# Patient Record
Sex: FEMALE | Race: WHITE | NOT HISPANIC OR LATINO | Employment: OTHER | URBAN - METROPOLITAN AREA
[De-identification: names, ages, dates, MRNs, and addresses within clinical notes are randomized per-mention and may not be internally consistent; named-entity substitution may affect disease eponyms.]

---

## 2017-01-17 PROBLEM — I83.90 VARICOSE VEIN OF LEG: Status: ACTIVE | Noted: 2017-01-17

## 2017-01-17 PROBLEM — M17.10 PRIMARY OSTEOARTHRITIS OF KNEE: Status: ACTIVE | Noted: 2017-01-17

## 2017-07-17 PROBLEM — R73.01 FASTING HYPERGLYCEMIA: Status: ACTIVE | Noted: 2017-07-17

## 2017-07-17 PROBLEM — R09.89 LABILE HYPERTENSION: Status: ACTIVE | Noted: 2017-07-17

## 2017-07-22 PROBLEM — E55.9 VITAMIN D DEFICIENCY: Status: ACTIVE | Noted: 2017-07-22

## 2017-07-22 PROBLEM — D64.9 NORMOCYTIC ANEMIA: Status: ACTIVE | Noted: 2017-07-22

## 2017-07-22 PROBLEM — I83.90 VARICOSE VEIN OF LEG: Chronic | Status: ACTIVE | Noted: 2017-01-17

## 2017-07-22 PROBLEM — R09.89 LABILE HYPERTENSION: Chronic | Status: ACTIVE | Noted: 2017-07-17

## 2017-07-22 PROBLEM — Z79.899 ENCOUNTER FOR LONG-TERM (CURRENT) USE OF OTHER MEDICATIONS: Chronic | Status: ACTIVE | Noted: 2017-07-22

## 2017-07-22 PROBLEM — E55.9 VITAMIN D DEFICIENCY: Chronic | Status: ACTIVE | Noted: 2017-07-22

## 2017-07-22 PROBLEM — Z79.899 ENCOUNTER FOR LONG-TERM (CURRENT) USE OF OTHER MEDICATIONS: Status: ACTIVE | Noted: 2017-07-22

## 2017-09-16 ENCOUNTER — OFFICE VISIT (OUTPATIENT)
Dept: URGENT CARE | Facility: CLINIC | Age: 77
End: 2017-09-16
Payer: MEDICARE

## 2017-09-16 VITALS
HEART RATE: 58 BPM | TEMPERATURE: 97 F | OXYGEN SATURATION: 97 % | SYSTOLIC BLOOD PRESSURE: 158 MMHG | DIASTOLIC BLOOD PRESSURE: 69 MMHG | RESPIRATION RATE: 16 BRPM

## 2017-09-16 DIAGNOSIS — R05.9 COUGH: Primary | ICD-10-CM

## 2017-09-16 PROCEDURE — 99203 OFFICE O/P NEW LOW 30 MIN: CPT | Mod: S$GLB,,, | Performed by: EMERGENCY MEDICINE

## 2017-09-16 PROCEDURE — 3077F SYST BP >= 140 MM HG: CPT | Mod: S$GLB,,, | Performed by: EMERGENCY MEDICINE

## 2017-09-16 PROCEDURE — 3008F BODY MASS INDEX DOCD: CPT | Mod: S$GLB,,, | Performed by: EMERGENCY MEDICINE

## 2017-09-16 PROCEDURE — 3078F DIAST BP <80 MM HG: CPT | Mod: S$GLB,,, | Performed by: EMERGENCY MEDICINE

## 2017-09-16 PROCEDURE — 1159F MED LIST DOCD IN RCRD: CPT | Mod: S$GLB,,, | Performed by: EMERGENCY MEDICINE

## 2017-09-16 RX ORDER — AZITHROMYCIN 500 MG/1
500 TABLET, FILM COATED ORAL DAILY
Qty: 5 TABLET | Refills: 0 | Status: SHIPPED | OUTPATIENT
Start: 2017-09-16 | End: 2017-09-21

## 2017-09-16 RX ORDER — METHYLPREDNISOLONE 4 MG/1
4 TABLET ORAL DAILY
Qty: 1 PACKAGE | Refills: 0 | Status: SHIPPED | OUTPATIENT
Start: 2017-09-16 | End: 2017-09-21

## 2017-09-16 NOTE — PATIENT INSTRUCTIONS
Understanding Nasal Allergies  Nasal allergies (also called allergic rhinitis) are a common health problem. They may be seasonal. This means they cause symptoms only at certain times of the year. Or they may be perennial. This means they cause symptoms all year long. Other health problems, such as asthma, often occur along with allergies as well.    What is an allergic reaction?  An allergy is a reaction to a substance called an allergen. Common allergens include:  · Wind-borne pollen  · Mold  · Dust mites  · Furry and feathered animals  · Cockroaches  Normally, allergens are harmless. But when a person has allergies, the body thinks they are harmful. The body then attacks allergens with antibodies. Antibodies are attached to special cells called mast cells. Allergens stick to the antibodies. This makes the mast cells release histamine and other chemicals. This is an allergic reaction. The chemicals irritate nearby nasal tissue. This causes nasal allergy symptoms.  Common nasal allergy symptoms  Allergies can cause nasal tissue to swell. This makes the air passages smaller. The nose may feel stuffed up. The nose may also make extra mucus, which can plug the nasal passages or drip out of the nose. Mucus can drip down the back of the throat (postnasal drip) as well. Sinus tissue can swell. This may cause pain and headache. Common allergy symptoms include:  · Runny nose with clear, watery discharge  · Stuffy nose (nasal congestion)  · Drainage down your throat (postnasal drip)  · Sneezing  · Red, watery eyes  · Itchy nose, eyes, ears, and throat  · Plugged-up ears (ear congestion)  · Sore throat  · Coughing  · Sinus pain and swelling  · Headache  It may not be allergies  Other health problems can cause symptoms like those of nasal allergies. These include:  · Nonallergic rhinitis and viruses such as colds  · Irritants and pollutants, such as strong odors or smoke  · Certain medicines  · Changes in the weather    Treatment  Your healthcare provider will evaluate you to find the cause of your symptoms then recommend treatment. If your symptoms are due to nasal allergies, your healthcare provider may prescribe nasal steroid sprays or oral antihistamines to help reduce symptoms. Avoidance of the allergen will also be suggested. You may also be referred to an allergist.   Date Last Reviewed: 10/1/2016  © 7869-4717 [employer]. [address] [place] [address], [place] [address]. All rights reserved. This information is not intended as a substitute for professional medical care. Always follow your healthcare professional's instructions.

## 2017-09-16 NOTE — LETTER
September 16, 2017      [facility] [address], [address]  [place] [address]  Phone: [phone]  Fax: [phone]       Patient: Sandra Wilson   YOB: 1940  Date of Visit: 09/16/2017    To Whom It May Concern:    Annette Wilson  was at [facility] on 09/16/2017. She may return to work/school on 9/17/2017 with no restrictions. If you have any questions or concerns, or if I can be of further assistance, please do not hesitate to contact me.    Sincerely,    Myke Smart MA

## 2017-09-16 NOTE — PROGRESS NOTES
Subjective:       Patient ID: Sandra Wilson is a 77 y.o. female.    Vitals:  oral temperature is 97.2 °F (36.2 °C). Her blood pressure is 158/69 (abnormal) and her pulse is 58 (abnormal). Her respiration is 16 and oxygen saturation is 97%.     Chief Complaint: Cough    PT C/O DRY COUGH, 3 WEEKS, INTERMITTENT, SINUS CONGESTION, PT STATED SHE WENT TO ENT, RECEIVED STEROID SHOT, COUGH SYRUP WITH CODEINE WITH MILD RELIEF, SYMPTOMS RETURNED,       Review of Systems   Constitution: Negative for chills, fever and malaise/fatigue.   HENT: Positive for congestion. Negative for ear pain, hoarse voice and sore throat.    Eyes: Negative for discharge and redness.   Cardiovascular: Negative for chest pain, dyspnea on exertion and leg swelling.   Respiratory: Positive for cough and wheezing. Negative for shortness of breath and sputum production.    Musculoskeletal: Negative for myalgias.   Gastrointestinal: Negative for abdominal pain and nausea.   Neurological: Negative for headaches.       Objective:      Physical Exam   Constitutional: She is oriented to person, place, and time. She appears well-developed and well-nourished. She is cooperative.  Non-toxic appearance. She does not appear ill. No distress.   HENT:   Head: Normocephalic and atraumatic.   Right Ear: Hearing, tympanic membrane, external ear and ear canal normal.   Left Ear: Hearing, tympanic membrane, external ear and ear canal normal.   Nose: Mucosal edema present. No rhinorrhea or nasal deformity. No epistaxis. Right sinus exhibits no maxillary sinus tenderness and no frontal sinus tenderness. Left sinus exhibits no maxillary sinus tenderness and no frontal sinus tenderness.   Mouth/Throat: Uvula is midline and mucous membranes are normal. No trismus in the jaw. Normal dentition. No uvula swelling. Posterior oropharyngeal erythema present.   Eyes: Conjunctivae and lids are normal. Right eye exhibits no discharge. Left eye exhibits no discharge. No scleral  icterus.   Sclera clear bilat   Neck: Trachea normal, normal range of motion, full passive range of motion without pain and phonation normal. Neck supple.   Cardiovascular: Normal rate, regular rhythm, normal heart sounds, intact distal pulses and normal pulses.    Pulmonary/Chest: Effort normal and breath sounds normal. No respiratory distress.   Abdominal: Soft. Normal appearance. She exhibits no distension, no pulsatile midline mass and no mass. There is no tenderness.   Musculoskeletal: Normal range of motion. She exhibits no edema or deformity.   Neurological: She is alert and oriented to person, place, and time. She exhibits normal muscle tone. Coordination normal.   Skin: Skin is warm, dry and intact. She is not diaphoretic. No pallor.   Psychiatric: She has a normal mood and affect. Her speech is normal and behavior is normal. Judgment and thought content normal. Cognition and memory are normal.   Nursing note and vitals reviewed.      Assessment:       1. Cough        Plan:         Cough  -     methylPREDNISolone (MEDROL DOSEPACK) 4 mg tablet; Take 1 tablet (4 mg total) by mouth once daily. use as directed  Dispense: 1 Package; Refill: 0  -     azithromycin (ZITHROMAX) 500 MG tablet; Take 1 tablet (500 mg total) by mouth once daily.  Dispense: 5 tablet; Refill: 0

## 2017-09-19 ENCOUNTER — TELEPHONE (OUTPATIENT)
Dept: URGENT CARE | Facility: CLINIC | Age: 77
End: 2017-09-19

## 2018-04-10 ENCOUNTER — OFFICE VISIT (OUTPATIENT)
Dept: URGENT CARE | Facility: CLINIC | Age: 78
End: 2018-04-10
Payer: MEDICARE

## 2018-04-10 VITALS
OXYGEN SATURATION: 98 % | RESPIRATION RATE: 16 BRPM | WEIGHT: 181 LBS | HEART RATE: 99 BPM | BODY MASS INDEX: 33.31 KG/M2 | DIASTOLIC BLOOD PRESSURE: 86 MMHG | HEIGHT: 62 IN | SYSTOLIC BLOOD PRESSURE: 168 MMHG | TEMPERATURE: 103 F

## 2018-04-10 DIAGNOSIS — J06.9 UPPER RESPIRATORY TRACT INFECTION, UNSPECIFIED TYPE: Primary | ICD-10-CM

## 2018-04-10 LAB
CTP QC/QA: YES
FLUAV AG NPH QL: NEGATIVE
FLUBV AG NPH QL: NEGATIVE

## 2018-04-10 PROCEDURE — 3077F SYST BP >= 140 MM HG: CPT | Mod: CPTII,S$GLB,, | Performed by: EMERGENCY MEDICINE

## 2018-04-10 PROCEDURE — 3079F DIAST BP 80-89 MM HG: CPT | Mod: CPTII,S$GLB,, | Performed by: EMERGENCY MEDICINE

## 2018-04-10 PROCEDURE — 99214 OFFICE O/P EST MOD 30 MIN: CPT | Mod: S$GLB,,, | Performed by: EMERGENCY MEDICINE

## 2018-04-10 PROCEDURE — 87804 INFLUENZA ASSAY W/OPTIC: CPT | Mod: QW,S$GLB,, | Performed by: EMERGENCY MEDICINE

## 2018-04-10 RX ORDER — AZITHROMYCIN 500 MG/1
500 TABLET, FILM COATED ORAL DAILY
Qty: 5 TABLET | Refills: 0 | Status: SHIPPED | OUTPATIENT
Start: 2018-04-10 | End: 2018-04-15

## 2018-04-10 NOTE — PATIENT INSTRUCTIONS
Understanding Sinus Problems    You dont often think about your sinuses until theres a problem. One day you realize you cant smell dinner cooking. Or you find you often have headaches or problems breathing through your nose.  Symptoms of sinus problems  Sinus problems can cause uncomfortable symptoms. Your nose may run constantly. You might have trouble sleeping at night. You may even lose your sense of smell. Other symptoms can include:  · Nasal congestion  · Fullness in ears  · Green, yellow, or bloody drainage from the nose  · Trouble tasting food  · Frequent headaches  · Facial pain  · Cough  When sinuses are blocked  If something blocks the passages in the nose or sinuses, mucus cant drain. Mucus-filled sinuses often become infected.  · Colds cause the lining of the nose and sinuses to swell and make extra mucus. A buildup of mucus can lead to a more serious infection.  · Allergies irritate turbinates and other tissues. This causes swelling, which can cause a blockage. Over time, this irritation can also lead to sacs of swollen tissue (polyps).  · Polyps may form in both the sinuses and nose. Polyps can grow large enough to clog nasal passages and block drainage.  · A crooked (deviated) septum may block nasal passages. This is often the result of an injury.  Date Last Reviewed: 11/1/2016  © 7722-6945 [employer]. [address] [place] [address], [place] [address]. All rights reserved. This information is not intended as a substitute for professional medical care. Always follow your healthcare professional's instructions.

## 2018-04-10 NOTE — PROGRESS NOTES
"Subjective:       Patient ID: Sandra Wilson is a 77 y.o. female.    Vitals:  height is 5' 2" (1.575 m) and weight is 82.1 kg (181 lb). Her temperature is 102.5 °F (39.2 °C) (abnormal). Her blood pressure is 168/86 (abnormal) and her pulse is 99. Her respiration is 16 and oxygen saturation is 98%.     Chief Complaint: Fever    Fever    This is a new problem. The current episode started today. The problem has been gradually worsening. Pertinent negatives include no abdominal pain, chest pain, congestion, coughing, ear pain, headaches, nausea, sore throat or wheezing.     Review of Systems   Constitution: Positive for fever. Negative for chills and malaise/fatigue.   HENT: Negative for congestion, ear pain, hoarse voice and sore throat.    Eyes: Negative for discharge and redness.   Cardiovascular: Negative for chest pain, dyspnea on exertion and leg swelling.   Respiratory: Negative for cough, shortness of breath, sputum production and wheezing.    Musculoskeletal: Negative for myalgias.   Gastrointestinal: Negative for abdominal pain and nausea.   Neurological: Negative for headaches.       Objective:      Physical Exam   Constitutional: She is oriented to person, place, and time. She appears well-developed and well-nourished. She is cooperative.  Non-toxic appearance. She does not appear ill. No distress.   HENT:   Head: Normocephalic and atraumatic.   Right Ear: Hearing, tympanic membrane, external ear and ear canal normal.   Left Ear: Hearing, tympanic membrane, external ear and ear canal normal.   Nose: Mucosal edema and rhinorrhea present. No nasal deformity. No epistaxis. Right sinus exhibits no maxillary sinus tenderness and no frontal sinus tenderness. Left sinus exhibits no maxillary sinus tenderness and no frontal sinus tenderness.   Mouth/Throat: Uvula is midline and mucous membranes are normal. No trismus in the jaw. Normal dentition. No uvula swelling. Posterior oropharyngeal erythema present.   Eyes: " Conjunctivae and lids are normal. Right eye exhibits no discharge. Left eye exhibits no discharge. No scleral icterus.   Sclera clear bilat   Neck: Trachea normal, normal range of motion, full passive range of motion without pain and phonation normal. Neck supple.   Cardiovascular: Normal rate, regular rhythm, normal heart sounds, intact distal pulses and normal pulses.    Pulmonary/Chest: Effort normal and breath sounds normal. No respiratory distress.   Abdominal: Soft. Normal appearance. She exhibits no pulsatile midline mass.   Musculoskeletal: Normal range of motion. She exhibits no edema or deformity.   Neurological: She is alert and oriented to person, place, and time. She exhibits normal muscle tone. Coordination normal.   Skin: Skin is warm, dry and intact. She is not diaphoretic. No pallor.   Psychiatric: She has a normal mood and affect. Her speech is normal and behavior is normal. Judgment and thought content normal. Cognition and memory are normal.   Nursing note and vitals reviewed.      Assessment:       1. Upper respiratory tract infection, unspecified type        Plan:         Upper respiratory tract infection, unspecified type  -     POCT Influenza A/B  -     azithromycin (ZITHROMAX) 500 MG tablet; Take 1 tablet (500 mg total) by mouth once daily. Take 1 tablet daily for 5 days.  Dispense: 5 tablet; Refill: 0      Influenza screen is negative.

## 2018-04-13 ENCOUNTER — TELEPHONE (OUTPATIENT)
Dept: URGENT CARE | Facility: CLINIC | Age: 78
End: 2018-04-13

## 2018-06-13 PROBLEM — I63.9 CVA (CEREBRAL VASCULAR ACCIDENT): Status: ACTIVE | Noted: 2018-06-13

## 2018-06-27 DIAGNOSIS — I63.9 CVA (CEREBRAL VASCULAR ACCIDENT): Primary | ICD-10-CM

## 2018-06-27 DIAGNOSIS — I63.9 CVA (CEREBRAL VASCULAR ACCIDENT): ICD-10-CM

## 2018-07-06 PROBLEM — Z74.09 IMPAIRED MOBILITY AND ADLS: Status: ACTIVE | Noted: 2018-07-06

## 2018-07-06 PROBLEM — I69.393 ATAXIA DUE TO RECENT CEREBROVASCULAR ACCIDENT (CVA): Status: ACTIVE | Noted: 2018-07-06

## 2018-07-06 PROBLEM — Z78.9 IMPAIRED MOBILITY AND ADLS: Status: ACTIVE | Noted: 2018-07-06

## 2018-09-26 PROBLEM — R47.1 DYSARTHRIA: Status: ACTIVE | Noted: 2018-09-26

## 2018-09-26 PROBLEM — R47.1 DYSARTHRIA: Chronic | Status: ACTIVE | Noted: 2018-09-26

## 2019-02-24 ENCOUNTER — OFFICE VISIT (OUTPATIENT)
Dept: URGENT CARE | Facility: CLINIC | Age: 79
End: 2019-02-24
Payer: MEDICARE

## 2019-02-24 VITALS
HEART RATE: 82 BPM | DIASTOLIC BLOOD PRESSURE: 56 MMHG | BODY MASS INDEX: 29.99 KG/M2 | HEIGHT: 65 IN | TEMPERATURE: 98 F | OXYGEN SATURATION: 97 % | SYSTOLIC BLOOD PRESSURE: 95 MMHG | WEIGHT: 180 LBS

## 2019-02-24 DIAGNOSIS — R19.7 DIARRHEA, UNSPECIFIED TYPE: Primary | ICD-10-CM

## 2019-02-24 PROCEDURE — 99213 OFFICE O/P EST LOW 20 MIN: CPT | Mod: S$GLB,,, | Performed by: INTERNAL MEDICINE

## 2019-02-24 PROCEDURE — 99213 PR OFFICE/OUTPT VISIT, EST, LEVL III, 20-29 MIN: ICD-10-PCS | Mod: S$GLB,,, | Performed by: INTERNAL MEDICINE

## 2019-02-24 RX ORDER — LEVOTHYROXINE SODIUM 25 UG/1
25 TABLET ORAL EVERY MORNING
COMMUNITY
Start: 2018-11-28

## 2019-02-24 RX ORDER — ALBUTEROL SULFATE 90 UG/1
AEROSOL, METERED RESPIRATORY (INHALATION)
Status: ON HOLD | COMMUNITY
Start: 2018-12-19 | End: 2021-12-17

## 2019-02-24 RX ORDER — OLMESARTAN MEDOXOMIL 40 MG/1
TABLET ORAL
Status: ON HOLD | COMMUNITY
Start: 2019-02-19 | End: 2021-12-17

## 2019-02-24 RX ORDER — AMLODIPINE BESYLATE 5 MG/1
TABLET ORAL
Status: ON HOLD | COMMUNITY
Start: 2019-02-14 | End: 2021-12-17

## 2019-02-24 NOTE — PATIENT INSTRUCTIONS

## 2019-02-24 NOTE — PROGRESS NOTES
"Subjective:       Patient ID: Sandra Wilson is a 78 y.o. female.    Vitals:  height is 5' 4.5" (1.638 m) and weight is 81.6 kg (180 lb). Her oral temperature is 98.1 °F (36.7 °C). Her blood pressure is 95/56 (abnormal) and her pulse is 82. Her oxygen saturation is 97%.     Chief Complaint: Diarrhea    Patient  began having diarrhea X 6 since 1 PM today . She denies abdominal pain or dizziness.  She had a ham & cheese sandwich yesterday at 8 pm at restaurant.  She took imodium today @3pm She drank milk this AM and 1 bottle of water this afternoon.  She plans to increase fluids with Pedialite      Diarrhea    This is a new problem. The current episode started today. The problem occurs 5 to 10 times per day. The problem has been gradually worsening. The stool consistency is described as watery and mucous. The patient states that diarrhea does not awaken her from sleep. Associated symptoms include bloating. Pertinent negatives include no arthralgias, chills, coughing, fever, headaches, myalgias or vomiting. Nothing aggravates the symptoms. The treatment provided no relief.       Constitution: Negative for chills, fatigue and fever.   HENT: Negative for congestion and sore throat.    Neck: Negative for painful lymph nodes.   Cardiovascular: Negative for chest pain and leg swelling.   Eyes: Negative for double vision and blurred vision.   Respiratory: Negative for cough and shortness of breath.    Gastrointestinal: Positive for diarrhea. Negative for nausea and vomiting.   Genitourinary: Negative for frequency.   Musculoskeletal: Negative for joint pain, joint swelling, muscle cramps and muscle ache.   Skin: Negative for color change, pale, rash and bruising.   Allergic/Immunologic: Negative for seasonal allergies.   Neurological: Negative for dizziness, light-headedness, passing out and headaches.   Hematologic/Lymphatic: Negative for swollen lymph nodes.   Psychiatric/Behavioral: Negative for nervous/anxious. The " patient is not nervous/anxious.        Objective:      Physical Exam   Constitutional: She is oriented to person, place, and time. She appears well-developed and well-nourished. No distress.   HENT:   Head: Normocephalic and atraumatic.   Right Ear: External ear normal.   Left Ear: External ear normal.   Nose: Nose normal.   Mouth/Throat: Mucous membranes are normal.   Eyes: Conjunctivae and lids are normal.   Neck: Trachea normal and full passive range of motion without pain. Neck supple.   Cardiovascular: Normal rate, regular rhythm and normal heart sounds.   Pulmonary/Chest: Effort normal and breath sounds normal. No respiratory distress.   Abdominal: Soft. Normal appearance and bowel sounds are normal. She exhibits no distension, no abdominal bruit, no pulsatile midline mass and no mass. There is no tenderness. There is no guarding.   Musculoskeletal: Normal range of motion. She exhibits no edema.   Neurological: She is alert and oriented to person, place, and time. She has normal strength.   Skin: Skin is warm, dry and intact. No rash noted. No pallor.   Psychiatric: She has a normal mood and affect. Her speech is normal and behavior is normal. Cognition and memory are normal.   Nursing note and vitals reviewed.      Assessment:       1. Diarrhea, unspecified type        Plan:         Diarrhea, unspecified type

## 2019-02-27 ENCOUNTER — TELEPHONE (OUTPATIENT)
Dept: URGENT CARE | Facility: CLINIC | Age: 79
End: 2019-02-27

## 2020-01-14 ENCOUNTER — OFFICE VISIT (OUTPATIENT)
Dept: URGENT CARE | Facility: CLINIC | Age: 80
End: 2020-01-14
Payer: MEDICARE

## 2020-01-14 VITALS
HEART RATE: 77 BPM | HEIGHT: 65 IN | OXYGEN SATURATION: 97 % | TEMPERATURE: 98 F | RESPIRATION RATE: 16 BRPM | SYSTOLIC BLOOD PRESSURE: 132 MMHG | WEIGHT: 180 LBS | DIASTOLIC BLOOD PRESSURE: 68 MMHG | BODY MASS INDEX: 29.99 KG/M2

## 2020-01-14 DIAGNOSIS — L02.01 ABSCESS OF CHIN: Primary | ICD-10-CM

## 2020-01-14 PROCEDURE — 99214 OFFICE O/P EST MOD 30 MIN: CPT | Mod: S$GLB,,, | Performed by: PHYSICIAN ASSISTANT

## 2020-01-14 PROCEDURE — 99214 PR OFFICE/OUTPT VISIT, EST, LEVL IV, 30-39 MIN: ICD-10-PCS | Mod: S$GLB,,, | Performed by: PHYSICIAN ASSISTANT

## 2020-01-14 RX ORDER — DOXYCYCLINE HYCLATE 100 MG
100 TABLET ORAL 2 TIMES DAILY
Qty: 20 TABLET | Refills: 0 | Status: SHIPPED | OUTPATIENT
Start: 2020-01-14 | End: 2020-01-24

## 2020-01-15 NOTE — PATIENT INSTRUCTIONS
Abscess (Incision & Drainage)  An abscess is sometimes called a boil. It happens when bacteria get trapped under the skin and start to grow. Pus forms inside the abscess as the body responds to the bacteria. An abscess can happen with an insect bite, ingrown hair, blocked oil gland, pimple, cyst, or puncture wound.  Your healthcare provider has drained the pus from your abscess. If the abscess pocket was large, your healthcare provider may have put in gauze packing. Your provider will need to remove it on your next visit. He or she may also replace it at that time. You may not need antibiotics to treat a simple abscess, unless the infection is spreading into the skin around the wound (cellulitis).  The wound will take about 1 to 2 weeks to heal, depending on the size of the abscess. Healthy tissue will grow from the bottom and sides of the opening until it seals over.  Home care  These tips can help your wound heal:  · The wound may drain for the first 2 days. Cover the wound with a clean dry dressing. Change the dressing if it becomes soaked with blood or pus.  · If a gauze packing was placed inside the abscess pocket, you may be told to remove it yourself. You may do this in the shower. Once the packing is removed, you should wash the area in the shower, or clean the area as directed by your provider. Continue to do this until the skin opening has closed. Make sure you wash your hands after changing the packing or cleaning the wound.  · If you were prescribed antibiotics, take them as directed until they are all gone.  · You may use acetaminophen or ibuprofen to control pain, unless another pain medicine was prescribed. If you have liver disease or ever had a stomach ulcer, talk with your doctor before using these medicines.  Follow-up care  Follow up with your healthcare provider, or as advised. If a gauze packing was put in your wound, it should be removed in 1 to 2 days. Check your wound every day for any  signs that the infection is getting worse. The signs are listed below.  When to seek medical advice  Call your healthcare provider right away if any of these occur:  · Increasing redness or swelling  · Red streaks in the skin leading away from the wound  · Increasing local pain or swelling  · Continued pus draining from the wound 2 days after treatment  · Fever of 100.4ºF (38ºC) or higher, or as directed by your healthcare provider  · Boil returns when you are at home  Date Last Reviewed: 9/1/2016  © 2987-5295 [employer]. [address] [place] [address]. All rights reserved. This information is not intended as a substitute for professional medical care. Always follow your healthcare professional's instructions.       If not allergic,take tylenol (acetominophen) for fever control, chills, or body aches every 4 hours. Do not exceed 4000 mg/ day.If not allergic, take Motrin (Ibuprofen) every 4 hours for fever, chills, pain or inflammation. Do not exceed 2400 mg/day. You can alternate taking tylenol and motrin.    If you were prescribed a narcotic medication, do not drive or operate heavy equipment or machinery while taking these medications.  You must understand that you've received an Urgent Care treatment only and that you may be released before all your medical problems are known or treated. You, the patient, will arrange for follow up care as instructed.  Follow up with your PCP or specialty clinic as directed in the next 1-2 weeks if not improved or as needed.  You can call [phone] to schedule an appointment with the appropriate provider.  If your condition worsens we recommend that you receive another evaluation at the emergency room immediately or contact your primary medical clinics after hours call service to discuss your concerns.    Please return here or go to the Emergency Department for any concerns or worsening of condition.    If you have been referred to another provider  and wish to call to check on the status of your referral, please call [facility] at [phone]

## 2020-01-15 NOTE — PROGRESS NOTES
"Subjective:       Patient ID: Sandra Wilson is a 79 y.o. female.    Vitals:  height is 5' 4.5" (1.638 m) and weight is 81.6 kg (180 lb). Her oral temperature is 98.2 °F (36.8 °C). Her blood pressure is 132/68 and her pulse is 77. Her respiration is 16 and oxygen saturation is 97%.     Chief Complaint: Wound Check    Patient fell and injured chin on new years nataly. She was put on antibiotics and now the wound is draining with fluid. Patient has not used anything for it.    Wound Check   She was originally treated more than 14 days ago. There has been colored discharge from the wound. There is new redness present. There is new swelling present. There is new pain present.       Constitution: Negative for chills, fatigue and fever.   HENT: Negative for congestion and sore throat.    Neck: Negative for painful lymph nodes.   Cardiovascular: Negative for chest pain and leg swelling.   Eyes: Negative for double vision and blurred vision.   Respiratory: Negative for cough and shortness of breath.    Gastrointestinal: Negative for nausea, vomiting and diarrhea.   Genitourinary: Negative for dysuria, frequency, urgency and history of kidney stones.   Musculoskeletal: Negative for joint pain, joint swelling, muscle cramps and muscle ache.   Skin: Positive for color change. Negative for pale, rash and bruising.   Allergic/Immunologic: Negative for seasonal allergies.   Neurological: Negative for dizziness, history of vertigo, light-headedness, passing out and headaches.   Hematologic/Lymphatic: Negative for swollen lymph nodes.   Psychiatric/Behavioral: Negative for nervous/anxious, sleep disturbance and depression. The patient is not nervous/anxious.        Objective:      Physical Exam   Constitutional: She is oriented to person, place, and time. She appears well-developed and well-nourished. She is cooperative.  Non-toxic appearance. She does not appear ill. No distress.   HENT:   Head: Normocephalic and atraumatic. "       Right Ear: Hearing, tympanic membrane, external ear and ear canal normal.   Left Ear: Hearing, tympanic membrane, external ear and ear canal normal.   Nose: Nose normal. No mucosal edema, rhinorrhea or nasal deformity. No epistaxis. Right sinus exhibits no maxillary sinus tenderness and no frontal sinus tenderness. Left sinus exhibits no maxillary sinus tenderness and no frontal sinus tenderness.   Mouth/Throat: Uvula is midline, oropharynx is clear and moist and mucous membranes are normal. No trismus in the jaw. Normal dentition. No uvula swelling. No posterior oropharyngeal erythema.   Eyes: Conjunctivae and lids are normal. Right eye exhibits no discharge. Left eye exhibits no discharge. No scleral icterus.   Neck: Trachea normal, normal range of motion, full passive range of motion without pain and phonation normal. Neck supple.   Cardiovascular: Normal rate, regular rhythm, normal heart sounds, intact distal pulses and normal pulses.   Pulmonary/Chest: Effort normal and breath sounds normal. No respiratory distress.   Abdominal: Soft. Normal appearance and bowel sounds are normal. She exhibits no distension, no pulsatile midline mass and no mass. There is no tenderness.   Musculoskeletal: Normal range of motion. She exhibits no edema or deformity.   Neurological: She is alert and oriented to person, place, and time. She exhibits normal muscle tone. Coordination normal.   Skin: Skin is warm, dry, intact, not diaphoretic and not pale.   Psychiatric: She has a normal mood and affect. Her speech is normal and behavior is normal. Judgment and thought content normal. Cognition and memory are normal.   Nursing note and vitals reviewed.        Assessment:       1. Abscess of chin        Plan:         Abscess of chin  -     doxycycline (VIBRA-TABS) 100 MG tablet; Take 1 tablet (100 mg total) by mouth 2 (two) times daily. Take as directed until bottle is empty for 10 days  Dispense: 20 tablet; Refill:  0      Patient Instructions     Abscess (Incision & Drainage)  An abscess is sometimes called a boil. It happens when bacteria get trapped under the skin and start to grow. Pus forms inside the abscess as the body responds to the bacteria. An abscess can happen with an insect bite, ingrown hair, blocked oil gland, pimple, cyst, or puncture wound.  Your healthcare provider has drained the pus from your abscess. If the abscess pocket was large, your healthcare provider may have put in gauze packing. Your provider will need to remove it on your next visit. He or she may also replace it at that time. You may not need antibiotics to treat a simple abscess, unless the infection is spreading into the skin around the wound (cellulitis).  The wound will take about 1 to 2 weeks to heal, depending on the size of the abscess. Healthy tissue will grow from the bottom and sides of the opening until it seals over.  Home care  These tips can help your wound heal:  · The wound may drain for the first 2 days. Cover the wound with a clean dry dressing. Change the dressing if it becomes soaked with blood or pus.  · If a gauze packing was placed inside the abscess pocket, you may be told to remove it yourself. You may do this in the shower. Once the packing is removed, you should wash the area in the shower, or clean the area as directed by your provider. Continue to do this until the skin opening has closed. Make sure you wash your hands after changing the packing or cleaning the wound.  · If you were prescribed antibiotics, take them as directed until they are all gone.  · You may use acetaminophen or ibuprofen to control pain, unless another pain medicine was prescribed. If you have liver disease or ever had a stomach ulcer, talk with your doctor before using these medicines.  Follow-up care  Follow up with your healthcare provider, or as advised. If a gauze packing was put in your wound, it should be removed in 1 to 2 days. Check  your wound every day for any signs that the infection is getting worse. The signs are listed below.  When to seek medical advice  Call your healthcare provider right away if any of these occur:  · Increasing redness or swelling  · Red streaks in the skin leading away from the wound  · Increasing local pain or swelling  · Continued pus draining from the wound 2 days after treatment  · Fever of 100.4ºF (38ºC) or higher, or as directed by your healthcare provider  · Boil returns when you are at home  Date Last Reviewed: 9/1/2016 © 2000-2017 [employer]. [address] [place] [id number]. All rights reserved. This information is not intended as a substitute for professional medical care. Always follow your healthcare professional's instructions.       If not allergic,take tylenol (acetominophen) for fever control, chills, or body aches every 4 hours. Do not exceed 4000 mg/ day.If not allergic, take Motrin (Ibuprofen) every 4 hours for fever, chills, pain or inflammation. Do not exceed 2400 mg/day. You can alternate taking tylenol and motrin.    If you were prescribed a narcotic medication, do not drive or operate heavy equipment or machinery while taking these medications.  You must understand that you've received an Urgent Care treatment only and that you may be released before all your medical problems are known or treated. You, the patient, will arrange for follow up care as instructed.  Follow up with your PCP or specialty clinic as directed in the next 1-2 weeks if not improved or as needed.  You can call [phone] to schedule an appointment with the appropriate provider.  If your condition worsens we recommend that you receive another evaluation at the emergency room immediately or contact your primary medical clinics after hours call service to discuss your concerns.    Please return here or go to the Emergency Department for any concerns or worsening of condition.    If you have been  referred to another provider and wish to call to check on the status of your referral, please call [facility] Sched[facility] at [phone]

## 2020-01-17 ENCOUNTER — TELEPHONE (OUTPATIENT)
Dept: URGENT CARE | Facility: CLINIC | Age: 80
End: 2020-01-17

## 2021-12-03 ENCOUNTER — HOSPITAL ENCOUNTER (EMERGENCY)
Facility: HOSPITAL | Age: 81
Discharge: HOME | End: 2021-12-03
Attending: EMERGENCY MEDICINE
Payer: MEDICARE

## 2021-12-03 VITALS
SYSTOLIC BLOOD PRESSURE: 117 MMHG | DIASTOLIC BLOOD PRESSURE: 56 MMHG | RESPIRATION RATE: 22 BRPM | BODY MASS INDEX: 28.68 KG/M2 | HEIGHT: 64 IN | HEART RATE: 80 BPM | OXYGEN SATURATION: 98 % | TEMPERATURE: 98 F | WEIGHT: 168 LBS

## 2021-12-03 DIAGNOSIS — Z43.1 PEG (PERCUTANEOUS ENDOSCOPIC GASTROSTOMY) ADJUSTMENT/REPLACEMENT/REMOVAL: Primary | ICD-10-CM

## 2021-12-03 PROCEDURE — 43762 RPLC GTUBE NO REVJ TRC: CPT

## 2021-12-03 PROCEDURE — 99283 EMERGENCY DEPT VISIT LOW MDM: CPT | Mod: 25

## 2021-12-09 ENCOUNTER — HOSPITAL ENCOUNTER (EMERGENCY)
Facility: HOSPITAL | Age: 81
Discharge: HOME | End: 2021-12-09
Attending: EMERGENCY MEDICINE
Payer: MEDICARE

## 2021-12-09 VITALS
HEIGHT: 64 IN | WEIGHT: 168 LBS | SYSTOLIC BLOOD PRESSURE: 106 MMHG | TEMPERATURE: 98 F | RESPIRATION RATE: 17 BRPM | BODY MASS INDEX: 28.68 KG/M2 | DIASTOLIC BLOOD PRESSURE: 73 MMHG | HEART RATE: 90 BPM | OXYGEN SATURATION: 97 %

## 2021-12-09 DIAGNOSIS — K94.23 PEG TUBE MALFUNCTION: Primary | ICD-10-CM

## 2021-12-09 PROCEDURE — 43762 RPLC GTUBE NO REVJ TRC: CPT

## 2021-12-09 PROCEDURE — 99284 EMERGENCY DEPT VISIT MOD MDM: CPT | Mod: 25

## 2021-12-15 ENCOUNTER — HOSPITAL ENCOUNTER (OUTPATIENT)
Facility: HOSPITAL | Age: 81
Discharge: HOME | End: 2021-12-18
Attending: EMERGENCY MEDICINE | Admitting: HOSPITALIST
Payer: MEDICARE

## 2021-12-15 DIAGNOSIS — R07.9 CHEST PAIN: ICD-10-CM

## 2021-12-15 DIAGNOSIS — S09.90XA INJURY OF HEAD, INITIAL ENCOUNTER: Primary | ICD-10-CM

## 2021-12-15 PROBLEM — J96.10 CHRONIC RESPIRATORY FAILURE: Status: ACTIVE | Noted: 2021-12-15

## 2021-12-15 PROBLEM — Z86.79 HISTORY OF SUBDURAL HEMATOMA: Status: ACTIVE | Noted: 2021-12-15

## 2021-12-15 PROBLEM — E03.9 HYPOTHYROIDISM: Status: ACTIVE | Noted: 2021-12-15

## 2021-12-15 LAB
ALBUMIN SERPL BCP-MCNC: 3.6 G/DL (ref 3.5–5.2)
ALP SERPL-CCNC: 77 U/L (ref 55–135)
ALT SERPL W/O P-5'-P-CCNC: 26 U/L (ref 10–44)
ANION GAP SERPL CALC-SCNC: 13 MMOL/L (ref 8–16)
APTT BLDCRRT: 25.4 SEC (ref 21–32)
AST SERPL-CCNC: 24 U/L (ref 10–40)
BASOPHILS # BLD AUTO: 0.1 K/UL (ref 0–0.2)
BASOPHILS NFR BLD: 0.7 % (ref 0–1.9)
BILIRUB SERPL-MCNC: 0.7 MG/DL (ref 0.1–1)
BUN SERPL-MCNC: 19 MG/DL (ref 8–23)
CALCIUM SERPL-MCNC: 9.7 MG/DL (ref 8.7–10.5)
CHLORIDE SERPL-SCNC: 97 MMOL/L (ref 95–110)
CO2 SERPL-SCNC: 25 MMOL/L (ref 23–29)
CREAT SERPL-MCNC: 0.7 MG/DL (ref 0.5–1.4)
DIFFERENTIAL METHOD: ABNORMAL
EOSINOPHIL # BLD AUTO: 0.2 K/UL (ref 0–0.5)
EOSINOPHIL NFR BLD: 1.2 % (ref 0–8)
ERYTHROCYTE [DISTWIDTH] IN BLOOD BY AUTOMATED COUNT: 14.1 % (ref 11.5–14.5)
EST. GFR  (AFRICAN AMERICAN): >60 ML/MIN/1.73 M^2
EST. GFR  (NON AFRICAN AMERICAN): >60 ML/MIN/1.73 M^2
GLUCOSE SERPL-MCNC: 102 MG/DL (ref 70–110)
HCT VFR BLD AUTO: 34.4 % (ref 37–48.5)
HGB BLD-MCNC: 11.7 G/DL (ref 12–16)
IMM GRANULOCYTES # BLD AUTO: 0.07 K/UL (ref 0–0.04)
IMM GRANULOCYTES NFR BLD AUTO: 0.5 % (ref 0–0.5)
INR PPP: 1 (ref 0.8–1.2)
LYMPHOCYTES # BLD AUTO: 1.7 K/UL (ref 1–4.8)
LYMPHOCYTES NFR BLD: 12.3 % (ref 18–48)
MCH RBC QN AUTO: 32.2 PG (ref 27–31)
MCHC RBC AUTO-ENTMCNC: 34 G/DL (ref 32–36)
MCV RBC AUTO: 95 FL (ref 82–98)
MONOCYTES # BLD AUTO: 1.2 K/UL (ref 0.3–1)
MONOCYTES NFR BLD: 9 % (ref 4–15)
NEUTROPHILS # BLD AUTO: 10.5 K/UL (ref 1.8–7.7)
NEUTROPHILS NFR BLD: 76.3 % (ref 38–73)
NRBC BLD-RTO: 0 /100 WBC
PLATELET # BLD AUTO: 277 K/UL (ref 150–450)
PMV BLD AUTO: 10.6 FL (ref 9.2–12.9)
POTASSIUM SERPL-SCNC: 4 MMOL/L (ref 3.5–5.1)
PROT SERPL-MCNC: 7.4 G/DL (ref 6–8.4)
PROTHROMBIN TIME: 10.8 SEC (ref 9–12.5)
RBC # BLD AUTO: 3.63 M/UL (ref 4–5.4)
SODIUM SERPL-SCNC: 135 MMOL/L (ref 136–145)
WBC # BLD AUTO: 13.79 K/UL (ref 3.9–12.7)

## 2021-12-15 PROCEDURE — 80053 COMPREHEN METABOLIC PANEL: CPT | Performed by: EMERGENCY MEDICINE

## 2021-12-15 PROCEDURE — 36415 COLL VENOUS BLD VENIPUNCTURE: CPT | Performed by: EMERGENCY MEDICINE

## 2021-12-15 PROCEDURE — G0378 HOSPITAL OBSERVATION PER HR: HCPCS

## 2021-12-15 PROCEDURE — 85730 THROMBOPLASTIN TIME PARTIAL: CPT | Performed by: EMERGENCY MEDICINE

## 2021-12-15 PROCEDURE — 99285 EMERGENCY DEPT VISIT HI MDM: CPT | Mod: 25

## 2021-12-15 PROCEDURE — 85610 PROTHROMBIN TIME: CPT | Performed by: EMERGENCY MEDICINE

## 2021-12-15 PROCEDURE — 85025 COMPLETE CBC W/AUTO DIFF WBC: CPT | Performed by: EMERGENCY MEDICINE

## 2021-12-15 RX ORDER — LOSARTAN POTASSIUM 25 MG/1
100 TABLET ORAL DAILY
Status: DISCONTINUED | OUTPATIENT
Start: 2021-12-16 | End: 2021-12-17

## 2021-12-15 RX ORDER — GLYCOPYRROLATE 1 MG/1
1 TABLET ORAL 2 TIMES DAILY
Status: DISCONTINUED | OUTPATIENT
Start: 2021-12-16 | End: 2021-12-18

## 2021-12-15 RX ORDER — FAMOTIDINE 20 MG/1
20 TABLET, FILM COATED ORAL 2 TIMES DAILY
Status: ON HOLD | COMMUNITY
Start: 2021-12-13 | End: 2021-12-18 | Stop reason: HOSPADM

## 2021-12-15 RX ORDER — LANOLIN ALCOHOL/MO/W.PET/CERES
800 CREAM (GRAM) TOPICAL
Status: DISCONTINUED | OUTPATIENT
Start: 2021-12-16 | End: 2021-12-18 | Stop reason: HOSPADM

## 2021-12-15 RX ORDER — BUDESONIDE 0.5 MG/2ML
0.5 INHALANT ORAL 2 TIMES DAILY
Status: DISCONTINUED | OUTPATIENT
Start: 2021-12-16 | End: 2021-12-18 | Stop reason: HOSPADM

## 2021-12-15 RX ORDER — IPRATROPIUM BROMIDE AND ALBUTEROL SULFATE 2.5; .5 MG/3ML; MG/3ML
3 SOLUTION RESPIRATORY (INHALATION) EVERY 6 HOURS PRN
Status: DISCONTINUED | OUTPATIENT
Start: 2021-12-16 | End: 2021-12-18 | Stop reason: HOSPADM

## 2021-12-15 RX ORDER — SODIUM,POTASSIUM PHOSPHATES 280-250MG
2 POWDER IN PACKET (EA) ORAL
Status: DISCONTINUED | OUTPATIENT
Start: 2021-12-16 | End: 2021-12-18 | Stop reason: HOSPADM

## 2021-12-15 RX ORDER — ALPRAZOLAM 0.25 MG/1
0.25 TABLET ORAL 2 TIMES DAILY PRN
Status: DISCONTINUED | OUTPATIENT
Start: 2021-12-16 | End: 2021-12-18 | Stop reason: HOSPADM

## 2021-12-15 RX ORDER — ALPRAZOLAM 0.25 MG/1
0.25 TABLET ORAL 2 TIMES DAILY PRN
Status: ON HOLD | COMMUNITY
Start: 2021-12-02 | End: 2022-02-09 | Stop reason: HOSPADM

## 2021-12-15 RX ORDER — IBUPROFEN 200 MG
16 TABLET ORAL
Status: DISCONTINUED | OUTPATIENT
Start: 2021-12-16 | End: 2021-12-18 | Stop reason: HOSPADM

## 2021-12-15 RX ORDER — METOPROLOL SUCCINATE 50 MG/1
50 TABLET, EXTENDED RELEASE ORAL DAILY
Status: DISCONTINUED | OUTPATIENT
Start: 2021-12-16 | End: 2021-12-17

## 2021-12-15 RX ORDER — ACETAMINOPHEN 325 MG/1
650 TABLET ORAL EVERY 4 HOURS PRN
Status: DISCONTINUED | OUTPATIENT
Start: 2021-12-16 | End: 2021-12-18 | Stop reason: HOSPADM

## 2021-12-15 RX ORDER — ATORVASTATIN CALCIUM 40 MG/1
40 TABLET, FILM COATED ORAL NIGHTLY
Status: DISCONTINUED | OUTPATIENT
Start: 2021-12-16 | End: 2021-12-18

## 2021-12-15 RX ORDER — MAG HYDROX/ALUMINUM HYD/SIMETH 200-200-20
30 SUSPENSION, ORAL (FINAL DOSE FORM) ORAL 4 TIMES DAILY PRN
Status: DISCONTINUED | OUTPATIENT
Start: 2021-12-16 | End: 2021-12-18 | Stop reason: HOSPADM

## 2021-12-15 RX ORDER — GLYCOPYRROLATE 1 MG/1
1 TABLET ORAL 2 TIMES DAILY
COMMUNITY
Start: 2021-12-09

## 2021-12-15 RX ORDER — GLUCAGON 1 MG
1 KIT INJECTION
Status: DISCONTINUED | OUTPATIENT
Start: 2021-12-16 | End: 2021-12-18 | Stop reason: HOSPADM

## 2021-12-15 RX ORDER — IBUPROFEN 200 MG
24 TABLET ORAL
Status: DISCONTINUED | OUTPATIENT
Start: 2021-12-16 | End: 2021-12-18 | Stop reason: HOSPADM

## 2021-12-15 RX ORDER — NALOXONE HCL 0.4 MG/ML
0.02 VIAL (ML) INJECTION
Status: DISCONTINUED | OUTPATIENT
Start: 2021-12-16 | End: 2021-12-18 | Stop reason: HOSPADM

## 2021-12-15 RX ORDER — ONDANSETRON 2 MG/ML
8 INJECTION INTRAMUSCULAR; INTRAVENOUS EVERY 6 HOURS PRN
Status: DISCONTINUED | OUTPATIENT
Start: 2021-12-16 | End: 2021-12-18 | Stop reason: HOSPADM

## 2021-12-15 RX ORDER — SIMETHICONE 80 MG
1 TABLET,CHEWABLE ORAL 4 TIMES DAILY PRN
Status: DISCONTINUED | OUTPATIENT
Start: 2021-12-16 | End: 2021-12-18 | Stop reason: HOSPADM

## 2021-12-15 RX ORDER — FLUOXETINE 10 MG/1
10 CAPSULE ORAL DAILY
Status: DISCONTINUED | OUTPATIENT
Start: 2021-12-16 | End: 2021-12-18

## 2021-12-15 RX ORDER — SODIUM CHLORIDE 0.9 % (FLUSH) 0.9 %
3 SYRINGE (ML) INJECTION EVERY 12 HOURS PRN
Status: DISCONTINUED | OUTPATIENT
Start: 2021-12-16 | End: 2021-12-18 | Stop reason: HOSPADM

## 2021-12-15 RX ORDER — FLUOXETINE 10 MG/1
10 CAPSULE ORAL DAILY
Status: ON HOLD | COMMUNITY
Start: 2021-12-09 | End: 2021-12-18 | Stop reason: SDUPTHER

## 2021-12-15 RX ORDER — POLYETHYLENE GLYCOL 3350 17 G/17G
17 POWDER, FOR SOLUTION ORAL DAILY
Status: DISCONTINUED | OUTPATIENT
Start: 2021-12-16 | End: 2021-12-18

## 2021-12-15 RX ORDER — LEVOTHYROXINE SODIUM 25 UG/1
25 TABLET ORAL
Status: DISCONTINUED | OUTPATIENT
Start: 2021-12-16 | End: 2021-12-18

## 2021-12-15 RX ORDER — AMLODIPINE BESYLATE 5 MG/1
5 TABLET ORAL DAILY
Status: DISCONTINUED | OUTPATIENT
Start: 2021-12-16 | End: 2021-12-17

## 2021-12-15 RX ORDER — METOPROLOL SUCCINATE 50 MG/1
50 TABLET, EXTENDED RELEASE ORAL DAILY
Status: ON HOLD | COMMUNITY
End: 2021-12-17

## 2021-12-15 RX ORDER — TALC
6 POWDER (GRAM) TOPICAL NIGHTLY PRN
Status: DISCONTINUED | OUTPATIENT
Start: 2021-12-16 | End: 2021-12-18 | Stop reason: HOSPADM

## 2021-12-16 PROCEDURE — 25000003 PHARM REV CODE 250: Performed by: NURSE PRACTITIONER

## 2021-12-16 PROCEDURE — 97161 PT EVAL LOW COMPLEX 20 MIN: CPT

## 2021-12-16 PROCEDURE — 97165 OT EVAL LOW COMPLEX 30 MIN: CPT

## 2021-12-16 PROCEDURE — 96360 HYDRATION IV INFUSION INIT: CPT

## 2021-12-16 PROCEDURE — G0378 HOSPITAL OBSERVATION PER HR: HCPCS

## 2021-12-16 PROCEDURE — 94640 AIRWAY INHALATION TREATMENT: CPT

## 2021-12-16 PROCEDURE — 97530 THERAPEUTIC ACTIVITIES: CPT

## 2021-12-16 PROCEDURE — 92610 EVALUATE SWALLOWING FUNCTION: CPT

## 2021-12-16 PROCEDURE — 96361 HYDRATE IV INFUSION ADD-ON: CPT

## 2021-12-16 PROCEDURE — 94761 N-INVAS EAR/PLS OXIMETRY MLT: CPT

## 2021-12-16 PROCEDURE — 25000242 PHARM REV CODE 250 ALT 637 W/ HCPCS: Performed by: NURSE PRACTITIONER

## 2021-12-16 PROCEDURE — 25000003 PHARM REV CODE 250: Performed by: HOSPITALIST

## 2021-12-16 RX ORDER — SODIUM CHLORIDE 9 MG/ML
INJECTION, SOLUTION INTRAVENOUS CONTINUOUS
Status: DISCONTINUED | OUTPATIENT
Start: 2021-12-16 | End: 2021-12-18 | Stop reason: HOSPADM

## 2021-12-16 RX ADMIN — AMLODIPINE BESYLATE 5 MG: 5 TABLET ORAL at 09:12

## 2021-12-16 RX ADMIN — BUDESONIDE INHALATION 0.5 MG: 0.5 SUSPENSION RESPIRATORY (INHALATION) at 07:12

## 2021-12-16 RX ADMIN — ATORVASTATIN CALCIUM 40 MG: 40 TABLET, FILM COATED ORAL at 09:12

## 2021-12-16 RX ADMIN — LOSARTAN POTASSIUM 100 MG: 25 TABLET, FILM COATED ORAL at 09:12

## 2021-12-16 RX ADMIN — GLYCOPYRROLATE 1 MG: 1 TABLET ORAL at 09:12

## 2021-12-16 RX ADMIN — FLUOXETINE 10 MG: 10 CAPSULE ORAL at 09:12

## 2021-12-16 RX ADMIN — METOPROLOL SUCCINATE 50 MG: 50 TABLET, EXTENDED RELEASE ORAL at 09:12

## 2021-12-16 RX ADMIN — IPRATROPIUM BROMIDE AND ALBUTEROL SULFATE 3 ML: .5; 2.5 SOLUTION RESPIRATORY (INHALATION) at 07:12

## 2021-12-16 RX ADMIN — POLYETHYLENE GLYCOL 3350 17 G: 17 POWDER, FOR SOLUTION ORAL at 10:12

## 2021-12-16 RX ADMIN — SODIUM CHLORIDE: 0.9 INJECTION, SOLUTION INTRAVENOUS at 06:12

## 2021-12-17 LAB
BASOPHILS # BLD AUTO: 0.05 K/UL (ref 0–0.2)
BASOPHILS NFR BLD: 0.6 % (ref 0–1.9)
DIFFERENTIAL METHOD: ABNORMAL
EOSINOPHIL # BLD AUTO: 0.2 K/UL (ref 0–0.5)
EOSINOPHIL NFR BLD: 2.7 % (ref 0–8)
ERYTHROCYTE [DISTWIDTH] IN BLOOD BY AUTOMATED COUNT: 14.8 % (ref 11.5–14.5)
HCT VFR BLD AUTO: 31.4 % (ref 37–48.5)
HGB BLD-MCNC: 10.4 G/DL (ref 12–16)
IMM GRANULOCYTES # BLD AUTO: 0.03 K/UL (ref 0–0.04)
IMM GRANULOCYTES NFR BLD AUTO: 0.4 % (ref 0–0.5)
LYMPHOCYTES # BLD AUTO: 2.2 K/UL (ref 1–4.8)
LYMPHOCYTES NFR BLD: 28 % (ref 18–48)
MCH RBC QN AUTO: 32.2 PG (ref 27–31)
MCHC RBC AUTO-ENTMCNC: 33.1 G/DL (ref 32–36)
MCV RBC AUTO: 97 FL (ref 82–98)
MONOCYTES # BLD AUTO: 0.9 K/UL (ref 0.3–1)
MONOCYTES NFR BLD: 11.7 % (ref 4–15)
NEUTROPHILS # BLD AUTO: 4.4 K/UL (ref 1.8–7.7)
NEUTROPHILS NFR BLD: 56.6 % (ref 38–73)
NRBC BLD-RTO: 0 /100 WBC
PLATELET # BLD AUTO: 252 K/UL (ref 150–450)
PMV BLD AUTO: 10.8 FL (ref 9.2–12.9)
RBC # BLD AUTO: 3.23 M/UL (ref 4–5.4)
WBC # BLD AUTO: 7.72 K/UL (ref 3.9–12.7)

## 2021-12-17 PROCEDURE — 97530 THERAPEUTIC ACTIVITIES: CPT | Mod: CQ

## 2021-12-17 PROCEDURE — 85025 COMPLETE CBC W/AUTO DIFF WBC: CPT | Performed by: HOSPITALIST

## 2021-12-17 PROCEDURE — 25000003 PHARM REV CODE 250: Performed by: HOSPITALIST

## 2021-12-17 PROCEDURE — 94761 N-INVAS EAR/PLS OXIMETRY MLT: CPT

## 2021-12-17 PROCEDURE — G0378 HOSPITAL OBSERVATION PER HR: HCPCS

## 2021-12-17 PROCEDURE — 25000003 PHARM REV CODE 250: Performed by: NURSE PRACTITIONER

## 2021-12-17 PROCEDURE — 92611 MOTION FLUOROSCOPY/SWALLOW: CPT

## 2021-12-17 PROCEDURE — 97110 THERAPEUTIC EXERCISES: CPT | Mod: CQ

## 2021-12-17 PROCEDURE — 94640 AIRWAY INHALATION TREATMENT: CPT

## 2021-12-17 PROCEDURE — 96361 HYDRATE IV INFUSION ADD-ON: CPT

## 2021-12-17 PROCEDURE — 25000242 PHARM REV CODE 250 ALT 637 W/ HCPCS: Performed by: NURSE PRACTITIONER

## 2021-12-17 PROCEDURE — 36415 COLL VENOUS BLD VENIPUNCTURE: CPT | Performed by: HOSPITALIST

## 2021-12-17 RX ORDER — INSULIN LISPRO 100 [IU]/ML
INJECTION, SOLUTION INTRAVENOUS; SUBCUTANEOUS
Status: ON HOLD | COMMUNITY
End: 2022-02-09 | Stop reason: HOSPADM

## 2021-12-17 RX ORDER — MIDODRINE HYDROCHLORIDE 5 MG/1
5 TABLET ORAL 3 TIMES DAILY
Status: ON HOLD | COMMUNITY
End: 2021-12-18 | Stop reason: SDUPTHER

## 2021-12-17 RX ORDER — HYDROCODONE BITARTRATE AND ACETAMINOPHEN 5; 325 MG/1; MG/1
1 TABLET ORAL 2 TIMES DAILY PRN
Status: ON HOLD | COMMUNITY
End: 2022-02-09 | Stop reason: HOSPADM

## 2021-12-17 RX ORDER — METOCLOPRAMIDE HYDROCHLORIDE 5 MG/5ML
10 SOLUTION ORAL EVERY 6 HOURS
Status: ON HOLD | COMMUNITY
End: 2022-02-09 | Stop reason: HOSPADM

## 2021-12-17 RX ORDER — PANTOPRAZOLE SODIUM 40 MG/1
40 FOR SUSPENSION ORAL DAILY
Status: ON HOLD | COMMUNITY
End: 2021-12-18 | Stop reason: SDUPTHER

## 2021-12-17 RX ORDER — PANTOPRAZOLE SODIUM 20 MG/1
20 TABLET, DELAYED RELEASE ORAL DAILY
Status: ON HOLD | COMMUNITY
End: 2021-12-17

## 2021-12-17 RX ADMIN — LOSARTAN POTASSIUM 100 MG: 25 TABLET, FILM COATED ORAL at 10:12

## 2021-12-17 RX ADMIN — BUDESONIDE INHALATION 0.5 MG: 0.5 SUSPENSION RESPIRATORY (INHALATION) at 07:12

## 2021-12-17 RX ADMIN — SODIUM CHLORIDE: 0.9 INJECTION, SOLUTION INTRAVENOUS at 05:12

## 2021-12-17 RX ADMIN — POLYETHYLENE GLYCOL 3350 17 G: 17 POWDER, FOR SOLUTION ORAL at 10:12

## 2021-12-17 RX ADMIN — LEVOTHYROXINE SODIUM 25 MCG: 0.03 TABLET ORAL at 05:12

## 2021-12-17 RX ADMIN — AMLODIPINE BESYLATE 5 MG: 5 TABLET ORAL at 10:12

## 2021-12-17 RX ADMIN — GLYCOPYRROLATE 1 MG: 1 TABLET ORAL at 10:12

## 2021-12-17 RX ADMIN — SODIUM CHLORIDE: 0.9 INJECTION, SOLUTION INTRAVENOUS at 04:12

## 2021-12-17 RX ADMIN — ATORVASTATIN CALCIUM 40 MG: 40 TABLET, FILM COATED ORAL at 10:12

## 2021-12-17 RX ADMIN — FLUOXETINE 10 MG: 10 CAPSULE ORAL at 10:12

## 2021-12-17 RX ADMIN — METOPROLOL SUCCINATE 50 MG: 50 TABLET, EXTENDED RELEASE ORAL at 10:12

## 2021-12-18 VITALS
RESPIRATION RATE: 18 BRPM | SYSTOLIC BLOOD PRESSURE: 137 MMHG | BODY MASS INDEX: 25.14 KG/M2 | HEIGHT: 64 IN | DIASTOLIC BLOOD PRESSURE: 62 MMHG | OXYGEN SATURATION: 98 % | TEMPERATURE: 98 F | HEART RATE: 63 BPM | WEIGHT: 147.25 LBS

## 2021-12-18 PROCEDURE — G0378 HOSPITAL OBSERVATION PER HR: HCPCS

## 2021-12-18 PROCEDURE — 25000003 PHARM REV CODE 250: Performed by: NURSE PRACTITIONER

## 2021-12-18 PROCEDURE — 94761 N-INVAS EAR/PLS OXIMETRY MLT: CPT

## 2021-12-18 PROCEDURE — 94640 AIRWAY INHALATION TREATMENT: CPT

## 2021-12-18 PROCEDURE — 25000242 PHARM REV CODE 250 ALT 637 W/ HCPCS: Performed by: NURSE PRACTITIONER

## 2021-12-18 PROCEDURE — 25000003 PHARM REV CODE 250: Performed by: STUDENT IN AN ORGANIZED HEALTH CARE EDUCATION/TRAINING PROGRAM

## 2021-12-18 RX ORDER — FLUOXETINE 10 MG/1
10 CAPSULE ORAL DAILY
Status: DISCONTINUED | OUTPATIENT
Start: 2021-12-18 | End: 2021-12-18 | Stop reason: HOSPADM

## 2021-12-18 RX ORDER — GLYCOPYRROLATE 1 MG/1
1 TABLET ORAL 2 TIMES DAILY
Status: DISCONTINUED | OUTPATIENT
Start: 2021-12-18 | End: 2021-12-18 | Stop reason: HOSPADM

## 2021-12-18 RX ORDER — MIDODRINE HYDROCHLORIDE 5 MG/1
5 TABLET ORAL 3 TIMES DAILY
Start: 2021-12-18

## 2021-12-18 RX ORDER — LEVOTHYROXINE SODIUM 25 UG/1
25 TABLET ORAL
Status: DISCONTINUED | OUTPATIENT
Start: 2021-12-19 | End: 2021-12-18 | Stop reason: HOSPADM

## 2021-12-18 RX ORDER — METOCLOPRAMIDE 10 MG/1
10 TABLET ORAL EVERY 6 HOURS
Status: DISCONTINUED | OUTPATIENT
Start: 2021-12-18 | End: 2021-12-18 | Stop reason: HOSPADM

## 2021-12-18 RX ORDER — PANTOPRAZOLE SODIUM 40 MG/1
40 FOR SUSPENSION ORAL DAILY
Start: 2021-12-18

## 2021-12-18 RX ORDER — POLYETHYLENE GLYCOL 3350 17 G/17G
17 POWDER, FOR SOLUTION ORAL DAILY
Status: DISCONTINUED | OUTPATIENT
Start: 2021-12-18 | End: 2021-12-18 | Stop reason: HOSPADM

## 2021-12-18 RX ORDER — MIDODRINE HYDROCHLORIDE 5 MG/1
5 TABLET ORAL 3 TIMES DAILY
Status: DISCONTINUED | OUTPATIENT
Start: 2021-12-18 | End: 2021-12-18 | Stop reason: HOSPADM

## 2021-12-18 RX ORDER — PANTOPRAZOLE SODIUM 40 MG/1
40 FOR SUSPENSION ORAL DAILY
Status: DISCONTINUED | OUTPATIENT
Start: 2021-12-18 | End: 2021-12-18 | Stop reason: HOSPADM

## 2021-12-18 RX ORDER — ATORVASTATIN CALCIUM 40 MG/1
40 TABLET, FILM COATED ORAL NIGHTLY
Start: 2021-12-18 | End: 2022-12-18

## 2021-12-18 RX ORDER — HYDROCODONE BITARTRATE AND ACETAMINOPHEN 5; 325 MG/1; MG/1
1 TABLET ORAL EVERY 12 HOURS PRN
Status: DISCONTINUED | OUTPATIENT
Start: 2021-12-18 | End: 2021-12-18 | Stop reason: HOSPADM

## 2021-12-18 RX ORDER — FLUOXETINE 10 MG/1
10 CAPSULE ORAL DAILY
Start: 2021-12-18

## 2021-12-18 RX ORDER — ATORVASTATIN CALCIUM 40 MG/1
40 TABLET, FILM COATED ORAL NIGHTLY
Status: DISCONTINUED | OUTPATIENT
Start: 2021-12-18 | End: 2021-12-18 | Stop reason: HOSPADM

## 2021-12-18 RX ADMIN — FLUOXETINE 10 MG: 10 CAPSULE ORAL at 09:12

## 2021-12-18 RX ADMIN — LEVOTHYROXINE SODIUM 25 MCG: 0.03 TABLET ORAL at 05:12

## 2021-12-18 RX ADMIN — MIDODRINE HYDROCHLORIDE 5 MG: 5 TABLET ORAL at 09:12

## 2021-12-18 RX ADMIN — GLYCOPYRROLATE 1 MG: 1 TABLET ORAL at 09:12

## 2021-12-18 RX ADMIN — POLYETHYLENE GLYCOL 3350 17 G: 17 POWDER, FOR SOLUTION ORAL at 09:12

## 2021-12-18 RX ADMIN — BUDESONIDE INHALATION 0.5 MG: 0.5 SUSPENSION RESPIRATORY (INHALATION) at 06:12

## 2021-12-18 RX ADMIN — PANTOPRAZOLE SODIUM 40 MG: 40 GRANULE, DELAYED RELEASE ORAL at 09:12

## 2021-12-28 DIAGNOSIS — R13.12 DYSPHAGIA, OROPHARYNGEAL PHASE: Primary | ICD-10-CM

## 2022-01-18 ENCOUNTER — HOSPITAL ENCOUNTER (INPATIENT)
Facility: HOSPITAL | Age: 82
LOS: 55 days | DRG: 870 | End: 2022-03-14
Attending: EMERGENCY MEDICINE | Admitting: STUDENT IN AN ORGANIZED HEALTH CARE EDUCATION/TRAINING PROGRAM
Payer: MEDICARE

## 2022-01-18 DIAGNOSIS — I48.91 A-FIB: ICD-10-CM

## 2022-01-18 DIAGNOSIS — J96.01 ACUTE HYPOXEMIC RESPIRATORY FAILURE: ICD-10-CM

## 2022-01-18 DIAGNOSIS — J69.0 ASPIRATION PNEUMONIA OF RIGHT LOWER LOBE, UNSPECIFIED ASPIRATION PNEUMONIA TYPE: ICD-10-CM

## 2022-01-18 DIAGNOSIS — J96.90 RESPIRATORY FAILURE: ICD-10-CM

## 2022-01-18 DIAGNOSIS — Z45.2 ENCOUNTER FOR CENTRAL LINE PLACEMENT: ICD-10-CM

## 2022-01-18 DIAGNOSIS — R09.02 HYPOXIA: ICD-10-CM

## 2022-01-18 DIAGNOSIS — R79.89 ELEVATED BRAIN NATRIURETIC PEPTIDE (BNP) LEVEL: ICD-10-CM

## 2022-01-18 DIAGNOSIS — R07.9 CHEST PAIN: ICD-10-CM

## 2022-01-18 DIAGNOSIS — J69.0 ASPIRATION PNEUMONIA OF RIGHT MIDDLE LOBE, UNSPECIFIED ASPIRATION PNEUMONIA TYPE: ICD-10-CM

## 2022-01-18 DIAGNOSIS — Z79.899 ENCOUNTER FOR LONG-TERM (CURRENT) USE OF OTHER MEDICATIONS: Primary | Chronic | ICD-10-CM

## 2022-01-18 DIAGNOSIS — Z01.818 ENCOUNTER FOR INTUBATION: ICD-10-CM

## 2022-01-18 PROBLEM — S09.90XA ACUTE HEAD INJURY: Status: RESOLVED | Noted: 2021-12-15 | Resolved: 2022-01-18

## 2022-01-18 PROBLEM — E11.9 TYPE 2 DIABETES MELLITUS, WITH LONG-TERM CURRENT USE OF INSULIN: Status: ACTIVE | Noted: 2022-01-18

## 2022-01-18 PROBLEM — F41.1 GAD (GENERALIZED ANXIETY DISORDER): Status: ACTIVE | Noted: 2022-01-18

## 2022-01-18 PROBLEM — D64.9 NORMOCYTIC ANEMIA: Status: RESOLVED | Noted: 2017-07-22 | Resolved: 2022-01-18

## 2022-01-18 PROBLEM — T17.908A ASPIRATION INTO AIRWAY: Status: ACTIVE | Noted: 2022-01-18

## 2022-01-18 PROBLEM — Z86.73 HISTORY OF CEREBROVASCULAR ACCIDENT (CVA) DUE TO ISCHEMIA: Status: ACTIVE | Noted: 2021-07-30

## 2022-01-18 PROBLEM — R73.01 FASTING HYPERGLYCEMIA: Status: RESOLVED | Noted: 2017-07-17 | Resolved: 2022-01-18

## 2022-01-18 PROBLEM — J18.9 PNEUMONIA: Status: ACTIVE | Noted: 2022-01-18

## 2022-01-18 PROBLEM — Z79.4 TYPE 2 DIABETES MELLITUS, WITH LONG-TERM CURRENT USE OF INSULIN: Status: ACTIVE | Noted: 2022-01-18

## 2022-01-18 LAB
ALBUMIN SERPL BCP-MCNC: 3.4 G/DL (ref 3.5–5.2)
ALLENS TEST: ABNORMAL
ALLENS TEST: ABNORMAL
ALP SERPL-CCNC: 85 U/L (ref 55–135)
ALT SERPL W/O P-5'-P-CCNC: 21 U/L (ref 10–44)
ANION GAP SERPL CALC-SCNC: 15 MMOL/L (ref 8–16)
AST SERPL-CCNC: 19 U/L (ref 10–40)
BACTERIA #/AREA URNS HPF: ABNORMAL /HPF
BASOPHILS # BLD AUTO: 0.01 K/UL (ref 0–0.2)
BASOPHILS NFR BLD: 0.2 % (ref 0–1.9)
BILIRUB SERPL-MCNC: 1.3 MG/DL (ref 0.1–1)
BILIRUB UR QL STRIP: NEGATIVE
BNP SERPL-MCNC: 136 PG/ML (ref 0–99)
BUN SERPL-MCNC: 23 MG/DL (ref 8–23)
C DIFF GDH STL QL: POSITIVE
C DIFF TOX A+B STL QL IA: NEGATIVE
C DIFF TOX GENS STL QL NAA+PROBE: POSITIVE
CALCIUM SERPL-MCNC: 9.8 MG/DL (ref 8.7–10.5)
CHLORIDE SERPL-SCNC: 97 MMOL/L (ref 95–110)
CLARITY UR: CLEAR
CO2 SERPL-SCNC: 23 MMOL/L (ref 23–29)
COLOR UR: YELLOW
CREAT SERPL-MCNC: 0.9 MG/DL (ref 0.5–1.4)
CRP SERPL-MCNC: 145.4 MG/L (ref 0–8.2)
D DIMER PPP IA.FEU-MCNC: 5.16 MG/L FEU
DELSYS: ABNORMAL
DELSYS: ABNORMAL
DIFFERENTIAL METHOD: ABNORMAL
EOSINOPHIL # BLD AUTO: 0 K/UL (ref 0–0.5)
EOSINOPHIL NFR BLD: 0.2 % (ref 0–8)
ERYTHROCYTE [DISTWIDTH] IN BLOOD BY AUTOMATED COUNT: 13.2 % (ref 11.5–14.5)
ERYTHROCYTE [SEDIMENTATION RATE] IN BLOOD BY WESTERGREN METHOD: 16 MM/H
ERYTHROCYTE [SEDIMENTATION RATE] IN BLOOD BY WESTERGREN METHOD: 16 MM/H
EST. GFR  (AFRICAN AMERICAN): >60 ML/MIN/1.73 M^2
EST. GFR  (NON AFRICAN AMERICAN): >60 ML/MIN/1.73 M^2
FERRITIN SERPL-MCNC: 380 NG/ML (ref 20–300)
FIO2: 100
FIO2: 100
GLUCOSE SERPL-MCNC: 129 MG/DL (ref 70–110)
GLUCOSE UR QL STRIP: NEGATIVE
HCO3 UR-SCNC: 19 MMOL/L (ref 24–28)
HCO3 UR-SCNC: 20 MMOL/L (ref 24–28)
HCT VFR BLD AUTO: 41.3 % (ref 37–48.5)
HGB BLD-MCNC: 13.6 G/DL (ref 12–16)
HGB UR QL STRIP: ABNORMAL
HYALINE CASTS #/AREA URNS LPF: 0 /LPF
IMM GRANULOCYTES # BLD AUTO: 0.01 K/UL (ref 0–0.04)
IMM GRANULOCYTES NFR BLD AUTO: 0.2 % (ref 0–0.5)
KETONES UR QL STRIP: NEGATIVE
LACTATE SERPL-SCNC: 7.5 MMOL/L (ref 0.5–2.2)
LDH SERPL L TO P-CCNC: 247 U/L (ref 110–260)
LEUKOCYTE ESTERASE UR QL STRIP: ABNORMAL
LYMPHOCYTES # BLD AUTO: 0.8 K/UL (ref 1–4.8)
LYMPHOCYTES NFR BLD: 15.4 % (ref 18–48)
MCH RBC QN AUTO: 32.6 PG (ref 27–31)
MCHC RBC AUTO-ENTMCNC: 32.9 G/DL (ref 32–36)
MCV RBC AUTO: 99 FL (ref 82–98)
MICROSCOPIC COMMENT: ABNORMAL
MIN VOL: 10.3
MODE: ABNORMAL
MODE: ABNORMAL
MONOCYTES # BLD AUTO: 0.5 K/UL (ref 0.3–1)
MONOCYTES NFR BLD: 9.1 % (ref 4–15)
NEUTROPHILS # BLD AUTO: 3.8 K/UL (ref 1.8–7.7)
NEUTROPHILS NFR BLD: 74.9 % (ref 38–73)
NITRITE UR QL STRIP: NEGATIVE
NRBC BLD-RTO: 0 /100 WBC
PCO2 BLDA: 41.3 MMHG (ref 35–45)
PCO2 BLDA: 43.7 MMHG (ref 35–45)
PEEP: 8
PEEP: 8
PH SMN: 7.27 [PH] (ref 7.35–7.45)
PH SMN: 7.27 [PH] (ref 7.35–7.45)
PH UR STRIP: 6 [PH] (ref 5–8)
PIP: 246
PLATELET # BLD AUTO: 244 K/UL (ref 150–450)
PMV BLD AUTO: 10.7 FL (ref 9.2–12.9)
PO2 BLDA: 100 MMHG (ref 80–100)
PO2 BLDA: 48 MMHG (ref 80–100)
POC BE: -7 MMOL/L
POC BE: -8 MMOL/L
POC SATURATED O2: 78 % (ref 95–100)
POC SATURATED O2: 97 % (ref 95–100)
POC TCO2: 20 MMOL/L (ref 23–27)
POC TCO2: 21 MMOL/L (ref 23–27)
POCT GLUCOSE: 193 MG/DL (ref 70–110)
POTASSIUM SERPL-SCNC: 4.3 MMOL/L (ref 3.5–5.1)
PROCALCITONIN SERPL IA-MCNC: 17.21 NG/ML
PROT SERPL-MCNC: 7.2 G/DL (ref 6–8.4)
PROT UR QL STRIP: ABNORMAL
RBC # BLD AUTO: 4.17 M/UL (ref 4–5.4)
RBC #/AREA URNS HPF: 3 /HPF (ref 0–4)
SAMPLE: ABNORMAL
SAMPLE: ABNORMAL
SARS-COV-2 RDRP RESP QL NAA+PROBE: NEGATIVE
SITE: ABNORMAL
SITE: ABNORMAL
SODIUM SERPL-SCNC: 135 MMOL/L (ref 136–145)
SP GR UR STRIP: 1.01 (ref 1–1.03)
SP02: 98
TROPONIN I SERPL DL<=0.01 NG/ML-MCNC: 0.24 NG/ML (ref 0–0.03)
URN SPEC COLLECT METH UR: ABNORMAL
UROBILINOGEN UR STRIP-ACNC: NEGATIVE EU/DL
VT: 450
VT: 450
WBC # BLD AUTO: 5.06 K/UL (ref 3.9–12.7)
WBC #/AREA URNS HPF: 7 /HPF (ref 0–5)

## 2022-01-18 PROCEDURE — 87493 C DIFF AMPLIFIED PROBE: CPT | Performed by: EMERGENCY MEDICINE

## 2022-01-18 PROCEDURE — 84145 PROCALCITONIN (PCT): CPT | Performed by: STUDENT IN AN ORGANIZED HEALTH CARE EDUCATION/TRAINING PROGRAM

## 2022-01-18 PROCEDURE — 80053 COMPREHEN METABOLIC PANEL: CPT | Performed by: EMERGENCY MEDICINE

## 2022-01-18 PROCEDURE — 63600175 PHARM REV CODE 636 W HCPCS: Performed by: STUDENT IN AN ORGANIZED HEALTH CARE EDUCATION/TRAINING PROGRAM

## 2022-01-18 PROCEDURE — 83605 ASSAY OF LACTIC ACID: CPT | Mod: 91 | Performed by: STUDENT IN AN ORGANIZED HEALTH CARE EDUCATION/TRAINING PROGRAM

## 2022-01-18 PROCEDURE — 85379 FIBRIN DEGRADATION QUANT: CPT | Performed by: STUDENT IN AN ORGANIZED HEALTH CARE EDUCATION/TRAINING PROGRAM

## 2022-01-18 PROCEDURE — 82803 BLOOD GASES ANY COMBINATION: CPT

## 2022-01-18 PROCEDURE — 36415 COLL VENOUS BLD VENIPUNCTURE: CPT | Performed by: EMERGENCY MEDICINE

## 2022-01-18 PROCEDURE — 27000207 HC ISOLATION

## 2022-01-18 PROCEDURE — 63600175 PHARM REV CODE 636 W HCPCS

## 2022-01-18 PROCEDURE — 25000003 PHARM REV CODE 250: Performed by: EMERGENCY MEDICINE

## 2022-01-18 PROCEDURE — 36415 COLL VENOUS BLD VENIPUNCTURE: CPT | Performed by: STUDENT IN AN ORGANIZED HEALTH CARE EDUCATION/TRAINING PROGRAM

## 2022-01-18 PROCEDURE — S0030 INJECTION, METRONIDAZOLE: HCPCS | Performed by: STUDENT IN AN ORGANIZED HEALTH CARE EDUCATION/TRAINING PROGRAM

## 2022-01-18 PROCEDURE — 81000 URINALYSIS NONAUTO W/SCOPE: CPT | Performed by: EMERGENCY MEDICINE

## 2022-01-18 PROCEDURE — 99223 1ST HOSP IP/OBS HIGH 75: CPT | Mod: ,,, | Performed by: INTERNAL MEDICINE

## 2022-01-18 PROCEDURE — 87449 NOS EACH ORGANISM AG IA: CPT | Performed by: EMERGENCY MEDICINE

## 2022-01-18 PROCEDURE — 85025 COMPLETE CBC W/AUTO DIFF WBC: CPT | Performed by: EMERGENCY MEDICINE

## 2022-01-18 PROCEDURE — 99900026 HC AIRWAY MAINTENANCE (STAT)

## 2022-01-18 PROCEDURE — 86140 C-REACTIVE PROTEIN: CPT | Performed by: STUDENT IN AN ORGANIZED HEALTH CARE EDUCATION/TRAINING PROGRAM

## 2022-01-18 PROCEDURE — 94761 N-INVAS EAR/PLS OXIMETRY MLT: CPT

## 2022-01-18 PROCEDURE — 36600 WITHDRAWAL OF ARTERIAL BLOOD: CPT

## 2022-01-18 PROCEDURE — 99900035 HC TECH TIME PER 15 MIN (STAT)

## 2022-01-18 PROCEDURE — 25000003 PHARM REV CODE 250

## 2022-01-18 PROCEDURE — 83880 ASSAY OF NATRIURETIC PEPTIDE: CPT | Performed by: STUDENT IN AN ORGANIZED HEALTH CARE EDUCATION/TRAINING PROGRAM

## 2022-01-18 PROCEDURE — 25000003 PHARM REV CODE 250: Performed by: STUDENT IN AN ORGANIZED HEALTH CARE EDUCATION/TRAINING PROGRAM

## 2022-01-18 PROCEDURE — U0003 INFECTIOUS AGENT DETECTION BY NUCLEIC ACID (DNA OR RNA); SEVERE ACUTE RESPIRATORY SYNDROME CORONAVIRUS 2 (SARS-COV-2) (CORONAVIRUS DISEASE [COVID-19]), AMPLIFIED PROBE TECHNIQUE, MAKING USE OF HIGH THROUGHPUT TECHNOLOGIES AS DESCRIBED BY CMS-2020-01-R: HCPCS | Performed by: STUDENT IN AN ORGANIZED HEALTH CARE EDUCATION/TRAINING PROGRAM

## 2022-01-18 PROCEDURE — 83615 LACTATE (LD) (LDH) ENZYME: CPT | Performed by: STUDENT IN AN ORGANIZED HEALTH CARE EDUCATION/TRAINING PROGRAM

## 2022-01-18 PROCEDURE — 99223 PR INITIAL HOSPITAL CARE,LEVL III: ICD-10-PCS | Mod: ,,, | Performed by: INTERNAL MEDICINE

## 2022-01-18 PROCEDURE — 20000000 HC ICU ROOM

## 2022-01-18 PROCEDURE — 27000221 HC OXYGEN, UP TO 24 HOURS

## 2022-01-18 PROCEDURE — 94002 VENT MGMT INPAT INIT DAY: CPT

## 2022-01-18 PROCEDURE — U0005 INFEC AGEN DETEC AMPLI PROBE: HCPCS | Performed by: STUDENT IN AN ORGANIZED HEALTH CARE EDUCATION/TRAINING PROGRAM

## 2022-01-18 PROCEDURE — 36620 INSERTION CATHETER ARTERY: CPT

## 2022-01-18 PROCEDURE — 87040 BLOOD CULTURE FOR BACTERIA: CPT | Mod: 59 | Performed by: STUDENT IN AN ORGANIZED HEALTH CARE EDUCATION/TRAINING PROGRAM

## 2022-01-18 PROCEDURE — U0002 COVID-19 LAB TEST NON-CDC: HCPCS | Performed by: EMERGENCY MEDICINE

## 2022-01-18 PROCEDURE — 99291 CRITICAL CARE FIRST HOUR: CPT

## 2022-01-18 PROCEDURE — 37799 UNLISTED PX VASCULAR SURGERY: CPT

## 2022-01-18 PROCEDURE — 63600175 PHARM REV CODE 636 W HCPCS: Performed by: EMERGENCY MEDICINE

## 2022-01-18 PROCEDURE — 82728 ASSAY OF FERRITIN: CPT | Performed by: STUDENT IN AN ORGANIZED HEALTH CARE EDUCATION/TRAINING PROGRAM

## 2022-01-18 PROCEDURE — 84484 ASSAY OF TROPONIN QUANT: CPT | Performed by: STUDENT IN AN ORGANIZED HEALTH CARE EDUCATION/TRAINING PROGRAM

## 2022-01-18 RX ORDER — SODIUM,POTASSIUM PHOSPHATES 280-250MG
2 POWDER IN PACKET (EA) ORAL
Status: DISCONTINUED | OUTPATIENT
Start: 2022-01-18 | End: 2022-01-18

## 2022-01-18 RX ORDER — HYDROCODONE BITARTRATE AND ACETAMINOPHEN 5; 325 MG/1; MG/1
1 TABLET ORAL EVERY 8 HOURS PRN
Status: DISCONTINUED | OUTPATIENT
Start: 2022-01-18 | End: 2022-01-28

## 2022-01-18 RX ORDER — ENOXAPARIN SODIUM 100 MG/ML
40 INJECTION SUBCUTANEOUS EVERY 24 HOURS
Status: DISCONTINUED | OUTPATIENT
Start: 2022-01-18 | End: 2022-03-14 | Stop reason: HOSPADM

## 2022-01-18 RX ORDER — CITALOPRAM 10 MG/1
40 TABLET ORAL DAILY
Status: DISCONTINUED | OUTPATIENT
Start: 2022-01-18 | End: 2022-01-18

## 2022-01-18 RX ORDER — ACETAMINOPHEN 325 MG/1
650 TABLET ORAL EVERY 8 HOURS PRN
Status: DISCONTINUED | OUTPATIENT
Start: 2022-01-18 | End: 2022-01-20

## 2022-01-18 RX ORDER — NALOXONE HCL 0.4 MG/ML
0.02 VIAL (ML) INJECTION
Status: DISCONTINUED | OUTPATIENT
Start: 2022-01-18 | End: 2022-03-14 | Stop reason: HOSPADM

## 2022-01-18 RX ORDER — CITALOPRAM 40 MG/1
40 TABLET, FILM COATED ORAL DAILY
Status: DISCONTINUED | OUTPATIENT
Start: 2022-01-19 | End: 2022-02-07

## 2022-01-18 RX ORDER — IBUPROFEN 200 MG
16 TABLET ORAL
Status: DISCONTINUED | OUTPATIENT
Start: 2022-01-18 | End: 2022-01-20

## 2022-01-18 RX ORDER — METOCLOPRAMIDE HYDROCHLORIDE 5 MG/5ML
10 SOLUTION ORAL EVERY 6 HOURS
Status: DISCONTINUED | OUTPATIENT
Start: 2022-01-18 | End: 2022-01-31

## 2022-01-18 RX ORDER — METOCLOPRAMIDE HYDROCHLORIDE 5 MG/ML
10 INJECTION INTRAMUSCULAR; INTRAVENOUS
Status: COMPLETED | OUTPATIENT
Start: 2022-01-18 | End: 2022-01-18

## 2022-01-18 RX ORDER — IBUPROFEN 200 MG
24 TABLET ORAL
Status: DISCONTINUED | OUTPATIENT
Start: 2022-01-18 | End: 2022-01-20

## 2022-01-18 RX ORDER — NOREPINEPHRINE BITARTRATE/D5W 4MG/250ML
0.05 PLASTIC BAG, INJECTION (ML) INTRAVENOUS CONTINUOUS
Status: DISCONTINUED | OUTPATIENT
Start: 2022-01-18 | End: 2022-01-18

## 2022-01-18 RX ORDER — CELECOXIB 100 MG/1
CAPSULE ORAL
Status: ON HOLD | COMMUNITY
End: 2022-02-09 | Stop reason: HOSPADM

## 2022-01-18 RX ORDER — PROPOFOL 10 MG/ML
0-50 INJECTION, EMULSION INTRAVENOUS CONTINUOUS
Status: DISCONTINUED | OUTPATIENT
Start: 2022-01-18 | End: 2022-01-25

## 2022-01-18 RX ORDER — TALC
6 POWDER (GRAM) TOPICAL NIGHTLY PRN
Status: DISCONTINUED | OUTPATIENT
Start: 2022-01-18 | End: 2022-01-20

## 2022-01-18 RX ORDER — PANTOPRAZOLE SODIUM 40 MG/1
40 FOR SUSPENSION ORAL DAILY
Status: DISCONTINUED | OUTPATIENT
Start: 2022-01-18 | End: 2022-01-19

## 2022-01-18 RX ORDER — IPRATROPIUM BROMIDE 21 UG/1
SPRAY, METERED NASAL
COMMUNITY

## 2022-01-18 RX ORDER — SODIUM CHLORIDE 0.9 % (FLUSH) 0.9 %
10 SYRINGE (ML) INJECTION
Status: DISCONTINUED | OUTPATIENT
Start: 2022-01-18 | End: 2022-03-14 | Stop reason: HOSPADM

## 2022-01-18 RX ORDER — NOREPINEPHRINE BITARTRATE/D5W 4MG/250ML
PLASTIC BAG, INJECTION (ML) INTRAVENOUS
Status: COMPLETED
Start: 2022-01-18 | End: 2022-01-18

## 2022-01-18 RX ORDER — FLUTICASONE PROPIONATE 50 MCG
SPRAY, SUSPENSION (ML) NASAL
COMMUNITY

## 2022-01-18 RX ORDER — CEFEPIME HYDROCHLORIDE 1 G/50ML
1 INJECTION, SOLUTION INTRAVENOUS
Status: DISCONTINUED | OUTPATIENT
Start: 2022-01-18 | End: 2022-01-21

## 2022-01-18 RX ORDER — GLUCAGON 1 MG
1 KIT INJECTION
Status: DISCONTINUED | OUTPATIENT
Start: 2022-01-18 | End: 2022-02-27

## 2022-01-18 RX ORDER — ATORVASTATIN CALCIUM 40 MG/1
40 TABLET, FILM COATED ORAL NIGHTLY
Status: DISCONTINUED | OUTPATIENT
Start: 2022-01-18 | End: 2022-03-14 | Stop reason: HOSPADM

## 2022-01-18 RX ORDER — ALPRAZOLAM 0.25 MG/1
0.25 TABLET ORAL 2 TIMES DAILY PRN
Status: DISCONTINUED | OUTPATIENT
Start: 2022-01-18 | End: 2022-01-19

## 2022-01-18 RX ORDER — LEVOTHYROXINE SODIUM 25 UG/1
25 TABLET ORAL
Status: DISCONTINUED | OUTPATIENT
Start: 2022-01-19 | End: 2022-03-14 | Stop reason: HOSPADM

## 2022-01-18 RX ORDER — SUCCINYLCHOLINE CHLORIDE 20 MG/ML
INJECTION INTRAMUSCULAR; INTRAVENOUS
Status: COMPLETED
Start: 2022-01-18 | End: 2022-01-18

## 2022-01-18 RX ORDER — LIDOCAINE 50 MG/G
OINTMENT TOPICAL
Status: ON HOLD | COMMUNITY
Start: 2021-10-30 | End: 2022-02-09 | Stop reason: HOSPADM

## 2022-01-18 RX ORDER — MIDODRINE HYDROCHLORIDE 5 MG/1
5 TABLET ORAL 3 TIMES DAILY
Status: DISCONTINUED | OUTPATIENT
Start: 2022-01-18 | End: 2022-03-14 | Stop reason: HOSPADM

## 2022-01-18 RX ORDER — HYDRALAZINE HYDROCHLORIDE 25 MG/1
TABLET, FILM COATED ORAL
Status: ON HOLD | COMMUNITY
End: 2022-02-09 | Stop reason: HOSPADM

## 2022-01-18 RX ORDER — METRONIDAZOLE 500 MG/100ML
500 INJECTION, SOLUTION INTRAVENOUS
Status: DISCONTINUED | OUTPATIENT
Start: 2022-01-18 | End: 2022-02-09

## 2022-01-18 RX ORDER — VANCOMYCIN HCL IN 5 % DEXTROSE 1G/250ML
1000 PLASTIC BAG, INJECTION (ML) INTRAVENOUS
Status: DISCONTINUED | OUTPATIENT
Start: 2022-01-18 | End: 2022-01-20 | Stop reason: DRUGHIGH

## 2022-01-18 RX ORDER — MUPIROCIN 20 MG/G
OINTMENT TOPICAL 2 TIMES DAILY
Status: COMPLETED | OUTPATIENT
Start: 2022-01-18 | End: 2022-01-23

## 2022-01-18 RX ORDER — SUCCINYLCHOLINE CHLORIDE 20 MG/ML
50 INJECTION INTRAMUSCULAR; INTRAVENOUS
Status: COMPLETED | OUTPATIENT
Start: 2022-01-18 | End: 2022-01-18

## 2022-01-18 RX ORDER — INSULIN ASPART 100 [IU]/ML
1-10 INJECTION, SOLUTION INTRAVENOUS; SUBCUTANEOUS
Status: DISCONTINUED | OUTPATIENT
Start: 2022-01-18 | End: 2022-02-27

## 2022-01-18 RX ORDER — ENOXAPARIN SODIUM 100 MG/ML
40 INJECTION SUBCUTANEOUS EVERY 24 HOURS
Status: DISCONTINUED | OUTPATIENT
Start: 2022-01-18 | End: 2022-01-18

## 2022-01-18 RX ORDER — CITALOPRAM 40 MG/1
TABLET, FILM COATED ORAL
COMMUNITY

## 2022-01-18 RX ORDER — PROPOFOL 10 MG/ML
INJECTION, EMULSION INTRAVENOUS
Status: DISPENSED
Start: 2022-01-18 | End: 2022-01-19

## 2022-01-18 RX ADMIN — CEFEPIME HYDROCHLORIDE 1 G: 1 INJECTION, SOLUTION INTRAVENOUS at 04:01

## 2022-01-18 RX ADMIN — METRONIDAZOLE 500 MG: 500 INJECTION, SOLUTION INTRAVENOUS at 04:01

## 2022-01-18 RX ADMIN — Medication 0.3 MCG/KG/MIN: at 02:01

## 2022-01-18 RX ADMIN — SUCCINYLCHOLINE CHLORIDE 50 MG: 20 INJECTION, SOLUTION INTRAMUSCULAR; INTRAVENOUS; PARENTERAL at 01:01

## 2022-01-18 RX ADMIN — METRONIDAZOLE 500 MG: 500 INJECTION, SOLUTION INTRAVENOUS at 11:01

## 2022-01-18 RX ADMIN — Medication 0.4 MCG/KG/MIN: at 03:01

## 2022-01-18 RX ADMIN — Medication 0.35 MCG/KG/MIN: at 03:01

## 2022-01-18 RX ADMIN — MUPIROCIN: 20 OINTMENT TOPICAL at 09:01

## 2022-01-18 RX ADMIN — Medication 0.05 MCG/KG/MIN: at 01:01

## 2022-01-18 RX ADMIN — SUCCINYLCHOLINE CHLORIDE 50 MG: 20 INJECTION INTRAMUSCULAR; INTRAVENOUS at 01:01

## 2022-01-18 RX ADMIN — METOCLOPRAMIDE 10 MG: 5 INJECTION, SOLUTION INTRAMUSCULAR; INTRAVENOUS at 12:01

## 2022-01-18 RX ADMIN — PIPERACILLIN AND TAZOBACTAM 4.5 G: 4; .5 INJECTION, POWDER, LYOPHILIZED, FOR SOLUTION INTRAVENOUS; PARENTERAL at 12:01

## 2022-01-18 RX ADMIN — Medication 0.2 MCG/KG/MIN: at 02:01

## 2022-01-18 RX ADMIN — ENOXAPARIN SODIUM 40 MG: 100 INJECTION SUBCUTANEOUS at 04:01

## 2022-01-18 RX ADMIN — Medication 0.8 MCG/KG/MIN: at 03:01

## 2022-01-18 RX ADMIN — VANCOMYCIN HYDROCHLORIDE 1000 MG: 1 INJECTION, POWDER, LYOPHILIZED, FOR SOLUTION INTRAVENOUS at 04:01

## 2022-01-18 RX ADMIN — INSULIN ASPART 1 UNITS: 100 INJECTION, SOLUTION INTRAVENOUS; SUBCUTANEOUS at 11:01

## 2022-01-18 RX ADMIN — NOREPINEPHRINE BITARTRATE 0.5 MCG/KG/MIN: 1 INJECTION, SOLUTION, CONCENTRATE INTRAVENOUS at 04:01

## 2022-01-18 RX ADMIN — METOCLOPRAMIDE HYDROCHLORIDE 10 MG: 5 SOLUTION ORAL at 11:01

## 2022-01-18 NOTE — ED PROVIDER NOTES
"Encounter Date: 1/18/2022    SCRIBE #1 NOTE: I, Jose Luis King, am scribing for, and in the presence of, Vignesh Gaspar MD.       History     Chief Complaint   Patient presents with    Shortness of Breath    Vomiting     PEG tube      Time seen by provider: 8:20 AM on 01/18/2022    Sandra Wilson is a 81 y.o. female who presents to the ED via EMS  with an onset of vomiting and SOB. EMS reported that upon examination patient was less responsive than usually but is able to answer questions. The report from the EMS upon arrival was an O2 sats in the 70's. EMS proceeded to give supplemental oxygen and the O2 sats raised up to 93% on 3 liters of O2 with  "junky" right upper lung sounds" and greenish diarrhea. She was vomiting en route. They also reported that the patient is a patient that has dislodged her PEG tubing many times. PMHx of HTN, CAD, OA, GERD, ctroke, Colon cancer, ovarian cancer, breast cancer.PSHx of Cardiac catheterization, colonoscopy.     The history is provided by the EMS personnel. The history is limited by the condition of the patient.     Review of patient's allergies indicates:   Allergen Reactions    Cephalexin (bulk)      Flushing and itching    Lidocaine base      rash    Lisinopril Other (See Comments)     cough     Past Medical History:   Diagnosis Date    Carotid stenosis     GERD (gastroesophageal reflux disease)     Hyperlipidemia     Hypertension     Osteoarthritis     Stroke      Past Surgical History:   Procedure Laterality Date    CARDIAC CATHETERIZATION      Carotid Endarderectomy      COLONOSCOPY  2010    repeat every 5    DEXA  2010    WNL    ESOPHAGOGASTRODUODENOSCOPY  6/9/2009 [facility]    Normal EGD.    ESOPHAGOGASTRODUODENOSCOPY  4/9/2012    [facility]? Slight gastric mucosal atrophy.  Otherwise normal stomach and duodenum.  CLOtest negative.    TONSILLECTOMY       Family History   Problem Relation Age of Onset    Coronary artery disease Father     Cancer Mother     "     stomach    Breast cancer Neg Hx     Colon cancer Neg Hx     Ovarian cancer Neg Hx      Social History     Tobacco Use    Smoking status: Never Smoker    Smokeless tobacco: Never Used   Substance Use Topics    Alcohol use: No    Drug use: No     Review of Systems   Unable to perform ROS: Acuity of condition       Physical Exam     Initial Vitals [01/18/22 0823]   BP Pulse Resp Temp SpO2   109/85 110 (!) 24 98.8 °F (37.1 °C) (!) 84 %      MAP       --         Physical Exam    Nursing note and vitals reviewed.  Constitutional: She appears lethargic.   HENT:   Head: Normocephalic and atraumatic.   Eyes: Conjunctivae are normal.   Neck: Neck supple.   Normal range of motion.  Cardiovascular: Normal rate, regular rhythm and normal heart sounds. Exam reveals no gallop and no friction rub.    No murmur heard.  Pulmonary/Chest: Effort normal. No respiratory distress. She has no wheezes. She has rhonchi in the right upper field. She has no rales.   Abdominal: Abdomen is soft. Bowel sounds are normal. She exhibits no distension. There is no abdominal tenderness.   Musculoskeletal:         General: Normal range of motion.      Cervical back: Normal range of motion and neck supple.     Neurological: She is oriented to person, place, and time. She appears lethargic.   Skin: Skin is warm and dry. No erythema.   Psychiatric: She has a normal mood and affect.         ED Course   Critical Care    Date/Time: 1/18/2022 3:37 PM  Performed by: Vignesh Gaspar III, MD  Authorized by: Fabian Charles MD   Direct patient critical care time: 150 minutes  Total critical care time (exclusive of procedural time) : 150 minutes  Critical care was necessary to treat or prevent imminent or life-threatening deterioration of the following conditions: sepsis, respiratory failure and circulatory failure.  Critical care was time spent personally by me on the following activities: vascular access procedures, review of old charts, pulse  oximetry, ordering and review of laboratory studies, obtaining history from patient or surrogate, evaluation of patient's response to treatment, development of treatment plan with patient or surrogate, discussions with primary provider, examination of patient, ordering and performing treatments and interventions, ordering and review of radiographic studies, re-evaluation of patient's condition and ventilator management.  Subsequent provider of critical care: I assumed direction of critical care for this patient from another provider of my specialty.    Intubation    Date/Time: 1/18/2022 3:38 PM  Location procedure was performed: [facility] EMERGENCY DEPARTMENT  Performed by: Vignesh Gaspar III, MD  Authorized by: Fabian Charles MD   Indications: respiratory failure  Intubation method: direct  Patient status: unconscious  Paralytic: succinylcholine  Laryngoscope size: Mac 4  Tube size: 7.5 mm  Tube type: cuffed  Number of attempts: 1  Cricoid pressure: no  Cords visualized: yes  Post-procedure assessment: CO2 detector and chest rise  Breath sounds: rales/crackles and equal and absent over the epigastrium  Cuff inflated: yes  ETT to lip: 23 cm  Tube secured with: ETT shaffer  Chest x-ray interpreted by me.  Chest x-ray findings: endotracheal tube in appropriate position  Patient tolerance: Patient tolerated the procedure well with no immediate complications    Central Line    Date/Time: 1/18/2022 3:39 PM  Performed by: Vignesh Gaspar III, MD  Authorized by: Fabian Charles MD     Location procedure was performed:  [facility] EMERGENCY DEPARTMENT  Indications:  Vascular access and med administration  Anesthesia:  Local infiltration  Local anesthetic:  Lidocaine 1% without epinephrine  Anesthetic total (ml):  1  Preparation:  Skin prepped with ChloraPrep  Skin prep agent dried: Skin prep agent completely dried prior to procedure    Sterile barriers: All five maximal sterile barriers used - gloves, gown, cap, mask and large  sterile sheet    Hand hygiene: Hand hygiene performed immediately prior to central venous catheter insertion    Location:  Right internal jugular  Catheter size:  12 Fr  Ultrasound guidance: Yes    Vessel Caliber:  Medium   patent  Comprressibility:  Normal  Needle advanced into vessel with real time ultrasound guidance.    Steril sheath on probe.    Sterile gel used.  Manometry: No    Number of attempts:  1  Securement:  Line sutured, chlorhexidine patch, sterile dressing applied and blood return through all ports  XRay:  Placement verified by x-ray  Adverse Events:  None      Labs Reviewed   CLOSTRIDIUM DIFFICILE - Abnormal; Notable for the following components:       Result Value    C. diff Antigen Positive (*)     All other components within normal limits   CBC W/ AUTO DIFFERENTIAL - Abnormal; Notable for the following components:    MCV 99 (*)     MCH 32.6 (*)     Lymph # 0.8 (*)     Gran % 74.9 (*)     Lymph % 15.4 (*)     All other components within normal limits   COMPREHENSIVE METABOLIC PANEL - Abnormal; Notable for the following components:    Sodium 135 (*)     Glucose 129 (*)     Albumin 3.4 (*)     Total Bilirubin 1.3 (*)     All other components within normal limits   URINALYSIS, REFLEX TO URINE CULTURE - Abnormal; Notable for the following components:    Protein, UA 1+ (*)     Occult Blood UA 1+ (*)     Leukocytes, UA 3+ (*)     All other components within normal limits    Narrative:     Specimen Source->Urine   URINALYSIS MICROSCOPIC - Abnormal; Notable for the following components:    WBC, UA 7 (*)     All other components within normal limits    Narrative:     Specimen Source->Urine   ISTAT PROCEDURE - Abnormal; Notable for the following components:    POC PH 7.269 (*)     POC PO2 48 (*)     POC HCO3 20.0 (*)     POC SATURATED O2 78 (*)     POC TCO2 21 (*)     All other components within normal limits   C DIFF TOXIN BY PCR   CULTURE, RESPIRATORY   CULTURE, BLOOD   CULTURE, BLOOD   SARS-COV-2 RNA  AMPLIFICATION, QUAL   LACTIC ACID, PLASMA   FERRITIN   LACTATE DEHYDROGENASE   B-TYPE NATRIURETIC PEPTIDE   C-REACTIVE PROTEIN   D DIMER, QUANTITATIVE   PROCALCITONIN   TROPONIN I   SARS-COV-2 (COVID-19) QUALITATIVE PCR   SARS-COV-2 RDRP GENE          Imaging Results          X-Ray Chest 1 View (Final result)  Result time 01/18/22 15:50:52    Final result by Mathew Yanez Jr., MD (01/18/22 15:50:52)                 Impression:      Continued intrapulmonary infiltrates right greater than left.  Endotracheal tube, NG tube and right central lines in position.      Electronically signed by: Mathew Yanez MD  Date:    01/18/2022  Time:    15:50             Narrative:    EXAMINATION:  XR CHEST 1 VIEW    CLINICAL HISTORY:  respiratory failure;    TECHNIQUE:  Single frontal view of the chest was performed.    COMPARISON:  Chest portable of January 18, 2022    FINDINGS:  An endotracheal tube ends in the trachea above the level the malorie.  An NG tube traverses the esophagus to the stomach.  A central line enters at the right neck and ends in the superior vena cava.  The cardiac size and contours within normal limits.  A loop recorder is noted in the left chest.  There is continued central right lung infiltrate and small infiltrate at the left lung base.  No pneumothorax is seen.                               X-Ray Chest AP Portable (Final result)  Result time 01/18/22 14:39:59    Final result by Milton Dimas MD (01/18/22 14:39:59)                 Impression:      Appropriate positioning of support devices.  Right greater than left bibasilar airspace disease without significant change.      Electronically signed by: Milton Dimas MD  Date:    01/18/2022  Time:    14:39             Narrative:    EXAMINATION:  XR CHEST AP PORTABLE    CLINICAL HISTORY:  Encounter for adjustment and management of vascular access device    TECHNIQUE:  Single frontal view of the chest was  performed.    COMPARISON:  01/18/2022    FINDINGS:  A new right IJ central line has been placed and has its tip in superior vena cava.  The endotracheal tube has its tip 4 cm above the malorie.  A new nasogastric has been placed and has its tip in the body of the stomach.  A ventricular peritoneal shunt catheter courses over the left chest.  Cardiac loop recorder is present.  The cardiomediastinal silhouette is with normal limits.  There is moderate patchy airspace disease at the right lung base and mild patchy disease at the left lung base.  There is no significant pleural effusion.  There is no pneumothorax.                               X-Ray Chest AP Portable (Final result)  Result time 01/18/22 13:35:53    Final result by Mathew Yanez Jr., MD (01/18/22 13:35:53)                 Impression:      Significantly decreased intrapulmonary infiltrates on both sides.  Endotracheal tube placed in good position.   shunt tube noted.      Electronically signed by: Mathew Yanez MD  Date:    01/18/2022  Time:    13:35             Narrative:    EXAMINATION:  XR CHEST AP PORTABLE    CLINICAL HISTORY:  Encounter for other preprocedural examination    TECHNIQUE:  Single frontal view of the chest was performed.    COMPARISON:  Chest x-ray of January 18, 2022 at 08:42.    FINDINGS:  An endotracheal tube has been placed ending in the trachea above the level the malorie.  A loop recorder remains in the left chest wall and a  shunt tube remains crossing the left chest.  The cardiac size and contour is within normal limits.  There is a significant improvement in the right central lung infiltrate and decreased infiltrate on the left as well.  Peripheral consolidation is not seen.  There is no pneumothorax or pleural effusion.                               X-Ray Chest AP Portable (Final result)  Result time 01/18/22 08:54:08    Final result by Mathew Yanez Jr., MD (01/18/22 08:54:08)                 Impression:       Continued patchy infiltrates throughout the central right lung and at the left lung base.  Loop recorder and  shunt tube in place.      Electronically signed by: Mathew Yanez MD  Date:    01/18/2022  Time:    08:54             Narrative:    EXAMINATION:  XR CHEST AP PORTABLE    CLINICAL HISTORY:  Hypoxemia    TECHNIQUE:  Single frontal view of the chest was performed.    COMPARISON:  None    FINDINGS:  A loop recorder is noted in the left chest.  A  shunt tube crosses the left chest.  The mediastinal and cardiac size and contours normal.  There is patchy infiltrate throughout the central right lung and at the left lung base.  No pneumothorax is seen.                                 Medications   ALPRAZolam tablet 0.25 mg (has no administration in time range)   atorvastatin tablet 40 mg (has no administration in time range)   HYDROcodone-acetaminophen 5-325 mg per tablet 1 tablet (has no administration in time range)   levothyroxine tablet 25 mcg (has no administration in time range)   metoclopramide HCl 5 mg/5 mL solution 10 mg (has no administration in time range)   midodrine tablet 5 mg (has no administration in time range)   pantoprazole suspension 40 mg (has no administration in time range)   sodium chloride 0.9% flush 10 mL (has no administration in time range)   potassium bicarbonate disintegrating tablet 35 mEq (has no administration in time range)   glucose chewable tablet 16 g (has no administration in time range)   glucose chewable tablet 24 g (has no administration in time range)   dextrose 50% injection 12.5 g (has no administration in time range)   dextrose 50% injection 25 g (has no administration in time range)   glucagon (human recombinant) injection 1 mg (has no administration in time range)   acetaminophen tablet 650 mg (has no administration in time range)   melatonin tablet 6 mg (has no administration in time range)   naloxone 0.4 mg/mL injection 0.02 mg (has no administration in time  range)   enoxaparin injection 40 mg (has no administration in time range)   insulin aspart U-100 pen 1-10 Units (has no administration in time range)   propofol (DIPRIVAN) 10 mg/mL infusion ( Intravenous Not Given 1/18/22 1330)   NORepinephrine 4 mg in dextrose 5% 250 mL infusion (premix) (titrating) (0.8 mcg/kg/min × 66.7 kg Intravenous New Bag 1/18/22 1514)   cefepime in dextrose 5 % 1 gram/50 mL IVPB 1 g (has no administration in time range)   metronidazole IVPB 500 mg (has no administration in time range)   vancomycin - pharmacy to dose (has no administration in time range)   vancomycin in dextrose 5 % 1 gram/250 mL IVPB 1,000 mg (has no administration in time range)   citalopram tablet 40 mg (has no administration in time range)   piperacillin-tazobactam 4.5 g in dextrose 5 % 100 mL IVPB (ready to mix system) (0 g Intravenous Stopped 1/18/22 1245)   metoclopramide HCl injection 10 mg (10 mg Intravenous Given 1/18/22 1230)   succinylcholine injection 50 mg (50 mg Intravenous Given by Other 1/18/22 1315)     Medical Decision Making:   History:   Old Medical Records: I decided to obtain old medical records.  ED Management:  81-year-old female with advanced dementia presents with with hypoxia, vomiting and is reportedly less alert.  Chest x-ray demonstrates infiltrate with our long most likely secondary to aspiration pneumonia.  While in the emergency department she developed worsening refractory hypoxia that required intubation.  She also developed hypotension concerning for sepsis.  She is admitted to ICU on a ventilator with pressors and IV hip       APC / Resident Notes:   I, Dr. Vignesh Gaspar III, personally performed the services described in this documentation. All medical record entries made by the scribe were at my direction and in my presence.  I have reviewed the chart and agree that the record reflects my personal performance and is accurate and complete     Scribe Attestation:   Scribe #1: I performed  the above scribed service and the documentation accurately describes the services I performed. I attest to the accuracy of the note.                 Clinical Impression:   Final diagnoses:  [R09.02] Hypoxia  [J69.0] Aspiration pneumonia of right middle lobe, unspecified aspiration pneumonia type (Primary)  [J96.90] Respiratory failure  [Z01.818] Encounter for intubation  [Z45.2] Encounter for central line placement          ED Disposition Condition    Admit               Vignesh Gaspar III, MD  01/18/22 0400

## 2022-01-18 NOTE — PLAN OF CARE
01/18/22 1414   PRE-TX-O2   O2 Device (Oxygen Therapy) ventilator   $ Is the patient on Low Flow Oxygen? Yes   Oxygen Concentration (%) 100   SpO2 (!) 81 %   Pulse Oximetry Type Continuous   $ Pulse Oximetry - Multiple Charge Pulse Oximetry - Multiple   Pulse 100   BP (!) 66/42        Airway - Non-Surgical 01/18/22 1300   Placement Date/Time: 01/18/22 1300   Present Prior to Hospital Arrival?: No  Inserted by: MD  Airway Device Size: 7.5  Style: Cuffed   Secured at 22 cm   Measured At Lips   Secured Location Center   Secured by Commercial tube shaffer   Bite Block secure and patent   Site Condition Cool;Dry   Status Intact;Secured;Patent   Site Assessment Clean;Dry   Cuff Pressure 32 cm H2O   Vent Select   Conventional Vent Y   Intubation? Initial intubation   Ventilator Initiated Yes   $ Ventilator Initial 1   Charged w/in last 24h YES   Preset Conventional Ventilator Settings   Vent Type NKV-550   Vent Mode A/CMV-VC   Humidity HME   Set Rate 16 BPM   Vt Set 450 mL   PEEP/CPAP 8.7 cmH20   Insp Time 1 Sec(s)   I-Trigger Type  Flow   Trigger Sensitivity Flow/I-Trigger 1.5 L/min   Patient Ventilator Parameters   Resp Rate Total 34 br/min   Peak Airway Pressure 29.4 cmH2O   Exhaled Vt 416 mL   Total Ve 14.9 mL   I:E Ratio Measured 1.4:1   Inspired Tidal Volume (VTI) 445 mL   Conventional Ventilator Alarms   Alarms On Y   Apnea Rate 15   Apnea Volume (mL) 300 mL   Ready to Wean/Extubation Screen   FIO2<=50 (chart decimal) (!) 1   MV<16L (chart vol.) 14.9   PEEP <=8 (chart #) (!) 8.7

## 2022-01-18 NOTE — PROGRESS NOTES
Pharmacokinetic Initial Assessment: IV Vancomycin    Assessment/Plan:    Initiate intravenous vancomycin with 1000mg IV every 24 hours  Desired empiric serum trough concentration is 15 to 20 mcg/mL  Draw vancomycin trough level 60 min prior to third dose on 1/20/22 at approximately 1500.  Pharmacy will continue to follow and monitor vancomycin.      Please contact pharmacy at extension [phone] with any questions regarding this assessment.     Thank you for the consult,   Emmanuel Varma       Patient brief summary:  Sandra Wilson is a 81 y.o. female initiated on antimicrobial therapy with IV Vancomycin for treatment of suspected bacteremia    Drug Allergies:   Review of patient's allergies indicates:   Allergen Reactions    Cephalexin (bulk)      Flushing and itching    Lidocaine base      rash    Lisinopril Other (See Comments)     cough       Actual Body Weight:   66.7kg    Renal Function:   Estimated Creatinine Clearance: 46 mL/min (based on SCr of 0.9 mg/dL).,     Dialysis Method (if applicable):  N/A    CBC (last 72 hours):  Recent Labs   Lab Result Units 01/18/22  0839   WBC K/uL 5.06   Hemoglobin g/dL 13.6   Hematocrit % 41.3   Platelets K/uL 244   Gran % % 74.9*   Lymph % % 15.4*   Mono % % 9.1   Eosinophil % % 0.2   Basophil % % 0.2   Differential Method  Automated       Metabolic Panel (last 72 hours):  Recent Labs   Lab Result Units 01/18/22  0839 01/18/22  0922   Sodium mmol/L 135*  --    Potassium mmol/L 4.3  --    Chloride mmol/L 97  --    CO2 mmol/L 23  --    Glucose mg/dL 129*  --    Glucose, UA   --  Negative   BUN mg/dL 23  --    Creatinine mg/dL 0.9  --    Albumin g/dL 3.4*  --    Total Bilirubin mg/dL 1.3*  --    Alkaline Phosphatase U/L 85  --    AST U/L 19  --    ALT U/L 21  --        Drug levels (last 3 results):  No results for input(s): VANCOMYCINRA, VANCOMYCINPE, VANCOMYCINTR in the last 72 hours.    Microbiologic Results:  Microbiology Results (last 7 days)       Procedure Component Value  Units Date/Time    Blood culture [[id number]]     Order Status: Sent Specimen: Blood     Blood culture [[id number]]     Order Status: Sent Specimen: Blood     Culture, Respiratory with Gram Stain [[id number]]     Order Status: No result Specimen: Sputum, Expectorated     C Diff Toxin by PCR [[id number]] Collected: 01/18/22 0846    Order Status: No result Updated: 01/18/22 1110    Clostridium difficile EIA [[id number]]  (Abnormal) Collected: 01/18/22 0846    Order Status: Completed Specimen: Stool Updated: 01/18/22 1109     C. diff Antigen Positive     C difficile Toxins A+B, EIA Negative     Comment: Testing not recommended for children <24 months old.

## 2022-01-19 PROBLEM — N17.9 AKI (ACUTE KIDNEY INJURY): Status: ACTIVE | Noted: 2022-01-19

## 2022-01-19 PROBLEM — Z93.1 GASTROSTOMY TUBE DEPENDENT: Status: ACTIVE | Noted: 2022-01-19

## 2022-01-19 PROBLEM — I24.89 DEMAND ISCHEMIA: Status: ACTIVE | Noted: 2022-01-19

## 2022-01-19 PROBLEM — J80 ARDS (ADULT RESPIRATORY DISTRESS SYNDROME): Status: ACTIVE | Noted: 2022-01-19

## 2022-01-19 PROBLEM — A41.9 SEPTIC SHOCK: Status: ACTIVE | Noted: 2022-01-19

## 2022-01-19 PROBLEM — R65.21 SEPTIC SHOCK: Status: ACTIVE | Noted: 2022-01-19

## 2022-01-19 PROBLEM — E87.1 HYPONATREMIA: Status: ACTIVE | Noted: 2022-01-19

## 2022-01-19 LAB
ALBUMIN SERPL BCP-MCNC: 2.6 G/DL (ref 3.5–5.2)
ALLENS TEST: ABNORMAL
ALP SERPL-CCNC: 44 U/L (ref 55–135)
ALT SERPL W/O P-5'-P-CCNC: 24 U/L (ref 10–44)
ANION GAP SERPL CALC-SCNC: 16 MMOL/L (ref 8–16)
AST SERPL-CCNC: 54 U/L (ref 10–40)
BASOPHILS # BLD AUTO: 0.09 K/UL (ref 0–0.2)
BASOPHILS NFR BLD: 0.6 % (ref 0–1.9)
BILIRUB SERPL-MCNC: 0.7 MG/DL (ref 0.1–1)
BUN SERPL-MCNC: 36 MG/DL (ref 8–23)
CALCIUM SERPL-MCNC: 9.1 MG/DL (ref 8.7–10.5)
CHLORIDE SERPL-SCNC: 96 MMOL/L (ref 95–110)
CO2 SERPL-SCNC: 18 MMOL/L (ref 23–29)
CREAT SERPL-MCNC: 1.5 MG/DL (ref 0.5–1.4)
CREAT UR-MCNC: 38.1 MG/DL (ref 15–325)
DELSYS: ABNORMAL
DIFFERENTIAL METHOD: ABNORMAL
EOSINOPHIL # BLD AUTO: 0 K/UL (ref 0–0.5)
EOSINOPHIL NFR BLD: 0.1 % (ref 0–8)
ERYTHROCYTE [DISTWIDTH] IN BLOOD BY AUTOMATED COUNT: 13 % (ref 11.5–14.5)
ERYTHROCYTE [SEDIMENTATION RATE] IN BLOOD BY WESTERGREN METHOD: 16 MM/H
EST. GFR  (AFRICAN AMERICAN): 37 ML/MIN/1.73 M^2
EST. GFR  (NON AFRICAN AMERICAN): 32 ML/MIN/1.73 M^2
GLUCOSE SERPL-MCNC: 168 MG/DL (ref 70–110)
HCO3 UR-SCNC: 21 MMOL/L (ref 24–28)
HCT VFR BLD AUTO: 33.5 % (ref 37–48.5)
HGB BLD-MCNC: 11.5 G/DL (ref 12–16)
IMM GRANULOCYTES # BLD AUTO: 0.18 K/UL (ref 0–0.04)
IMM GRANULOCYTES NFR BLD AUTO: 1.2 % (ref 0–0.5)
LACTATE SERPL-SCNC: 3.3 MMOL/L (ref 0.5–2.2)
LACTATE SERPL-SCNC: 3.3 MMOL/L (ref 0.5–2.2)
LACTATE SERPL-SCNC: 3.6 MMOL/L (ref 0.5–2.2)
LACTATE SERPL-SCNC: 3.6 MMOL/L (ref 0.5–2.2)
LACTATE SERPL-SCNC: 3.7 MMOL/L (ref 0.5–2.2)
LACTATE SERPL-SCNC: 4.8 MMOL/L (ref 0.5–2.2)
LYMPHOCYTES # BLD AUTO: 0.8 K/UL (ref 1–4.8)
LYMPHOCYTES NFR BLD: 5.7 % (ref 18–48)
MAGNESIUM SERPL-MCNC: 2.1 MG/DL (ref 1.6–2.6)
MCH RBC QN AUTO: 32.3 PG (ref 27–31)
MCHC RBC AUTO-ENTMCNC: 34.3 G/DL (ref 32–36)
MCV RBC AUTO: 94 FL (ref 82–98)
MODE: ABNORMAL
MONOCYTES # BLD AUTO: 0.9 K/UL (ref 0.3–1)
MONOCYTES NFR BLD: 6.4 % (ref 4–15)
NEUTROPHILS # BLD AUTO: 12.7 K/UL (ref 1.8–7.7)
NEUTROPHILS NFR BLD: 86 % (ref 38–73)
NRBC BLD-RTO: 0 /100 WBC
PCO2 BLDA: 39.8 MMHG (ref 35–45)
PEEP: 5
PH SMN: 7.33 [PH] (ref 7.35–7.45)
PHOSPHATE SERPL-MCNC: 4.1 MG/DL (ref 2.7–4.5)
PLATELET # BLD AUTO: 259 K/UL (ref 150–450)
PLATELET BLD QL SMEAR: ABNORMAL
PMV BLD AUTO: 11 FL (ref 9.2–12.9)
PO2 BLDA: 61 MMHG (ref 80–100)
POC BE: -5 MMOL/L
POC SATURATED O2: 89 % (ref 95–100)
POC TCO2: 22 MMOL/L (ref 23–27)
POCT GLUCOSE: 114 MG/DL (ref 70–110)
POCT GLUCOSE: 135 MG/DL (ref 70–110)
POTASSIUM SERPL-SCNC: 4.6 MMOL/L (ref 3.5–5.1)
PROT SERPL-MCNC: 6.3 G/DL (ref 6–8.4)
RBC # BLD AUTO: 3.56 M/UL (ref 4–5.4)
SAMPLE: ABNORMAL
SARS-COV-2 RNA RESP QL NAA+PROBE: NOT DETECTED
SARS-COV-2- CYCLE NUMBER: NORMAL
SITE: ABNORMAL
SODIUM SERPL-SCNC: 130 MMOL/L (ref 136–145)
SODIUM UR-SCNC: <20 MMOL/L (ref 20–250)
TROPONIN I SERPL DL<=0.01 NG/ML-MCNC: 0.49 NG/ML (ref 0–0.03)
TROPONIN I SERPL DL<=0.01 NG/ML-MCNC: 0.72 NG/ML (ref 0–0.03)
TROPONIN I SERPL DL<=0.01 NG/ML-MCNC: 0.85 NG/ML (ref 0–0.03)
VT: 450
WBC # BLD AUTO: 14.8 K/UL (ref 3.9–12.7)

## 2022-01-19 PROCEDURE — 25000003 PHARM REV CODE 250: Performed by: INTERNAL MEDICINE

## 2022-01-19 PROCEDURE — 37799 UNLISTED PX VASCULAR SURGERY: CPT

## 2022-01-19 PROCEDURE — 85025 COMPLETE CBC W/AUTO DIFF WBC: CPT | Performed by: STUDENT IN AN ORGANIZED HEALTH CARE EDUCATION/TRAINING PROGRAM

## 2022-01-19 PROCEDURE — 27000207 HC ISOLATION

## 2022-01-19 PROCEDURE — 99900035 HC TECH TIME PER 15 MIN (STAT)

## 2022-01-19 PROCEDURE — 25000003 PHARM REV CODE 250: Performed by: STUDENT IN AN ORGANIZED HEALTH CARE EDUCATION/TRAINING PROGRAM

## 2022-01-19 PROCEDURE — 82803 BLOOD GASES ANY COMBINATION: CPT

## 2022-01-19 PROCEDURE — 87070 CULTURE OTHR SPECIMN AEROBIC: CPT | Performed by: STUDENT IN AN ORGANIZED HEALTH CARE EDUCATION/TRAINING PROGRAM

## 2022-01-19 PROCEDURE — 27000221 HC OXYGEN, UP TO 24 HOURS

## 2022-01-19 PROCEDURE — S0030 INJECTION, METRONIDAZOLE: HCPCS | Performed by: STUDENT IN AN ORGANIZED HEALTH CARE EDUCATION/TRAINING PROGRAM

## 2022-01-19 PROCEDURE — 84100 ASSAY OF PHOSPHORUS: CPT | Performed by: STUDENT IN AN ORGANIZED HEALTH CARE EDUCATION/TRAINING PROGRAM

## 2022-01-19 PROCEDURE — 99233 PR SUBSEQUENT HOSPITAL CARE,LEVL III: ICD-10-PCS | Mod: ,,, | Performed by: INTERNAL MEDICINE

## 2022-01-19 PROCEDURE — 99442 PR PHYSICIAN TELEPHONE EVALUATION 11-20 MIN: CPT | Mod: 95,,, | Performed by: FAMILY MEDICINE

## 2022-01-19 PROCEDURE — 83735 ASSAY OF MAGNESIUM: CPT | Performed by: STUDENT IN AN ORGANIZED HEALTH CARE EDUCATION/TRAINING PROGRAM

## 2022-01-19 PROCEDURE — 80053 COMPREHEN METABOLIC PANEL: CPT | Performed by: STUDENT IN AN ORGANIZED HEALTH CARE EDUCATION/TRAINING PROGRAM

## 2022-01-19 PROCEDURE — 82570 ASSAY OF URINE CREATININE: CPT | Performed by: STUDENT IN AN ORGANIZED HEALTH CARE EDUCATION/TRAINING PROGRAM

## 2022-01-19 PROCEDURE — 93005 ELECTROCARDIOGRAM TRACING: CPT

## 2022-01-19 PROCEDURE — 94761 N-INVAS EAR/PLS OXIMETRY MLT: CPT

## 2022-01-19 PROCEDURE — 36415 COLL VENOUS BLD VENIPUNCTURE: CPT | Performed by: STUDENT IN AN ORGANIZED HEALTH CARE EDUCATION/TRAINING PROGRAM

## 2022-01-19 PROCEDURE — 99900026 HC AIRWAY MAINTENANCE (STAT)

## 2022-01-19 PROCEDURE — 94003 VENT MGMT INPAT SUBQ DAY: CPT

## 2022-01-19 PROCEDURE — 99233 SBSQ HOSP IP/OBS HIGH 50: CPT | Mod: ,,, | Performed by: INTERNAL MEDICINE

## 2022-01-19 PROCEDURE — 87147 CULTURE TYPE IMMUNOLOGIC: CPT | Performed by: STUDENT IN AN ORGANIZED HEALTH CARE EDUCATION/TRAINING PROGRAM

## 2022-01-19 PROCEDURE — 87186 SC STD MICRODIL/AGAR DIL: CPT | Mod: 59 | Performed by: STUDENT IN AN ORGANIZED HEALTH CARE EDUCATION/TRAINING PROGRAM

## 2022-01-19 PROCEDURE — 63600175 PHARM REV CODE 636 W HCPCS: Performed by: EMERGENCY MEDICINE

## 2022-01-19 PROCEDURE — 63600175 PHARM REV CODE 636 W HCPCS: Performed by: STUDENT IN AN ORGANIZED HEALTH CARE EDUCATION/TRAINING PROGRAM

## 2022-01-19 PROCEDURE — 84484 ASSAY OF TROPONIN QUANT: CPT | Mod: 91 | Performed by: STUDENT IN AN ORGANIZED HEALTH CARE EDUCATION/TRAINING PROGRAM

## 2022-01-19 PROCEDURE — 83605 ASSAY OF LACTIC ACID: CPT | Mod: 91 | Performed by: STUDENT IN AN ORGANIZED HEALTH CARE EDUCATION/TRAINING PROGRAM

## 2022-01-19 PROCEDURE — 99442 PR PHYSICIAN TELEPHONE EVALUATION 11-20 MIN: ICD-10-PCS | Mod: 95,,, | Performed by: FAMILY MEDICINE

## 2022-01-19 PROCEDURE — 31720 CLEARANCE OF AIRWAYS: CPT

## 2022-01-19 PROCEDURE — 87077 CULTURE AEROBIC IDENTIFY: CPT | Performed by: STUDENT IN AN ORGANIZED HEALTH CARE EDUCATION/TRAINING PROGRAM

## 2022-01-19 PROCEDURE — 20000000 HC ICU ROOM

## 2022-01-19 PROCEDURE — 87205 SMEAR GRAM STAIN: CPT | Performed by: STUDENT IN AN ORGANIZED HEALTH CARE EDUCATION/TRAINING PROGRAM

## 2022-01-19 PROCEDURE — 84300 ASSAY OF URINE SODIUM: CPT | Performed by: STUDENT IN AN ORGANIZED HEALTH CARE EDUCATION/TRAINING PROGRAM

## 2022-01-19 RX ORDER — SODIUM CHLORIDE 9 MG/ML
INJECTION, SOLUTION INTRAVENOUS CONTINUOUS
Status: ACTIVE | OUTPATIENT
Start: 2022-01-19 | End: 2022-01-19

## 2022-01-19 RX ORDER — SODIUM CHLORIDE 9 MG/ML
INJECTION, SOLUTION INTRAVENOUS CONTINUOUS
Status: DISCONTINUED | OUTPATIENT
Start: 2022-01-19 | End: 2022-01-21

## 2022-01-19 RX ADMIN — MIDODRINE HYDROCHLORIDE 5 MG: 5 TABLET ORAL at 03:01

## 2022-01-19 RX ADMIN — METOCLOPRAMIDE HYDROCHLORIDE 10 MG: 5 SOLUTION ORAL at 11:01

## 2022-01-19 RX ADMIN — MUPIROCIN: 20 OINTMENT TOPICAL at 11:01

## 2022-01-19 RX ADMIN — Medication 125 MG: at 11:01

## 2022-01-19 RX ADMIN — LEVOTHYROXINE SODIUM 25 MCG: 0.03 TABLET ORAL at 06:01

## 2022-01-19 RX ADMIN — ENOXAPARIN SODIUM 40 MG: 100 INJECTION SUBCUTANEOUS at 04:01

## 2022-01-19 RX ADMIN — PROPOFOL 25 MCG/KG/MIN: 10 INJECTION, EMULSION INTRAVENOUS at 08:01

## 2022-01-19 RX ADMIN — METRONIDAZOLE 500 MG: 500 INJECTION, SOLUTION INTRAVENOUS at 08:01

## 2022-01-19 RX ADMIN — METRONIDAZOLE 500 MG: 500 INJECTION, SOLUTION INTRAVENOUS at 03:01

## 2022-01-19 RX ADMIN — MIDODRINE HYDROCHLORIDE 5 MG: 5 TABLET ORAL at 08:01

## 2022-01-19 RX ADMIN — SODIUM CHLORIDE: 0.9 INJECTION, SOLUTION INTRAVENOUS at 10:01

## 2022-01-19 RX ADMIN — PANTOPRAZOLE SODIUM 40 MG: 40 GRANULE, DELAYED RELEASE ORAL at 08:01

## 2022-01-19 RX ADMIN — CEFEPIME HYDROCHLORIDE 1 G: 1 INJECTION, SOLUTION INTRAVENOUS at 04:01

## 2022-01-19 RX ADMIN — CITALOPRAM HYDROBROMIDE 40 MG: 10 TABLET ORAL at 08:01

## 2022-01-19 RX ADMIN — CEFEPIME HYDROCHLORIDE 1 G: 1 INJECTION, SOLUTION INTRAVENOUS at 03:01

## 2022-01-19 RX ADMIN — Medication 125 MG: at 01:01

## 2022-01-19 RX ADMIN — NOREPINEPHRINE BITARTRATE 0.7 MCG/KG/MIN: 1 INJECTION, SOLUTION, CONCENTRATE INTRAVENOUS at 02:01

## 2022-01-19 RX ADMIN — METRONIDAZOLE 500 MG: 500 INJECTION, SOLUTION INTRAVENOUS at 11:01

## 2022-01-19 RX ADMIN — PROPOFOL 5 MCG/KG/MIN: 10 INJECTION, EMULSION INTRAVENOUS at 08:01

## 2022-01-19 RX ADMIN — METOCLOPRAMIDE HYDROCHLORIDE 10 MG: 5 SOLUTION ORAL at 05:01

## 2022-01-19 RX ADMIN — VANCOMYCIN HYDROCHLORIDE 1000 MG: 1 INJECTION, POWDER, LYOPHILIZED, FOR SOLUTION INTRAVENOUS at 03:01

## 2022-01-19 RX ADMIN — SODIUM CHLORIDE: 0.9 INJECTION, SOLUTION INTRAVENOUS at 11:01

## 2022-01-19 RX ADMIN — Medication 125 MG: at 05:01

## 2022-01-19 RX ADMIN — METOCLOPRAMIDE HYDROCHLORIDE 10 MG: 5 SOLUTION ORAL at 06:01

## 2022-01-19 RX ADMIN — ATORVASTATIN CALCIUM 40 MG: 40 TABLET, FILM COATED ORAL at 08:01

## 2022-01-19 RX ADMIN — NOREPINEPHRINE BITARTRATE 0.1 MCG/KG/MIN: 1 INJECTION, SOLUTION, CONCENTRATE INTRAVENOUS at 04:01

## 2022-01-19 RX ADMIN — MUPIROCIN: 20 OINTMENT TOPICAL at 08:01

## 2022-01-19 RX ADMIN — SODIUM CHLORIDE: 0.9 INJECTION, SOLUTION INTRAVENOUS at 06:01

## 2022-01-19 NOTE — SUBJECTIVE & OBJECTIVE
Past Medical History:   Diagnosis Date    Carotid stenosis     GERD (gastroesophageal reflux disease)     Hyperlipidemia     Hypertension     Osteoarthritis     Stroke        Past Surgical History:   Procedure Laterality Date    CARDIAC CATHETERIZATION      Carotid Endarderectomy      COLONOSCOPY  2010    repeat every 5    DEXA  2010    WNL    ESOPHAGOGASTRODUODENOSCOPY  6/9/2009 [facility]    Normal EGD.    ESOPHAGOGASTRODUODENOSCOPY  4/9/2012    [facility]? Slight gastric mucosal atrophy.  Otherwise normal stomach and duodenum.  CLOtest negative.    TONSILLECTOMY         Review of patient's allergies indicates:   Allergen Reactions    Cephalexin (bulk)      Flushing and itching    Lidocaine base      rash    Lisinopril Other (See Comments)     cough       No current facility-administered medications on file prior to encounter.     Current Outpatient Medications on File Prior to Encounter   Medication Sig    ALPRAZolam (XANAX) 0.25 MG tablet Take 0.25 mg by mouth 2 (two) times daily as needed.    atorvastatin (LIPITOR) 40 MG tablet 1 tablet (40 mg total) by Per G Tube route every evening. Per peg tube    celecoxib (CELEBREX) 100 MG capsule celecoxib 100 mg capsule    citalopram (CELEXA) 40 MG tablet citalopram 40 mg tablet   TAKE 1 TABLET BY MOUTH EVERY DAY    FLUoxetine 10 MG capsule 1 capsule (10 mg total) by Per G Tube route once daily. Per peg tube    fluticasone propionate (FLONASE) 50 mcg/actuation nasal spray fluticasone propionate 50 mcg/actuation nasal spray,suspension    glycopyrrolate (ROBINUL) 1 mg Tab 1 mg 2 (two) times daily. Per peg tube    hydrALAZINE (APRESOLINE) 25 MG tablet hydralazine 25 mg tablet    HYDROcodone-acetaminophen (NORCO) 5-325 mg per tablet Take 1 tablet by mouth 2 (two) times daily as needed for Pain.    insulin lispro 100 unit/mL injection Inject into the skin as needed. 150-199 = 1 UNITS  200-249 = 2 UNITS  250-299 = 3 UNITS  300-349 = 4 UNITS    ipratropium  (ATROVENT) 21 mcg (0.03 %) nasal spray ipratropium bromide 21 mcg (0.03 %) nasal spray    levothyroxine (SYNTHROID) 25 MCG tablet 25 mcg every morning. At 0600 per peg tube    LIDOcaine (XYLOCAINE) 5 % Oint ointment Apply topically.    metoclopramide HCl (REGLAN) 5 mg/5 mL Soln 10 mg every 6 (six) hours. Per peg tube    midodrine (PROAMATINE) 5 MG Tab 1 tablet (5 mg total) by Per G Tube route 3 (three) times daily.    multivitamin (THERAGRAN) tablet 1 tablet once daily. Per peg tube    pantoprazole (PROTONIX) 40 mg suspension 1 packet (40 mg total) by Per G Tube route once daily. Per peg tube     Family History     Problem Relation (Age of Onset)    Cancer Mother    Coronary artery disease Father        Tobacco Use    Smoking status: Never Smoker    Smokeless tobacco: Never Used   Substance and Sexual Activity    Alcohol use: No    Drug use: No    Sexual activity: Never     Birth control/protection: Post-menopausal     Review of Systems   Unable to perform ROS: Intubated     Objective:     Vital Signs (Most Recent):  Temp: 98 °F (36.7 °C) (01/18/22 2301)  Pulse: 81 (01/18/22 2301)  Resp: (!) 24 (01/18/22 2301)  BP: (!) 108/57 (01/18/22 2300)  SpO2: 95 % (01/18/22 2301) Vital Signs (24h Range):  Temp:  [98 °F (36.7 °C)-98.8 °F (37.1 °C)] 98 °F (36.7 °C)  Pulse:  [] 81  Resp:  [16-43] 24  SpO2:  [74 %-99 %] 95 %  BP: ()/(17-85) 108/57  Arterial Line BP: ()/(43-63) 121/63     Weight: 67 kg (147 lb 11.3 oz)  Body mass index is 25.35 kg/m².    Physical Exam  Vitals and nursing note reviewed.   Constitutional:       Appearance: She is ill-appearing.   HENT:      Head: Normocephalic and atraumatic.      Right Ear: External ear normal.      Left Ear: External ear normal.      Nose: Nose normal.      Mouth/Throat:      Comments: ETT.  Cardiovascular:      Rate and Rhythm: Normal rate and regular rhythm.      Pulses: Normal pulses.      Heart sounds: Normal heart sounds.   Pulmonary:      Effort:  Respiratory distress present.      Breath sounds: Rhonchi present.      Comments: Mechanical ventiation  Abdominal:      General: Bowel sounds are normal. There is no distension.      Palpations: Abdomen is soft.      Tenderness: There is no abdominal tenderness.   Musculoskeletal:      Cervical back: Normal range of motion and neck supple.      Right lower leg: No edema.      Left lower leg: No edema.   Skin:     General: Skin is warm and dry.   Neurological:      Comments: Sedated.   Psychiatric:      Comments: Sedated.             Significant Labs: All pertinent labs within the past 24 hours have been reviewed.    Significant Imaging: I have reviewed all pertinent imaging results/findings within the past 24 hours.

## 2022-01-19 NOTE — H&P
[facility] Hospital Medicine  History & Physical    Patient Name: Sandra Wilson  MRN: [id number]  Patient Class: IP- Inpatient  Admission Date: 1/18/2022  Attending Physician: Fabian Charles MD  Primary Care Provider: Primary Doctor No         Patient information was obtained from relative(s), past medical records and ER records.     Subjective:     Principal Problem:Acute hypoxemic respiratory failure    Chief Complaint:   Chief Complaint   Patient presents with    Shortness of Breath    Vomiting     PEG tube         HPI: Sandra Wilson is an 81 year old female with a past medical history of CVAs, CNS aneurysm, PEG status, CAD, HLD, hypothryoidism, DM, aortic stenosis, and colon, breast, and ovarian cancer who presented from long term care with vomiting, diarrhea, and shortness of breath. Workup in the ED showed likely aspiration pneumonitis and C. Diff colitis. Her O2 requirement increased while in the ED requiring intubation with sedation. She also required Levophed as she likely developed septic shock. Antibiotics were broadened to cefepime, vancomycin, and Flagyl. Pulmonary was consulted. Family was notified. The patient was unable to provide history given acuity of illness.      Past Medical History:   Diagnosis Date    Carotid stenosis     GERD (gastroesophageal reflux disease)     Hyperlipidemia     Hypertension     Osteoarthritis     Stroke        Past Surgical History:   Procedure Laterality Date    CARDIAC CATHETERIZATION      Carotid Endarderectomy      COLONOSCOPY  2010    repeat every 5    DEXA  2010    WNL    ESOPHAGOGASTRODUODENOSCOPY  6/9/2009 [facility]    Normal EGD.    ESOPHAGOGASTRODUODENOSCOPY  4/9/2012    NERD? Slight gastric mucosal atrophy.  Otherwise normal stomach and duodenum.  CLOtest negative.    TONSILLECTOMY         Review of patient's allergies indicates:   Allergen Reactions    Cephalexin (bulk)      Flushing and itching    Lidocaine base      rash     Lisinopril Other (See Comments)     cough       No current facility-administered medications on file prior to encounter.     Current Outpatient Medications on File Prior to Encounter   Medication Sig    ALPRAZolam (XANAX) 0.25 MG tablet Take 0.25 mg by mouth 2 (two) times daily as needed.    atorvastatin (LIPITOR) 40 MG tablet 1 tablet (40 mg total) by Per G Tube route every evening. Per peg tube    celecoxib (CELEBREX) 100 MG capsule celecoxib 100 mg capsule    citalopram (CELEXA) 40 MG tablet citalopram 40 mg tablet   TAKE 1 TABLET BY MOUTH EVERY DAY    FLUoxetine 10 MG capsule 1 capsule (10 mg total) by Per G Tube route once daily. Per peg tube    fluticasone propionate (FLONASE) 50 mcg/actuation nasal spray fluticasone propionate 50 mcg/actuation nasal spray,suspension    glycopyrrolate (ROBINUL) 1 mg Tab 1 mg 2 (two) times daily. Per peg tube    hydrALAZINE (APRESOLINE) 25 MG tablet hydralazine 25 mg tablet    HYDROcodone-acetaminophen (NORCO) 5-325 mg per tablet Take 1 tablet by mouth 2 (two) times daily as needed for Pain.    insulin lispro 100 unit/mL injection Inject into the skin as needed. 150-199 = 1 UNITS  200-249 = 2 UNITS  250-299 = 3 UNITS  300-349 = 4 UNITS    ipratropium (ATROVENT) 21 mcg (0.03 %) nasal spray ipratropium bromide 21 mcg (0.03 %) nasal spray    levothyroxine (SYNTHROID) 25 MCG tablet 25 mcg every morning. At 0600 per peg tube    LIDOcaine (XYLOCAINE) 5 % Oint ointment Apply topically.    metoclopramide HCl (REGLAN) 5 mg/5 mL Soln 10 mg every 6 (six) hours. Per peg tube    midodrine (PROAMATINE) 5 MG Tab 1 tablet (5 mg total) by Per G Tube route 3 (three) times daily.    multivitamin (THERAGRAN) tablet 1 tablet once daily. Per peg tube    pantoprazole (PROTONIX) 40 mg suspension 1 packet (40 mg total) by Per G Tube route once daily. Per peg tube     Family History     Problem Relation (Age of Onset)    Cancer Mother    Coronary artery disease Father        Tobacco  Use    Smoking status: Never Smoker    Smokeless tobacco: Never Used   Substance and Sexual Activity    Alcohol use: No    Drug use: No    Sexual activity: Never     Birth control/protection: Post-menopausal     Review of Systems   Unable to perform ROS: Intubated     Objective:     Vital Signs (Most Recent):  Temp: 98 °F (36.7 °C) (01/18/22 2301)  Pulse: 81 (01/18/22 2301)  Resp: (!) 24 (01/18/22 2301)  BP: (!) 108/57 (01/18/22 2300)  SpO2: 95 % (01/18/22 2301) Vital Signs (24h Range):  Temp:  [98 °F (36.7 °C)-98.8 °F (37.1 °C)] 98 °F (36.7 °C)  Pulse:  [] 81  Resp:  [16-43] 24  SpO2:  [74 %-99 %] 95 %  BP: ()/(17-85) 108/57  Arterial Line BP: ()/(43-63) 121/63     Weight: 67 kg (147 lb 11.3 oz)  Body mass index is 25.35 kg/m².    Physical Exam  Vitals and nursing note reviewed.   Constitutional:       Appearance: She is ill-appearing.   HENT:      Head: Normocephalic and atraumatic.      Right Ear: External ear normal.      Left Ear: External ear normal.      Nose: Nose normal.      Mouth/Throat:      Comments: ETT.  Cardiovascular:      Rate and Rhythm: Normal rate and regular rhythm.      Pulses: Normal pulses.      Heart sounds: Normal heart sounds.   Pulmonary:      Effort: Respiratory distress present.      Breath sounds: Rhonchi present.      Comments: Mechanical ventiation  Abdominal:      General: Bowel sounds are normal. There is no distension.      Palpations: Abdomen is soft.      Tenderness: There is no abdominal tenderness.   Musculoskeletal:      Cervical back: Normal range of motion and neck supple.      Right lower leg: No edema.      Left lower leg: No edema.   Skin:     General: Skin is warm and dry.   Neurological:      Comments: Sedated.   Psychiatric:      Comments: Sedated.             Significant Labs: All pertinent labs within the past 24 hours have been reviewed.    Significant Imaging: I have reviewed all pertinent imaging results/findings within the past 24  "hours.    Assessment/Plan:     * Acute hypoxemic respiratory failure  Secondary to aspiration pneumonia.  -Pulmonary consulted  -Admit to ICU  -Continue broad spectrum antibiotics  -CXR and ABG PRN  -Continuous pulse oximetry  -Consider proning protocol    Aspiration into airway  -Continue broad spectrum antibiotics  -Pulmonary consulted  -Ventilation with sedation      Pneumonia  -See "Aspiration into airway"      Type 2 diabetes mellitus, with long-term current use of insulin  -SSI  -Q6H glucose checks  -Hypoglycemic precautions  -Currently NPO      YARI (generalized anxiety disorder)  -Sedation      Hypothyroidism  -Continue Synthroid      Hyperlipidemia  -Statin    GERD (gastroesophageal reflux disease)  -Continue PPI        VTE Risk Mitigation (From admission, onward)         Ordered     enoxaparin injection 40 mg  Daily         01/18/22 1438     IP VTE HIGH RISK PATIENT  Once         01/18/22 1438     Place sequential compression device  Until discontinued         01/18/22 1438              Critical care time spent on the evaluation and treatment of severe organ dysfunction, review of pertinent labs and imaging studies, discussions with consulting providers and discussions with patient/family: 37 minutes.     Fabian Charles MD  Department of Hospital Medicine   [facility]  "

## 2022-01-19 NOTE — ASSESSMENT & PLAN NOTE
-Levophed infusion for MAP > 65  -Trend lactic acid  -Continue broad spectrum antibiotics  -Continue PO vancomycin  -Follow up cultures

## 2022-01-19 NOTE — ASSESSMENT & PLAN NOTE
Secondary to aspiration pneumonia. Concern for ARDS.  -Pulmonary consulted  -Admit to ICU  -Continue broad spectrum antibiotics  -CXR and ABG PRN  -Continuous pulse oximetry  -Consider proning protocol

## 2022-01-19 NOTE — PLAN OF CARE
[facility]  Initial Discharge Assessment       Primary Care Provider: Primary Doctor No    Admission Diagnosis: Respiratory failure [J96.90]  Hypoxia [R09.02]  Encounter for central line placement [Z45.2]  Encounter for intubation [Z01.818]  Chest pain [R07.9]  Aspiration pneumonia of right middle lobe, unspecified aspiration pneumonia type [J69.0]    Admission Date: 1/18/2022  Expected Discharge Date:      Discharge Barriers Identified: (P) Social (Family requesting LTAC and also possible transfer to another nursing home)    Payor: [employer] MANAGED MEDICARE / Plan: [employer] MEDICARE HMO / Product Type: Capitation /         Extended Emergency Contact Information  Primary Emergency Contact: CINDY SOLOMON  Mobile Phone: [phone]  Relation: Grandchild  Preferred language: English   needed? No  Secondary Emergency Contact: VillaKirsten[place]  Address: [address] [place] [address]  Mobile Phone: [phone]  Relation: Daughter    Discharge Plan A: (P) Return to nursing home  Discharge Plan B: (P) Return to Nursing Home    CM completed assessment with [place] Driver (daughter) [phone] via mobile phone.  CM verified the patient is a resident of [facility], and expresses a desire for the patient to transfer to another nursing home, when asked if she has a nursing home in mind, [place] Driver (daughter) [phone], stated she does not.  Daughter also express desire for the patient to go to LTAC if medically appropriate.  Daughter verified that both her and her brother Cliff Wilson [phone] are the patients MPOA.  Daughter endorses a wheelchair for ambulation.  D/C plan is return to nursing home, CM will follow to assist with discharge needs.    [id number] - [place] [id number] AT [facility] [address]  [facility] [address]  Phone: [phone] Fax: [phone]      Initial  Assessment (most recent)       Adult Discharge Assessment - 01/19/22 1545          Discharge Assessment    Assessment Type Discharge Planning Assessment (P)      Confirmed/corrected address, phone number and insurance Yes (P)      Confirmed Demographics Correct on Facesheet (P)      Source of Information family (P)    [place] Driver (daughter) [phone]    Reason For Admission Respiratory Failure (P)      Lives With facility resident (P)      Facility Arrived From: [facility] (P)      Do you expect to return to your current living situation? Yes (P)      Do you have help at home or someone to help you manage your care at home? Yes (P)      Who are your caregiver(s) and their phone number(s)? [facility] Staff (P)      Prior to hospitilization cognitive status: Alert/Oriented (P)      Current cognitive status: Unable to Assess (P)    Patient currentl on Ventilator    Walking or Climbing Stairs Difficulty ambulation difficulty, requires equipment (P)      Mobility Management wheelchair (P)      Dressing/Bathing Difficulty bathing difficulty, assistance 1 person (P)      Dressing/Bathing Management [facility] Staff (P)      Equipment Currently Used at Home wheelchair (P)      Readmission within 30 days? No (P)      Patient currently being followed by outpatient case management? No (P)      Do you currently have service(s) that help you manage your care at home? No (P)      Do you take prescription medications? Yes (P)      Do you have prescription coverage? Yes (P)      Coverage [employer] Manged Medicare (P)      Do you have any problems affording any of your prescribed medications? No (P)      Is the patient taking medications as prescribed? yes (P)      Who is going to help you get home at discharge? [facility] will provide transportation or ambulance (P)      How do you get to doctors appointments? -- (P)    [facility] transportation    Are you on dialysis? No (P)      Do you take coumadin? No (P)      Discharge Plan A Return to  nursing home (P)      Discharge Plan B Return to Nursing Home (P)      DME Needed Upon Discharge  -- (P)    TBD    Discharge Plan discussed with: Adult children (P)    [place] Driver (daughter) [phone]    Discharge Barriers Identified Social (P)    Family requesting LTAC and also possible transfer to another nursing home       Relationship/Environment    Name(s) of Who Lives With Patient Kristy Resident (P)

## 2022-01-19 NOTE — PLAN OF CARE
"Patient free of falls and injuries this shift. Remains on vent with FIO2 back up to 80%.Nonresponsive to any commands or requests. Keeps eyes closed. O2 sats and b/p drop when turned to rt side. Diminished to rt side.Titration of levophed.  Repositioned to lt lateral and supine. Heels floating. Mepliex to sacral area with calmoseptine applied.. Jackson patent with scant urine output. Lt radial kalie with a good waveform, correlates well with . Mitten to rt hand with distal pulse and circulation noted. Sinus on monitor with frequent PVC"s.   "

## 2022-01-19 NOTE — PLAN OF CARE
Problem: Adult Inpatient Plan of Care  Goal: Plan of Care Review  Outcome: Ongoing, Progressing  Goal: Patient-Specific Goal (Individualized)  Outcome: Ongoing, Progressing  Goal: Absence of Hospital-Acquired Illness or Injury  Outcome: Ongoing, Progressing  Goal: Optimal Comfort and Wellbeing  Outcome: Ongoing, Progressing  Goal: Readiness for Transition of Care  Outcome: Ongoing, Progressing     Problem: Infection  Goal: Absence of Infection Signs and Symptoms  Outcome: Ongoing, Progressing     Problem: Diabetes Comorbidity  Goal: Blood Glucose Level Within Targeted Range  Outcome: Ongoing, Progressing     Problem: Fluid Imbalance (Pneumonia)  Goal: Fluid Balance  Outcome: Ongoing, Progressing     Problem: Infection (Pneumonia)  Goal: Resolution of Infection Signs and Symptoms  Outcome: Ongoing, Progressing     Problem: Respiratory Compromise (Pneumonia)  Goal: Effective Oxygenation and Ventilation  Outcome: Ongoing, Progressing     Problem: Fall Injury Risk  Goal: Absence of Fall and Fall-Related Injury  Outcome: Ongoing, Progressing     Problem: Restraint, Nonbehavioral (Nonviolent)  Goal: Absence of Harm or Injury  Outcome: Ongoing, Progressing     Problem: Communication Impairment (Mechanical Ventilation, Invasive)  Goal: Effective Communication  Outcome: Ongoing, Progressing     Problem: Device-Related Complication Risk (Mechanical Ventilation, Invasive)  Goal: Optimal Device Function  Outcome: Ongoing, Progressing     Problem: Inability to Wean (Mechanical Ventilation, Invasive)  Goal: Mechanical Ventilation Liberation  Outcome: Ongoing, Progressing     Problem: Nutrition Impairment (Mechanical Ventilation, Invasive)  Goal: Optimal Nutrition Delivery  Outcome: Ongoing, Progressing     Problem: Skin and Tissue Injury (Mechanical Ventilation, Invasive)  Goal: Absence of Device-Related Skin and Tissue Injury  Outcome: Ongoing, Progressing     Problem: Ventilator-Induced Lung Injury (Mechanical Ventilation,  Invasive)  Goal: Absence of Ventilator-Induced Lung Injury  Outcome: Ongoing, Progressing     Problem: Communication Impairment (Artificial Airway)  Goal: Effective Communication  Outcome: Ongoing, Progressing     Problem: Device-Related Complication Risk (Artificial Airway)  Goal: Optimal Device Function  Outcome: Ongoing, Progressing     Problem: Skin and Tissue Injury (Artificial Airway)  Goal: Absence of Device-Related Skin or Tissue Injury  Outcome: Ongoing, Progressing     Problem: Noninvasive Ventilation Acute  Goal: Effective Unassisted Ventilation and Oxygenation  Outcome: Ongoing, Progressing     Problem: Skin Injury Risk Increased  Goal: Skin Health and Integrity  Outcome: Ongoing, Progressing     Problem: Coping Ineffective  Goal: Effective Coping  Outcome: Ongoing, Progressing

## 2022-01-19 NOTE — CHAPLAIN
"Spoke to daughter, [place], via telephone to set up palliative care meeting w/Dr. DUGAN, per his request. [place] is a ; she and her brother (who works at a bank and heading out of town) have the MPOA jointly. [place]'s daughter, granddaughter to patient, is Ana M Curiel and she is an RN and in route to the hospital now.    Explained palliative care and how it's an extra layer of support for patient and family and the attending physician placed a consult for the family to meet with Dr. Hunt and I'm calling on his behalf as part of the PC team; said doc will review her mom's case after consulting with specialists and together come up with a goals of care plan for her mom, answer their questions, etc. Daughter was asking about LTAC, doesn't like where her mom was before. I assured her those options will be discussed after meeting with Dr. DUGAN.    Daughter said she'd talk to er family about a meeting, but as if right now "impossible with our schedules to get us all together." Will relay to Dr. DUGAN.  will continue to follow. , in your mercy.  "

## 2022-01-19 NOTE — ASSESSMENT & PLAN NOTE
Secondary to aspiration pneumonia.  -Pulmonary consulted  -Admit to ICU  -Continue broad spectrum antibiotics  -CXR and ABG PRN  -Continuous pulse oximetry  -Consider proning protocol

## 2022-01-19 NOTE — ASSESSMENT & PLAN NOTE
Severe.  -Pulmonary consulted  -Treat aspiration pneumonia and septic shock  -Consider proning  -Lasix when shock resolved  -Mechanical ventilation with sedation; PEEP and TV  per Pulmonary

## 2022-01-19 NOTE — SUBJECTIVE & OBJECTIVE
Interval History: please refer to hospital course.    Review of Systems   Unable to perform ROS: Intubated     Objective:     Vital Signs (Most Recent):  Temp: 99 °F (37.2 °C) (01/19/22 0215)  Pulse: 84 (01/19/22 1100)  Resp: (!) 22 (01/19/22 1100)  BP: 123/60 (01/19/22 1100)  SpO2: 97 % (01/19/22 1100) Vital Signs (24h Range):  Temp:  [98 °F (36.7 °C)-99 °F (37.2 °C)] 99 °F (37.2 °C)  Pulse:  [] 84  Resp:  [16-43] 22  SpO2:  [74 %-100 %] 97 %  BP: ()/(17-70) 123/60  Arterial Line BP: ()/() 151/116     Weight: 65.9 kg (145 lb 4.5 oz)  Body mass index is 24.94 kg/m².    Intake/Output Summary (Last 24 hours) at 1/19/2022 1138  Last data filed at 1/19/2022 0700  Gross per 24 hour   Intake 2241.3 ml   Output 180 ml   Net 2061.3 ml      Physical Exam  Vitals and nursing note reviewed.   Constitutional:       Appearance: She is ill-appearing.   HENT:      Head: Normocephalic and atraumatic.      Right Ear: External ear normal.      Left Ear: External ear normal.      Nose: Nose normal.      Mouth/Throat:      Comments: ETT/OG.  Neck:      Comments: [facility] CVC.  Cardiovascular:      Rate and Rhythm: Normal rate and regular rhythm.      Pulses: Normal pulses.      Heart sounds: Normal heart sounds.   Pulmonary:      Effort: Respiratory distress present.      Breath sounds: Rhonchi present.      Comments: Mechanical ventiation  Abdominal:      General: Bowel sounds are normal. There is no distension.      Palpations: Abdomen is soft.      Tenderness: There is no abdominal tenderness.      Comments: G tube.   Musculoskeletal:      Cervical back: Normal range of motion and neck supple.      Right lower leg: No edema.      Left lower leg: No edema.   Skin:     General: Skin is warm and dry.   Neurological:      Comments: Sedated.   Psychiatric:      Comments: Sedated.         Significant Labs: All pertinent labs within the past 24 hours have been reviewed.    Significant Imaging: I have reviewed all  pertinent imaging results/findings within the past 24 hours.

## 2022-01-19 NOTE — CARE UPDATE
01/19/22 0834   Patient Assessment/Suction   Level of Consciousness (AVPU) unresponsive   Respiratory Effort Normal;Unlabored   Expansion/Accessory Muscles/Retractions expansion symmetric;no retractions;no use of accessory muscles   All Lung Fields Breath Sounds diminished;clear   Rhythm/Pattern, Respiratory assisted mechanically   Cough Frequency with stimulation   Cough Type assisted   Suction Method tracheal;oral   $ Suction Charges Inline Suction Procedure Stat Charge  (none)   PRE-TX-O2   O2 Device (Oxygen Therapy) ventilator   $ Is the patient on Low Flow Oxygen? Yes   Pulse Oximetry Type Continuous   $ Pulse Oximetry - Multiple Charge Pulse Oximetry - Multiple   Positioning HOB elevated 30 degrees   ETCO2   $ ETCO2 Usage Currently wearing   ETCO2 Device Type Bedside Monitor;Ventilator;Artificial Airway   Skin Integrity   $ Wound Care Tech Time 15 min   Area Observed Upper lip;Lower lip;Corner lip   Skin Appearance without discoloration        Airway - Non-Surgical 01/18/22 1300   Placement Date/Time: 01/18/22 1300   Present Prior to Hospital Arrival?: No  Inserted by: MD  Airway Device Size: 7.5  Style: Cuffed   Secured at 23 cm   Measured At Lips   Secured Location Center   Secured by Commercial tube shaffer   Bite Block center   Site Condition Dry   Status Patent;Intact   Site Assessment Midline   General Safety Checklist   Safety Promotion/Fall Prevention side rails raised   Airway Safety   Ambu bag with the patient? Yes, Adult Ambu   Is mask with the patient? Yes, Adult Mask   Suction set is at the bedside? Yes   Respiratory Interventions   Airway/Ventilation Management airway patency maintained;pulmonary hygiene promoted   Airway/Ventilation Support pulmonary hygiene promoted   Vent Select   Conventional Vent Y   $ Ventilator Subsequent 1   Charged w/in last 24h YES   Preset Conventional Ventilator Settings   Vent Type [id number]   Ventilation Type VC   Humidity HME   I-Trigger Type  Flow   Trigger  Sensitivity Flow/I-Trigger 1.5 L/min   Patient Ventilator Parameters   Mean Airway Pressure 10.8 cmH20   Conventional Ventilator Alarms   Alarms On Y   Ve High Alarm 25 L/min   Ve Low Alarm 2 L/min   Vt High Alarm 15 mL   Vt Low Alarm 1 mL   Resp Rate High Alarm 40 br/min   Press High Alarm 50 cmH2O   Delay Alarm (Sec) 20 sec(s)   IHI Ventilator Associated Pneumonia Bundle (Required)   Head of Bed Elevated  HOB 30   Oral Care Mouth swabbed;Mouth suctioned;with half strength hydrogen peroxide

## 2022-01-19 NOTE — PROGRESS NOTES
"  01/19/2022      Admit Date: 1/18/2022  Sandra Wilson  New Patient Consult    Chief Complaint   Patient presents with    Shortness of Breath    Vomiting     PEG tube        History of Present Illness:  Intubated.          From Dr Gaspar's hpi, ERP:Sandra Wilson is a 81 y.o. female who presents to the ED via EMS  with an onset of vomiting and SOB. EMS reported that upon examination patient was less responsive than usually but is able to answer questions. The report from the EMS upon arrival was an O2 sats in the 70's. EMS proceeded to give supplemental oxygen and the O2 sats raised up to 93% on 3 liters of O2 with  "junky" right upper lung sounds" and greenish diarrhea. She was vomiting en route. They also reported that the patient is a patient that has dislodged her PEG tubing many times. PMHx of HTN, CAD, OA, GERD, ctroke, Colon cancer, ovarian cancer, breast cancer.PSHx of Cardiac catheterization, colonoscopy.         Progress Note  PULMONARY    Admit Date: 1/18/2022 01/19/2022      SUBJECTIVE:     1/19 intubated, sedated,      PFSH and Allergies reviewed.    OBJECTIVE:     Vitals (Most recent):  Vitals:    01/19/22 0513   BP:    Pulse: 85   Resp: (!) 43   Temp:        Vitals (24 hour range):  Temp:  [98 °F (36.7 °C)-99 °F (37.2 °C)]   Pulse:  []   Resp:  [16-43]   BP: ()/(17-85)   SpO2:  [74 %-99 %]   Arterial Line BP: ()/(43-69)       Intake/Output Summary (Last 24 hours) at 1/19/2022 0527  Last data filed at 1/19/2022 0357  Gross per 24 hour   Intake 687.16 ml   Output 133 ml   Net 554.16 ml          Physical Exam:  The patient's neuro status (alertness,orientation,cognitive function,motor skills,), pharyngeal exam (oral lesions, hygiene, abn dentition,), Neck (jvd,mass,thyroid,nodes in neck and above/below clavicle),RESPIRATORY(symmetry,effort,fremitus,percussion,auscultation),  Cor(rhythm,heart tones including " gallops,perfusion,edema)ABD(distention,hepatic&splenomegaly,tenderness,masses), Skin(rash,cyanosis),Psyc(affect,judgement,).  Exam negative except for these pertinent findings:    Et tube, sedate, chest is symmetric, no distress, normal percussion, normal fremitus and good anterior breath sounds      Radiographs reviewed: view by direct vision    Results for orders placed during the hospital encounter of 01/18/22    X-Ray Chest 1 View    Narrative  EXAMINATION:  XR CHEST 1 VIEW    CLINICAL HISTORY:  respiratory failure;    TECHNIQUE:  Single frontal view of the chest was performed.    COMPARISON:  Chest portable of January 18, 2022    FINDINGS:  An endotracheal tube ends in the trachea above the level the malorie.  An NG tube traverses the esophagus to the stomach.  A central line enters at the right neck and ends in the superior vena cava.  The cardiac size and contours within normal limits.  A loop recorder is noted in the left chest.  There is continued central right lung infiltrate and small infiltrate at the left lung base.  No pneumothorax is seen.    Impression  Continued intrapulmonary infiltrates right greater than left.  Endotracheal tube, NG tube and right central lines in position.      Electronically signed by: Mathew Yanez MD  Date:    01/18/2022  Time:    15:50  ]    Labs     Recent Labs   Lab 01/19/22  0409   WBC 14.80*   HGB 11.5*   HCT 33.5*        Recent Labs   Lab 01/18/22  1726 01/18/22  2359 01/19/22  0409   NA  --   --  130*   K  --   --  4.6   CL  --   --  96   CO2  --   --  18*   BUN  --   --  36*   CREATININE  --   --  1.5*   GLU  --   --  168*   CALCIUM  --   --  9.1   MG  --   --  2.1   PHOS  --   --  4.1   AST  --   --  54*   ALT  --   --  24   ALKPHOS  --   --  44*   BILITOT  --   --  0.7   PROT  --   --  6.3   ALBUMIN  --   --  2.6*   .4*  --   --    PROCAL 17.21*  --   --    LACTATE 7.5*   < > 3.6*   TROPONINI 0.242*   < > 0.717*   *  --   --     < > = values  in this interval not displayed.     Recent Labs   Lab 01/19/22  0456   PH 7.332*   PCO2 39.8   PO2 61*   HCO3 21.0*     Microbiology Results (last 7 days)     Procedure Component Value Units Date/Time    Culture, Respiratory with Gram Stain [[id number]] Collected: 01/19/22 0114    Order Status: Sent Specimen: Sputum, Expectorated Updated: 01/19/22 0152    C Diff Toxin by PCR [[id number]]  (Abnormal) Collected: 01/18/22 0846    Order Status: Completed Updated: 01/18/22 2355     C. diff PCR Positive    Blood culture [[id number]] Collected: 01/18/22 1726    Order Status: Sent Specimen: Blood from Antecubital, Left Arm Updated: 01/18/22 2222    Blood culture [[id number]] Collected: 01/18/22 1802    Order Status: Sent Specimen: Blood from Antecubital, Right Arm Updated: 01/18/22 2222    Blood culture [[id number]]     Order Status: Canceled Specimen: Blood     Blood culture [[id number]]     Order Status: Canceled Specimen: Blood     Clostridium difficile EIA [[id number]]  (Abnormal) Collected: 01/18/22 0846    Order Status: Completed Specimen: Stool Updated: 01/18/22 1109     C. diff Antigen Positive     C difficile Toxins A+B, EIA Negative     Comment: Testing not recommended for children <24 months old.             Impression:  Active Hospital Problems    Diagnosis  POA    *Acute hypoxemic respiratory failure [J96.01]  Yes    YARI (generalized anxiety disorder) [F41.1]  Yes    Type 2 diabetes mellitus, with long-term current use of insulin [E11.9, Z79.4]  Not Applicable    Pneumonia [J18.9]  Yes    Aspiration into airway [T17.908A]  Yes    Hypothyroidism [E03.9]  Yes    Hyperlipidemia [E78.5]  Yes     Chronic    GERD (gastroesophageal reflux disease) [K21.9]  Yes     Chronic      Resolved Hospital Problems   No resolved problems to display.                        Plan: no fever, vss, was hypotensive earlier.  Cefepime/vanc,flagyl,  chr midodrine, levophed 0.9 ,  ddimer 5.2, creat 0.9  c diff ag + but toxin neg.  abg  7.27 with ox 48,   Dnr,  cxr impressive R>>>L aspiration pattern.     Prior subdural --       Recruitment manuver done with peep 30 for 6 seconds with art line bp falling from sbp 100 to 60's.  Sat improved from 92 to 94.  Pt not really peep responsive.    Pt should improve ox with left side down or prone.  Peep may help but infiltrates more R>L -- should be posterior lung bases.  Might consider cta if not progressing.      discussed with resp and nursing. Optimally would be left side down or prone.        1/19 no fever, vss with rr to 43, flagyl/zosyn/vanc/cefepime, n70-80% ox,  Wbc 14.8 from 5k,  Creat 1.5 form 0.9,   abg 02 61 with ph 7.33- peep 5,   I/o 687/133-  Will screen for dvt/pe with leg study  Will dose 500 cc saline,   F/u cxr more opacification right>> left lung.

## 2022-01-19 NOTE — PROGRESS NOTES
[facility] Hospital Medicine  Progress Note    Patient Name: Sandra Wilson  MRN: [id number]  Patient Class: IP- Inpatient   Admission Date: 1/18/2022  Length of Stay: 1 days  Attending Physician: Fabian Charles MD  Primary Care Provider: Primary Doctor No        Subjective:     Principal Problem:Acute hypoxemic respiratory failure        HPI:  Sandra Wilson is an 81 year old female with a past medical history of CVAs, CNS aneurysm, PEG status, CAD, HLD, hypothryoidism, DM, aortic stenosis, and colon, breast, and ovarian cancer who presented from long term care with vomiting, diarrhea, and shortness of breath. Workup in the ED showed likely aspiration pneumonitis and C. Diff colitis. Her O2 requirement increased while in the ED requiring intubation with sedation. She also required Levophed as she likely developed septic shock. Antibiotics were broadened to cefepime, vancomycin, and Flagyl. Pulmonary was consulted. Family was notified. The patient was unable to provide history given acuity of illness.      Overview/Hospital Course:  Sandra Wilson is an 81 year old female with a past medical history of CVAs, CNS aneurysm, PEG status, CAD, HLD, hypothryoidism, DM, aortic stenosis, and colon, breast, and ovarian cancer who presented from long term care with vomiting, diarrhea, and shortness of breath secondary to acute hypoxic respiratory failure from aspiration pneumonia in the setting of C diff colitis leading to septic shock. She was intubated in the ED and started on Levophed infusion via LIJ placed by Dr. Gaspar. She was started on broad spectrum antibiotics as well and admitted to the ICU. She has also been started on enteral vancomcyin for severe C diff colitis given elevated WBC count and STEFFANY. There is concern for developing ARDS given P/F ratio of 87 (severe). Pulmonary was consulted. She also presents with demand ischemia likely secondary to septic shock. Troponin is being trended. In  addition to Levophed, normal saline infusion has been ordered for STEFFANY and hyponatremia. Family agreed to DNR status 1/18 and Palliative Care has been consulted to discuss goal of care 1/19.      Interval History: please refer to hospital course.    Review of Systems   Unable to perform ROS: Intubated     Objective:     Vital Signs (Most Recent):  Temp: 99 °F (37.2 °C) (01/19/22 0215)  Pulse: 84 (01/19/22 1100)  Resp: (!) 22 (01/19/22 1100)  BP: 123/60 (01/19/22 1100)  SpO2: 97 % (01/19/22 1100) Vital Signs (24h Range):  Temp:  [98 °F (36.7 °C)-99 °F (37.2 °C)] 99 °F (37.2 °C)  Pulse:  [] 84  Resp:  [16-43] 22  SpO2:  [74 %-100 %] 97 %  BP: ()/(17-70) 123/60  Arterial Line BP: ()/() 151/116     Weight: 65.9 kg (145 lb 4.5 oz)  Body mass index is 24.94 kg/m².    Intake/Output Summary (Last 24 hours) at 1/19/2022 1138  Last data filed at 1/19/2022 0700  Gross per 24 hour   Intake 2241.3 ml   Output 180 ml   Net 2061.3 ml      Physical Exam  Vitals and nursing note reviewed.   Constitutional:       Appearance: She is ill-appearing.   HENT:      Head: Normocephalic and atraumatic.      Right Ear: External ear normal.      Left Ear: External ear normal.      Nose: Nose normal.      Mouth/Throat:      Comments: ETT/OG.  Neck:      Comments: LIJ CVC.  Cardiovascular:      Rate and Rhythm: Normal rate and regular rhythm.      Pulses: Normal pulses.      Heart sounds: Normal heart sounds.   Pulmonary:      Effort: Respiratory distress present.      Breath sounds: Rhonchi present.      Comments: Mechanical ventiation  Abdominal:      General: Bowel sounds are normal. There is no distension.      Palpations: Abdomen is soft.      Tenderness: There is no abdominal tenderness.      Comments: G tube.   Musculoskeletal:      Cervical back: Normal range of motion and neck supple.      Right lower leg: No edema.      Left lower leg: No edema.   Skin:     General: Skin is warm and dry.   Neurological:       "Comments: Sedated.   Psychiatric:      Comments: Sedated.         Significant Labs: All pertinent labs within the past 24 hours have been reviewed.    Significant Imaging: I have reviewed all pertinent imaging results/findings within the past 24 hours.      Assessment/Plan:      * Acute hypoxemic respiratory failure  Secondary to aspiration pneumonia. Concern for ARDS.  -Pulmonary consulted  -Admit to ICU  -Continue broad spectrum antibiotics  -CXR and ABG PRN  -Continuous pulse oximetry  -Consider proning protocol    ARDS (adult respiratory distress syndrome)  Severe.  -Pulmonary consulted  -Treat aspiration pneumonia and septic shock  -Consider proning  -Lasix when shock resolved  -Mechanical ventilation with sedation; PEEP and TV  per Pulmonary      Hyponatremia  -Follow up urine studies  -NS IV fluids  -Trend Na      STEFFANY (acute kidney injury)  Likely pre-renal in setting of STEFFANY.  -Continue NS IV fluids  -Continue to treat septic shock  -Renally dose medications  -Avoid nephrotoxic agents  -Monitor UOP and electrolytes  -Trend creatinine  -Follow up urine studies      Septic shock  -Levophed infusion for MAP > 65  -Trend lactic acid  -Continue broad spectrum antibiotics  -Continue PO vancomycin  -Follow up cultures      Demand ischemia  In setting of septic shock.  -Continue to treat septic shock  -Trend troponin  -Telemetry      Gastrostomy tube dependent  Likely cause of aspiration.  -Care per nursing  -Tube feedings when more stable      Aspiration into airway  -Continue broad spectrum antibiotics  -Pulmonary consulted  -Ventilation with sedation      Pneumonia  -See "Aspiration into airway"      Type 2 diabetes mellitus, with long-term current use of insulin  -SSI  -Q6H glucose checks  -Hypoglycemic precautions  -Currently NPO      YARI (generalized anxiety disorder)  -Sedation      Hypothyroidism  -Continue Synthroid      Hyperlipidemia  -Statin    GERD (gastroesophageal reflux disease)  -Hold PPI given C " diff colitis        VTE Risk Mitigation (From admission, onward)         Ordered     enoxaparin injection 40 mg  Daily         01/18/22 1438     IP VTE HIGH RISK PATIENT  Once         01/18/22 1438     Place sequential compression device  Until discontinued         01/18/22 1438                Discharge Planning   DEMETRIUS: 1/23/2022    Code Status: DNR   Is the patient medically ready for discharge?:     Reason for patient still in hospital (select all that apply): Patient unstable, Patient new problem, Patient trending condition, Laboratory test, Treatment, Imaging and Consult recommendations               Critical care time spent on the evaluation and treatment of severe organ dysfunction, review of pertinent labs and imaging studies, discussions with consulting providers and discussions with patient/family: 32 minutes.      Fabian Charles MD  Department of Hospital Medicine   [facility]

## 2022-01-19 NOTE — HPI
Sandra Wilson is an 81 year old female with a past medical history of CVAs, CNS aneurysm, PEG status, CAD, HLD, hypothryoidism, DM, aortic stenosis, and colon, breast, and ovarian cancer who presented from long term care with vomiting, diarrhea, and shortness of breath. Workup in the ED showed likely aspiration pneumonitis and C. Diff colitis. Her O2 requirement increased while in the ED requiring intubation with sedation. She also required Levophed as she likely developed septic shock. Antibiotics were broadened to cefepime, vancomycin, and Flagyl. Pulmonary was consulted. Family was notified. The patient was unable to provide history given acuity of illness.

## 2022-01-19 NOTE — HOSPITAL COURSE
Sandra Wilson is an 81 year old female with a past medical history of CVAs, CNS aneurysm, PEG status, CAD, HLD, hypothyroidism, DM, aortic stenosis, and colon, breast, and ovarian cancer who presented from long term care with vomiting, diarrhea, and shortness of breath secondary to acute hypoxic respiratory failure from aspiration pneumonia in the setting of C diff colitis leading to septic shock. She was intubated in the ED and started on Levophed infusion via RIJ CVC placed by Dr. Vignesh Gaspar. She was started on broad spectrum antibiotics with cefepime, Flagyl, vancomycin and admitted to the ICU. She was also started on enteral vancomycin for severe C diff colitis given elevated WBC count and STEFFANY. There was concern for developing ARDS given P/F ratio of 87 (severe); 190 1/22 (moderate). Pulmonary was consulted. She also presented with demand ischemia likely secondary to septic shock. Troponin was trended and improved as shock resolved. Normal saline infusion was added for hyponatremia and STEFFANY which improved both. Levophed was able to be weaned. Family agreed to DNR status 1/18 and Palliative Care was consulted to discuss goal of care 1/19. Antibiotics were changed to doxycycline and Flagyl (GBS in sputum culture) 1/21; vancomycin was added 1/23 after culture also became positive for Staph aureus and Klebsiella. Her respiratory status has improved throughout her course and she was extubated (code changed to partial code for intubation) 1/21 only to be re-intubated for worsening stridor (history of tracheostomy). Upon re-intubation, copious amounts of secretions were suctioned. KUB suggested a mild ileus A bowel regimen was instituted and the patient was able to have bowel movements 1/22. Tube feeds were started 1/23. Her course was complicated by Afib as well noted on telemetry 1/21; metoprolol tartrate started 1/22.She failed a leak test 1/22; Surgery was consulted for tracheostomy which is pending conversation  with family regarding goals of care.    Patient was set to go for trach on 01/26, but attempt was made for another trial extubation and the patient did well after extubation, she was transition to face mask and was able to maintain her saturations although had significant difficulty with secretions.  She was started on Robinul and scopolamine patch which did seem to improve for secretions.  Her respiratory status continued to be somewhat tenuous, so she was monitored in the ICU.  Family expressed desire to not have her get a tracheostomy.  Case management began working on LTACH referral. She was transferred out of the ICU    Patient was stable for discharge and tolerating tube feeds. Discharge was delayed for weeks because of family barriers. Eventually all paperwork and financial information was given to [facility] and she was accepted there. Patient was discharged in stable condition.

## 2022-01-19 NOTE — CONSULTS
"  01/18/2022      Admit Date: 1/18/2022  Sandra Wilson  New Patient Consult    Chief Complaint   Patient presents with    Shortness of Breath    Vomiting     PEG tube        History of Present Illness:  Intubated.          From Dr Gaspar's hpi, ERP:Sandra Wilson is a 81 y.o. female who presents to the ED via EMS  with an onset of vomiting and SOB. EMS reported that upon examination patient was less responsive than usually but is able to answer questions. The report from the EMS upon arrival was an O2 sats in the 70's. EMS proceeded to give supplemental oxygen and the O2 sats raised up to 93% on 3 liters of O2 with  "junky" right upper lung sounds" and greenish diarrhea. She was vomiting en route. They also reported that the patient is a patient that has dislodged her PEG tubing many times. PMHx of HTN, CAD, OA, GERD, ctroke, Colon cancer, ovarian cancer, breast cancer.PSHx of Cardiac catheterization, colonoscopy.     PFSH:  Past Medical History:   Diagnosis Date    Carotid stenosis     GERD (gastroesophageal reflux disease)     Hyperlipidemia     Hypertension     Osteoarthritis     Stroke      Past Surgical History:   Procedure Laterality Date    CARDIAC CATHETERIZATION      Carotid Endarderectomy      COLONOSCOPY  2010    repeat every 5    DEXA  2010    WNL    ESOPHAGOGASTRODUODENOSCOPY  6/9/2009 [facility]    Normal EGD.    ESOPHAGOGASTRODUODENOSCOPY  4/9/2012    [facility]? Slight gastric mucosal atrophy.  Otherwise normal stomach and duodenum.  CLOtest negative.    TONSILLECTOMY       Social History     Tobacco Use    Smoking status: Never Smoker    Smokeless tobacco: Never Used   Substance Use Topics    Alcohol use: No    Drug use: No     Family History   Problem Relation Age of Onset    Coronary artery disease Father     Cancer Mother         stomach    Breast cancer Neg Hx     Colon cancer Neg Hx     Ovarian cancer Neg Hx      Review of patient's allergies indicates:   Allergen Reactions " "   Cephalexin (bulk)      Flushing and itching    Lidocaine base      rash    Lisinopril Other (See Comments)     cough            Review of Systems:  due to neurologic status/impairments a Review of Systems could not be obtained       Exam:Comprehensive exam done. /70   Pulse 89   Temp 98.8 °F (37.1 °C) (Oral)   Resp (!) 22   Ht 5' 4" (1.626 m)   Wt 67 kg (147 lb 11.3 oz)   SpO2 (!) 93%   BMI 25.35 kg/m²   Exam included Vitals as listed, and patient's appearance and affect and alertness and mood, oral exam for yeast and hygiene and pharynx lesions and Mallapatti (M) score, neck with inspection for jvd and masses and thyroid abnormalities and lymph nodes (supraclavicular and infraclavicular nodes also examined and noted if abn), chest exam included symmetry and effort and fremitus and percussion and auscultation, cardiac exam included rhythm and gallops and murmur and rubs and jvd and edema, abdominal exam for mass and hepatosplenomegaly and tenderness and hernias and bowel sounds, Musculoskeletal exam with muscle tone and posture and mobility/gait and  strenght, and skin for rashes and cyanosis and pallor and turgor, extremity for clubbing.  Findings were normal except as listed below: not arousing, chest is symmetric, no distress, normal percussion, normal fremitus and good normal breath sounds, et tube, legs no edema/cords, rest good.        Radiographs reviewed: view by direct vision    Results for orders placed during the hospital encounter of 01/18/22    X-Ray Chest 1 View    Narrative  EXAMINATION:  XR CHEST 1 VIEW    CLINICAL HISTORY:  respiratory failure;    TECHNIQUE:  Single frontal view of the chest was performed.    COMPARISON:  Chest portable of January 18, 2022    FINDINGS:  An endotracheal tube ends in the trachea above the level the malorie.  An NG tube traverses the esophagus to the stomach.  A central line enters at the right neck and ends in the superior vena cava.  The cardiac " size and contours within normal limits.  A loop recorder is noted in the left chest.  There is continued central right lung infiltrate and small infiltrate at the left lung base.  No pneumothorax is seen.    Impression  Continued intrapulmonary infiltrates right greater than left.  Endotracheal tube, NG tube and right central lines in position.      Electronically signed by: Mathew Yanez MD  Date:    01/18/2022  Time:    15:50  ]    Labs     Recent Labs   Lab 01/18/22  0839   WBC 5.06   HGB 13.6   HCT 41.3        Recent Labs   Lab 01/18/22 0839 01/18/22 1726   *  --    K 4.3  --    CL 97  --    CO2 23  --    BUN 23  --    CREATININE 0.9  --    *  --    CALCIUM 9.8  --    AST 19  --    ALT 21  --    ALKPHOS 85  --    BILITOT 1.3*  --    PROT 7.2  --    ALBUMIN 3.4*  --    CRP  --  145.4*   PROCAL  --  17.21*   LACTATE  --  7.5*   TROPONINI  --  0.242*   BNP  --  136*     Recent Labs   Lab 01/18/22  1821   PH 7.271*   PCO2 41.3   PO2 100   HCO3 19.0*     Microbiology Results (last 7 days)     Procedure Component Value Units Date/Time    Blood culture [[id number]] Collected: 01/18/22 1802    Order Status: Sent Specimen: Blood from Antecubital, Right Arm Updated: 01/18/22 1802    Blood culture [[id number]] Collected: 01/18/22 1726    Order Status: Sent Specimen: Blood from Antecubital, Left Arm Updated: 01/18/22 1726    Blood culture [[id number]]     Order Status: Canceled Specimen: Blood     Blood culture [[id number]]     Order Status: Canceled Specimen: Blood     Culture, Respiratory with Gram Stain [[id number]]     Order Status: No result Specimen: Sputum, Expectorated     C Diff Toxin by PCR [[id number]] Collected: 01/18/22 0846    Order Status: No result Updated: 01/18/22 1110    Clostridium difficile EIA [[id number]]  (Abnormal) Collected: 01/18/22 0846    Order Status: Completed Specimen: Stool Updated: 01/18/22 1109     C. diff Antigen Positive     C difficile Toxins A+B, EIA Negative      Comment: Testing not recommended for children <24 months old.             Impression:  Active Hospital Problems    Diagnosis  POA    *Acute hypoxemic respiratory failure [J96.01]  Unknown    YARI (generalized anxiety disorder) [F41.1]  Unknown    Type 2 diabetes mellitus, with long-term current use of insulin [E11.9, Z79.4]  Not Applicable    Pneumonia [J18.9]  Unknown    Hypothyroidism [E03.9]  Yes    Hyperlipidemia [E78.5]  Yes     Chronic    GERD (gastroesophageal reflux disease) [K21.9]  Yes     Chronic      Resolved Hospital Problems   No resolved problems to display.               Plan: no fever, vss, was hypotensive earlier.  Cefepime/vanc,flagyl,  chr midodrine, levophed 0.9 ,  ddimer 5.2, creat 0.9  c diff ag + but toxin neg.  abg 7.27 with ox 48,   Dnr,  cxr impressive R>>>L aspiration pattern.     Prior subdural --       Recruitment manuver done with peep 30 for 6 seconds with art line bp falling from sbp 100 to 60's.  Sat improved from 92 to 94.  Pt not really peep responsive.    Pt should improve ox with left side down or prone.  Peep may help but infiltrates mofe R>L -- should be posterior lung bases.  Might consider cta if not progressing.      discussed with resp and nursing. Optimally would be left side down or prone.

## 2022-01-19 NOTE — ASSESSMENT & PLAN NOTE
Likely pre-renal in setting of STEFFANY.  -Continue NS IV fluids  -Continue to treat septic shock  -Renally dose medications  -Avoid nephrotoxic agents  -Monitor UOP and electrolytes  -Trend creatinine  -Follow up urine studies

## 2022-01-20 PROBLEM — E87.1 HYPONATREMIA: Status: RESOLVED | Noted: 2022-01-19 | Resolved: 2022-01-20

## 2022-01-20 LAB
ALBUMIN SERPL BCP-MCNC: 2.2 G/DL (ref 3.5–5.2)
ALLENS TEST: ABNORMAL
ALP SERPL-CCNC: 39 U/L (ref 55–135)
ALT SERPL W/O P-5'-P-CCNC: 20 U/L (ref 10–44)
ANION GAP SERPL CALC-SCNC: 13 MMOL/L (ref 8–16)
AST SERPL-CCNC: 29 U/L (ref 10–40)
BASOPHILS # BLD AUTO: ABNORMAL K/UL (ref 0–0.2)
BASOPHILS NFR BLD: 0 % (ref 0–1.9)
BILIRUB SERPL-MCNC: 0.6 MG/DL (ref 0.1–1)
BUN SERPL-MCNC: 34 MG/DL (ref 8–23)
CALCIUM SERPL-MCNC: 8.8 MG/DL (ref 8.7–10.5)
CHLORIDE SERPL-SCNC: 102 MMOL/L (ref 95–110)
CO2 SERPL-SCNC: 21 MMOL/L (ref 23–29)
CREAT SERPL-MCNC: 1 MG/DL (ref 0.5–1.4)
DELSYS: ABNORMAL
DIFFERENTIAL METHOD: ABNORMAL
EOSINOPHIL # BLD AUTO: ABNORMAL K/UL (ref 0–0.5)
EOSINOPHIL NFR BLD: 0 % (ref 0–8)
ERYTHROCYTE [DISTWIDTH] IN BLOOD BY AUTOMATED COUNT: 13.3 % (ref 11.5–14.5)
EST. GFR  (AFRICAN AMERICAN): >60 ML/MIN/1.73 M^2
EST. GFR  (NON AFRICAN AMERICAN): 53 ML/MIN/1.73 M^2
FIO2: 40
GLUCOSE SERPL-MCNC: 97 MG/DL (ref 70–110)
HCO3 UR-SCNC: 23.9 MMOL/L (ref 24–28)
HCT VFR BLD AUTO: 26.4 % (ref 37–48.5)
HGB BLD-MCNC: 9 G/DL (ref 12–16)
IMM GRANULOCYTES # BLD AUTO: ABNORMAL K/UL (ref 0–0.04)
IMM GRANULOCYTES NFR BLD AUTO: ABNORMAL % (ref 0–0.5)
LACTATE SERPL-SCNC: 2.1 MMOL/L (ref 0.5–2.2)
LACTATE SERPL-SCNC: 2.3 MMOL/L (ref 0.5–2.2)
LACTATE SERPL-SCNC: 2.5 MMOL/L (ref 0.5–2.2)
LACTATE SERPL-SCNC: 2.6 MMOL/L (ref 0.5–2.2)
LACTATE SERPL-SCNC: 2.8 MMOL/L (ref 0.5–2.2)
LACTATE SERPL-SCNC: 3 MMOL/L (ref 0.5–2.2)
LYMPHOCYTES # BLD AUTO: ABNORMAL K/UL (ref 1–4.8)
LYMPHOCYTES NFR BLD: 6 % (ref 18–48)
MAGNESIUM SERPL-MCNC: 2.1 MG/DL (ref 1.6–2.6)
MAP: 7.8
MCH RBC QN AUTO: 32.5 PG (ref 27–31)
MCHC RBC AUTO-ENTMCNC: 34.1 G/DL (ref 32–36)
MCV RBC AUTO: 95 FL (ref 82–98)
MIN VOL: 5.68
MODE: ABNORMAL
MONOCYTES # BLD AUTO: ABNORMAL K/UL (ref 0.3–1)
MONOCYTES NFR BLD: 5 % (ref 4–15)
NEUTROPHILS NFR BLD: 72 % (ref 38–73)
NEUTS BAND NFR BLD MANUAL: 17 %
NRBC BLD-RTO: 0 /100 WBC
PCO2 BLDA: 38.7 MMHG (ref 35–45)
PEEP: 5
PH SMN: 7.4 [PH] (ref 7.35–7.45)
PHOSPHATE SERPL-MCNC: 3.1 MG/DL (ref 2.7–4.5)
PLATELET # BLD AUTO: 144 K/UL (ref 150–450)
PLATELET BLD QL SMEAR: ABNORMAL
PMV BLD AUTO: 10.7 FL (ref 9.2–12.9)
PO2 BLDA: 64 MMHG (ref 80–100)
POC BE: -1 MMOL/L
POC SATURATED O2: 92 % (ref 95–100)
POC TCO2: 25 MMOL/L (ref 23–27)
POCT GLUCOSE: 104 MG/DL (ref 70–110)
POCT GLUCOSE: 104 MG/DL (ref 70–110)
POCT GLUCOSE: 137 MG/DL (ref 70–110)
POCT GLUCOSE: 87 MG/DL (ref 70–110)
POLYCHROMASIA BLD QL SMEAR: ABNORMAL
POTASSIUM SERPL-SCNC: 3.4 MMOL/L (ref 3.5–5.1)
PROT SERPL-MCNC: 5.4 G/DL (ref 6–8.4)
PS: 10
RBC # BLD AUTO: 2.77 M/UL (ref 4–5.4)
SAMPLE: ABNORMAL
SITE: ABNORMAL
SODIUM SERPL-SCNC: 136 MMOL/L (ref 136–145)
SP02: 94
SPONT RATE: 24
VANCOMYCIN TROUGH SERPL-MCNC: 10.9 UG/ML (ref 10–22)
VOL: 498
WBC # BLD AUTO: 14.55 K/UL (ref 3.9–12.7)

## 2022-01-20 PROCEDURE — 94761 N-INVAS EAR/PLS OXIMETRY MLT: CPT

## 2022-01-20 PROCEDURE — S0030 INJECTION, METRONIDAZOLE: HCPCS | Performed by: STUDENT IN AN ORGANIZED HEALTH CARE EDUCATION/TRAINING PROGRAM

## 2022-01-20 PROCEDURE — 82803 BLOOD GASES ANY COMBINATION: CPT

## 2022-01-20 PROCEDURE — 36415 COLL VENOUS BLD VENIPUNCTURE: CPT | Performed by: STUDENT IN AN ORGANIZED HEALTH CARE EDUCATION/TRAINING PROGRAM

## 2022-01-20 PROCEDURE — 80053 COMPREHEN METABOLIC PANEL: CPT | Performed by: STUDENT IN AN ORGANIZED HEALTH CARE EDUCATION/TRAINING PROGRAM

## 2022-01-20 PROCEDURE — 99233 SBSQ HOSP IP/OBS HIGH 50: CPT | Mod: ,,, | Performed by: INTERNAL MEDICINE

## 2022-01-20 PROCEDURE — 85027 COMPLETE CBC AUTOMATED: CPT | Performed by: STUDENT IN AN ORGANIZED HEALTH CARE EDUCATION/TRAINING PROGRAM

## 2022-01-20 PROCEDURE — 80202 ASSAY OF VANCOMYCIN: CPT | Performed by: STUDENT IN AN ORGANIZED HEALTH CARE EDUCATION/TRAINING PROGRAM

## 2022-01-20 PROCEDURE — 63600175 PHARM REV CODE 636 W HCPCS: Performed by: EMERGENCY MEDICINE

## 2022-01-20 PROCEDURE — 63600175 PHARM REV CODE 636 W HCPCS: Performed by: STUDENT IN AN ORGANIZED HEALTH CARE EDUCATION/TRAINING PROGRAM

## 2022-01-20 PROCEDURE — 27000207 HC ISOLATION

## 2022-01-20 PROCEDURE — 27000221 HC OXYGEN, UP TO 24 HOURS

## 2022-01-20 PROCEDURE — 83735 ASSAY OF MAGNESIUM: CPT | Performed by: STUDENT IN AN ORGANIZED HEALTH CARE EDUCATION/TRAINING PROGRAM

## 2022-01-20 PROCEDURE — 94003 VENT MGMT INPAT SUBQ DAY: CPT

## 2022-01-20 PROCEDURE — 36600 WITHDRAWAL OF ARTERIAL BLOOD: CPT

## 2022-01-20 PROCEDURE — 25000003 PHARM REV CODE 250: Performed by: STUDENT IN AN ORGANIZED HEALTH CARE EDUCATION/TRAINING PROGRAM

## 2022-01-20 PROCEDURE — 99900026 HC AIRWAY MAINTENANCE (STAT)

## 2022-01-20 PROCEDURE — 83605 ASSAY OF LACTIC ACID: CPT | Mod: 91 | Performed by: STUDENT IN AN ORGANIZED HEALTH CARE EDUCATION/TRAINING PROGRAM

## 2022-01-20 PROCEDURE — 20000000 HC ICU ROOM

## 2022-01-20 PROCEDURE — 84100 ASSAY OF PHOSPHORUS: CPT | Performed by: STUDENT IN AN ORGANIZED HEALTH CARE EDUCATION/TRAINING PROGRAM

## 2022-01-20 PROCEDURE — 99900035 HC TECH TIME PER 15 MIN (STAT)

## 2022-01-20 PROCEDURE — 99233 PR SUBSEQUENT HOSPITAL CARE,LEVL III: ICD-10-PCS | Mod: ,,, | Performed by: INTERNAL MEDICINE

## 2022-01-20 PROCEDURE — 85007 BL SMEAR W/DIFF WBC COUNT: CPT | Performed by: STUDENT IN AN ORGANIZED HEALTH CARE EDUCATION/TRAINING PROGRAM

## 2022-01-20 RX ORDER — IBUPROFEN 200 MG
16 TABLET ORAL
Status: DISCONTINUED | OUTPATIENT
Start: 2022-01-20 | End: 2022-02-27

## 2022-01-20 RX ORDER — ACETAMINOPHEN 325 MG/1
650 TABLET ORAL EVERY 8 HOURS PRN
Status: DISCONTINUED | OUTPATIENT
Start: 2022-01-20 | End: 2022-03-14 | Stop reason: HOSPADM

## 2022-01-20 RX ORDER — IBUPROFEN 200 MG
24 TABLET ORAL
Status: DISCONTINUED | OUTPATIENT
Start: 2022-01-20 | End: 2022-02-27

## 2022-01-20 RX ADMIN — MIDODRINE HYDROCHLORIDE 5 MG: 5 TABLET ORAL at 04:01

## 2022-01-20 RX ADMIN — PROPOFOL 15 MCG/KG/MIN: 10 INJECTION, EMULSION INTRAVENOUS at 09:01

## 2022-01-20 RX ADMIN — CEFEPIME HYDROCHLORIDE 1 G: 1 INJECTION, SOLUTION INTRAVENOUS at 04:01

## 2022-01-20 RX ADMIN — METRONIDAZOLE 500 MG: 500 INJECTION, SOLUTION INTRAVENOUS at 08:01

## 2022-01-20 RX ADMIN — POTASSIUM BICARBONATE 35 MEQ: 391 TABLET, EFFERVESCENT ORAL at 08:01

## 2022-01-20 RX ADMIN — MIDODRINE HYDROCHLORIDE 5 MG: 5 TABLET ORAL at 09:01

## 2022-01-20 RX ADMIN — MIDODRINE HYDROCHLORIDE 5 MG: 5 TABLET ORAL at 08:01

## 2022-01-20 RX ADMIN — METOCLOPRAMIDE HYDROCHLORIDE 10 MG: 5 SOLUTION ORAL at 12:01

## 2022-01-20 RX ADMIN — METRONIDAZOLE 500 MG: 500 INJECTION, SOLUTION INTRAVENOUS at 11:01

## 2022-01-20 RX ADMIN — METRONIDAZOLE 500 MG: 500 INJECTION, SOLUTION INTRAVENOUS at 04:01

## 2022-01-20 RX ADMIN — CITALOPRAM HYDROBROMIDE 40 MG: 10 TABLET ORAL at 08:01

## 2022-01-20 RX ADMIN — ATORVASTATIN CALCIUM 40 MG: 40 TABLET, FILM COATED ORAL at 09:01

## 2022-01-20 RX ADMIN — MUPIROCIN: 20 OINTMENT TOPICAL at 09:01

## 2022-01-20 RX ADMIN — LEVOTHYROXINE SODIUM 25 MCG: 0.03 TABLET ORAL at 05:01

## 2022-01-20 RX ADMIN — Medication 125 MG: at 12:01

## 2022-01-20 RX ADMIN — CEFEPIME HYDROCHLORIDE 1 G: 1 INJECTION, SOLUTION INTRAVENOUS at 06:01

## 2022-01-20 RX ADMIN — Medication 125 MG: at 11:01

## 2022-01-20 RX ADMIN — ENOXAPARIN SODIUM 40 MG: 100 INJECTION SUBCUTANEOUS at 05:01

## 2022-01-20 RX ADMIN — POTASSIUM BICARBONATE 35 MEQ: 391 TABLET, EFFERVESCENT ORAL at 05:01

## 2022-01-20 RX ADMIN — Medication 125 MG: at 05:01

## 2022-01-20 RX ADMIN — SODIUM CHLORIDE: 0.9 INJECTION, SOLUTION INTRAVENOUS at 11:01

## 2022-01-20 RX ADMIN — VANCOMYCIN HYDROCHLORIDE 1250 MG: 1.25 INJECTION, POWDER, LYOPHILIZED, FOR SOLUTION INTRAVENOUS at 04:01

## 2022-01-20 RX ADMIN — METOCLOPRAMIDE HYDROCHLORIDE 10 MG: 5 SOLUTION ORAL at 05:01

## 2022-01-20 RX ADMIN — MUPIROCIN: 20 OINTMENT TOPICAL at 08:01

## 2022-01-20 RX ADMIN — PROPOFOL 25 MCG/KG/MIN: 10 INJECTION, EMULSION INTRAVENOUS at 06:01

## 2022-01-20 RX ADMIN — METOCLOPRAMIDE HYDROCHLORIDE 10 MG: 5 SOLUTION ORAL at 11:01

## 2022-01-20 NOTE — ASSESSMENT & PLAN NOTE
Secondary to aspiration pneumonia. Concern for ARDS.  -Pulmonary consulted  -Admit to ICU  -Continue broad spectrum antibiotics  -CXR and ABG PRN  -Continuous pulse oximetry

## 2022-01-20 NOTE — PROGRESS NOTES
Attempted to reach daughter by phone who had left the hospital prior to anticipated meeting. Left message for call back.

## 2022-01-20 NOTE — CONSULTS
Reached daughter today by phone to set up family meeting. She and her brother defer this to granddaughter, January. Unfortunately writer missed family visit today but after reaching Matteo this evening agreeable to meeting tomorrow. She works as a dialysis nurse and has an unpredictable schedule tomorrow however does plan on coming in to the hospital. Writer gives cell and she plans on letting  Writer know as soon as possible when this meeting can take place tomorrow.

## 2022-01-20 NOTE — SUBJECTIVE & OBJECTIVE
Interval History: CXR and respiatory status improving; Pulmonary following; STEFFANY and hyponatremia resolved on NS infusion; no changes to antimicrobials.    Review of Systems   Unable to perform ROS: Intubated     Objective:     Vital Signs (Most Recent):  Temp: 96.8 °F (36 °C) (01/20/22 0800)  Pulse: 70 (01/20/22 0845)  Resp: 16 (01/20/22 0845)  BP: (!) 100/59 (01/20/22 0845)  SpO2: 99 % (01/20/22 0845) Vital Signs (24h Range):  Temp:  [96.8 °F (36 °C)-99 °F (37.2 °C)] 96.8 °F (36 °C)  Pulse:  [70-97] 70  Resp:  [10-44] 16  SpO2:  [94 %-100 %] 99 %  BP: ()/() 100/59  Arterial Line BP: ()/(51-90) 66/51     Weight: 67.9 kg (149 lb 11.1 oz)  Body mass index is 25.69 kg/m².    Intake/Output Summary (Last 24 hours) at 1/20/2022 1100  Last data filed at 1/20/2022 1024  Gross per 24 hour   Intake 4194.53 ml   Output 1175 ml   Net 3019.53 ml      Physical Exam  Vitals and nursing note reviewed.   Constitutional:       Appearance: She is ill-appearing.   HENT:      Head: Normocephalic and atraumatic.      Right Ear: External ear normal.      Left Ear: External ear normal.      Nose: Nose normal.      Mouth/Throat:      Comments: ETT/OG.  Neck:      Comments: RIJ CVC.  Cardiovascular:      Rate and Rhythm: Normal rate and regular rhythm.      Pulses: Normal pulses.      Heart sounds: Normal heart sounds.   Pulmonary:      Effort: Respiratory distress present.      Breath sounds: Rhonchi present.      Comments: Mechanical ventiation  Abdominal:      General: Bowel sounds are normal. There is no distension.      Palpations: Abdomen is soft.      Tenderness: There is no abdominal tenderness.      Comments: G tube.   Musculoskeletal:      Cervical back: Normal range of motion and neck supple.      Right lower leg: No edema.      Left lower leg: No edema.   Skin:     General: Skin is warm and dry.   Neurological:      Comments: Sedated.   Psychiatric:      Comments: Sedated.         Significant Labs: All pertinent  labs within the past 24 hours have been reviewed.    Significant Imaging: I have reviewed all pertinent imaging results/findings within the past 24 hours.

## 2022-01-20 NOTE — PLAN OF CARE
Patient intubated in the ICU. Vent settings continued. Propofol for sedation, titrated to RASS score of -1. Patient is arouses to voice and can nod her head appropriately. Levophed for BP control. Unable to wean patient off of levophed at this time. Jackson catheter with 950 mL of output. Peg tube to abdomen clamped. OG tube in place. Patient afebrile through the night. No acute events over night. Safety maintained. Bed low and locked, bed alarm on.

## 2022-01-20 NOTE — PROGRESS NOTES
Pharmacokinetic Assessment Follow Up: IV Vancomycin    Vancomycin serum concentration assessment(s):    The trough level was drawn correctly and can be used to guide therapy at this time. The measurement is below the desired definitive target range of 15 to 20 mcg/mL.    Vancomycin Regimen Plan:    Change regimen to Vancomycin 1250 mg IV every 24 hours with next serum trough concentration measured at 1500 prior to third dose on 1/22/22    Drug levels (last 3 results):  Recent Labs   Lab Result Units 01/20/22  1444   Vancomycin-Trough ug/mL 10.9       Pharmacy will continue to follow and monitor vancomycin.    Please contact pharmacy at extension [phone] for questions regarding this assessment.    Thank you for the consult,   Lilly Varma       Patient brief summary:  Sandra Wilson is a 81 y.o. female initiated on antimicrobial therapy with IV Vancomycin for treatment of bacteremia      Drug Allergies:   Review of patient's allergies indicates:   Allergen Reactions    Cephalexin (bulk)      Flushing and itching    Lidocaine base      rash    Lisinopril Other (See Comments)     cough       Actual Body Weight:   67.9 kg    Renal Function:   Estimated Creatinine Clearance: 41.8 mL/min (based on SCr of 1 mg/dL).,     Dialysis Method (if applicable):  N/A    CBC (last 72 hours):  Recent Labs   Lab Result Units 01/18/22  0839 01/19/22  0409 01/20/22  0325   WBC K/uL 5.06 14.80* 14.55*   Hemoglobin g/dL 13.6 11.5* 9.0*   Hematocrit % 41.3 33.5* 26.4*   Platelets K/uL 244 259 144*   Gran % % 74.9* 86.0* 72.0   Lymph % % 15.4* 5.7* 6.0*   Mono % % 9.1 6.4 5.0   Eosinophil % % 0.2 0.1 0.0   Basophil % % 0.2 0.6 0.0   Differential Method  Automated Automated Manual       Metabolic Panel (last 72 hours):  Recent Labs   Lab Result Units 01/18/22  0839 01/18/22  0922 01/19/22  0409 01/19/22  0945 01/20/22  0325   Sodium mmol/L 135*  --  130*  --  136   Sodium, Urine mmol/L  --   --   --  <20*  --    Potassium mmol/L 4.3  --  4.6   --  3.4*   Chloride mmol/L 97  --  96  --  102   CO2 mmol/L 23  --  18*  --  21*   Glucose mg/dL 129*  --  168*  --  97   Glucose, UA   --  Negative  --   --   --    BUN mg/dL 23  --  36*  --  34*   Creatinine mg/dL 0.9  --  1.5*  --  1.0   Creatinine, Urine mg/dL  --   --   --  38.1  --    Albumin g/dL 3.4*  --  2.6*  --  2.2*   Total Bilirubin mg/dL 1.3*  --  0.7  --  0.6   Alkaline Phosphatase U/L 85  --  44*  --  39*   AST U/L 19  --  54*  --  29   ALT U/L 21  --  24  --  20   Magnesium mg/dL  --   --  2.1  --  2.1   Phosphorus mg/dL  --   --  4.1  --  3.1       Vancomycin Administrations:  vancomycin given in the last 96 hours                     vancomycin 125 mg/5 mL oral solution 125 mg (mg) 125 mg Given 01/20/22 1226     125 mg Given  0538     125 mg Given 01/19/22 2356     125 mg Given  1705     125 mg Given  1311    vancomycin in dextrose 5 % 1 gram/250 mL IVPB 1,000 mg (mg) 1,000 mg New Bag 01/19/22 1534     1,000 mg New Bag 01/18/22 1651                    Microbiologic Results:  Microbiology Results (last 7 days)       Procedure Component Value Units Date/Time    Culture, Respiratory with Gram Stain [[id number]]  (Abnormal) Collected: 01/19/22 0114    Order Status: Completed Specimen: Sputum, Expectorated Updated: 01/20/22 1032     Respiratory Culture STREPTOCOCCUS AGALACTIAE (GROUP B)  Few  Beta-hemolytic streptococci are routinely susceptible to   penicillins,cephalosporins and carbapenems.  Susceptibility testing not routinely performed  Normal respiratory harjinder also present       Gram Stain (Respiratory) <10 epithelial cells per low power field.     Gram Stain (Respiratory) Rare WBC's     Gram Stain (Respiratory) Rare Gram positive cocci     Gram Stain (Respiratory) Rare yeast    Blood culture [[id number]] Collected: 01/18/22 1726    Order Status: Completed Specimen: Blood from Antecubital, Left Arm Updated: 01/19/22 2322     Blood Culture, Routine No Growth to date      No Growth to date    Blood  culture [[id number]] Collected: 01/18/22 1802    Order Status: Completed Specimen: Blood from Antecubital, Right Arm Updated: 01/19/22 2322     Blood Culture, Routine No Growth to date      No Growth to date    C Diff Toxin by PCR [[id number]]  (Abnormal) Collected: 01/18/22 0846    Order Status: Completed Updated: 01/18/22 2355     C. diff PCR Positive    Blood culture [[id number]]     Order Status: Canceled Specimen: Blood     Blood culture [[id number]]     Order Status: Canceled Specimen: Blood     Clostridium difficile EIA [[id number]]  (Abnormal) Collected: 01/18/22 0846    Order Status: Completed Specimen: Stool Updated: 01/18/22 1109     C. diff Antigen Positive     C difficile Toxins A+B, EIA Negative     Comment: Testing not recommended for children <24 months old.

## 2022-01-20 NOTE — ASSESSMENT & PLAN NOTE
Likely pre-renal in setting of STEFFANY. Improving  -Continue NS IV fluids  -Continue to treat septic shock  -Renally dose medications  -Avoid nephrotoxic agents  -Monitor UOP and electrolytes  -Trend creatinine

## 2022-01-20 NOTE — ASSESSMENT & PLAN NOTE
Severe. Improving.  -Pulmonary consulted  -Treat aspiration pneumonia and septic shock  -Lasix when shock resolved to be considered  -Mechanical ventilation with sedation; PEEP and TV per Pulmonary

## 2022-01-20 NOTE — PROGRESS NOTES
"  01/20/2022      Admit Date: 1/18/2022  Sandra Wilson  New Patient Consult    Chief Complaint   Patient presents with    Shortness of Breath    Vomiting     PEG tube        History of Present Illness:  Intubated.          From Dr Gaspar's hpi, ERP:Sandra Wilson is a 81 y.o. female who presents to the ED via EMS  with an onset of vomiting and SOB. EMS reported that upon examination patient was less responsive than usually but is able to answer questions. The report from the EMS upon arrival was an O2 sats in the 70's. EMS proceeded to give supplemental oxygen and the O2 sats raised up to 93% on 3 liters of O2 with  "junky" right upper lung sounds" and greenish diarrhea. She was vomiting en route. They also reported that the patient is a patient that has dislodged her PEG tubing many times. PMHx of HTN, CAD, OA, GERD, ctroke, Colon cancer, ovarian cancer, breast cancer.PSHx of Cardiac catheterization, colonoscopy.         Progress Note  PULMONARY    Admit Date: 1/18/2022 01/20/2022      SUBJECTIVE:     1/19 intubated, sedated,  1/20 no c/o intubated, sedated    PFSH and Allergies reviewed.    OBJECTIVE:     Vitals (Most recent):  Vitals:    01/20/22 0441   BP:    Pulse: 75   Resp: (!) 22   Temp:        Vitals (24 hour range):  Temp:  [97.7 °F (36.5 °C)-99 °F (37.2 °C)]   Pulse:  [71-97]   Resp:  [16-44]   BP: ()/(43-74)   SpO2:  [94 %-100 %]   Arterial Line BP: ()/()       Intake/Output Summary (Last 24 hours) at 1/20/2022 0554  Last data filed at 1/20/2022 0530  Gross per 24 hour   Intake 3721.4 ml   Output 912 ml   Net 2809.4 ml          Physical Exam:  The patient's neuro status (alertness,orientation,cognitive function,motor skills,), pharyngeal exam (oral lesions, hygiene, abn dentition,), Neck (jvd,mass,thyroid,nodes in neck and above/below clavicle),RESPIRATORY(symmetry,effort,fremitus,percussion,auscultation),  Cor(rhythm,heart tones including " gallops,perfusion,edema)ABD(distention,hepatic&splenomegaly,tenderness,masses), Skin(rash,cyanosis),Psyc(affect,judgement,).  Exam negative except for these pertinent findings:    Et tube, sedate, chest is symmetric, no distress, normal percussion, normal fremitus and good anterior breath sounds      Radiographs reviewed: view by direct vision    Results for orders placed during the hospital encounter of 01/18/22    X-Ray Chest 1 View    Narrative  EXAMINATION:  XR CHEST 1 VIEW    CLINICAL HISTORY:  respiratory failure;    TECHNIQUE:  Single frontal view of the chest was performed.    COMPARISON:  Chest portable of January 18, 2022    FINDINGS:  An endotracheal tube ends in the trachea above the level the malorie.  An NG tube traverses the esophagus to the stomach.  A central line enters at the right neck and ends in the superior vena cava.  The cardiac size and contours within normal limits.  A loop recorder is noted in the left chest.  There is continued central right lung infiltrate and small infiltrate at the left lung base.  No pneumothorax is seen.    Impression  Continued intrapulmonary infiltrates right greater than left.  Endotracheal tube, NG tube and right central lines in position.      Electronically signed by: Mathew Yanez MD  Date:    01/18/2022  Time:    15:50  ]    Labs     Recent Labs   Lab 01/20/22  0325   WBC 14.55*   HGB 9.0*   HCT 26.4*   *   BAND 17.0     Recent Labs   Lab 01/19/22  1114 01/19/22  1539 01/20/22  0325   NA  --   --  136   K  --   --  3.4*   CL  --   --  102   CO2  --   --  21*   BUN  --   --  34*   CREATININE  --   --  1.0   GLU  --   --  97   CALCIUM  --   --  8.8   MG  --   --  2.1   PHOS  --   --  3.1   AST  --   --  29   ALT  --   --  20   ALKPHOS  --   --  39*   BILITOT  --   --  0.6   PROT  --   --  5.4*   ALBUMIN  --   --  2.2*   LACTATE 3.3*   < > 2.8*   TROPONINI 0.486*  --   --     < > = values in this interval not displayed.     No results for input(s): PH,  PCO2, PO2, HCO3 in the last 24 hours.  Microbiology Results (last 7 days)     Procedure Component Value Units Date/Time    Blood culture [[id number]] Collected: 01/18/22 1726    Order Status: Completed Specimen: Blood from Antecubital, Left Arm Updated: 01/19/22 2322     Blood Culture, Routine No Growth to date      No Growth to date    Blood culture [[id number]] Collected: 01/18/22 1802    Order Status: Completed Specimen: Blood from Antecubital, Right Arm Updated: 01/19/22 2322     Blood Culture, Routine No Growth to date      No Growth to date    Culture, Respiratory with Gram Stain [[id number]] Collected: 01/19/22 0114    Order Status: Completed Specimen: Sputum, Expectorated Updated: 01/19/22 2007     Gram Stain (Respiratory) <10 epithelial cells per low power field.     Gram Stain (Respiratory) Rare WBC's     Gram Stain (Respiratory) Rare Gram positive cocci     Gram Stain (Respiratory) Rare yeast    C Diff Toxin by PCR [[id number]]  (Abnormal) Collected: 01/18/22 0846    Order Status: Completed Updated: 01/18/22 2355     C. diff PCR Positive    Blood culture [[id number]]     Order Status: Canceled Specimen: Blood     Blood culture [[id number]]     Order Status: Canceled Specimen: Blood     Clostridium difficile EIA [[id number]]  (Abnormal) Collected: 01/18/22 0846    Order Status: Completed Specimen: Stool Updated: 01/18/22 1109     C. diff Antigen Positive     C difficile Toxins A+B, EIA Negative     Comment: Testing not recommended for children <24 months old.             Impression:  Active Hospital Problems    Diagnosis  POA    *Acute hypoxemic respiratory failure [J96.01]  Yes    Gastrostomy tube dependent [Z93.1]  Not Applicable    Demand ischemia [I24.8]  Yes    Septic shock [A41.9, R65.21]  Yes    STEFFANY (acute kidney injury) [N17.9]  Yes    Hyponatremia [E87.1]  Yes    ARDS (adult respiratory distress syndrome) [J80]  Yes    YARI (generalized anxiety disorder) [F41.1]  Yes    Type 2 diabetes mellitus,  with long-term current use of insulin [E11.9, Z79.4]  Not Applicable    Pneumonia [J18.9]  Yes    Aspiration into airway [T17.908A]  Yes    Hypothyroidism [E03.9]  Yes    Hyperlipidemia [E78.5]  Yes     Chronic    GERD (gastroesophageal reflux disease) [K21.9]  Yes     Chronic      Resolved Hospital Problems   No resolved problems to display.                        Plan: no fever, vss, was hypotensive earlier.  Cefepime/vanc,flagyl,  chr midodrine, levophed 0.9 ,  ddimer 5.2, creat 0.9  c diff ag + but toxin neg.  abg 7.27 with ox 48,   Dnr,  cxr impressive R>>>L aspiration pattern.     Prior subdural --       Recruitment manuver done with peep 30 for 6 seconds with art line bp falling from sbp 100 to 60's.  Sat improved from 92 to 94.  Pt not really peep responsive.    Pt should improve ox with left side down or prone.  Peep may help but infiltrates more R>L -- should be posterior lung bases.  Might consider cta if not progressing.      discussed with resp and nursing. Optimally would be left side down or prone.        1/19 no fever, vss with rr to 43, flagyl/zosyn/vanc/cefepime, n70-80% ox,  Wbc 14.8 from 5k,  Creat 1.5 form 0.9,   abg 02 61 with ph 7.33- peep 5,   I/o 687/133-  Will screen for dvt/pe with leg study  Will dose 500 cc saline,   F/u cxr more opacification right>> left lung.      1/20 no fever, vss, bp low at times, I.o 2770/1870, cefepime/flagyl/vanc, ox 60%, wbc 14.6, plts 144- new, creat 1.0,. cxr with some clearing,   F/u abg, wean soon likely but need better ox to extubate.    No dvt, ddimer up at admit,     Pt min vol 8 and seemed to do well with 50% ox while I rounded.  Discussed with nursing and respiratory-- if can get to 40% and pass wean trial would recommend extubation, would be optimal to discuss with family prior plans not to re intubate.  She is progressing-- would consider not discussing comfort care if improving.

## 2022-01-20 NOTE — PROGRESS NOTES
[facility] Hospital Medicine  Progress Note    Patient Name: Sandra Wilson  MRN: [id number]  Patient Class: IP- Inpatient   Admission Date: 1/18/2022  Length of Stay: 2 days  Attending Physician: Fabian Charles MD  Primary Care Provider: Primary Doctor No        Subjective:     Principal Problem:Acute hypoxemic respiratory failure        HPI:  Sandra Wilson is an 81 year old female with a past medical history of CVAs, CNS aneurysm, PEG status, CAD, HLD, hypothryoidism, DM, aortic stenosis, and colon, breast, and ovarian cancer who presented from long term care with vomiting, diarrhea, and shortness of breath. Workup in the ED showed likely aspiration pneumonitis and C. Diff colitis. Her O2 requirement increased while in the ED requiring intubation with sedation. She also required Levophed as she likely developed septic shock. Antibiotics were broadened to cefepime, vancomycin, and Flagyl. Pulmonary was consulted. Family was notified. The patient was unable to provide history given acuity of illness.      Overview/Hospital Course:  Sandra Wilson is an 81 year old female with a past medical history of CVAs, CNS aneurysm, PEG status, CAD, HLD, hypothryoidism, DM, aortic stenosis, and colon, breast, and ovarian cancer who presented from long term care with vomiting, diarrhea, and shortness of breath secondary to acute hypoxic respiratory failure from aspiration pneumonia in the setting of C diff colitis leading to septic shock. She was intubated in the ED and started on Levophed infusion via LIJ placed by Dr. Whit Gaspar. She was started on broad spectrum antibiotics with cefepime, Flagyl, vancomycin and admitted to the ICU. She has also been started on enteral vancomcyin for severe C diff colitis given elevated WBC count and STEFFANY. There was concern for developing ARDS given P/F ratio of 87 (severe). Pulmonary was consulted. She also presents with demand ischemia likely secondary to septic  shock. Troponin was trended and improved. Normal saline infusion was added for hyponatremia and STEFFANY which improved both. Levophed is currently being weaned. Family agreed to DNR status 1/18 and Palliative Care has been consulted to discuss goal of care 1/19. Her respiratory status has improved throughout her course and she may be able to be extubated in the coming days.      Interval History: CXR and respiatory status improving; Pulmonary following; STEFFANY and hyponatremia resolved on NS infusion; no changes to antimicrobials.    Review of Systems   Unable to perform ROS: Intubated     Objective:     Vital Signs (Most Recent):  Temp: 96.8 °F (36 °C) (01/20/22 0800)  Pulse: 70 (01/20/22 0845)  Resp: 16 (01/20/22 0845)  BP: (!) 100/59 (01/20/22 0845)  SpO2: 99 % (01/20/22 0845) Vital Signs (24h Range):  Temp:  [96.8 °F (36 °C)-99 °F (37.2 °C)] 96.8 °F (36 °C)  Pulse:  [70-97] 70  Resp:  [10-44] 16  SpO2:  [94 %-100 %] 99 %  BP: ()/() 100/59  Arterial Line BP: ()/(51-90) 66/51     Weight: 67.9 kg (149 lb 11.1 oz)  Body mass index is 25.69 kg/m².    Intake/Output Summary (Last 24 hours) at 1/20/2022 1100  Last data filed at 1/20/2022 1024  Gross per 24 hour   Intake 4194.53 ml   Output 1175 ml   Net 3019.53 ml      Physical Exam  Vitals and nursing note reviewed.   Constitutional:       Appearance: She is ill-appearing.   HENT:      Head: Normocephalic and atraumatic.      Right Ear: External ear normal.      Left Ear: External ear normal.      Nose: Nose normal.      Mouth/Throat:      Comments: ETT/OG.  Neck:      Comments: RIJ CVC.  Cardiovascular:      Rate and Rhythm: Normal rate and regular rhythm.      Pulses: Normal pulses.      Heart sounds: Normal heart sounds.   Pulmonary:      Effort: Respiratory distress present.      Breath sounds: Rhonchi present.      Comments: Mechanical ventiation  Abdominal:      General: Bowel sounds are normal. There is no distension.      Palpations: Abdomen is soft.  "     Tenderness: There is no abdominal tenderness.      Comments: G tube.   Musculoskeletal:      Cervical back: Normal range of motion and neck supple.      Right lower leg: No edema.      Left lower leg: No edema.   Skin:     General: Skin is warm and dry.   Neurological:      Comments: Sedated.   Psychiatric:      Comments: Sedated.         Significant Labs: All pertinent labs within the past 24 hours have been reviewed.    Significant Imaging: I have reviewed all pertinent imaging results/findings within the past 24 hours.      Assessment/Plan:      * Acute hypoxemic respiratory failure  Secondary to aspiration pneumonia. Concern for ARDS.  -Pulmonary consulted  -Admit to ICU  -Continue broad spectrum antibiotics  -CXR and ABG PRN  -Continuous pulse oximetry    ARDS (adult respiratory distress syndrome)  Severe. Improving.  -Pulmonary consulted  -Treat aspiration pneumonia and septic shock  -Lasix when shock resolved to be considered  -Mechanical ventilation with sedation; PEEP and TV per Pulmonary      STEFFANY (acute kidney injury)  Likely pre-renal in setting of STEFFANY. Improving  -Continue NS IV fluids  -Continue to treat septic shock  -Renally dose medications  -Avoid nephrotoxic agents  -Monitor UOP and electrolytes  -Trend creatinine      Septic shock  -Levophed infusion for MAP > 65; weaning  -Continue broad spectrum antibiotics  -Continue PO vancomycin  -Follow up cultures      Demand ischemia  In setting of septic shock. Improved.  -Continue to treat septic shock  -Telemetry      Gastrostomy tube dependent  Likely cause of aspiration.  -Care per nursing  -Tube feedings when more stable      Aspiration into airway  -Continue broad spectrum antibiotics  -Pulmonary consulted  -Ventilation with sedation      Pneumonia  -See "Aspiration into airway"      Type 2 diabetes mellitus, with long-term current use of insulin  -SSI  -Q6H glucose checks  -Hypoglycemic precautions  -Currently NPO      YARI (generalized anxiety " disorder)  -Sedation      Hypothyroidism  -Continue Synthroid      Hyperlipidemia  -Statin    GERD (gastroesophageal reflux disease)  -Hold PPI given C diff colitis        VTE Risk Mitigation (From admission, onward)         Ordered     enoxaparin injection 40 mg  Daily         01/18/22 1438     IP VTE HIGH RISK PATIENT  Once         01/18/22 1438     Place sequential compression device  Until discontinued         01/18/22 1438                Discharge Planning   DEMETRIUS: 1/23/2022    Code Status: DNR   Is the patient medically ready for discharge?:     Reason for patient still in hospital (select all that apply): Patient trending condition, Laboratory test, Treatment and Consult recommendations  Discharge Plan A: Return to nursing home            Critical care time spent on the evaluation and treatment of severe organ dysfunction, review of pertinent labs and imaging studies, discussions with consulting providers and discussions with patient/family: 33 minutes.      Fabian Charles MD  Department of Hospital Medicine   [facility]

## 2022-01-20 NOTE — ASSESSMENT & PLAN NOTE
-Levophed infusion for MAP > 65; weaning  -Continue broad spectrum antibiotics  -Continue PO vancomycin  -Follow up cultures

## 2022-01-20 NOTE — CARE UPDATE
01/20/22 0723   Patient Assessment/Suction   Level of Consciousness (AVPU) responds to pain   Respiratory Effort Normal;Unlabored   Expansion/Accessory Muscles/Retractions expansion symmetric;no retractions;no use of accessory muscles   All Lung Fields Breath Sounds coarse;clear   Rhythm/Pattern, Respiratory assisted mechanically   Cough Frequency with stimulation   Cough Type assisted   Suction Method oral;tracheal   $ Suction Charges Inline Suction Procedure Stat Charge   Secretions Amount small   Secretions Color yellow   Secretions Characteristics thick   PRE-TX-O2   O2 Device (Oxygen Therapy) ventilator   $ Is the patient on Low Flow Oxygen? Yes   Pulse Oximetry Type Continuous   $ Pulse Oximetry - Multiple Charge Pulse Oximetry - Multiple   Positioning HOB elevated 30 degrees   ETCO2   $ ETCO2 Usage Currently wearing   ETCO2 Device Type Bedside Monitor;Ventilator;Artificial Airway   Skin Integrity   $ Wound Care Tech Time 15 min   Area Observed Upper lip;Lower lip;Corner lip   Skin Appearance without discoloration        Airway - Non-Surgical 01/18/22 1300   Placement Date/Time: 01/18/22 1300   Present Prior to Hospital Arrival?: No  Inserted by: MD  Airway Device Size: 7.5  Style: Cuffed   Secured at 23 cm   Measured At Lips   Secured Location Left   Secured by Commercial tube shaffer   Bite Block left;secure and patent   Site Condition Dry   Status Secured;Intact   Site Assessment Midline   Cuff Pressure 22 cm H2O   General Safety Checklist   Safety Promotion/Fall Prevention side rails raised   Airway Safety   Ambu bag with the patient? Yes, Adult Ambu   Is mask with the patient? Yes, Adult Mask   Suction set is at the bedside? Yes   ETT Size 7.5   Respiratory Interventions   Airway/Ventilation Management pulmonary hygiene promoted;airway patency maintained   Airway/Ventilation Support pulmonary hygiene promoted   Vent Select   Conventional Vent Y   $ Ventilator Subsequent 1   Charged w/in last 24h YES    Preset Conventional Ventilator Settings   Vent Type NKV-550   Ventilation Type VC   Humidity HME   Patient Ventilator Parameters   Mean Airway Pressure 9.42 cmH20   Plateau Pressure 17.9 cmH20   Conventional Ventilator Alarms   Alarms On Y   Ve High Alarm 25 L/min   Ve Low Alarm 2 L/min   Vt High Alarm 15 mL   Vt Low Alarm 1 mL   Resp Rate High Alarm 40 br/min   Press High Alarm 50 cmH2O   Delay Alarm (Sec) 20 sec(s)   IHI Ventilator Associated Pneumonia Bundle (Required)   Head of Bed Elevated  HOB 30   Oral Care Mouth moisturizer;Mouth suctioned;with half strength hydrogen peroxide   Abg order put in but Dr. Gao decided to hold off on it for now until weaning trials are initiated later.

## 2022-01-21 PROBLEM — N17.9 AKI (ACUTE KIDNEY INJURY): Status: RESOLVED | Noted: 2022-01-19 | Resolved: 2022-01-21

## 2022-01-21 PROBLEM — K56.7 ILEUS: Status: ACTIVE | Noted: 2022-01-21

## 2022-01-21 PROBLEM — I48.91 ATRIAL FIBRILLATION: Status: ACTIVE | Noted: 2022-01-21

## 2022-01-21 LAB
ALBUMIN SERPL BCP-MCNC: 2 G/DL (ref 3.5–5.2)
ALLENS TEST: ABNORMAL
ALP SERPL-CCNC: 48 U/L (ref 55–135)
ALT SERPL W/O P-5'-P-CCNC: 19 U/L (ref 10–44)
ANION GAP SERPL CALC-SCNC: 10 MMOL/L (ref 8–16)
AST SERPL-CCNC: 25 U/L (ref 10–40)
BASOPHILS # BLD AUTO: ABNORMAL K/UL (ref 0–0.2)
BASOPHILS NFR BLD: 0 % (ref 0–1.9)
BILIRUB SERPL-MCNC: 0.5 MG/DL (ref 0.1–1)
BNP SERPL-MCNC: 445 PG/ML (ref 0–99)
BUN SERPL-MCNC: 26 MG/DL (ref 8–23)
CALCIUM SERPL-MCNC: 8.6 MG/DL (ref 8.7–10.5)
CHLORIDE SERPL-SCNC: 106 MMOL/L (ref 95–110)
CO2 SERPL-SCNC: 23 MMOL/L (ref 23–29)
CREAT SERPL-MCNC: 0.7 MG/DL (ref 0.5–1.4)
DELSYS: ABNORMAL
DIFFERENTIAL METHOD: ABNORMAL
EOSINOPHIL # BLD AUTO: ABNORMAL K/UL (ref 0–0.5)
EOSINOPHIL NFR BLD: 0 % (ref 0–8)
ERYTHROCYTE [DISTWIDTH] IN BLOOD BY AUTOMATED COUNT: 13.6 % (ref 11.5–14.5)
ERYTHROCYTE [SEDIMENTATION RATE] IN BLOOD BY WESTERGREN METHOD: 29 MM/H
EST. GFR  (AFRICAN AMERICAN): >60 ML/MIN/1.73 M^2
EST. GFR  (NON AFRICAN AMERICAN): >60 ML/MIN/1.73 M^2
FIO2: 40
FLOW: 10
GLUCOSE SERPL-MCNC: 82 MG/DL (ref 70–110)
HCO3 UR-SCNC: 24.5 MMOL/L (ref 24–28)
HCT VFR BLD AUTO: 23.7 % (ref 37–48.5)
HGB BLD-MCNC: 8 G/DL (ref 12–16)
IMM GRANULOCYTES # BLD AUTO: ABNORMAL K/UL (ref 0–0.04)
IMM GRANULOCYTES NFR BLD AUTO: ABNORMAL % (ref 0–0.5)
LACTATE SERPL-SCNC: 1.3 MMOL/L (ref 0.5–2.2)
LACTATE SERPL-SCNC: 1.5 MMOL/L (ref 0.5–2.2)
LYMPHOCYTES # BLD AUTO: ABNORMAL K/UL (ref 1–4.8)
LYMPHOCYTES NFR BLD: 5 % (ref 18–48)
MAGNESIUM SERPL-MCNC: 2.1 MG/DL (ref 1.6–2.6)
MCH RBC QN AUTO: 32.5 PG (ref 27–31)
MCHC RBC AUTO-ENTMCNC: 33.8 G/DL (ref 32–36)
MCV RBC AUTO: 96 FL (ref 82–98)
MODE: ABNORMAL
MONOCYTES # BLD AUTO: ABNORMAL K/UL (ref 0.3–1)
MONOCYTES NFR BLD: 2 % (ref 4–15)
NEUTROPHILS NFR BLD: 81 % (ref 38–73)
NEUTS BAND NFR BLD MANUAL: 12 %
NRBC BLD-RTO: 0 /100 WBC
PCO2 BLDA: 40.4 MMHG (ref 35–45)
PH SMN: 7.39 [PH] (ref 7.35–7.45)
PHOSPHATE SERPL-MCNC: 1.7 MG/DL (ref 2.7–4.5)
PLATELET # BLD AUTO: 130 K/UL (ref 150–450)
PLATELET BLD QL SMEAR: ABNORMAL
PMV BLD AUTO: 11.3 FL (ref 9.2–12.9)
PO2 BLDA: 70 MMHG (ref 80–100)
POC BE: 0 MMOL/L
POC SATURATED O2: 94 % (ref 95–100)
POC TCO2: 26 MMOL/L (ref 23–27)
POCT GLUCOSE: 105 MG/DL (ref 70–110)
POCT GLUCOSE: 110 MG/DL (ref 70–110)
POCT GLUCOSE: 129 MG/DL (ref 70–110)
POCT GLUCOSE: 77 MG/DL (ref 70–110)
POLYCHROMASIA BLD QL SMEAR: ABNORMAL
POTASSIUM SERPL-SCNC: 3.2 MMOL/L (ref 3.5–5.1)
PROT SERPL-MCNC: 5.1 G/DL (ref 6–8.4)
RBC # BLD AUTO: 2.46 M/UL (ref 4–5.4)
SAMPLE: ABNORMAL
SITE: ABNORMAL
SODIUM SERPL-SCNC: 139 MMOL/L (ref 136–145)
SP02: 95
TROPONIN I SERPL DL<=0.01 NG/ML-MCNC: 0.08 NG/ML (ref 0–0.03)
TROPONIN I SERPL DL<=0.01 NG/ML-MCNC: 0.1 NG/ML (ref 0–0.03)
TROPONIN I SERPL DL<=0.01 NG/ML-MCNC: 0.1 NG/ML (ref 0–0.03)
WBC # BLD AUTO: 15.24 K/UL (ref 3.9–12.7)

## 2022-01-21 PROCEDURE — 84100 ASSAY OF PHOSPHORUS: CPT | Performed by: STUDENT IN AN ORGANIZED HEALTH CARE EDUCATION/TRAINING PROGRAM

## 2022-01-21 PROCEDURE — 27000221 HC OXYGEN, UP TO 24 HOURS

## 2022-01-21 PROCEDURE — 80053 COMPREHEN METABOLIC PANEL: CPT | Performed by: STUDENT IN AN ORGANIZED HEALTH CARE EDUCATION/TRAINING PROGRAM

## 2022-01-21 PROCEDURE — S0030 INJECTION, METRONIDAZOLE: HCPCS | Performed by: STUDENT IN AN ORGANIZED HEALTH CARE EDUCATION/TRAINING PROGRAM

## 2022-01-21 PROCEDURE — 63600175 PHARM REV CODE 636 W HCPCS: Performed by: STUDENT IN AN ORGANIZED HEALTH CARE EDUCATION/TRAINING PROGRAM

## 2022-01-21 PROCEDURE — 83735 ASSAY OF MAGNESIUM: CPT | Performed by: STUDENT IN AN ORGANIZED HEALTH CARE EDUCATION/TRAINING PROGRAM

## 2022-01-21 PROCEDURE — 82803 BLOOD GASES ANY COMBINATION: CPT

## 2022-01-21 PROCEDURE — 94002 VENT MGMT INPAT INIT DAY: CPT

## 2022-01-21 PROCEDURE — 99291 PR CRITICAL CARE, E/M 30-74 MINUTES: ICD-10-PCS | Mod: ,,, | Performed by: INTERNAL MEDICINE

## 2022-01-21 PROCEDURE — 83605 ASSAY OF LACTIC ACID: CPT | Performed by: STUDENT IN AN ORGANIZED HEALTH CARE EDUCATION/TRAINING PROGRAM

## 2022-01-21 PROCEDURE — 27200966 HC CLOSED SUCTION SYSTEM

## 2022-01-21 PROCEDURE — 83880 ASSAY OF NATRIURETIC PEPTIDE: CPT | Performed by: STUDENT IN AN ORGANIZED HEALTH CARE EDUCATION/TRAINING PROGRAM

## 2022-01-21 PROCEDURE — 25000003 PHARM REV CODE 250: Performed by: NURSE PRACTITIONER

## 2022-01-21 PROCEDURE — 94761 N-INVAS EAR/PLS OXIMETRY MLT: CPT

## 2022-01-21 PROCEDURE — 25000003 PHARM REV CODE 250: Performed by: STUDENT IN AN ORGANIZED HEALTH CARE EDUCATION/TRAINING PROGRAM

## 2022-01-21 PROCEDURE — 99900026 HC AIRWAY MAINTENANCE (STAT)

## 2022-01-21 PROCEDURE — 93010 ELECTROCARDIOGRAM REPORT: CPT | Mod: ,,, | Performed by: GENERAL PRACTICE

## 2022-01-21 PROCEDURE — 94003 VENT MGMT INPAT SUBQ DAY: CPT

## 2022-01-21 PROCEDURE — 85007 BL SMEAR W/DIFF WBC COUNT: CPT | Performed by: STUDENT IN AN ORGANIZED HEALTH CARE EDUCATION/TRAINING PROGRAM

## 2022-01-21 PROCEDURE — 63600175 PHARM REV CODE 636 W HCPCS: Performed by: EMERGENCY MEDICINE

## 2022-01-21 PROCEDURE — 99291 CRITICAL CARE FIRST HOUR: CPT | Mod: ,,, | Performed by: INTERNAL MEDICINE

## 2022-01-21 PROCEDURE — 99900035 HC TECH TIME PER 15 MIN (STAT)

## 2022-01-21 PROCEDURE — 84484 ASSAY OF TROPONIN QUANT: CPT | Performed by: STUDENT IN AN ORGANIZED HEALTH CARE EDUCATION/TRAINING PROGRAM

## 2022-01-21 PROCEDURE — 93005 ELECTROCARDIOGRAM TRACING: CPT

## 2022-01-21 PROCEDURE — 36600 WITHDRAWAL OF ARTERIAL BLOOD: CPT

## 2022-01-21 PROCEDURE — 20000000 HC ICU ROOM

## 2022-01-21 PROCEDURE — 25000003 PHARM REV CODE 250: Performed by: INTERNAL MEDICINE

## 2022-01-21 PROCEDURE — 27000207 HC ISOLATION

## 2022-01-21 PROCEDURE — 85027 COMPLETE CBC AUTOMATED: CPT | Performed by: STUDENT IN AN ORGANIZED HEALTH CARE EDUCATION/TRAINING PROGRAM

## 2022-01-21 PROCEDURE — 93010 EKG 12-LEAD: ICD-10-PCS | Mod: ,,, | Performed by: GENERAL PRACTICE

## 2022-01-21 RX ORDER — SODIUM CHLORIDE 0.9 % (FLUSH) 0.9 %
3 SYRINGE (ML) INJECTION
Status: DISCONTINUED | OUTPATIENT
Start: 2022-01-21 | End: 2022-03-14 | Stop reason: HOSPADM

## 2022-01-21 RX ORDER — ROCURONIUM BROMIDE 10 MG/ML
INJECTION, SOLUTION INTRAVENOUS
Status: DISPENSED
Start: 2022-01-21 | End: 2022-01-21

## 2022-01-21 RX ORDER — POLYETHYLENE GLYCOL 3350 17 G/17G
17 POWDER, FOR SOLUTION ORAL DAILY
Status: DISCONTINUED | OUTPATIENT
Start: 2022-01-22 | End: 2022-01-22

## 2022-01-21 RX ORDER — METOPROLOL TARTRATE 1 MG/ML
5 INJECTION, SOLUTION INTRAVENOUS EVERY 5 MIN PRN
Status: DISCONTINUED | OUTPATIENT
Start: 2022-01-21 | End: 2022-03-14 | Stop reason: HOSPADM

## 2022-01-21 RX ORDER — SENNOSIDES 8.6 MG/1
8.6 TABLET ORAL DAILY
Status: DISCONTINUED | OUTPATIENT
Start: 2022-01-22 | End: 2022-01-22

## 2022-01-21 RX ORDER — SUCCINYLCHOLINE CHLORIDE 20 MG/ML
INJECTION INTRAMUSCULAR; INTRAVENOUS
Status: DISCONTINUED
Start: 2022-01-21 | End: 2022-01-21 | Stop reason: WASHOUT

## 2022-01-21 RX ORDER — ETOMIDATE 2 MG/ML
INJECTION INTRAVENOUS
Status: DISCONTINUED
Start: 2022-01-21 | End: 2022-01-21 | Stop reason: WASHOUT

## 2022-01-21 RX ORDER — PROPOFOL 10 MG/ML
INJECTION, EMULSION INTRAVENOUS
Status: DISPENSED
Start: 2022-01-21 | End: 2022-01-21

## 2022-01-21 RX ORDER — SODIUM CHLORIDE 9 MG/ML
INJECTION, SOLUTION INTRAVENOUS CONTINUOUS
Status: DISCONTINUED | OUTPATIENT
Start: 2022-01-21 | End: 2022-03-14 | Stop reason: HOSPADM

## 2022-01-21 RX ADMIN — SODIUM CHLORIDE 10 ML/HR: 0.9 INJECTION, SOLUTION INTRAVENOUS at 07:01

## 2022-01-21 RX ADMIN — MUPIROCIN: 20 OINTMENT TOPICAL at 09:01

## 2022-01-21 RX ADMIN — MIDODRINE HYDROCHLORIDE 5 MG: 5 TABLET ORAL at 09:01

## 2022-01-21 RX ADMIN — LEVOTHYROXINE SODIUM 25 MCG: 0.03 TABLET ORAL at 06:01

## 2022-01-21 RX ADMIN — Medication 125 MG: at 12:01

## 2022-01-21 RX ADMIN — CEFEPIME HYDROCHLORIDE 1 G: 1 INJECTION, SOLUTION INTRAVENOUS at 06:01

## 2022-01-21 RX ADMIN — METRONIDAZOLE 500 MG: 500 INJECTION, SOLUTION INTRAVENOUS at 08:01

## 2022-01-21 RX ADMIN — METOCLOPRAMIDE HYDROCHLORIDE 10 MG: 5 SOLUTION ORAL at 12:01

## 2022-01-21 RX ADMIN — ENOXAPARIN SODIUM 40 MG: 100 INJECTION SUBCUTANEOUS at 05:01

## 2022-01-21 RX ADMIN — SODIUM CHLORIDE: 0.9 INJECTION, SOLUTION INTRAVENOUS at 03:01

## 2022-01-21 RX ADMIN — CITALOPRAM HYDROBROMIDE 40 MG: 10 TABLET ORAL at 09:01

## 2022-01-21 RX ADMIN — Medication 125 MG: at 06:01

## 2022-01-21 RX ADMIN — Medication 125 MG: at 05:01

## 2022-01-21 RX ADMIN — POTASSIUM BICARBONATE 35 MEQ: 391 TABLET, EFFERVESCENT ORAL at 07:01

## 2022-01-21 RX ADMIN — METOCLOPRAMIDE HYDROCHLORIDE 10 MG: 5 SOLUTION ORAL at 05:01

## 2022-01-21 RX ADMIN — PROPOFOL 5 MCG/KG/MIN: 10 INJECTION, EMULSION INTRAVENOUS at 12:01

## 2022-01-21 RX ADMIN — METOCLOPRAMIDE HYDROCHLORIDE 10 MG: 5 SOLUTION ORAL at 11:01

## 2022-01-21 RX ADMIN — METRONIDAZOLE 500 MG: 500 INJECTION, SOLUTION INTRAVENOUS at 11:01

## 2022-01-21 RX ADMIN — METRONIDAZOLE 500 MG: 500 INJECTION, SOLUTION INTRAVENOUS at 03:01

## 2022-01-21 RX ADMIN — DOXYCYCLINE 100 MG: 100 INJECTION, POWDER, LYOPHILIZED, FOR SOLUTION INTRAVENOUS at 07:01

## 2022-01-21 RX ADMIN — ATORVASTATIN CALCIUM 40 MG: 40 TABLET, FILM COATED ORAL at 09:01

## 2022-01-21 RX ADMIN — MIDODRINE HYDROCHLORIDE 5 MG: 5 TABLET ORAL at 03:01

## 2022-01-21 RX ADMIN — Medication 125 MG: at 11:01

## 2022-01-21 RX ADMIN — METOCLOPRAMIDE HYDROCHLORIDE 10 MG: 5 SOLUTION ORAL at 06:01

## 2022-01-21 RX ADMIN — POTASSIUM PHOSPHATE, MONOBASIC AND POTASSIUM PHOSPHATE, DIBASIC 20 MMOL: 224; 236 INJECTION, SOLUTION, CONCENTRATE INTRAVENOUS at 07:01

## 2022-01-21 NOTE — PROGRESS NOTES
Palliative medicine provider attempts to reach granddaughter again today after missing each other yesterday. She has writer's cell. Reviewed with staff.

## 2022-01-21 NOTE — CONSULTS
"  [facility]  Adult Nutrition  Consult Note    SUMMARY     Recommendations  1) Replete electrolytes     2) When medically able, initate continous feeds of Isosource 1.5 @ 20 mL/hr and advance to goal rate of 45 mL/hr  -  mL Q 4 hr,   - Hold for residuals > 400 mL.   - Monitor and replete electrolytes  - Will provide 1620 kcal, 73 g protein, 820 mL FW, 690 mL FWF.   - Propofol providing additional 158 kcal.   - Will meet 117% kcal, 100% protein, 99% fluid needs.     3) RD to complete NFPE at follow up.    Goals: Initation of enteral nutrition by RD follow up.  Nutrition Goal Status: new  Communication of RD Recs: reviewed with physician    Assessment and Plan  Nutrition Problem  Inadeqate energy intake    Related to (etiology):   NPO status, no TF running, dysphagia    Signs and Symptoms (as evidenced by):   Pt receiving 0% EEN/EPN    Interventions(treatment strategy):  Collaboration with other providers  Enteral nutrition    Nutrition Diagnosis Status:   New    Reason for Assessment  Reason For Assessment: consult (PEG tube)  Diagnosis: other (see comments) (Acute hypoxemic respiratory failure)  Relevant Medical History: GERD, HLD, HTN, CVAs, CNS aneurysm, PEG, CAD, hypothyroidism, DM, colon, breast and ovarian cancer.  Interdisciplinary Rounds: did not attend  General Information Comments: Remote assessment for coverage. Pt intubated and sedated, requiring levophed, on propofol, MAP 75, plan to try for extubation today per note. With PEG, noted pt has hx of removing PEG tube. C-diff positive, also noted with STEFFANY - renal labs improving, K, phos low, repleting. Per chart, with 9.5% wt loss over the last 2 months, significant. NFPE needed to determine malnutrition status. RD to monitor and follow up.  Nutrition Discharge Planning: Pending clinical course    Nutrition Risk Screen  Nutrition Risk Screen: no indicators present    Anthropometrics  Temp: 98.2 °F (36.8 °C)  Height: 5' 4" (162.6 " cm)  Height (inches): 64 in  Weight Method: Bed Scale  Weight: 69 kg (152 lb 1.9 oz)  Weight (lb): 152.12 lb  Ideal Body Weight (IBW), Female: 120 lb  % Ideal Body Weight, Female (lb): 122.5 %  BMI (Calculated): 26.1     Lab/Procedures/Meds  Pertinent Labs Reviewed: reviewed  Pertinent Labs Comments: H/H 8/23.7, K 3.2, BUN 26, Phos 1.7, Mg 2.1  Pertinent Medications Reviewed: reviewed  Pertinent Medications Comments: atorvastatin, levothyroxine, potassium phosphate    Physical Findings/Assessment  PEG     Estimated/Assessed Needs  Weight Used For Calorie Calculations: 68.9 kg (152 lb)  Energy Calorie Requirements (kcal): 1519 kcal/day based on [facility]  Energy Need Method: [facility]  Protein Requirements: 69-83 g/day based on 1-1.2 g/kg  Weight Used For Protein Calculations: 68.9 kg (152 lb)  Fluid Requirements (mL): 1 mL/kcal or per MD  Estimated Fluid Requirement Method: RDA Method  RDA Method (mL): 1519  CHO Requirement: 190 g/day based on 50% kcal from CHO    Nutrition Prescription Ordered  Current Diet Order: NPO    Evaluation of Received Nutrient/Fluid Intake  Energy Calories Required: not meeting needs  Protein Required: not meeting needs  Fluid Required: not meeting needs  Comments: LBM 1/21  Tolerance: tolerating  % Intake of Estimated Energy Needs: 0 - 25 %  % Meal Intake: NPO    Nutrition Risk  Level of Risk/Frequency of Follow-up: high (1-2x weekly)     Monitor and Evaluation  Food and Nutrient Intake: enteral nutrition intake  Food and Nutrient Adminstration: enteral and parenteral nutrition administration  Knowledge/Beliefs/Attitudes: food and nutrition knowledge/skill  Physical Activity and Function: nutrition-related ADLs and IADLs  Anthropometric Measurements: weight change  Biochemical Data, Medical Tests and Procedures: lipid profile,electrolyte and renal panel,gastrointestinal profile,glucose/endocrine profile,inflammatory profile  Nutrition-Focused Physical Findings: overall  appearance,extremities, muscles and bones,skin     Nutrition Follow-Up  RD Follow-up?: Yes

## 2022-01-21 NOTE — ASSESSMENT & PLAN NOTE
GBS in sputum. Concern for aspiration pneumonitis in setting of C diff colitis.  -Continue doxycycline and Flagyl  -Continue PO vancomycin  -Follow up cultures

## 2022-01-21 NOTE — PROGRESS NOTES
Pt reintubated by Dr. Jaramillo due to unable to maintain airway. Pt was intubated with a 7.5 ETT and secured 23cm at the lip, pt tolerated well, no respiratory distress noted, RT and RN at bedside. Placed on vent with documented settings

## 2022-01-21 NOTE — ASSESSMENT & PLAN NOTE
Secondary to aspiration pneumonia. GBS in sputum.  Concern for ARDS.  -Pulmonary consulted; re-intubated 1/21  -May need another tracheostomy  -Admit to ICU  -Continue doxycycline and Flagyl  -CXR and ABG PRN  -Continuous pulse oximetry

## 2022-01-21 NOTE — PROGRESS NOTES
"  01/21/2022      Admit Date: 1/18/2022  Sandra Wilson  New Patient Consult    Chief Complaint   Patient presents with    Shortness of Breath    Vomiting     PEG tube        History of Present Illness:  Intubated.          From Dr Gaspar's hpi, ERP:Sandra Wilson is a 81 y.o. female who presents to the ED via EMS  with an onset of vomiting and SOB. EMS reported that upon examination patient was less responsive than usually but is able to answer questions. The report from the EMS upon arrival was an O2 sats in the 70's. EMS proceeded to give supplemental oxygen and the O2 sats raised up to 93% on 3 liters of O2 with  "junky" right upper lung sounds" and greenish diarrhea. She was vomiting en route. They also reported that the patient is a patient that has dislodged her PEG tubing many times. PMHx of HTN, CAD, OA, GERD, ctroke, Colon cancer, ovarian cancer, breast cancer.PSHx of Cardiac catheterization, colonoscopy.         Progress Note  PULMONARY    Admit Date: 1/18/2022 01/21/2022      SUBJECTIVE:     1/19 intubated, sedated,  1/20 no c/o intubated, sedated  1/21 no new c/o intubated      PFSH and Allergies reviewed.    OBJECTIVE:     Vitals (Most recent):  Vitals:    01/21/22 0515   BP: (!) 131/57   Pulse: 77   Resp: (!) 27   Temp:        Vitals (24 hour range):  Temp:  [96.8 °F (36 °C)-100 °F (37.8 °C)]   Pulse:  [69-95]   Resp:  [14-46]   BP: ()/(47-77)   SpO2:  [94 %-100 %]       Intake/Output Summary (Last 24 hours) at 1/21/2022 0614  Last data filed at 1/21/2022 0200  Gross per 24 hour   Intake 2986.5 ml   Output 1390 ml   Net 1596.5 ml          Physical Exam:  The patient's neuro status (alertness,orientation,cognitive function,motor skills,), pharyngeal exam (oral lesions, hygiene, abn dentition,), Neck (jvd,mass,thyroid,nodes in neck and above/below clavicle),RESPIRATORY(symmetry,effort,fremitus,percussion,auscultation),  Cor(rhythm,heart tones including " gallops,perfusion,edema)ABD(distention,hepatic&splenomegaly,tenderness,masses), Skin(rash,cyanosis),Psyc(affect,judgement,).  Exam negative except for these pertinent findings:    Et tube, sedate, chest is symmetric, no distress, normal percussion, normal fremitus and good anterior breath sounds      Radiographs reviewed: view by direct vision    Results for orders placed during the hospital encounter of 01/18/22    X-Ray Chest 1 View    Narrative  EXAMINATION:  XR CHEST 1 VIEW    CLINICAL HISTORY:  respiratory failure;    TECHNIQUE:  Single frontal view of the chest was performed.    COMPARISON:  Chest portable of January 18, 2022    FINDINGS:  An endotracheal tube ends in the trachea above the level the malorie.  An NG tube traverses the esophagus to the stomach.  A central line enters at the right neck and ends in the superior vena cava.  The cardiac size and contours within normal limits.  A loop recorder is noted in the left chest.  There is continued central right lung infiltrate and small infiltrate at the left lung base.  No pneumothorax is seen.    Impression  Continued intrapulmonary infiltrates right greater than left.  Endotracheal tube, NG tube and right central lines in position.      Electronically signed by: Mathew Yanez MD  Date:    01/18/2022  Time:    15:50  ]    Labs     Recent Labs   Lab 01/21/22  0411   WBC 15.24*   HGB 8.0*   HCT 23.7*   *   BAND 12.0     Recent Labs   Lab 01/21/22  0411      K 3.2*      CO2 23   BUN 26*   CREATININE 0.7   GLU 82   CALCIUM 8.6*   MG 2.1   PHOS 1.7*   AST 25   ALT 19   ALKPHOS 48*   BILITOT 0.5   PROT 5.1*   ALBUMIN 2.0*   LACTATE 1.3     Recent Labs   Lab 01/21/22  0508   PH 7.391   PCO2 40.4   PO2 70*   HCO3 24.5     Microbiology Results (last 7 days)     Procedure Component Value Units Date/Time    Blood culture [[id number]] Collected: 01/18/22 1726    Order Status: Completed Specimen: Blood from Antecubital, Left Arm Updated: 01/20/22  2322     Blood Culture, Routine No Growth to date      No Growth to date      No Growth to date    Blood culture [[id number]] Collected: 01/18/22 1802    Order Status: Completed Specimen: Blood from Antecubital, Right Arm Updated: 01/20/22 2322     Blood Culture, Routine No Growth to date      No Growth to date      No Growth to date    Culture, Respiratory with Gram Stain [[id number]]  (Abnormal) Collected: 01/19/22 0114    Order Status: Completed Specimen: Sputum, Expectorated Updated: 01/20/22 1032     Respiratory Culture STREPTOCOCCUS AGALACTIAE (GROUP B)  Few  Beta-hemolytic streptococci are routinely susceptible to   penicillins,cephalosporins and carbapenems.  Susceptibility testing not routinely performed  Normal respiratory harjinder also present       Gram Stain (Respiratory) <10 epithelial cells per low power field.     Gram Stain (Respiratory) Rare WBC's     Gram Stain (Respiratory) Rare Gram positive cocci     Gram Stain (Respiratory) Rare yeast    C Diff Toxin by PCR [[id number]]  (Abnormal) Collected: 01/18/22 0846    Order Status: Completed Updated: 01/18/22 2355     C. diff PCR Positive    Blood culture [[id number]]     Order Status: Canceled Specimen: Blood     Blood culture [[id number]]     Order Status: Canceled Specimen: Blood     Clostridium difficile EIA [[id number]]  (Abnormal) Collected: 01/18/22 0846    Order Status: Completed Specimen: Stool Updated: 01/18/22 1109     C. diff Antigen Positive     C difficile Toxins A+B, EIA Negative     Comment: Testing not recommended for children <24 months old.             Impression:  Active Hospital Problems    Diagnosis  POA    *Acute hypoxemic respiratory failure [J96.01]  Yes    Gastrostomy tube dependent [Z93.1]  Not Applicable    Demand ischemia [I24.8]  Yes    Septic shock [A41.9, R65.21]  Yes    STEFFANY (acute kidney injury) [N17.9]  Yes    ARDS (adult respiratory distress syndrome) [J80]  Yes    YARI (generalized anxiety disorder) [F41.1]  Yes     Type 2 diabetes mellitus, with long-term current use of insulin [E11.9, Z79.4]  Not Applicable    Pneumonia [J18.9]  Yes    Aspiration into airway [T17.908A]  Yes    Hypothyroidism [E03.9]  Yes    Hyperlipidemia [E78.5]  Yes     Chronic    GERD (gastroesophageal reflux disease) [K21.9]  Yes     Chronic      Resolved Hospital Problems    Diagnosis Date Resolved POA    Hyponatremia [E87.1] 01/20/2022 Yes                        Plan: no fever, vss, was hypotensive earlier.  Cefepime/vanc,flagyl,  chr midodrine, levophed 0.9 ,  ddimer 5.2, creat 0.9  c diff ag + but toxin neg.  abg 7.27 with ox 48,   Dnr,  cxr impressive R>>>L aspiration pattern.     Prior subdural --       Recruitment manuver done with peep 30 for 6 seconds with art line bp falling from sbp 100 to 60's.  Sat improved from 92 to 94.  Pt not really peep responsive.    Pt should improve ox with left side down or prone.  Peep may help but infiltrates more R>L -- should be posterior lung bases.  Might consider cta if not progressing.      discussed with resp and nursing. Optimally would be left side down or prone.        1/19 no fever, vss with rr to 43, flagyl/zosyn/vanc/cefepime, n70-80% ox,  Wbc 14.8 from 5k,  Creat 1.5 form 0.9,   abg 02 61 with ph 7.33- peep 5,   I/o 687/133-  Will screen for dvt/pe with leg study  Will dose 500 cc saline,   F/u cxr more opacification right>> left lung.      1/20 no fever, vss, bp low at times, I.o 2770/1870, cefepime/flagyl/vanc, ox 60%, wbc 14.6, plts 144- new, creat 1.0,. cxr with some clearing,   F/u abg, wean soon likely but need better ox to extubate.    No dvt, ddimer up at admit,     Pt min vol 8 and seemed to do well with 50% ox while I rounded.  Discussed with nursing and respiratory-- if can get to 40% and pass wean trial would recommend extubation, would be optimal to discuss with family prior plans not to re intubate.  She is progressing-- would consider not discussing comfort care if improving.       1/21 no fever, vss, tm 100, wbc 15.2, hgb 8, from 13.6 bon 18th- admit, plt 130 from 244,   Creat 0.7,   cxr progressive clearing right , some increase opacification left suggested.   Strep in sputum,  Taper abx to doxy and flagy with c diff and strep in mucous-- monitor, check    procal am.    Will change code status to allow re intubation, and  extubate this am.      On enteral vanc for c diff concerns.   Discussed with January short.  Reviewed revised code status.     The following were evaluated and adjusted as needed: mechanical ventilator settings and weaning status, intravenous fluids and nutritional status, sedation and neurologic status, antibiotics, hemodynamics, support tubes and access lines and invasive monitoring, acid base balance and oxygenation needs, input and output and renal status and CODE STATUS/OUTLOOK DISCUSSED WITH AVAILABLE NEXT OF  KIN       Critical Care  - THE PATIENT HAS A HIGH PROBABILITY OF IMMINENT OR LIFE THREATENING DETERIORATION.  Over 50%time of encounter was in direct care at bedside.  Time was 30 to 74 minutes required for patient care.  Time needed for all of the above totaled 34 minutes.  Addendum-- having stridor post extubation.  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  Pt was at [place] when patient removed trach 2 months ago.  Pt has no h/o stridor per daughter.  Stridor did not change with jaw thrush nor nt trumpet.  Discussed would recommend intubation and replace trach as best option. Discussed with Dr Charles.

## 2022-01-21 NOTE — PROGRESS NOTES
This writer followed up with patient. Spoke with primary nurse who states that patient may be re-intubated. Patient appeared to be in respiratory distress. Dr. Gao at bedside to evaluate. Dr. Gao then spoke with a family member to update on patient condition. See his note from today.     Palliative Care to follow.

## 2022-01-21 NOTE — ASSESSMENT & PLAN NOTE
Likely cause of aspiration.  -Care per nursing  -Tube feedings when more stable; likely through OG

## 2022-01-21 NOTE — PLAN OF CARE
POC reviewed with patient. Will continue vent support. Dr. Crabtree attempting to contact patient's family. Unable to contact at this time. Pt with one episode of bradycardia while in a-fib. Dr. Charles aware.

## 2022-01-21 NOTE — PROGRESS NOTES
[facility] Hospital Medicine  Progress Note    Patient Name: Sandra Wilson  MRN: [id number]  Patient Class: IP- Inpatient   Admission Date: 1/18/2022  Length of Stay: 3 days  Attending Physician: Fabian Charles MD  Primary Care Provider: Primary Doctor No        Subjective:     Principal Problem:Acute hypoxemic respiratory failure        HPI:  Sandra Wilson is an 81 year old female with a past medical history of CVAs, CNS aneurysm, PEG status, CAD, HLD, hypothryoidism, DM, aortic stenosis, and colon, breast, and ovarian cancer who presented from long term care with vomiting, diarrhea, and shortness of breath. Workup in the ED showed likely aspiration pneumonitis and C. Diff colitis. Her O2 requirement increased while in the ED requiring intubation with sedation. She also required Levophed as she likely developed septic shock. Antibiotics were broadened to cefepime, vancomycin, and Flagyl. Pulmonary was consulted. Family was notified. The patient was unable to provide history given acuity of illness.      Overview/Hospital Course:  Sandra Wilson is an 81 year old female with a past medical history of CVAs, CNS aneurysm, PEG status, CAD, HLD, hypothryoidism, DM, aortic stenosis, and colon, breast, and ovarian cancer who presented from long term care with vomiting, diarrhea, and shortness of breath secondary to acute hypoxic respiratory failure from aspiration pneumonia in the setting of C diff colitis leading to septic shock. She was intubated in the ED and started on Levophed infusion via RIJ CVC placed by Dr. Vignesh Gaspar. She was started on broad spectrum antibiotics with cefepime, Flagyl, vancomycin and admitted to the ICU. She was started on enteral vancomcyin for severe C diff colitis given elevated WBC count and STEFFANY. There was concern for developing ARDS given P/F ratio of 87 (severe). Pulmonary was consulted. She also presented with demand ischemia likely secondary to septic shock.  Troponin was trended and improved. Normal saline infusion was added for hyponatremia and STEFFANY which improved both. Levophed was able to be weaned. Family agreed to DNR status 1/18 and Palliative Care was consulted to discuss goal of care 1/19. Antibiotics were changed to doxycycline and Flagyl (GBS in sputum culture).  Her respiratory status has improved throughout her course and she was extubated (code changed to partial code for intubation) 1/21 only to be re-intubated for worsening stridor. Upon re-intubation, copious amounts of secretions were suctioned. KUB suggested a mild ileus. A bowel regimen was instituted. Her course was complicated by Afib as well noted on telemetry 1/21.      Interval History: extubated, then re-intubated; mild ileus on KUB; bowel regimen; Afib on telemetry; on doxycycline and Flagyl.    Review of Systems   Unable to perform ROS: Intubated     Objective:     Vital Signs (Most Recent):  Temp: 98.3 °F (36.8 °C) (01/21/22 1200)  Pulse: 86 (01/21/22 1536)  Resp: (!) 26 (01/21/22 1536)  BP: (!) 118/56 (01/21/22 1536)  SpO2: 98 % (01/21/22 1536) Vital Signs (24h Range):  Temp:  [98.2 °F (36.8 °C)-100 °F (37.8 °C)] 98.3 °F (36.8 °C)  Pulse:  [71-98] 86  Resp:  [16-45] 26  SpO2:  [94 %-100 %] 98 %  BP: (100-169)/(49-67) 118/56     Weight: 69 kg (152 lb 1.9 oz)  Body mass index is 26.11 kg/m².    Intake/Output Summary (Last 24 hours) at 1/21/2022 1603  Last data filed at 1/21/2022 1326  Gross per 24 hour   Intake 3095.39 ml   Output 1015 ml   Net 2080.39 ml      Physical Exam  Vitals and nursing note reviewed.   Constitutional:       Appearance: She is ill-appearing.   HENT:      Head: Normocephalic and atraumatic.      Right Ear: External ear normal.      Left Ear: External ear normal.      Nose: Nose normal.      Mouth/Throat:      Comments: ETT/OG.  Neck:      Comments: RIJ CVC.  Cardiovascular:      Rate and Rhythm: Normal rate and regular rhythm.      Pulses: Normal pulses.      Heart  sounds: Normal heart sounds.   Pulmonary:      Effort: No respiratory distress.      Breath sounds: Rhonchi present.      Comments: Mechanical ventilation.  Abdominal:      General: Bowel sounds are normal. There is no distension.      Palpations: Abdomen is soft.      Tenderness: There is no abdominal tenderness.      Comments: G tube.   Musculoskeletal:      Cervical back: Normal range of motion and neck supple.      Right lower leg: No edema.      Left lower leg: No edema.   Skin:     General: Skin is warm and dry.   Neurological:      Comments: Sedated.   Psychiatric:      Comments: Sedated.         Significant Labs: All pertinent labs within the past 24 hours have been reviewed.    Significant Imaging: I have reviewed all pertinent imaging results/findings within the past 24 hours.      Assessment/Plan:      * Acute hypoxemic respiratory failure  Secondary to aspiration pneumonia. GBS in sputum.  Concern for ARDS.  -Pulmonary consulted; re-intubated 1/21  -May need another tracheostomy  -Admit to ICU  -Continue doxycycline and Flagyl  -CXR and ABG PRN  -Continuous pulse oximetry    Atrial fibrillation  -Telemetry  -Metoprolol 5 IV PRN for HR > 130  -Defer full dose anticoagulation at this time given thrombocytopenia and critical illness      Ileus  Mild on KUB.   -Serial abdominal exams  -KUB PRN  -Hold off on tube feeds at this time      ARDS (adult respiratory distress syndrome)  Severe. Improving.  -Pulmonary consulted  -Treat aspiration pneumonia and sepsis  -Lasix when shock resolved to be considered  -Mechanical ventilation with sedation; PEEP and TV per Pulmonary      Sepsis  GBS in sputum. Concern for aspiration pneumonitis in setting of C diff colitis.  -Continue doxycycline and Flagyl  -Continue PO vancomycin  -Follow up cultures      Demand ischemia  In setting of septic shock. Improved.  -Continue to treat septic shock  -Telemetry      Gastrostomy tube dependent  Likely cause of aspiration.  -Care  "per nursing  -Tube feedings when more stable; likely through OG      Aspiration into airway  -Continue doxycyline and Flagyl  -Pulmonary consulted  -Ventilation with sedation      Pneumonia  -See "Aspiration into airway"      Type 2 diabetes mellitus, with long-term current use of insulin  -SSI  -Q6H glucose checks  -Hypoglycemic precautions  -Currently NPO      YARI (generalized anxiety disorder)  -Sedation      Hypothyroidism  -Continue Synthroid      Hyperlipidemia  -Statin    GERD (gastroesophageal reflux disease)  -Hold PPI given C diff colitis        VTE Risk Mitigation (From admission, onward)         Ordered     enoxaparin injection 40 mg  Daily         01/18/22 1438     IP VTE HIGH RISK PATIENT  Once         01/18/22 1438     Place sequential compression device  Until discontinued         01/18/22 1438                Discharge Planning   DEMETRIUS: 1/30/2021    Code Status: Partial Code   Is the patient medically ready for discharge?:     Reason for patient still in hospital (select all that apply): Patient trending condition, Laboratory test, Treatment, Imaging and Consult recommendations  Discharge Plan A: Return to nursing home            Critical care time spent on the evaluation and treatment of severe organ dysfunction, review of pertinent labs and imaging studies, discussions with consulting providers and discussions with patient/family: 31 minutes.      Fabian Charles MD  Department of Hospital Medicine   [facility]  "

## 2022-01-21 NOTE — CARE UPDATE
01/21/22 1000   Patient Assessment/Suction   Level of Consciousness (AVPU) responds to pain   Respiratory Effort Normal;Unlabored   Expansion/Accessory Muscles/Retractions expansion symmetric;no retractions;no use of accessory muscles   All Lung Fields Breath Sounds coarse   Rhythm/Pattern, Respiratory depth regular;unlabored;pattern regular   Cough Frequency infrequent   Cough Type weak   Suction Method oral   Secretions Amount small   Secretions Color white;yellow;pale   Secretions Characteristics thick   $ Swab or suction? Suction   PRE-TX-O2   O2 Device (Oxygen Therapy) Non Rebreather Mask   $ Is the patient on Low Flow Oxygen? Yes   Pulse Oximetry Type Continuous   $ Pulse Oximetry - Multiple Charge Pulse Oximetry - Multiple   Ready to Wean Parameters   Extubated? Yes   Reason for Extubation Other (see comments)  (per md orders)   Ventilator Discontinued Yes   Pt extubated to 2L-5L-NRB, sats on 2L/5L 91% on %. Pt not fully awake with a weak cough, will continue to monitor and wean from NRB when appropriate.

## 2022-01-21 NOTE — PLAN OF CARE
POC reviewed with daughter as pt is intubated/sedated. Levo titrated down. Propofol at 10. Pt opening eyes with propofol down. Plan to extubate pt tomorrow and change code status to Full per daughter and conversation with Dr. Crabtree. Urine output 800mL. BG monitored no sliding scale needed. Vent settings on CPAP at 40% o2. Comfort and wellbeing maintained. Fall and safety precautions in place. Contact isolation maintained.     Problem: Adult Inpatient Plan of Care  Goal: Plan of Care Review  Outcome: Ongoing, Progressing  Goal: Patient-Specific Goal (Individualized)  Outcome: Ongoing, Progressing  Goal: Optimal Comfort and Wellbeing  Outcome: Ongoing, Progressing     Problem: Infection  Goal: Absence of Infection Signs and Symptoms  Outcome: Ongoing, Progressing     Problem: Infection (Pneumonia)  Goal: Resolution of Infection Signs and Symptoms  Outcome: Ongoing, Progressing

## 2022-01-21 NOTE — CARE UPDATE
01/21/22 0801   Patient Assessment/Suction   Level of Consciousness (AVPU) responds to pain   Respiratory Effort Normal;Unlabored   Expansion/Accessory Muscles/Retractions expansion symmetric;no retractions;no use of accessory muscles   All Lung Fields Breath Sounds coarse   Rhythm/Pattern, Respiratory assisted mechanically   Cough Frequency with stimulation   Cough Type assisted   Suction Method oral;tracheal   $ Suction Charges Inline Suction Procedure Stat Charge   Secretions Amount scant   Secretions Color yellow;pale   Secretions Characteristics thick   PRE-TX-O2   O2 Device (Oxygen Therapy) ventilator   $ Is the patient on Low Flow Oxygen? Yes   Pulse Oximetry Type Continuous   $ Pulse Oximetry - Multiple Charge Pulse Oximetry - Multiple   Positioning HOB elevated 30 degrees   ETCO2   $ ETCO2 Usage Currently wearing   ETCO2 Device Type Bedside Monitor;Ventilator;Artificial Airway   Skin Integrity   $ Wound Care Tech Time 15 min   Area Observed Upper lip;Lower lip;Corner lip   Skin Appearance without discoloration        Airway - Non-Surgical 01/18/22 1300   Placement Date/Time: 01/18/22 1300   Present Prior to Hospital Arrival?: No  Inserted by: MD  Airway Device Size: 7.5  Style: Cuffed   Secured at 23 cm   Measured At Lips   Secured Location Left   Secured by Commercial tube shaffer   Bite Block secure and patent;left   Site Condition Dry   Status Secured;Intact   Site Assessment Midline   Cuff Pressure 21 cm H2O   General Safety Checklist   Safety Promotion/Fall Prevention side rails raised   Airway Safety   Ambu bag with the patient? Yes, Adult Ambu   Is mask with the patient? Yes, Adult Mask   Suction set is at the bedside? Yes   ETT Size 7.5   Respiratory Interventions   Airway/Ventilation Management pulmonary hygiene promoted;airway patency maintained   Airway/Ventilation Support pulmonary hygiene promoted   Vent Select   Conventional Vent Y   $ Ventilator Subsequent 1   Charged w/in last 24h YES    Preset Conventional Ventilator Settings   Vent Type [id number]   Ventilation Type VC   Humidity HME   Patient Ventilator Parameters   Mean Airway Pressure 10.5 cmH20   Conventional Ventilator Alarms   Alarms On Y   Ve High Alarm 25 L/min   Ve Low Alarm 2 L/min   Vt High Alarm 15 mL   Vt Low Alarm 1 mL   Resp Rate High Alarm 40 br/min   Press High Alarm 50 cmH2O   Delay Alarm (Sec) 20 sec(s)   IHI Ventilator Associated Pneumonia Bundle (Required)   Head of Bed Elevated  HOB 30   Oral Care Mouth moisturizer;Mouth suctioned;Lip moisturizer applied;with half strength hydrogen peroxide

## 2022-01-21 NOTE — SUBJECTIVE & OBJECTIVE
Interval History: extubated, then re-intubated; mild ileus on KUB; bowel regimen; Afib on telemetry; on doxycycline and Flagyl.    Review of Systems   Unable to perform ROS: Intubated     Objective:     Vital Signs (Most Recent):  Temp: 98.3 °F (36.8 °C) (01/21/22 1200)  Pulse: 86 (01/21/22 1536)  Resp: (!) 26 (01/21/22 1536)  BP: (!) 118/56 (01/21/22 1536)  SpO2: 98 % (01/21/22 1536) Vital Signs (24h Range):  Temp:  [98.2 °F (36.8 °C)-100 °F (37.8 °C)] 98.3 °F (36.8 °C)  Pulse:  [71-98] 86  Resp:  [16-45] 26  SpO2:  [94 %-100 %] 98 %  BP: (100-169)/(49-67) 118/56     Weight: 69 kg (152 lb 1.9 oz)  Body mass index is 26.11 kg/m².    Intake/Output Summary (Last 24 hours) at 1/21/2022 1603  Last data filed at 1/21/2022 1326  Gross per 24 hour   Intake 3095.39 ml   Output 1015 ml   Net 2080.39 ml      Physical Exam  Vitals and nursing note reviewed.   Constitutional:       Appearance: She is ill-appearing.   HENT:      Head: Normocephalic and atraumatic.      Right Ear: External ear normal.      Left Ear: External ear normal.      Nose: Nose normal.      Mouth/Throat:      Comments: ETT/OG.  Neck:      Comments: RIJ CVC.  Cardiovascular:      Rate and Rhythm: Normal rate and regular rhythm.      Pulses: Normal pulses.      Heart sounds: Normal heart sounds.   Pulmonary:      Effort: No respiratory distress.      Breath sounds: Rhonchi present.      Comments: Mechanical ventilation.  Abdominal:      General: Bowel sounds are normal. There is no distension.      Palpations: Abdomen is soft.      Tenderness: There is no abdominal tenderness.      Comments: G tube.   Musculoskeletal:      Cervical back: Normal range of motion and neck supple.      Right lower leg: No edema.      Left lower leg: No edema.   Skin:     General: Skin is warm and dry.   Neurological:      Comments: Sedated.   Psychiatric:      Comments: Sedated.         Significant Labs: All pertinent labs within the past 24 hours have been  reviewed.    Significant Imaging: I have reviewed all pertinent imaging results/findings within the past 24 hours.

## 2022-01-21 NOTE — ASSESSMENT & PLAN NOTE
-Telemetry  -Metoprolol 5 IV PRN for HR > 130  -Defer full dose anticoagulation at this time given thrombocytopenia and critical illness

## 2022-01-21 NOTE — PLAN OF CARE
Problem: Adult Inpatient Plan of Care  Goal: Plan of Care Review  Outcome: Ongoing, Progressing  Goal: Patient-Specific Goal (Individualized)  Outcome: Ongoing, Progressing  Goal: Absence of Hospital-Acquired Illness or Injury  Outcome: Ongoing, Progressing  Goal: Optimal Comfort and Wellbeing  Outcome: Ongoing, Progressing  Goal: Readiness for Transition of Care  Outcome: Ongoing, Progressing     Problem: Infection  Goal: Absence of Infection Signs and Symptoms  Outcome: Ongoing, Progressing     Problem: Diabetes Comorbidity  Goal: Blood Glucose Level Within Targeted Range  Outcome: Ongoing, Progressing     Problem: Fluid Imbalance (Pneumonia)  Goal: Fluid Balance  Outcome: Ongoing, Progressing     Problem: Infection (Pneumonia)  Goal: Resolution of Infection Signs and Symptoms  Outcome: Ongoing, Progressing     Problem: Respiratory Compromise (Pneumonia)  Goal: Effective Oxygenation and Ventilation  Outcome: Ongoing, Progressing     Problem: Fall Injury Risk  Goal: Absence of Fall and Fall-Related Injury  Outcome: Ongoing, Progressing     Problem: Restraint, Nonbehavioral (Nonviolent)  Goal: Absence of Harm or Injury  Outcome: Ongoing, Progressing     Problem: Communication Impairment (Mechanical Ventilation, Invasive)  Goal: Effective Communication  Outcome: Ongoing, Progressing     Problem: Device-Related Complication Risk (Mechanical Ventilation, Invasive)  Goal: Optimal Device Function  Outcome: Ongoing, Progressing     Problem: Inability to Wean (Mechanical Ventilation, Invasive)  Goal: Mechanical Ventilation Liberation  Outcome: Ongoing, Progressing     Problem: Nutrition Impairment (Mechanical Ventilation, Invasive)  Goal: Optimal Nutrition Delivery  Outcome: Ongoing, Progressing     Problem: Skin and Tissue Injury (Mechanical Ventilation, Invasive)  Goal: Absence of Device-Related Skin and Tissue Injury  Outcome: Ongoing, Progressing     Problem: Ventilator-Induced Lung Injury (Mechanical Ventilation,  Invasive)  Goal: Absence of Ventilator-Induced Lung Injury  Outcome: Ongoing, Progressing     Problem: Communication Impairment (Artificial Airway)  Goal: Effective Communication  Outcome: Ongoing, Progressing     Problem: Device-Related Complication Risk (Artificial Airway)  Goal: Optimal Device Function  Outcome: Ongoing, Progressing     Problem: Skin and Tissue Injury (Artificial Airway)  Goal: Absence of Device-Related Skin or Tissue Injury  Outcome: Ongoing, Progressing     Problem: Noninvasive Ventilation Acute  Goal: Effective Unassisted Ventilation and Oxygenation  Outcome: Ongoing, Progressing     Problem: Skin Injury Risk Increased  Goal: Skin Health and Integrity  Outcome: Ongoing, Progressing     Problem: Coping Ineffective  Goal: Effective Coping  Outcome: Ongoing, Progressing     Problem: Adjustment to Illness (Sepsis/Septic Shock)  Goal: Optimal Coping  Outcome: Ongoing, Progressing     Problem: Bleeding (Sepsis/Septic Shock)  Goal: Absence of Bleeding  Outcome: Ongoing, Progressing     Problem: Glycemic Control Impaired (Sepsis/Septic Shock)  Goal: Blood Glucose Level Within Desired Range  Outcome: Ongoing, Progressing     Problem: Infection Progression (Sepsis/Septic Shock)  Goal: Absence of Infection Signs and Symptoms  Outcome: Ongoing, Progressing     Problem: Nutrition Impaired (Sepsis/Septic Shock)  Goal: Optimal Nutrition Intake  Outcome: Ongoing, Progressing     Problem: Fluid and Electrolyte Imbalance (Acute Kidney Injury/Impairment)  Goal: Fluid and Electrolyte Balance  Outcome: Ongoing, Progressing     Problem: Oral Intake Inadequate (Acute Kidney Injury/Impairment)  Goal: Optimal Nutrition Intake  Outcome: Ongoing, Progressing     Problem: Renal Function Impairment (Acute Kidney Injury/Impairment)  Goal: Effective Renal Function  Outcome: Ongoing, Progressing     Problem: ARDS (Acute Respiratory Distress Syndrome)  Goal: Effective Oxygenation  Outcome: Ongoing, Progressing    Patient  continued on spontaneous mode on the ventilator through the night. Levophed stopped at beginning of shift due to MAP stable above 65. Small BM on shift. Propofol titrated to keep patient's Rass -1. Patient does arouse to voice or light tough and nods appropriately. Jackson catheter with noted sediment.  500 mL of urine output for shift. Electrolyte replacement this morning. Safety maintained, bed low and locked, bed alarm on.

## 2022-01-21 NOTE — ASSESSMENT & PLAN NOTE
Severe. Improving.  -Pulmonary consulted  -Treat aspiration pneumonia and sepsis  -Lasix when shock resolved to be considered  -Mechanical ventilation with sedation; PEEP and TV per Pulmonary

## 2022-01-21 NOTE — PLAN OF CARE
Recommendations  1) Replete electrolytes     2) When medically able, initate continous feeds of Isosource 1.5 @ 20 mL/hr and advance to goal rate of 45 mL/hr  -  mL Q 4 hr,   - Hold for residuals > 400 mL.   - Monitor and replete electrolytes  - Will provide 1620 kcal, 73 g protein, 820 mL FW, 690 mL FWF.   - Propofol providing additional 158 kcal.   - Will meet 117% kcal, 100% protein, 99% fluid needs.     3) RD to complete NFPE at follow up.    Goals: Initation of enteral nutrition by RD follow up.  Nutrition Goal Status: new  Communication of RD Recs: reviewed with physician    Assessment and Plan  Nutrition Problem  Inadeqate energy intake    Related to (etiology):   NPO status, no TF running, dysphagia    Signs and Symptoms (as evidenced by):   Pt receiving 0% EEN/EPN    Interventions(treatment strategy):  Collaboration with other providers  Enteral nutrition    Nutrition Diagnosis Status:   New

## 2022-01-21 NOTE — PROGRESS NOTES
Sandra Wilson [id number] is a 81 y.o. female who has been consulted for vancomycin dosing.    Pharmacy consult for vancomycin dosing in no longer required.  Vancomycin was discontinued.    Thank you for allowing us to participate in this patient's care.     Dallas Varma, GaneshD

## 2022-01-22 LAB
ALBUMIN SERPL BCP-MCNC: 2 G/DL (ref 3.5–5.2)
ALLENS TEST: ABNORMAL
ALP SERPL-CCNC: 101 U/L (ref 55–135)
ALT SERPL W/O P-5'-P-CCNC: 19 U/L (ref 10–44)
ANION GAP SERPL CALC-SCNC: 11 MMOL/L (ref 8–16)
AST SERPL-CCNC: 21 U/L (ref 10–40)
BASOPHILS # BLD AUTO: 0.05 K/UL (ref 0–0.2)
BASOPHILS NFR BLD: 0.3 % (ref 0–1.9)
BILIRUB SERPL-MCNC: 0.6 MG/DL (ref 0.1–1)
BUN SERPL-MCNC: 23 MG/DL (ref 8–23)
CALCIUM SERPL-MCNC: 8.5 MG/DL (ref 8.7–10.5)
CHLORIDE SERPL-SCNC: 104 MMOL/L (ref 95–110)
CO2 SERPL-SCNC: 21 MMOL/L (ref 23–29)
CREAT SERPL-MCNC: 0.7 MG/DL (ref 0.5–1.4)
DELSYS: ABNORMAL
DIFFERENTIAL METHOD: ABNORMAL
EOSINOPHIL # BLD AUTO: 0.5 K/UL (ref 0–0.5)
EOSINOPHIL NFR BLD: 2.8 % (ref 0–8)
ERYTHROCYTE [DISTWIDTH] IN BLOOD BY AUTOMATED COUNT: 13.8 % (ref 11.5–14.5)
EST. GFR  (AFRICAN AMERICAN): >60 ML/MIN/1.73 M^2
EST. GFR  (NON AFRICAN AMERICAN): >60 ML/MIN/1.73 M^2
FIO2: 30
GLUCOSE SERPL-MCNC: 89 MG/DL (ref 70–110)
HCO3 UR-SCNC: 23.4 MMOL/L (ref 24–28)
HCT VFR BLD AUTO: 24.5 % (ref 37–48.5)
HGB BLD-MCNC: 8.1 G/DL (ref 12–16)
IMM GRANULOCYTES # BLD AUTO: 0.24 K/UL (ref 0–0.04)
IMM GRANULOCYTES NFR BLD AUTO: 1.5 % (ref 0–0.5)
LYMPHOCYTES # BLD AUTO: 0.9 K/UL (ref 1–4.8)
LYMPHOCYTES NFR BLD: 5.6 % (ref 18–48)
MAGNESIUM SERPL-MCNC: 1.8 MG/DL (ref 1.6–2.6)
MCH RBC QN AUTO: 32.1 PG (ref 27–31)
MCHC RBC AUTO-ENTMCNC: 33.1 G/DL (ref 32–36)
MCV RBC AUTO: 97 FL (ref 82–98)
MIN VOL: 11.1
MODE: ABNORMAL
MONOCYTES # BLD AUTO: 1.4 K/UL (ref 0.3–1)
MONOCYTES NFR BLD: 8.5 % (ref 4–15)
NEUTROPHILS # BLD AUTO: 13.3 K/UL (ref 1.8–7.7)
NEUTROPHILS NFR BLD: 81.3 % (ref 38–73)
NRBC BLD-RTO: 0 /100 WBC
PCO2 BLDA: 36.5 MMHG (ref 35–45)
PEEP: 5
PH SMN: 7.42 [PH] (ref 7.35–7.45)
PHOSPHATE SERPL-MCNC: 2.5 MG/DL (ref 2.7–4.5)
PLATELET # BLD AUTO: 136 K/UL (ref 150–450)
PMV BLD AUTO: 11.1 FL (ref 9.2–12.9)
PO2 BLDA: 57 MMHG (ref 80–100)
POC BE: -1 MMOL/L
POC SATURATED O2: 90 % (ref 95–100)
POC TCO2: 25 MMOL/L (ref 23–27)
POCT GLUCOSE: 154 MG/DL (ref 70–110)
POCT GLUCOSE: 189 MG/DL (ref 70–110)
POTASSIUM SERPL-SCNC: 3.1 MMOL/L (ref 3.5–5.1)
POTASSIUM SERPL-SCNC: 3.7 MMOL/L (ref 3.5–5.1)
PROCALCITONIN SERPL IA-MCNC: 3.02 NG/ML
PROT SERPL-MCNC: 5.1 G/DL (ref 6–8.4)
PS: 10
RBC # BLD AUTO: 2.52 M/UL (ref 4–5.4)
SAMPLE: ABNORMAL
SITE: ABNORMAL
SODIUM SERPL-SCNC: 136 MMOL/L (ref 136–145)
SP02: 94
SPONT RATE: 31
TROPONIN I SERPL DL<=0.01 NG/ML-MCNC: 0.18 NG/ML (ref 0–0.03)
VOL: 339
WBC # BLD AUTO: 16.29 K/UL (ref 3.9–12.7)

## 2022-01-22 PROCEDURE — 94003 VENT MGMT INPAT SUBQ DAY: CPT

## 2022-01-22 PROCEDURE — 63600175 PHARM REV CODE 636 W HCPCS: Performed by: INTERNAL MEDICINE

## 2022-01-22 PROCEDURE — 63600175 PHARM REV CODE 636 W HCPCS: Performed by: STUDENT IN AN ORGANIZED HEALTH CARE EDUCATION/TRAINING PROGRAM

## 2022-01-22 PROCEDURE — 80053 COMPREHEN METABOLIC PANEL: CPT | Performed by: STUDENT IN AN ORGANIZED HEALTH CARE EDUCATION/TRAINING PROGRAM

## 2022-01-22 PROCEDURE — 99900035 HC TECH TIME PER 15 MIN (STAT)

## 2022-01-22 PROCEDURE — 25000003 PHARM REV CODE 250: Performed by: STUDENT IN AN ORGANIZED HEALTH CARE EDUCATION/TRAINING PROGRAM

## 2022-01-22 PROCEDURE — 84100 ASSAY OF PHOSPHORUS: CPT | Performed by: STUDENT IN AN ORGANIZED HEALTH CARE EDUCATION/TRAINING PROGRAM

## 2022-01-22 PROCEDURE — 99291 CRITICAL CARE FIRST HOUR: CPT | Mod: ,,, | Performed by: INTERNAL MEDICINE

## 2022-01-22 PROCEDURE — S0030 INJECTION, METRONIDAZOLE: HCPCS | Performed by: STUDENT IN AN ORGANIZED HEALTH CARE EDUCATION/TRAINING PROGRAM

## 2022-01-22 PROCEDURE — 83735 ASSAY OF MAGNESIUM: CPT | Performed by: STUDENT IN AN ORGANIZED HEALTH CARE EDUCATION/TRAINING PROGRAM

## 2022-01-22 PROCEDURE — 99223 1ST HOSP IP/OBS HIGH 75: CPT | Mod: ,,, | Performed by: STUDENT IN AN ORGANIZED HEALTH CARE EDUCATION/TRAINING PROGRAM

## 2022-01-22 PROCEDURE — 36600 WITHDRAWAL OF ARTERIAL BLOOD: CPT

## 2022-01-22 PROCEDURE — 84132 ASSAY OF SERUM POTASSIUM: CPT | Performed by: STUDENT IN AN ORGANIZED HEALTH CARE EDUCATION/TRAINING PROGRAM

## 2022-01-22 PROCEDURE — 99900026 HC AIRWAY MAINTENANCE (STAT)

## 2022-01-22 PROCEDURE — 27000207 HC ISOLATION

## 2022-01-22 PROCEDURE — 25000003 PHARM REV CODE 250: Performed by: INTERNAL MEDICINE

## 2022-01-22 PROCEDURE — 27000221 HC OXYGEN, UP TO 24 HOURS

## 2022-01-22 PROCEDURE — 99223 PR INITIAL HOSPITAL CARE,LEVL III: ICD-10-PCS | Mod: ,,, | Performed by: STUDENT IN AN ORGANIZED HEALTH CARE EDUCATION/TRAINING PROGRAM

## 2022-01-22 PROCEDURE — 84145 PROCALCITONIN (PCT): CPT | Performed by: INTERNAL MEDICINE

## 2022-01-22 PROCEDURE — 85025 COMPLETE CBC W/AUTO DIFF WBC: CPT | Performed by: STUDENT IN AN ORGANIZED HEALTH CARE EDUCATION/TRAINING PROGRAM

## 2022-01-22 PROCEDURE — 84484 ASSAY OF TROPONIN QUANT: CPT | Performed by: STUDENT IN AN ORGANIZED HEALTH CARE EDUCATION/TRAINING PROGRAM

## 2022-01-22 PROCEDURE — 99291 PR CRITICAL CARE, E/M 30-74 MINUTES: ICD-10-PCS | Mod: ,,, | Performed by: INTERNAL MEDICINE

## 2022-01-22 PROCEDURE — 94761 N-INVAS EAR/PLS OXIMETRY MLT: CPT

## 2022-01-22 PROCEDURE — 82803 BLOOD GASES ANY COMBINATION: CPT

## 2022-01-22 PROCEDURE — 20000000 HC ICU ROOM

## 2022-01-22 RX ORDER — POLYETHYLENE GLYCOL 3350 17 G/17G
17 POWDER, FOR SOLUTION ORAL 2 TIMES DAILY
Status: DISCONTINUED | OUTPATIENT
Start: 2022-01-22 | End: 2022-01-31

## 2022-01-22 RX ORDER — FAMOTIDINE 10 MG/ML
20 INJECTION INTRAVENOUS 2 TIMES DAILY
Status: DISCONTINUED | OUTPATIENT
Start: 2022-01-22 | End: 2022-02-26

## 2022-01-22 RX ORDER — SODIUM,POTASSIUM PHOSPHATES 280-250MG
1 POWDER IN PACKET (EA) ORAL ONCE
Status: COMPLETED | OUTPATIENT
Start: 2022-01-22 | End: 2022-01-22

## 2022-01-22 RX ORDER — FUROSEMIDE 10 MG/ML
20 INJECTION INTRAMUSCULAR; INTRAVENOUS ONCE
Status: COMPLETED | OUTPATIENT
Start: 2022-01-22 | End: 2022-01-22

## 2022-01-22 RX ORDER — AMOXICILLIN 250 MG
1 CAPSULE ORAL 2 TIMES DAILY
Status: DISCONTINUED | OUTPATIENT
Start: 2022-01-22 | End: 2022-01-31

## 2022-01-22 RX ORDER — METOPROLOL TARTRATE 25 MG/1
25 TABLET, FILM COATED ORAL 2 TIMES DAILY
Status: DISCONTINUED | OUTPATIENT
Start: 2022-01-22 | End: 2022-01-23

## 2022-01-22 RX ADMIN — MIDODRINE HYDROCHLORIDE 5 MG: 5 TABLET ORAL at 03:01

## 2022-01-22 RX ADMIN — Medication 125 MG: at 11:01

## 2022-01-22 RX ADMIN — DOXYCYCLINE 100 MG: 100 INJECTION, POWDER, LYOPHILIZED, FOR SOLUTION INTRAVENOUS at 07:01

## 2022-01-22 RX ADMIN — Medication 125 MG: at 05:01

## 2022-01-22 RX ADMIN — MIDODRINE HYDROCHLORIDE 5 MG: 5 TABLET ORAL at 09:01

## 2022-01-22 RX ADMIN — CITALOPRAM HYDROBROMIDE 40 MG: 10 TABLET ORAL at 09:01

## 2022-01-22 RX ADMIN — POLYETHYLENE GLYCOL 3350 17 G: 17 POWDER, FOR SOLUTION ORAL at 09:01

## 2022-01-22 RX ADMIN — FAMOTIDINE 20 MG: 10 INJECTION INTRAVENOUS at 09:01

## 2022-01-22 RX ADMIN — MUPIROCIN: 20 OINTMENT TOPICAL at 09:01

## 2022-01-22 RX ADMIN — METOCLOPRAMIDE HYDROCHLORIDE 10 MG: 5 SOLUTION ORAL at 11:01

## 2022-01-22 RX ADMIN — FUROSEMIDE 20 MG: 10 INJECTION, SOLUTION INTRAMUSCULAR; INTRAVENOUS at 09:01

## 2022-01-22 RX ADMIN — DOXYCYCLINE 100 MG: 100 INJECTION, POWDER, LYOPHILIZED, FOR SOLUTION INTRAVENOUS at 09:01

## 2022-01-22 RX ADMIN — INSULIN ASPART 2 UNITS: 100 INJECTION, SOLUTION INTRAVENOUS; SUBCUTANEOUS at 11:01

## 2022-01-22 RX ADMIN — METOPROLOL TARTRATE 25 MG: 25 TABLET, FILM COATED ORAL at 09:01

## 2022-01-22 RX ADMIN — METOCLOPRAMIDE HYDROCHLORIDE 10 MG: 5 SOLUTION ORAL at 05:01

## 2022-01-22 RX ADMIN — METHYLPREDNISOLONE SODIUM SUCCINATE 40 MG: 40 INJECTION, POWDER, FOR SOLUTION INTRAMUSCULAR; INTRAVENOUS at 05:01

## 2022-01-22 RX ADMIN — SENNOSIDES AND DOCUSATE SODIUM 1 TABLET: 50; 8.6 TABLET ORAL at 09:01

## 2022-01-22 RX ADMIN — POTASSIUM & SODIUM PHOSPHATES POWDER PACK 280-160-250 MG 1 PACKET: 280-160-250 PACK at 09:01

## 2022-01-22 RX ADMIN — ENOXAPARIN SODIUM 40 MG: 100 INJECTION SUBCUTANEOUS at 05:01

## 2022-01-22 RX ADMIN — INSULIN ASPART 2 UNITS: 100 INJECTION, SOLUTION INTRAVENOUS; SUBCUTANEOUS at 05:01

## 2022-01-22 RX ADMIN — METRONIDAZOLE 500 MG: 500 INJECTION, SOLUTION INTRAVENOUS at 11:01

## 2022-01-22 RX ADMIN — ATORVASTATIN CALCIUM 40 MG: 40 TABLET, FILM COATED ORAL at 09:01

## 2022-01-22 RX ADMIN — SODIUM CHLORIDE: 0.9 INJECTION, SOLUTION INTRAVENOUS at 09:01

## 2022-01-22 RX ADMIN — PROPOFOL 5 MCG/KG/MIN: 10 INJECTION, EMULSION INTRAVENOUS at 07:01

## 2022-01-22 RX ADMIN — METRONIDAZOLE 500 MG: 500 INJECTION, SOLUTION INTRAVENOUS at 05:01

## 2022-01-22 RX ADMIN — POTASSIUM BICARBONATE 35 MEQ: 391 TABLET, EFFERVESCENT ORAL at 04:01

## 2022-01-22 RX ADMIN — LEVOTHYROXINE SODIUM 25 MCG: 0.03 TABLET ORAL at 05:01

## 2022-01-22 RX ADMIN — METRONIDAZOLE 500 MG: 500 INJECTION, SOLUTION INTRAVENOUS at 09:01

## 2022-01-22 RX ADMIN — PROPOFOL 25 MCG/KG/MIN: 10 INJECTION, EMULSION INTRAVENOUS at 12:01

## 2022-01-22 NOTE — RESPIRATORY THERAPY
Results for JENNIE FIORE (MRN [id number]) as of 1/22/2022 06:07   Ref. Range 1/22/2022 05:48   POC PH Latest Ref Range: 7.35 - 7.45  7.415   POC PCO2 Latest Ref Range: 35 - 45 mmHg 36.5   POC PO2 Latest Ref Range: 80 - 100 mmHg 57 (LL)   POC BE Latest Ref Range: -2 to 2 mmol/L -1   POC HCO3 Latest Ref Range: 24 - 28 mmol/L 23.4 (L)   POC SATURATED O2 Latest Ref Range: 95 - 100 % 90 (L)   POC TCO2 Latest Ref Range: 23 - 27 mmol/L 25   FiO2 Unknown 30   PEEP Unknown 5   Sample Unknown ARTERIAL   DelSys Unknown CPAP/BiPAP   Allens Test Unknown N/A   Site Unknown LB   Mode Unknown PSV   RR 28  Vt 370  FIO2 increased Back to 40%

## 2022-01-22 NOTE — ASSESSMENT & PLAN NOTE
Moderate. Improving.  -Pulmonary consulted  -Treat aspiration pneumonia and sepsis  -Lasix when shock resolved to be considered  -Mechanical ventilation with sedation; PEEP and TV per Pulmonary

## 2022-01-22 NOTE — PLAN OF CARE
Remains in icu.  Intubated.  Spontaneous with pressure support.  Pt more awake this evening.  Eyes open and starting to move right arm.  Still not following commands.  Tentative plan is to place trach.  Echo complete this AM.  Safety maintained.

## 2022-01-22 NOTE — SUBJECTIVE & OBJECTIVE
Interval History: re-intubated 1/21; metoprolol added for Afib; no changes to antibiotics; Pulmonary following; patient now partial code; may need tracheostomy per Pulmonary; TTE ordered for elevated BNP; will try IV Lasix 20 for one dose.    Review of Systems  Objective:     Vital Signs (Most Recent):  Temp: 98.7 °F (37.1 °C) (01/22/22 0400)  Pulse: 91 (01/22/22 0758)  Resp: (!) 32 (01/22/22 0758)  BP: (!) 163/72 (01/22/22 0758)  SpO2: 99 % (01/22/22 0758) Vital Signs (24h Range):  Temp:  [98.3 °F (36.8 °C)-98.7 °F (37.1 °C)] 98.7 °F (37.1 °C)  Pulse:  [] 91  Resp:  [16-49] 32  SpO2:  [90 %-100 %] 99 %  BP: ()/(43-73) 163/72     Weight: 69 kg (152 lb 1.9 oz)  Body mass index is 26.11 kg/m².    Intake/Output Summary (Last 24 hours) at 1/22/2022 0822  Last data filed at 1/22/2022 0628  Gross per 24 hour   Intake 2903.67 ml   Output 620 ml   Net 2283.67 ml      Physical Exam    Significant Labs: All pertinent labs within the past 24 hours have been reviewed.    Significant Imaging: I have reviewed all pertinent imaging results/findings within the past 24 hours.

## 2022-01-22 NOTE — PROGRESS NOTES
"  01/22/2022      Admit Date: 1/18/2022  Sandra Wilson  New Patient Consult    Chief Complaint   Patient presents with    Shortness of Breath    Vomiting     PEG tube        History of Present Illness:  Intubated.          From Dr Gaspar's hpi, ERP:Sandra Wilson is a 81 y.o. female who presents to the ED via EMS  with an onset of vomiting and SOB. EMS reported that upon examination patient was less responsive than usually but is able to answer questions. The report from the EMS upon arrival was an O2 sats in the 70's. EMS proceeded to give supplemental oxygen and the O2 sats raised up to 93% on 3 liters of O2 with  "junky" right upper lung sounds" and greenish diarrhea. She was vomiting en route. They also reported that the patient is a patient that has dislodged her PEG tubing many times. PMHx of HTN, CAD, OA, GERD, ctroke, Colon cancer, ovarian cancer, breast cancer.PSHx of Cardiac catheterization, colonoscopy.         Progress Note  PULMONARY    Admit Date: 1/18/2022 01/22/2022      SUBJECTIVE:     1/19 intubated, sedated,  1/20 no c/o intubated, sedated  1/21 no new c/o intubated  1/22 no c/o intubated     PFSH and Allergies reviewed.    OBJECTIVE:     Vitals (Most recent):  Vitals:    01/22/22 0300   BP: (!) 128/55   Pulse: 70   Resp: (!) 22   Temp:        Vitals (24 hour range):  Temp:  [98.2 °F (36.8 °C)-98.6 °F (37 °C)]   Pulse:  []   Resp:  [16-45]   BP: ()/(43-72)   SpO2:  [90 %-100 %]       Intake/Output Summary (Last 24 hours) at 1/22/2022 0439  Last data filed at 1/21/2022 2324  Gross per 24 hour   Intake 2685.07 ml   Output 700 ml   Net 1985.07 ml          Physical Exam:  The patient's neuro status (alertness,orientation,cognitive function,motor skills,), pharyngeal exam (oral lesions, hygiene, abn dentition,), Neck (jvd,mass,thyroid,nodes in neck and above/below clavicle),RESPIRATORY(symmetry,effort,fremitus,percussion,auscultation),  Cor(rhythm,heart tones including " gallops,perfusion,edema)ABD(distention,hepatic&splenomegaly,tenderness,masses), Skin(rash,cyanosis),Psyc(affect,judgement,).  Exam negative except for these pertinent findings:    Et tube, sedate, chest is symmetric, no distress, normal percussion, normal fremitus and good anterior breath sounds      Radiographs reviewed: view by direct vision    Results for orders placed during the hospital encounter of 01/18/22    X-Ray Chest 1 View    Narrative  EXAMINATION:  XR CHEST 1 VIEW    CLINICAL HISTORY:  respiratory failure;    TECHNIQUE:  Single frontal view of the chest was performed.    COMPARISON:  Chest portable of January 18, 2022    FINDINGS:  An endotracheal tube ends in the trachea above the level the malorie.  An NG tube traverses the esophagus to the stomach.  A central line enters at the right neck and ends in the superior vena cava.  The cardiac size and contours within normal limits.  A loop recorder is noted in the left chest.  There is continued central right lung infiltrate and small infiltrate at the left lung base.  No pneumothorax is seen.    Impression  Continued intrapulmonary infiltrates right greater than left.  Endotracheal tube, NG tube and right central lines in position.      Electronically signed by: Mathew Yanez MD  Date:    01/18/2022  Time:    15:50  ]    Labs     Recent Labs   Lab 01/22/22 0311   WBC 16.29*   HGB 8.1*   HCT 24.5*   *     Recent Labs   Lab 01/21/22 2117 01/21/22 2117 01/22/22 0311   NA  --   --  136   K  --   --  3.1*   CL  --   --  104   CO2  --   --  21*   BUN  --   --  23   CREATININE  --   --  0.7   GLU  --   --  89   CALCIUM  --   --  8.5*   MG  --   --  1.8   PHOS  --   --  2.5*   AST  --   --  21   ALT  --   --  19   ALKPHOS  --   --  101   BILITOT  --   --  0.6   PROT  --   --  5.1*   ALBUMIN  --   --  2.0*   PROCAL  --   --  3.02*   TROPONINI 0.081*   < > 0.180*   *  --   --     < > = values in this interval not displayed.     Recent Labs    Lab 01/21/22  0508   PH 7.391   PCO2 40.4   PO2 70*   HCO3 24.5     Microbiology Results (last 7 days)     Procedure Component Value Units Date/Time    Blood culture [[id number]] Collected: 01/18/22 1726    Order Status: Completed Specimen: Blood from Antecubital, Left Arm Updated: 01/21/22 2322     Blood Culture, Routine No Growth to date      No Growth to date      No Growth to date      No Growth to date    Blood culture [[id number]] Collected: 01/18/22 1802    Order Status: Completed Specimen: Blood from Antecubital, Right Arm Updated: 01/21/22 2322     Blood Culture, Routine No Growth to date      No Growth to date      No Growth to date      No Growth to date    Culture, Respiratory with Gram Stain [[id number]]  (Abnormal) Collected: 01/19/22 0114    Order Status: Completed Specimen: Sputum, Expectorated Updated: 01/21/22 1118     Respiratory Culture No Pseudomonas isolated.      STREPTOCOCCUS AGALACTIAE (GROUP B)  Few  Beta-hemolytic streptococci are routinely susceptible to   penicillins,cephalosporins and carbapenems.  Susceptibility testing not routinely performed  Normal respiratory harjinder also present       Gram Stain (Respiratory) <10 epithelial cells per low power field.     Gram Stain (Respiratory) Rare WBC's     Gram Stain (Respiratory) Rare Gram positive cocci     Gram Stain (Respiratory) Rare yeast    C Diff Toxin by PCR [[id number]]  (Abnormal) Collected: 01/18/22 0846    Order Status: Completed Updated: 01/18/22 2355     C. diff PCR Positive    Blood culture [[id number]]     Order Status: Canceled Specimen: Blood     Blood culture [[id number]]     Order Status: Canceled Specimen: Blood     Clostridium difficile EIA [[id number]]  (Abnormal) Collected: 01/18/22 0846    Order Status: Completed Specimen: Stool Updated: 01/18/22 1109     C. diff Antigen Positive     C difficile Toxins A+B, EIA Negative     Comment: Testing not recommended for children <24 months old.             Impression:  Active Hospital  Problems    Diagnosis  POA    *Acute hypoxemic respiratory failure [J96.01]  Yes    Ileus [K56.7]  No    Atrial fibrillation [I48.91]  No    Gastrostomy tube dependent [Z93.1]  Not Applicable    Demand ischemia [I24.8]  Yes    Sepsis [A41.9]  Yes    ARDS (adult respiratory distress syndrome) [J80]  Yes    YARI (generalized anxiety disorder) [F41.1]  Yes    Type 2 diabetes mellitus, with long-term current use of insulin [E11.9, Z79.4]  Not Applicable    Pneumonia [J18.9]  Yes    Aspiration into airway [T17.908A]  Yes    Hypothyroidism [E03.9]  Yes    Hyperlipidemia [E78.5]  Yes     Chronic    GERD (gastroesophageal reflux disease) [K21.9]  Yes     Chronic      Resolved Hospital Problems    Diagnosis Date Resolved POA    STEFFANY (acute kidney injury) [N17.9] 01/21/2022 Yes    Hyponatremia [E87.1] 01/20/2022 Yes                        Plan: no fever, vss, was hypotensive earlier.  Cefepime/vanc,flagyl,  chr midodrine, levophed 0.9 ,  ddimer 5.2, creat 0.9  c diff ag + but toxin neg.  abg 7.27 with ox 48,   Dnr,  cxr impressive R>>>L aspiration pattern.     Prior subdural --       Recruitment manuver done with peep 30 for 6 seconds with art line bp falling from sbp 100 to 60's.  Sat improved from 92 to 94.  Pt not really peep responsive.    Pt should improve ox with left side down or prone.  Peep may help but infiltrates more R>L -- should be posterior lung bases.  Might consider cta if not progressing.      discussed with resp and nursing. Optimally would be left side down or prone.        1/19 no fever, vss with rr to 43, flagyl/zosyn/vanc/cefepime, n70-80% ox,  Wbc 14.8 from 5k,  Creat 1.5 form 0.9,   abg 02 61 with ph 7.33- peep 5,   I/o 687/133-  Will screen for dvt/pe with leg study  Will dose 500 cc saline,   F/u cxr more opacification right>> left lung.      1/20 no fever, vss, bp low at times, I.o 2770/1870, cefepime/flagyl/vanc, ox 60%, wbc 14.6, plts 144- new, creat 1.0,. cxr with some clearing,    F/u abg, wean soon likely but need better ox to extubate.    No dvt, ddimer up at admit,     Pt min vol 8 and seemed to do well with 50% ox while I rounded.  Discussed with nursing and respiratory-- if can get to 40% and pass wean trial would recommend extubation, would be optimal to discuss with family prior plans not to re intubate.  She is progressing-- would consider not discussing comfort care if improving.      1/21 no fever, vss, tm 100, wbc 15.2, hgb 8, from 13.6 bon 18th- admit, plt 130 from 244,   Creat 0.7,   cxr progressive clearing right , some increase opacification left suggested.   Strep in sputum,  Taper abx to doxy and flagy with c diff and strep in mucous-- monitor, check    procal am.    Will change code status to allow re intubation, and  extubate this am.      On enteral vanc for c diff concerns.   Discussed with January short.  Reviewed revised code status.      Addendum-- having stridor post extubation.  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  Pt was at [facility] when patient removed trach 2 months ago.  Pt has no h/o stridor per daughter.  Stridor did not change with jaw thrush nor nt trumpet.  Discussed would recommend intubation and replace trach as best option. Discussed with Dr Charles.  1/22 no fever,vss, wbc 16.3, hgb 8.1 from 13.6 admit?  , procal is down to 3 from 17 admit,     Pt failed extubation with upper airway stridor and poor loc.      Do leak test:on vent with cuff down and tidal vol delivered 450 would need over 110 cc to leak to pass, need exhaled tidal vol to be less than 340 cc to  pass leak test -- do now and repeat q 4 x 3 total.  Will dose steroids for laryngospasm.  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  Will discuss with family later- trial extubation if passes, trach, comfort care???      Discussed with daughter, asked if code status should be altered?  After discussion - will consider extubation trial if good leak test  (would try to notify daughter if passes) --- otherwise trach.        The following were evaluated and adjusted as needed: mechanical ventilator settings and weaning status, intravenous fluids and nutritional status, sedation and neurologic status, antibiotics, hemodynamics, support tubes and access lines and invasive monitoring, acid base balance and oxygenation needs, input and output and renal status and CODE STATUS/OUTLOOK DISCUSSED WITH AVAILABLE NEXT OF  KIN       Critical Care  - THE PATIENT HAS A HIGH PROBABILITY OF IMMINENT OR LIFE THREATENING DETERIORATION.  Over 50%time of encounter was in direct care at bedside.  Time was 30 to 74 minutes required for patient care.  Time needed for all of the above totaled 38 minutes.

## 2022-01-22 NOTE — ASSESSMENT & PLAN NOTE
Mild on KUB.    -Serial abdominal exams; monitor for toxic megacolon  -KUB PRN  -Hold off on tube feeds at this time  -Bowel regimen

## 2022-01-22 NOTE — PLAN OF CARE
Problem: Adult Inpatient Plan of Care  Goal: Plan of Care Review  Outcome: Ongoing, Progressing  Goal: Patient-Specific Goal (Individualized)  Outcome: Ongoing, Progressing  Goal: Absence of Hospital-Acquired Illness or Injury  Outcome: Ongoing, Progressing  Goal: Optimal Comfort and Wellbeing  Outcome: Ongoing, Progressing  Goal: Readiness for Transition of Care  Outcome: Ongoing, Progressing     Problem: Infection  Goal: Absence of Infection Signs and Symptoms  Outcome: Ongoing, Progressing     Problem: Diabetes Comorbidity  Goal: Blood Glucose Level Within Targeted Range  Outcome: Ongoing, Progressing     Problem: Fluid Imbalance (Pneumonia)  Goal: Fluid Balance  Outcome: Ongoing, Progressing     Problem: Infection (Pneumonia)  Goal: Resolution of Infection Signs and Symptoms  Outcome: Ongoing, Progressing     Problem: Respiratory Compromise (Pneumonia)  Goal: Effective Oxygenation and Ventilation  Outcome: Ongoing, Progressing     Problem: Fall Injury Risk  Goal: Absence of Fall and Fall-Related Injury  Outcome: Ongoing, Progressing     Problem: Communication Impairment (Mechanical Ventilation, Invasive)  Goal: Effective Communication  Outcome: Ongoing, Progressing     Problem: Device-Related Complication Risk (Mechanical Ventilation, Invasive)  Goal: Optimal Device Function  Outcome: Ongoing, Progressing     Problem: Inability to Wean (Mechanical Ventilation, Invasive)  Goal: Mechanical Ventilation Liberation  Outcome: Ongoing, Progressing     Problem: Nutrition Impairment (Mechanical Ventilation, Invasive)  Goal: Optimal Nutrition Delivery  Outcome: Ongoing, Progressing

## 2022-01-22 NOTE — PROGRESS NOTES
History     Chief Complaint:  Chest Pain    The history is provided by the patient.      Dick Shen is a 41 year old male who presents for evaluation of localized left sided chest pain. The patient reports that the pain started at 1400 and lasted for about half an hour. He describes the pain as a pinching sensation that was a 6/10 at its worst, secondary to mild light headedness. The pain slowly went away with time and he is currently experiencing no discomfort at this time. He denies any shortness of breath, nausea, vomiting, or a history of blood clots.     CARDIAC RISK FACTORS:  Sex:    Male  Tobacco:   Yes  Hypertension:   No  Hyperlipidemia:  No  Diabetes:   No  Family History:  Yes    PE/DVT RISK FACTORS:  Sex:    Male  Hormones:   No  Tobacco:   Yes  Cancer:   No  Travel:   No  Surgery:   No  Other immobilization: No  Personal history:  No  Family history:  No    Allergies:  Erythromycin  Sulfa drugs    Medications:    Zyban  Norco  Flexeril  Zofran    Past Medical History:    Impingement syndrome of right shoulder    Past Surgical History:    Laparoscopic cholecystectomy with cholangiograms     Family History:    Mother: hypertension, breast cancer  Father: diabetes, cancer, heart disease    Social History:  Smoking status: current every day smoker, 0.5 packs a day  Tobacco: on Zyban  Negative for alcohol use.  Negative for drug use.  Marital Status:  Single [1]    Review of Systems   Respiratory: Negative for shortness of breath.    Cardiovascular: Positive for chest pain.   Gastrointestinal: Negative for nausea and vomiting.   Neurological: Positive for light-headedness (mild).   All other systems reviewed and are negative.    Physical Exam     Patient Vitals for the past 24 hrs:   BP Temp Temp src Pulse Heart Rate Resp SpO2   07/02/20 2045 132/89 -- -- 83 80 19 98 %   07/02/20 2030 (!) 135/90 -- -- 84 -- -- 98 %   07/02/20 2015 139/88 -- -- 80 88 -- 98 %   07/02/20 2000 (!) 138/100 -- -- 88 89 28 99  [facility] Hospital Medicine  Progress Note    Patient Name: Sandra Wilson  MRN: [id number]  Patient Class: IP- Inpatient   Admission Date: 1/18/2022  Length of Stay: 4 days  Attending Physician: Fabian Charles MD  Primary Care Provider: Primary Doctor No        Subjective:     Principal Problem:Acute hypoxemic respiratory failure        HPI:  Sandra Wilson is an 81 year old female with a past medical history of CVAs, CNS aneurysm, PEG status, CAD, HLD, hypothryoidism, DM, aortic stenosis, and colon, breast, and ovarian cancer who presented from long term care with vomiting, diarrhea, and shortness of breath. Workup in the ED showed likely aspiration pneumonitis and C. Diff colitis. Her O2 requirement increased while in the ED requiring intubation with sedation. She also required Levophed as she likely developed septic shock. Antibiotics were broadened to cefepime, vancomycin, and Flagyl. Pulmonary was consulted. Family was notified. The patient was unable to provide history given acuity of illness.      Overview/Hospital Course:  Sandra Wilson is an 81 year old female with a past medical history of CVAs, CNS aneurysm, PEG status, CAD, HLD, hypothryoidism, DM, aortic stenosis, and colon, breast, and ovarian cancer who presented from long term care with vomiting, diarrhea, and shortness of breath secondary to acute hypoxic respiratory failure from aspiration pneumonia in the setting of C diff colitis leading to septic shock. She was intubated in the ED and started on Levophed infusion via RIJ CVC placed by Dr. Vignesh Gaspar. She was started on broad spectrum antibiotics with cefepime, Flagyl, vancomycin and admitted to the ICU. She was also started on enteral vancomcyin for severe C diff colitis given elevated WBC count and STEFFANY. There was concern for developing ARDS given P/F ratio of 87 (severe); 190 1/22 (moderate). Pulmonary was consulted. She also presented with demand ischemia likely  %   07/02/20 1916 (!) 145/109 98.6  F (37  C) Oral 87 87 16 100 %       Physical Exam  Constitutional: Alert, attentive  HENT:    Nose: Nose normal.    Mouth/Throat: Oropharynx is clear, mucous membranes are moist   Eyes: EOM are normal.   CV: regular rate and rhythm; no murmurs, rubs or gallups  Chest: Effort normal and breath sounds normal.   GI:  There is no tenderness. No distension. Normal bowel sounds  MSK: Normal range of motion.   Neurological: Alert, attentive  Skin: Skin is warm and dry.      Emergency Department Course     ECG:  NSR, no ST abnormality, no significant change since 9/6/16    Imaging:  Radiology findings were communicated with the patient who voiced understanding of the findings.    XR Chest Portable 1 View:   Heart size and pulmonary vascularity normal. Calcified granuloma right upper lobe. Lungs otherwise clear. As per radiology.     Laboratory:  Laboratory findings were communicated with the patient who voiced understanding of the findings.    CBC: WBC: 10.8, HGB: 15.0, PLT: 197  BMP: o/w WNL (Creatinine: 1.04)  1941 Troponin I: <0.015    Emergency Department Course:  Past medical records, nursing notes, and vitals reviewed.    2000 I performed an exam of the patient as documented above.      EKG obtained in the ED, see results above.      IV was inserted and blood was drawn for laboratory testing, results above.     The patient was sent for a chest xray while in the emergency department, results above.      I rechecked the patient and discussed the results of his workup thus far.     Findings and plan explained to the Patient. Patient discharged home with instructions regarding supportive care, medications, and reasons to return. The importance of close follow-up was reviewed.    I personally reviewed the laboratory and imaging results with the Patient and answered all related questions prior to discharge.     Impression & Plan     Medical Decision Making:  This is a 41-year-old male  secondary to septic shock. Troponin was trended and improved. Normal saline infusion was added for hyponatremia and STEFFANY which improved both. Levophed was able to be weaned. Family agreed to DNR status 1/18 and Palliative Care was consulted to discuss goal of care 1/19. Antibiotics were changed to doxycycline and Flagyl (GBS in sputum culture) 1/21. Her respiratory status has improved throughout her course and she was extubated (code changed to partial code for intubation) 1/21 only to be re-intubated for worsening stridor (history of tracheostomy). Upon re-intubation, copious amounts of secretions were suctioned. KUB suggested a mild ileus. A bowel regimen was instituted. Tube feeds have not been started. Her course was complicated by Afib as well noted on telemetry 1/21; metoprolol tartrate started 1/22.      Interval History: re-intubated 1/21; metoprolol added for Afib; no changes to antibiotics; Pulmonary following; patient now partial code; may need tracheostomy per Pulmonary; TTE ordered for elevated BNP; will try IV Lasix 20 for one dose.    Review of Systems  Objective:     Vital Signs (Most Recent):  Temp: 98.7 °F (37.1 °C) (01/22/22 0400)  Pulse: 91 (01/22/22 0758)  Resp: (!) 32 (01/22/22 0758)  BP: (!) 163/72 (01/22/22 0758)  SpO2: 99 % (01/22/22 0758) Vital Signs (24h Range):  Temp:  [98.3 °F (36.8 °C)-98.7 °F (37.1 °C)] 98.7 °F (37.1 °C)  Pulse:  [] 91  Resp:  [16-49] 32  SpO2:  [90 %-100 %] 99 %  BP: ()/(43-73) 163/72     Weight: 69 kg (152 lb 1.9 oz)  Body mass index is 26.11 kg/m².    Intake/Output Summary (Last 24 hours) at 1/22/2022 0822  Last data filed at 1/22/2022 0628  Gross per 24 hour   Intake 2903.67 ml   Output 620 ml   Net 2283.67 ml      Physical Exam    Significant Labs: All pertinent labs within the past 24 hours have been reviewed.    Significant Imaging: I have reviewed all pertinent imaging results/findings within the past 24 hours.      Assessment/Plan:      * Acute  "hypoxemic respiratory failure  Secondary to aspiration pneumonia. GBS in sputum.  Concern for ARDS.  -Pulmonary consulted; re-intubated 1/21  -May need another tracheostomy  -Admit to ICU  -Continue doxycycline and Flagyl  -CXR and ABG PRN  -Continuous pulse oximetry    Atrial fibrillation  -Telemetry  -Metoprolol 5 IV PRN for HR > 130  -Defer full dose anticoagulation at this time given thrombocytopenia and critical illness  -Metoprolol 25 BID      Ileus  Mild on KUB.    -Serial abdominal exams; monitor for toxic megacolon  -KUB PRN  -Hold off on tube feeds at this time  -Bowel regimen      ARDS (adult respiratory distress syndrome)  Moderate. Improving.  -Pulmonary consulted  -Treat aspiration pneumonia and sepsis  -Lasix when shock resolved to be considered  -Mechanical ventilation with sedation; PEEP and TV per Pulmonary      Sepsis  GBS in sputum. Concern for aspiration pneumonitis in setting of C diff colitis.  -Continue doxycycline and Flagyl  -Continue PO vancomycin  -Follow up cultures      Demand ischemia  In setting of septic shock. Improved.  -Continue to treat septic shock  -Telemetry      Gastrostomy tube dependent  Likely cause of aspiration.  -Care per nursing  -Tube feedings when more stable; likely through OG      Aspiration into airway  -Continue doxycyline and Flagyl  -Pulmonary consulted  -Ventilation with sedation      Pneumonia  -See "Aspiration into airway"      Type 2 diabetes mellitus, with long-term current use of insulin  -SSI  -Q6H glucose checks  -Hypoglycemic precautions  -Currently NPO      YARI (generalized anxiety disorder)  -Sedation      Hypothyroidism  -Continue Synthroid      Hyperlipidemia  -Statin    GERD (gastroesophageal reflux disease)  -Hold PPI given C diff colitis  -IV famotidine        VTE Risk Mitigation (From admission, onward)         Ordered     enoxaparin injection 40 mg  Daily         01/18/22 1438     IP VTE HIGH RISK PATIENT  Once         01/18/22 1438     Place " with history of father with MI at age 50 who presents for evaluation of chest pain episode.  Symptoms began 6 hours ago, were described as pinching, and lasted 30 minutes with no other associated symptoms.  Given 6 hours from pain, his negative EKG and troponin essentially rule out AMI.  He is PERC negative, essentially ruling out PE.  Chest x-ray shows no widened mediastinum, pneumothorax, or pneumonia to explain his symptoms.  We discussed and assure decision-making conversation his risk profile.  Based on family history, recommend at least consideration of stress test within 72 hours and conversation about this topic with his primary doctor tomorrow.  He remains chest pain-free.  Plan primary care follow-up as per above and strict return precautions for worse pain, shortness of breath, or any other concerns.    Diagnosis:    ICD-10-CM    1. Chest pain, unspecified type  R07.9 Echo Stress Echocardiogram       Disposition:  Discharged to home.    Scribe Disclosure:  FABRICE, Fuad Carmona, am serving as a scribe at 8:09 PM on 7/2/2020 to document services personally performed by Martin Montelongo MD based on my observations and the provider's statements to me.        Martin Montelongo MD  07/02/20 8403     sequential compression device  Until discontinued         01/18/22 1438                Discharge Planning   DEMETRIUS: 1/30/2022    Code Status: Partial Code   Is the patient medically ready for discharge?:     Reason for patient still in hospital (select all that apply): Patient trending condition, Laboratory test, Treatment and Consult recommendations  Discharge Plan A: Return to nursing home            Critical care time spent on the evaluation and treatment of severe organ dysfunction, review of pertinent labs and imaging studies, discussions with consulting providers and discussions with patient/family: 31 minutes.      Fabian Charles MD  Department of Hospital Medicine   [facility]

## 2022-01-22 NOTE — CONSULTS
[facility]  General Surgery  Consult Note    Consults  Subjective:     Chief Complaint/Reason for Admission:  Acute hypoxemic respiratory failure    History of Present Illness:  This is an 81-year-old female with a past medical history of CVA, CNS aneurysm, peg status, coronary artery disease, hyperlipidemia, hypothyroidism, diabetes, aortic stenosis, colon, breast, and ovarian cancer who presented from LTAC with vomiting, diarrhea, and shortness of breath.  She likely had aspiration pneumonitis and C diff colitis.  She has been a prolonged period of time.  She was ultimately intubated and extubated recently.  She then had to be reintubated yesterday.  I was consulted for repeat tracheostomy.    No current facility-administered medications on file prior to encounter.     Current Outpatient Medications on File Prior to Encounter   Medication Sig    ALPRAZolam (XANAX) 0.25 MG tablet Take 0.25 mg by mouth 2 (two) times daily as needed.    atorvastatin (LIPITOR) 40 MG tablet 1 tablet (40 mg total) by Per G Tube route every evening. Per peg tube    celecoxib (CELEBREX) 100 MG capsule celecoxib 100 mg capsule    citalopram (CELEXA) 40 MG tablet citalopram 40 mg tablet   TAKE 1 TABLET BY MOUTH EVERY DAY    FLUoxetine 10 MG capsule 1 capsule (10 mg total) by Per G Tube route once daily. Per peg tube    fluticasone propionate (FLONASE) 50 mcg/actuation nasal spray fluticasone propionate 50 mcg/actuation nasal spray,suspension    glycopyrrolate (ROBINUL) 1 mg Tab 1 mg 2 (two) times daily. Per peg tube    hydrALAZINE (APRESOLINE) 25 MG tablet hydralazine 25 mg tablet    HYDROcodone-acetaminophen (NORCO) 5-325 mg per tablet Take 1 tablet by mouth 2 (two) times daily as needed for Pain.    insulin lispro 100 unit/mL injection Inject into the skin as needed. 150-199 = 1 UNITS  200-249 = 2 UNITS  250-299 = 3 UNITS  300-349 = 4 UNITS    ipratropium (ATROVENT) 21 mcg (0.03 %) nasal spray ipratropium  bromide 21 mcg (0.03 %) nasal spray    levothyroxine (SYNTHROID) 25 MCG tablet 25 mcg every morning. At 0600 per peg tube    LIDOcaine (XYLOCAINE) 5 % Oint ointment Apply topically.    metoclopramide HCl (REGLAN) 5 mg/5 mL Soln 10 mg every 6 (six) hours. Per peg tube    midodrine (PROAMATINE) 5 MG Tab 1 tablet (5 mg total) by Per G Tube route 3 (three) times daily.    multivitamin (THERAGRAN) tablet 1 tablet once daily. Per peg tube    pantoprazole (PROTONIX) 40 mg suspension 1 packet (40 mg total) by Per G Tube route once daily. Per peg tube       Review of patient's allergies indicates:   Allergen Reactions    Cephalexin (bulk)      Flushing and itching    Lidocaine base      rash    Lisinopril Other (See Comments)     cough       Past Medical History:   Diagnosis Date    Carotid stenosis     GERD (gastroesophageal reflux disease)     Hyperlipidemia     Hypertension     Osteoarthritis     Stroke      Past Surgical History:   Procedure Laterality Date    CARDIAC CATHETERIZATION      Carotid Endarderectomy      COLONOSCOPY  2010    repeat every 5    DEXA  2010    WNL    ESOPHAGOGASTRODUODENOSCOPY  6/9/2009 [facility]    Normal EGD.    ESOPHAGOGASTRODUODENOSCOPY  4/9/2012    [facility]? Slight gastric mucosal atrophy.  Otherwise normal stomach and duodenum.  CLOtest negative.    TONSILLECTOMY       Family History     Problem Relation (Age of Onset)    Cancer Mother    Coronary artery disease Father        Tobacco Use    Smoking status: Never Smoker    Smokeless tobacco: Never Used   Substance and Sexual Activity    Alcohol use: No    Drug use: No    Sexual activity: Never     Birth control/protection: Post-menopausal     Review of Systems   Unable to obtain secondary to intubation  Objective:     Vital Signs (Most Recent):  Temp: 97.6 °F (36.4 °C) (01/22/22 1515)  Pulse: 68 (01/22/22 1626)  Resp: (!) 21 (01/22/22 1626)  BP: (!) 143/65 (01/22/22 1530)  SpO2: 100 % (01/22/22 1626) Vital Signs (24h  Range):  Temp:  [97.2 °F (36.2 °C)-98.7 °F (37.1 °C)] 97.6 °F (36.4 °C)  Pulse:  [65-91] 68  Resp:  [15-49] 21  SpO2:  [94 %-100 %] 100 %  BP: ()/(43-73) 143/65     Weight: 69 kg (152 lb 1.9 oz)  Body mass index is 26.11 kg/m².      Intake/Output Summary (Last 24 hours) at 1/22/2022 1643  Last data filed at 1/22/2022 1517  Gross per 24 hour   Intake 773.23 ml   Output 1635 ml   Net -861.77 ml       Physical Exam  Constitutional:       General: She is not in acute distress.     Appearance: She is ill-appearing. She is not toxic-appearing or diaphoretic.   HENT:      Head: Normocephalic.      Nose: Nose normal.   Eyes:      Conjunctiva/sclera: Conjunctivae normal.   Neck:      Comments: Old tracheostomy site  Cardiovascular:      Rate and Rhythm: Normal rate and regular rhythm.   Pulmonary:      Comments: Intubated  Abdominal:      Palpations: Abdomen is soft.   Skin:     General: Skin is warm.   Neurological:      Comments: Sedated         Significant Labs:  CBC:   Recent Labs   Lab 01/22/22 0311   WBC 16.29*   RBC 2.52*   HGB 8.1*   HCT 24.5*   *   MCV 97   MCH 32.1*   MCHC 33.1     CMP:   Recent Labs   Lab 01/22/22  0311 01/22/22  0311 01/22/22  1228   GLU 89  --   --    CALCIUM 8.5*  --   --    ALBUMIN 2.0*  --   --    PROT 5.1*  --   --      --   --    K 3.1*   < > 3.7   CO2 21*  --   --      --   --    BUN 23  --   --    CREATININE 0.7  --   --    ALKPHOS 101  --   --    ALT 19  --   --    AST 21  --   --    BILITOT 0.6  --   --     < > = values in this interval not displayed.     Coagulation: No results for input(s): PT, INR, APTT in the last 48 hours.  Lactic Acid:   Recent Labs   Lab 01/21/22  0411   LACTATE 1.3       Assessment/Plan:     Active Diagnoses:    Diagnosis Date Noted POA    PRINCIPAL PROBLEM:  Acute hypoxemic respiratory failure [J96.01] 01/18/2022 Yes    Ileus [K56.7] 01/21/2022 No    Atrial fibrillation [I48.91] 01/21/2022 No    Gastrostomy tube dependent [Z93.1]  01/19/2022 Not Applicable    Demand ischemia [I24.8] 01/19/2022 Yes    Sepsis [A41.9] 01/19/2022 Yes    ARDS (adult respiratory distress syndrome) [J80] 01/19/2022 Yes    YARI (generalized anxiety disorder) [F41.1] 01/18/2022 Yes    Type 2 diabetes mellitus, with long-term current use of insulin [E11.9, Z79.4] 01/18/2022 Not Applicable    Pneumonia [J18.9] 01/18/2022 Yes    Aspiration into airway [T17.908A] 01/18/2022 Yes    Hypothyroidism [E03.9] 12/15/2021 Yes    Hyperlipidemia [E78.5]  Yes     Chronic    GERD (gastroesophageal reflux disease) [K21.9]  Yes     Chronic      Problems Resolved During this Admission:    Diagnosis Date Noted Date Resolved POA    STEFFANY (acute kidney injury) [N17.9] 01/19/2022 01/21/2022 Yes    Hyponatremia [E87.1] 01/19/2022 01/20/2022 Yes     81-year-old female who lives at LTAC who was been reintubated.  She has a history of prior tracheostomy.  Multiple cancers.  Generally has failure to thrive.  I was reconsulted for repeat tracheostomy given recent intubation.    Pending conversation with palliative care about goals of care    Will need to address what long-term goals are.  Can plan to perform repeat tracheostomy, likely Wednesday      Thank you for your consult. I will follow-up with patient. Please contact us if you have any additional questions.    Abdulkadir Marrufo MD  General Surgery  [facility]

## 2022-01-22 NOTE — CARE UPDATE
01/22/22 0758   Preset Conventional Ventilator Settings   Vent Type NKV-550   Ventilation Type Other (see comments)  (PS ventilation)   Vent Mode SPONT-PS   Humidity HME   PEEP/CPAP 5.6 cmH20   Pressure Support 10 cmH20   Patient failed leak test at this time.

## 2022-01-22 NOTE — ASSESSMENT & PLAN NOTE
-Telemetry  -Metoprolol 5 IV PRN for HR > 130  -Defer full dose anticoagulation at this time given thrombocytopenia and critical illness  -Metoprolol 25 BID

## 2022-01-23 PROBLEM — K56.7 ILEUS: Status: RESOLVED | Noted: 2022-01-21 | Resolved: 2022-01-23

## 2022-01-23 LAB
ALBUMIN SERPL BCP-MCNC: 2.1 G/DL (ref 3.5–5.2)
ALP SERPL-CCNC: 71 U/L (ref 55–135)
ALT SERPL W/O P-5'-P-CCNC: 18 U/L (ref 10–44)
ANION GAP SERPL CALC-SCNC: 12 MMOL/L (ref 8–16)
AORTIC ROOT ANNULUS: 2.65 CM
AORTIC VALVE CUSP SEPERATION: 0.86 CM
AST SERPL-CCNC: 15 U/L (ref 10–40)
AV INDEX (PROSTH): 0.23
AV MEAN GRADIENT: 32 MMHG
AV PEAK GRADIENT: 47 MMHG
AV VELOCITY RATIO: 0.26
BACTERIA BLD CULT: NORMAL
BACTERIA BLD CULT: NORMAL
BACTERIA SPEC AEROBE CULT: ABNORMAL
BASOPHILS # BLD AUTO: ABNORMAL K/UL (ref 0–0.2)
BASOPHILS NFR BLD: 0 % (ref 0–1.9)
BILIRUB SERPL-MCNC: 0.4 MG/DL (ref 0.1–1)
BSA FOR ECHO PROCEDURE: 1.77 M2
BUN SERPL-MCNC: 27 MG/DL (ref 8–23)
CALCIUM SERPL-MCNC: 8.6 MG/DL (ref 8.7–10.5)
CHLORIDE SERPL-SCNC: 101 MMOL/L (ref 95–110)
CO2 SERPL-SCNC: 22 MMOL/L (ref 23–29)
CREAT SERPL-MCNC: 0.7 MG/DL (ref 0.5–1.4)
CV ECHO LV RWT: 0.65 CM
DIFFERENTIAL METHOD: ABNORMAL
DOP CALC AO PEAK VEL: 3.42 M/S
DOP CALC AO VTI: 86.5 CM
DOP CALC LVOT PEAK VEL: 0.88 M/S
DOP CALC MV VTI: 35.55 CM
DOP CALCLVOT PEAK VEL VTI: 19.54 CM
E WAVE DECELERATION TIME: 216.78 MSEC
E/A RATIO: 1.04
E/E' RATIO: 20.91 M/S
ECHO LV POSTERIOR WALL: 1.21 CM (ref 0.6–1.1)
EJECTION FRACTION: 55 %
EOSINOPHIL # BLD AUTO: ABNORMAL K/UL (ref 0–0.5)
EOSINOPHIL NFR BLD: 0 % (ref 0–8)
ERYTHROCYTE [DISTWIDTH] IN BLOOD BY AUTOMATED COUNT: 14.2 % (ref 11.5–14.5)
EST. GFR  (AFRICAN AMERICAN): >60 ML/MIN/1.73 M^2
EST. GFR  (NON AFRICAN AMERICAN): >60 ML/MIN/1.73 M^2
FRACTIONAL SHORTENING: 22 % (ref 28–44)
GLUCOSE SERPL-MCNC: 138 MG/DL (ref 70–110)
GRAM STN SPEC: ABNORMAL
HCT VFR BLD AUTO: 24 % (ref 37–48.5)
HGB BLD-MCNC: 7.9 G/DL (ref 12–16)
IMM GRANULOCYTES # BLD AUTO: ABNORMAL K/UL (ref 0–0.04)
IMM GRANULOCYTES NFR BLD AUTO: ABNORMAL % (ref 0–0.5)
INTERVENTRICULAR SEPTUM: 1.22 CM (ref 0.6–1.1)
LA MAJOR: 7.2 CM
LA WIDTH: 4.11 CM
LEFT ATRIUM SIZE: 3.4 CM
LEFT INTERNAL DIMENSION IN SYSTOLE: 2.89 CM (ref 2.1–4)
LEFT VENTRICLE DIASTOLIC VOLUME INDEX: 33.37 ML/M2
LEFT VENTRICLE DIASTOLIC VOLUME: 58.07 ML
LEFT VENTRICLE MASS INDEX: 86 G/M2
LEFT VENTRICLE SYSTOLIC VOLUME INDEX: 18.3 ML/M2
LEFT VENTRICLE SYSTOLIC VOLUME: 31.84 ML
LEFT VENTRICULAR INTERNAL DIMENSION IN DIASTOLE: 3.7 CM (ref 3.5–6)
LEFT VENTRICULAR MASS: 150.1 G
LV LATERAL E/E' RATIO: 19.17 M/S
LV SEPTAL E/E' RATIO: 23 M/S
LYMPHOCYTES # BLD AUTO: ABNORMAL K/UL (ref 1–4.8)
LYMPHOCYTES NFR BLD: 10 % (ref 18–48)
MAGNESIUM SERPL-MCNC: 1.8 MG/DL (ref 1.6–2.6)
MCH RBC QN AUTO: 32 PG (ref 27–31)
MCHC RBC AUTO-ENTMCNC: 32.9 G/DL (ref 32–36)
MCV RBC AUTO: 97 FL (ref 82–98)
MONOCYTES # BLD AUTO: ABNORMAL K/UL (ref 0.3–1)
MONOCYTES NFR BLD: 4 % (ref 4–15)
MV MEAN GRADIENT: 1 MMHG
MV PEAK A VEL: 1.11 M/S
MV PEAK E VEL: 1.15 M/S
MV PEAK GRADIENT: 7 MMHG
MV STENOSIS PRESSURE HALF TIME: 72.12 MS
MV VALVE AREA P 1/2 METHOD: 3.05 CM2
NEUTROPHILS NFR BLD: 74 % (ref 38–73)
NEUTS BAND NFR BLD MANUAL: 12 %
NRBC BLD-RTO: 0 /100 WBC
PHOSPHATE SERPL-MCNC: 4.2 MG/DL (ref 2.7–4.5)
PISA TR MAX VEL: 2.7 M/S
PLATELET # BLD AUTO: 156 K/UL (ref 150–450)
PLATELET BLD QL SMEAR: ABNORMAL
PMV BLD AUTO: 11.4 FL (ref 9.2–12.9)
POCT GLUCOSE: 128 MG/DL (ref 70–110)
POCT GLUCOSE: 148 MG/DL (ref 70–110)
POCT GLUCOSE: 181 MG/DL (ref 70–110)
POLYCHROMASIA BLD QL SMEAR: ABNORMAL
POTASSIUM SERPL-SCNC: 3.5 MMOL/L (ref 3.5–5.1)
PROT SERPL-MCNC: 5.3 G/DL (ref 6–8.4)
PV PEAK VELOCITY: 0.81 CM/S
RA MAJOR: 5.12 CM
RA PRESSURE: 3 MMHG
RA WIDTH: 3.99 CM
RBC # BLD AUTO: 2.47 M/UL (ref 4–5.4)
RIGHT VENTRICULAR END-DIASTOLIC DIMENSION: 1.77 CM
RV TISSUE DOPPLER FREE WALL SYSTOLIC VELOCITY 1 (APICAL 4 CHAMBER VIEW): 9.69 CM/S
SODIUM SERPL-SCNC: 135 MMOL/L (ref 136–145)
TDI LATERAL: 0.06 M/S
TDI SEPTAL: 0.05 M/S
TDI: 0.06 M/S
TR MAX PG: 29 MMHG
TROPONIN I SERPL DL<=0.01 NG/ML-MCNC: 0.11 NG/ML (ref 0–0.03)
TV REST PULMONARY ARTERY PRESSURE: 32 MMHG
WBC # BLD AUTO: 14.27 K/UL (ref 3.9–12.7)

## 2022-01-23 PROCEDURE — 63600175 PHARM REV CODE 636 W HCPCS: Performed by: STUDENT IN AN ORGANIZED HEALTH CARE EDUCATION/TRAINING PROGRAM

## 2022-01-23 PROCEDURE — 99900035 HC TECH TIME PER 15 MIN (STAT)

## 2022-01-23 PROCEDURE — 25000003 PHARM REV CODE 250: Performed by: STUDENT IN AN ORGANIZED HEALTH CARE EDUCATION/TRAINING PROGRAM

## 2022-01-23 PROCEDURE — 27000221 HC OXYGEN, UP TO 24 HOURS

## 2022-01-23 PROCEDURE — 84484 ASSAY OF TROPONIN QUANT: CPT | Performed by: STUDENT IN AN ORGANIZED HEALTH CARE EDUCATION/TRAINING PROGRAM

## 2022-01-23 PROCEDURE — 94003 VENT MGMT INPAT SUBQ DAY: CPT

## 2022-01-23 PROCEDURE — 25000003 PHARM REV CODE 250: Performed by: INTERNAL MEDICINE

## 2022-01-23 PROCEDURE — 94761 N-INVAS EAR/PLS OXIMETRY MLT: CPT

## 2022-01-23 PROCEDURE — 27000207 HC ISOLATION

## 2022-01-23 PROCEDURE — 99900026 HC AIRWAY MAINTENANCE (STAT)

## 2022-01-23 PROCEDURE — 83735 ASSAY OF MAGNESIUM: CPT | Performed by: STUDENT IN AN ORGANIZED HEALTH CARE EDUCATION/TRAINING PROGRAM

## 2022-01-23 PROCEDURE — 85007 BL SMEAR W/DIFF WBC COUNT: CPT | Performed by: STUDENT IN AN ORGANIZED HEALTH CARE EDUCATION/TRAINING PROGRAM

## 2022-01-23 PROCEDURE — 20000000 HC ICU ROOM

## 2022-01-23 PROCEDURE — 84100 ASSAY OF PHOSPHORUS: CPT | Performed by: STUDENT IN AN ORGANIZED HEALTH CARE EDUCATION/TRAINING PROGRAM

## 2022-01-23 PROCEDURE — 99233 SBSQ HOSP IP/OBS HIGH 50: CPT | Mod: ,,, | Performed by: INTERNAL MEDICINE

## 2022-01-23 PROCEDURE — 80053 COMPREHEN METABOLIC PANEL: CPT | Performed by: STUDENT IN AN ORGANIZED HEALTH CARE EDUCATION/TRAINING PROGRAM

## 2022-01-23 PROCEDURE — 99233 PR SUBSEQUENT HOSPITAL CARE,LEVL III: ICD-10-PCS | Mod: ,,, | Performed by: INTERNAL MEDICINE

## 2022-01-23 PROCEDURE — S0030 INJECTION, METRONIDAZOLE: HCPCS | Performed by: STUDENT IN AN ORGANIZED HEALTH CARE EDUCATION/TRAINING PROGRAM

## 2022-01-23 PROCEDURE — 85027 COMPLETE CBC AUTOMATED: CPT | Performed by: STUDENT IN AN ORGANIZED HEALTH CARE EDUCATION/TRAINING PROGRAM

## 2022-01-23 RX ORDER — METOPROLOL TARTRATE 25 MG/1
12.5 TABLET ORAL 2 TIMES DAILY
Status: DISCONTINUED | OUTPATIENT
Start: 2022-01-23 | End: 2022-03-14 | Stop reason: HOSPADM

## 2022-01-23 RX ADMIN — MIDODRINE HYDROCHLORIDE 5 MG: 5 TABLET ORAL at 08:01

## 2022-01-23 RX ADMIN — Medication 125 MG: at 06:01

## 2022-01-23 RX ADMIN — DOXYCYCLINE 100 MG: 100 INJECTION, POWDER, LYOPHILIZED, FOR SOLUTION INTRAVENOUS at 06:01

## 2022-01-23 RX ADMIN — LEVOTHYROXINE SODIUM 25 MCG: 0.03 TABLET ORAL at 06:01

## 2022-01-23 RX ADMIN — POLYETHYLENE GLYCOL 3350 17 G: 17 POWDER, FOR SOLUTION ORAL at 08:01

## 2022-01-23 RX ADMIN — ATORVASTATIN CALCIUM 40 MG: 40 TABLET, FILM COATED ORAL at 08:01

## 2022-01-23 RX ADMIN — METRONIDAZOLE 500 MG: 500 INJECTION, SOLUTION INTRAVENOUS at 03:01

## 2022-01-23 RX ADMIN — Medication 125 MG: at 11:01

## 2022-01-23 RX ADMIN — FAMOTIDINE 20 MG: 10 INJECTION INTRAVENOUS at 08:01

## 2022-01-23 RX ADMIN — INSULIN ASPART 1 UNITS: 100 INJECTION, SOLUTION INTRAVENOUS; SUBCUTANEOUS at 08:01

## 2022-01-23 RX ADMIN — MUPIROCIN: 20 OINTMENT TOPICAL at 08:01

## 2022-01-23 RX ADMIN — CITALOPRAM HYDROBROMIDE 40 MG: 10 TABLET ORAL at 08:01

## 2022-01-23 RX ADMIN — MIDODRINE HYDROCHLORIDE 5 MG: 5 TABLET ORAL at 03:01

## 2022-01-23 RX ADMIN — PROPOFOL 20 MCG/KG/MIN: 10 INJECTION, EMULSION INTRAVENOUS at 04:01

## 2022-01-23 RX ADMIN — ENOXAPARIN SODIUM 40 MG: 100 INJECTION SUBCUTANEOUS at 05:01

## 2022-01-23 RX ADMIN — SENNOSIDES AND DOCUSATE SODIUM 1 TABLET: 50; 8.6 TABLET ORAL at 08:01

## 2022-01-23 RX ADMIN — METOCLOPRAMIDE HYDROCHLORIDE 10 MG: 5 SOLUTION ORAL at 06:01

## 2022-01-23 RX ADMIN — PROPOFOL 15 MCG/KG/MIN: 10 INJECTION, EMULSION INTRAVENOUS at 05:01

## 2022-01-23 RX ADMIN — VANCOMYCIN HYDROCHLORIDE 1500 MG: 1.5 INJECTION, POWDER, LYOPHILIZED, FOR SOLUTION INTRAVENOUS at 11:01

## 2022-01-23 RX ADMIN — Medication 125 MG: at 05:01

## 2022-01-23 RX ADMIN — METRONIDAZOLE 500 MG: 500 INJECTION, SOLUTION INTRAVENOUS at 08:01

## 2022-01-23 RX ADMIN — METOCLOPRAMIDE HYDROCHLORIDE 10 MG: 5 SOLUTION ORAL at 11:01

## 2022-01-23 NOTE — ASSESSMENT & PLAN NOTE
Moderate.  -Pulmonary consulted  -Treat aspiration pneumonia and sepsis  -Mechanical ventilation with sedation; PEEP and TV per Pulmonary (ARDSnet)  -Patient not proned

## 2022-01-23 NOTE — ASSESSMENT & PLAN NOTE
Secondary to aspiration pneumonia. GBS, Klebsiella and Staph aureus in sputum. Concern for ARDS.  -Pulmonary consulted; re-intubated 1/21  -May need another tracheostomy; Surgery consulted  -Continue vancomycin, doxycycline and Flagyl  -CXR and ABG PRN  -Continuous pulse oximetry  -Palliative Care consulted

## 2022-01-23 NOTE — SUBJECTIVE & OBJECTIVE
Interval History: failed leak test; Surgery consulted for tracheostomy; added back vancomycin for Staph in sputum; tube feeding diet started; pending goals of care.    Review of Systems   Unable to perform ROS: Intubated     Objective:     Vital Signs (Most Recent):  Temp: 97.1 °F (36.2 °C) (01/23/22 0745)  Pulse: (!) 57 (01/23/22 0830)  Resp: 18 (01/23/22 0830)  BP: 129/60 (01/23/22 0800)  SpO2: 100 % (01/23/22 0830) Vital Signs (24h Range):  Temp:  [97.1 °F (36.2 °C)-97.8 °F (36.6 °C)] 97.1 °F (36.2 °C)  Pulse:  [53-77] 57  Resp:  [14-31] 18  SpO2:  [99 %-100 %] 100 %  BP: (105-165)/(51-72) 129/60     Weight: 70.9 kg (156 lb 4.9 oz)  Body mass index is 26.83 kg/m².    Intake/Output Summary (Last 24 hours) at 1/23/2022 0955  Last data filed at 1/23/2022 0858  Gross per 24 hour   Intake 1502.33 ml   Output 1360 ml   Net 142.33 ml      Physical Exam  Vitals and nursing note reviewed.   Constitutional:       Appearance: She is ill-appearing.   HENT:      Head: Normocephalic and atraumatic.      Right Ear: External ear normal.      Left Ear: External ear normal.      Nose: Nose normal.      Mouth/Throat:      Comments: ETT/OG.  Neck:      Comments: RIJ CVC.  Cardiovascular:      Rate and Rhythm: Normal rate and regular rhythm.      Pulses: Normal pulses.      Heart sounds: Normal heart sounds.   Pulmonary:      Effort: No respiratory distress.      Breath sounds: Rhonchi present.      Comments: Mechanical ventilation.  Abdominal:      General: Bowel sounds are normal. There is no distension.      Palpations: Abdomen is soft.      Tenderness: There is no abdominal tenderness.      Comments: G tube.   Genitourinary:     Comments: [place].  Musculoskeletal:      Cervical back: Normal range of motion and neck supple.      Right lower leg: No edema.      Left lower leg: No edema.   Skin:     General: Skin is warm and dry.   Neurological:      Comments: Sedated.   Psychiatric:      Comments: Sedated.         Significant Labs:  All pertinent labs within the past 24 hours have been reviewed.    Significant Imaging: I have reviewed all pertinent imaging results/findings within the past 24 hours.

## 2022-01-23 NOTE — CONSULTS
Pharmacokinetic Initial Assessment: IV Vancomycin    Assessment/Plan:    Initiate intravenous vancomycin 1500 mg every 24 hours.  Desired empiric serum trough concentration is 15 to 20 mcg/mL.  Draw vancomycin trough level 60 min prior to third dose on 1/25 at approximately 1000.  Pharmacy will continue to follow and monitor vancomycin.      Please contact pharmacy at extension [phone] with any questions regarding this assessment.     Thank you for the consult,   Alie Alonzo       Patient brief summary:  Sandra Wilson is a 81 y.o. female initiated on antimicrobial therapy with IV Vancomycin for treatment of suspected lower respiratory infection.    Drug Allergies:   Review of patient's allergies indicates:   Allergen Reactions    Cephalexin (bulk)      Flushing and itching    Lidocaine base      rash    Lisinopril Other (See Comments)     cough       Actual Body Weight:   70.9 kg    Renal Function:   Estimated Creatinine Clearance: 60.9 mL/min (based on SCr of 0.7 mg/dL).,     Dialysis Method (if applicable):  N/A    CBC (last 72 hours):  Recent Labs   Lab Result Units 01/21/22 0411 01/22/22  0311 01/23/22  0335   WBC K/uL 15.24* 16.29* 14.27*   Hemoglobin g/dL 8.0* 8.1* 7.9*   Hematocrit % 23.7* 24.5* 24.0*   Platelets K/uL 130* 136* 156   Gran % % 81.0* 81.3* 74.0*   Lymph % % 5.0* 5.6* 10.0*   Mono % % 2.0* 8.5 4.0   Eosinophil % % 0.0 2.8 0.0   Basophil % % 0.0 0.3 0.0   Differential Method  Manual Automated Manual       Metabolic Panel (last 72 hours):  Recent Labs   Lab Result Units 01/21/22  0411 01/22/22  0311 01/22/22  1228 01/23/22  0335   Sodium mmol/L 139 136  --  135*   Potassium mmol/L 3.2* 3.1* 3.7 3.5   Chloride mmol/L 106 104  --  101   CO2 mmol/L 23 21*  --  22*   Glucose mg/dL 82 89  --  138*   BUN mg/dL 26* 23  --  27*   Creatinine mg/dL 0.7 0.7  --  0.7   Albumin g/dL 2.0* 2.0*  --  2.1*   Total Bilirubin mg/dL 0.5 0.6  --  0.4   Alkaline Phosphatase U/L 48* 101  --  71   AST U/L 25  21  --  15   ALT U/L 19 19  --  18   Magnesium mg/dL 2.1 1.8  --  1.8   Phosphorus mg/dL 1.7* 2.5*  --  4.2       Drug levels (last 3 results):  Recent Labs   Lab Result Units 01/20/22  1444   Vancomycin-Trough ug/mL 10.9       Microbiologic Results:  Microbiology Results (last 7 days)       Procedure Component Value Units Date/Time    Blood culture [[id number]] Collected: 01/18/22 1726    Order Status: Completed Specimen: Blood from Antecubital, Left Arm Updated: 01/22/22 2322     Blood Culture, Routine No Growth to date      No Growth to date      No Growth to date      No Growth to date      No Growth to date    Blood culture [[id number]] Collected: 01/18/22 1802    Order Status: Completed Specimen: Blood from Antecubital, Right Arm Updated: 01/22/22 2322     Blood Culture, Routine No Growth to date      No Growth to date      No Growth to date      No Growth to date      No Growth to date    Culture, Respiratory with Gram Stain [[id number]]  (Abnormal)  (Susceptibility) Collected: 01/19/22 0114    Order Status: Completed Specimen: Sputum, Expectorated Updated: 01/22/22 1020     Respiratory Culture No Pseudomonas isolated.      STREPTOCOCCUS AGALACTIAE (GROUP B)  Few  Beta-hemolytic streptococci are routinely susceptible to   penicillins,cephalosporins and carbapenems.  Susceptibility testing not routinely performed        KLEBSIELLA PNEUMONIAE  Rare        STAPHYLOCOCCUS AUREUS  Few  Susceptibility pending       Gram Stain (Respiratory) <10 epithelial cells per low power field.     Gram Stain (Respiratory) Rare WBC's     Gram Stain (Respiratory) Rare Gram positive cocci     Gram Stain (Respiratory) Rare yeast    C Diff Toxin by PCR [[id number]]  (Abnormal) Collected: 01/18/22 0846    Order Status: Completed Updated: 01/18/22 2355     C. diff PCR Positive    Blood culture [[id number]]     Order Status: Canceled Specimen: Blood     Blood culture [[id number]]     Order Status: Canceled Specimen: Blood     Clostridium  difficile EIA [[id number]]  (Abnormal) Collected: 01/18/22 0846    Order Status: Completed Specimen: Stool Updated: 01/18/22 1109     C. diff Antigen Positive     C difficile Toxins A+B, EIA Negative     Comment: Testing not recommended for children <24 months old.

## 2022-01-23 NOTE — ASSESSMENT & PLAN NOTE
GBS, Klebsiella and Staph aureus in sputum. Concern for aspiration pneumonitis in setting of C diff colitis.  -Continue vancomycin, doxycycline and Flagyl  -Continue PO vancomycin  -Continue to follow up on respiratory culture

## 2022-01-23 NOTE — PROGRESS NOTES
"  01/23/2022      Admit Date: 1/18/2022  Sandra Wilson  New Patient Consult    Chief Complaint   Patient presents with    Shortness of Breath    Vomiting     PEG tube        History of Present Illness:  Intubated.          From Dr Gaspar's hpi, ERP:Sandra Wilson is a 81 y.o. female who presents to the ED via EMS  with an onset of vomiting and SOB. EMS reported that upon examination patient was less responsive than usually but is able to answer questions. The report from the EMS upon arrival was an O2 sats in the 70's. EMS proceeded to give supplemental oxygen and the O2 sats raised up to 93% on 3 liters of O2 with  "junky" right upper lung sounds" and greenish diarrhea. She was vomiting en route. They also reported that the patient is a patient that has dislodged her PEG tubing many times. PMHx of HTN, CAD, OA, GERD, ctroke, Colon cancer, ovarian cancer, breast cancer.PSHx of Cardiac catheterization, colonoscopy.         Progress Note  PULMONARY    Admit Date: 1/18/2022 01/23/2022      SUBJECTIVE:     1/19 intubated, sedated,  1/20 no c/o intubated, sedated  1/21 no new c/o intubated  1/22 no c/o intubated   1/23 no  New c/o intubated        PFSH and Allergies reviewed.    OBJECTIVE:     Vitals (Most recent):  Vitals:    01/23/22 0600   BP: (!) 118/56   Pulse: (!) 53   Resp: 17   Temp:        Vitals (24 hour range):  Temp:  [97.2 °F (36.2 °C)-98 °F (36.7 °C)]   Pulse:  [53-91]   Resp:  [14-32]   BP: (105-165)/(51-72)   SpO2:  [96 %-100 %]       Intake/Output Summary (Last 24 hours) at 1/23/2022 0646  Last data filed at 1/23/2022 0631  Gross per 24 hour   Intake 1442.33 ml   Output 1390 ml   Net 52.33 ml          Physical Exam:  The patient's neuro status (alertness,orientation,cognitive function,motor skills,), pharyngeal exam (oral lesions, hygiene, abn dentition,), Neck (jvd,mass,thyroid,nodes in neck and above/below " clavicle),RESPIRATORY(symmetry,effort,fremitus,percussion,auscultation),  Cor(rhythm,heart tones including gallops,perfusion,edema)ABD(distention,hepatic&splenomegaly,tenderness,masses), Skin(rash,cyanosis),Psyc(affect,judgement,).  Exam negative except for these pertinent findings:    Et tube, sedate, chest is symmetric, no distress, normal percussion, normal fremitus and good anterior breath sounds      Radiographs reviewed: view by direct vision    Results for orders placed during the hospital encounter of 01/18/22    X-Ray Chest 1 View    Narrative  EXAMINATION:  XR CHEST 1 VIEW    CLINICAL HISTORY:  respiratory failure;    TECHNIQUE:  Single frontal view of the chest was performed.    COMPARISON:  Chest portable of January 18, 2022    FINDINGS:  An endotracheal tube ends in the trachea above the level the malorie.  An NG tube traverses the esophagus to the stomach.  A central line enters at the right neck and ends in the superior vena cava.  The cardiac size and contours within normal limits.  A loop recorder is noted in the left chest.  There is continued central right lung infiltrate and small infiltrate at the left lung base.  No pneumothorax is seen.    Impression  Continued intrapulmonary infiltrates right greater than left.  Endotracheal tube, NG tube and right central lines in position.      Electronically signed by: Mathew Yanez MD  Date:    01/18/2022  Time:    15:50  ]    Labs     Recent Labs   Lab 01/23/22  0335   WBC 14.27*   HGB 7.9*   HCT 24.0*      BAND 12.0     Recent Labs   Lab 01/23/22  0335   *   K 3.5      CO2 22*   BUN 27*   CREATININE 0.7   *   CALCIUM 8.6*   MG 1.8   PHOS 4.2   AST 15   ALT 18   ALKPHOS 71   BILITOT 0.4   PROT 5.3*   ALBUMIN 2.1*   TROPONINI 0.114*     No results for input(s): PH, PCO2, PO2, HCO3 in the last 24 hours.  Microbiology Results (last 7 days)     Procedure Component Value Units Date/Time    Blood culture [[id number]] Collected:  01/18/22 1726    Order Status: Completed Specimen: Blood from Antecubital, Left Arm Updated: 01/22/22 2322     Blood Culture, Routine No Growth to date      No Growth to date      No Growth to date      No Growth to date      No Growth to date    Blood culture [[id number]] Collected: 01/18/22 1802    Order Status: Completed Specimen: Blood from Antecubital, Right Arm Updated: 01/22/22 2322     Blood Culture, Routine No Growth to date      No Growth to date      No Growth to date      No Growth to date      No Growth to date    Culture, Respiratory with Gram Stain [[id number]]  (Abnormal)  (Susceptibility) Collected: 01/19/22 0114    Order Status: Completed Specimen: Sputum, Expectorated Updated: 01/22/22 1020     Respiratory Culture No Pseudomonas isolated.      STREPTOCOCCUS AGALACTIAE (GROUP B)  Few  Beta-hemolytic streptococci are routinely susceptible to   penicillins,cephalosporins and carbapenems.  Susceptibility testing not routinely performed        KLEBSIELLA PNEUMONIAE  Rare        STAPHYLOCOCCUS AUREUS  Few  Susceptibility pending       Gram Stain (Respiratory) <10 epithelial cells per low power field.     Gram Stain (Respiratory) Rare WBC's     Gram Stain (Respiratory) Rare Gram positive cocci     Gram Stain (Respiratory) Rare yeast    C Diff Toxin by PCR [[id number]]  (Abnormal) Collected: 01/18/22 0846    Order Status: Completed Updated: 01/18/22 2355     C. diff PCR Positive    Blood culture [[id number]]     Order Status: Canceled Specimen: Blood     Blood culture [[id number]]     Order Status: Canceled Specimen: Blood     Clostridium difficile EIA [[id number]]  (Abnormal) Collected: 01/18/22 0846    Order Status: Completed Specimen: Stool Updated: 01/18/22 1109     C. diff Antigen Positive     C difficile Toxins A+B, EIA Negative     Comment: Testing not recommended for children <24 months old.             Impression:  Active Hospital Problems    Diagnosis  POA    *Acute hypoxemic respiratory failure  [J96.01]  Yes    Ileus [K56.7]  No    Atrial fibrillation [I48.91]  No    Gastrostomy tube dependent [Z93.1]  Not Applicable    Demand ischemia [I24.8]  Yes    Sepsis [A41.9]  Yes    ARDS (adult respiratory distress syndrome) [J80]  Yes    YARI (generalized anxiety disorder) [F41.1]  Yes    Type 2 diabetes mellitus, with long-term current use of insulin [E11.9, Z79.4]  Not Applicable    Pneumonia [J18.9]  Yes    Aspiration into airway [T17.908A]  Yes    Hypothyroidism [E03.9]  Yes    Hyperlipidemia [E78.5]  Yes     Chronic    GERD (gastroesophageal reflux disease) [K21.9]  Yes     Chronic      Resolved Hospital Problems    Diagnosis Date Resolved POA    STEFFANY (acute kidney injury) [N17.9] 01/21/2022 Yes    Hyponatremia [E87.1] 01/20/2022 Yes                        Plan: no fever, vss, was hypotensive earlier.  Cefepime/vanc,flagyl,  chr midodrine, levophed 0.9 ,  ddimer 5.2, creat 0.9  c diff ag + but toxin neg.  abg 7.27 with ox 48,   Dnr,  cxr impressive R>>>L aspiration pattern.     Prior subdural --       Recruitment manuver done with peep 30 for 6 seconds with art line bp falling from sbp 100 to 60's.  Sat improved from 92 to 94.  Pt not really peep responsive.    Pt should improve ox with left side down or prone.  Peep may help but infiltrates more R>L -- should be posterior lung bases.  Might consider cta if not progressing.      discussed with resp and nursing. Optimally would be left side down or prone.        1/19 no fever, vss with rr to 43, flagyl/zosyn/vanc/cefepime, n70-80% ox,  Wbc 14.8 from 5k,  Creat 1.5 form 0.9,   abg 02 61 with ph 7.33- peep 5,   I/o 687/133-  Will screen for dvt/pe with leg study  Will dose 500 cc saline,   F/u cxr more opacification right>> left lung.      1/20 no fever, vss, bp low at times, I.o 2770/1870, cefepime/flagyl/vanc, ox 60%, wbc 14.6, plts 144- new, creat 1.0,. cxr with some clearing,   F/u abg, wean soon likely but need better ox to extubate.    No dvt,  ddimer up at admit,     Pt min vol 8 and seemed to do well with 50% ox while I rounded.  Discussed with nursing and respiratory-- if can get to 40% and pass wean trial would recommend extubation, would be optimal to discuss with family prior plans not to re intubate.  She is progressing-- would consider not discussing comfort care if improving.      1/21 no fever, vss, tm 100, wbc 15.2, hgb 8, from 13.6 bon 18th- admit, plt 130 from 244,   Creat 0.7,   cxr progressive clearing right , some increase opacification left suggested.   Strep in sputum,  Taper abx to doxy and flagy with c diff and strep in mucous-- monitor, check    procal am.    Will change code status to allow re intubation, and  extubate this am.      On enteral vanc for c diff concerns.   Discussed with January short.  Reviewed revised code status.      Addendum-- having stridor post extubation.  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.  Pt was at [place] when patient removed trach 2 months ago.  Pt has no h/o stridor per daughter.  Stridor did not change with jaw thrush nor nt trumpet.  Discussed would recommend intubation and replace trach as best option. Discussed with Dr Charles.  1/22 no fever,vss, wbc 16.3, hgb 8.1 from 13.6 admit?  , procal is down to 3 from 17 admit,     Pt failed extubation with upper airway stridor and poor loc.      Do leak test:on vent with cuff down and tidal vol delivered 450 would need over 110 cc to leak to pass, need exhaled tidal vol to be less than 340 cc to  pass leak test -- do now and repeat q 4 x 3 total.  Will dose steroids for laryngospasm.  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  Will discuss with family later- trial extubation if passes, trach, comfort care???      Discussed with daughter, asked if code status should be altered?  After discussion - will consider extubation trial if good leak test (would try to notify daughter if passes) --- otherwise trach.      1/23  failed leak test, suspect trach stenosis - trach Wednesday?  No fever, vss, doxy/flagyl, vanc enteral, wbc 14.3,   F/u cxr am  Called Ira, daughter.  Discussed need for Palliative care eval.

## 2022-01-23 NOTE — PROGRESS NOTES
[facility] Hospital Medicine  Progress Note    Patient Name: Sandra Wilson  MRN: [id number]  Patient Class: IP- Inpatient   Admission Date: 1/18/2022  Length of Stay: 5 days  Attending Physician: Fabian Charles MD  Primary Care Provider: Primary Doctor No        Subjective:     Principal Problem:Acute hypoxemic respiratory failure        HPI:  Sandra Wilson is an 81 year old female with a past medical history of CVAs, CNS aneurysm, PEG status, CAD, HLD, hypothryoidism, DM, aortic stenosis, and colon, breast, and ovarian cancer who presented from long term care with vomiting, diarrhea, and shortness of breath. Workup in the ED showed likely aspiration pneumonitis and C. Diff colitis. Her O2 requirement increased while in the ED requiring intubation with sedation. She also required Levophed as she likely developed septic shock. Antibiotics were broadened to cefepime, vancomycin, and Flagyl. Pulmonary was consulted. Family was notified. The patient was unable to provide history given acuity of illness.      Overview/Hospital Course:  Sandra Wilson is an 81 year old female with a past medical history of CVAs, CNS aneurysm, PEG status, CAD, HLD, hypothyroidism, DM, aortic stenosis, and colon, breast, and ovarian cancer who presented from long term care with vomiting, diarrhea, and shortness of breath secondary to acute hypoxic respiratory failure from aspiration pneumonia in the setting of C diff colitis leading to septic shock. She was intubated in the ED and started on Levophed infusion via RIJ CVC placed by Dr. Vignesh Gaspar. She was started on broad spectrum antibiotics with cefepime, Flagyl, vancomycin and admitted to the ICU. She was also started on enteral vancomycin for severe C diff colitis given elevated WBC count and STEFFANY. There was concern for developing ARDS given P/F ratio of 87 (severe); 190 1/22 (moderate). Pulmonary was consulted. She also presented with demand ischemia likely  secondary to septic shock. Troponin was trended and improved as shock resolved. Normal saline infusion was added for hyponatremia and STEFFANY which improved both. Levophed was able to be weaned. Family agreed to DNR status 1/18 and Palliative Care was consulted to discuss goal of care 1/19. Antibiotics were changed to doxycycline and Flagyl (GBS in sputum culture) 1/21; vancomycin was added 1/23 after culture also became positive for Staph aureus and Klebsiella. Her respiratory status has improved throughout her course and she was extubated (code changed to partial code for intubation) 1/21 only to be re-intubated for worsening stridor (history of tracheostomy). Upon re-intubation, copious amounts of secretions were suctioned. KUB suggested a mild ileus A bowel regimen was instituted and the patient was able to have bowel movements 1/22. Tube feeds were started 1/23. Her course was complicated by Afib as well noted on telemetry 1/21; metoprolol tartrate started 1/22.She failed a leak test 1/22; Surgery was consulted for tracheostomy which is pending conversation with family regarding goals of care.      Interval History: failed leak test; Surgery consulted for tracheostomy; added back vancomycin for Staph in sputum; tube feeding diet started; pending goals of care.    Review of Systems   Unable to perform ROS: Intubated     Objective:     Vital Signs (Most Recent):  Temp: 97.1 °F (36.2 °C) (01/23/22 0745)  Pulse: (!) 57 (01/23/22 0830)  Resp: 18 (01/23/22 0830)  BP: 129/60 (01/23/22 0800)  SpO2: 100 % (01/23/22 0830) Vital Signs (24h Range):  Temp:  [97.1 °F (36.2 °C)-97.8 °F (36.6 °C)] 97.1 °F (36.2 °C)  Pulse:  [53-77] 57  Resp:  [14-31] 18  SpO2:  [99 %-100 %] 100 %  BP: (105-165)/(51-72) 129/60     Weight: 70.9 kg (156 lb 4.9 oz)  Body mass index is 26.83 kg/m².    Intake/Output Summary (Last 24 hours) at 1/23/2022 0955  Last data filed at 1/23/2022 0858  Gross per 24 hour   Intake 1502.33 ml   Output 1360 ml   Net  142.33 ml      Physical Exam  Vitals and nursing note reviewed.   Constitutional:       Appearance: She is ill-appearing.   HENT:      Head: Normocephalic and atraumatic.      Right Ear: External ear normal.      Left Ear: External ear normal.      Nose: Nose normal.      Mouth/Throat:      Comments: ETT/OG.  Neck:      Comments: RI CVC.  Cardiovascular:      Rate and Rhythm: Normal rate and regular rhythm.      Pulses: Normal pulses.      Heart sounds: Normal heart sounds.   Pulmonary:      Effort: No respiratory distress.      Breath sounds: Rhonchi present.      Comments: Mechanical ventilation.  Abdominal:      General: Bowel sounds are normal. There is no distension.      Palpations: Abdomen is soft.      Tenderness: There is no abdominal tenderness.      Comments: G tube.   Genitourinary:     Comments: Jackson.  Musculoskeletal:      Cervical back: Normal range of motion and neck supple.      Right lower leg: No edema.      Left lower leg: No edema.   Skin:     General: Skin is warm and dry.   Neurological:      Comments: Sedated.   Psychiatric:      Comments: Sedated.         Significant Labs: All pertinent labs within the past 24 hours have been reviewed.    Significant Imaging: I have reviewed all pertinent imaging results/findings within the past 24 hours.      Assessment/Plan:      * Acute hypoxemic respiratory failure  Secondary to aspiration pneumonia. GBS, Klebsiella and Staph aureus in sputum. Concern for ARDS.  -Pulmonary consulted; re-intubated 1/21  -May need another tracheostomy; Surgery consulted  -Continue vancomycin, doxycycline and Flagyl  -CXR and ABG PRN  -Continuous pulse oximetry  -Palliative Care consulted    Atrial fibrillation  -Telemetry  -Metoprolol 5 IV PRN for HR > 130  -Defer full dose anticoagulation at this time given thrombocytopenia and critical illness  -Metoprolol 25 BID      ARDS (adult respiratory distress syndrome)  Moderate.  -Pulmonary consulted  -Treat aspiration  "pneumonia and sepsis  -Mechanical ventilation with sedation; PEEP and TV per Pulmonary (ARDSnet)  -Patient not proned    Sepsis  GBS, Klebsiella and Staph aureus in sputum. Concern for aspiration pneumonitis in setting of C diff colitis.  -Continue vancomycin, doxycycline and Flagyl  -Continue PO vancomycin  -Continue to follow up on respiratory culture      Demand ischemia  In setting of septic shock. Improved.  -Telemetry      Gastrostomy tube dependent  Likely cause of aspiration.  -Care per nursing  -Tube feedings through OG      Aspiration into airway  -Continue vancomycin, doxycyline and Flagyl  -Pulmonary consulted  -Ventilation with sedation  -Surgery consulted for tracheostomy      Pneumonia  -See "Aspiration into airway" and "Sepsis"      Type 2 diabetes mellitus, with long-term current use of insulin  -SSI  -Q6H glucose checks  -Hypoglycemic precautions  -Tube feeding diet      YARI (generalized anxiety disorder)  -Sedation      Hypothyroidism  -Continue Synthroid      Hyperlipidemia  -Statin    GERD (gastroesophageal reflux disease)  -Hold PPI given C diff colitis  -IV famotidine        VTE Risk Mitigation (From admission, onward)         Ordered     enoxaparin injection 40 mg  Daily         01/18/22 1438     IP VTE HIGH RISK PATIENT  Once         01/18/22 1438     Place sequential compression device  Until discontinued         01/18/22 1438                Discharge Planning   DEMETRIUS: 1/31/2021    Code Status: Partial Code   Is the patient medically ready for discharge?:     Reason for patient still in hospital (select all that apply): Patient trending condition, Laboratory test, Treatment, Consult recommendations and Pending disposition  Discharge Plan A: Return to nursing home            Critical care time spent on the evaluation and treatment of severe organ dysfunction, review of pertinent labs and imaging studies, discussions with consulting providers and discussions with patient/family: 32 " minutes.      Fabian Charles MD  Department of Hospital Medicine   [facility]

## 2022-01-23 NOTE — ASSESSMENT & PLAN NOTE
-Continue vancomycin, doxycyline and Flagyl  -Pulmonary consulted  -Ventilation with sedation  -Surgery consulted for tracheostomy

## 2022-01-24 PROBLEM — A41.9 SEPSIS: Status: RESOLVED | Noted: 2022-01-19 | Resolved: 2022-01-24

## 2022-01-24 PROBLEM — I24.89 DEMAND ISCHEMIA: Status: RESOLVED | Noted: 2022-01-19 | Resolved: 2022-01-24

## 2022-01-24 LAB
ALBUMIN SERPL BCP-MCNC: 2.1 G/DL (ref 3.5–5.2)
ALP SERPL-CCNC: 69 U/L (ref 55–135)
ALT SERPL W/O P-5'-P-CCNC: 19 U/L (ref 10–44)
ANION GAP SERPL CALC-SCNC: 11 MMOL/L (ref 8–16)
AST SERPL-CCNC: 19 U/L (ref 10–40)
BASOPHILS # BLD AUTO: 0.05 K/UL (ref 0–0.2)
BASOPHILS NFR BLD: 0.3 % (ref 0–1.9)
BILIRUB SERPL-MCNC: 0.5 MG/DL (ref 0.1–1)
BUN SERPL-MCNC: 26 MG/DL (ref 8–23)
CALCIUM SERPL-MCNC: 8.9 MG/DL (ref 8.7–10.5)
CHLORIDE SERPL-SCNC: 104 MMOL/L (ref 95–110)
CO2 SERPL-SCNC: 23 MMOL/L (ref 23–29)
CREAT SERPL-MCNC: 0.7 MG/DL (ref 0.5–1.4)
DIFFERENTIAL METHOD: ABNORMAL
EOSINOPHIL # BLD AUTO: 0.2 K/UL (ref 0–0.5)
EOSINOPHIL NFR BLD: 1.5 % (ref 0–8)
ERYTHROCYTE [DISTWIDTH] IN BLOOD BY AUTOMATED COUNT: 14.3 % (ref 11.5–14.5)
EST. GFR  (AFRICAN AMERICAN): >60 ML/MIN/1.73 M^2
EST. GFR  (NON AFRICAN AMERICAN): >60 ML/MIN/1.73 M^2
GLUCOSE SERPL-MCNC: 80 MG/DL (ref 70–110)
HCT VFR BLD AUTO: 26.1 % (ref 37–48.5)
HGB BLD-MCNC: 8.5 G/DL (ref 12–16)
IMM GRANULOCYTES # BLD AUTO: 0.59 K/UL (ref 0–0.04)
IMM GRANULOCYTES NFR BLD AUTO: 3.8 % (ref 0–0.5)
LYMPHOCYTES # BLD AUTO: 1.3 K/UL (ref 1–4.8)
LYMPHOCYTES NFR BLD: 8.2 % (ref 18–48)
MAGNESIUM SERPL-MCNC: 1.8 MG/DL (ref 1.6–2.6)
MCH RBC QN AUTO: 32 PG (ref 27–31)
MCHC RBC AUTO-ENTMCNC: 32.6 G/DL (ref 32–36)
MCV RBC AUTO: 98 FL (ref 82–98)
MONOCYTES # BLD AUTO: 1.6 K/UL (ref 0.3–1)
MONOCYTES NFR BLD: 10 % (ref 4–15)
NEUTROPHILS # BLD AUTO: 11.8 K/UL (ref 1.8–7.7)
NEUTROPHILS NFR BLD: 76.2 % (ref 38–73)
NRBC BLD-RTO: 0 /100 WBC
PHOSPHATE SERPL-MCNC: 3.3 MG/DL (ref 2.7–4.5)
PLATELET # BLD AUTO: 195 K/UL (ref 150–450)
PMV BLD AUTO: 10.9 FL (ref 9.2–12.9)
POCT GLUCOSE: 102 MG/DL (ref 70–110)
POCT GLUCOSE: 167 MG/DL (ref 70–110)
POTASSIUM SERPL-SCNC: 3.2 MMOL/L (ref 3.5–5.1)
POTASSIUM SERPL-SCNC: 4 MMOL/L (ref 3.5–5.1)
PROT SERPL-MCNC: 5.4 G/DL (ref 6–8.4)
RBC # BLD AUTO: 2.66 M/UL (ref 4–5.4)
SODIUM SERPL-SCNC: 138 MMOL/L (ref 136–145)
WBC # BLD AUTO: 15.46 K/UL (ref 3.9–12.7)

## 2022-01-24 PROCEDURE — 25000003 PHARM REV CODE 250: Performed by: STUDENT IN AN ORGANIZED HEALTH CARE EDUCATION/TRAINING PROGRAM

## 2022-01-24 PROCEDURE — 83735 ASSAY OF MAGNESIUM: CPT | Performed by: STUDENT IN AN ORGANIZED HEALTH CARE EDUCATION/TRAINING PROGRAM

## 2022-01-24 PROCEDURE — 27000221 HC OXYGEN, UP TO 24 HOURS

## 2022-01-24 PROCEDURE — 94003 VENT MGMT INPAT SUBQ DAY: CPT

## 2022-01-24 PROCEDURE — 27000207 HC ISOLATION

## 2022-01-24 PROCEDURE — S0030 INJECTION, METRONIDAZOLE: HCPCS | Performed by: STUDENT IN AN ORGANIZED HEALTH CARE EDUCATION/TRAINING PROGRAM

## 2022-01-24 PROCEDURE — 99900035 HC TECH TIME PER 15 MIN (STAT)

## 2022-01-24 PROCEDURE — 99233 SBSQ HOSP IP/OBS HIGH 50: CPT | Mod: ,,, | Performed by: INTERNAL MEDICINE

## 2022-01-24 PROCEDURE — 27200966 HC CLOSED SUCTION SYSTEM

## 2022-01-24 PROCEDURE — 25000003 PHARM REV CODE 250: Performed by: INTERNAL MEDICINE

## 2022-01-24 PROCEDURE — 84132 ASSAY OF SERUM POTASSIUM: CPT | Performed by: STUDENT IN AN ORGANIZED HEALTH CARE EDUCATION/TRAINING PROGRAM

## 2022-01-24 PROCEDURE — 99900026 HC AIRWAY MAINTENANCE (STAT)

## 2022-01-24 PROCEDURE — 99233 PR SUBSEQUENT HOSPITAL CARE,LEVL III: ICD-10-PCS | Mod: ,,, | Performed by: INTERNAL MEDICINE

## 2022-01-24 PROCEDURE — 20000000 HC ICU ROOM

## 2022-01-24 PROCEDURE — 63600175 PHARM REV CODE 636 W HCPCS: Performed by: STUDENT IN AN ORGANIZED HEALTH CARE EDUCATION/TRAINING PROGRAM

## 2022-01-24 PROCEDURE — 80053 COMPREHEN METABOLIC PANEL: CPT | Performed by: STUDENT IN AN ORGANIZED HEALTH CARE EDUCATION/TRAINING PROGRAM

## 2022-01-24 PROCEDURE — 84100 ASSAY OF PHOSPHORUS: CPT | Performed by: STUDENT IN AN ORGANIZED HEALTH CARE EDUCATION/TRAINING PROGRAM

## 2022-01-24 PROCEDURE — 94761 N-INVAS EAR/PLS OXIMETRY MLT: CPT

## 2022-01-24 PROCEDURE — 85025 COMPLETE CBC W/AUTO DIFF WBC: CPT | Performed by: STUDENT IN AN ORGANIZED HEALTH CARE EDUCATION/TRAINING PROGRAM

## 2022-01-24 RX ADMIN — METRONIDAZOLE 500 MG: 500 INJECTION, SOLUTION INTRAVENOUS at 12:01

## 2022-01-24 RX ADMIN — METOCLOPRAMIDE HYDROCHLORIDE 10 MG: 5 SOLUTION ORAL at 06:01

## 2022-01-24 RX ADMIN — MIDODRINE HYDROCHLORIDE 5 MG: 5 TABLET ORAL at 03:01

## 2022-01-24 RX ADMIN — Medication 125 MG: at 12:01

## 2022-01-24 RX ADMIN — METRONIDAZOLE 500 MG: 500 INJECTION, SOLUTION INTRAVENOUS at 04:01

## 2022-01-24 RX ADMIN — METOCLOPRAMIDE HYDROCHLORIDE 10 MG: 5 SOLUTION ORAL at 12:01

## 2022-01-24 RX ADMIN — ENOXAPARIN SODIUM 40 MG: 100 INJECTION SUBCUTANEOUS at 04:01

## 2022-01-24 RX ADMIN — Medication 125 MG: at 09:01

## 2022-01-24 RX ADMIN — SENNOSIDES AND DOCUSATE SODIUM 1 TABLET: 50; 8.6 TABLET ORAL at 08:01

## 2022-01-24 RX ADMIN — PROPOFOL 25 MCG/KG/MIN: 10 INJECTION, EMULSION INTRAVENOUS at 02:01

## 2022-01-24 RX ADMIN — FAMOTIDINE 20 MG: 10 INJECTION INTRAVENOUS at 08:01

## 2022-01-24 RX ADMIN — ATORVASTATIN CALCIUM 40 MG: 40 TABLET, FILM COATED ORAL at 08:01

## 2022-01-24 RX ADMIN — METRONIDAZOLE 500 MG: 500 INJECTION, SOLUTION INTRAVENOUS at 11:01

## 2022-01-24 RX ADMIN — POTASSIUM BICARBONATE 35 MEQ: 391 TABLET, EFFERVESCENT ORAL at 06:01

## 2022-01-24 RX ADMIN — POTASSIUM BICARBONATE 35 MEQ: 391 TABLET, EFFERVESCENT ORAL at 08:01

## 2022-01-24 RX ADMIN — DOXYCYCLINE 100 MG: 100 INJECTION, POWDER, LYOPHILIZED, FOR SOLUTION INTRAVENOUS at 06:01

## 2022-01-24 RX ADMIN — LEVOTHYROXINE SODIUM 25 MCG: 0.03 TABLET ORAL at 06:01

## 2022-01-24 RX ADMIN — METOPROLOL TARTRATE 12.5 MG: 25 TABLET, FILM COATED ORAL at 08:01

## 2022-01-24 RX ADMIN — METRONIDAZOLE 500 MG: 500 INJECTION, SOLUTION INTRAVENOUS at 08:01

## 2022-01-24 RX ADMIN — VANCOMYCIN HYDROCHLORIDE 1500 MG: 1.5 INJECTION, POWDER, LYOPHILIZED, FOR SOLUTION INTRAVENOUS at 12:01

## 2022-01-24 RX ADMIN — PROPOFOL 20 MCG/KG/MIN: 10 INJECTION, EMULSION INTRAVENOUS at 06:01

## 2022-01-24 RX ADMIN — POLYETHYLENE GLYCOL 3350 17 G: 17 POWDER, FOR SOLUTION ORAL at 08:01

## 2022-01-24 RX ADMIN — CITALOPRAM HYDROBROMIDE 40 MG: 10 TABLET ORAL at 08:01

## 2022-01-24 RX ADMIN — MIDODRINE HYDROCHLORIDE 5 MG: 5 TABLET ORAL at 08:01

## 2022-01-24 RX ADMIN — DOXYCYCLINE 100 MG: 100 INJECTION, POWDER, LYOPHILIZED, FOR SOLUTION INTRAVENOUS at 08:01

## 2022-01-24 RX ADMIN — Medication 125 MG: at 06:01

## 2022-01-24 RX ADMIN — PROPOFOL 30 MCG/KG/MIN: 10 INJECTION, EMULSION INTRAVENOUS at 10:01

## 2022-01-24 NOTE — PROGRESS NOTES
Patient seen and examined.  No significant changes.  FiO2 increased slightly.  Otherwise hemodynamically stable.    Plan for tracheostomy placement on Wednesday.  Hold tube feeds Tuesday night

## 2022-01-24 NOTE — PROGRESS NOTES
[facility]  Adult Nutrition  Progress Note    SUMMARY   Intervention: enteral nutrition therapy    Recommendations  1) As medically able continue Isosource 1.5 @ 10 mL/hr and advance to goal rate of 45 mL/hr  -  mL Q 4 hr,   - Monitor and replete electrolytes  - Will provide 1620 kcal, 73 g protein, 820 mL FW, 690 mL FWF.   - Propofol providing additional 211 kcal.   - Will meet >100% EEN, 87% EPN     2) If glucose consistently elevated change to glucerna 1.5.    3) If unable to tolerate TF advancement to goal in 48 hr consider PPN D 5 AA 4.25 @ 75 ml/hr + standard lipids ( 1112 kcal, 76 g protein)     4) weigh weekly     Goals: 1) Initation of enteral nutrition by RD follow up 2) Nutrition support meeting > 50% of needs at f/u  Nutrition Goal Status: met/ new  Communication of RD Recs: POC, sticky note, second sign      Assessment and Plan  Nutrition Problem  Inadeqate energy intake     Related to (etiology):   NPO status, no TF running, dysphagia     Signs and Symptoms (as evidenced by):   Pt receiving < 50% EEN/EPN x 6 days     Interventions(treatment strategy):  Collaboration with other providers  Enteral nutrition     Nutrition Diagnosis Status:   continues     Malnutrition  Edema: 1+  Gabriel: 12  NFPE 1/24/21 no significant wasting seen  PO intakes < 50% of needs x 5-6 days  No significant wt loss per chart review    Reason for Assessment  Reason For Assessment: follow up  Diagnosis: other (see comments) (Acute hypoxemic respiratory failure)  Relevant Medical History: GERD, HLD, HTN, CVAs, CNS aneurysm, PEG, CAD, hypothyroidism, DM, colon, breast and ovarian cancer.  Interdisciplinary Rounds: did not attend    General Information Comments: Remote assessment for coverage. Pt intubated and sedated, requiring levophed, on propofol, MAP 75, plan to try for extubation today per note. With PEG, noted pt has hx of removing PEG tube. C-diff positive, also noted with STEFFANY - renal labs  "improving, K, phos low, repleting. Per chart, with 9.5% wt loss over the last 2 months, significant. NFPE needed to determine malnutrition status. RD to monitor and follow up.  Nutrition Discharge Planning: Pending clinical course  1/24/22 TF recs left 1/21, TF started @ 10 ml/hr yesterday, continues at same rate. No BM. + vent-possible plan for trach placement. NFPE done 1/24/21 no significant wasting seen.    Discharge Planning:  TF above or cardiac, DM 1500 kcal diet     Nutrition/ Diet History  unable to obtain  Spiritual/cultural/Sikhism factors affecting PO intakes  Factors affecting PO intakes: NPO, vent    Nutrition Risk Screen  Nutrition Risk Screen: no indicators present     Anthropometrics  Height: 5' 4" (162.6 cm)  Height (inches): 64 in  Weight Method: Bed Scale  Weight: 70.3 kg 1/24, 69 kg (admission)  Weight (lb): 152.12 lb  Ideal Body Weight (IBW), Female: 120 lb  BMI (Calculated): 26.1 admission  81.6 kg 1/2020    Lab/Procedures/Meds  Pertinent Labs Reviewed: reviewed  BMP  Lab Results   Component Value Date     01/24/2022    K 3.2 (L) 01/24/2022     01/24/2022    CO2 23 01/24/2022    BUN 26 (H) 01/24/2022    CREATININE 0.7 01/24/2022    CALCIUM 8.9 01/24/2022    ANIONGAP 11 01/24/2022    ESTGFRAFRICA >60 01/24/2022    EGFRNONAA >60 01/24/2022     Recent Labs   Lab 01/23/22 2048   POCTGLUCOSE 181*     Pertinent Medications Reviewed: reviewed  Propofol 8 ml/hr, statin, polyethylene glycol, senna, insulin, Kbicarb     Estimated/Assessed Needs  Weight Used For Calorie Calculations: 68.9 kg (152 lb)  Energy Calorie Requirements (kcal): 1519 kcal/day based on [place]  Energy Need Method: [place] [facility]  Protein Requirements: 83 g/day based on 1.2 g/kg  Weight Used For Protein Calculations: 68.9 kg (152 lb)  Fluid Requirements (mL): 1 mL/kcal or per MD  Estimated Fluid Requirement Method: RDA Method  RDA Method (mL): 1519  CHO Requirement: 190 g/day based on 50% kcal from CHO     Nutrition " Prescription Ordered  Current Diet Order: NPO + TF above     Evaluation of Received Nutrient/Fluid Intake  Energy Calories Required: not meeting needs  Protein Required: not meeting needs  Fluid Required: not meeting needs  Tolerance: tolerating  % Intake of Estimated Energy Needs: 0 - 25 %  % Meal Intake: NPO + TF above     Nutrition Risk  Level of Risk/Frequency of Follow-up: moderate/high (2x weekly)      Monitor and Evaluation  Food and Nutrient Intake: enteral nutrition intake  Food and Nutrient Adminstration: enteral and parenteral nutrition administration  Knowledge/Beliefs/Attitudes: food and nutrition knowledge/skill  Physical Activity and Function: nutrition-related ADLs and IADLs  Anthropometric Measurements: weight change  Biochemical Data, Medical Tests and Procedures: electrolyte and renal panel,gastrointestinal profile,glucose/endocrine profile  Nutrition-Focused Physical Findings: overall appearance,extremities, muscles and bones,skin      Nutrition Follow-Up  RD Follow-up?: Yes

## 2022-01-24 NOTE — CARE UPDATE
01/23/22 2000   Patient Assessment/Suction   Level of Consciousness (AVPU) responds to voice   Respiratory Effort Normal;Unlabored   Expansion/Accessory Muscles/Retractions expansion symmetric;no retractions;no use of accessory muscles   All Lung Fields Breath Sounds coarse;rhonchi   Suction Method oral;tracheal   $ Suction Charges Inline Suction Procedure Stat Charge   Secretions Amount moderate   Secretions Color yellow;pale   Secretions Characteristics thick   PRE-TX-O2   O2 Device (Oxygen Therapy) ventilator   Oxygen Concentration (%) 60   SpO2 100 %   Pulse Oximetry Type Continuous   Pulse 60   Resp (!) 25   ETCO2   ETCO2 (mmHg) 29 mmHg   Vent Select   Conventional Vent Y   Charged w/in last 24h YES   Preset Conventional Ventilator Settings   Vent Type NKV-550   Ventilation Type VC   Vent Mode SIMV-VC-PS   Humidity HME   Set Rate 8 BPM   Vt Set 450 mL   PEEP/CPAP 4 cmH20   Patient Ventilator Parameters   Resp Rate Total 18 br/min   Peak Airway Pressure 14.3 cmH2O   Exhaled Vt 357 mL   Total Ve 6.62 mL   Spont Ve 3.67 L   I:E Ratio Measured 1:7.5   Inspired Tidal Volume (VTI) 366 mL   Conventional Ventilator Alarms   Apnea Rate 15   Apnea Volume (mL) 300 mL   Ready to Wean/Extubation Screen   FIO2<=50 (chart decimal) (!) 0.6   MV<16L (chart vol.) 6.62   PEEP <=8 (chart #) 4   Ready to Wean Parameters   F/VT Ratio<105 (RSBI) (!) 70.03

## 2022-01-24 NOTE — PLAN OF CARE
Problem: Adult Inpatient Plan of Care  Goal: Plan of Care Review  Outcome: Ongoing, Progressing  Goal: Patient-Specific Goal (Individualized)  Outcome: Ongoing, Progressing  Goal: Absence of Hospital-Acquired Illness or Injury  Outcome: Ongoing, Progressing  Goal: Optimal Comfort and Wellbeing  Outcome: Ongoing, Progressing  Goal: Readiness for Transition of Care  Outcome: Ongoing, Progressing     Problem: Infection  Goal: Absence of Infection Signs and Symptoms  Outcome: Ongoing, Progressing     Problem: Diabetes Comorbidity  Goal: Blood Glucose Level Within Targeted Range  Outcome: Ongoing, Progressing     Problem: Fluid Imbalance (Pneumonia)  Goal: Fluid Balance  Outcome: Ongoing, Progressing     Problem: Infection (Pneumonia)  Goal: Resolution of Infection Signs and Symptoms  Outcome: Ongoing, Progressing     Problem: Respiratory Compromise (Pneumonia)  Goal: Effective Oxygenation and Ventilation  Outcome: Ongoing, Progressing     Problem: Fall Injury Risk  Goal: Absence of Fall and Fall-Related Injury  Outcome: Ongoing, Progressing     Problem: Communication Impairment (Mechanical Ventilation, Invasive)  Goal: Effective Communication  Outcome: Ongoing, Progressing     Problem: Device-Related Complication Risk (Mechanical Ventilation, Invasive)  Goal: Optimal Device Function  Outcome: Ongoing, Progressing     Problem: Inability to Wean (Mechanical Ventilation, Invasive)  Goal: Mechanical Ventilation Liberation  Outcome: Ongoing, Progressing     Problem: Nutrition Impairment (Mechanical Ventilation, Invasive)  Goal: Optimal Nutrition Delivery  Outcome: Ongoing, Progressing     Problem: Skin and Tissue Injury (Mechanical Ventilation, Invasive)  Goal: Absence of Device-Related Skin and Tissue Injury  Outcome: Ongoing, Progressing     Problem: Ventilator-Induced Lung Injury (Mechanical Ventilation, Invasive)  Goal: Absence of Ventilator-Induced Lung Injury  Outcome: Ongoing, Progressing     Problem: Communication  Impairment (Artificial Airway)  Goal: Effective Communication  Outcome: Ongoing, Progressing     Problem: Device-Related Complication Risk (Artificial Airway)  Goal: Optimal Device Function  Outcome: Ongoing, Progressing     Problem: Skin and Tissue Injury (Artificial Airway)  Goal: Absence of Device-Related Skin or Tissue Injury  Outcome: Ongoing, Progressing     Problem: Noninvasive Ventilation Acute  Goal: Effective Unassisted Ventilation and Oxygenation  Outcome: Ongoing, Progressing     Problem: Skin Injury Risk Increased  Goal: Skin Health and Integrity  Outcome: Ongoing, Progressing     Problem: Coping Ineffective  Goal: Effective Coping  Outcome: Ongoing, Progressing     Problem: Adjustment to Illness (Sepsis/Septic Shock)  Goal: Optimal Coping  Outcome: Ongoing, Progressing     Problem: Bleeding (Sepsis/Septic Shock)  Goal: Absence of Bleeding  Outcome: Ongoing, Progressing     Problem: Glycemic Control Impaired (Sepsis/Septic Shock)  Goal: Blood Glucose Level Within Desired Range  Outcome: Ongoing, Progressing     Problem: Infection Progression (Sepsis/Septic Shock)  Goal: Absence of Infection Signs and Symptoms  Outcome: Ongoing, Progressing     Problem: Nutrition Impaired (Sepsis/Septic Shock)  Goal: Optimal Nutrition Intake  Outcome: Ongoing, Progressing     Problem: Fluid and Electrolyte Imbalance (Acute Kidney Injury/Impairment)  Goal: Fluid and Electrolyte Balance  Outcome: Ongoing, Progressing     Problem: Oral Intake Inadequate (Acute Kidney Injury/Impairment)  Goal: Optimal Nutrition Intake  Outcome: Ongoing, Progressing     Problem: Renal Function Impairment (Acute Kidney Injury/Impairment)  Goal: Effective Renal Function  Outcome: Ongoing, Progressing     Problem: ARDS (Acute Respiratory Distress Syndrome)  Goal: Effective Oxygenation  Outcome: Ongoing, Progressing

## 2022-01-24 NOTE — PLAN OF CARE
01/24/22 0717   Patient Assessment/Suction   Level of Consciousness (AVPU) responds to pain   Respiratory Effort Unlabored;Normal   Expansion/Accessory Muscles/Retractions no use of accessory muscles;no retractions;expansion symmetric   All Lung Fields Breath Sounds coarse;diminished   Rhythm/Pattern, Respiratory assisted mechanically   PRE-TX-O2   O2 Device (Oxygen Therapy) ventilator   $ Is the patient on Low Flow Oxygen? Yes   Oxygen Concentration (%) 60   SpO2 100 %   Pulse Oximetry Type Continuous   $ Pulse Oximetry - Multiple Charge Pulse Oximetry - Multiple   Pulse 68   Resp (!) 23   ETCO2   $ ETCO2 Usage Currently wearing   ETCO2 (mmHg) 31 mmHg   ETCO2 Device Type Monitor:;Bedside Monitor;Delivery Device:;Artificial Airway        Airway - Non-Surgical 01/21/22 1150 Endotracheal Tube   Placement Date/Time: 01/21/22 1150   Method of Intubation: Glidescope  Inserted by: Anesthesia MD  Staff/Resident Name(s): Dr. Jaramillo  Airway Device: Endotracheal Tube  Airway Device Size: 7.5  Style: Cuffed  Cuff Inflated With: Air  Placement Verifi...   Secured at 23 cm   Measured At Lips   Secured Location Center   Secured by Commercial tube shaffer   Bite Block secure and patent;center   Site Condition Cool;Dry   Status Intact;Secured;Patent   Site Assessment Clean;Dry   Cuff Pressure 31 cm H2O   Airway Safety   Ambu bag with the patient? Yes, Adult Ambu   Vent Select   Conventional Vent Y   $ Ventilator Subsequent 1   Charged w/in last 24h YES   Preset Conventional Ventilator Settings   Vent Type NKV-550   Ventilation Type VC   Vent Mode SIMV-VC-PS   Set Rate 8 BPM   Vt Set 450 mL   PEEP/CPAP 5.2 cmH20   Insp Time 0.8 Sec(s)   Patient Ventilator Parameters   Resp Rate Total 23 br/min   Peak Airway Pressure 28 cmH2O   Exhaled Vt 364 mL   Total Ve 9.04 mL   Spont Ve 5.53 L   I:E Ratio Measured 1:1.7   Inspired Tidal Volume (VTI) 374 mL   Conventional Ventilator Alarms   Alarms On Y   Ve High Alarm 18 L/min   Ve Low Alarm  5 L/min   Vt High Alarm 15 mL   Vt Low Alarm 4 mL   Resp Rate High Alarm 35 br/min   Press High Alarm 35 cmH2O   Apnea Rate 15   Apnea Volume (mL) 300 mL   Delay Alarm (Sec) 20 sec(s)   Ready to Wean/Extubation Screen   FIO2<=50 (chart decimal) (!) 0.6   MV<16L (chart vol.) 9.04   PEEP <=8 (chart #) 5.2   Ready to Wean Parameters   F/VT Ratio<105 (RSBI) (!) 63.19

## 2022-01-24 NOTE — PROGRESS NOTES
"  01/24/2022      Admit Date: 1/18/2022  Sandra Wilson  New Patient Consult    Chief Complaint   Patient presents with    Shortness of Breath    Vomiting     PEG tube        History of Present Illness:  Intubated.          From Dr Gaspar's hpi, ERP:Sandra Wilson is a 81 y.o. female who presents to the ED via EMS  with an onset of vomiting and SOB. EMS reported that upon examination patient was less responsive than usually but is able to answer questions. The report from the EMS upon arrival was an O2 sats in the 70's. EMS proceeded to give supplemental oxygen and the O2 sats raised up to 93% on 3 liters of O2 with  "junky" right upper lung sounds" and greenish diarrhea. She was vomiting en route. They also reported that the patient is a patient that has dislodged her PEG tubing many times. PMHx of HTN, CAD, OA, GERD, ctroke, Colon cancer, ovarian cancer, breast cancer.PSHx of Cardiac catheterization, colonoscopy.         Progress Note  PULMONARY    Admit Date: 1/18/2022 01/24/2022      SUBJECTIVE:     1/19 intubated, sedated,  1/20 no c/o intubated, sedated  1/21 no new c/o intubated  1/22 no c/o intubated   1/23 no  New c/o intubated  1/24 no new c/o        PFSH and Allergies reviewed.    OBJECTIVE:     Vitals (Most recent):  Vitals:    01/24/22 0530   BP: (!) 113/53   Pulse: 67   Resp: (!) 26   Temp:        Vitals (24 hour range):  Temp:  [96.9 °F (36.1 °C)-98.7 °F (37.1 °C)]   Pulse:  [53-67]   Resp:  [16-42]   BP: ()/(48-65)   SpO2:  [95 %-100 %]       Intake/Output Summary (Last 24 hours) at 1/24/2022 0610  Last data filed at 1/24/2022 0403  Gross per 24 hour   Intake 2040.26 ml   Output 1235 ml   Net 805.26 ml          Physical Exam:  The patient's neuro status (alertness,orientation,cognitive function,motor skills,), pharyngeal exam (oral lesions, hygiene, abn dentition,), Neck (jvd,mass,thyroid,nodes in neck and above/below " clavicle),RESPIRATORY(symmetry,effort,fremitus,percussion,auscultation),  Cor(rhythm,heart tones including gallops,perfusion,edema)ABD(distention,hepatic&splenomegaly,tenderness,masses), Skin(rash,cyanosis),Psyc(affect,judgement,).  Exam negative except for these pertinent findings:    Et tube, sedate, chest is symmetric, no distress, normal percussion, normal fremitus and good anterior breath sounds      Radiographs reviewed: view by direct vision    Results for orders placed during the hospital encounter of 01/18/22    X-Ray Chest 1 View    Narrative  EXAMINATION:  XR CHEST 1 VIEW    CLINICAL HISTORY:  respiratory failure;    TECHNIQUE:  Single frontal view of the chest was performed.    COMPARISON:  Chest portable of January 18, 2022    FINDINGS:  An endotracheal tube ends in the trachea above the level the malorie.  An NG tube traverses the esophagus to the stomach.  A central line enters at the right neck and ends in the superior vena cava.  The cardiac size and contours within normal limits.  A loop recorder is noted in the left chest.  There is continued central right lung infiltrate and small infiltrate at the left lung base.  No pneumothorax is seen.    Impression  Continued intrapulmonary infiltrates right greater than left.  Endotracheal tube, NG tube and right central lines in position.      Electronically signed by: Mathew Yanez MD  Date:    01/18/2022  Time:    15:50  ]    Labs     Recent Labs   Lab 01/24/22  0325   WBC 15.46*   HGB 8.5*   HCT 26.1*        Recent Labs   Lab 01/24/22  0325      K 3.2*      CO2 23   BUN 26*   CREATININE 0.7   GLU 80   CALCIUM 8.9   MG 1.8   PHOS 3.3   AST 19   ALT 19   ALKPHOS 69   BILITOT 0.5   PROT 5.4*   ALBUMIN 2.1*     No results for input(s): PH, PCO2, PO2, HCO3 in the last 24 hours.  Microbiology Results (last 7 days)     Procedure Component Value Units Date/Time    Blood culture [[id number]] Collected: 01/18/22 1726    Order Status: Completed  Specimen: Blood from Antecubital, Left Arm Updated: 01/23/22 2322     Blood Culture, Routine No growth after 5 days.    Blood culture [[id number]] Collected: 01/18/22 1802    Order Status: Completed Specimen: Blood from Antecubital, Right Arm Updated: 01/23/22 2322     Blood Culture, Routine No growth after 5 days.    Culture, Respiratory with Gram Stain [[id number]]  (Abnormal)  (Susceptibility) Collected: 01/19/22 0114    Order Status: Completed Specimen: Sputum, Expectorated Updated: 01/23/22 1045     Respiratory Culture No Pseudomonas isolated.      STREPTOCOCCUS AGALACTIAE (GROUP B)  Few  Beta-hemolytic streptococci are routinely susceptible to   penicillins,cephalosporins and carbapenems.  Susceptibility testing not routinely performed        KLEBSIELLA PNEUMONIAE  Rare        METHICILLIN RESISTANT STAPHYLOCOCCUS AUREUS  Few       Gram Stain (Respiratory) <10 epithelial cells per low power field.     Gram Stain (Respiratory) Rare WBC's     Gram Stain (Respiratory) Rare Gram positive cocci     Gram Stain (Respiratory) Rare yeast    C Diff Toxin by PCR [[id number]]  (Abnormal) Collected: 01/18/22 0846    Order Status: Completed Updated: 01/18/22 2355     C. diff PCR Positive    Blood culture [[id number]]     Order Status: Canceled Specimen: Blood     Blood culture [[id number]]     Order Status: Canceled Specimen: Blood     Clostridium difficile EIA [[id number]]  (Abnormal) Collected: 01/18/22 0846    Order Status: Completed Specimen: Stool Updated: 01/18/22 1109     C. diff Antigen Positive     C difficile Toxins A+B, EIA Negative     Comment: Testing not recommended for children <24 months old.           Echo   Summary    · Mild concentric hypertrophy and normal systolic function.  · Mild-to-moderate mitral regurgitation.  · Mild tricuspid regurgitation.  · Mild pulmonic regurgitation.  · The estimated ejection fraction is 55%.  · Indeterminate left ventricular diastolic function.  · Normal right ventricular size  with normal right ventricular systolic function.  · Mild left atrial enlargement.  · There is moderate-to-severe aortic valve stenosis.  · Aortic valve area is cm2; peak velocity is 3.42 m/s; mean gradient is 32 mmHg. Peak gradient 47mmHg  · Mild aortic regurgitation.  · Normal central venous pressure (3 mmHg).  · The estimated PA systolic pressure is 32 mmHg.  · There is no pulmonary hypertension.  · Moderate to severe AS. 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]  Not Applicable    Demand ischemia [I24.8]  Yes    Sepsis [A41.9]  Yes    ARDS (adult respiratory distress syndrome) [J80]  Yes    YARI (generalized anxiety disorder) [F41.1]  Yes    Type 2 diabetes mellitus, with long-term current use of insulin [E11.9, Z79.4]  Not Applicable    Pneumonia [J18.9]  Yes    Aspiration into airway [T17.908A]  Yes    Hypothyroidism [E03.9]  Yes    Hyperlipidemia [E78.5]  Yes     Chronic    GERD (gastroesophageal reflux disease) [K21.9]  Yes     Chronic      Resolved Hospital Problems    Diagnosis Date Resolved POA    Ileus [K56.7] 01/23/2022 No    STEFFANY (acute kidney injury) [N17.9] 01/21/2022 Yes    Hyponatremia [E87.1] 01/20/2022 Yes                        Plan: no fever, vss, was hypotensive earlier.  Cefepime/vanc,flagyl,  chr midodrine, levophed 0.9 ,  ddimer 5.2, creat 0.9  c diff ag + but toxin neg.  abg 7.27 with ox 48,   Dnr,  cxr impressive R>>>L aspiration pattern.     Prior subdural --       Recruitment manuver done with peep 30 for 6 seconds with art line bp falling from sbp 100 to 60's.  Sat improved from 92 to 94.  Pt not really peep responsive.    Pt should improve ox with left side down or prone.  Peep may help but infiltrates more R>L -- should be posterior lung bases.  Might consider cta if not progressing.      discussed with  resp and nursing. Optimally would be left side down or prone.        1/19 no fever, vss with rr to 43, flagyl/zosyn/vanc/cefepime, n70-80% ox,  Wbc 14.8 from 5k,  Creat 1.5 form 0.9,   abg 02 61 with ph 7.33- peep 5,   I/o 687/133-  Will screen for dvt/pe with leg study  Will dose 500 cc saline,   F/u cxr more opacification right>> left lung.      1/20 no fever, vss, bp low at times, I.o 2770/1870, cefepime/flagyl/vanc, ox 60%, wbc 14.6, plts 144- new, creat 1.0,. cxr with some clearing,   F/u abg, wean soon likely but need better ox to extubate.    No dvt, ddimer up at admit,     Pt min vol 8 and seemed to do well with 50% ox while I rounded.  Discussed with nursing and respiratory-- if can get to 40% and pass wean trial would recommend extubation, would be optimal to discuss with family prior plans not to re intubate.  She is progressing-- would consider not discussing comfort care if improving.      1/21 no fever, vss, tm 100, wbc 15.2, hgb 8, from 13.6 bon 18th- admit, plt 130 from 244,   Creat 0.7,   cxr progressive clearing right , some increase opacification left suggested.   Strep in sputum,  Taper abx to doxy and flagy with c diff and strep in mucous-- monitor, check    procal am.    Will change code status to allow re intubation, and  extubate this am.      On enteral vanc for c diff concerns.   Discussed with January short.  Reviewed revised code status.      Addendum-- having stridor post extubation.  Pt had aneurysm last summer with trach placed at [facility].  Pt was at [place] when patient removed trach 2 months ago.  Pt has no h/o stridor per daughter.  Stridor did not change with jaw thrush nor nt trumpet.  Discussed would recommend intubation and replace trach as best option. Discussed with Dr Charles.  1/22 no fever,vss, wbc 16.3, hgb 8.1 from 13.6 admit?  , procal is down to 3 from 17 admit,     Pt failed extubation with upper airway stridor and poor loc.      Do leak test:on vent with cuff  down and tidal vol delivered 450 would need over 110 cc to leak to pass, need exhaled tidal vol to be less than 340 cc to  pass leak test -- do now and repeat q 4 x 3 total.  Will dose steroids for laryngospasm.  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  Will discuss with family later- trial extubation if passes, trach, comfort care???      Discussed with daughter, asked if code status should be altered?  After discussion - will consider extubation trial if good leak test (would try to notify daughter if passes) --- otherwise trach.      1/23 failed leak test, suspect trach stenosis - trach Wednesday?  No fever, vss, doxy/flagyl, vanc enteral, wbc 14.3,   F/u cxr am  Called Ira, daughter.  Discussed need for Palliative care eval.      1/24 no fever, vss, doxy/flagyl/vanc/vanc iv, wbc 15.5,   cxr patchy fluffy infiltrates R>L    No change in plan, defer to palliative care.  Could do trial extubation but likely to fail. Could bronch when tube pulled?

## 2022-01-24 NOTE — PLAN OF CARE
Remains icu.  Intubated.  Light sedation.  Opens eyes to voice.  Still doesn't follow commands.  Tube feeding started today.  Manually flushing with 115 water Q4.  Goal rate is 45.  Afebrile.  No BM today.  Safety maintained.

## 2022-01-24 NOTE — PLAN OF CARE
Intervention: enteral nutrition therapy    Recommendations  1) As medically able continue Isosource 1.5 @ 10 mL/hr and advance to goal rate of 45 mL/hr  -  mL Q 4 hr,   - Monitor and replete electrolytes  - Will provide 1620 kcal, 73 g protein, 820 mL FW, 690 mL FWF.   - Propofol providing additional 211 kcal.   - Will meet >100% EEN, 87% EPN     2) If glucose consistently elevated change to glucerna 1.5.    3) If unable to tolerate TF advancement to goal in 48 hr consider PPN D 5 AA 4.25 @ 75 ml/hr + standard lipids ( 1112 kcal, 76 g protein)     4) weigh weekly     Goals: 1) Initation of enteral nutrition by RD follow up 2) Nutrition support meeting > 50% of needs at f/u  Nutrition Goal Status: met/ new  Communication of RD Recs: POC, sticky note, second sign

## 2022-01-25 LAB
ALBUMIN SERPL BCP-MCNC: 1.9 G/DL (ref 3.5–5.2)
ALLENS TEST: ABNORMAL
ALP SERPL-CCNC: 60 U/L (ref 55–135)
ALT SERPL W/O P-5'-P-CCNC: 15 U/L (ref 10–44)
ANION GAP SERPL CALC-SCNC: 10 MMOL/L (ref 8–16)
AST SERPL-CCNC: 15 U/L (ref 10–40)
BASOPHILS # BLD AUTO: 0.04 K/UL (ref 0–0.2)
BASOPHILS NFR BLD: 0.2 % (ref 0–1.9)
BILIRUB SERPL-MCNC: 0.7 MG/DL (ref 0.1–1)
BUN SERPL-MCNC: 19 MG/DL (ref 8–23)
CALCIUM SERPL-MCNC: 8.4 MG/DL (ref 8.7–10.5)
CHLORIDE SERPL-SCNC: 102 MMOL/L (ref 95–110)
CO2 SERPL-SCNC: 25 MMOL/L (ref 23–29)
CREAT SERPL-MCNC: 0.6 MG/DL (ref 0.5–1.4)
DELSYS: ABNORMAL
DIFFERENTIAL METHOD: ABNORMAL
EOSINOPHIL # BLD AUTO: 0.3 K/UL (ref 0–0.5)
EOSINOPHIL NFR BLD: 1.9 % (ref 0–8)
ERYTHROCYTE [DISTWIDTH] IN BLOOD BY AUTOMATED COUNT: 14.4 % (ref 11.5–14.5)
EST. GFR  (AFRICAN AMERICAN): >60 ML/MIN/1.73 M^2
EST. GFR  (NON AFRICAN AMERICAN): >60 ML/MIN/1.73 M^2
FIO2: 40
GLUCOSE SERPL-MCNC: 81 MG/DL (ref 70–110)
HCO3 UR-SCNC: 29.9 MMOL/L (ref 24–28)
HCT VFR BLD AUTO: 23.7 % (ref 37–48.5)
HGB BLD-MCNC: 7.7 G/DL (ref 12–16)
IMM GRANULOCYTES # BLD AUTO: 0.63 K/UL (ref 0–0.04)
IMM GRANULOCYTES NFR BLD AUTO: 3.6 % (ref 0–0.5)
LYMPHOCYTES # BLD AUTO: 1.6 K/UL (ref 1–4.8)
LYMPHOCYTES NFR BLD: 9.4 % (ref 18–48)
MAGNESIUM SERPL-MCNC: 1.6 MG/DL (ref 1.6–2.6)
MCH RBC QN AUTO: 32 PG (ref 27–31)
MCHC RBC AUTO-ENTMCNC: 32.5 G/DL (ref 32–36)
MCV RBC AUTO: 98 FL (ref 82–98)
MODE: ABNORMAL
MONOCYTES # BLD AUTO: 1.4 K/UL (ref 0.3–1)
MONOCYTES NFR BLD: 8.2 % (ref 4–15)
NEUTROPHILS # BLD AUTO: 13.4 K/UL (ref 1.8–7.7)
NEUTROPHILS NFR BLD: 76.7 % (ref 38–73)
NRBC BLD-RTO: 0 /100 WBC
PCO2 BLDA: 40.8 MMHG (ref 35–45)
PEEP: 5
PH SMN: 7.47 [PH] (ref 7.35–7.45)
PHOSPHATE SERPL-MCNC: 2.8 MG/DL (ref 2.7–4.5)
PLATELET # BLD AUTO: 183 K/UL (ref 150–450)
PMV BLD AUTO: 11.2 FL (ref 9.2–12.9)
PO2 BLDA: 63 MMHG (ref 80–100)
POC BE: 6 MMOL/L
POC SATURATED O2: 93 % (ref 95–100)
POC TCO2: 31 MMOL/L (ref 23–27)
POCT GLUCOSE: 218 MG/DL (ref 70–110)
POCT GLUCOSE: 92 MG/DL (ref 70–110)
POTASSIUM SERPL-SCNC: 3.6 MMOL/L (ref 3.5–5.1)
PROT SERPL-MCNC: 5.1 G/DL (ref 6–8.4)
PS: 10
RBC # BLD AUTO: 2.41 M/UL (ref 4–5.4)
SAMPLE: ABNORMAL
SITE: ABNORMAL
SODIUM SERPL-SCNC: 137 MMOL/L (ref 136–145)
SP02: 95
SPONT RATE: 24
VANCOMYCIN TROUGH SERPL-MCNC: 18.5 UG/ML (ref 10–22)
WBC # BLD AUTO: 17.46 K/UL (ref 3.9–12.7)

## 2022-01-25 PROCEDURE — 84100 ASSAY OF PHOSPHORUS: CPT | Performed by: STUDENT IN AN ORGANIZED HEALTH CARE EDUCATION/TRAINING PROGRAM

## 2022-01-25 PROCEDURE — 99900026 HC AIRWAY MAINTENANCE (STAT)

## 2022-01-25 PROCEDURE — 25000003 PHARM REV CODE 250: Performed by: INTERNAL MEDICINE

## 2022-01-25 PROCEDURE — 27000221 HC OXYGEN, UP TO 24 HOURS

## 2022-01-25 PROCEDURE — 94761 N-INVAS EAR/PLS OXIMETRY MLT: CPT

## 2022-01-25 PROCEDURE — 63600175 PHARM REV CODE 636 W HCPCS: Performed by: STUDENT IN AN ORGANIZED HEALTH CARE EDUCATION/TRAINING PROGRAM

## 2022-01-25 PROCEDURE — 36600 WITHDRAWAL OF ARTERIAL BLOOD: CPT

## 2022-01-25 PROCEDURE — 80053 COMPREHEN METABOLIC PANEL: CPT | Performed by: STUDENT IN AN ORGANIZED HEALTH CARE EDUCATION/TRAINING PROGRAM

## 2022-01-25 PROCEDURE — 63600175 PHARM REV CODE 636 W HCPCS: Performed by: INTERNAL MEDICINE

## 2022-01-25 PROCEDURE — 99233 SBSQ HOSP IP/OBS HIGH 50: CPT | Mod: ,,, | Performed by: INTERNAL MEDICINE

## 2022-01-25 PROCEDURE — S0030 INJECTION, METRONIDAZOLE: HCPCS | Performed by: STUDENT IN AN ORGANIZED HEALTH CARE EDUCATION/TRAINING PROGRAM

## 2022-01-25 PROCEDURE — 27000207 HC ISOLATION

## 2022-01-25 PROCEDURE — 99900035 HC TECH TIME PER 15 MIN (STAT)

## 2022-01-25 PROCEDURE — 85025 COMPLETE CBC W/AUTO DIFF WBC: CPT | Performed by: STUDENT IN AN ORGANIZED HEALTH CARE EDUCATION/TRAINING PROGRAM

## 2022-01-25 PROCEDURE — 83735 ASSAY OF MAGNESIUM: CPT | Performed by: STUDENT IN AN ORGANIZED HEALTH CARE EDUCATION/TRAINING PROGRAM

## 2022-01-25 PROCEDURE — 80202 ASSAY OF VANCOMYCIN: CPT | Performed by: STUDENT IN AN ORGANIZED HEALTH CARE EDUCATION/TRAINING PROGRAM

## 2022-01-25 PROCEDURE — 94003 VENT MGMT INPAT SUBQ DAY: CPT

## 2022-01-25 PROCEDURE — 99233 PR SUBSEQUENT HOSPITAL CARE,LEVL III: ICD-10-PCS | Mod: ,,, | Performed by: INTERNAL MEDICINE

## 2022-01-25 PROCEDURE — 82803 BLOOD GASES ANY COMBINATION: CPT

## 2022-01-25 PROCEDURE — 20000000 HC ICU ROOM

## 2022-01-25 PROCEDURE — 25000003 PHARM REV CODE 250: Performed by: STUDENT IN AN ORGANIZED HEALTH CARE EDUCATION/TRAINING PROGRAM

## 2022-01-25 RX ORDER — FENTANYL CITRATE 50 UG/ML
25 INJECTION, SOLUTION INTRAMUSCULAR; INTRAVENOUS EVERY 5 MIN PRN
Status: CANCELLED | OUTPATIENT
Start: 2022-01-25

## 2022-01-25 RX ORDER — DEXMEDETOMIDINE HYDROCHLORIDE 4 UG/ML
0-1.4 INJECTION INTRAVENOUS CONTINUOUS
Status: DISCONTINUED | OUTPATIENT
Start: 2022-01-25 | End: 2022-01-27

## 2022-01-25 RX ORDER — SODIUM CHLORIDE, SODIUM LACTATE, POTASSIUM CHLORIDE, CALCIUM CHLORIDE 600; 310; 30; 20 MG/100ML; MG/100ML; MG/100ML; MG/100ML
INJECTION, SOLUTION INTRAVENOUS CONTINUOUS
Status: CANCELLED | OUTPATIENT
Start: 2022-01-25

## 2022-01-25 RX ORDER — HYDROMORPHONE HYDROCHLORIDE 2 MG/ML
0.2 INJECTION, SOLUTION INTRAMUSCULAR; INTRAVENOUS; SUBCUTANEOUS EVERY 5 MIN PRN
Status: CANCELLED | OUTPATIENT
Start: 2022-01-25

## 2022-01-25 RX ORDER — LIDOCAINE HYDROCHLORIDE 10 MG/ML
1 INJECTION, SOLUTION EPIDURAL; INFILTRATION; INTRACAUDAL; PERINEURAL ONCE
Status: CANCELLED | OUTPATIENT
Start: 2022-01-25 | End: 2022-01-25

## 2022-01-25 RX ADMIN — METRONIDAZOLE 500 MG: 500 INJECTION, SOLUTION INTRAVENOUS at 03:01

## 2022-01-25 RX ADMIN — METOCLOPRAMIDE HYDROCHLORIDE 10 MG: 5 SOLUTION ORAL at 06:01

## 2022-01-25 RX ADMIN — Medication 125 MG: at 07:01

## 2022-01-25 RX ADMIN — FAMOTIDINE 20 MG: 10 INJECTION INTRAVENOUS at 08:01

## 2022-01-25 RX ADMIN — Medication 125 MG: at 01:01

## 2022-01-25 RX ADMIN — DOXYCYCLINE 100 MG: 100 INJECTION, POWDER, LYOPHILIZED, FOR SOLUTION INTRAVENOUS at 07:01

## 2022-01-25 RX ADMIN — VANCOMYCIN HYDROCHLORIDE 1500 MG: 1.5 INJECTION, POWDER, LYOPHILIZED, FOR SOLUTION INTRAVENOUS at 12:01

## 2022-01-25 RX ADMIN — CITALOPRAM HYDROBROMIDE 40 MG: 10 TABLET ORAL at 08:01

## 2022-01-25 RX ADMIN — METRONIDAZOLE 500 MG: 500 INJECTION, SOLUTION INTRAVENOUS at 08:01

## 2022-01-25 RX ADMIN — METHYLPREDNISOLONE SODIUM SUCCINATE 40 MG: 40 INJECTION, POWDER, FOR SOLUTION INTRAMUSCULAR; INTRAVENOUS at 06:01

## 2022-01-25 RX ADMIN — METHYLPREDNISOLONE SODIUM SUCCINATE 40 MG: 40 INJECTION, POWDER, FOR SOLUTION INTRAMUSCULAR; INTRAVENOUS at 10:01

## 2022-01-25 RX ADMIN — SENNOSIDES AND DOCUSATE SODIUM 1 TABLET: 50; 8.6 TABLET ORAL at 08:01

## 2022-01-25 RX ADMIN — LEVOTHYROXINE SODIUM 25 MCG: 0.03 TABLET ORAL at 06:01

## 2022-01-25 RX ADMIN — PROPOFOL 25 MCG/KG/MIN: 10 INJECTION, EMULSION INTRAVENOUS at 04:01

## 2022-01-25 RX ADMIN — MIDODRINE HYDROCHLORIDE 5 MG: 5 TABLET ORAL at 03:01

## 2022-01-25 RX ADMIN — METOPROLOL TARTRATE 12.5 MG: 25 TABLET, FILM COATED ORAL at 08:01

## 2022-01-25 RX ADMIN — Medication 125 MG: at 02:01

## 2022-01-25 RX ADMIN — POLYETHYLENE GLYCOL 3350 17 G: 17 POWDER, FOR SOLUTION ORAL at 08:01

## 2022-01-25 RX ADMIN — METOCLOPRAMIDE HYDROCHLORIDE 10 MG: 5 SOLUTION ORAL at 12:01

## 2022-01-25 RX ADMIN — ENOXAPARIN SODIUM 40 MG: 100 INJECTION SUBCUTANEOUS at 05:01

## 2022-01-25 RX ADMIN — MIDODRINE HYDROCHLORIDE 5 MG: 5 TABLET ORAL at 08:01

## 2022-01-25 RX ADMIN — ATORVASTATIN CALCIUM 40 MG: 40 TABLET, FILM COATED ORAL at 08:01

## 2022-01-25 RX ADMIN — DOXYCYCLINE 100 MG: 100 INJECTION, POWDER, LYOPHILIZED, FOR SOLUTION INTRAVENOUS at 08:01

## 2022-01-25 RX ADMIN — Medication 125 MG: at 06:01

## 2022-01-25 RX ADMIN — METOCLOPRAMIDE HYDROCHLORIDE 10 MG: 5 SOLUTION ORAL at 02:01

## 2022-01-25 NOTE — ASSESSMENT & PLAN NOTE
-  Patient's FSGs are controlled on current medication regimen.  Last A1c reviewed- No results found for: LABA1C, HGBA1C  Most recent fingerstick glucose reviewed- Recent Labs   Lab 01/23/22 2048 01/24/22  1826   POCTGLUCOSE 181* 102     Current correctional scale  Medium  Maintain anti-hyperglycemic dose as follows-   Antihyperglycemics (From admission, onward)            Start     Stop Route Frequency Ordered    01/18/22 1438  insulin aspart U-100 pen 1-10 Units         -- SubQ Before meals & nightly PRN 01/18/22 1438        Hold Oral hypoglycemics while patient is in the hospital.

## 2022-01-25 NOTE — RESPIRATORY THERAPY
Patient remains on NKV-500 on SIMV , Rate 8, , Peep +5,  PS = 10  and Fi02 .6.  Hr 60, ETC02 32 and 02 saturation 100%.  Patient intubated via 7.5  Et tube and secured at 25cm@ lip with [id number] cuff psi.  Ambu bag and mask at bedside with alarms on and fuctioning properly at this time.

## 2022-01-25 NOTE — ASSESSMENT & PLAN NOTE
"Patient with current diagnosis of pneumonia due to bacterial etiology due to  MRSA, Haemophilus and GBS which is uncontrolled due to persistent hypoxia . I have reviewed the pertinent imaging. Current antimicrobial regimen consists of   Antibiotics (From admission, onward)            Start     Stop Route Frequency Ordered    01/23/22 1100  vancomycin 1.5 g in dextrose 5 % 250 mL IVPB (ready to mix)         -- IV Every 24 hours (non-standard times) 01/23/22 1026    01/23/22 1047  vancomycin - pharmacy to dose  (vancomycin IVPB)        "And" Linked Group Details    -- IV pharmacy to manage frequency 01/23/22 0947    01/21/22 0730  doxycycline (VIBRAMYCIN) 100 mg in dextrose 5 % 250 mL IVPB         -- IV Every 12 hours (non-standard times) 01/21/22 0617    01/19/22 1200  vancomycin 125 mg/5 mL oral solution 125 mg  (C. difficile Infection (CDI) Treatment Order Panel)         01/29 1159 PER G TUBE Every 6 hours 01/19/22 0920    01/18/22 1545  metronidazole IVPB 500 mg         -- IV Every 8 hours (non-standard times) 01/18/22 1441      . Cultures drawn and noted-   Microbiology Results (last 7 days)     Procedure Component Value Units Date/Time    Blood culture [[id number]] Collected: 01/18/22 1726    Order Status: Completed Specimen: Blood from Antecubital, Left Arm Updated: 01/23/22 2322     Blood Culture, Routine No growth after 5 days.    Blood culture [[id number]] Collected: 01/18/22 1802    Order Status: Completed Specimen: Blood from Antecubital, Right Arm Updated: 01/23/22 2322     Blood Culture, Routine No growth after 5 days.    Culture, Respiratory with Gram Stain [[id number]]  (Abnormal)  (Susceptibility) Collected: 01/19/22 0114    Order Status: Completed Specimen: Sputum, Expectorated Updated: 01/23/22 1045     Respiratory Culture No Pseudomonas isolated.      STREPTOCOCCUS AGALACTIAE (GROUP B)  Few  Beta-hemolytic streptococci are routinely susceptible to   penicillins,cephalosporins and " carbapenems.  Susceptibility testing not routinely performed        KLEBSIELLA PNEUMONIAE  Rare        METHICILLIN RESISTANT STAPHYLOCOCCUS AUREUS  Few       Gram Stain (Respiratory) <10 epithelial cells per low power field.     Gram Stain (Respiratory) Rare WBC's     Gram Stain (Respiratory) Rare Gram positive cocci     Gram Stain (Respiratory) Rare yeast    C Diff Toxin by PCR [[id number]]  (Abnormal) Collected: 01/18/22 0846    Order Status: Completed Updated: 01/18/22 2355     C. diff PCR Positive    Blood culture [[id number]]     Order Status: Canceled Specimen: Blood     Blood culture [[id number]]     Order Status: Canceled Specimen: Blood     Clostridium difficile EIA [[id number]]  (Abnormal) Collected: 01/18/22 0846    Order Status: Completed Specimen: Stool Updated: 01/18/22 1109     C. diff Antigen Positive     C difficile Toxins A+B, EIA Negative     Comment: Testing not recommended for children <24 months old.          Will monitor patient closely and continue current treatment plan unchanged.

## 2022-01-25 NOTE — PLAN OF CARE
Problem: Adult Inpatient Plan of Care  Goal: Plan of Care Review  Outcome: Ongoing, Progressing  Goal: Patient-Specific Goal (Individualized)  Outcome: Ongoing, Progressing  Goal: Absence of Hospital-Acquired Illness or Injury  Outcome: Ongoing, Progressing  Goal: Optimal Comfort and Wellbeing  Outcome: Ongoing, Progressing  Goal: Readiness for Transition of Care  Outcome: Ongoing, Progressing     Problem: Infection  Goal: Absence of Infection Signs and Symptoms  Outcome: Ongoing, Progressing     Problem: Diabetes Comorbidity  Goal: Blood Glucose Level Within Targeted Range  Outcome: Ongoing, Progressing     Problem: Fluid Imbalance (Pneumonia)  Goal: Fluid Balance  Outcome: Ongoing, Progressing     Problem: Infection (Pneumonia)  Goal: Resolution of Infection Signs and Symptoms  Outcome: Ongoing, Progressing     Problem: Respiratory Compromise (Pneumonia)  Goal: Effective Oxygenation and Ventilation  Outcome: Ongoing, Progressing     Problem: Fall Injury Risk  Goal: Absence of Fall and Fall-Related Injury  Outcome: Ongoing, Progressing     Problem: Device-Related Complication Risk (Mechanical Ventilation, Invasive)  Goal: Optimal Device Function  Outcome: Ongoing, Progressing     Problem: Inability to Wean (Mechanical Ventilation, Invasive)  Goal: Mechanical Ventilation Liberation  Outcome: Ongoing, Progressing     Problem: Nutrition Impairment (Mechanical Ventilation, Invasive)  Goal: Optimal Nutrition Delivery  Outcome: Ongoing, Progressing     Problem: Skin and Tissue Injury (Mechanical Ventilation, Invasive)  Goal: Absence of Device-Related Skin and Tissue Injury  Outcome: Ongoing, Progressing     Problem: Ventilator-Induced Lung Injury (Mechanical Ventilation, Invasive)  Goal: Absence of Ventilator-Induced Lung Injury  Outcome: Ongoing, Progressing     Problem: Communication Impairment (Artificial Airway)  Goal: Effective Communication  Outcome: Ongoing, Progressing     Problem: Device-Related Complication  Risk (Artificial Airway)  Goal: Optimal Device Function  Outcome: Ongoing, Progressing     Problem: Skin and Tissue Injury (Artificial Airway)  Goal: Absence of Device-Related Skin or Tissue Injury  Outcome: Ongoing, Progressing     Problem: Noninvasive Ventilation Acute  Goal: Effective Unassisted Ventilation and Oxygenation  Outcome: Ongoing, Progressing     Problem: Skin Injury Risk Increased  Goal: Skin Health and Integrity  Outcome: Ongoing, Progressing     Problem: Coping Ineffective  Goal: Effective Coping  Outcome: Ongoing, Progressing     Problem: Adjustment to Illness (Sepsis/Septic Shock)  Goal: Optimal Coping  Outcome: Ongoing, Progressing     Problem: Bleeding (Sepsis/Septic Shock)  Goal: Absence of Bleeding  Outcome: Ongoing, Progressing     Problem: Glycemic Control Impaired (Sepsis/Septic Shock)  Goal: Blood Glucose Level Within Desired Range  Outcome: Ongoing, Progressing     Problem: Infection Progression (Sepsis/Septic Shock)  Goal: Absence of Infection Signs and Symptoms  Outcome: Ongoing, Progressing     Problem: Nutrition Impaired (Sepsis/Septic Shock)  Goal: Optimal Nutrition Intake  Outcome: Ongoing, Progressing     Problem: Fluid and Electrolyte Imbalance (Acute Kidney Injury/Impairment)  Goal: Fluid and Electrolyte Balance  Outcome: Ongoing, Progressing     Problem: Oral Intake Inadequate (Acute Kidney Injury/Impairment)  Goal: Optimal Nutrition Intake  Outcome: Ongoing, Progressing     Problem: Renal Function Impairment (Acute Kidney Injury/Impairment)  Goal: Effective Renal Function  Outcome: Ongoing, Progressing     Problem: ARDS (Acute Respiratory Distress Syndrome)  Goal: Effective Oxygenation  Outcome: Ongoing, Progressing     Problem: Oral Intake Inadequate  Goal: Improved Oral Intake  Outcome: Ongoing, Progressing

## 2022-01-25 NOTE — SUBJECTIVE & OBJECTIVE
Interval History:  Patient seen and examined.  Currently on trach for tracheostomy placement on Wednesday.    Review of Systems   Unable to perform ROS: Intubated     Objective:     Vital Signs (Most Recent):  Temp: 99.2 °F (37.3 °C) (01/24/22 1600)  Pulse: 65 (01/24/22 2000)  Resp: (!) 23 (01/24/22 2000)  BP: (!) 106/53 (01/24/22 2000)  SpO2: 100 % (01/24/22 2000) Vital Signs (24h Range):  Temp:  [97.8 °F (36.6 °C)-99.2 °F (37.3 °C)] 99.2 °F (37.3 °C)  Pulse:  [58-71] 65  Resp:  [18-42] 23  SpO2:  [95 %-100 %] 100 %  BP: ()/(47-62) 106/53     Weight: 70.3 kg (154 lb 15.7 oz)  Body mass index is 26.6 kg/m².    Intake/Output Summary (Last 24 hours) at 1/24/2022 2023  Last data filed at 1/24/2022 1921  Gross per 24 hour   Intake 1192.3 ml   Output 785 ml   Net 407.3 ml      Physical Exam  Vitals and nursing note reviewed.   Constitutional:       General: She is not in acute distress.     Appearance: She is not diaphoretic.      Comments: Intubated, sedated   HENT:      Mouth/Throat:      Pharynx: No oropharyngeal exudate.      Comments: ETT/NG tube noted in place.  Eyes:      General:         Right eye: No discharge.         Left eye: No discharge.      Comments: Pupils sluggish   Neck:      Thyroid: No thyromegaly.      Vascular: No JVD.      Trachea: No tracheal deviation.   Cardiovascular:      Heart sounds: No murmur heard.  No friction rub. No gallop.    Pulmonary:      Effort: No respiratory distress.      Breath sounds: No wheezing or rales.      Comments: Breath sounds coarse on ventilator.  Abdominal:      General: There is no distension.      Palpations: Abdomen is soft.      Tenderness: There is no abdominal tenderness.   Genitourinary:     Comments: Jackson catheter noted in place.  Musculoskeletal:         General: No tenderness or deformity.   Skin:     Capillary Refill: Capillary refill takes 2 to 3 seconds.      Findings: No erythema or rash.   Neurological:      Motor: No abnormal muscle tone.       Deep Tendon Reflexes: Reflexes normal.      Comments: Patient sedated, unresponsive at present         Significant Labs: All pertinent labs within the past 24 hours have been reviewed.    Significant Imaging: I have reviewed all pertinent imaging results/findings within the past 24 hours.

## 2022-01-25 NOTE — ASSESSMENT & PLAN NOTE
Patient is chronically on statin.will continue for now. Monitor clinically. Last LDL was   Lab Results   Component Value Date    LDLCALC 94.4 07/13/2017

## 2022-01-25 NOTE — PLAN OF CARE
POC reviewed. VSS, afebrile. Intubated/sedated. Opens eyes to voice/touch. Episode of vent bucking and appearance of discomfort - sedation increased per orders. Relief noted. Plans for trach Wednesday - Dr. Marrufo discussed with son at bedside today. Consent in chart. K+ replaced per orders, recheck WDL. TF at 20cc/hr - max residual 145. Jackson with adequate UOP. Family updated via phone and at bedside. Comfort promoted, safety maintained.

## 2022-01-25 NOTE — ASSESSMENT & PLAN NOTE
Secondary to aspiration pneumonia. GBS, Klebsiella and Staph aureus in sputum.  -Pulmonary consulted; re-intubated 1/21  -May need another tracheostomy; Surgery consulted  -Continue vancomycin, doxycycline and Flagyl  -CXR and ABG PRN  -Continuous pulse oximetry  -Palliative Care consulted  -patient going for tracheostomy on 01/26 per pulmonology and patient conversation.

## 2022-01-25 NOTE — PROGRESS NOTES
Pharmacokinetic Assessment Follow Up: IV Vancomycin    Vancomycin serum concentration assessment(s):    The trough level was drawn correctly and can be used to guide therapy at this time. The measurement is within the desired definitive target range of 15 to 20 mcg/mL.    Vancomycin Regimen Plan:    Continue regimen to Vancomycin 1500 mg IV every 24 hours with next serum trough concentration measured at approximately 1130 prior to third dose on 1/27.    Drug levels (last 3 results):  Recent Labs   Lab Result Units 01/25/22  1032   Vancomycin-Trough ug/mL 18.5       Pharmacy will continue to follow and monitor vancomycin.    Please contact pharmacy at extension [phone] for questions regarding this assessment.    Thank you for the consult,   Alie Alonzo       Patient brief summary:  Sandra Wilson is a 81 y.o. female initiated on antimicrobial therapy with IV Vancomycin for treatment of lower respiratory infection.    The patient's current regimen is vancomycin 1500 mg IV every 24 hours.    Drug Allergies:   Review of patient's allergies indicates:   Allergen Reactions    Cephalexin (bulk)      Flushing and itching    Lidocaine base      rash    Lisinopril Other (See Comments)     cough       Actual Body Weight:   70.3 kg    Renal Function:   Estimated Creatinine Clearance: 70.7 mL/min (based on SCr of 0.6 mg/dL).,     Dialysis Method (if applicable):  N/A    CBC (last 72 hours):  Recent Labs   Lab Result Units 01/23/22  0335 01/24/22  0325 01/25/22  0348   WBC K/uL 14.27* 15.46* 17.46*   Hemoglobin g/dL 7.9* 8.5* 7.7*   Hematocrit % 24.0* 26.1* 23.7*   Platelets K/uL 156 195 183   Gran % % 74.0* 76.2* 76.7*   Lymph % % 10.0* 8.2* 9.4*   Mono % % 4.0 10.0 8.2   Eosinophil % % 0.0 1.5 1.9   Basophil % % 0.0 0.3 0.2   Differential Method  Manual Automated Automated       Metabolic Panel (last 72 hours):  Recent Labs   Lab Result Units 01/22/22  1228 01/23/22  0335 01/24/22  0325 01/24/22  1700 01/25/22  0348    Sodium mmol/L  --  135* 138  --  137   Potassium mmol/L 3.7 3.5 3.2* 4.0 3.6   Chloride mmol/L  --  101 104  --  102   CO2 mmol/L  --  22* 23  --  25   Glucose mg/dL  --  138* 80  --  81   BUN mg/dL  --  27* 26*  --  19   Creatinine mg/dL  --  0.7 0.7  --  0.6   Albumin g/dL  --  2.1* 2.1*  --  1.9*   Total Bilirubin mg/dL  --  0.4 0.5  --  0.7   Alkaline Phosphatase U/L  --  71 69  --  60   AST U/L  --  15 19  --  15   ALT U/L  --  18 19  --  15   Magnesium mg/dL  --  1.8 1.8  --  1.6   Phosphorus mg/dL  --  4.2 3.3  --  2.8       Vancomycin Administrations:  vancomycin given in the last 96 hours                     vancomycin 125 mg/5 mL oral solution 125 mg (mg) 125 mg Given 01/25/22 0609     125 mg Given  0200     125 mg Given 01/24/22 2115     125 mg Given  1226     125 mg Given  0639     125 mg Given  0028     125 mg Given 01/23/22 1751     125 mg Given  1135     125 mg Given  0627     125 mg Given 01/22/22 2341     125 mg Given  1741     125 mg Given  1141     125 mg Given  0530     125 mg Given 01/21/22 2351     125 mg Given  1745     125 mg Given  1246    vancomycin 1.5 g in dextrose 5 % 250 mL IVPB (ready to mix) (mg) 1,500 mg New Bag 01/24/22 1241     1,500 mg New Bag 01/23/22 1134                    Microbiologic Results:  Microbiology Results (last 7 days)       Procedure Component Value Units Date/Time    Blood culture [[id number]] Collected: 01/18/22 1726    Order Status: Completed Specimen: Blood from Antecubital, Left Arm Updated: 01/23/22 2322     Blood Culture, Routine No growth after 5 days.    Blood culture [[id number]] Collected: 01/18/22 1802    Order Status: Completed Specimen: Blood from Antecubital, Right Arm Updated: 01/23/22 2322     Blood Culture, Routine No growth after 5 days.    Culture, Respiratory with Gram Stain [[id number]]  (Abnormal)  (Susceptibility) Collected: 01/19/22 0114    Order Status: Completed Specimen: Sputum, Expectorated Updated: 01/23/22 1045     Respiratory Culture  No Pseudomonas isolated.      STREPTOCOCCUS AGALACTIAE (GROUP B)  Few  Beta-hemolytic streptococci are routinely susceptible to   penicillins,cephalosporins and carbapenems.  Susceptibility testing not routinely performed        KLEBSIELLA PNEUMONIAE  Rare        METHICILLIN RESISTANT STAPHYLOCOCCUS AUREUS  Few       Gram Stain (Respiratory) <10 epithelial cells per low power field.     Gram Stain (Respiratory) Rare WBC's     Gram Stain (Respiratory) Rare Gram positive cocci     Gram Stain (Respiratory) Rare yeast    C Diff Toxin by PCR [[id number]]  (Abnormal) Collected: 01/18/22 0846    Order Status: Completed Updated: 01/18/22 4212     C. diff PCR Positive    Blood culture [[id number]]     Order Status: Canceled Specimen: Blood     Blood culture [[id number]]     Order Status: Canceled Specimen: Blood

## 2022-01-25 NOTE — PROGRESS NOTES
"  01/25/2022      Admit Date: 1/18/2022  Sandra Wilson  New Patient Consult    Chief Complaint   Patient presents with    Shortness of Breath    Vomiting     PEG tube        History of Present Illness:  Intubated.          From Dr Gaspar's hpi, ERP:Sandra Wilson is a 81 y.o. female who presents to the ED via EMS  with an onset of vomiting and SOB. EMS reported that upon examination patient was less responsive than usually but is able to answer questions. The report from the EMS upon arrival was an O2 sats in the 70's. EMS proceeded to give supplemental oxygen and the O2 sats raised up to 93% on 3 liters of O2 with  "junky" right upper lung sounds" and greenish diarrhea. She was vomiting en route. They also reported that the patient is a patient that has dislodged her PEG tubing many times. PMHx of HTN, CAD, OA, GERD, ctroke, Colon cancer, ovarian cancer, breast cancer.PSHx of Cardiac catheterization, colonoscopy.         Progress Note  PULMONARY    Admit Date: 1/18/2022 01/25/2022      SUBJECTIVE:     1/19 intubated, sedated,  1/20 no c/o intubated, sedated  1/21 no new c/o intubated  1/22 no c/o intubated   1/23 no  New c/o intubated  1/24 no new c/o  1/25 no new c/o      PFSH and Allergies reviewed.    OBJECTIVE:     Vitals (Most recent):  Vitals:    01/25/22 0500   BP: (!) 90/52   Pulse: 61   Resp: 20   Temp:        Vitals (24 hour range):  Temp:  [97.8 °F (36.6 °C)-99.2 °F (37.3 °C)]   Pulse:  [60-75]   Resp:  [20-44]   BP: ()/(45-59)   SpO2:  [96 %-100 %]       Intake/Output Summary (Last 24 hours) at 1/25/2022 0546  Last data filed at 1/25/2022 0538  Gross per 24 hour   Intake 1504.44 ml   Output 465 ml   Net 1039.44 ml          Physical Exam:  The patient's neuro status (alertness,orientation,cognitive function,motor skills,), pharyngeal exam (oral lesions, hygiene, abn dentition,), Neck (jvd,mass,thyroid,nodes in neck and above/below " clavicle),RESPIRATORY(symmetry,effort,fremitus,percussion,auscultation),  Cor(rhythm,heart tones including gallops,perfusion,edema)ABD(distention,hepatic&splenomegaly,tenderness,masses), Skin(rash,cyanosis),Psyc(affect,judgement,).  Exam negative except for these pertinent findings:    Et tube, sedate, chest is symmetric, no distress, normal percussion, normal fremitus and good anterior breath sounds      Radiographs reviewed: view by direct vision    Results for orders placed during the hospital encounter of 01/18/22    X-Ray Chest 1 View    Narrative  EXAMINATION:  XR CHEST 1 VIEW    CLINICAL HISTORY:  respiratory failure;    TECHNIQUE:  Single frontal view of the chest was performed.    COMPARISON:  Chest portable of January 18, 2022    FINDINGS:  An endotracheal tube ends in the trachea above the level the malorie.  An NG tube traverses the esophagus to the stomach.  A central line enters at the right neck and ends in the superior vena cava.  The cardiac size and contours within normal limits.  A loop recorder is noted in the left chest.  There is continued central right lung infiltrate and small infiltrate at the left lung base.  No pneumothorax is seen.    Impression  Continued intrapulmonary infiltrates right greater than left.  Endotracheal tube, NG tube and right central lines in position.      Electronically signed by: Mathew Yanez MD  Date:    01/18/2022  Time:    15:50  ]    Labs     Recent Labs   Lab 01/25/22  0348   WBC 17.46*   HGB 7.7*   HCT 23.7*        Recent Labs   Lab 01/25/22  0348      K 3.6      CO2 25   BUN 19   CREATININE 0.6   GLU 81   CALCIUM 8.4*   MG 1.6   PHOS 2.8   AST 15   ALT 15   ALKPHOS 60   BILITOT 0.7   PROT 5.1*   ALBUMIN 1.9*     No results for input(s): PH, PCO2, PO2, HCO3 in the last 24 hours.  Microbiology Results (last 7 days)     Procedure Component Value Units Date/Time    Blood culture [[id number]] Collected: 01/18/22 1726    Order Status: Completed  Specimen: Blood from Antecubital, Left Arm Updated: 01/23/22 2322     Blood Culture, Routine No growth after 5 days.    Blood culture [[id number]] Collected: 01/18/22 1802    Order Status: Completed Specimen: Blood from Antecubital, Right Arm Updated: 01/23/22 2322     Blood Culture, Routine No growth after 5 days.    Culture, Respiratory with Gram Stain [[id number]]  (Abnormal)  (Susceptibility) Collected: 01/19/22 0114    Order Status: Completed Specimen: Sputum, Expectorated Updated: 01/23/22 1045     Respiratory Culture No Pseudomonas isolated.      STREPTOCOCCUS AGALACTIAE (GROUP B)  Few  Beta-hemolytic streptococci are routinely susceptible to   penicillins,cephalosporins and carbapenems.  Susceptibility testing not routinely performed        KLEBSIELLA PNEUMONIAE  Rare        METHICILLIN RESISTANT STAPHYLOCOCCUS AUREUS  Few       Gram Stain (Respiratory) <10 epithelial cells per low power field.     Gram Stain (Respiratory) Rare WBC's     Gram Stain (Respiratory) Rare Gram positive cocci     Gram Stain (Respiratory) Rare yeast    C Diff Toxin by PCR [[id number]]  (Abnormal) Collected: 01/18/22 0846    Order Status: Completed Updated: 01/18/22 2355     C. diff PCR Positive    Blood culture [[id number]]     Order Status: Canceled Specimen: Blood     Blood culture [[id number]]     Order Status: Canceled Specimen: Blood     Clostridium difficile EIA [[id number]]  (Abnormal) Collected: 01/18/22 0846    Order Status: Completed Specimen: Stool Updated: 01/18/22 1109     C. diff Antigen Positive     C difficile Toxins A+B, EIA Negative     Comment: Testing not recommended for children <24 months old.           Echo   Summary    · Mild concentric hypertrophy and normal systolic function.  · Mild-to-moderate mitral regurgitation.  · Mild tricuspid regurgitation.  · Mild pulmonic regurgitation.  · The estimated ejection fraction is 55%.  · Indeterminate left ventricular diastolic function.  · Normal right ventricular size  with normal right ventricular systolic function.  · Mild left atrial enlargement.  · There is moderate-to-severe aortic valve stenosis.  · Aortic valve area is cm2; peak velocity is 3.42 m/s; mean gradient is 32 mmHg. Peak gradient 47mmHg  · Mild aortic regurgitation.  · Normal central venous pressure (3 mmHg).  · The estimated PA systolic pressure is 32 mmHg.  · There is no pulmonary hypertension.  · Moderate to severe AS. 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]  Not Applicable    ARDS (adult respiratory distress syndrome) [J80]  Yes    YARI (generalized anxiety disorder) [F41.1]  Yes    Type 2 diabetes mellitus, with long-term current use of insulin [E11.9, Z79.4]  Not Applicable    Pneumonia [J18.9]  Yes    Aspiration into airway [T17.908A]  Yes    Hypothyroidism [E03.9]  Yes    Hyperlipidemia [E78.5]  Yes     Chronic    GERD (gastroesophageal reflux disease) [K21.9]  Yes     Chronic      Resolved Hospital Problems    Diagnosis Date Resolved POA    Ileus [K56.7] 01/23/2022 No    Demand ischemia [I24.8] 01/24/2022 Yes    Sepsis [A41.9] 01/24/2022 Yes    STEFFANY (acute kidney injury) [N17.9] 01/21/2022 Yes    Hyponatremia [E87.1] 01/20/2022 Yes                        Plan: no fever, vss, was hypotensive earlier.  Cefepime/vanc,flagyl,  chr midodrine, levophed 0.9 ,  ddimer 5.2, creat 0.9  c diff ag + but toxin neg.  abg 7.27 with ox 48,   Dnr,  cxr impressive R>>>L aspiration pattern.     Prior subdural --       Recruitment manuver done with peep 30 for 6 seconds with art line bp falling from sbp 100 to 60's.  Sat improved from 92 to 94.  Pt not really peep responsive.    Pt should improve ox with left side down or prone.  Peep may help but infiltrates more R>L -- should be posterior lung bases.  Might consider cta if not progressing.       discussed with resp and nursing. Optimally would be left side down or prone.        1/19 no fever, vss with rr to 43, flagyl/zosyn/vanc/cefepime, n70-80% ox,  Wbc 14.8 from 5k,  Creat 1.5 form 0.9,   abg 02 61 with ph 7.33- peep 5,   I/o 687/133-  Will screen for dvt/pe with leg study  Will dose 500 cc saline,   F/u cxr more opacification right>> left lung.      1/20 no fever, vss, bp low at times, I.o 2770/1870, cefepime/flagyl/vanc, ox 60%, wbc 14.6, plts 144- new, creat 1.0,. cxr with some clearing,   F/u abg, wean soon likely but need better ox to extubate.    No dvt, ddimer up at admit,     Pt min vol 8 and seemed to do well with 50% ox while I rounded.  Discussed with nursing and respiratory-- if can get to 40% and pass wean trial would recommend extubation, would be optimal to discuss with family prior plans not to re intubate.  She is progressing-- would consider not discussing comfort care if improving.      1/21 no fever, vss, tm 100, wbc 15.2, hgb 8, from 13.6 bon 18th- admit, plt 130 from 244,   Creat 0.7,   cxr progressive clearing right , some increase opacification left suggested.   Strep in sputum,  Taper abx to doxy and flagy with c diff and strep in mucous-- monitor, check    procal am.    Will change code status to allow re intubation, and  extubate this am.      On enteral vanc for c diff concerns.   Discussed with January short.  Reviewed revised code status.      Addendum-- having stridor post extubation.  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  Pt was at [facility] when patient removed trach 2 months ago.  Pt has no h/o stridor per daughter.  Stridor did not change with jaw thrush nor nt trumpet.  Discussed would recommend intubation and replace trach as best option. Discussed with Dr Charles.  1/22 no fever,vss, wbc 16.3, hgb 8.1 from 13.6 admit?  , procal is down to 3 from 17 admit,     Pt failed extubation with upper airway stridor and poor loc.      Do leak  test:on vent with cuff down and tidal vol delivered 450 would need over 110 cc to leak to pass, need exhaled tidal vol to be less than 340 cc to  pass leak test -- do now and repeat q 4 x 3 total.  Will dose steroids for laryngospasm.  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  Will discuss with family later- trial extubation if passes, trach, comfort care???      Discussed with daughter, asked if code status should be altered?  After discussion - will consider extubation trial if good leak test (would try to notify daughter if passes) --- otherwise trach.      1/23 failed leak test, suspect trach stenosis - trach Wednesday?  No fever, vss, doxy/flagyl, vanc enteral, wbc 14.3,   F/u cxr am  Called Ira, daughter.  Discussed need for Palliative care eval.      1/24 no fever, vss, doxy/flagyl/vanc/vanc iv, wbc 15.5,   cxr patchy fluffy infiltrates R>L    No change in plan, defer to palliative care.  Could do trial extubation but likely to fail. Could bronch when tube pulled?      1/25 no fever, vss, doxy/flagyl,vanc iv/enteral, wbc 17.5 hgb 7,7 from 13.6 admit, day 8  abx    Pt seems to have good leak today.  Will have resp quantify, do wean trial, decrease sedation, and consider trial extubation if does well.

## 2022-01-25 NOTE — PLAN OF CARE
Pt remains intubated. Sedation stopped this am for CPAP trial/leak test. Pt passing leak test. Awake but still not following commands; called daughter (Ira) for pt neuro baseline and per her pt was able to interact/follow commands appropriately prior to this hospitalization. Dr. Gao notified. Leave sedation off through night; back on rate at bedtime and attempt CPAP again in am. Will extubate to see if pt does well - if not pt will got for trach early afternoon (1/26) with Dr. Marrufo. Hold TF at midnightn    VSS, afebrile. Tolerating TF @ 20/hr via PEG. 1x loose BM. Jackson to be removed after surgery tomorrow. Comfort promoted, safety maintained.

## 2022-01-25 NOTE — PROGRESS NOTES
[facility] Hospital Medicine  Progress Note    Patient Name: Sandra Wilson  MRN: [id number]  Patient Class: IP- Inpatient   Admission Date: 1/18/2022  Length of Stay: 6 days  Attending Physician: Braulio Armendariz MD  Primary Care Provider: Primary Doctor No        Subjective:     Principal Problem:Acute hypoxemic respiratory failure        HPI:  Sandra Wilson is an 81 year old female with a past medical history of CVAs, CNS aneurysm, PEG status, CAD, HLD, hypothryoidism, DM, aortic stenosis, and colon, breast, and ovarian cancer who presented from long term care with vomiting, diarrhea, and shortness of breath. Workup in the ED showed likely aspiration pneumonitis and C. Diff colitis. Her O2 requirement increased while in the ED requiring intubation with sedation. She also required Levophed as she likely developed septic shock. Antibiotics were broadened to cefepime, vancomycin, and Flagyl. Pulmonary was consulted. Family was notified. The patient was unable to provide history given acuity of illness.      Overview/Hospital Course:  Sandra Wilson is an 81 year old female with a past medical history of CVAs, CNS aneurysm, PEG status, CAD, HLD, hypothyroidism, DM, aortic stenosis, and colon, breast, and ovarian cancer who presented from long term care with vomiting, diarrhea, and shortness of breath secondary to acute hypoxic respiratory failure from aspiration pneumonia in the setting of C diff colitis leading to septic shock. She was intubated in the ED and started on Levophed infusion via RIJ CVC placed by Dr. Vignesh Gaspar. She was started on broad spectrum antibiotics with cefepime, Flagyl, vancomycin and admitted to the ICU. She was also started on enteral vancomycin for severe C diff colitis given elevated WBC count and STEFFANY. There was concern for developing ARDS given P/F ratio of 87 (severe); 190 1/22 (moderate). Pulmonary was consulted. She also presented with demand ischemia likely  secondary to septic shock. Troponin was trended and improved as shock resolved. Normal saline infusion was added for hyponatremia and STEFFANY which improved both. Levophed was able to be weaned. Family agreed to DNR status 1/18 and Palliative Care was consulted to discuss goal of care 1/19. Antibiotics were changed to doxycycline and Flagyl (GBS in sputum culture) 1/21; vancomycin was added 1/23 after culture also became positive for Staph aureus and Klebsiella. Her respiratory status has improved throughout her course and she was extubated (code changed to partial code for intubation) 1/21 only to be re-intubated for worsening stridor (history of tracheostomy). Upon re-intubation, copious amounts of secretions were suctioned. KUB suggested a mild ileus A bowel regimen was instituted and the patient was able to have bowel movements 1/22. Tube feeds were started 1/23. Her course was complicated by Afib as well noted on telemetry 1/21; metoprolol tartrate started 1/22.She failed a leak test 1/22; Surgery was consulted for tracheostomy which is pending conversation with family regarding goals of care.      Interval History:  Patient seen and examined.  Currently on trach for tracheostomy placement on Wednesday.    Review of Systems   Unable to perform ROS: Intubated     Objective:     Vital Signs (Most Recent):  Temp: 99.2 °F (37.3 °C) (01/24/22 1600)  Pulse: 65 (01/24/22 2000)  Resp: (!) 23 (01/24/22 2000)  BP: (!) 106/53 (01/24/22 2000)  SpO2: 100 % (01/24/22 2000) Vital Signs (24h Range):  Temp:  [97.8 °F (36.6 °C)-99.2 °F (37.3 °C)] 99.2 °F (37.3 °C)  Pulse:  [58-71] 65  Resp:  [18-42] 23  SpO2:  [95 %-100 %] 100 %  BP: ()/(47-62) 106/53     Weight: 70.3 kg (154 lb 15.7 oz)  Body mass index is 26.6 kg/m².    Intake/Output Summary (Last 24 hours) at 1/24/2022 2023  Last data filed at 1/24/2022 1921  Gross per 24 hour   Intake 1192.3 ml   Output 785 ml   Net 407.3 ml      Physical Exam  Vitals and nursing note  reviewed.   Constitutional:       General: She is not in acute distress.     Appearance: She is not diaphoretic.      Comments: Intubated, sedated   HENT:      Mouth/Throat:      Pharynx: No oropharyngeal exudate.      Comments: ETT/NG tube noted in place.  Eyes:      General:         Right eye: No discharge.         Left eye: No discharge.      Comments: Pupils sluggish   Neck:      Thyroid: No thyromegaly.      Vascular: No JVD.      Trachea: No tracheal deviation.   Cardiovascular:      Heart sounds: No murmur heard.  No friction rub. No gallop.    Pulmonary:      Effort: No respiratory distress.      Breath sounds: No wheezing or rales.      Comments: Breath sounds coarse on ventilator.  Abdominal:      General: There is no distension.      Palpations: Abdomen is soft.      Tenderness: There is no abdominal tenderness.   Genitourinary:     Comments: Jackson catheter noted in place.  Musculoskeletal:         General: No tenderness or deformity.   Skin:     Capillary Refill: Capillary refill takes 2 to 3 seconds.      Findings: No erythema or rash.   Neurological:      Motor: No abnormal muscle tone.      Deep Tendon Reflexes: Reflexes normal.      Comments: Patient sedated, unresponsive at present         Significant Labs: All pertinent labs within the past 24 hours have been reviewed.    Significant Imaging: I have reviewed all pertinent imaging results/findings within the past 24 hours.      Assessment/Plan:      * Acute hypoxemic respiratory failure  Secondary to aspiration pneumonia. GBS, Klebsiella and Staph aureus in sputum.  -Pulmonary consulted; re-intubated 1/21  -May need another tracheostomy; Surgery consulted  -Continue vancomycin, doxycycline and Flagyl  -CXR and ABG PRN  -Continuous pulse oximetry  -Palliative Care consulted  -patient going for tracheostomy on 01/26 per pulmonology and patient conversation.    Pneumonia  Patient with current diagnosis of pneumonia due to bacterial etiology due to   "MRSA, Haemophilus and GBS which is uncontrolled due to persistent hypoxia . I have reviewed the pertinent imaging. Current antimicrobial regimen consists of   Antibiotics (From admission, onward)            Start     Stop Route Frequency Ordered    01/23/22 1100  vancomycin 1.5 g in dextrose 5 % 250 mL IVPB (ready to mix)         -- IV Every 24 hours (non-standard times) 01/23/22 1026    01/23/22 1047  vancomycin - pharmacy to dose  (vancomycin IVPB)        "And" Linked Group Details    -- IV pharmacy to manage frequency 01/23/22 0947    01/21/22 0730  doxycycline (VIBRAMYCIN) 100 mg in dextrose 5 % 250 mL IVPB         -- IV Every 12 hours (non-standard times) 01/21/22 0617    01/19/22 1200  vancomycin 125 mg/5 mL oral solution 125 mg  (C. difficile Infection (CDI) Treatment Order Panel)         01/29 1159 PER G TUBE Every 6 hours 01/19/22 0920    01/18/22 1545  metronidazole IVPB 500 mg         -- IV Every 8 hours (non-standard times) 01/18/22 1441      . Cultures drawn and noted-   Microbiology Results (last 7 days)     Procedure Component Value Units Date/Time    Blood culture [[id number]] Collected: 01/18/22 1726    Order Status: Completed Specimen: Blood from Antecubital, Left Arm Updated: 01/23/22 2322     Blood Culture, Routine No growth after 5 days.    Blood culture [[id number]] Collected: 01/18/22 1802    Order Status: Completed Specimen: Blood from Antecubital, Right Arm Updated: 01/23/22 2322     Blood Culture, Routine No growth after 5 days.    Culture, Respiratory with Gram Stain [[id number]]  (Abnormal)  (Susceptibility) Collected: 01/19/22 0114    Order Status: Completed Specimen: Sputum, Expectorated Updated: 01/23/22 1045     Respiratory Culture No Pseudomonas isolated.      STREPTOCOCCUS AGALACTIAE (GROUP B)  Few  Beta-hemolytic streptococci are routinely susceptible to   penicillins,cephalosporins and carbapenems.  Susceptibility testing not routinely performed        KLEBSIELLA PNEUMONIAE  Rare   "      METHICILLIN RESISTANT STAPHYLOCOCCUS AUREUS  Few       Gram Stain (Respiratory) <10 epithelial cells per low power field.     Gram Stain (Respiratory) Rare WBC's     Gram Stain (Respiratory) Rare Gram positive cocci     Gram Stain (Respiratory) Rare yeast    C Diff Toxin by PCR [[id number]]  (Abnormal) Collected: 01/18/22 0846    Order Status: Completed Updated: 01/18/22 2355     C. diff PCR Positive    Blood culture [[id number]]     Order Status: Canceled Specimen: Blood     Blood culture [[id number]]     Order Status: Canceled Specimen: Blood     Clostridium difficile EIA [[id number]]  (Abnormal) Collected: 01/18/22 0846    Order Status: Completed Specimen: Stool Updated: 01/18/22 1109     C. diff Antigen Positive     C difficile Toxins A+B, EIA Negative     Comment: Testing not recommended for children <24 months old.          Will monitor patient closely and continue current treatment plan unchanged.        Aspiration into airway  Patient to go for tracheostomy on Wednesday per      Atrial fibrillation  -Telemetry  -Metoprolol 5 IV PRN for HR > 130  -Defer full dose anticoagulation at this time given thrombocytopenia and critical illness  -Metoprolol 25 BID      ARDS (adult respiratory distress syndrome)  Moderate.  -Pulmonary consulted  -Treat aspiration pneumonia and sepsis  -Mechanical ventilation with sedation; PEEP and TV per Pulmonary (ARDSnet)  -Patient not proned    Gastrostomy tube dependent  Likely cause of aspiration.  -Care per nursing  -Tube feedings through OG      Type 2 diabetes mellitus, with long-term current use of insulin  -  Patient's FSGs are controlled on current medication regimen.  Last A1c reviewed- No results found for: LABA1C, HGBA1C  Most recent fingerstick glucose reviewed- Recent Labs   Lab 01/23/22 2048 01/24/22  1826   POCTGLUCOSE 181* 102     Current correctional scale  Medium  Maintain anti-hyperglycemic dose as follows-   Antihyperglycemics (From admission, onward)             Start     Stop Route Frequency Ordered    01/18/22 1438  insulin aspart U-100 pen 1-10 Units         -- SubQ Before meals & nightly PRN 01/18/22 1438        Hold Oral hypoglycemics while patient is in the hospital.        YARI (generalized anxiety disorder)  Currently intubated and sedated      Hypothyroidism  Patient has chronic hypothyroidism. TFTs reviewed-   Lab Results   Component Value Date    TSH 1.130 07/13/2017   . Will continue chronic levothyroxine and adjust for and clinical changes.        Hyperlipidemia   Patient is chronically on statin.will continue for now. Monitor clinically. Last LDL was   Lab Results   Component Value Date    LDLCALC 94.4 07/13/2017          GERD (gastroesophageal reflux disease)  -Hold PPI given C diff colitis  -IV famotidine        VTE Risk Mitigation (From admission, onward)         Ordered     enoxaparin injection 40 mg  Daily         01/18/22 1438     IP VTE HIGH RISK PATIENT  Once         01/18/22 1438     Place sequential compression device  Until discontinued         01/18/22 1438                Discharge Planning   DEMETRIUS:      Code Status: Partial Code   Is the patient medically ready for discharge?:     Reason for patient still in hospital (select all that apply): Treatment  Discharge Plan A: Return to nursing home            Critical care time spent on the evaluation and treatment of severe organ dysfunction, review of pertinent labs and imaging studies, discussions with consulting providers and discussions with patient/family: 42 minutes.      Braulio Armendariz MD  Department of Hospital Medicine   [facility]

## 2022-01-25 NOTE — CARE UPDATE
01/25/22 0932   PRE-TX-O2   Oxygen Concentration (%) 40   SpO2 (!) 92 %   Pulse 68   Resp (!) 24   ETCO2   ETCO2 (mmHg) 33 mmHg   Vent Select   Charged w/in last 24h YES   Preset Conventional Ventilator Settings   Vent Mode SPONT-PS   PEEP/CPAP 5.3 cmH20   Pressure Support 5 cmH20   Patient Ventilator Parameters   Resp Rate Total 24 br/min   Peak Airway Pressure 10.9 cmH2O   Exhaled Vt 380 mL   Total Ve 8.96 mL   Spont Ve 8.96 L   I:E Ratio Measured 1:1   Inspired Tidal Volume (VTI) 380 mL   Conventional Ventilator Alarms   Apnea Rate 15   Apnea Volume (mL) 300 mL   Ready to Wean/Extubation Screen   FIO2<=50 (chart decimal) 0.4   MV<16L (chart vol.) 8.96   PEEP <=8 (chart #) 5.3   Ready to Wean Parameters   F/VT Ratio<105 (RSBI) (!) 63.16

## 2022-01-25 NOTE — ASSESSMENT & PLAN NOTE
Patient has chronic hypothyroidism. TFTs reviewed-   Lab Results   Component Value Date    TSH 1.130 07/13/2017   . Will continue chronic levothyroxine and adjust for and clinical changes.

## 2022-01-26 LAB
ALBUMIN SERPL BCP-MCNC: 1.9 G/DL (ref 3.5–5.2)
ALLENS TEST: NORMAL
ALP SERPL-CCNC: 72 U/L (ref 55–135)
ALT SERPL W/O P-5'-P-CCNC: 16 U/L (ref 10–44)
ANION GAP SERPL CALC-SCNC: 11 MMOL/L (ref 8–16)
AST SERPL-CCNC: 15 U/L (ref 10–40)
BASOPHILS # BLD AUTO: 0.02 K/UL (ref 0–0.2)
BASOPHILS NFR BLD: 0.1 % (ref 0–1.9)
BILIRUB SERPL-MCNC: 0.6 MG/DL (ref 0.1–1)
BUN SERPL-MCNC: 21 MG/DL (ref 8–23)
CALCIUM SERPL-MCNC: 8.6 MG/DL (ref 8.7–10.5)
CHLORIDE SERPL-SCNC: 99 MMOL/L (ref 95–110)
CO2 SERPL-SCNC: 23 MMOL/L (ref 23–29)
CREAT SERPL-MCNC: 0.7 MG/DL (ref 0.5–1.4)
DELSYS: NORMAL
DIFFERENTIAL METHOD: ABNORMAL
EOSINOPHIL # BLD AUTO: 0 K/UL (ref 0–0.5)
EOSINOPHIL NFR BLD: 0.1 % (ref 0–8)
ERYTHROCYTE [DISTWIDTH] IN BLOOD BY AUTOMATED COUNT: 13.9 % (ref 11.5–14.5)
EST. GFR  (AFRICAN AMERICAN): >60 ML/MIN/1.73 M^2
EST. GFR  (NON AFRICAN AMERICAN): >60 ML/MIN/1.73 M^2
FIO2: 40
GLUCOSE SERPL-MCNC: 201 MG/DL (ref 70–110)
HCO3 UR-SCNC: 25.6 MMOL/L (ref 24–28)
HCT VFR BLD AUTO: 23.7 % (ref 37–48.5)
HGB BLD-MCNC: 7.8 G/DL (ref 12–16)
IMM GRANULOCYTES # BLD AUTO: 0.85 K/UL (ref 0–0.04)
IMM GRANULOCYTES NFR BLD AUTO: 4.4 % (ref 0–0.5)
LYMPHOCYTES # BLD AUTO: 1.1 K/UL (ref 1–4.8)
LYMPHOCYTES NFR BLD: 5.9 % (ref 18–48)
MAGNESIUM SERPL-MCNC: 1.6 MG/DL (ref 1.6–2.6)
MCH RBC QN AUTO: 31.6 PG (ref 27–31)
MCHC RBC AUTO-ENTMCNC: 32.9 G/DL (ref 32–36)
MCV RBC AUTO: 96 FL (ref 82–98)
MIN VOL: 8.3
MODE: NORMAL
MONOCYTES # BLD AUTO: 0.3 K/UL (ref 0.3–1)
MONOCYTES NFR BLD: 1.4 % (ref 4–15)
NEUTROPHILS # BLD AUTO: 16.9 K/UL (ref 1.8–7.7)
NEUTROPHILS NFR BLD: 88.1 % (ref 38–73)
NRBC BLD-RTO: 0 /100 WBC
PCO2 BLDA: 38.8 MMHG (ref 35–45)
PEEP: 5
PH SMN: 7.43 [PH] (ref 7.35–7.45)
PHOSPHATE SERPL-MCNC: 3.2 MG/DL (ref 2.7–4.5)
PLATELET # BLD AUTO: 214 K/UL (ref 150–450)
PMV BLD AUTO: 11.2 FL (ref 9.2–12.9)
PO2 BLDA: 86 MMHG (ref 80–100)
POC BE: 1 MMOL/L
POC SATURATED O2: 97 % (ref 95–100)
POC TCO2: 27 MMOL/L (ref 23–27)
POCT GLUCOSE: 174 MG/DL (ref 70–110)
POCT GLUCOSE: 188 MG/DL (ref 70–110)
POCT GLUCOSE: 191 MG/DL (ref 70–110)
POCT GLUCOSE: 244 MG/DL (ref 70–110)
POCT GLUCOSE: 276 MG/DL (ref 70–110)
POTASSIUM SERPL-SCNC: 4 MMOL/L (ref 3.5–5.1)
PROT SERPL-MCNC: 5.7 G/DL (ref 6–8.4)
PS: 10
RBC # BLD AUTO: 2.47 M/UL (ref 4–5.4)
SAMPLE: NORMAL
SITE: NORMAL
SODIUM SERPL-SCNC: 133 MMOL/L (ref 136–145)
SP02: 99
SPONT RATE: 19
WBC # BLD AUTO: 19.14 K/UL (ref 3.9–12.7)

## 2022-01-26 PROCEDURE — 87186 SC STD MICRODIL/AGAR DIL: CPT | Mod: 59 | Performed by: INTERNAL MEDICINE

## 2022-01-26 PROCEDURE — 99291 PR CRITICAL CARE, E/M 30-74 MINUTES: ICD-10-PCS | Mod: ,,, | Performed by: INTERNAL MEDICINE

## 2022-01-26 PROCEDURE — 99900026 HC AIRWAY MAINTENANCE (STAT)

## 2022-01-26 PROCEDURE — S0030 INJECTION, METRONIDAZOLE: HCPCS | Performed by: STUDENT IN AN ORGANIZED HEALTH CARE EDUCATION/TRAINING PROGRAM

## 2022-01-26 PROCEDURE — 20000000 HC ICU ROOM

## 2022-01-26 PROCEDURE — 80053 COMPREHEN METABOLIC PANEL: CPT | Performed by: STUDENT IN AN ORGANIZED HEALTH CARE EDUCATION/TRAINING PROGRAM

## 2022-01-26 PROCEDURE — 85025 COMPLETE CBC W/AUTO DIFF WBC: CPT | Performed by: STUDENT IN AN ORGANIZED HEALTH CARE EDUCATION/TRAINING PROGRAM

## 2022-01-26 PROCEDURE — 63600175 PHARM REV CODE 636 W HCPCS: Performed by: INTERNAL MEDICINE

## 2022-01-26 PROCEDURE — 99499 UNLISTED E&M SERVICE: CPT | Mod: ,,, | Performed by: STUDENT IN AN ORGANIZED HEALTH CARE EDUCATION/TRAINING PROGRAM

## 2022-01-26 PROCEDURE — 82803 BLOOD GASES ANY COMBINATION: CPT

## 2022-01-26 PROCEDURE — 63600175 PHARM REV CODE 636 W HCPCS: Performed by: NURSE PRACTITIONER

## 2022-01-26 PROCEDURE — 25000003 PHARM REV CODE 250: Performed by: STUDENT IN AN ORGANIZED HEALTH CARE EDUCATION/TRAINING PROGRAM

## 2022-01-26 PROCEDURE — 27000221 HC OXYGEN, UP TO 24 HOURS

## 2022-01-26 PROCEDURE — 87070 CULTURE OTHR SPECIMN AEROBIC: CPT | Performed by: INTERNAL MEDICINE

## 2022-01-26 PROCEDURE — 99499 NO LOS: ICD-10-PCS | Mod: ,,, | Performed by: STUDENT IN AN ORGANIZED HEALTH CARE EDUCATION/TRAINING PROGRAM

## 2022-01-26 PROCEDURE — 87077 CULTURE AEROBIC IDENTIFY: CPT | Mod: 59 | Performed by: INTERNAL MEDICINE

## 2022-01-26 PROCEDURE — 84100 ASSAY OF PHOSPHORUS: CPT | Performed by: STUDENT IN AN ORGANIZED HEALTH CARE EDUCATION/TRAINING PROGRAM

## 2022-01-26 PROCEDURE — 63600175 PHARM REV CODE 636 W HCPCS: Performed by: STUDENT IN AN ORGANIZED HEALTH CARE EDUCATION/TRAINING PROGRAM

## 2022-01-26 PROCEDURE — 87205 SMEAR GRAM STAIN: CPT | Performed by: INTERNAL MEDICINE

## 2022-01-26 PROCEDURE — 36600 WITHDRAWAL OF ARTERIAL BLOOD: CPT

## 2022-01-26 PROCEDURE — 99900035 HC TECH TIME PER 15 MIN (STAT)

## 2022-01-26 PROCEDURE — 27000207 HC ISOLATION

## 2022-01-26 PROCEDURE — 83735 ASSAY OF MAGNESIUM: CPT | Performed by: STUDENT IN AN ORGANIZED HEALTH CARE EDUCATION/TRAINING PROGRAM

## 2022-01-26 PROCEDURE — 25000003 PHARM REV CODE 250: Performed by: INTERNAL MEDICINE

## 2022-01-26 PROCEDURE — 99291 CRITICAL CARE FIRST HOUR: CPT | Mod: ,,, | Performed by: INTERNAL MEDICINE

## 2022-01-26 PROCEDURE — 94761 N-INVAS EAR/PLS OXIMETRY MLT: CPT

## 2022-01-26 RX ORDER — MAGNESIUM SULFATE HEPTAHYDRATE 40 MG/ML
2 INJECTION, SOLUTION INTRAVENOUS ONCE
Status: COMPLETED | OUTPATIENT
Start: 2022-01-26 | End: 2022-01-26

## 2022-01-26 RX ADMIN — DOXYCYCLINE 100 MG: 100 INJECTION, POWDER, LYOPHILIZED, FOR SOLUTION INTRAVENOUS at 06:01

## 2022-01-26 RX ADMIN — SENNOSIDES AND DOCUSATE SODIUM 1 TABLET: 50; 8.6 TABLET ORAL at 08:01

## 2022-01-26 RX ADMIN — INSULIN ASPART 6 UNITS: 100 INJECTION, SOLUTION INTRAVENOUS; SUBCUTANEOUS at 06:01

## 2022-01-26 RX ADMIN — POLYETHYLENE GLYCOL 3350 17 G: 17 POWDER, FOR SOLUTION ORAL at 08:01

## 2022-01-26 RX ADMIN — VANCOMYCIN HYDROCHLORIDE 1500 MG: 1.5 INJECTION, POWDER, LYOPHILIZED, FOR SOLUTION INTRAVENOUS at 12:01

## 2022-01-26 RX ADMIN — FAMOTIDINE 20 MG: 10 INJECTION INTRAVENOUS at 08:01

## 2022-01-26 RX ADMIN — METOCLOPRAMIDE HYDROCHLORIDE 10 MG: 5 SOLUTION ORAL at 12:01

## 2022-01-26 RX ADMIN — METOPROLOL TARTRATE 12.5 MG: 25 TABLET, FILM COATED ORAL at 08:01

## 2022-01-26 RX ADMIN — MAGNESIUM SULFATE 2 G: 2 INJECTION INTRAVENOUS at 05:01

## 2022-01-26 RX ADMIN — MIDODRINE HYDROCHLORIDE 5 MG: 5 TABLET ORAL at 08:01

## 2022-01-26 RX ADMIN — LEVOTHYROXINE SODIUM 25 MCG: 0.03 TABLET ORAL at 05:01

## 2022-01-26 RX ADMIN — ENOXAPARIN SODIUM 40 MG: 100 INJECTION SUBCUTANEOUS at 05:01

## 2022-01-26 RX ADMIN — METRONIDAZOLE 500 MG: 500 INJECTION, SOLUTION INTRAVENOUS at 03:01

## 2022-01-26 RX ADMIN — MIDODRINE HYDROCHLORIDE 5 MG: 5 TABLET ORAL at 03:01

## 2022-01-26 RX ADMIN — METOCLOPRAMIDE HYDROCHLORIDE 10 MG: 5 SOLUTION ORAL at 05:01

## 2022-01-26 RX ADMIN — METRONIDAZOLE 500 MG: 500 INJECTION, SOLUTION INTRAVENOUS at 08:01

## 2022-01-26 RX ADMIN — INSULIN ASPART 2 UNITS: 100 INJECTION, SOLUTION INTRAVENOUS; SUBCUTANEOUS at 12:01

## 2022-01-26 RX ADMIN — DOXYCYCLINE 100 MG: 100 INJECTION, POWDER, LYOPHILIZED, FOR SOLUTION INTRAVENOUS at 05:01

## 2022-01-26 RX ADMIN — CITALOPRAM HYDROBROMIDE 40 MG: 10 TABLET ORAL at 08:01

## 2022-01-26 RX ADMIN — METHYLPREDNISOLONE SODIUM SUCCINATE 40 MG: 40 INJECTION, POWDER, FOR SOLUTION INTRAMUSCULAR; INTRAVENOUS at 12:01

## 2022-01-26 RX ADMIN — Medication 125 MG: at 12:01

## 2022-01-26 RX ADMIN — ATORVASTATIN CALCIUM 40 MG: 40 TABLET, FILM COATED ORAL at 08:01

## 2022-01-26 RX ADMIN — METRONIDAZOLE 500 MG: 500 INJECTION, SOLUTION INTRAVENOUS at 12:01

## 2022-01-26 RX ADMIN — Medication 125 MG: at 05:01

## 2022-01-26 RX ADMIN — INSULIN ASPART 2 UNITS: 100 INJECTION, SOLUTION INTRAVENOUS; SUBCUTANEOUS at 06:01

## 2022-01-26 RX ADMIN — INSULIN ASPART 1 UNITS: 100 INJECTION, SOLUTION INTRAVENOUS; SUBCUTANEOUS at 08:01

## 2022-01-26 NOTE — PROGRESS NOTES
[facility] Hospital Medicine  Progress Note    Patient Name: Sandra Wilson  MRN: [id number]  Patient Class: IP- Inpatient   Admission Date: 1/18/2022  Length of Stay: 7 days  Attending Physician: Braulio Armendariz MD  Primary Care Provider: Primary Doctor No        Subjective:     Principal Problem:Acute hypoxemic respiratory failure        HPI:  Sandra Wilson is an 81 year old female with a past medical history of CVAs, CNS aneurysm, PEG status, CAD, HLD, hypothryoidism, DM, aortic stenosis, and colon, breast, and ovarian cancer who presented from long term care with vomiting, diarrhea, and shortness of breath. Workup in the ED showed likely aspiration pneumonitis and C. Diff colitis. Her O2 requirement increased while in the ED requiring intubation with sedation. She also required Levophed as she likely developed septic shock. Antibiotics were broadened to cefepime, vancomycin, and Flagyl. Pulmonary was consulted. Family was notified. The patient was unable to provide history given acuity of illness.      Overview/Hospital Course:  Sandra Wilson is an 81 year old female with a past medical history of CVAs, CNS aneurysm, PEG status, CAD, HLD, hypothyroidism, DM, aortic stenosis, and colon, breast, and ovarian cancer who presented from long term care with vomiting, diarrhea, and shortness of breath secondary to acute hypoxic respiratory failure from aspiration pneumonia in the setting of C diff colitis leading to septic shock. She was intubated in the ED and started on Levophed infusion via RIJ CVC placed by Dr. Vignesh Gaspar. She was started on broad spectrum antibiotics with cefepime, Flagyl, vancomycin and admitted to the ICU. She was also started on enteral vancomycin for severe C diff colitis given elevated WBC count and STEFFANY. There was concern for developing ARDS given P/F ratio of 87 (severe); 190 1/22 (moderate). Pulmonary was consulted. She also presented with demand ischemia likely  secondary to septic shock. Troponin was trended and improved as shock resolved. Normal saline infusion was added for hyponatremia and STEFFANY which improved both. Levophed was able to be weaned. Family agreed to DNR status 1/18 and Palliative Care was consulted to discuss goal of care 1/19. Antibiotics were changed to doxycycline and Flagyl (GBS in sputum culture) 1/21; vancomycin was added 1/23 after culture also became positive for Staph aureus and Klebsiella. Her respiratory status has improved throughout her course and she was extubated (code changed to partial code for intubation) 1/21 only to be re-intubated for worsening stridor (history of tracheostomy). Upon re-intubation, copious amounts of secretions were suctioned. KUB suggested a mild ileus A bowel regimen was instituted and the patient was able to have bowel movements 1/22. Tube feeds were started 1/23. Her course was complicated by Afib as well noted on telemetry 1/21; metoprolol tartrate started 1/22.She failed a leak test 1/22; Surgery was consulted for tracheostomy which is pending conversation with family regarding goals of care.      Interval History:  Patient seen and examined.  Plan of care discussed with pulmonology.  Plan is to currently attempt trial extubation tomorrow morning, and if that fails to proceed forward with tracheostomy.    Review of Systems   Unable to perform ROS: Intubated     Objective:     Vital Signs (Most Recent):  Temp: 98.9 °F (37.2 °C) (01/25/22 2000)  Pulse: 74 (01/25/22 2000)  Resp: (!) 23 (01/25/22 2000)  BP: (!) 121/56 (01/25/22 2000)  SpO2: 100 % (01/25/22 2000) Vital Signs (24h Range):  Temp:  [75.2 °F (24 °C)-99.2 °F (37.3 °C)] 98.9 °F (37.2 °C)  Pulse:  [60-82] 74  Resp:  [20-76] 23  SpO2:  [92 %-100 %] 100 %  BP: ()/(45-83) 121/56     Weight: 70.3 kg (154 lb 15.7 oz)  Body mass index is 26.6 kg/m².    Intake/Output Summary (Last 24 hours) at 1/25/2022 8991  Last data filed at 1/25/2022 2000  Gross per 24  hour   Intake 1793.83 ml   Output 1200 ml   Net 593.83 ml      Physical Exam  Vitals and nursing note reviewed.   Constitutional:       General: She is not in acute distress.     Appearance: She is not diaphoretic.      Comments: Intubated, sedated   HENT:      Mouth/Throat:      Pharynx: No oropharyngeal exudate.      Comments: ETT/NG tube noted in place.  Eyes:      General:         Right eye: No discharge.         Left eye: No discharge.      Comments: Pupils sluggish   Neck:      Thyroid: No thyromegaly.      Vascular: No JVD.      Trachea: No tracheal deviation.   Cardiovascular:      Heart sounds: No murmur heard.  No friction rub. No gallop.    Pulmonary:      Effort: No respiratory distress.      Breath sounds: No wheezing or rales.      Comments: Breath sounds coarse on ventilator.  Abdominal:      General: There is no distension.      Palpations: Abdomen is soft.      Tenderness: There is no abdominal tenderness.   Genitourinary:     Comments: Jackson catheter noted in place.  Musculoskeletal:         General: No tenderness or deformity.   Skin:     Capillary Refill: Capillary refill takes 2 to 3 seconds.      Findings: No erythema or rash.   Neurological:      Motor: No abnormal muscle tone.      Deep Tendon Reflexes: Reflexes normal.      Comments: Patient sedated, unresponsive at present         Significant Labs: All pertinent labs within the past 24 hours have been reviewed.    Significant Imaging: I have reviewed all pertinent imaging results/findings within the past 24 hours.      Assessment/Plan:      * Acute hypoxemic respiratory failure  Secondary to aspiration pneumonia. GBS, Klebsiella and Staph aureus in sputum.  -Pulmonary consulted; re-intubated 1/21  -May need another tracheostomy; Surgery consulted  -Continue vancomycin, doxycycline and Flagyl  -CXR and ABG PRN  -Continuous pulse oximetry  -Palliative Care consulted  -patient going for tracheostomy on 01/26 per pulmonology and patient  "conversation.    Pneumonia  Patient with current diagnosis of pneumonia due to bacterial etiology due to  MRSA, Haemophilus and GBS which is uncontrolled due to persistent hypoxia . I have reviewed the pertinent imaging. Current antimicrobial regimen consists of   Antibiotics (From admission, onward)            Start     Stop Route Frequency Ordered    01/23/22 1100  vancomycin 1.5 g in dextrose 5 % 250 mL IVPB (ready to mix)         -- IV Every 24 hours (non-standard times) 01/23/22 1026    01/23/22 1047  vancomycin - pharmacy to dose  (vancomycin IVPB)        "And" Linked Group Details    -- IV pharmacy to manage frequency 01/23/22 0947    01/21/22 0730  doxycycline (VIBRAMYCIN) 100 mg in dextrose 5 % 250 mL IVPB         -- IV Every 12 hours (non-standard times) 01/21/22 0617    01/19/22 1200  vancomycin 125 mg/5 mL oral solution 125 mg  (C. difficile Infection (CDI) Treatment Order Panel)         01/29 1159 PER G TUBE Every 6 hours 01/19/22 0920    01/18/22 1545  metronidazole IVPB 500 mg         -- IV Every 8 hours (non-standard times) 01/18/22 1441      . Cultures drawn and noted-   Microbiology Results (last 7 days)     Procedure Component Value Units Date/Time    Blood culture [[id number]] Collected: 01/18/22 1726    Order Status: Completed Specimen: Blood from Antecubital, Left Arm Updated: 01/23/22 2322     Blood Culture, Routine No growth after 5 days.    Blood culture [[id number]] Collected: 01/18/22 1802    Order Status: Completed Specimen: Blood from Antecubital, Right Arm Updated: 01/23/22 2322     Blood Culture, Routine No growth after 5 days.    Culture, Respiratory with Gram Stain [[id number]]  (Abnormal)  (Susceptibility) Collected: 01/19/22 0114    Order Status: Completed Specimen: Sputum, Expectorated Updated: 01/23/22 1045     Respiratory Culture No Pseudomonas isolated.      STREPTOCOCCUS AGALACTIAE (GROUP B)  Few  Beta-hemolytic streptococci are routinely susceptible to "   penicillins,cephalosporins and carbapenems.  Susceptibility testing not routinely performed        KLEBSIELLA PNEUMONIAE  Rare        METHICILLIN RESISTANT STAPHYLOCOCCUS AUREUS  Few       Gram Stain (Respiratory) <10 epithelial cells per low power field.     Gram Stain (Respiratory) Rare WBC's     Gram Stain (Respiratory) Rare Gram positive cocci     Gram Stain (Respiratory) Rare yeast    C Diff Toxin by PCR [[id number]]  (Abnormal) Collected: 01/18/22 0846    Order Status: Completed Updated: 01/18/22 6220     C. diff PCR Positive       Will monitor patient closely and continue current treatment plan unchanged.        Aspiration into airway  Patient to go for tracheostomy on Wednesday per      Atrial fibrillation  -Telemetry  -Metoprolol 5 IV PRN for HR > 130  -Defer full dose anticoagulation at this time given thrombocytopenia and critical illness  -Metoprolol 25 BID      ARDS (adult respiratory distress syndrome)  Moderate.  -Pulmonary consulted  -Treat aspiration pneumonia and sepsis  -Mechanical ventilation with sedation; PEEP and TV per Pulmonary (ARDSnet)  -Patient not proned    Gastrostomy tube dependent  Likely cause of aspiration.  -Care per nursing  -Tube feedings through OG      Type 2 diabetes mellitus, with long-term current use of insulin  -  Patient's FSGs are controlled on current medication regimen.  Last A1c reviewed- No results found for: LABA1C, HGBA1C  Most recent fingerstick glucose reviewed-   Recent Labs   Lab 01/24/22  2240 01/25/22  1026 01/25/22  1825   POCTGLUCOSE 167* 92 218*     Current correctional scale  Medium  Maintain anti-hyperglycemic dose as follows-   Antihyperglycemics (From admission, onward)            Start     Stop Route Frequency Ordered    01/18/22 1438  insulin aspart U-100 pen 1-10 Units         -- SubQ Before meals & nightly PRN 01/18/22 1438        Hold Oral hypoglycemics while patient is in the hospital.        YARI (generalized anxiety disorder)  Currently  intubated and sedated      Hypothyroidism  Patient has chronic hypothyroidism. TFTs reviewed-   Lab Results   Component Value Date    TSH 1.130 07/13/2017   . Will continue chronic levothyroxine and adjust for and clinical changes.        Hyperlipidemia   Patient is chronically on statin.will continue for now. Monitor clinically. Last LDL was   Lab Results   Component Value Date    LDLCALC 94.4 07/13/2017          GERD (gastroesophageal reflux disease)  -Hold PPI given C diff colitis  -IV famotidine        VTE Risk Mitigation (From admission, onward)         Ordered     enoxaparin injection 40 mg  Daily         01/18/22 1438     IP VTE HIGH RISK PATIENT  Once         01/18/22 1438     Place sequential compression device  Until discontinued         01/18/22 1438                Discharge Planning   DEMETRIUS:      Code Status: Partial Code   Is the patient medically ready for discharge?:     Reason for patient still in hospital (select all that apply): Treatment  Discharge Plan A: Return to nursing home            Critical care time spent on the evaluation and treatment of severe organ dysfunction, review of pertinent labs and imaging studies, discussions with consulting providers and discussions with patient/family: 36 minutes.      Braulio Armendariz MD  Department of Hospital Medicine   [facility]

## 2022-01-26 NOTE — PROGRESS NOTES
"  01/26/2022      Admit Date: 1/18/2022  Sandra Wilson  New Patient Consult    Chief Complaint   Patient presents with    Shortness of Breath    Vomiting     PEG tube        History of Present Illness:  Intubated.          From Dr Gaspar's hpi, ERP:Sandra Wilson is a 81 y.o. female who presents to the ED via EMS  with an onset of vomiting and SOB. EMS reported that upon examination patient was less responsive than usually but is able to answer questions. The report from the EMS upon arrival was an O2 sats in the 70's. EMS proceeded to give supplemental oxygen and the O2 sats raised up to 93% on 3 liters of O2 with  "junky" right upper lung sounds" and greenish diarrhea. She was vomiting en route. They also reported that the patient is a patient that has dislodged her PEG tubing many times. PMHx of HTN, CAD, OA, GERD, ctroke, Colon cancer, ovarian cancer, breast cancer.PSHx of Cardiac catheterization, colonoscopy.         Progress Note  PULMONARY    Admit Date: 1/18/2022 01/26/2022      SUBJECTIVE:     1/19 intubated, sedated,  1/20 no c/o intubated, sedated  1/21 no new c/o intubated  1/22 no c/o intubated   1/23 no  New c/o intubated  1/24 no new c/o  1/25 no new c/o  1/26 no new co      PFSH and Allergies reviewed.    OBJECTIVE:     Vitals (Most recent):  Vitals:    01/26/22 0600   BP: (!) 119/55   Pulse: 67   Resp: 17   Temp:        Vitals (24 hour range):  Temp:  [75.2 °F (24 °C)-99.2 °F (37.3 °C)]   Pulse:  [63-82]   Resp:  [17-76]   BP: ()/(47-83)   SpO2:  [91 %-100 %]       Intake/Output Summary (Last 24 hours) at 1/26/2022 0655  Last data filed at 1/26/2022 0619  Gross per 24 hour   Intake 2197.39 ml   Output 1175 ml   Net 1022.39 ml        Too value is ED when Physical Exam:  The patient's neuro status (alertness,orientation,cognitive function,motor skills,), pharyngeal exam (oral lesions, hygiene, abn dentition,), Neck (jvd,mass,thyroid,nodes in neck and above/below " clavicle),RESPIRATORY(symmetry,effort,fremitus,percussion,auscultation),  Cor(rhythm,heart tones including gallops,perfusion,edema)ABD(distention,hepatic&splenomegaly,tenderness,masses), Skin(rash,cyanosis),Psyc(affect,judgement,).  Exam negative except for these pertinent findings:    Et tube-- removed,no stridor, fair voice, arouses, tracking, not consistently followin commands, chest is symmetric, no distress, normal percussion, normal fremitus and good anterior breath sounds      Radiographs reviewed: view by direct vision    Results for orders placed during the hospital encounter of 01/18/22    X-Ray Chest 1 View    Narrative  EXAMINATION:  Mm and is is  XR CHEST 1 VIEW    CLINICAL HISTORY:  respiratory failure;    TECHNIQUE:  Single frontal view of the chest was performed.    COMPARISON:  Chest portable of January 18, 2022    FINDINGS:  An endotracheal tube ends in the trachea above the level the malorie.  An NG tube traverses the esophagus to the stomach.  A central line enters at the right neck and ends in the superior vena cava.  The cardiac size and contours within normal limits.  A loop recorder is noted in the left chest.  There is continued central right lung infiltrate and small infiltrate at the left lung base.  No pneumothorax is seen.    Impression  Continued intrapulmonary infiltrates right greater than left.  Endotracheal tube, NG tube and right central lines in position.      Electronically signed by: Mathew Yanez MD  Date:    01/18/2022  Time:    15:50  ]    Labs     Recent Labs   Lab 01/26/22  0343   WBC 19.14*   HGB 7.8*   HCT 23.7*        Recent Labs   Lab 01/26/22  0343   *   K 4.0   CL 99   CO2 23   BUN 21   CREATININE 0.7   *   CALCIUM 8.6*   MG 1.6   PHOS 3.2   AST 15   ALT 16   ALKPHOS 72   BILITOT 0.6   PROT 5.7*   ALBUMIN 1.9*     Recent Labs   Lab 01/26/22  0536   PH 7.427   PCO2 38.8   PO2 86   HCO3 25.6     Microbiology Results (last 7 days)     Procedure  Component Value Units Date/Time    Blood culture [[id number]] Collected: 01/18/22 1726    Order Status: Completed Specimen: Blood from Antecubital, Left Arm Updated: 01/23/22 2322     Blood Culture, Routine No growth after 5 days.    Blood culture [[id number]] Collected: 01/18/22 1802    Order Status: Completed Specimen: Blood from Antecubital, Right Arm Updated: 01/23/22 2322     Blood Culture, Routine No growth after 5 days.    Culture, Respiratory with Gram Stain [[id number]]  (Abnormal)  (Susceptibility) Collected: 01/19/22 0114    Order Status: Completed Specimen: Sputum, Expectorated Updated: 01/23/22 1045     Respiratory Culture No Pseudomonas isolated.      STREPTOCOCCUS AGALACTIAE (GROUP B)  Few  Beta-hemolytic streptococci are routinely susceptible to   penicillins,cephalosporins and carbapenems.  Susceptibility testing not routinely performed        KLEBSIELLA PNEUMONIAE  Rare        METHICILLIN RESISTANT STAPHYLOCOCCUS AUREUS  Few       Gram Stain (Respiratory) <10 epithelial cells per low power field.     Gram Stain (Respiratory) Rare WBC's     Gram Stain (Respiratory) Rare Gram positive cocci     Gram Stain (Respiratory) Rare yeast        Echo   Summary    · Mild concentric hypertrophy and normal systolic function.  · Mild-to-moderate mitral regurgitation.  · Mild tricuspid regurgitation.  · Mild pulmonic regurgitation.  · The estimated ejection fraction is 55%.  · Indeterminate left ventricular diastolic function.  · Normal right ventricular size with normal right ventricular systolic function.  · Mild left atrial enlargement.  · There is moderate-to-severe aortic valve stenosis.  · Aortic valve area is cm2; peak velocity is 3.42 m/s; mean gradient is 32 mmHg. Peak gradient 47mmHg  · Mild aortic regurgitation.  · Normal central venous pressure (3 mmHg).  · The estimated PA systolic pressure is 32 mmHg.  · There is no pulmonary hypertension.  · Moderate to severe AS. DI= 0.23 and  0.20          Impression:  Active Hospital Problems    Diagnosis  POA    *Acute hypoxemic respiratory failure [J96.01]  Yes    Atrial fibrillation [I48.91]  No    Gastrostomy tube dependent [Z93.1]  Not Applicable    ARDS (adult respiratory distress syndrome) [J80]  Yes    YARI (generalized anxiety disorder) [F41.1]  Yes    Type 2 diabetes mellitus, with long-term current use of insulin [E11.9, Z79.4]  Not Applicable    Pneumonia [J18.9]  Yes    Aspiration into airway [T17.908A]  Yes    Hypothyroidism [E03.9]  Yes    Hyperlipidemia [E78.5]  Yes     Chronic    GERD (gastroesophageal reflux disease) [K21.9]  Yes     Chronic      Resolved Hospital Problems    Diagnosis Date Resolved POA    Ileus [K56.7] 01/23/2022 No    Demand ischemia [I24.8] 01/24/2022 Yes    Sepsis [A41.9] 01/24/2022 Yes    STEFFANY (acute kidney injury) [N17.9] 01/21/2022 Yes    Hyponatremia [E87.1] 01/20/2022 Yes                        Plan: no fever, vss, was hypotensive earlier.  Cefepime/vanc,flagyl,  chr midodrine, levophed 0.9 ,  ddimer 5.2, creat 0.9  c diff ag + but toxin neg.  abg 7.27 with ox 48,   Dnr,  cxr impressive R>>>L aspiration pattern.     Prior subdural --       Recruitment manuver done with peep 30 for 6 seconds with art line bp falling from sbp 100 to 60's.  Sat improved from 92 to 94.  Pt not really peep responsive.    Pt should improve ox with left side down or prone.  Peep may help but infiltrates more R>L -- should be posterior lung bases.  Might consider cta if not progressing.      discussed with resp and nursing. Optimally would be left side down or prone.        1/19 no fever, vss with rr to 43, flagyl/zosyn/vanc/cefepime, n70-80% ox,  Wbc 14.8 from 5k,  Creat 1.5 form 0.9,   abg 02 61 with ph 7.33- peep 5,   I/o 687/133-  Will screen for dvt/pe with leg study  Will dose 500 cc saline,   F/u cxr more opacification right>> left lung.      1/20 no fever, vss, bp low at times, I.o 2770/1870,  cefepime/flagyl/vanc, ox 60%, wbc 14.6, plts 144- new, creat 1.0,. cxr with some clearing,   F/u abg, wean soon likely but need better ox to extubate.    No dvt, ddimer up at admit,     Pt min vol 8 and seemed to do well with 50% ox while I rounded.  Discussed with nursing and respiratory-- if can get to 40% and pass wean trial would recommend extubation, would be optimal to discuss with family prior plans not to re intubate.  She is progressing-- would consider not discussing comfort care if improving.      1/21 no fever, vss, tm 100, wbc 15.2, hgb 8, from 13.6 bon 18th- admit, plt 130 from 244,   Creat 0.7,   cxr progressive clearing right , some increase opacification left suggested.   Strep in sputum,  Taper abx to doxy and flagy with c diff and strep in mucous-- monitor, check    procal am.    Will change code status to allow re intubation, and  extubate this am.      On enteral vanc for c diff concerns.   Discussed with January short.  Reviewed revised code status.      Addendum-- having stridor post extubation.  Pt had aneurysm last summer with trach placed at [facility].  Pt was at [place] when patient removed trach 2 months ago.  Pt has no h/o stridor per daughter.  Stridor did not change with jaw thrush nor nt trumpet.  Discussed would recommend intubation and replace trach as best option. Discussed with Dr Charles.  1/22 no fever,vss, wbc 16.3, hgb 8.1 from 13.6 admit?  , procal is down to 3 from 17 admit,     Pt failed extubation with upper airway stridor and poor loc.      Do leak test:on vent with cuff down and tidal vol delivered 450 would need over 110 cc to leak to pass, need exhaled tidal vol to be less than 340 cc to  pass leak test -- do now and repeat q 4 x 3 total.  Will dose steroids for laryngospasm.  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  Will discuss with family later- trial extubation if passes, trach, comfort care???      Discussed with daughter, asked if code status  should be altered?  After discussion - will consider extubation trial if good leak test (would try to notify daughter if passes) --- otherwise trach.      1/23 failed leak test, suspect trach stenosis - trach Wednesday?  No fever, vss, doxy/flagyl, vanc enteral, wbc 14.3,   F/u cxr am  Called Ira, daughter.  Discussed need for Palliative care eval.      1/24 no fever, vss, doxy/flagyl/vanc/vanc iv, wbc 15.5,   cxr patchy fluffy infiltrates R>L    No change in plan, defer to palliative care.  Could do trial extubation but likely to fail. Could bronch when tube pulled?      1/25 no fever, vss, doxy/flagyl,vanc iv/enteral, wbc 17.5 hgb 7,7 from 13.6 admit, day 8  abx    Pt seems to have good leak today.  Will have resp quantify, do wean trial, decrease sedation, and consider trial extubation if does well.  1/26 no fever, vss, wbc 19.1, check sputum, good leak test and weaned well.  Loc Is very good for extubation.     Has  shunt for yrs with no problems appreciated.    Loc very good.    Discussed with daughter.    Re evaluated post extubation.  The patient can speak well.  There is no stridor.  There is no respiratory distress.  Patient perhaps is a little slow.    Called and discussed case again with daughter.  Might consider bronchoscopy later on but would not  at all at this time.  Case discussed with .        The patient had severe pharyngeal dysphagia with aspiration of thin liquids in julius aspiration of nectar thick liquids in December 2021 by speech therapy evaluation.  Patient had an ineffective cough during aspiration.    Would recommend prolonged NPO.  Tube feedings the for would be adequate.        The following were evaluated and adjusted as needed: mechanical ventilator settings and weaning status, intravenous fluids and nutritional status, sedation and neurologic status, antibiotics, hemodynamics, support tubes and access lines and invasive monitoring, acid base balance and  oxygenation needs, input and output and renal status and CODE STATUS/OUTLOOK DISCUSSED WITH AVAILABLE NEXT OF  KIN       Critical Care  - THE PATIENT HAS A HIGH PROBABILITY OF IMMINENT OR LIFE THREATENING DETERIORATION.  Over 50%time of encounter was in direct care at bedside.  Time was 30 to 74 minutes required for patient care.  Time needed for all of the above totaled 33 minutes.

## 2022-01-26 NOTE — RESPIRATORY THERAPY
Results for JENNIE FIORE (MRN [id number]) as of 1/26/2022 05:56   Ref. Range 1/26/2022 05:36   POC PH Latest Ref Range: 7.35 - 7.45  7.427   POC PCO2 Latest Ref Range: 35 - 45 mmHg 38.8   POC PO2 Latest Ref Range: 80 - 100 mmHg 86   POC BE Latest Ref Range: -2 to 2 mmol/L 1   POC HCO3 Latest Ref Range: 24 - 28 mmol/L 25.6   POC SATURATED O2 Latest Ref Range: 95 - 100 % 97   POC TCO2 Latest Ref Range: 23 - 27 mmol/L 27   FiO2 Unknown 40   PEEP Unknown 5   Sample Unknown ARTERIAL   DelSys Unknown Adult Vent   Allens Test Unknown N/A   Site Unknown RB   Mode Unknown CPAP

## 2022-01-26 NOTE — PROGRESS NOTES
[facility]  Adult Nutrition  Progress Note    SUMMARY   Intervention: enteral nutrition therapy     Recommendations  1) Continue Isosource 1.5 @ 10 mL/hr and advance to goal rate of 45 mL/hr  -  mL Q 4 hr,   - Monitor and replete electrolytes  - Will provide 1620 kcal, 73 g protein, 820 mL FW, 690 mL FWF.   - Propofol providing additional 211 kcal.   - Will meet >100% EEN, 87% EPN      2) If glucose consistently elevated change to glucerna 1.5.     3) If unable to tolerate TF advancement to goal consider PPN D 5 AA 4.25 @ 75 ml/hr + standard lipids ( 1112 kcal, 76 g protein)     4) weigh weekly, SLP consult if appropriate     Goals: 1) Initation of enteral nutrition by RD follow up 2) Nutrition support meeting > 50% of needs at f/u  Nutrition Goal Status: met/ was meeting-continues  Communication of RD Recs: POC, sticky note     Assessment and Plan  Nutrition Problem  Inadeqate energy intake     Related to (etiology):   NPO status, no TF running, dysphagia     Signs and Symptoms (as evidenced by):   Pt receiving < 50% EEN/EPN x 6 days     Interventions(treatment strategy):  Collaboration with other providers  Enteral nutrition     Nutrition Diagnosis Status:   continues     Malnutrition  Edema: 2+  Gabriel: 13 + PEG  NFPE 1/24/21 no significant wasting seen  PO intakes < 50% of needs x 5-6 days  No significant wt loss per chart review     Reason for Assessment  Reason For Assessment: follow up  Diagnosis: other (see comments) (Acute hypoxemic respiratory failure)  Relevant Medical History: GERD, HLD, HTN, CVAs, CNS aneurysm, PEG, CAD, hypothyroidism, DM, colon, breast and ovarian cancer.  Interdisciplinary Rounds: attended     General Information Comments: Remote assessment for coverage. Pt intubated and sedated, requiring levophed, on propofol, MAP 75, plan to try for extubation today per note. With PEG, noted pt has hx of removing PEG tube. C-diff positive, also noted with STEFFANY - renal  "labs improving, K, phos low, repleting. Per chart, with 9.5% wt loss over the last 2 months, significant. NFPE needed to determine malnutrition status. RD to monitor and follow up.  Nutrition Discharge Planning: Pending clinical course  1/24/22 TF recs left 1/21, TF started @ 10 ml/hr yesterday, continues at same rate. No BM. + vent-possible plan for trach placement. NFPE done 1/24/21 no significant wasting seen.  1/26/22 + PEG. TF held for possible trach placemen however now pt extubated. Plan to continue with TFs.     Discharge Planning:  TF above or cardiac, DM 1500 kcal diet     Nutrition/ Diet History  unable to obtain  Spiritual/cultural/Restorationism factors affecting PO intakes  Factors affecting PO intakes: NPO     Nutrition Risk Screen  Nutrition Risk Screen: no indicators present     Anthropometrics  Height: 5' 4" (162.6 cm)  Height (inches): 64 in  Weight Method: Bed Scale  Weight: 70.3 kg 1/24, 69 kg (admission)  Weight (lb): 152.12 lb  Ideal Body Weight (IBW), Female: 120 lb  BMI (Calculated): 26.1 admission  81.6 kg 1/2020     Lab/Procedures/Meds  Pertinent Labs Reviewed: reviewed  BMP  Lab Results   Component Value Date     (L) 01/26/2022    K 4.0 01/26/2022    CL 99 01/26/2022    CO2 23 01/26/2022    BUN 21 01/26/2022    CREATININE 0.7 01/26/2022    CALCIUM 8.6 (L) 01/26/2022    ANIONGAP 11 01/26/2022    ESTGFRAFRICA >60 01/26/2022    EGFRNONAA >60 01/26/2022     Recent Labs   Lab 01/26/22  1209   POCTGLUCOSE 191*       Pertinent Medications Reviewed: reviewed  Statin, polyethylene glycol, senna, NS 1-35 ml/hr, insulin, Kbicarb     Estimated/Assessed Needs  Weight Used For Calorie Calculations: 68.9 kg (152 lb)  Energy Calorie Requirements (kcal): MSJ ( x 1.25) = 1425 kcal  Energy Need Method: MSJ  Protein Requirements: 83 g/day based on 1.2 g/kg  Weight Used For Protein Calculations: 68.9 kg (152 lb)  Fluid Requirements (mL): 1 mL/kcal or per MD  Estimated Fluid Requirement Method: RDA " Method  RDA Method (mL): 1450 ml  CHO Requirement: 160-195 g     Nutrition Prescription Ordered  Current Diet Order: NPO     Evaluation of Received Nutrient/Fluid Intake  Energy Calories Required: not meeting needs  Protein Required: not meeting needs  Fluid Required: not meeting needs  Tolerance: tolerating  % Intake of Estimated Energy Needs: 0%  % Meal Intake: NPO      Nutrition Risk  Level of Risk/Frequency of Follow-up: moderate/high (2x weekly)      Monitor and Evaluation  Food and Nutrient Intake: enteral nutrition intake  Food and Nutrient Adminstration: enteral and parenteral nutrition administration  Knowledge/Beliefs/Attitudes: food and nutrition knowledge/skill  Physical Activity and Function: nutrition-related ADLs and IADLs  Anthropometric Measurements: weight change  Biochemical Data, Medical Tests and Procedures: electrolyte and renal panel,gastrointestinal profile,glucose/endocrine profile  Nutrition-Focused Physical Findings: overall appearance,extremities, muscles and bones,skin      Nutrition Follow-Up  RD Follow-up?: Yes

## 2022-01-26 NOTE — PROGRESS NOTES
[facility] Hospital Medicine  Progress Note    Patient Name: Sandra Wilson  MRN: [id number]  Patient Class: IP- Inpatient   Admission Date: 1/18/2022  Length of Stay: 8 days  Attending Physician: Braulio Armendariz MD  Primary Care Provider: Primary Doctor No        Subjective:     Principal Problem:Acute hypoxemic respiratory failure        HPI:  Sandra Wilson is an 81 year old female with a past medical history of CVAs, CNS aneurysm, PEG status, CAD, HLD, hypothryoidism, DM, aortic stenosis, and colon, breast, and ovarian cancer who presented from long term care with vomiting, diarrhea, and shortness of breath. Workup in the ED showed likely aspiration pneumonitis and C. Diff colitis. Her O2 requirement increased while in the ED requiring intubation with sedation. She also required Levophed as she likely developed septic shock. Antibiotics were broadened to cefepime, vancomycin, and Flagyl. Pulmonary was consulted. Family was notified. The patient was unable to provide history given acuity of illness.      Overview/Hospital Course:  Sandra Wilson is an 81 year old female with a past medical history of CVAs, CNS aneurysm, PEG status, CAD, HLD, hypothyroidism, DM, aortic stenosis, and colon, breast, and ovarian cancer who presented from long term care with vomiting, diarrhea, and shortness of breath secondary to acute hypoxic respiratory failure from aspiration pneumonia in the setting of C diff colitis leading to septic shock. She was intubated in the ED and started on Levophed infusion via RIJ CVC placed by Dr. Vignesh Gaspar. She was started on broad spectrum antibiotics with cefepime, Flagyl, vancomycin and admitted to the ICU. She was also started on enteral vancomycin for severe C diff colitis given elevated WBC count and STEFFANY. There was concern for developing ARDS given P/F ratio of 87 (severe); 190 1/22 (moderate). Pulmonary was consulted. She also presented with demand ischemia likely  secondary to septic shock. Troponin was trended and improved as shock resolved. Normal saline infusion was added for hyponatremia and STEFFANY which improved both. Levophed was able to be weaned. Family agreed to DNR status 1/18 and Palliative Care was consulted to discuss goal of care 1/19. Antibiotics were changed to doxycycline and Flagyl (GBS in sputum culture) 1/21; vancomycin was added 1/23 after culture also became positive for Staph aureus and Klebsiella. Her respiratory status has improved throughout her course and she was extubated (code changed to partial code for intubation) 1/21 only to be re-intubated for worsening stridor (history of tracheostomy). Upon re-intubation, copious amounts of secretions were suctioned. KUB suggested a mild ileus A bowel regimen was instituted and the patient was able to have bowel movements 1/22. Tube feeds were started 1/23. Her course was complicated by Afib as well noted on telemetry 1/21; metoprolol tartrate started 1/22.She failed a leak test 1/22; Surgery was consulted for tracheostomy which is pending conversation with family regarding goals of care.      Interval History:  Patient seen and examined.  No acute events overnight.  Patient was extubated this morning to nasal cannula.  Son is at bedside.  Plan of care was discussed in detail with the nurse and the patient's son at the bedside.  Plan was also discussed with pulmonology.    Review of Systems   Unable to perform ROS: Dementia     Objective:     Vital Signs (Most Recent):  Temp: 98.5 °F (36.9 °C) (01/26/22 1300)  Pulse: 69 (01/26/22 1300)  Resp: 19 (01/26/22 1300)  BP: (!) 110/54 (01/26/22 1300)  SpO2: 100 % (01/26/22 1300) Vital Signs (24h Range):  Temp:  [98.5 °F (36.9 °C)-99.2 °F (37.3 °C)] 98.5 °F (36.9 °C)  Pulse:  [61-82] 69  Resp:  [17-47] 19  SpO2:  [91 %-100 %] 100 %  BP: ()/(51-83) 110/54     Weight: 69 kg (152 lb 1.9 oz)  Body mass index is 26.11 kg/m².    Intake/Output Summary (Last 24 hours) at  1/26/2022 1436  Last data filed at 1/26/2022 0800  Gross per 24 hour   Intake 2197.39 ml   Output 825 ml   Net 1372.39 ml      Physical Exam  Vitals and nursing note reviewed.   Constitutional:       General: She is not in acute distress.     Appearance: She is ill-appearing.   HENT:      Mouth/Throat:      Comments: Thick secretions noted  Eyes:      Pupils: Pupils are equal, round, and reactive to light.   Cardiovascular:      Rate and Rhythm: Normal rate and regular rhythm.      Heart sounds: No murmur heard.      Pulmonary:      Breath sounds: Rhonchi present.      Comments: Increased work of breathing noted with coarse bilateral breath sounds  Abdominal:      General: There is no distension.      Palpations: Abdomen is soft.      Tenderness: There is no abdominal tenderness.   Skin:     Coloration: Skin is not pale.      Findings: No rash.   Neurological:      Mental Status: She is disoriented.         Significant Labs: All pertinent labs within the past 24 hours have been reviewed.    Significant Imaging: I have reviewed all pertinent imaging results/findings within the past 24 hours.      Assessment/Plan:      * Acute hypoxemic respiratory failure  Patient with Hypoxic Respiratory failure which is Acute on chronic.  she is not on home oxygen. Supplemental oxygen was provided and noted- Vent Mode: SPONT-PS  Oxygen Concentration (%):  [32-40] 32  Resp Rate Total:  [17 br/min-26 br/min] 22 br/min  Vt Set:  [450 mL] 450 mL  PEEP/CPAP:  [1.9 cmH20-5.5 cmH20] 1.9 cmH20  Pressure Support:  [10 cmH20] 10 cmH20  Mean Airway Pressure:  [9.2 cmH20] 9.2 cmH20.   Signs/symptoms of respiratory failure include- tachypnea, increased work of breathing and respiratory distress. Contributing diagnoses includes - COPD and Pneumonia Labs and images were reviewed. Patient Has recent ABG, which has been reviewed. Will treat underlying causes and adjust management of respiratory failure as follows- Secondary to aspiration pneumonia.  "GBS, Klebsiella and Staph aureus in sputum.  Continue antibiotics, will monitor patient closely high risk for re-intubation.  If she is reintubated, will likely proceed with tracheostomy.  S          Pneumonia  Patient with current diagnosis of pneumonia due to bacterial etiology due to  MRSA, Haemophilus and GBS which is uncontrolled due to persistent hypoxia . I have reviewed the pertinent imaging. Current antimicrobial regimen consists of   Antibiotics (From admission, onward)            Start     Stop Route Frequency Ordered    01/23/22 1100  vancomycin 1.5 g in dextrose 5 % 250 mL IVPB (ready to mix)         -- IV Every 24 hours (non-standard times) 01/23/22 1026    01/23/22 1047  vancomycin - pharmacy to dose  (vancomycin IVPB)        "And" Linked Group Details    -- IV pharmacy to manage frequency 01/23/22 0947    01/21/22 0730  doxycycline (VIBRAMYCIN) 100 mg in dextrose 5 % 250 mL IVPB         -- IV Every 12 hours (non-standard times) 01/21/22 0617    01/19/22 1200  vancomycin 125 mg/5 mL oral solution 125 mg  (C. difficile Infection (CDI) Treatment Order Panel)         01/29 1159 PER G TUBE Every 6 hours 01/19/22 0920    01/18/22 1545  metronidazole IVPB 500 mg         -- IV Every 8 hours (non-standard times) 01/18/22 1441      . Cultures drawn and noted-   Microbiology Results (last 7 days)     Procedure Component Value Units Date/Time    Culture, Respiratory with Gram Stain [[id number]] Collected: 01/26/22 0745    Order Status: Sent Specimen: Respiratory from Sputum Updated: 01/26/22 1221    Blood culture [[id number]] Collected: 01/18/22 1726    Order Status: Completed Specimen: Blood from Antecubital, Left Arm Updated: 01/23/22 2322     Blood Culture, Routine No growth after 5 days.    Blood culture [[id number]] Collected: 01/18/22 1802    Order Status: Completed Specimen: Blood from Antecubital, Right Arm Updated: 01/23/22 2322     Blood Culture, Routine No growth after 5 days.    Culture, Respiratory " with Gram Stain [[id number]]  (Abnormal)  (Susceptibility) Collected: 01/19/22 0114    Order Status: Completed Specimen: Sputum, Expectorated Updated: 01/23/22 1045     Respiratory Culture No Pseudomonas isolated.      STREPTOCOCCUS AGALACTIAE (GROUP B)  Few  Beta-hemolytic streptococci are routinely susceptible to   penicillins,cephalosporins and carbapenems.  Susceptibility testing not routinely performed        KLEBSIELLA PNEUMONIAE  Rare        METHICILLIN RESISTANT STAPHYLOCOCCUS AUREUS  Few       Gram Stain (Respiratory) <10 epithelial cells per low power field.     Gram Stain (Respiratory) Rare WBC's     Gram Stain (Respiratory) Rare Gram positive cocci     Gram Stain (Respiratory) Rare yeast       Will monitor patient closely and continue current treatment plan unchanged.        Aspiration into airway  See above under aspiration pneumonia    Atrial fibrillation  -Telemetry  -Metoprolol 5 IV PRN for HR > 130  -Defer full dose anticoagulation at this time given thrombocytopenia and critical illness  -Metoprolol 25 BID      ARDS (adult respiratory distress syndrome)  Moderate.  -Pulmonary consulted  -Treat aspiration pneumonia and sepsis  -Mechanical ventilation with sedation; PEEP and TV per Pulmonary (ARDSnet)  -Patient not proned    Gastrostomy tube dependent  Likely cause of aspiration.  -Care per nursing  -Tube feedings through OG      Type 2 diabetes mellitus, with long-term current use of insulin  S-  Patient's FSGs are controlled on current medication regimen.  Last A1c reviewed- No results found for: LABA1C, HGBA1C  Most recent fingerstick glucose reviewed-   Recent Labs   Lab 01/25/22  1825 01/26/22  0007 01/26/22  0637 01/26/22  1209   POCTGLUCOSE 218* 244* 276* 191*     Current correctional scale  Medium  Maintain anti-hyperglycemic dose as follows-   Antihyperglycemics (From admission, onward)            Start     Stop Route Frequency Ordered    01/18/22 1438  insulin aspart U-100 pen 1-10 Units          -- SubQ Before meals & nightly PRN 01/18/22 1438        Hold Oral hypoglycemics while patient is in the hospital.        YARI (generalized anxiety disorder)  Extubated on 01/26.  Please Precedex as needed to control anxiety.  Patient does not appear anxious on exam.      Hypothyroidism  Patient has chronic hypothyroidism. TFTs reviewed-   Lab Results   Component Value Date    TSH 1.130 07/13/2017   . Will continue chronic levothyroxine and adjust for and clinical changes.        Hyperlipidemia   Patient is chronically on statin.will continue for now. Monitor clinically. Last LDL was   Lab Results   Component Value Date    LDLCALC 94.4 07/13/2017          GERD (gastroesophageal reflux disease)  -Hold PPI given C diff colitis  -IV famotidine        VTE Risk Mitigation (From admission, onward)         Ordered     enoxaparin injection 40 mg  Daily         01/18/22 1438     IP VTE HIGH RISK PATIENT  Once         01/18/22 1438     Place sequential compression device  Until discontinued         01/18/22 1438                Discharge Planning   DEMETRIUS:      Code Status: Partial Code   Is the patient medically ready for discharge?:     Reason for patient still in hospital (select all that apply): Treatment  Discharge Plan A: Return to nursing home            Critical care time spent on the evaluation and treatment of severe organ dysfunction, review of pertinent labs and imaging studies, discussions with consulting providers and discussions with patient/family:  38 minutes.      Braulio Armendariz MD  Department of Hospital Medicine   [facility]

## 2022-01-26 NOTE — ASSESSMENT & PLAN NOTE
S-  Patient's FSGs are controlled on current medication regimen.  Last A1c reviewed- No results found for: LABA1C, HGBA1C  Most recent fingerstick glucose reviewed-   Recent Labs   Lab 01/25/22  1825 01/26/22  0007 01/26/22  0637 01/26/22  1209   POCTGLUCOSE 218* 244* 276* 191*     Current correctional scale  Medium  Maintain anti-hyperglycemic dose as follows-   Antihyperglycemics (From admission, onward)            Start     Stop Route Frequency Ordered    01/18/22 1438  insulin aspart U-100 pen 1-10 Units         -- SubQ Before meals & nightly PRN 01/18/22 1438        Hold Oral hypoglycemics while patient is in the hospital.

## 2022-01-26 NOTE — ASSESSMENT & PLAN NOTE
"Patient with current diagnosis of pneumonia due to bacterial etiology due to  MRSA, Haemophilus and GBS which is uncontrolled due to persistent hypoxia . I have reviewed the pertinent imaging. Current antimicrobial regimen consists of   Antibiotics (From admission, onward)            Start     Stop Route Frequency Ordered    01/23/22 1100  vancomycin 1.5 g in dextrose 5 % 250 mL IVPB (ready to mix)         -- IV Every 24 hours (non-standard times) 01/23/22 1026    01/23/22 1047  vancomycin - pharmacy to dose  (vancomycin IVPB)        "And" Linked Group Details    -- IV pharmacy to manage frequency 01/23/22 0947    01/21/22 0730  doxycycline (VIBRAMYCIN) 100 mg in dextrose 5 % 250 mL IVPB         -- IV Every 12 hours (non-standard times) 01/21/22 0617    01/19/22 1200  vancomycin 125 mg/5 mL oral solution 125 mg  (C. difficile Infection (CDI) Treatment Order Panel)         01/29 1159 PER G TUBE Every 6 hours 01/19/22 0920    01/18/22 1545  metronidazole IVPB 500 mg         -- IV Every 8 hours (non-standard times) 01/18/22 1441      . Cultures drawn and noted-   Microbiology Results (last 7 days)     Procedure Component Value Units Date/Time    Blood culture [[id number]] Collected: 01/18/22 1726    Order Status: Completed Specimen: Blood from Antecubital, Left Arm Updated: 01/23/22 2322     Blood Culture, Routine No growth after 5 days.    Blood culture [[id number]] Collected: 01/18/22 1802    Order Status: Completed Specimen: Blood from Antecubital, Right Arm Updated: 01/23/22 2322     Blood Culture, Routine No growth after 5 days.    Culture, Respiratory with Gram Stain [[id number]]  (Abnormal)  (Susceptibility) Collected: 01/19/22 0114    Order Status: Completed Specimen: Sputum, Expectorated Updated: 01/23/22 1045     Respiratory Culture No Pseudomonas isolated.      STREPTOCOCCUS AGALACTIAE (GROUP B)  Few  Beta-hemolytic streptococci are routinely susceptible to   penicillins,cephalosporins and " carbapenems.  Susceptibility testing not routinely performed        KLEBSIELLA PNEUMONIAE  Rare        METHICILLIN RESISTANT STAPHYLOCOCCUS AUREUS  Few       Gram Stain (Respiratory) <10 epithelial cells per low power field.     Gram Stain (Respiratory) Rare WBC's     Gram Stain (Respiratory) Rare Gram positive cocci     Gram Stain (Respiratory) Rare yeast    C Diff Toxin by PCR [[id number]]  (Abnormal) Collected: 01/18/22 8192    Order Status: Completed Updated: 01/18/22 9452     C. diff PCR Positive       Will monitor patient closely and continue current treatment plan unchanged.

## 2022-01-26 NOTE — SUBJECTIVE & OBJECTIVE
Interval History:  Patient seen and examined.  Plan of care discussed with pulmonology.  Plan is to currently attempt trial extubation tomorrow morning, and if that fails to proceed forward with tracheostomy.    Review of Systems   Unable to perform ROS: Intubated     Objective:     Vital Signs (Most Recent):  Temp: 98.9 °F (37.2 °C) (01/25/22 2000)  Pulse: 74 (01/25/22 2000)  Resp: (!) 23 (01/25/22 2000)  BP: (!) 121/56 (01/25/22 2000)  SpO2: 100 % (01/25/22 2000) Vital Signs (24h Range):  Temp:  [75.2 °F (24 °C)-99.2 °F (37.3 °C)] 98.9 °F (37.2 °C)  Pulse:  [60-82] 74  Resp:  [20-76] 23  SpO2:  [92 %-100 %] 100 %  BP: ()/(45-83) 121/56     Weight: 70.3 kg (154 lb 15.7 oz)  Body mass index is 26.6 kg/m².    Intake/Output Summary (Last 24 hours) at 1/25/2022 2157  Last data filed at 1/25/2022 2000  Gross per 24 hour   Intake 1793.83 ml   Output 1200 ml   Net 593.83 ml      Physical Exam  Vitals and nursing note reviewed.   Constitutional:       General: She is not in acute distress.     Appearance: She is not diaphoretic.      Comments: Intubated, sedated   HENT:      Mouth/Throat:      Pharynx: No oropharyngeal exudate.      Comments: ETT/NG tube noted in place.  Eyes:      General:         Right eye: No discharge.         Left eye: No discharge.      Comments: Pupils sluggish   Neck:      Thyroid: No thyromegaly.      Vascular: No JVD.      Trachea: No tracheal deviation.   Cardiovascular:      Heart sounds: No murmur heard.  No friction rub. No gallop.    Pulmonary:      Effort: No respiratory distress.      Breath sounds: No wheezing or rales.      Comments: Breath sounds coarse on ventilator.  Abdominal:      General: There is no distension.      Palpations: Abdomen is soft.      Tenderness: There is no abdominal tenderness.   Genitourinary:     Comments: Jackson catheter noted in place.  Musculoskeletal:         General: No tenderness or deformity.   Skin:     Capillary Refill: Capillary refill takes 2 to  3 seconds.      Findings: No erythema or rash.   Neurological:      Motor: No abnormal muscle tone.      Deep Tendon Reflexes: Reflexes normal.      Comments: Patient sedated, unresponsive at present         Significant Labs: All pertinent labs within the past 24 hours have been reviewed.    Significant Imaging: I have reviewed all pertinent imaging results/findings within the past 24 hours.

## 2022-01-26 NOTE — PLAN OF CARE
Patient is not medically clear, patient successfully extubated today, currently on 3L NC, continue to monitor.  Pulmonology follow.  Discharge plan is back to [place] pending PT/OT recommendation once medically stable.    CM will continue to follow.

## 2022-01-26 NOTE — ASSESSMENT & PLAN NOTE
"Patient with current diagnosis of pneumonia due to bacterial etiology due to  MRSA, Haemophilus and GBS which is uncontrolled due to persistent hypoxia . I have reviewed the pertinent imaging. Current antimicrobial regimen consists of   Antibiotics (From admission, onward)            Start     Stop Route Frequency Ordered    01/23/22 1100  vancomycin 1.5 g in dextrose 5 % 250 mL IVPB (ready to mix)         -- IV Every 24 hours (non-standard times) 01/23/22 1026    01/23/22 1047  vancomycin - pharmacy to dose  (vancomycin IVPB)        "And" Linked Group Details    -- IV pharmacy to manage frequency 01/23/22 0947    01/21/22 0730  doxycycline (VIBRAMYCIN) 100 mg in dextrose 5 % 250 mL IVPB         -- IV Every 12 hours (non-standard times) 01/21/22 0617    01/19/22 1200  vancomycin 125 mg/5 mL oral solution 125 mg  (C. difficile Infection (CDI) Treatment Order Panel)         01/29 1159 PER G TUBE Every 6 hours 01/19/22 0920    01/18/22 1545  metronidazole IVPB 500 mg         -- IV Every 8 hours (non-standard times) 01/18/22 1441      . Cultures drawn and noted-   Microbiology Results (last 7 days)     Procedure Component Value Units Date/Time    Culture, Respiratory with Gram Stain [[id number]] Collected: 01/26/22 0745    Order Status: Sent Specimen: Respiratory from Sputum Updated: 01/26/22 1221    Blood culture [[id number]] Collected: 01/18/22 1726    Order Status: Completed Specimen: Blood from Antecubital, Left Arm Updated: 01/23/22 2322     Blood Culture, Routine No growth after 5 days.    Blood culture [[id number]] Collected: 01/18/22 1802    Order Status: Completed Specimen: Blood from Antecubital, Right Arm Updated: 01/23/22 2322     Blood Culture, Routine No growth after 5 days.    Culture, Respiratory with Gram Stain [[id number]]  (Abnormal)  (Susceptibility) Collected: 01/19/22 0114    Order Status: Completed Specimen: Sputum, Expectorated Updated: 01/23/22 1045     Respiratory Culture No Pseudomonas isolated. "      STREPTOCOCCUS AGALACTIAE (GROUP B)  Few  Beta-hemolytic streptococci are routinely susceptible to   penicillins,cephalosporins and carbapenems.  Susceptibility testing not routinely performed        KLEBSIELLA PNEUMONIAE  Rare        METHICILLIN RESISTANT STAPHYLOCOCCUS AUREUS  Few       Gram Stain (Respiratory) <10 epithelial cells per low power field.     Gram Stain (Respiratory) Rare WBC's     Gram Stain (Respiratory) Rare Gram positive cocci     Gram Stain (Respiratory) Rare yeast       Will monitor patient closely and continue current treatment plan unchanged.

## 2022-01-26 NOTE — RESPIRATORY THERAPY
Placed on previous ventilator settings for night rest, AC 8 , will return to Spont at approx 4am with ABG approx 1 hour after

## 2022-01-26 NOTE — PLAN OF CARE
Problem: Adult Inpatient Plan of Care  Goal: Plan of Care Review  Outcome: Ongoing, Progressing     Problem: Respiratory Compromise (Pneumonia)  Goal: Effective Oxygenation and Ventilation  Outcome: Ongoing, Progressing     Problem: Communication Impairment (Mechanical Ventilation, Invasive)  Goal: Effective Communication  Outcome: Ongoing, Progressing     Problem: Noninvasive Ventilation Acute  Goal: Effective Unassisted Ventilation and Oxygenation  Outcome: Ongoing, Progressing     POC reviewed with the patient unable to assess level of understanding. Updated patient's son at bedside and he verbalized understanding. Patient extubated this am, remains on 3L NC at this time. Suctioning required due to patient having weak, ineffective cough. Alert and tracking. Occasionally nods when asked yes/no questions. Verbal attempts made, however unintelligible speech. PERRLA. Extremities stiff but withdrawal to pain. NSR on monitor. NPO at this time. PEG intact. Jackson removed at 1600, purewick in place and patient has voided since catheter removal. BM x1 this shift. Bed locked in lowest position with bed alarm set, call light within reach. Safety precautions maintained.

## 2022-01-26 NOTE — CARE UPDATE
01/25/22 1930   PRE-TX-O2   Oxygen Concentration (%) 40   SpO2 100 %   Pulse 71   Resp (!) 21   BP (!) 116/56   ETCO2   ETCO2 (mmHg) 33 mmHg        Airway - Non-Surgical 01/21/22 1150 Endotracheal Tube   Placement Date/Time: 01/21/22 1150   Method of Intubation: Glidescope  Inserted by: Anesthesia MD  Staff/Resident Name(s): Dr. Jaramillo  Airway Device: Endotracheal Tube  Airway Device Size: 7.5  Style: Cuffed  Cuff Inflated With: Air  Placement Verifi...   Secured at 23 cm   Measured At Lips   Secured Location Center   Secured by Commercial tube shaffer   Bite Block secure and patent   Site Condition Cool;Dry   Status Intact;Secured;Patent   Site Assessment Clean;Dry;No bleeding;No drainage   Cuff Pressure 30 cm H2O   Airway Safety   Ambu bag with the patient? Yes, Adult Ambu   Is mask with the patient? Yes, Adult Mask   Vent Select   Conventional Vent Y   Charged w/in last 24h YES   Preset Conventional Ventilator Settings   Vent Type NKV-550   Ventilation Type VC   Vent Mode SPONT-PS   PEEP/CPAP 5 cmH20   Pressure Support 10 cmH20   Patient Ventilator Parameters   Resp Rate Total 22 br/min   Peak Airway Pressure 16.4 cmH2O   Mean Airway Pressure 9.2 cmH20   Plateau Pressure 15.3 cmH20   Exhaled Vt 425 mL   Total Ve 8.2 mL   Spont Ve 8.2 L   I:E Ratio Measured 1.1   Total PEEP 5.2 cmH20   Inspired Tidal Volume (VTI) 433 mL   Conventional Ventilator Alarms   Alarms On Y   Ve High Alarm 18 L/min   Ve Low Alarm 5 L/min   Vt High Alarm 15 mL   Vt Low Alarm 4 mL   Resp Rate High Alarm 35 br/min   Press High Alarm 15 cmH2O   Apnea Rate 15   Apnea Volume (mL) 300 mL   Delay Alarm (Sec) 20 sec(s)   Ready to Wean/Extubation Screen   FIO2<=50 (chart decimal) 0.4   MV<16L (chart vol.) 8.2   PEEP <=8 (chart #) 5   Ready to Wean Parameters   F/VT Ratio<105 (RSBI) (!) 49.41

## 2022-01-26 NOTE — SUBJECTIVE & OBJECTIVE
Interval History:  Patient seen and examined.  No acute events overnight.  Patient was extubated this morning to nasal cannula.  Son is at bedside.  Plan of care was discussed in detail with the nurse and the patient's son at the bedside.  Plan was also discussed with pulmonology.    Review of Systems   Unable to perform ROS: Dementia     Objective:     Vital Signs (Most Recent):  Temp: 98.5 °F (36.9 °C) (01/26/22 1300)  Pulse: 69 (01/26/22 1300)  Resp: 19 (01/26/22 1300)  BP: (!) 110/54 (01/26/22 1300)  SpO2: 100 % (01/26/22 1300) Vital Signs (24h Range):  Temp:  [98.5 °F (36.9 °C)-99.2 °F (37.3 °C)] 98.5 °F (36.9 °C)  Pulse:  [61-82] 69  Resp:  [17-47] 19  SpO2:  [91 %-100 %] 100 %  BP: ()/(51-83) 110/54     Weight: 69 kg (152 lb 1.9 oz)  Body mass index is 26.11 kg/m².    Intake/Output Summary (Last 24 hours) at 1/26/2022 1436  Last data filed at 1/26/2022 0800  Gross per 24 hour   Intake 2197.39 ml   Output 825 ml   Net 1372.39 ml      Physical Exam  Vitals and nursing note reviewed.   Constitutional:       General: She is not in acute distress.     Appearance: She is ill-appearing.   HENT:      Mouth/Throat:      Comments: Thick secretions noted  Eyes:      Pupils: Pupils are equal, round, and reactive to light.   Cardiovascular:      Rate and Rhythm: Normal rate and regular rhythm.      Heart sounds: No murmur heard.      Pulmonary:      Breath sounds: Rhonchi present.      Comments: Increased work of breathing noted with coarse bilateral breath sounds  Abdominal:      General: There is no distension.      Palpations: Abdomen is soft.      Tenderness: There is no abdominal tenderness.   Skin:     Coloration: Skin is not pale.      Findings: No rash.   Neurological:      Mental Status: She is disoriented.         Significant Labs: All pertinent labs within the past 24 hours have been reviewed.    Significant Imaging: I have reviewed all pertinent imaging results/findings within the past 24 hours.

## 2022-01-26 NOTE — ASSESSMENT & PLAN NOTE
-  Patient's FSGs are controlled on current medication regimen.  Last A1c reviewed- No results found for: LABA1C, HGBA1C  Most recent fingerstick glucose reviewed-   Recent Labs   Lab 01/24/22  2240 01/25/22  1026 01/25/22  1825   POCTGLUCOSE 167* 92 218*     Current correctional scale  Medium  Maintain anti-hyperglycemic dose as follows-   Antihyperglycemics (From admission, onward)            Start     Stop Route Frequency Ordered    01/18/22 1438  insulin aspart U-100 pen 1-10 Units         -- SubQ Before meals & nightly PRN 01/18/22 1438        Hold Oral hypoglycemics while patient is in the hospital.

## 2022-01-26 NOTE — PLAN OF CARE
Intervention: enteral nutrition therapy     Recommendations  1) Continue Isosource 1.5 @ 10 mL/hr and advance to goal rate of 45 mL/hr  -  mL Q 4 hr,   - Monitor and replete electrolytes  - Will provide 1620 kcal, 73 g protein, 820 mL FW, 690 mL FWF.   - Propofol providing additional 211 kcal.   - Will meet >100% EEN, 87% EPN      2) If glucose consistently elevated change to glucerna 1.5.     3) If unable to tolerate TF advancement to goal consider PPN D 5 AA 4.25 @ 75 ml/hr + standard lipids ( 1112 kcal, 76 g protein)     4) weigh weekly, SLP consult if appropriate     Goals: 1) Initation of enteral nutrition by RD follow up 2) Nutrition support meeting > 50% of needs at f/u  Nutrition Goal Status: met/ was meeting-continues  Communication of RD Recs: POC, sticky note

## 2022-01-26 NOTE — ASSESSMENT & PLAN NOTE
Patient with Hypoxic Respiratory failure which is Acute on chronic.  she is not on home oxygen. Supplemental oxygen was provided and noted- Vent Mode: SPONT-PS  Oxygen Concentration (%):  [32-40] 32  Resp Rate Total:  [17 br/min-26 br/min] 22 br/min  Vt Set:  [450 mL] 450 mL  PEEP/CPAP:  [1.9 cmH20-5.5 cmH20] 1.9 cmH20  Pressure Support:  [10 cmH20] 10 cmH20  Mean Airway Pressure:  [9.2 cmH20] 9.2 cmH20.   Signs/symptoms of respiratory failure include- tachypnea, increased work of breathing and respiratory distress. Contributing diagnoses includes - COPD and Pneumonia Labs and images were reviewed. Patient Has recent ABG, which has been reviewed. Will treat underlying causes and adjust management of respiratory failure as follows- Secondary to aspiration pneumonia. GBS, Klebsiella and Staph aureus in sputum.  Continue antibiotics, will monitor patient closely high risk for re-intubation.  If she is reintubated, will likely proceed with tracheostomy.  S

## 2022-01-26 NOTE — PLAN OF CARE
On spontaneous breathing per vent and tolerating well.ABG good. Scheduled for trach placement today. Receiving Magnesium replacement currently. Stool cleaned this shift x 1 .Will open eyes and follow with eyes but arms and legs very stiff. Bed on rotate and alarm on.

## 2022-01-26 NOTE — ASSESSMENT & PLAN NOTE
Extubated on 01/26.  Please Precedex as needed to control anxiety.  Patient does not appear anxious on exam.     - - -

## 2022-01-27 LAB
ALBUMIN SERPL BCP-MCNC: 2.2 G/DL (ref 3.5–5.2)
ALP SERPL-CCNC: 73 U/L (ref 55–135)
ALT SERPL W/O P-5'-P-CCNC: 18 U/L (ref 10–44)
ANION GAP SERPL CALC-SCNC: 10 MMOL/L (ref 8–16)
AST SERPL-CCNC: 16 U/L (ref 10–40)
BASOPHILS # BLD AUTO: 0.04 K/UL (ref 0–0.2)
BASOPHILS NFR BLD: 0.2 % (ref 0–1.9)
BILIRUB SERPL-MCNC: 0.6 MG/DL (ref 0.1–1)
BUN SERPL-MCNC: 18 MG/DL (ref 8–23)
CALCIUM SERPL-MCNC: 8.7 MG/DL (ref 8.7–10.5)
CHLORIDE SERPL-SCNC: 99 MMOL/L (ref 95–110)
CO2 SERPL-SCNC: 26 MMOL/L (ref 23–29)
CREAT SERPL-MCNC: 0.6 MG/DL (ref 0.5–1.4)
DIFFERENTIAL METHOD: ABNORMAL
EOSINOPHIL # BLD AUTO: 0 K/UL (ref 0–0.5)
EOSINOPHIL NFR BLD: 0 % (ref 0–8)
ERYTHROCYTE [DISTWIDTH] IN BLOOD BY AUTOMATED COUNT: 14.1 % (ref 11.5–14.5)
EST. GFR  (AFRICAN AMERICAN): >60 ML/MIN/1.73 M^2
EST. GFR  (NON AFRICAN AMERICAN): >60 ML/MIN/1.73 M^2
GLUCOSE SERPL-MCNC: 104 MG/DL (ref 70–110)
HCT VFR BLD AUTO: 26.6 % (ref 37–48.5)
HGB BLD-MCNC: 8.7 G/DL (ref 12–16)
IMM GRANULOCYTES # BLD AUTO: 0.82 K/UL (ref 0–0.04)
IMM GRANULOCYTES NFR BLD AUTO: 3.6 % (ref 0–0.5)
LYMPHOCYTES # BLD AUTO: 1.8 K/UL (ref 1–4.8)
LYMPHOCYTES NFR BLD: 8.1 % (ref 18–48)
MAGNESIUM SERPL-MCNC: 1.9 MG/DL (ref 1.6–2.6)
MCH RBC QN AUTO: 31.6 PG (ref 27–31)
MCHC RBC AUTO-ENTMCNC: 32.7 G/DL (ref 32–36)
MCV RBC AUTO: 97 FL (ref 82–98)
MONOCYTES # BLD AUTO: 1.4 K/UL (ref 0.3–1)
MONOCYTES NFR BLD: 6 % (ref 4–15)
NEUTROPHILS # BLD AUTO: 18.5 K/UL (ref 1.8–7.7)
NEUTROPHILS NFR BLD: 82.1 % (ref 38–73)
NRBC BLD-RTO: 0 /100 WBC
PHOSPHATE SERPL-MCNC: 2.6 MG/DL (ref 2.7–4.5)
PLATELET # BLD AUTO: 350 K/UL (ref 150–450)
PLATELET BLD QL SMEAR: ABNORMAL
PMV BLD AUTO: 10.7 FL (ref 9.2–12.9)
POCT GLUCOSE: 118 MG/DL (ref 70–110)
POCT GLUCOSE: 120 MG/DL (ref 70–110)
POCT GLUCOSE: 162 MG/DL (ref 70–110)
POCT GLUCOSE: 178 MG/DL (ref 70–110)
POCT GLUCOSE: 83 MG/DL (ref 70–110)
POTASSIUM SERPL-SCNC: 3.4 MMOL/L (ref 3.5–5.1)
PROT SERPL-MCNC: 6.1 G/DL (ref 6–8.4)
RBC # BLD AUTO: 2.75 M/UL (ref 4–5.4)
SODIUM SERPL-SCNC: 135 MMOL/L (ref 136–145)
VANCOMYCIN TROUGH SERPL-MCNC: 21.1 UG/ML (ref 10–22)
WBC # BLD AUTO: 22.59 K/UL (ref 3.9–12.7)

## 2022-01-27 PROCEDURE — 83735 ASSAY OF MAGNESIUM: CPT | Performed by: STUDENT IN AN ORGANIZED HEALTH CARE EDUCATION/TRAINING PROGRAM

## 2022-01-27 PROCEDURE — 20000000 HC ICU ROOM

## 2022-01-27 PROCEDURE — 99900026 HC AIRWAY MAINTENANCE (STAT)

## 2022-01-27 PROCEDURE — 25000003 PHARM REV CODE 250: Performed by: HOSPITALIST

## 2022-01-27 PROCEDURE — 94761 N-INVAS EAR/PLS OXIMETRY MLT: CPT

## 2022-01-27 PROCEDURE — 25000003 PHARM REV CODE 250: Performed by: STUDENT IN AN ORGANIZED HEALTH CARE EDUCATION/TRAINING PROGRAM

## 2022-01-27 PROCEDURE — 63600175 PHARM REV CODE 636 W HCPCS: Performed by: STUDENT IN AN ORGANIZED HEALTH CARE EDUCATION/TRAINING PROGRAM

## 2022-01-27 PROCEDURE — 80053 COMPREHEN METABOLIC PANEL: CPT | Performed by: STUDENT IN AN ORGANIZED HEALTH CARE EDUCATION/TRAINING PROGRAM

## 2022-01-27 PROCEDURE — 99291 CRITICAL CARE FIRST HOUR: CPT | Mod: ,,, | Performed by: INTERNAL MEDICINE

## 2022-01-27 PROCEDURE — S0030 INJECTION, METRONIDAZOLE: HCPCS | Performed by: STUDENT IN AN ORGANIZED HEALTH CARE EDUCATION/TRAINING PROGRAM

## 2022-01-27 PROCEDURE — 31720 CLEARANCE OF AIRWAYS: CPT

## 2022-01-27 PROCEDURE — 25000003 PHARM REV CODE 250: Performed by: INTERNAL MEDICINE

## 2022-01-27 PROCEDURE — 84100 ASSAY OF PHOSPHORUS: CPT | Performed by: STUDENT IN AN ORGANIZED HEALTH CARE EDUCATION/TRAINING PROGRAM

## 2022-01-27 PROCEDURE — 27000221 HC OXYGEN, UP TO 24 HOURS

## 2022-01-27 PROCEDURE — 80202 ASSAY OF VANCOMYCIN: CPT | Performed by: HOSPITALIST

## 2022-01-27 PROCEDURE — B4185 PARENTERAL SOL 10 GM LIPIDS: HCPCS | Performed by: HOSPITALIST

## 2022-01-27 PROCEDURE — 99291 PR CRITICAL CARE, E/M 30-74 MINUTES: ICD-10-PCS | Mod: ,,, | Performed by: INTERNAL MEDICINE

## 2022-01-27 PROCEDURE — 27000207 HC ISOLATION

## 2022-01-27 PROCEDURE — 85025 COMPLETE CBC W/AUTO DIFF WBC: CPT | Performed by: STUDENT IN AN ORGANIZED HEALTH CARE EDUCATION/TRAINING PROGRAM

## 2022-01-27 RX ORDER — SCOLOPAMINE TRANSDERMAL SYSTEM 1 MG/1
1 PATCH, EXTENDED RELEASE TRANSDERMAL
Status: DISCONTINUED | OUTPATIENT
Start: 2022-01-27 | End: 2022-02-02

## 2022-01-27 RX ORDER — GLYCOPYRROLATE 1 MG/1
1 TABLET ORAL 3 TIMES DAILY PRN
Status: DISCONTINUED | OUTPATIENT
Start: 2022-01-27 | End: 2022-02-01

## 2022-01-27 RX ADMIN — Medication 125 MG: at 12:01

## 2022-01-27 RX ADMIN — METOPROLOL TARTRATE 12.5 MG: 25 TABLET, FILM COATED ORAL at 09:01

## 2022-01-27 RX ADMIN — Medication 125 MG: at 05:01

## 2022-01-27 RX ADMIN — METRONIDAZOLE 500 MG: 500 INJECTION, SOLUTION INTRAVENOUS at 12:01

## 2022-01-27 RX ADMIN — FAMOTIDINE 20 MG: 10 INJECTION INTRAVENOUS at 09:01

## 2022-01-27 RX ADMIN — CITALOPRAM HYDROBROMIDE 40 MG: 10 TABLET ORAL at 09:01

## 2022-01-27 RX ADMIN — METOCLOPRAMIDE HYDROCHLORIDE 10 MG: 5 SOLUTION ORAL at 05:01

## 2022-01-27 RX ADMIN — METOCLOPRAMIDE HYDROCHLORIDE 10 MG: 5 SOLUTION ORAL at 12:01

## 2022-01-27 RX ADMIN — SCOPALAMINE 1 PATCH: 1 PATCH, EXTENDED RELEASE TRANSDERMAL at 09:01

## 2022-01-27 RX ADMIN — MIDODRINE HYDROCHLORIDE 5 MG: 5 TABLET ORAL at 09:01

## 2022-01-27 RX ADMIN — INSULIN ASPART 1 UNITS: 100 INJECTION, SOLUTION INTRAVENOUS; SUBCUTANEOUS at 09:01

## 2022-01-27 RX ADMIN — POTASSIUM BICARBONATE 35 MEQ: 391 TABLET, EFFERVESCENT ORAL at 06:01

## 2022-01-27 RX ADMIN — SCOPALAMINE 1 PATCH: 1 PATCH, EXTENDED RELEASE TRANSDERMAL at 04:01

## 2022-01-27 RX ADMIN — LEUCINE, PHENYLALANINE, LYSINE, METHIONINE, ISOLEUCINE, VALINE, HISTIDINE, THREONINE, TRYPTOPHAN, ALANINE, GLYCINE, ARGININE, PROLINE, SERINE, TYROSINE, SODIUM ACETATE, DIBASIC POTASSIUM PHOSPHATE, MAGNESIUM CHLORIDE, SODIUM CHLORIDE, CALCIUM CHLORIDE, DEXTROSE
365; 280; 290; 200; 300; 290; 240; 210; 90; 1035; 515; 575; 340; 250; 20; 340; 261; 51; 59; 33; 15 INJECTION INTRAVENOUS at 02:01

## 2022-01-27 RX ADMIN — ATORVASTATIN CALCIUM 40 MG: 40 TABLET, FILM COATED ORAL at 09:01

## 2022-01-27 RX ADMIN — ENOXAPARIN SODIUM 40 MG: 100 INJECTION SUBCUTANEOUS at 05:01

## 2022-01-27 RX ADMIN — INSULIN ASPART 2 UNITS: 100 INJECTION, SOLUTION INTRAVENOUS; SUBCUTANEOUS at 06:01

## 2022-01-27 RX ADMIN — DOXYCYCLINE 100 MG: 100 INJECTION, POWDER, LYOPHILIZED, FOR SOLUTION INTRAVENOUS at 06:01

## 2022-01-27 RX ADMIN — I.V. FAT EMULSION 250 ML: 20 EMULSION INTRAVENOUS at 09:01

## 2022-01-27 RX ADMIN — METRONIDAZOLE 500 MG: 500 INJECTION, SOLUTION INTRAVENOUS at 03:01

## 2022-01-27 RX ADMIN — METRONIDAZOLE 500 MG: 500 INJECTION, SOLUTION INTRAVENOUS at 07:01

## 2022-01-27 RX ADMIN — LEVOTHYROXINE SODIUM 25 MCG: 0.03 TABLET ORAL at 05:01

## 2022-01-27 RX ADMIN — MIDODRINE HYDROCHLORIDE 5 MG: 5 TABLET ORAL at 02:01

## 2022-01-27 RX ADMIN — VANCOMYCIN HYDROCHLORIDE 1250 MG: 1.25 INJECTION, POWDER, LYOPHILIZED, FOR SOLUTION INTRAVENOUS at 09:01

## 2022-01-27 NOTE — PROGRESS NOTES
Palliative provider signing off after repeated attempts to connect with granddaughter and daughter. Please re-consult if things change.

## 2022-01-27 NOTE — CARE UPDATE
01/27/22 0750   Patient Assessment/Suction   Level of Consciousness (AVPU) alert   Respiratory Effort Unlabored;Normal   Expansion/Accessory Muscles/Retractions no use of accessory muscles   All Lung Fields Breath Sounds rhonchi   Rhythm/Pattern, Respiratory unlabored;depth regular;pattern regular   Cough Frequency frequent   Cough Type productive   Suction Method nasal;oral   Suction Pressure (mmHg) -120 mmHg   $ Suction Charges NT Suction Procedure   Secretions Amount copious   Secretions Color white   Secretions Characteristics thick   PRE-TX-O2   O2 Device (Oxygen Therapy) Oxymask   $ Is the patient on Low Flow Oxygen? Yes   Flow (L/min) 5   SpO2 97 %   Pulse Oximetry Type Continuous   $ Pulse Oximetry - Multiple Charge Pulse Oximetry - Multiple   Pulse 88   Resp 19   General Safety Checklist   Safety Promotion/Fall Prevention side rails raised   Airway Safety   Ambu bag with the patient? Yes, Adult Ambu   Is mask with the patient? Yes, Adult Mask

## 2022-01-27 NOTE — PLAN OF CARE
Problem: Adult Inpatient Plan of Care  Goal: Plan of Care Review  Outcome: Ongoing, Progressing     Problem: Diabetes Comorbidity  Goal: Blood Glucose Level Within Targeted Range  Outcome: Ongoing, Progressing     Problem: Respiratory Compromise (Pneumonia)  Goal: Effective Oxygenation and Ventilation  Outcome: Ongoing, Progressing     POC reviewed with the patient; she nods in understanding. Updated patient's family at bedside. Patient remains on 5L oxymask at this time. Suctioning required due to patient having weak, ineffective cough. Scopolamine patch administered due to copious secretions. Alert and tracking. Nods when asked yes/no questions. Verbal attempts made, however incomprehensible speech. PERRLA. Extremities stiff but able to move feet and hands on command. NSR with rare PVCs noted on monitor. NPO at this time. PEG intact. TPN started this shift. Purewick in place. BM x1 this shift. Bed locked in lowest position with bed alarm set, call light within reach. Safety precautions maintained.

## 2022-01-27 NOTE — PROGRESS NOTES
Pharmacokinetic Assessment Follow Up: IV Vancomycin    Vancomycin serum concentration assessment(s):    The trough level was drawn correctly and can be used to guide therapy at this time. The measurement is above the desired definitive target range of 15 to 20 mcg/mL.    Vancomycin Regimen Plan:    Change regimen to Vancomycin 1250 mg IV every 24 hours with next serum trough concentration measured at 1900 prior to 3rd dose on 1/29    Drug levels (last 3 results):  Recent Labs   Lab Result Units 01/25/22  1032 01/27/22  1148   Vancomycin-Trough ug/mL 18.5 21.1       Pharmacy will continue to follow and monitor vancomycin.    Please contact pharmacy at extension [phone] for questions regarding this assessment.    Thank you for the consult,   Carlos Valdez       Patient brief summary:  Sandra Wilson is a 81 y.o. female initiated on antimicrobial therapy with IV Vancomycin for treatment of  pna    Drug Allergies:   Review of patient's allergies indicates:   Allergen Reactions    Cephalexin (bulk)      Flushing and itching    Lidocaine base      rash    Lisinopril Other (See Comments)     cough       Actual Body Weight:   72 kg    Renal Function:   Estimated Creatinine Clearance: 71.5 mL/min (based on SCr of 0.6 mg/dL).,     Dialysis Method (if applicable):  N/A    CBC (last 72 hours):  Recent Labs   Lab Result Units 01/25/22  0348 01/26/22  0343 01/27/22  0317   WBC K/uL 17.46* 19.14* 22.59*   Hemoglobin g/dL 7.7* 7.8* 8.7*   Hematocrit % 23.7* 23.7* 26.6*   Platelets K/uL 183 214 350   Gran % % 76.7* 88.1* 82.1*   Lymph % % 9.4* 5.9* 8.1*   Mono % % 8.2 1.4* 6.0   Eosinophil % % 1.9 0.1 0.0   Basophil % % 0.2 0.1 0.2   Differential Method  Automated Automated Automated       Metabolic Panel (last 72 hours):  Recent Labs   Lab Result Units 01/24/22  1700 01/25/22  0348 01/26/22  0343 01/27/22  0317   Sodium mmol/L  --  137 133* 135*   Potassium mmol/L 4.0 3.6 4.0 3.4*   Chloride mmol/L  --  102 99 99   CO2 mmol/L  --  25 23  26   Glucose mg/dL  --  81 201* 104   BUN mg/dL  --  19 21 18   Creatinine mg/dL  --  0.6 0.7 0.6   Albumin g/dL  --  1.9* 1.9* 2.2*   Total Bilirubin mg/dL  --  0.7 0.6 0.6   Alkaline Phosphatase U/L  --  60 72 73   AST U/L  --  15 15 16   ALT U/L  --  15 16 18   Magnesium mg/dL  --  1.6 1.6 1.9   Phosphorus mg/dL  --  2.8 3.2 2.6*       Vancomycin Administrations:  vancomycin given in the last 96 hours                     vancomycin 125 mg/5 mL oral solution 125 mg (mg) 125 mg Given 01/27/22 0516     125 mg Given  0047     125 mg Given 01/26/22 1724     125 mg Given  1212     125 mg Given  0530     125 mg Given  0010     125 mg Given 01/25/22 1958     125 mg Given  1300     125 mg Given  0609     125 mg Given  0200     125 mg Given 01/24/22 2115     125 mg Given  1226     125 mg Given  0639     125 mg Given  0028     125 mg Given 01/23/22 1751    vancomycin 1.5 g in dextrose 5 % 250 mL IVPB (ready to mix) (mg) 1,500 mg New Bag 01/26/22 1228     1,500 mg New Bag 01/25/22 1230     1,500 mg New Bag 01/24/22 1241                    Microbiologic Results:  Microbiology Results (last 7 days)       Procedure Component Value Units Date/Time    Culture, Respiratory with Gram Stain [[id number]]  (Abnormal) Collected: 01/26/22 0745    Order Status: Completed Specimen: Respiratory from Sputum Updated: 01/27/22 1029     Respiratory Culture PRESUMPTIVE PROTEUS SPECIES  Few  Identification and susceptibility pending        GRAM NEGATIVE NELLY  Moderate  Identification and susceptibility pending       Gram Stain (Respiratory) <10 epithelial cells per low power field.     Gram Stain (Respiratory) Rare WBC's     Gram Stain (Respiratory) No organisms seen    Blood culture [[id number]] Collected: 01/18/22 1726    Order Status: Completed Specimen: Blood from Antecubital, Left Arm Updated: 01/23/22 2322     Blood Culture, Routine No growth after 5 days.    Blood culture [[id number]] Collected: 01/18/22 1802    Order Status: Completed  Specimen: Blood from Antecubital, Right Arm Updated: 01/23/22 2322     Blood Culture, Routine No growth after 5 days.    Culture, Respiratory with Gram Stain [[id number]]  (Abnormal)  (Susceptibility) Collected: 01/19/22 0114    Order Status: Completed Specimen: Sputum, Expectorated Updated: 01/23/22 1045     Respiratory Culture No Pseudomonas isolated.      STREPTOCOCCUS AGALACTIAE (GROUP B)  Few  Beta-hemolytic streptococci are routinely susceptible to   penicillins,cephalosporins and carbapenems.  Susceptibility testing not routinely performed        KLEBSIELLA PNEUMONIAE  Rare        METHICILLIN RESISTANT STAPHYLOCOCCUS AUREUS  Few       Gram Stain (Respiratory) <10 epithelial cells per low power field.     Gram Stain (Respiratory) Rare WBC's     Gram Stain (Respiratory) Rare Gram positive cocci     Gram Stain (Respiratory) Rare yeast

## 2022-01-27 NOTE — PROGRESS NOTES
"  01/27/2022      Admit Date: 1/18/2022  Sandra Wilson  New Patient Consult    Chief Complaint   Patient presents with    Shortness of Breath    Vomiting     PEG tube        History of Present Illness:  Intubated.          From Dr Gaspar's hpi, ERP:Sandra Wilson is a 81 y.o. female who presents to the ED via EMS  with an onset of vomiting and SOB. EMS reported that upon examination patient was less responsive than usually but is able to answer questions. The report from the EMS upon arrival was an O2 sats in the 70's. EMS proceeded to give supplemental oxygen and the O2 sats raised up to 93% on 3 liters of O2 with  "junky" right upper lung sounds" and greenish diarrhea. She was vomiting en route. They also reported that the patient is a patient that has dislodged her PEG tubing many times. PMHx of HTN, CAD, OA, GERD, ctroke, Colon cancer, ovarian cancer, breast cancer.PSHx of Cardiac catheterization, colonoscopy.         Progress Note  PULMONARY    Admit Date: 1/18/2022 01/27/2022      SUBJECTIVE:     1/19 intubated, sedated,  1/20 no c/o intubated, sedated  1/21 no new c/o intubated  1/22 no c/o intubated   1/23 no  New c/o intubated  1/24 no new c/o  1/25 no new c/o  1/26 no new co      PFSH and Allergies reviewed.    OBJECTIVE:     Vitals (Most recent):  Vitals:    01/27/22 0600   BP: 110/67   Pulse: 70   Resp: 19   Temp:        Vitals (24 hour range):  Temp:  [98.3 °F (36.8 °C)-98.6 °F (37 °C)]   Pulse:  [59-86]   Resp:  [15-26]   BP: ()/(49-86)   SpO2:  [89 %-100 %]       Intake/Output Summary (Last 24 hours) at 1/27/2022 0608  Last data filed at 1/27/2022 0154  Gross per 24 hour   Intake 1213.29 ml   Output 710 ml   Net 503.29 ml        Too value is ED when Physical Exam:  The patient's neuro status (alertness,orientation,cognitive function,motor skills,), pharyngeal exam (oral lesions, hygiene, abn dentition,), Neck (jvd,mass,thyroid,nodes in neck and above/below " clavicle),RESPIRATORY(symmetry,effort,fremitus,percussion,auscultation),  Cor(rhythm,heart tones including gallops,perfusion,edema)ABD(distention,hepatic&splenomegaly,tenderness,masses), Skin(rash,cyanosis),Psyc(affect,judgement,).  Exam negative except for these pertinent findings:    Cans speak, slow speech, slight and min occasional stridor, fair voice,  ,   chest is symmetric, no distress, normal percussion, normal fremitus and good anterior breath sounds      Radiographs reviewed: view by direct vision    Results for orders placed during the hospital encounter of 01/18/22    X-Ray Chest 1 View    Narrative  EXAMINATION:  Mm and is is  XR CHEST 1 VIEW    CLINICAL HISTORY:  respiratory failure;    TECHNIQUE:  Single frontal view of the chest was performed.    COMPARISON:  Chest portable of January 18, 2022    FINDINGS:  An endotracheal tube ends in the trachea above the level the malorie.  An NG tube traverses the esophagus to the stomach.  A central line enters at the right neck and ends in the superior vena cava.  The cardiac size and contours within normal limits.  A loop recorder is noted in the left chest.  There is continued central right lung infiltrate and small infiltrate at the left lung base.  No pneumothorax is seen.    Impression  Continued intrapulmonary infiltrates right greater than left.  Endotracheal tube, NG tube and right central lines in position.      Electronically signed by: Mathew Yanez MD  Date:    01/18/2022  Time:    15:50  ]    Labs     Recent Labs   Lab 01/27/22 0317   WBC 22.59*   HGB 8.7*   HCT 26.6*        Recent Labs   Lab 01/27/22 0317   *   K 3.4*   CL 99   CO2 26   BUN 18   CREATININE 0.6      CALCIUM 8.7   MG 1.9   PHOS 2.6*   AST 16   ALT 18   ALKPHOS 73   BILITOT 0.6   PROT 6.1   ALBUMIN 2.2*     No results for input(s): PH, PCO2, PO2, HCO3 in the last 24 hours.  Microbiology Results (last 7 days)     Procedure Component Value Units Date/Time     Culture, Respiratory with Gram Stain [[id number]] Collected: 01/26/22 0745    Order Status: Completed Specimen: Respiratory from Sputum Updated: 01/26/22 1455     Gram Stain (Respiratory) <10 epithelial cells per low power field.     Gram Stain (Respiratory) Rare WBC's     Gram Stain (Respiratory) No organisms seen    Blood culture [[id number]] Collected: 01/18/22 1726    Order Status: Completed Specimen: Blood from Antecubital, Left Arm Updated: 01/23/22 2322     Blood Culture, Routine No growth after 5 days.    Blood culture [[id number]] Collected: 01/18/22 1802    Order Status: Completed Specimen: Blood from Antecubital, Right Arm Updated: 01/23/22 2322     Blood Culture, Routine No growth after 5 days.    Culture, Respiratory with Gram Stain [[id number]]  (Abnormal)  (Susceptibility) Collected: 01/19/22 0114    Order Status: Completed Specimen: Sputum, Expectorated Updated: 01/23/22 1045     Respiratory Culture No Pseudomonas isolated.      STREPTOCOCCUS AGALACTIAE (GROUP B)  Few  Beta-hemolytic streptococci are routinely susceptible to   penicillins,cephalosporins and carbapenems.  Susceptibility testing not routinely performed        KLEBSIELLA PNEUMONIAE  Rare        METHICILLIN RESISTANT STAPHYLOCOCCUS AUREUS  Few       Gram Stain (Respiratory) <10 epithelial cells per low power field.     Gram Stain (Respiratory) Rare WBC's     Gram Stain (Respiratory) Rare Gram positive cocci     Gram Stain (Respiratory) Rare yeast        Echo   Summary    · Mild concentric hypertrophy and normal systolic function.  · Mild-to-moderate mitral regurgitation.  · Mild tricuspid regurgitation.  · Mild pulmonic regurgitation.  · The estimated ejection fraction is 55%.  · Indeterminate left ventricular diastolic function.  · Normal right ventricular size with normal right ventricular systolic function.  · Mild left atrial enlargement.  · There is moderate-to-severe aortic valve stenosis.  · Aortic valve area is cm2; peak velocity is  3.42 m/s; mean gradient is 32 mmHg. Peak gradient 47mmHg  · Mild aortic regurgitation.  · Normal central venous pressure (3 mmHg).  · The estimated PA systolic pressure is 32 mmHg.  · There is no pulmonary hypertension.  · Moderate to severe AS. 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]  Not Applicable    ARDS (adult respiratory distress syndrome) [J80]  Yes    YARI (generalized anxiety disorder) [F41.1]  Yes    Type 2 diabetes mellitus, with long-term current use of insulin [E11.9, Z79.4]  Not Applicable    Pneumonia [J18.9]  Yes    Aspiration into airway [T17.908A]  Yes    Hypothyroidism [E03.9]  Yes    Hyperlipidemia [E78.5]  Yes     Chronic    GERD (gastroesophageal reflux disease) [K21.9]  Yes     Chronic      Resolved Hospital Problems    Diagnosis Date Resolved POA    Ileus [K56.7] 01/23/2022 No    Demand ischemia [I24.8] 01/24/2022 Yes    Sepsis [A41.9] 01/24/2022 Yes    STEFFANY (acute kidney injury) [N17.9] 01/21/2022 Yes    Hyponatremia [E87.1] 01/20/2022 Yes                        Plan: no fever, vss, was hypotensive earlier.  Cefepime/vanc,flagyl,  chr midodrine, levophed 0.9 ,  ddimer 5.2, creat 0.9  c diff ag + but toxin neg.  abg 7.27 with ox 48,   Dnr,  cxr impressive R>>>L aspiration pattern.     Prior subdural --       Recruitment manuver done with peep 30 for 6 seconds with art line bp falling from sbp 100 to 60's.  Sat improved from 92 to 94.  Pt not really peep responsive.    Pt should improve ox with left side down or prone.  Peep may help but infiltrates more R>L -- should be posterior lung bases.  Might consider cta if not progressing.      discussed with resp and nursing. Optimally would be left side down or prone.        1/19 no fever, vss with rr to 43, flagyl/zosyn/vanc/cefepime, n70-80% ox,  Wbc 14.8 from 5k,   Creat 1.5 form 0.9,   abg 02 61 with ph 7.33- peep 5,   I/o 687/133-  Will screen for dvt/pe with leg study  Will dose 500 cc saline,   F/u cxr more opacification right>> left lung.      1/20 no fever, vss, bp low at times, I.o 2770/1870, cefepime/flagyl/vanc, ox 60%, wbc 14.6, plts 144- new, creat 1.0,. cxr with some clearing,   F/u abg, wean soon likely but need better ox to extubate.    No dvt, ddimer up at admit,     Pt min vol 8 and seemed to do well with 50% ox while I rounded.  Discussed with nursing and respiratory-- if can get to 40% and pass wean trial would recommend extubation, would be optimal to discuss with family prior plans not to re intubate.  She is progressing-- would consider not discussing comfort care if improving.      1/21 no fever, vss, tm 100, wbc 15.2, hgb 8, from 13.6 bon 18th- admit, plt 130 from 244,   Creat 0.7,   cxr progressive clearing right , some increase opacification left suggested.   Strep in sputum,  Taper abx to doxy and flagy with c diff and strep in mucous-- monitor, check    procal am.    Will change code status to allow re intubation, and  extubate this am.      On enteral vanc for c diff concerns.   Discussed with January short.  Reviewed revised code status.      Addendum-- having stridor post extubation.  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  Pt was at [place] when patient removed trach 2 months ago.  Pt has no h/o stridor per daughter.  Stridor did not change with jaw thrush nor nt trumpet.  Discussed would recommend intubation and replace trach as best option. Discussed with Dr Charles.  1/22 no fever,vss, wbc 16.3, hgb 8.1 from 13.6 admit?  , procal is down to 3 from 17 admit,     Pt failed extubation with upper airway stridor and poor loc.      Do leak test:on vent with cuff down and tidal vol delivered 450 would need over 110 cc to leak to pass, need exhaled tidal vol to be less than 340 cc to  pass leak test -- do now and repeat q 4 x  3 total.  Will dose steroids for laryngospasm.  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  Will discuss with family later- trial extubation if passes, trach, comfort care???      Discussed with daughter, asked if code status should be altered?  After discussion - will consider extubation trial if good leak test (would try to notify daughter if passes) --- otherwise trach.      1/23 failed leak test, suspect trach stenosis - trach Wednesday?  No fever, vss, doxy/flagyl, vanc enteral, wbc 14.3,   F/u cxr am  Called Ira, daughter.  Discussed need for Palliative care eval.      1/24 no fever, vss, doxy/flagyl/vanc/vanc iv, wbc 15.5,   cxr patchy fluffy infiltrates R>L    No change in plan, defer to palliative care.  Could do trial extubation but likely to fail. Could bronch when tube pulled?      1/25 no fever, vss, doxy/flagyl,vanc iv/enteral, wbc 17.5 hgb 7,7 from 13.6 admit, day 8  abx    Pt seems to have good leak today.  Will have resp quantify, do wean trial, decrease sedation, and consider trial extubation if does well.  1/26 no fever, vss, wbc 19.1, check sputum, good leak test and weaned well.  Loc Is very good for extubation.     Has  shunt for yrs with no problems appreciated.    Loc very good.    Discussed with daughter.    Re evaluated post extubation.  The patient can speak well.  There is no stridor.  There is no respiratory distress.  Patient perhaps is a little slow.    Called and discussed case again with daughter.  Might consider bronchoscopy later on but would not  at all at this time.  Case discussed with .        The patient had severe pharyngeal dysphagia with aspiration of thin liquids in julius aspiration of nectar thick liquids in December 2021 by speech therapy evaluation.  Patient had an ineffective cough during aspiration.    Would recommend prolonged NPO.  Tube feedings the for would be adequate.      1/27 no fever, vss, doxy/flagyl/vanc iv/po, grew  strep/kleb (no sensitivity)/mrsa from sputumon 18th.  7 days iv vanc, has c diff toxin neg on enteral vanc,  On flagyl and doxy-- should be adequate for pneumonia.  3 lpm, wbc 22.6,     F/u cxr-- stable infiltrates as would be expected.    Some upper airway noise.  Not bad enough to trach.  Would consider bronch to inspect airway in a few days.  If worsens--intubation and trach would be recommended.    Will dose scopolamine patch consider resume robinul.  Will have robinul available for prn  Use.     Discussed with daughter, Ira.  Could be just aspiration/laryngospasm issues as tracheal problem should not have progressed to extubation after good leak test.      The following were evaluated and adjusted as needed: intravenous fluids and nutritional status, sedation and neurologic status, antibiotics, hemodynamics, support tubes and access lines and invasive monitoring, acid base balance and oxygenation needs, input and output and renal status and CODE STATUS/OUTLOOK DISCUSSED WITH AVAILABLE NEXT OF  KIN       Critical Care  - THE PATIENT HAS A HIGH PROBABILITY OF IMMINENT OR LIFE THREATENING DETERIORATION.  Over 50%time of encounter was in direct care at bedside.  Time was less than 30 minutes required for patient care.  Time needed for all of the above totaled 33 minutes.

## 2022-01-27 NOTE — PLAN OF CARE
Pt on 3 liters nasal cannula until around 01:00, when her oxygen sats dropped to 87%.  Pt placed on oxymask at 3 liters with improvement of oxygen sats to 96-97%.    Heavy coarse rhonci throughout all lung fields.  Pt unable to cough up phlegm.  Has very weak cough.  Unable to suction any out with yankauer cath.  Pt very weak and unable to communicate needs.  She makes eye contact and mumbles at words, but none are comprehendible.  Able to follow commands.  Some effort in lifting right and left arms and a small amount with the right leg.  No movement with left leg.    Overall, pt is very stiff.  Continues with loose bowel movements.  Now has a pure wick as catheter is out and is urinating freely.  Afebrile overnight.             Problem: Adult Inpatient Plan of Care  Goal: Plan of Care Review  Outcome: Ongoing, Progressing  Goal: Patient-Specific Goal (Individualized)  Outcome: Ongoing, Progressing  Flowsheets (Taken 1/27/2022 0414)  Anxieties, Fears or Concerns: unable to assess  Individualized Care Needs: mantain adequate airway  Patient-Specific Goals (Include Timeframe): able to cough up phlegm.     Problem: Infection  Goal: Absence of Infection Signs and Symptoms  Outcome: Ongoing, Progressing     Problem: Diabetes Comorbidity  Goal: Blood Glucose Level Within Targeted Range  Outcome: Ongoing, Progressing     Problem: Fluid Imbalance (Pneumonia)  Goal: Fluid Balance  Outcome: Ongoing, Progressing     Problem: Infection (Pneumonia)  Goal: Resolution of Infection Signs and Symptoms  Outcome: Ongoing, Progressing     Problem: Respiratory Compromise (Pneumonia)  Goal: Effective Oxygenation and Ventilation  Outcome: Ongoing, Progressing     Problem: Fall Injury Risk  Goal: Absence of Fall and Fall-Related Injury  Outcome: Ongoing, Progressing     Problem: Communication Impairment (Mechanical Ventilation, Invasive)  Goal: Effective Communication  Outcome: Met     Problem: Device-Related Complication Risk (Mechanical  Ventilation, Invasive)  Goal: Optimal Device Function  Outcome: Met     Problem: Inability to Wean (Mechanical Ventilation, Invasive)  Goal: Mechanical Ventilation Liberation  Outcome: Met     Problem: Nutrition Impairment (Mechanical Ventilation, Invasive)  Goal: Optimal Nutrition Delivery  Outcome: Met     Problem: Skin and Tissue Injury (Mechanical Ventilation, Invasive)  Goal: Absence of Device-Related Skin and Tissue Injury  Outcome: Met     Problem: Ventilator-Induced Lung Injury (Mechanical Ventilation, Invasive)  Goal: Absence of Ventilator-Induced Lung Injury  Outcome: Met     Problem: Communication Impairment (Artificial Airway)  Goal: Effective Communication  Outcome: Ongoing, Progressing     Problem: Device-Related Complication Risk (Artificial Airway)  Goal: Optimal Device Function  Outcome: Ongoing, Progressing     Problem: Skin and Tissue Injury (Artificial Airway)  Goal: Absence of Device-Related Skin or Tissue Injury  Outcome: Ongoing, Progressing     Problem: Noninvasive Ventilation Acute  Goal: Effective Unassisted Ventilation and Oxygenation  Outcome: Met     Problem: Skin Injury Risk Increased  Goal: Skin Health and Integrity  Outcome: Ongoing, Progressing     Problem: Coping Ineffective  Goal: Effective Coping  Outcome: Ongoing, Progressing     Problem: Adjustment to Illness (Sepsis/Septic Shock)  Goal: Optimal Coping  Outcome: Ongoing, Progressing     Problem: Bleeding (Sepsis/Septic Shock)  Goal: Absence of Bleeding  Outcome: Ongoing, Progressing     Problem: Glycemic Control Impaired (Sepsis/Septic Shock)  Goal: Blood Glucose Level Within Desired Range  Outcome: Ongoing, Progressing     Problem: Infection Progression (Sepsis/Septic Shock)  Goal: Absence of Infection Signs and Symptoms  Outcome: Ongoing, Progressing     Problem: Nutrition Impaired (Sepsis/Septic Shock)  Goal: Optimal Nutrition Intake  Outcome: Ongoing, Progressing     Problem: Fluid and Electrolyte Imbalance (Acute Kidney  Injury/Impairment)  Goal: Fluid and Electrolyte Balance  Outcome: Ongoing, Progressing     Problem: Oral Intake Inadequate (Acute Kidney Injury/Impairment)  Goal: Optimal Nutrition Intake  Outcome: Ongoing, Progressing     Problem: Renal Function Impairment (Acute Kidney Injury/Impairment)  Goal: Effective Renal Function  Outcome: Ongoing, Progressing     Problem: ARDS (Acute Respiratory Distress Syndrome)  Goal: Effective Oxygenation  Outcome: Ongoing, Progressing     Problem: Oral Intake Inadequate  Goal: Improved Oral Intake  Outcome: Ongoing, Progressing

## 2022-01-28 LAB
ALBUMIN SERPL BCP-MCNC: 2.2 G/DL (ref 3.5–5.2)
ALP SERPL-CCNC: 57 U/L (ref 55–135)
ALT SERPL W/O P-5'-P-CCNC: 16 U/L (ref 10–44)
ANION GAP SERPL CALC-SCNC: 9 MMOL/L (ref 8–16)
AST SERPL-CCNC: 13 U/L (ref 10–40)
BACTERIA SPEC AEROBE CULT: ABNORMAL
BASOPHILS # BLD AUTO: 0.01 K/UL (ref 0–0.2)
BASOPHILS NFR BLD: 0.1 % (ref 0–1.9)
BILIRUB SERPL-MCNC: 0.4 MG/DL (ref 0.1–1)
BUN SERPL-MCNC: 15 MG/DL (ref 8–23)
CALCIUM SERPL-MCNC: 8.2 MG/DL (ref 8.7–10.5)
CHLORIDE SERPL-SCNC: 95 MMOL/L (ref 95–110)
CO2 SERPL-SCNC: 28 MMOL/L (ref 23–29)
CREAT SERPL-MCNC: 0.6 MG/DL (ref 0.5–1.4)
DIFFERENTIAL METHOD: ABNORMAL
EOSINOPHIL # BLD AUTO: 0.1 K/UL (ref 0–0.5)
EOSINOPHIL NFR BLD: 1.2 % (ref 0–8)
ERYTHROCYTE [DISTWIDTH] IN BLOOD BY AUTOMATED COUNT: 13.8 % (ref 11.5–14.5)
EST. GFR  (AFRICAN AMERICAN): >60 ML/MIN/1.73 M^2
EST. GFR  (NON AFRICAN AMERICAN): >60 ML/MIN/1.73 M^2
GLUCOSE SERPL-MCNC: 151 MG/DL (ref 70–110)
GRAM STN SPEC: ABNORMAL
HCT VFR BLD AUTO: 25.2 % (ref 37–48.5)
HGB BLD-MCNC: 8.2 G/DL (ref 12–16)
IMM GRANULOCYTES # BLD AUTO: 0.18 K/UL (ref 0–0.04)
IMM GRANULOCYTES NFR BLD AUTO: 1.6 % (ref 0–0.5)
LYMPHOCYTES # BLD AUTO: 1.2 K/UL (ref 1–4.8)
LYMPHOCYTES NFR BLD: 10.5 % (ref 18–48)
MAGNESIUM SERPL-MCNC: 1.6 MG/DL (ref 1.6–2.6)
MCH RBC QN AUTO: 31.5 PG (ref 27–31)
MCHC RBC AUTO-ENTMCNC: 32.5 G/DL (ref 32–36)
MCV RBC AUTO: 97 FL (ref 82–98)
MONOCYTES # BLD AUTO: 1 K/UL (ref 0.3–1)
MONOCYTES NFR BLD: 8.8 % (ref 4–15)
NEUTROPHILS # BLD AUTO: 9 K/UL (ref 1.8–7.7)
NEUTROPHILS NFR BLD: 77.8 % (ref 38–73)
NRBC BLD-RTO: 0 /100 WBC
PHOSPHATE SERPL-MCNC: 2.8 MG/DL (ref 2.7–4.5)
PLATELET # BLD AUTO: 280 K/UL (ref 150–450)
PMV BLD AUTO: 10.5 FL (ref 9.2–12.9)
POCT GLUCOSE: 163 MG/DL (ref 70–110)
POCT GLUCOSE: 169 MG/DL (ref 70–110)
POCT GLUCOSE: 172 MG/DL (ref 70–110)
POCT GLUCOSE: 197 MG/DL (ref 70–110)
POTASSIUM SERPL-SCNC: 3.1 MMOL/L (ref 3.5–5.1)
PREALB SERPL-MCNC: 15 MG/DL (ref 20–43)
PROT SERPL-MCNC: 5.8 G/DL (ref 6–8.4)
RBC # BLD AUTO: 2.6 M/UL (ref 4–5.4)
SODIUM SERPL-SCNC: 132 MMOL/L (ref 136–145)
TRIGL SERPL-MCNC: 78 MG/DL (ref 30–150)
WBC # BLD AUTO: 11.59 K/UL (ref 3.9–12.7)

## 2022-01-28 PROCEDURE — B4185 PARENTERAL SOL 10 GM LIPIDS: HCPCS | Performed by: HOSPITALIST

## 2022-01-28 PROCEDURE — 25000003 PHARM REV CODE 250: Performed by: HOSPITALIST

## 2022-01-28 PROCEDURE — 25000003 PHARM REV CODE 250: Performed by: STUDENT IN AN ORGANIZED HEALTH CARE EDUCATION/TRAINING PROGRAM

## 2022-01-28 PROCEDURE — 85025 COMPLETE CBC W/AUTO DIFF WBC: CPT | Performed by: STUDENT IN AN ORGANIZED HEALTH CARE EDUCATION/TRAINING PROGRAM

## 2022-01-28 PROCEDURE — 27000207 HC ISOLATION

## 2022-01-28 PROCEDURE — 31720 CLEARANCE OF AIRWAYS: CPT

## 2022-01-28 PROCEDURE — 83735 ASSAY OF MAGNESIUM: CPT | Performed by: STUDENT IN AN ORGANIZED HEALTH CARE EDUCATION/TRAINING PROGRAM

## 2022-01-28 PROCEDURE — 80053 COMPREHEN METABOLIC PANEL: CPT | Performed by: STUDENT IN AN ORGANIZED HEALTH CARE EDUCATION/TRAINING PROGRAM

## 2022-01-28 PROCEDURE — 20000000 HC ICU ROOM

## 2022-01-28 PROCEDURE — 63600175 PHARM REV CODE 636 W HCPCS: Performed by: STUDENT IN AN ORGANIZED HEALTH CARE EDUCATION/TRAINING PROGRAM

## 2022-01-28 PROCEDURE — 84100 ASSAY OF PHOSPHORUS: CPT | Performed by: STUDENT IN AN ORGANIZED HEALTH CARE EDUCATION/TRAINING PROGRAM

## 2022-01-28 PROCEDURE — 99233 PR SUBSEQUENT HOSPITAL CARE,LEVL III: ICD-10-PCS | Mod: ,,, | Performed by: INTERNAL MEDICINE

## 2022-01-28 PROCEDURE — 94761 N-INVAS EAR/PLS OXIMETRY MLT: CPT

## 2022-01-28 PROCEDURE — S0030 INJECTION, METRONIDAZOLE: HCPCS | Performed by: STUDENT IN AN ORGANIZED HEALTH CARE EDUCATION/TRAINING PROGRAM

## 2022-01-28 PROCEDURE — 27000221 HC OXYGEN, UP TO 24 HOURS

## 2022-01-28 PROCEDURE — 84134 ASSAY OF PREALBUMIN: CPT | Performed by: HOSPITALIST

## 2022-01-28 PROCEDURE — 84478 ASSAY OF TRIGLYCERIDES: CPT | Performed by: HOSPITALIST

## 2022-01-28 PROCEDURE — 25000003 PHARM REV CODE 250: Performed by: INTERNAL MEDICINE

## 2022-01-28 PROCEDURE — 99233 SBSQ HOSP IP/OBS HIGH 50: CPT | Mod: ,,, | Performed by: INTERNAL MEDICINE

## 2022-01-28 PROCEDURE — 99900026 HC AIRWAY MAINTENANCE (STAT)

## 2022-01-28 RX ADMIN — FAMOTIDINE 20 MG: 10 INJECTION INTRAVENOUS at 08:01

## 2022-01-28 RX ADMIN — Medication 125 MG: at 05:01

## 2022-01-28 RX ADMIN — POTASSIUM BICARBONATE 35 MEQ: 391 TABLET, EFFERVESCENT ORAL at 05:01

## 2022-01-28 RX ADMIN — METRONIDAZOLE 500 MG: 500 INJECTION, SOLUTION INTRAVENOUS at 03:01

## 2022-01-28 RX ADMIN — METOCLOPRAMIDE HYDROCHLORIDE 10 MG: 5 SOLUTION ORAL at 05:01

## 2022-01-28 RX ADMIN — DOXYCYCLINE 100 MG: 100 INJECTION, POWDER, LYOPHILIZED, FOR SOLUTION INTRAVENOUS at 06:01

## 2022-01-28 RX ADMIN — METOPROLOL TARTRATE 12.5 MG: 25 TABLET, FILM COATED ORAL at 08:01

## 2022-01-28 RX ADMIN — METRONIDAZOLE 500 MG: 500 INJECTION, SOLUTION INTRAVENOUS at 08:01

## 2022-01-28 RX ADMIN — METRONIDAZOLE 500 MG: 500 INJECTION, SOLUTION INTRAVENOUS at 01:01

## 2022-01-28 RX ADMIN — VANCOMYCIN HYDROCHLORIDE 1250 MG: 1.25 INJECTION, POWDER, LYOPHILIZED, FOR SOLUTION INTRAVENOUS at 09:01

## 2022-01-28 RX ADMIN — METOCLOPRAMIDE HYDROCHLORIDE 10 MG: 5 SOLUTION ORAL at 01:01

## 2022-01-28 RX ADMIN — INSULIN ASPART 2 UNITS: 100 INJECTION, SOLUTION INTRAVENOUS; SUBCUTANEOUS at 12:01

## 2022-01-28 RX ADMIN — INSULIN ASPART 2 UNITS: 100 INJECTION, SOLUTION INTRAVENOUS; SUBCUTANEOUS at 05:01

## 2022-01-28 RX ADMIN — ENOXAPARIN SODIUM 40 MG: 100 INJECTION SUBCUTANEOUS at 05:01

## 2022-01-28 RX ADMIN — SOYBEAN OIL 250 ML: 20 INJECTION, SOLUTION INTRAVENOUS at 09:01

## 2022-01-28 RX ADMIN — METOROPROLOL TARTRATE 5 MG: 5 INJECTION, SOLUTION INTRAVENOUS at 08:01

## 2022-01-28 RX ADMIN — ATORVASTATIN CALCIUM 40 MG: 40 TABLET, FILM COATED ORAL at 08:01

## 2022-01-28 RX ADMIN — LEUCINE, PHENYLALANINE, LYSINE, METHIONINE, ISOLEUCINE, VALINE, HISTIDINE, THREONINE, TRYPTOPHAN, ALANINE, GLYCINE, ARGININE, PROLINE, SERINE, TYROSINE, SODIUM ACETATE, DIBASIC POTASSIUM PHOSPHATE, MAGNESIUM CHLORIDE, SODIUM CHLORIDE, CALCIUM CHLORIDE, DEXTROSE
365; 280; 290; 200; 300; 290; 240; 210; 90; 1035; 515; 575; 340; 250; 20; 340; 261; 51; 59; 33; 15 INJECTION INTRAVENOUS at 06:01

## 2022-01-28 RX ADMIN — LEVOTHYROXINE SODIUM 25 MCG: 0.03 TABLET ORAL at 05:01

## 2022-01-28 RX ADMIN — Medication 125 MG: at 12:01

## 2022-01-28 RX ADMIN — CITALOPRAM HYDROBROMIDE 40 MG: 10 TABLET ORAL at 08:01

## 2022-01-28 RX ADMIN — METOCLOPRAMIDE HYDROCHLORIDE 10 MG: 5 SOLUTION ORAL at 12:01

## 2022-01-28 RX ADMIN — POTASSIUM BICARBONATE 35 MEQ: 391 TABLET, EFFERVESCENT ORAL at 03:01

## 2022-01-28 NOTE — ASSESSMENT & PLAN NOTE
"Patient with current diagnosis of pneumonia due to bacterial etiology due to  MRSA, Haemophilus and GBS which is uncontrolled due to persistent hypoxia . I have reviewed the pertinent imaging. Current antimicrobial regimen consists of   Antibiotics (From admission, onward)            Start     Stop Route Frequency Ordered    01/27/22 2000  vancomycin 1.25 g in dextrose 5% 250 mL IVPB (ready to mix)         -- IV Every 24 hours (non-standard times) 01/27/22 1247    01/23/22 1047  vancomycin - pharmacy to dose  (vancomycin IVPB)        "And" Linked Group Details    -- IV pharmacy to manage frequency 01/23/22 0947    01/21/22 0730  doxycycline (VIBRAMYCIN) 100 mg in dextrose 5 % 250 mL IVPB         -- IV Every 12 hours (non-standard times) 01/21/22 0617    01/19/22 1200  vancomycin 125 mg/5 mL oral solution 125 mg  (C. difficile Infection (CDI) Treatment Order Panel)         01/29 1159 PER G TUBE Every 6 hours 01/19/22 0920    01/18/22 1545  metronidazole IVPB 500 mg         -- IV Every 8 hours (non-standard times) 01/18/22 1441      . Cultures drawn and noted-   Microbiology Results (last 7 days)     Procedure Component Value Units Date/Time    Culture, Respiratory with Gram Stain [[id number]]  (Abnormal)  (Susceptibility) Collected: 01/26/22 0745    Order Status: Completed Specimen: Respiratory from Sputum Updated: 01/28/22 1316     Respiratory Culture No S aureus or Pseudomonas isolated.      PROTEUS MIRABILIS ESBL  Few        SERRATIA MARCESCENS  Moderate       Gram Stain (Respiratory) <10 epithelial cells per low power field.     Gram Stain (Respiratory) Rare WBC's     Gram Stain (Respiratory) No organisms seen    Blood culture [[id number]] Collected: 01/18/22 1726    Order Status: Completed Specimen: Blood from Antecubital, Left Arm Updated: 01/23/22 2322     Blood Culture, Routine No growth after 5 days.    Blood culture [[id number]] Collected: 01/18/22 1802    Order Status: Completed Specimen: Blood from " Antecubital, Right Arm Updated: 01/23/22 2322     Blood Culture, Routine No growth after 5 days.    Culture, Respiratory with Gram Stain [[id number]]  (Abnormal)  (Susceptibility) Collected: 01/19/22 0114    Order Status: Completed Specimen: Sputum, Expectorated Updated: 01/23/22 1045     Respiratory Culture No Pseudomonas isolated.      STREPTOCOCCUS AGALACTIAE (GROUP B)  Few  Beta-hemolytic streptococci are routinely susceptible to   penicillins,cephalosporins and carbapenems.  Susceptibility testing not routinely performed        KLEBSIELLA PNEUMONIAE  Rare        METHICILLIN RESISTANT STAPHYLOCOCCUS AUREUS  Few       Gram Stain (Respiratory) <10 epithelial cells per low power field.     Gram Stain (Respiratory) Rare WBC's     Gram Stain (Respiratory) Rare Gram positive cocci     Gram Stain (Respiratory) Rare yeast       Will monitor patient closely and continue current treatment plan unchanged.

## 2022-01-28 NOTE — CARE UPDATE
01/28/22 0803   Patient Assessment/Suction   Level of Consciousness (AVPU) responds to voice   Respiratory Effort Normal   Expansion/Accessory Muscles/Retractions expansion symmetric   All Lung Fields Breath Sounds Anterior:   BILL Breath Sounds coarse   RUL Breath Sounds coarse   Cough Frequency with stimulation   Cough Type assisted;weak   Suction Method nasal   Suction Pressure (mmHg) 100 mmHg   $ Suction Charges NT Suction Procedure   Secretions Amount large   Secretions Color yellow   Secretions Characteristics thick   PRE-TX-O2   O2 Device (Oxygen Therapy) Oxymask   $ Is the patient on Low Flow Oxygen? Yes   Flow (L/min) 5   Oxygen Concentration (%) 40   SpO2 (!) 92 %   Pulse Oximetry Type Continuous   $ Pulse Oximetry - Multiple Charge Pulse Oximetry - Multiple   Pulse 81   Resp 18   BP (!) 170/73   POx, NT Sx prn

## 2022-01-28 NOTE — ASSESSMENT & PLAN NOTE
Continue to hold gastrostomy tube feeds for the moment, as patient's respiratory status is tenuous.  Will start on TPN, and start trickle feeds in the next 24-48 hours.

## 2022-01-28 NOTE — ASSESSMENT & PLAN NOTE
S-  Patient's FSGs are controlled on current medication regimen.  Last A1c reviewed- No results found for: LABA1C, HGBA1C  Most recent fingerstick glucose reviewed-   Recent Labs   Lab 01/27/22  0531 01/27/22  0915 01/27/22  1247 01/27/22  1837   POCTGLUCOSE 120* 118* 83 162*     Current correctional scale  Medium  Maintain anti-hyperglycemic dose as follows-   Antihyperglycemics (From admission, onward)            Start     Stop Route Frequency Ordered    01/18/22 1438  insulin aspart U-100 pen 1-10 Units         -- SubQ Before meals & nightly PRN 01/18/22 1438        Hold Oral hypoglycemics while patient is in the hospital.

## 2022-01-28 NOTE — ASSESSMENT & PLAN NOTE
Patient with Hypoxic Respiratory failure which is Acute on chronic.  she is not on home oxygen. Supplemental oxygen was provided and noted-  .   Signs/symptoms of respiratory failure include- tachypnea, increased work of breathing and respiratory distress. Contributing diagnoses includes - COPD and Pneumonia Labs and images were reviewed. Patient Has recent ABG, which has been reviewed. Will treat underlying causes and adjust management of respiratory failure as follows- Secondary to aspiration pneumonia. GBS, Klebsiella and Staph aureus in sputum.  Continue antibiotics, will monitor patient closely high risk for re-intubation.  If she is reintubated, will likely proceed with tracheostomy.

## 2022-01-28 NOTE — SUBJECTIVE & OBJECTIVE
Interval History:  Patient seen and examined.  No acute events overnight.  Patient was extubated on 1/26 to nasal cannula.  Remains nonverbal.  Thick secretions improved with scopolamine patch.  Plan of care reviewed with the nurse at the bedside.  Also reviewed with pulmonology.    Review of Systems   Unable to perform ROS: Dementia     Objective:     Vital Signs (Most Recent):  Temp: 98 °F (36.7 °C) (01/27/22 1600)  Pulse: 75 (01/27/22 1948)  Resp: (!) 23 (01/27/22 1948)  BP: (!) 119/56 (01/27/22 1800)  SpO2: 99 % (01/27/22 1948) Vital Signs (24h Range):  Temp:  [97.4 °F (36.3 °C)-98.6 °F (37 °C)] 98 °F (36.7 °C)  Pulse:  [59-88] 75  Resp:  [15-24] 23  SpO2:  [89 %-100 %] 99 %  BP: ()/(43-86) 119/56     Weight: 72 kg (158 lb 11.7 oz)  Body mass index is 27.25 kg/m².    Intake/Output Summary (Last 24 hours) at 1/27/2022 2011  Last data filed at 1/27/2022 1730  Gross per 24 hour   Intake 992.89 ml   Output 950 ml   Net 42.89 ml      Physical Exam  Vitals and nursing note reviewed.   Constitutional:       General: She is not in acute distress.     Appearance: She is ill-appearing.   HENT:      Mouth/Throat:      Comments: Thick secretions noted  Eyes:      Pupils: Pupils are equal, round, and reactive to light.   Cardiovascular:      Rate and Rhythm: Normal rate and regular rhythm.      Heart sounds: No murmur heard.      Pulmonary:      Breath sounds: Rhonchi present.      Comments: Increased work of breathing noted with coarse bilateral breath sounds.  Stridor noted.  Abdominal:      General: There is no distension.      Palpations: Abdomen is soft.      Tenderness: There is no abdominal tenderness.   Skin:     Coloration: Skin is not pale.      Findings: No rash.   Neurological:      Mental Status: She is disoriented.         Significant Labs: All pertinent labs within the past 24 hours have been reviewed.    Significant Imaging: I have reviewed all pertinent imaging results/findings within the past 24  hours.

## 2022-01-28 NOTE — PLAN OF CARE
Remains in ICU on oxymask at 5 liters.    Continues with heavy rales to all lung fields.  NT suction per RT yielding think blood tinged white to yellow sputum.  1100 ml dark straw colored urine output.  TPN and LIPIDS in progress.  Afebrile.  Loose stool x 1 overnight.  Appears to have worsening skin breakdown to sacrum.  Wound care nurse consulted.        Problem: Adult Inpatient Plan of Care  Goal: Plan of Care Review  Outcome: Ongoing, Progressing  Goal: Patient-Specific Goal (Individualized)  Outcome: Ongoing, Progressing     Problem: Infection  Goal: Absence of Infection Signs and Symptoms  Outcome: Ongoing, Progressing     Problem: Diabetes Comorbidity  Goal: Blood Glucose Level Within Targeted Range  Outcome: Ongoing, Progressing     Problem: Fluid Imbalance (Pneumonia)  Goal: Fluid Balance  Outcome: Ongoing, Progressing     Problem: Infection (Pneumonia)  Goal: Resolution of Infection Signs and Symptoms  Outcome: Ongoing, Progressing     Problem: Respiratory Compromise (Pneumonia)  Goal: Effective Oxygenation and Ventilation  Outcome: Ongoing, Progressing     Problem: Fall Injury Risk  Goal: Absence of Fall and Fall-Related Injury  Outcome: Ongoing, Progressing     Problem: Communication Impairment (Artificial Airway)  Goal: Effective Communication  Outcome: Met     Problem: Device-Related Complication Risk (Artificial Airway)  Goal: Optimal Device Function  Outcome: Met     Problem: Skin and Tissue Injury (Artificial Airway)  Goal: Absence of Device-Related Skin or Tissue Injury  Outcome: Met     Problem: Infection Progression (Sepsis/Septic Shock)  Goal: Absence of Infection Signs and Symptoms  Outcome: Ongoing, Progressing     Problem: Nutrition Impaired (Sepsis/Septic Shock)  Goal: Optimal Nutrition Intake  Outcome: Ongoing, Progressing     Problem: ARDS (Acute Respiratory Distress Syndrome)  Goal: Effective Oxygenation  Outcome: Ongoing, Progressing

## 2022-01-28 NOTE — CONSULTS
Consulted for sacral and buttock wound worsening at this time due to c-diff diarrhea. Patient admitted with skin injury to her sacral area and buttocks. Patient at this time has a flexi seal fecal containment system in place to help manage her stool and keep this off her skin due to incontinent associated dermatitis. Cleaned the area with wound cleanser. The macerated skin to the sacral area wiped off easily and once cleaned the open sacral wound measured 1.5cm x 0.8cm x 0.1cm.  Cleaned this area and applied a cut piece of Urgotul dressing to cover the open wound and then secured with a foam sacral dressing and obtained a good seal. Foam dressing covered both buttocks and sacral areas. Wound care to continue to follow this patient.

## 2022-01-28 NOTE — ASSESSMENT & PLAN NOTE
Continue to hold gastrostomy tube feeds for the moment, as patient's respiratory status is tenuous.  Will continue TPN, and start trickle feeds today,

## 2022-01-28 NOTE — ASSESSMENT & PLAN NOTE
"Patient with current diagnosis of pneumonia due to bacterial etiology due to  MRSA, Haemophilus and GBS which is uncontrolled due to persistent hypoxia . I have reviewed the pertinent imaging. Current antimicrobial regimen consists of   Antibiotics (From admission, onward)            Start     Stop Route Frequency Ordered    01/27/22 2000  vancomycin 1.25 g in dextrose 5% 250 mL IVPB (ready to mix)         -- IV Every 24 hours (non-standard times) 01/27/22 1247    01/23/22 1047  vancomycin - pharmacy to dose  (vancomycin IVPB)        "And" Linked Group Details    -- IV pharmacy to manage frequency 01/23/22 0947    01/21/22 0730  doxycycline (VIBRAMYCIN) 100 mg in dextrose 5 % 250 mL IVPB         -- IV Every 12 hours (non-standard times) 01/21/22 0617    01/19/22 1200  vancomycin 125 mg/5 mL oral solution 125 mg  (C. difficile Infection (CDI) Treatment Order Panel)         01/29 1159 PER G TUBE Every 6 hours 01/19/22 0920    01/18/22 1545  metronidazole IVPB 500 mg         -- IV Every 8 hours (non-standard times) 01/18/22 1441      . Cultures drawn and noted-   Microbiology Results (last 7 days)     Procedure Component Value Units Date/Time    Culture, Respiratory with Gram Stain [[id number]]  (Abnormal) Collected: 01/26/22 0745    Order Status: Completed Specimen: Respiratory from Sputum Updated: 01/27/22 1419     Respiratory Culture PRESUMPTIVE PROTEUS SPECIES  Few  Identification and susceptibility pending        SERRATIA MARCESCENS  Moderate  Susceptibility pending       Gram Stain (Respiratory) <10 epithelial cells per low power field.     Gram Stain (Respiratory) Rare WBC's     Gram Stain (Respiratory) No organisms seen    Blood culture [[id number]] Collected: 01/18/22 1726    Order Status: Completed Specimen: Blood from Antecubital, Left Arm Updated: 01/23/22 2322     Blood Culture, Routine No growth after 5 days.    Blood culture [[id number]] Collected: 01/18/22 1802    Order Status: Completed Specimen: Blood " from Antecubital, Right Arm Updated: 01/23/22 2322     Blood Culture, Routine No growth after 5 days.    Culture, Respiratory with Gram Stain [[id number]]  (Abnormal)  (Susceptibility) Collected: 01/19/22 0114    Order Status: Completed Specimen: Sputum, Expectorated Updated: 01/23/22 1045     Respiratory Culture No Pseudomonas isolated.      STREPTOCOCCUS AGALACTIAE (GROUP B)  Few  Beta-hemolytic streptococci are routinely susceptible to   penicillins,cephalosporins and carbapenems.  Susceptibility testing not routinely performed        KLEBSIELLA PNEUMONIAE  Rare        METHICILLIN RESISTANT STAPHYLOCOCCUS AUREUS  Few       Gram Stain (Respiratory) <10 epithelial cells per low power field.     Gram Stain (Respiratory) Rare WBC's     Gram Stain (Respiratory) Rare Gram positive cocci     Gram Stain (Respiratory) Rare yeast       Will monitor patient closely and continue current treatment plan unchanged.

## 2022-01-28 NOTE — PROGRESS NOTES
"  01/28/2022      Admit Date: 1/18/2022  Sandra Wilson  New Patient Consult    Chief Complaint   Patient presents with    Shortness of Breath    Vomiting     PEG tube        History of Present Illness:  Intubated.          From Dr Gaspar's hpi, ERP:Sandra Wilson is a 81 y.o. female who presents to the ED via EMS  with an onset of vomiting and SOB. EMS reported that upon examination patient was less responsive than usually but is able to answer questions. The report from the EMS upon arrival was an O2 sats in the 70's. EMS proceeded to give supplemental oxygen and the O2 sats raised up to 93% on 3 liters of O2 with  "junky" right upper lung sounds" and greenish diarrhea. She was vomiting en route. They also reported that the patient is a patient that has dislodged her PEG tubing many times. PMHx of HTN, CAD, OA, GERD, ctroke, Colon cancer, ovarian cancer, breast cancer.PSHx of Cardiac catheterization, colonoscopy.         Progress Note  PULMONARY    Admit Date: 1/18/2022 01/28/2022      SUBJECTIVE:     1/19 intubated, sedated,  1/20 no c/o intubated, sedated  1/21 no new c/o intubated  1/22 no c/o intubated   1/23 no  New c/o intubated  1/24 no new c/o  1/25 no new c/o  1/26 no new co  1/28- resp status stable on 5L oxymizer mask. Having loose BMs in diaper. Pt does not verbalize. Slight stridor but no distress, oxygening well      PFSH and Allergies reviewed.    OBJECTIVE:     Vitals (Most recent):  Vitals:    01/28/22 1000   BP: (!) 175/81   Pulse: 77   Resp: 20   Temp:        Vitals (24 hour range):  Temp:  [97.7 °F (36.5 °C)-98.8 °F (37.1 °C)]   Pulse:  []   Resp:  [18-28]   BP: ()/(31-81)   SpO2:  [92 %-100 %]       Intake/Output Summary (Last 24 hours) at 1/28/2022 1121  Last data filed at 1/28/2022 0749  Gross per 24 hour   Intake 2112.58 ml   Output 1570 ml   Net 542.58 ml        Too value is ED when Physical Exam:  The patient's neuro status (alertness,orientation,cognitive " function,motor skills,), pharyngeal exam (oral lesions, hygiene, abn dentition,), Neck (jvd,mass,thyroid,nodes in neck and above/below clavicle),RESPIRATORY(symmetry,effort,fremitus,percussion,auscultation),  Cor(rhythm,heart tones including gallops,perfusion,edema)ABD(distention,hepatic&splenomegaly,tenderness,masses), Skin(rash,cyanosis),Psyc(affect,judgement,).  Exam negative except for these pertinent findings:    No distress  Does not verbalize  Coarse rhonchi upper airway  Trach scar  Chest symmetric, projected upper airway insp and exp rhonchi   abd soft with PEG tube  No edema      Radiographs reviewed: view by direct vision    Results for orders placed during the hospital encounter of 01/18/22    X-Ray Chest 1 View    Narrative  EXAMINATION:  Mm and is is  XR CHEST 1 VIEW    CLINICAL HISTORY:  respiratory failure;    TECHNIQUE:  Single frontal view of the chest was performed.    COMPARISON:  Chest portable of January 18, 2022    FINDINGS:  An endotracheal tube ends in the trachea above the level the malorie.  An NG tube traverses the esophagus to the stomach.  A central line enters at the right neck and ends in the superior vena cava.  The cardiac size and contours within normal limits.  A loop recorder is noted in the left chest.  There is continued central right lung infiltrate and small infiltrate at the left lung base.  No pneumothorax is seen.    Impression  Continued intrapulmonary infiltrates right greater than left.  Endotracheal tube, NG tube and right central lines in position.      Electronically signed by: Mathew Yanez MD  Date:    01/18/2022  Time:    15:50  ]    Labs     No results for input(s): WBC, HGB, HCT, PLT, BAND, METAMYELOCYT, MYELOPCT, HGBA1C in the last 24 hours.  No results for input(s): NA, K, CL, CO2, BUN, CREATININE, GLU, CALCIUM, CAION, MG, PHOS, AST, ALT, ALKPHOS, BILITOT, BILIDIR, PROT, ALBUMIN, PREALBUMIN, AMYLASE, LIPASE, CRP, HSCRP, SEDRATE, PROCAL, INR, PTT, LABHEPA,  LACTATE, TROPONINI, CPK, CPKMB, MB, BNP in the last 24 hours.  No results for input(s): PH, PCO2, PO2, HCO3 in the last 24 hours.  Microbiology Results (last 7 days)     Procedure Component Value Units Date/Time    Culture, Respiratory with Gram Stain [[id number]]  (Abnormal) Collected: 01/26/22 0745    Order Status: Completed Specimen: Respiratory from Sputum Updated: 01/28/22 1050     Respiratory Culture No S aureus or Pseudomonas isolated.      PRESUMPTIVE PROTEUS SPECIES  Few  Identification and susceptibility pending        SERRATIA MARCESCENS  Moderate  Susceptibility pending       Gram Stain (Respiratory) <10 epithelial cells per low power field.     Gram Stain (Respiratory) Rare WBC's     Gram Stain (Respiratory) No organisms seen    Blood culture [[id number]] Collected: 01/18/22 1726    Order Status: Completed Specimen: Blood from Antecubital, Left Arm Updated: 01/23/22 2322     Blood Culture, Routine No growth after 5 days.    Blood culture [[id number]] Collected: 01/18/22 1802    Order Status: Completed Specimen: Blood from Antecubital, Right Arm Updated: 01/23/22 2322     Blood Culture, Routine No growth after 5 days.    Culture, Respiratory with Gram Stain [[id number]]  (Abnormal)  (Susceptibility) Collected: 01/19/22 0114    Order Status: Completed Specimen: Sputum, Expectorated Updated: 01/23/22 1045     Respiratory Culture No Pseudomonas isolated.      STREPTOCOCCUS AGALACTIAE (GROUP B)  Few  Beta-hemolytic streptococci are routinely susceptible to   penicillins,cephalosporins and carbapenems.  Susceptibility testing not routinely performed        KLEBSIELLA PNEUMONIAE  Rare        METHICILLIN RESISTANT STAPHYLOCOCCUS AUREUS  Few       Gram Stain (Respiratory) <10 epithelial cells per low power field.     Gram Stain (Respiratory) Rare WBC's     Gram Stain (Respiratory) Rare Gram positive cocci     Gram Stain (Respiratory) Rare yeast        Echo   Summary    · Mild concentric hypertrophy and normal  systolic function.  · Mild-to-moderate mitral regurgitation.  · Mild tricuspid regurgitation.  · Mild pulmonic regurgitation.  · The estimated ejection fraction is 55%.  · Indeterminate left ventricular diastolic function.  · Normal right ventricular size with normal right ventricular systolic function.  · Mild left atrial enlargement.  · There is moderate-to-severe aortic valve stenosis.  · Aortic valve area is cm2; peak velocity is 3.42 m/s; mean gradient is 32 mmHg. Peak gradient 47mmHg  · Mild aortic regurgitation.  · Normal central venous pressure (3 mmHg).  · The estimated PA systolic pressure is 32 mmHg.  · There is no pulmonary hypertension.  · Moderate to severe AS. 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]  Not Applicable    ARDS (adult respiratory distress syndrome) [J80]  Yes    YARI (generalized anxiety disorder) [F41.1]  Yes    Type 2 diabetes mellitus, with long-term current use of insulin [E11.9, Z79.4]  Not Applicable    Pneumonia [J18.9]  Yes    Aspiration into airway [T17.908A]  Yes    Hypothyroidism [E03.9]  Yes    Hyperlipidemia [E78.5]  Yes     Chronic    GERD (gastroesophageal reflux disease) [K21.9]  Yes     Chronic      Resolved Hospital Problems    Diagnosis Date Resolved POA    Ileus [K56.7] 01/23/2022 No    Demand ischemia [I24.8] 01/24/2022 Yes    Sepsis [A41.9] 01/24/2022 Yes    STEFFANY (acute kidney injury) [N17.9] 01/21/2022 Yes    Hyponatremia [E87.1] 01/20/2022 Yes                        Plan: no fever, vss, was hypotensive earlier.  Cefepime/vanc,flagyl,  chr midodrine, levophed 0.9 ,  ddimer 5.2, creat 0.9  c diff ag + but toxin neg.  abg 7.27 with ox 48,   Dnr,  cxr impressive R>>>L aspiration pattern.     Prior subdural --       Recruitment manuver done with peep 30 for 6 seconds with art line bp  falling from sbp 100 to 60's.  Sat improved from 92 to 94.  Pt not really peep responsive.    Pt should improve ox with left side down or prone.  Peep may help but infiltrates more R>L -- should be posterior lung bases.  Might consider cta if not progressing.      discussed with resp and nursing. Optimally would be left side down or prone.        1/19 no fever, vss with rr to 43, flagyl/zosyn/vanc/cefepime, n70-80% ox,  Wbc 14.8 from 5k,  Creat 1.5 form 0.9,   abg 02 61 with ph 7.33- peep 5,   I/o 687/133-  Will screen for dvt/pe with leg study  Will dose 500 cc saline,   F/u cxr more opacification right>> left lung.      1/20 no fever, vss, bp low at times, I.o 2770/1870, cefepime/flagyl/vanc, ox 60%, wbc 14.6, plts 144- new, creat 1.0,. cxr with some clearing,   F/u abg, wean soon likely but need better ox to extubate.    No dvt, ddimer up at admit,     Pt min vol 8 and seemed to do well with 50% ox while I rounded.  Discussed with nursing and respiratory-- if can get to 40% and pass wean trial would recommend extubation, would be optimal to discuss with family prior plans not to re intubate.  She is progressing-- would consider not discussing comfort care if improving.      1/21 no fever, vss, tm 100, wbc 15.2, hgb 8, from 13.6 bon 18th- admit, plt 130 from 244,   Creat 0.7,   cxr progressive clearing right , some increase opacification left suggested.   Strep in sputum,  Taper abx to doxy and flagy with c diff and strep in mucous-- monitor, check    procal am.    Will change code status to allow re intubation, and  extubate this am.      On enteral vanc for c diff concerns.   Discussed with January short.  Reviewed revised code status.      Addendum-- having stridor post extubation.  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  Pt was at [facility] when patient removed trach 2 months ago.  Pt has no h/o stridor per daughter.  Stridor did not change with jaw thrush nor nt trumpet.  Discussed  would recommend intubation and replace trach as best option. Discussed with Dr Charles.  1/22 no fever,vss, wbc 16.3, hgb 8.1 from 13.6 admit?  , procal is down to 3 from 17 admit,     Pt failed extubation with upper airway stridor and poor loc.      Do leak test:on vent with cuff down and tidal vol delivered 450 would need over 110 cc to leak to pass, need exhaled tidal vol to be less than 340 cc to  pass leak test -- do now and repeat q 4 x 3 total.  Will dose steroids for laryngospasm.  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  Will discuss with family later- trial extubation if passes, trach, comfort care???      Discussed with daughter, asked if code status should be altered?  After discussion - will consider extubation trial if good leak test (would try to notify daughter if passes) --- otherwise trach.      1/23 failed leak test, suspect trach stenosis - trach Wednesday?  No fever, vss, doxy/flagyl, vanc enteral, wbc 14.3,   F/u cxr am  Called Iar, daughter.  Discussed need for Palliative care eval.      1/24 no fever, vss, doxy/flagyl/vanc/vanc iv, wbc 15.5,   cxr patchy fluffy infiltrates R>L    No change in plan, defer to palliative care.  Could do trial extubation but likely to fail. Could bronch when tube pulled?      1/25 no fever, vss, doxy/flagyl,vanc iv/enteral, wbc 17.5 hgb 7,7 from 13.6 admit, day 8  abx    Pt seems to have good leak today.  Will have resp quantify, do wean trial, decrease sedation, and consider trial extubation if does well.  1/26 no fever, vss, wbc 19.1, check sputum, good leak test and weaned well.  Loc Is very good for extubation.     Has  shunt for yrs with no problems appreciated.    Loc very good.    Discussed with daughter.    Re evaluated post extubation.  The patient can speak well.  There is no stridor.  There is no respiratory distress.  Patient perhaps is a little slow.    Called and discussed case again with daughter.  Might consider bronchoscopy later  on but would not  at all at this time.  Case discussed with .        The patient had severe pharyngeal dysphagia with aspiration of thin liquids in julius aspiration of nectar thick liquids in December 2021 by speech therapy evaluation.  Patient had an ineffective cough during aspiration.    Would recommend prolonged NPO.  Tube feedings the for would be adequate.      1/27 no fever, vss, doxy/flagyl/vanc iv/po, grew strep/kleb (no sensitivity)/mrsa from sputumon 18th.  7 days iv vanc, has c diff toxin neg on enteral vanc,  On flagyl and doxy-- should be adequate for pneumonia.  3 lpm, wbc 22.6,     F/u cxr-- stable infiltrates as would be expected.    Some upper airway noise.  Not bad enough to trach.  Would consider bronch to inspect airway in a few days.  If worsens--intubation and trach would be recommended.    Will dose scopolamine patch consider resume robinul.  Will have robinul available for prn  Use.     Discussed with daughter, Ira.  Could be just aspiration/laryngospasm issues as tracheal problem should not have progressed to extubation after good leak test.      Above from Dr Gao and below from Dr Gaspar:    - tenuous airway/resp status, poor ability to clear secretions- monitor in ICU  - if needed reintubate then re trach- currently does not appear necessary  - may require rectal tube with multiple loose BMs  - continue antibiotics  - tube feedings via PEG resume today    Miracle Gaspar MD  Pulmonary & Critical Care Medicine

## 2022-01-28 NOTE — PROGRESS NOTES
[facility] Hospital Medicine  Progress Note    Patient Name: Sandra Wilson  MRN: [id number]  Patient Class: IP- Inpatient   Admission Date: 1/18/2022  Length of Stay: 9 days  Attending Physician: Braulio Armendariz MD  Primary Care Provider: Primary Doctor No        Subjective:     Principal Problem:Acute hypoxemic respiratory failure        HPI:  Sandra Wilson is an 81 year old female with a past medical history of CVAs, CNS aneurysm, PEG status, CAD, HLD, hypothryoidism, DM, aortic stenosis, and colon, breast, and ovarian cancer who presented from long term care with vomiting, diarrhea, and shortness of breath. Workup in the ED showed likely aspiration pneumonitis and C. Diff colitis. Her O2 requirement increased while in the ED requiring intubation with sedation. She also required Levophed as she likely developed septic shock. Antibiotics were broadened to cefepime, vancomycin, and Flagyl. Pulmonary was consulted. Family was notified. The patient was unable to provide history given acuity of illness.      Overview/Hospital Course:  Sandra Wilson is an 81 year old female with a past medical history of CVAs, CNS aneurysm, PEG status, CAD, HLD, hypothyroidism, DM, aortic stenosis, and colon, breast, and ovarian cancer who presented from long term care with vomiting, diarrhea, and shortness of breath secondary to acute hypoxic respiratory failure from aspiration pneumonia in the setting of C diff colitis leading to septic shock. She was intubated in the ED and started on Levophed infusion via RIJ CVC placed by Dr. Vignesh Gaspar. She was started on broad spectrum antibiotics with cefepime, Flagyl, vancomycin and admitted to the ICU. She was also started on enteral vancomycin for severe C diff colitis given elevated WBC count and STEFFANY. There was concern for developing ARDS given P/F ratio of 87 (severe); 190 1/22 (moderate). Pulmonary was consulted. She also presented with demand ischemia likely  secondary to septic shock. Troponin was trended and improved as shock resolved. Normal saline infusion was added for hyponatremia and STEFFANY which improved both. Levophed was able to be weaned. Family agreed to DNR status 1/18 and Palliative Care was consulted to discuss goal of care 1/19. Antibiotics were changed to doxycycline and Flagyl (GBS in sputum culture) 1/21; vancomycin was added 1/23 after culture also became positive for Staph aureus and Klebsiella. Her respiratory status has improved throughout her course and she was extubated (code changed to partial code for intubation) 1/21 only to be re-intubated for worsening stridor (history of tracheostomy). Upon re-intubation, copious amounts of secretions were suctioned. KUB suggested a mild ileus A bowel regimen was instituted and the patient was able to have bowel movements 1/22. Tube feeds were started 1/23. Her course was complicated by Afib as well noted on telemetry 1/21; metoprolol tartrate started 1/22.She failed a leak test 1/22; Surgery was consulted for tracheostomy which is pending conversation with family regarding goals of care.      Interval History:  Patient seen and examined.  No acute events overnight.  Patient was extubated on 1/26 to nasal cannula.  Remains nonverbal.  Thick secretions improved with scopolamine patch.  Plan of care reviewed with the nurse at the bedside.  Also reviewed with pulmonology.    Review of Systems   Unable to perform ROS: Dementia     Objective:     Vital Signs (Most Recent):  Temp: 98 °F (36.7 °C) (01/27/22 1600)  Pulse: 75 (01/27/22 1948)  Resp: (!) 23 (01/27/22 1948)  BP: (!) 119/56 (01/27/22 1800)  SpO2: 99 % (01/27/22 1948) Vital Signs (24h Range):  Temp:  [97.4 °F (36.3 °C)-98.6 °F (37 °C)] 98 °F (36.7 °C)  Pulse:  [59-88] 75  Resp:  [15-24] 23  SpO2:  [89 %-100 %] 99 %  BP: ()/(43-86) 119/56     Weight: 72 kg (158 lb 11.7 oz)  Body mass index is 27.25 kg/m².    Intake/Output Summary (Last 24 hours) at  1/27/2022 2011  Last data filed at 1/27/2022 1730  Gross per 24 hour   Intake 992.89 ml   Output 950 ml   Net 42.89 ml      Physical Exam  Vitals and nursing note reviewed.   Constitutional:       General: She is not in acute distress.     Appearance: She is ill-appearing.   HENT:      Mouth/Throat:      Comments: Thick secretions noted  Eyes:      Pupils: Pupils are equal, round, and reactive to light.   Cardiovascular:      Rate and Rhythm: Normal rate and regular rhythm.      Heart sounds: No murmur heard.      Pulmonary:      Breath sounds: Rhonchi present.      Comments: Increased work of breathing noted with coarse bilateral breath sounds.  Stridor noted.  Abdominal:      General: There is no distension.      Palpations: Abdomen is soft.      Tenderness: There is no abdominal tenderness.   Skin:     Coloration: Skin is not pale.      Findings: No rash.   Neurological:      Mental Status: She is disoriented.         Significant Labs: All pertinent labs within the past 24 hours have been reviewed.    Significant Imaging: I have reviewed all pertinent imaging results/findings within the past 24 hours.      Assessment/Plan:      * Acute hypoxemic respiratory failure  Patient with Hypoxic Respiratory failure which is Acute on chronic.  she is not on home oxygen. Supplemental oxygen was provided and noted-  .   Signs/symptoms of respiratory failure include- tachypnea, increased work of breathing and respiratory distress. Contributing diagnoses includes - COPD and Pneumonia Labs and images were reviewed. Patient Has recent ABG, which has been reviewed. Will treat underlying causes and adjust management of respiratory failure as follows- Secondary to aspiration pneumonia. GBS, Klebsiella and Staph aureus in sputum.  Continue antibiotics, will monitor patient closely high risk for re-intubation.  If she is reintubated, will likely proceed with tracheostomy.           Pneumonia  Patient with current diagnosis of  "pneumonia due to bacterial etiology due to  MRSA, Haemophilus and GBS which is uncontrolled due to persistent hypoxia . I have reviewed the pertinent imaging. Current antimicrobial regimen consists of   Antibiotics (From admission, onward)            Start     Stop Route Frequency Ordered    01/27/22 2000  vancomycin 1.25 g in dextrose 5% 250 mL IVPB (ready to mix)         -- IV Every 24 hours (non-standard times) 01/27/22 1247    01/23/22 1047  vancomycin - pharmacy to dose  (vancomycin IVPB)        "And" Linked Group Details    -- IV pharmacy to manage frequency 01/23/22 0947    01/21/22 0730  doxycycline (VIBRAMYCIN) 100 mg in dextrose 5 % 250 mL IVPB         -- IV Every 12 hours (non-standard times) 01/21/22 0617    01/19/22 1200  vancomycin 125 mg/5 mL oral solution 125 mg  (C. difficile Infection (CDI) Treatment Order Panel)         01/29 1159 PER G TUBE Every 6 hours 01/19/22 0920    01/18/22 1545  metronidazole IVPB 500 mg         -- IV Every 8 hours (non-standard times) 01/18/22 1441      . Cultures drawn and noted-   Microbiology Results (last 7 days)     Procedure Component Value Units Date/Time    Culture, Respiratory with Gram Stain [[id number]]  (Abnormal) Collected: 01/26/22 0745    Order Status: Completed Specimen: Respiratory from Sputum Updated: 01/27/22 1419     Respiratory Culture PRESUMPTIVE PROTEUS SPECIES  Few  Identification and susceptibility pending        SERRATIA MARCESCENS  Moderate  Susceptibility pending       Gram Stain (Respiratory) <10 epithelial cells per low power field.     Gram Stain (Respiratory) Rare WBC's     Gram Stain (Respiratory) No organisms seen    Blood culture [[id number]] Collected: 01/18/22 1726    Order Status: Completed Specimen: Blood from Antecubital, Left Arm Updated: 01/23/22 2322     Blood Culture, Routine No growth after 5 days.    Blood culture [[id number]] Collected: 01/18/22 1802    Order Status: Completed Specimen: Blood from Antecubital, Right Arm Updated: " 01/23/22 2322     Blood Culture, Routine No growth after 5 days.    Culture, Respiratory with Gram Stain [[id number]]  (Abnormal)  (Susceptibility) Collected: 01/19/22 0114    Order Status: Completed Specimen: Sputum, Expectorated Updated: 01/23/22 1045     Respiratory Culture No Pseudomonas isolated.      STREPTOCOCCUS AGALACTIAE (GROUP B)  Few  Beta-hemolytic streptococci are routinely susceptible to   penicillins,cephalosporins and carbapenems.  Susceptibility testing not routinely performed        KLEBSIELLA PNEUMONIAE  Rare        METHICILLIN RESISTANT STAPHYLOCOCCUS AUREUS  Few       Gram Stain (Respiratory) <10 epithelial cells per low power field.     Gram Stain (Respiratory) Rare WBC's     Gram Stain (Respiratory) Rare Gram positive cocci     Gram Stain (Respiratory) Rare yeast       Will monitor patient closely and continue current treatment plan unchanged.        Aspiration into airway  See above under aspiration pneumonia    Atrial fibrillation  -Telemetry  -Metoprolol 5 IV PRN for HR > 130  -Defer full dose anticoagulation at this time given thrombocytopenia and critical illness  -Metoprolol 25 BID      ARDS (adult respiratory distress syndrome)  Moderate.  -Pulmonary consulted  -Treat aspiration pneumonia and sepsis  -Mechanical ventilation with sedation; PEEP and TV per Pulmonary (ARDSnet)  -Patient not proned    Gastrostomy tube dependent  Continue to hold gastrostomy tube feeds for the moment, as patient's respiratory status is tenuous.  Will start on TPN, and start trickle feeds in the next 24-48 hours.      Type 2 diabetes mellitus, with long-term current use of insulin  S-  Patient's FSGs are controlled on current medication regimen.  Last A1c reviewed- No results found for: LABA1C, HGBA1C  Most recent fingerstick glucose reviewed-   Recent Labs   Lab 01/27/22  0531 01/27/22  0915 01/27/22  1247 01/27/22  1837   POCTGLUCOSE 120* 118* 83 162*     Current correctional scale  Medium  Maintain  anti-hyperglycemic dose as follows-   Antihyperglycemics (From admission, onward)            Start     Stop Route Frequency Ordered    01/18/22 1438  insulin aspart U-100 pen 1-10 Units         -- SubQ Before meals & nightly PRN 01/18/22 1438        Hold Oral hypoglycemics while patient is in the hospital.        YARI (generalized anxiety disorder)  Extubated on 01/26.  Use Precedex as needed to control anxiety.  Patient does not appear anxious on exam.      Hypothyroidism  Patient has chronic hypothyroidism. TFTs reviewed-   Lab Results   Component Value Date    TSH 1.130 07/13/2017   . Will continue chronic levothyroxine and adjust for and clinical changes.        Hyperlipidemia   Patient is chronically on statin.will continue for now. Monitor clinically. Last LDL was   Lab Results   Component Value Date    LDLCALC 94.4 07/13/2017          GERD (gastroesophageal reflux disease)  -Hold PPI given C diff colitis  -IV famotidine        VTE Risk Mitigation (From admission, onward)         Ordered     enoxaparin injection 40 mg  Daily         01/18/22 1438     IP VTE HIGH RISK PATIENT  Once         01/18/22 1438     Place sequential compression device  Until discontinued         01/18/22 1438                Discharge Planning   DEMETRIUS:      Code Status: Partial Code   Is the patient medically ready for discharge?:     Reason for patient still in hospital (select all that apply): Treatment  Discharge Plan A: Return to nursing home            Critical care time spent on the evaluation and treatment of severe organ dysfunction, review of pertinent labs and imaging studies, discussions with consulting providers and discussions with patient/family: 37 S minutes.      Braulio Armendariz MD  Department of Hospital Medicine   [facility]

## 2022-01-28 NOTE — PROGRESS NOTES
[facility] Hospital Medicine  Progress Note    Patient Name: Sandra Wilson  MRN: [id number]  Patient Class: IP- Inpatient   Admission Date: 1/18/2022  Length of Stay: 10 days  Attending Physician: Braulio Armendariz MD  Primary Care Provider: Primary Doctor No        Subjective:     Principal Problem:Acute hypoxemic respiratory failure        HPI:  Sandra Wilson is an 81 year old female with a past medical history of CVAs, CNS aneurysm, PEG status, CAD, HLD, hypothryoidism, DM, aortic stenosis, and colon, breast, and ovarian cancer who presented from long term care with vomiting, diarrhea, and shortness of breath. Workup in the ED showed likely aspiration pneumonitis and C. Diff colitis. Her O2 requirement increased while in the ED requiring intubation with sedation. She also required Levophed as she likely developed septic shock. Antibiotics were broadened to cefepime, vancomycin, and Flagyl. Pulmonary was consulted. Family was notified. The patient was unable to provide history given acuity of illness.      Overview/Hospital Course:  Sandra Wilson is an 81 year old female with a past medical history of CVAs, CNS aneurysm, PEG status, CAD, HLD, hypothyroidism, DM, aortic stenosis, and colon, breast, and ovarian cancer who presented from long term care with vomiting, diarrhea, and shortness of breath secondary to acute hypoxic respiratory failure from aspiration pneumonia in the setting of C diff colitis leading to septic shock. She was intubated in the ED and started on Levophed infusion via RIJ CVC placed by Dr. Vignesh Gaspar. She was started on broad spectrum antibiotics with cefepime, Flagyl, vancomycin and admitted to the ICU. She was also started on enteral vancomycin for severe C diff colitis given elevated WBC count and STEFFANY. There was concern for developing ARDS given P/F ratio of 87 (severe); 190 1/22 (moderate). Pulmonary was consulted. She also presented with demand ischemia likely  secondary to septic shock. Troponin was trended and improved as shock resolved. Normal saline infusion was added for hyponatremia and STEFFANY which improved both. Levophed was able to be weaned. Family agreed to DNR status 1/18 and Palliative Care was consulted to discuss goal of care 1/19. Antibiotics were changed to doxycycline and Flagyl (GBS in sputum culture) 1/21; vancomycin was added 1/23 after culture also became positive for Staph aureus and Klebsiella. Her respiratory status has improved throughout her course and she was extubated (code changed to partial code for intubation) 1/21 only to be re-intubated for worsening stridor (history of tracheostomy). Upon re-intubation, copious amounts of secretions were suctioned. KUB suggested a mild ileus A bowel regimen was instituted and the patient was able to have bowel movements 1/22. Tube feeds were started 1/23. Her course was complicated by Afib as well noted on telemetry 1/21; metoprolol tartrate started 1/22.She failed a leak test 1/22; Surgery was consulted for tracheostomy which is pending conversation with family regarding goals of care.    Patient was set to go for trach on 01/26, but attempt was made for another trial extubation and the patient did well after extubation, she was transition to face mask and was able to maintain her saturations although had significant difficulty with secretions.  She was started on Robinul and scopolamine patch which did seem to improve for secretions.  Her respiratory status continued to be somewhat tenuous, so she was monitored in the ICU.      Interval History:  Patient seen and examined.  No acute events overnight.  Patient was extubated on 1/26 to nasal cannula.  Remains nonverbal.  Thick secretions improved with scopolamine patch.  Plan of care reviewed with the nurse at the bedside.  Also reviewed with pulmonology.    Review of Systems   Unable to perform ROS: Dementia     Objective:     Vital Signs (Most  Recent):  Temp: 97.8 °F (36.6 °C) (01/28/22 1130)  Pulse: 72 (01/28/22 1515)  Resp: 20 (01/28/22 1515)  BP: (!) 173/74 (01/28/22 1515)  SpO2: 98 % (01/28/22 1515) Vital Signs (24h Range):  Temp:  [97.7 °F (36.5 °C)-98.8 °F (37.1 °C)] 97.8 °F (36.6 °C)  Pulse:  [] 72  Resp:  [18-30] 20  SpO2:  [90 %-100 %] 98 %  BP: (114-184)/(31-81) 173/74     Weight: 71.5 kg (157 lb 10.1 oz)  Body mass index is 27.06 kg/m².    Intake/Output Summary (Last 24 hours) at 1/28/2022 1612  Last data filed at 1/28/2022 0749  Gross per 24 hour   Intake 2052.58 ml   Output 1570 ml   Net 482.58 ml      Physical Exam  Vitals and nursing note reviewed.   Constitutional:       General: She is not in acute distress.     Appearance: She is ill-appearing.   HENT:      Mouth/Throat:      Comments: Thick secretions noted  Eyes:      Pupils: Pupils are equal, round, and reactive to light.   Cardiovascular:      Rate and Rhythm: Normal rate and regular rhythm.      Heart sounds: No murmur heard.      Pulmonary:      Breath sounds: Rhonchi present.      Comments: Increased work of breathing noted with coarse bilateral breath sounds.  Stridor noted.  Abdominal:      General: There is no distension.      Palpations: Abdomen is soft.      Tenderness: There is no abdominal tenderness.   Skin:     Coloration: Skin is not pale.      Findings: No rash.   Neurological:      Mental Status: She is disoriented.         Significant Labs: All pertinent labs within the past 24 hours have been reviewed.    Significant Imaging: I have reviewed all pertinent imaging results/findings within the past 24 hours.      Assessment/Plan:      * Acute hypoxemic respiratory failure  Patient with Hypoxic Respiratory failure which is Acute on chronic.  she is not on home oxygen. Supplemental oxygen was provided and noted- Oxygen Concentration (%):  [40] 40.   Signs/symptoms of respiratory failure include- tachypnea, increased work of breathing and respiratory distress.  "Contributing diagnoses includes - COPD and Pneumonia Labs and images were reviewed. Patient Has recent ABG, which has been reviewed. Will treat underlying causes and adjust management of respiratory failure as follows- Secondary to aspiration pneumonia. GBS, Klebsiella and Staph aureus in sputum.  Continue antibiotics, will monitor patient closely high risk for re-intubation.  If she is reintubated, will likely proceed with tracheostomy.           Pneumonia  Patient with current diagnosis of pneumonia due to bacterial etiology due to  MRSA, Haemophilus and GBS which is uncontrolled due to persistent hypoxia . I have reviewed the pertinent imaging. Current antimicrobial regimen consists of   Antibiotics (From admission, onward)            Start     Stop Route Frequency Ordered    01/27/22 2000  vancomycin 1.25 g in dextrose 5% 250 mL IVPB (ready to mix)         -- IV Every 24 hours (non-standard times) 01/27/22 1247    01/23/22 1047  vancomycin - pharmacy to dose  (vancomycin IVPB)        "And" Linked Group Details    -- IV pharmacy to manage frequency 01/23/22 0947    01/21/22 0730  doxycycline (VIBRAMYCIN) 100 mg in dextrose 5 % 250 mL IVPB         -- IV Every 12 hours (non-standard times) 01/21/22 0617    01/19/22 1200  vancomycin 125 mg/5 mL oral solution 125 mg  (C. difficile Infection (CDI) Treatment Order Panel)         01/29 1159 PER G TUBE Every 6 hours 01/19/22 0920    01/18/22 1545  metronidazole IVPB 500 mg         -- IV Every 8 hours (non-standard times) 01/18/22 1441      . Cultures drawn and noted-   Microbiology Results (last 7 days)     Procedure Component Value Units Date/Time    Culture, Respiratory with Gram Stain [[id number]]  (Abnormal)  (Susceptibility) Collected: 01/26/22 0745    Order Status: Completed Specimen: Respiratory from Sputum Updated: 01/28/22 1316     Respiratory Culture No S aureus or Pseudomonas isolated.      PROTEUS MIRABILIS ESBL  Few        SERRATIA MARCESCENS  Moderate       " Gram Stain (Respiratory) <10 epithelial cells per low power field.     Gram Stain (Respiratory) Rare WBC's     Gram Stain (Respiratory) No organisms seen    Blood culture [[id number]] Collected: 01/18/22 1726    Order Status: Completed Specimen: Blood from Antecubital, Left Arm Updated: 01/23/22 2322     Blood Culture, Routine No growth after 5 days.    Blood culture [[id number]] Collected: 01/18/22 1802    Order Status: Completed Specimen: Blood from Antecubital, Right Arm Updated: 01/23/22 2322     Blood Culture, Routine No growth after 5 days.    Culture, Respiratory with Gram Stain [[id number]]  (Abnormal)  (Susceptibility) Collected: 01/19/22 0114    Order Status: Completed Specimen: Sputum, Expectorated Updated: 01/23/22 1045     Respiratory Culture No Pseudomonas isolated.      STREPTOCOCCUS AGALACTIAE (GROUP B)  Few  Beta-hemolytic streptococci are routinely susceptible to   penicillins,cephalosporins and carbapenems.  Susceptibility testing not routinely performed        KLEBSIELLA PNEUMONIAE  Rare        METHICILLIN RESISTANT STAPHYLOCOCCUS AUREUS  Few       Gram Stain (Respiratory) <10 epithelial cells per low power field.     Gram Stain (Respiratory) Rare WBC's     Gram Stain (Respiratory) Rare Gram positive cocci     Gram Stain (Respiratory) Rare yeast       Will monitor patient closely and continue current treatment plan unchanged.        Aspiration into airway  See above under aspiration pneumonia    Atrial fibrillation  -Telemetry  -Metoprolol 5 IV PRN for HR > 130  -Defer full dose anticoagulation at this time given thrombocytopenia and critical illness  -Metoprolol 25 BID      ARDS (adult respiratory distress syndrome)  -Pulmonary consulted  -Treat aspiration pneumonia and sepsis      Gastrostomy tube dependent  Continue to hold gastrostomy tube feeds for the moment, as patient's respiratory status is tenuous.  Will continue TPN, and start trickle feeds today,      Type 2 diabetes mellitus, with  long-term current use of insulin  Patient's FSGs are controlled on current medication regimen.  Most recent fingerstick glucose reviewed-   Recent Labs   Lab 01/27/22  1837 01/27/22  2139 01/28/22  0528 01/28/22  1215   POCTGLUCOSE 162* 178* 163* 197*     Current correctional scale  Medium  Maintain anti-hyperglycemic dose as follows-   Antihyperglycemics (From admission, onward)            Start     Stop Route Frequency Ordered    01/18/22 1438  insulin aspart U-100 pen 1-10 Units         -- SubQ Before meals & nightly PRN 01/18/22 1438        Hold Oral hypoglycemics while patient is in the hospital.        YARI (generalized anxiety disorder)  Extubated on 01/26.  Use Precedex as needed to control anxiety.  Patient does not appear anxious on exam.      Hypothyroidism  Patient has chronic hypothyroidism. TFTs reviewed-   Lab Results   Component Value Date    TSH 1.130 07/13/2017   . Will continue chronic levothyroxine and adjust for and clinical changes.        Hyperlipidemia   Patient is chronically on statin.will continue for now. Monitor clinically. Last LDL was   Lab Results   Component Value Date    LDLCALC 94.4 07/13/2017          GERD (gastroesophageal reflux disease)  -Hold PPI given C diff colitis  -IV famotidine        VTE Risk Mitigation (From admission, onward)         Ordered     enoxaparin injection 40 mg  Daily         01/18/22 1438     IP VTE HIGH RISK PATIENT  Once         01/18/22 1438     Place sequential compression device  Until discontinued         01/18/22 1438                Discharge Planning   DEMETRIUS:      Code Status: Partial Code   Is the patient medically ready for discharge?:     Reason for patient still in hospital (select all that apply): Treatment  Discharge Plan A: Return to nursing home            Critical care time spent on the evaluation and treatment of severe organ dysfunction, review of pertinent labs and imaging studies, discussions with consulting providers and discussions with  patient/family: 33 minutes.      Braulio Armendariz MD  Department of Hospital Medicine   [facility]

## 2022-01-28 NOTE — CONSULTS
[facility]  Adult Nutrition  Consult Note    SUMMARY   Intervention: enteral vs. parenteral nutrition therapy     Recommendations  1) Continue TPN until able to advance TF: D 15 AA5 @ 65 ml/hr + standard lipids  ( provides 1607 kcal ( 100% EEN), 78 g protein ( 93% EPN))    2) When medically able advance TF   Isosource 1.5 @ 10 mL/hr   advance to goal rate of 45 mL/hr as pt tolerates + Brody BID and Beneprotein BID + 115 ml flush q 4 hr  --Will provide 1620 kcal, 73 g protein ( + beneprotein), 820 ml free water  (100% EEN, 100% EPN )     3) weigh weekly, SLP consult if appropriate     Goals: 1) Initation of enteral nutrition by RD follow up 2) Nutrition support meeting > 50% of needs at f/u  Nutrition Goal Status: met/ meeting-continues  Communication of RD Recs: POC, sticky note, reviewed with MD, second sign     Assessment and Plan  Nutrition Problem  Inadeqate energy intake     Related to (etiology):   NPO status, no TF running, dysphagia     Signs and Symptoms (as evidenced by):   Pt receiving < 50% EEN/EPN x 6 days     Interventions(treatment strategy):  Collaboration with other providers  Enteral nutrition     Nutrition Diagnosis Status:   continues     Malnutrition  Edema: 2+  Gabriel: 11 + PEG, new coccyx ulcer  NFPE 1/24/21 no significant wasting seen  PO intakes < 50% of needs x > 5-6 days  No significant wt loss per chart review     Reason for Assessment  Reason For Assessment: follow up  Diagnosis: other (see comments) (Acute hypoxemic respiratory failure)  Relevant Medical History: GERD, HLD, HTN, CVAs, CNS aneurysm, PEG, CAD, hypothyroidism, DM, colon, breast and ovarian cancer.  Interdisciplinary Rounds: attended     General Information Comments: Remote assessment for coverage. Pt intubated and sedated, requiring levophed, on propofol, MAP 75, plan to try for extubation today per note. With PEG, noted pt has hx of removing PEG tube. C-diff positive, also noted with STEFFANY - renal labs  "improving, K, phos low, repleting. Per chart, with 9.5% wt loss over the last 2 months, significant. NFPE needed to determine malnutrition status. RD to monitor and follow up.  Nutrition Discharge Planning: Pending clinical course  1/24/22 TF recs left 1/21, TF started @ 10 ml/hr yesterday, continues at same rate. No BM. + vent-possible plan for trach placement. NFPE done 1/24/21 no significant wasting seen.  1/26/22 + PEG. TF held for possible trach placemen however now pt extubated. Plan to continue with TFs.  1/28/21 TF held 1/26-today r/t tenuous respiratory status. TPN started yesterday per MD ( meeting 72% protein needs and 94% energy needs). Pt with O2 mask on today. Plan per MD to continue TPN and start trickle TF in the next 24-48 hr.     Discharge Planning:  TF above or cardiac, DM 1500 kcal diet     Nutrition/ Diet History  unable to obtain  Spiritual/cultural/Rastafarian factors affecting PO intakes  Factors affecting PO intakes: NPO, trouble swallowing     Nutrition Risk Screen  Nutrition Risk Screen: no indicators present     Anthropometrics  Height: 5' 4" (162.6 cm)  Height (inches): 64 in  Weight Method: Bed Scale  Weight: 71.5 kg 1/22, 70.3 kg 1/24, 69 kg (admission)  Weight (lb): 157 lb  Ideal Body Weight (IBW), Female: 120 lb  BMI (Calculated): 26.1 admission  81.6 kg 1/2020     Lab/Procedures/Meds  Pertinent Labs Reviewed: reviewed  BMP  Lab Results   Component Value Date     (L) 01/27/2022    K 3.4 (L) 01/27/2022    CL 99 01/27/2022    CO2 26 01/27/2022    BUN 18 01/27/2022    CREATININE 0.6 01/27/2022    CALCIUM 8.7 01/27/2022    ANIONGAP 10 01/27/2022    ESTGFRAFRICA >60 01/27/2022    EGFRNONAA >60 01/27/2022     POCT Glucose   Date Value Ref Range Status   01/28/2022 163 (H) 70 - 110 mg/dL Final   01/27/2022 178 (H) 70 - 110 mg/dL Final   01/27/2022 162 (H) 70 - 110 mg/dL Final   01/27/2022 83 70 - 110 mg/dL Final   01/27/2022 118 (H) 70 - 110 mg/dL Final   01/27/2022 120 (H) 70 - 110 " mg/dL Final   01/26/2022 188 (H) 70 - 110 mg/dL Final   01/26/2022 174 (H) 70 - 110 mg/dL Final   01/26/2022 191 (H) 70 - 110 mg/dL Final   01/26/2022 276 (H) 70 - 110 mg/dL Final   01/26/2022 244 (H) 70 - 110 mg/dL Final   01/25/2022 218 (H) 70 - 110 mg/dL Final     Lab Results   Component Value Date    CALCIUM 8.7 01/27/2022    PHOS 2.6 (L) 01/27/2022       Pertinent Medications Reviewed: reviewed  Statin, polyethylene glycol, NS 1-35 ml/hr, insulin, scopolamine, senna     Estimated/Assessed Needs  Weight Used For Calorie Calculations: 68.9 kg (152 lb)  Energy Calorie Requirements (kcal): MSJ ( x 1.25-1.4) = 9249-5406 kcal  Energy Need Method: MSJ  Protein Requirements:  g/day based on 1.2-1.5 g protein /kg  Weight Used For Protein Calculations: 68.9 kg (152 lb)  Fluid Requirements (mL): 1 mL/kcal or per MD  Estimated Fluid Requirement Method: RDA Method  RDA Method (mL): 1450 ml  CHO Requirement: 160-195 g     Nutrition Prescription Ordered  Current Diet Order: NPO + TPN above     Evaluation of Received Nutrient/Fluid Intake  Energy Calories Required:  meeting needs  Protein Required: not meeting needs  Fluid Required: not meeting needs  TPN @ 50 ml/hr  Tolerance: tolerating  % Intake of Estimated Energy Needs: 94%  % Meal Intake: NPO + TPN     Nutrition Risk  Level of Risk/Frequency of Follow-up: moderate (2x weekly)      Monitor and Evaluation  Food and Nutrient Intake: enteral nutrition intake  Food and Nutrient Adminstration: enteral and parenteral nutrition administration  Knowledge/Beliefs/Attitudes: food and nutrition knowledge/skill  Physical Activity and Function: nutrition-related ADLs and IADLs  Anthropometric Measurements: weight change  Biochemical Data, Medical Tests and Procedures: electrolyte and renal panel,gastrointestinal profile,glucose/endocrine profile  Nutrition-Focused Physical Findings: overall appearance,extremities, muscles and bones,skin      Nutrition Follow-Up  RD Follow-up?:  Yes

## 2022-01-28 NOTE — PLAN OF CARE
Intervention: enteral vs. parenteral nutrition therapy     Recommendations  1) Continue TPN until able to advance TF: D 15 AA5 @ 65 ml/hr + standard lipids  ( provides 1607 kcal ( 100% EEN), 78 g protein ( 93% EPN))    2) When medically able advance TF   Isosource 1.5 @ 10 mL/hr   advance to goal rate of 45 mL/hr as pt tolerates + Brody BID and Beneprotein BID + 115 ml flush q 4 hr  --Will provide 1620 kcal, 73 g protein ( + beneprotein), 820 ml free water  (100% EEN, 100% EPN )     3) weigh weekly, SLP consult if appropriate     Goals: 1) Initation of enteral nutrition by RD follow up 2) Nutrition support meeting > 50% of needs at f/u  Nutrition Goal Status: met/ meeting-continues  Communication of RD Recs: POC, sticky note, reviewed with MD, second sign

## 2022-01-28 NOTE — ASSESSMENT & PLAN NOTE
Patient's FSGs are controlled on current medication regimen.  Most recent fingerstick glucose reviewed-   Recent Labs   Lab 01/27/22  1837 01/27/22  2139 01/28/22  0528 01/28/22  1215   POCTGLUCOSE 162* 178* 163* 197*     Current correctional scale  Medium  Maintain anti-hyperglycemic dose as follows-   Antihyperglycemics (From admission, onward)            Start     Stop Route Frequency Ordered    01/18/22 1438  insulin aspart U-100 pen 1-10 Units         -- SubQ Before meals & nightly PRN 01/18/22 1438        Hold Oral hypoglycemics while patient is in the hospital.

## 2022-01-28 NOTE — ASSESSMENT & PLAN NOTE
Extubated on 01/26.  Use Precedex as needed to control anxiety.  Patient does not appear anxious on exam.

## 2022-01-28 NOTE — SUBJECTIVE & OBJECTIVE
Interval History:  Patient seen and examined.  No acute events overnight.  Patient was extubated on 1/26 to nasal cannula.  Remains nonverbal.  Thick secretions improved with scopolamine patch.  Plan of care reviewed with the nurse at the bedside.  Also reviewed with pulmonology.    Review of Systems   Unable to perform ROS: Dementia     Objective:     Vital Signs (Most Recent):  Temp: 97.8 °F (36.6 °C) (01/28/22 1130)  Pulse: 72 (01/28/22 1515)  Resp: 20 (01/28/22 1515)  BP: (!) 173/74 (01/28/22 1515)  SpO2: 98 % (01/28/22 1515) Vital Signs (24h Range):  Temp:  [97.7 °F (36.5 °C)-98.8 °F (37.1 °C)] 97.8 °F (36.6 °C)  Pulse:  [] 72  Resp:  [18-30] 20  SpO2:  [90 %-100 %] 98 %  BP: (114-184)/(31-81) 173/74     Weight: 71.5 kg (157 lb 10.1 oz)  Body mass index is 27.06 kg/m².    Intake/Output Summary (Last 24 hours) at 1/28/2022 1612  Last data filed at 1/28/2022 0749  Gross per 24 hour   Intake 2052.58 ml   Output 1570 ml   Net 482.58 ml      Physical Exam  Vitals and nursing note reviewed.   Constitutional:       General: She is not in acute distress.     Appearance: She is ill-appearing.   HENT:      Mouth/Throat:      Comments: Thick secretions noted  Eyes:      Pupils: Pupils are equal, round, and reactive to light.   Cardiovascular:      Rate and Rhythm: Normal rate and regular rhythm.      Heart sounds: No murmur heard.      Pulmonary:      Breath sounds: Rhonchi present.      Comments: Increased work of breathing noted with coarse bilateral breath sounds.  Stridor noted.  Abdominal:      General: There is no distension.      Palpations: Abdomen is soft.      Tenderness: There is no abdominal tenderness.   Skin:     Coloration: Skin is not pale.      Findings: No rash.   Neurological:      Mental Status: She is disoriented.         Significant Labs: All pertinent labs within the past 24 hours have been reviewed.    Significant Imaging: I have reviewed all pertinent imaging results/findings within the past  24 hours.

## 2022-01-28 NOTE — ASSESSMENT & PLAN NOTE
Patient with Hypoxic Respiratory failure which is Acute on chronic.  she is not on home oxygen. Supplemental oxygen was provided and noted- Oxygen Concentration (%):  [40] 40.   Signs/symptoms of respiratory failure include- tachypnea, increased work of breathing and respiratory distress. Contributing diagnoses includes - COPD and Pneumonia Labs and images were reviewed. Patient Has recent ABG, which has been reviewed. Will treat underlying causes and adjust management of respiratory failure as follows- Secondary to aspiration pneumonia. GBS, Klebsiella and Staph aureus in sputum.  Continue antibiotics, will monitor patient closely high risk for re-intubation.  If she is reintubated, will likely proceed with tracheostomy.

## 2022-01-29 LAB
ALBUMIN SERPL BCP-MCNC: 2.2 G/DL (ref 3.5–5.2)
ALP SERPL-CCNC: 62 U/L (ref 55–135)
ALT SERPL W/O P-5'-P-CCNC: 16 U/L (ref 10–44)
ANION GAP SERPL CALC-SCNC: 8 MMOL/L (ref 8–16)
AST SERPL-CCNC: 19 U/L (ref 10–40)
BASOPHILS # BLD AUTO: 0.03 K/UL (ref 0–0.2)
BASOPHILS NFR BLD: 0.2 % (ref 0–1.9)
BILIRUB SERPL-MCNC: 0.4 MG/DL (ref 0.1–1)
BUN SERPL-MCNC: 15 MG/DL (ref 8–23)
CALCIUM SERPL-MCNC: 8.4 MG/DL (ref 8.7–10.5)
CHLORIDE SERPL-SCNC: 92 MMOL/L (ref 95–110)
CO2 SERPL-SCNC: 33 MMOL/L (ref 23–29)
CREAT SERPL-MCNC: 0.6 MG/DL (ref 0.5–1.4)
DIFFERENTIAL METHOD: ABNORMAL
EOSINOPHIL # BLD AUTO: 0.2 K/UL (ref 0–0.5)
EOSINOPHIL NFR BLD: 1.3 % (ref 0–8)
ERYTHROCYTE [DISTWIDTH] IN BLOOD BY AUTOMATED COUNT: 13.7 % (ref 11.5–14.5)
EST. GFR  (AFRICAN AMERICAN): >60 ML/MIN/1.73 M^2
EST. GFR  (NON AFRICAN AMERICAN): >60 ML/MIN/1.73 M^2
GLUCOSE SERPL-MCNC: 132 MG/DL (ref 70–110)
HCT VFR BLD AUTO: 25.1 % (ref 37–48.5)
HGB BLD-MCNC: 8.5 G/DL (ref 12–16)
IMM GRANULOCYTES # BLD AUTO: 0.12 K/UL (ref 0–0.04)
IMM GRANULOCYTES NFR BLD AUTO: 0.9 % (ref 0–0.5)
LYMPHOCYTES # BLD AUTO: 1.5 K/UL (ref 1–4.8)
LYMPHOCYTES NFR BLD: 11.1 % (ref 18–48)
MAGNESIUM SERPL-MCNC: 1.6 MG/DL (ref 1.6–2.6)
MCH RBC QN AUTO: 32.7 PG (ref 27–31)
MCHC RBC AUTO-ENTMCNC: 33.9 G/DL (ref 32–36)
MCV RBC AUTO: 97 FL (ref 82–98)
MONOCYTES # BLD AUTO: 1.1 K/UL (ref 0.3–1)
MONOCYTES NFR BLD: 8 % (ref 4–15)
NEUTROPHILS # BLD AUTO: 10.6 K/UL (ref 1.8–7.7)
NEUTROPHILS NFR BLD: 78.5 % (ref 38–73)
NRBC BLD-RTO: 0 /100 WBC
PHOSPHATE SERPL-MCNC: 2.6 MG/DL (ref 2.7–4.5)
PLATELET # BLD AUTO: 322 K/UL (ref 150–450)
PMV BLD AUTO: 10.4 FL (ref 9.2–12.9)
POCT GLUCOSE: 148 MG/DL (ref 70–110)
POCT GLUCOSE: 154 MG/DL (ref 70–110)
POCT GLUCOSE: 161 MG/DL (ref 70–110)
POTASSIUM SERPL-SCNC: 3.6 MMOL/L (ref 3.5–5.1)
PROT SERPL-MCNC: 6 G/DL (ref 6–8.4)
RBC # BLD AUTO: 2.6 M/UL (ref 4–5.4)
SODIUM SERPL-SCNC: 133 MMOL/L (ref 136–145)
VANCOMYCIN TROUGH SERPL-MCNC: 16.4 UG/ML (ref 10–22)
WBC # BLD AUTO: 13.55 K/UL (ref 3.9–12.7)

## 2022-01-29 PROCEDURE — S0030 INJECTION, METRONIDAZOLE: HCPCS | Performed by: STUDENT IN AN ORGANIZED HEALTH CARE EDUCATION/TRAINING PROGRAM

## 2022-01-29 PROCEDURE — 25000003 PHARM REV CODE 250: Performed by: INTERNAL MEDICINE

## 2022-01-29 PROCEDURE — 99233 SBSQ HOSP IP/OBS HIGH 50: CPT | Mod: ,,, | Performed by: INTERNAL MEDICINE

## 2022-01-29 PROCEDURE — 80053 COMPREHEN METABOLIC PANEL: CPT | Performed by: STUDENT IN AN ORGANIZED HEALTH CARE EDUCATION/TRAINING PROGRAM

## 2022-01-29 PROCEDURE — 94761 N-INVAS EAR/PLS OXIMETRY MLT: CPT

## 2022-01-29 PROCEDURE — 31720 CLEARANCE OF AIRWAYS: CPT

## 2022-01-29 PROCEDURE — 99900026 HC AIRWAY MAINTENANCE (STAT)

## 2022-01-29 PROCEDURE — 84100 ASSAY OF PHOSPHORUS: CPT | Performed by: STUDENT IN AN ORGANIZED HEALTH CARE EDUCATION/TRAINING PROGRAM

## 2022-01-29 PROCEDURE — 25000003 PHARM REV CODE 250: Performed by: STUDENT IN AN ORGANIZED HEALTH CARE EDUCATION/TRAINING PROGRAM

## 2022-01-29 PROCEDURE — 83735 ASSAY OF MAGNESIUM: CPT | Performed by: STUDENT IN AN ORGANIZED HEALTH CARE EDUCATION/TRAINING PROGRAM

## 2022-01-29 PROCEDURE — 99233 PR SUBSEQUENT HOSPITAL CARE,LEVL III: ICD-10-PCS | Mod: ,,, | Performed by: INTERNAL MEDICINE

## 2022-01-29 PROCEDURE — 25000003 PHARM REV CODE 250

## 2022-01-29 PROCEDURE — 25000003 PHARM REV CODE 250: Performed by: HOSPITALIST

## 2022-01-29 PROCEDURE — 27000207 HC ISOLATION

## 2022-01-29 PROCEDURE — 20000000 HC ICU ROOM

## 2022-01-29 PROCEDURE — 27000221 HC OXYGEN, UP TO 24 HOURS

## 2022-01-29 PROCEDURE — 63600175 PHARM REV CODE 636 W HCPCS: Performed by: STUDENT IN AN ORGANIZED HEALTH CARE EDUCATION/TRAINING PROGRAM

## 2022-01-29 PROCEDURE — 85025 COMPLETE CBC W/AUTO DIFF WBC: CPT | Performed by: STUDENT IN AN ORGANIZED HEALTH CARE EDUCATION/TRAINING PROGRAM

## 2022-01-29 PROCEDURE — 80202 ASSAY OF VANCOMYCIN: CPT | Performed by: INTERNAL MEDICINE

## 2022-01-29 RX ADMIN — METOCLOPRAMIDE HYDROCHLORIDE 10 MG: 5 SOLUTION ORAL at 11:01

## 2022-01-29 RX ADMIN — METOCLOPRAMIDE HYDROCHLORIDE 10 MG: 5 SOLUTION ORAL at 06:01

## 2022-01-29 RX ADMIN — METRONIDAZOLE 500 MG: 500 INJECTION, SOLUTION INTRAVENOUS at 11:01

## 2022-01-29 RX ADMIN — METOCLOPRAMIDE HYDROCHLORIDE 10 MG: 5 SOLUTION ORAL at 12:01

## 2022-01-29 RX ADMIN — SODIUM CHLORIDE: 0.9 INJECTION, SOLUTION INTRAVENOUS at 06:01

## 2022-01-29 RX ADMIN — METRONIDAZOLE 500 MG: 500 INJECTION, SOLUTION INTRAVENOUS at 03:01

## 2022-01-29 RX ADMIN — METOPROLOL TARTRATE 12.5 MG: 25 TABLET, FILM COATED ORAL at 09:01

## 2022-01-29 RX ADMIN — DOXYCYCLINE 100 MG: 100 INJECTION, POWDER, LYOPHILIZED, FOR SOLUTION INTRAVENOUS at 07:01

## 2022-01-29 RX ADMIN — METRONIDAZOLE 500 MG: 500 INJECTION, SOLUTION INTRAVENOUS at 08:01

## 2022-01-29 RX ADMIN — Medication 125 MG: at 12:01

## 2022-01-29 RX ADMIN — FAMOTIDINE 20 MG: 10 INJECTION INTRAVENOUS at 09:01

## 2022-01-29 RX ADMIN — METOCLOPRAMIDE HYDROCHLORIDE 10 MG: 5 SOLUTION ORAL at 05:01

## 2022-01-29 RX ADMIN — Medication 125 MG: at 06:01

## 2022-01-29 RX ADMIN — METRONIDAZOLE 500 MG: 500 INJECTION, SOLUTION INTRAVENOUS at 12:01

## 2022-01-29 RX ADMIN — LEUCINE, PHENYLALANINE, LYSINE, METHIONINE, ISOLEUCINE, VALINE, HISTIDINE, THREONINE, TRYPTOPHAN, ALANINE, GLYCINE, ARGININE, PROLINE, SERINE, TYROSINE, SODIUM ACETATE, DIBASIC POTASSIUM PHOSPHATE, MAGNESIUM CHLORIDE, SODIUM CHLORIDE, CALCIUM CHLORIDE, DEXTROSE
365; 280; 290; 200; 300; 290; 240; 210; 90; 1035; 515; 575; 340; 250; 20; 340; 261; 51; 59; 33; 15 INJECTION INTRAVENOUS at 09:01

## 2022-01-29 RX ADMIN — ENOXAPARIN SODIUM 40 MG: 100 INJECTION SUBCUTANEOUS at 05:01

## 2022-01-29 RX ADMIN — METOPROLOL TARTRATE 12.5 MG: 25 TABLET, FILM COATED ORAL at 08:01

## 2022-01-29 RX ADMIN — SODIUM CHLORIDE 5 ML/HR: 0.9 INJECTION, SOLUTION INTRAVENOUS at 08:01

## 2022-01-29 RX ADMIN — VANCOMYCIN HYDROCHLORIDE 1500 MG: 1.5 INJECTION, POWDER, LYOPHILIZED, FOR SOLUTION INTRAVENOUS at 09:01

## 2022-01-29 RX ADMIN — ATORVASTATIN CALCIUM 40 MG: 40 TABLET, FILM COATED ORAL at 09:01

## 2022-01-29 RX ADMIN — FAMOTIDINE 20 MG: 10 INJECTION INTRAVENOUS at 08:01

## 2022-01-29 RX ADMIN — DOXYCYCLINE 100 MG: 100 INJECTION, POWDER, LYOPHILIZED, FOR SOLUTION INTRAVENOUS at 08:01

## 2022-01-29 RX ADMIN — CITALOPRAM HYDROBROMIDE 40 MG: 10 TABLET ORAL at 08:01

## 2022-01-29 RX ADMIN — SODIUM PHOSPHATE, MONOBASIC, MONOHYDRATE 15 MMOL: 276; 142 INJECTION, SOLUTION INTRAVENOUS at 06:01

## 2022-01-29 RX ADMIN — LEVOTHYROXINE SODIUM 25 MCG: 0.03 TABLET ORAL at 06:01

## 2022-01-29 NOTE — PLAN OF CARE
POC reviewed. VSS, afebrile. Midodrine held r/t -175, MD aware. Sats remain appropriate on oxymask (3L). Weak, ineffective cough - NT suction PRN. In and out of NSR/Afib. Runs of Svt this am and sustained 's that resolved with IVP lopressor. Follows commands, generalized weakness. Stated name. Garbled speech. TPN infusing. Trickle feeds started via PEG. K+ replaced per orders. Proximal port not flushing/drawing back. Wound care assessed sacral wound - moisture associated. Skin care completed, dressing placed. Flexi placed r/t frequent liquid diarrhea. Purewick with adequate WDL. Comfort promoted, safety maintained.     Problem: Adult Inpatient Plan of Care  Goal: Plan of Care Review  1/28/2022 1914 by Palma Nguyen RN  Outcome: Ongoing, Progressing  1/28/2022 1913 by Palma Nguyen RN  Outcome: Ongoing, Progressing

## 2022-01-29 NOTE — CARE UPDATE
01/29/22 0748   Patient Assessment/Suction   Suction Method other (see comments)  (refused)   PRE-TX-O2   O2 Device (Oxygen Therapy) Oxymask   $ Is the patient on Low Flow Oxygen? Yes   Flow (L/min) 5   Oxygen Concentration (%) 40   SpO2 100 %   Pulse Oximetry Type Continuous   $ Pulse Oximetry - Multiple Charge Pulse Oximetry - Multiple   Pulse 85   Resp (!) 22   BP (!) 170/65   POx, ABGs prn, Sx prn

## 2022-01-29 NOTE — PROGRESS NOTES
"  01/29/2022      Admit Date: 1/18/2022  Sandra Wilson  New Patient Consult    Chief Complaint   Patient presents with    Shortness of Breath    Vomiting     PEG tube        History of Present Illness:  Intubated.          From Dr Gaspar's hpi, ERP:Sandra Wilson is a 81 y.o. female who presents to the ED via EMS  with an onset of vomiting and SOB. EMS reported that upon examination patient was less responsive than usually but is able to answer questions. The report from the EMS upon arrival was an O2 sats in the 70's. EMS proceeded to give supplemental oxygen and the O2 sats raised up to 93% on 3 liters of O2 with  "junky" right upper lung sounds" and greenish diarrhea. She was vomiting en route. They also reported that the patient is a patient that has dislodged her PEG tubing many times. PMHx of HTN, CAD, OA, GERD, ctroke, Colon cancer, ovarian cancer, breast cancer.PSHx of Cardiac catheterization, colonoscopy.         Progress Note  PULMONARY    Admit Date: 1/18/2022 01/29/2022      SUBJECTIVE:     1/19 intubated, sedated,  1/20 no c/o intubated, sedated  1/21 no new c/o intubated  1/22 no c/o intubated   1/23 no  New c/o intubated  1/24 no new c/o  1/25 no new c/o  1/26 no new co  1/28- resp status stable on 5L oxymizer mask. Having loose BMs in diaper. Pt does not verbalize. Slight stridor but no distress, oxygening well  1/29- remains on oxymizer, resp status stable. Hypertensive to 170s-180s systolic      PFSH and Allergies reviewed.    OBJECTIVE:     Vitals (Most recent):  Vitals:    01/29/22 0900   BP: (!) 178/80   Pulse: 75   Resp: 19   Temp:        Vitals (24 hour range):  Temp:  [97.8 °F (36.6 °C)-99.5 °F (37.5 °C)]   Pulse:  [69-87]   Resp:  [18-30]   BP: (132-186)/(56-85)   SpO2:  [90 %-100 %]       Intake/Output Summary (Last 24 hours) at 1/29/202229/2022 0928  Last data filed at 1/29/2022 0800  Gross per 24 hour   Intake 2456.53 ml   Output 3300 ml   Net -843.47 ml        Too value is ED when " Physical Exam:  The patient's neuro status (alertness,orientation,cognitive function,motor skills,), pharyngeal exam (oral lesions, hygiene, abn dentition,), Neck (jvd,mass,thyroid,nodes in neck and above/below clavicle),RESPIRATORY(symmetry,effort,fremitus,percussion,auscultation),  Cor(rhythm,heart tones including gallops,perfusion,edema)ABD(distention,hepatic&splenomegaly,tenderness,masses), Skin(rash,cyanosis),Psyc(affect,judgement,).  Exam negative except for these pertinent findings:    No distress  Does not verbalize  No stridor  Trach scar  Chest symmetric, projected upper airway slight rhonchi- sounds improved  abd soft with PEG tube  No edema      Radiographs reviewed: view by direct vision    Results for orders placed during the hospital encounter of 01/18/22    X-Ray Chest 1 View    Narrative  EXAMINATION:  Mm and is is  XR CHEST 1 VIEW    CLINICAL HISTORY:  respiratory failure;    TECHNIQUE:  Single frontal view of the chest was performed.    COMPARISON:  Chest portable of January 18, 2022    FINDINGS:  An endotracheal tube ends in the trachea above the level the malorie.  An NG tube traverses the esophagus to the stomach.  A central line enters at the right neck and ends in the superior vena cava.  The cardiac size and contours within normal limits.  A loop recorder is noted in the left chest.  There is continued central right lung infiltrate and small infiltrate at the left lung base.  No pneumothorax is seen.    Impression  Continued intrapulmonary infiltrates right greater than left.  Endotracheal tube, NG tube and right central lines in position.      Electronically signed by: Mathew Yanez MD  Date:    01/18/2022  Time:    15:50  ]    Labs     Recent Labs   Lab 01/29/22 0319   WBC 13.55*   HGB 8.5*   HCT 25.1*        Recent Labs   Lab 01/28/22  1315 01/28/22  1315 01/29/22  0319   *   < > 133*   K 3.1*   < > 3.6   CL 95   < > 92*   CO2 28   < > 33*   BUN 15   < > 15   CREATININE  0.6   < > 0.6   *   < > 132*   CALCIUM 8.2*   < > 8.4*   MG 1.6   < > 1.6   PHOS 2.8   < > 2.6*   AST 13   < > 19   ALT 16   < > 16   ALKPHOS 57   < > 62   BILITOT 0.4   < > 0.4   PROT 5.8*   < > 6.0   ALBUMIN 2.2*   < > 2.2*   PREALBUMIN 15*  --   --     < > = values in this interval not displayed.     No results for input(s): PH, PCO2, PO2, HCO3 in the last 24 hours.  Microbiology Results (last 7 days)     Procedure Component Value Units Date/Time    Culture, Respiratory with Gram Stain [[id number]]  (Abnormal)  (Susceptibility) Collected: 01/26/22 0745    Order Status: Completed Specimen: Respiratory from Sputum Updated: 01/28/22 1316     Respiratory Culture No S aureus or Pseudomonas isolated.      PROTEUS MIRABILIS ESBL  Few        SERRATIA MARCESCENS  Moderate       Gram Stain (Respiratory) <10 epithelial cells per low power field.     Gram Stain (Respiratory) Rare WBC's     Gram Stain (Respiratory) No organisms seen    Blood culture [[id number]] Collected: 01/18/22 1726    Order Status: Completed Specimen: Blood from Antecubital, Left Arm Updated: 01/23/22 2322     Blood Culture, Routine No growth after 5 days.    Blood culture [[id number]] Collected: 01/18/22 1802    Order Status: Completed Specimen: Blood from Antecubital, Right Arm Updated: 01/23/22 2322     Blood Culture, Routine No growth after 5 days.    Culture, Respiratory with Gram Stain [[id number]]  (Abnormal)  (Susceptibility) Collected: 01/19/22 0114    Order Status: Completed Specimen: Sputum, Expectorated Updated: 01/23/22 1045     Respiratory Culture No Pseudomonas isolated.      STREPTOCOCCUS AGALACTIAE (GROUP B)  Few  Beta-hemolytic streptococci are routinely susceptible to   penicillins,cephalosporins and carbapenems.  Susceptibility testing not routinely performed        KLEBSIELLA PNEUMONIAE  Rare        METHICILLIN RESISTANT STAPHYLOCOCCUS AUREUS  Few       Gram Stain (Respiratory) <10 epithelial cells per low power field.     Gram  Stain (Respiratory) Rare WBC's     Gram Stain (Respiratory) Rare Gram positive cocci     Gram Stain (Respiratory) Rare yeast        Echo   Summary    · Mild concentric hypertrophy and normal systolic function.  · Mild-to-moderate mitral regurgitation.  · Mild tricuspid regurgitation.  · Mild pulmonic regurgitation.  · The estimated ejection fraction is 55%.  · Indeterminate left ventricular diastolic function.  · Normal right ventricular size with normal right ventricular systolic function.  · Mild left atrial enlargement.  · There is moderate-to-severe aortic valve stenosis.  · Aortic valve area is cm2; peak velocity is 3.42 m/s; mean gradient is 32 mmHg. Peak gradient 47mmHg  · Mild aortic regurgitation.  · Normal central venous pressure (3 mmHg).  · The estimated PA systolic pressure is 32 mmHg.  · There is no pulmonary hypertension.  · Moderate to severe AS. 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]  Not Applicable    ARDS (adult respiratory distress syndrome) [J80]  Yes    YARI (generalized anxiety disorder) [F41.1]  Yes    Type 2 diabetes mellitus, with long-term current use of insulin [E11.9, Z79.4]  Not Applicable    Pneumonia [J18.9]  Yes    Aspiration into airway [T17.908A]  Yes    Hypothyroidism [E03.9]  Yes    Hyperlipidemia [E78.5]  Yes     Chronic    GERD (gastroesophageal reflux disease) [K21.9]  Yes     Chronic      Resolved Hospital Problems    Diagnosis Date Resolved POA    Ileus [K56.7] 01/23/2022 No    Demand ischemia [I24.8] 01/24/2022 Yes    Sepsis [A41.9] 01/24/2022 Yes    STEFFANY (acute kidney injury) [N17.9] 01/21/2022 Yes    Hyponatremia [E87.1] 01/20/2022 Yes                        Plan: no fever, vss, was hypotensive earlier.  Cefepime/vanc,flagyl,  chr midodrine, levophed 0.9 ,  ddimer 5.2, creat 0.9  c diff ag  + but toxin neg.  abg 7.27 with ox 48,   Dnr,  cxr impressive R>>>L aspiration pattern.     Prior subdural --       Recruitment manuver done with peep 30 for 6 seconds with art line bp falling from sbp 100 to 60's.  Sat improved from 92 to 94.  Pt not really peep responsive.    Pt should improve ox with left side down or prone.  Peep may help but infiltrates more R>L -- should be posterior lung bases.  Might consider cta if not progressing.      discussed with resp and nursing. Optimally would be left side down or prone.        1/19 no fever, vss with rr to 43, flagyl/zosyn/vanc/cefepime, n70-80% ox,  Wbc 14.8 from 5k,  Creat 1.5 form 0.9,   abg 02 61 with ph 7.33- peep 5,   I/o 687/133-  Will screen for dvt/pe with leg study  Will dose 500 cc saline,   F/u cxr more opacification right>> left lung.      1/20 no fever, vss, bp low at times, I.o 2770/1870, cefepime/flagyl/vanc, ox 60%, wbc 14.6, plts 144- new, creat 1.0,. cxr with some clearing,   F/u abg, wean soon likely but need better ox to extubate.    No dvt, ddimer up at admit,     Pt min vol 8 and seemed to do well with 50% ox while I rounded.  Discussed with nursing and respiratory-- if can get to 40% and pass wean trial would recommend extubation, would be optimal to discuss with family prior plans not to re intubate.  She is progressing-- would consider not discussing comfort care if improving.      1/21 no fever, vss, tm 100, wbc 15.2, hgb 8, from 13.6 bon 18th- admit, plt 130 from 244,   Creat 0.7,   cxr progressive clearing right , some increase opacification left suggested.   Strep in sputum,  Taper abx to doxy and flagy with c diff and strep in mucous-- monitor, check    procal am.    Will change code status to allow re intubation, and  extubate this am.      On enteral vanc for c diff concerns.   Discussed with January short.  Reviewed revised code status.      Addendum-- having stridor post extubation.  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  Pt was at [facility] when patient removed trach 2 months ago.  Pt has no h/o stridor per daughter.  Stridor did not change with jaw thrush nor nt trumpet.  Discussed would recommend intubation and replace trach as best option. Discussed with Dr Charles.  1/22 no fever,vss, wbc 16.3, hgb 8.1 from 13.6 admit?  , procal is down to 3 from 17 admit,     Pt failed extubation with upper airway stridor and poor loc.      Do leak test:on vent with cuff down and tidal vol delivered 450 would need over 110 cc to leak to pass, need exhaled tidal vol to be less than 340 cc to  pass leak test -- do now and repeat q 4 x 3 total.  Will dose steroids for laryngospasm.  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  Will discuss with family later- trial extubation if passes, trach, comfort care???      Discussed with daughter, asked if code status should be altered?  After discussion - will consider extubation trial if good leak test (would try to notify daughter if passes) --- otherwise trach.      1/23 failed leak test, suspect trach stenosis - trach Wednesday?  No fever, vss, doxy/flagyl, vanc enteral, wbc 14.3,   F/u cxr am  Called Ira, daughter.  Discussed need for Palliative care eval.      1/24 no fever, vss, doxy/flagyl/vanc/vanc iv, wbc 15.5,   cxr patchy fluffy infiltrates R>L    No change in plan, defer to palliative care.  Could do trial extubation but likely to fail. Could bronch when tube pulled?      1/25 no fever, vss, doxy/flagyl,vanc iv/enteral, wbc 17.5 hgb 7,7 from 13.6 admit, day 8  abx    Pt seems to have good leak today.  Will have resp quantify, do wean trial, decrease sedation, and consider trial extubation if does well.  1/26 no fever, vss, wbc 19.1, check sputum, good leak test and weaned well.  Loc Is very good for extubation.     Has  shunt for yrs with no problems appreciated.    Loc very good.    Discussed with daughter.    Re evaluated post extubation.  The patient can speak  well.  There is no stridor.  There is no respiratory distress.  Patient perhaps is a little slow.    Called and discussed case again with daughter.  Might consider bronchoscopy later on but would not  at all at this time.  Case discussed with .        The patient had severe pharyngeal dysphagia with aspiration of thin liquids in julius aspiration of nectar thick liquids in December 2021 by speech therapy evaluation.  Patient had an ineffective cough during aspiration.    Would recommend prolonged NPO.  Tube feedings the for would be adequate.      1/27 no fever, vss, doxy/flagyl/vanc iv/po, grew strep/kleb (no sensitivity)/mrsa from sputumon 18th.  7 days iv vanc, has c diff toxin neg on enteral vanc,  On flagyl and doxy-- should be adequate for pneumonia.  3 lpm, wbc 22.6,     F/u cxr-- stable infiltrates as would be expected.    Some upper airway noise.  Not bad enough to trach.  Would consider bronch to inspect airway in a few days.  If worsens--intubation and trach would be recommended.    Will dose scopolamine patch consider resume robinul.  Will have robinul available for prn  Use.     Discussed with daughter, Ira.  Could be just aspiration/laryngospasm issues as tracheal problem should not have progressed to extubation after good leak test.      Above from Dr Gao and below from Dr Gaspar:    - continue supplemental O2 via oximizer   - stable to improving upper airway rhonchi- monitor  - suction prn  - scopolamine patch   - continue antibiotics  - tube feedings via PEG  - rectal tube for diarrhea      Miracle Gaspar MD  Pulmonary & Critical Care Medicine

## 2022-01-29 NOTE — PLAN OF CARE
CICU DAILY GOALS       A: Awake    RASS: Goal - RASS Goal: 0-->alert and calm  Actual - RASS (Hernandez Agitation-Sedation Scale): 0-->alert and calm   Restraint necessity: Clinical Justification: Removing medical devices  B: Breath   SBT: Not attempted   C: Coordinate A & B, analgesics/sedatives   Pain: managed    SAT: Not intubated  D: Delirium   CAM-ICU: Overall CAM-ICU: Negative  E: Early(intubated/ Progressive (non-intubated) Mobility   MOVE Screen: Pass   Activity: Activity Management: Rolling - L1  FAS: Feeding/Nutrition   Diet order: Diet/Nutrition Received: TPN (total parenteral nutrition),tube feeding,   Fluid restriction:    T: Thrombus   DVT prophylaxis: VTE Required Core Measure: (SCDs) Sequential compression device initiated/maintained  H: HOB Elevation   Head of Bed (HOB) Positioning: HOB at 30 degrees  U: Ulcer Prophylaxis   GI: yes  G: Glucose control   managed Glycemic Management: blood glucose monitored  S: Skin   Bundle compliance: yes   Bathing/Skin Care: incontinence care, CHG bath, linen changed, dressed  B: Bowel Function   diarrhea   I: Indwelling Catheters   Jackson necessity: [REMOVED]      Urethral Catheter 01/18/22 0930 Non-latex 10 Fr.-Reason for Continuing Urinary Catheterization: Critically ill in ICU and requiring hourly monitoring of intake/output   CVC necessity: Yes    D: De-escalation Antibx   Yes  Plan for the day   Effective airway clearance, adequate nutrition, skin integrity  Family/Goals of care/Code Status   Code Status: Partial Code     No acute events throughout day, VS and assessment per flow sheet, patient progressing towards goals as tolerated, plan of care reviewed with Sandra Wilson and family, all concerns addressed, will continue to monitor.

## 2022-01-29 NOTE — PROGRESS NOTES
Sandra Wilson [id number] is a 81 y.o. female who has been consulted for vancomycin dosing for suspected pneumonia.    The patient has the following labs:     Date Creatinine (mg/dl)    BUN WBC Count   1/29/2022 Estimated Creatinine Clearance: 70.2 mL/min (based on SCr of 0.6 mg/dL). Lab Results   Component Value Date    BUN 15 01/29/2022     Lab Results   Component Value Date    WBC 13.55 (H) 01/29/2022        Current weight is 69.2 kg (152 lb 8.9 oz)    The current dosing regimen is vancomycin 1250 mg every 24 hours. The vancomycin trough has been modified to be drawn on 1/29 at approximately 2030.     Patient will be followed by pharmacy for changes in renal function, toxicity, and efficacy.      Thank you for allowing us to participate in this patient's care.     Alie Alonzo

## 2022-01-29 NOTE — SUBJECTIVE & OBJECTIVE
Interval History: Patient seen and examined.  No acute events overnight.     remains on oxymizer Thick secretions improved with scopolamine patch.  Plan of care reviewed with the nurse at the bedside.     Review of Systems   Unable to perform ROS: Dementia     Objective:     Vital Signs (Most Recent):  Temp: 97.8 °F (36.6 °C) (01/29/22 0800)  Pulse: 70 (01/29/22 1045)  Resp: 18 (01/29/22 1045)  BP: (!) 150/65 (01/29/22 1045)  SpO2: 100 % (01/29/22 1045) Vital Signs (24h Range):  Temp:  [97.8 °F (36.6 °C)-99.5 °F (37.5 °C)] 97.8 °F (36.6 °C)  Pulse:  [69-87] 70  Resp:  [18-30] 18  SpO2:  [90 %-100 %] 100 %  BP: (132-186)/(56-85) 150/65     Weight: 69.2 kg (152 lb 8.9 oz)  Body mass index is 26.19 kg/m².    Intake/Output Summary (Last 24 hours) at 1/29/2022 1155  Last data filed at 1/29/2022 0800  Gross per 24 hour   Intake 2456.53 ml   Output 3300 ml   Net -843.47 ml      Physical Exam  Vitals and nursing note reviewed.   Constitutional:       General: She is not in acute distress.     Appearance: She is ill-appearing.   Eyes:      Pupils: Pupils are equal, round, and reactive to light.   Cardiovascular:      Rate and Rhythm: Normal rate and regular rhythm.      Heart sounds: No murmur heard.      Pulmonary:      Breath sounds: Rhonchi present.      Comments: Increased work of breathing noted with coarse bilateral breath sounds.  Stridor noted.  Abdominal:      General: There is no distension.      Palpations: Abdomen is soft.      Tenderness: There is no abdominal tenderness.   Skin:     Coloration: Skin is not pale.      Findings: No rash.   Neurological:      Mental Status: She is disoriented.         Significant Labs:   All pertinent labs within the past 24 hours have been reviewed.  BMP:   Recent Labs   Lab 01/29/22  0319   *   *   K 3.6   CL 92*   CO2 33*   BUN 15   CREATININE 0.6   CALCIUM 8.4*   MG 1.6     CBC:   Recent Labs   Lab 01/28/22  1315 01/29/22  0319   WBC 11.59 13.55*   HGB 8.2* 8.5*    HCT 25.2* 25.1*    322       Significant Imaging: I have reviewed all pertinent imaging results/findings within the past 24 hours.

## 2022-01-29 NOTE — PROGRESS NOTES
[facility] Hospital Medicine  Progress Note    Patient Name: Sandra Wlison  MRN: [id number]  Patient Class: IP- Inpatient   Admission Date: 1/18/2022  Length of Stay: 11 days  Attending Physician: Neda Porter MD  Primary Care Provider: Primary Doctor No        Subjective:     Principal Problem:Acute hypoxemic respiratory failure        HPI:  Sandra Wilson is an 81 year old female with a past medical history of CVAs, CNS aneurysm, PEG status, CAD, HLD, hypothryoidism, DM, aortic stenosis, and colon, breast, and ovarian cancer who presented from long term care with vomiting, diarrhea, and shortness of breath. Workup in the ED showed likely aspiration pneumonitis and C. Diff colitis. Her O2 requirement increased while in the ED requiring intubation with sedation. She also required Levophed as she likely developed septic shock. Antibiotics were broadened to cefepime, vancomycin, and Flagyl. Pulmonary was consulted. Family was notified. The patient was unable to provide history given acuity of illness.      Overview/Hospital Course:  Sandra Wilson is an 81 year old female with a past medical history of CVAs, CNS aneurysm, PEG status, CAD, HLD, hypothyroidism, DM, aortic stenosis, and colon, breast, and ovarian cancer who presented from long term care with vomiting, diarrhea, and shortness of breath secondary to acute hypoxic respiratory failure from aspiration pneumonia in the setting of C diff colitis leading to septic shock. She was intubated in the ED and started on Levophed infusion via RIJ CVC placed by Dr. Vignesh Gaspar. She was started on broad spectrum antibiotics with cefepime, Flagyl, vancomycin and admitted to the ICU. She was also started on enteral vancomycin for severe C diff colitis given elevated WBC count and STEFFANY. There was concern for developing ARDS given P/F ratio of 87 (severe); 190 1/22 (moderate). Pulmonary was consulted. She also presented with demand ischemia likely  secondary to septic shock. Troponin was trended and improved as shock resolved. Normal saline infusion was added for hyponatremia and STEFFANY which improved both. Levophed was able to be weaned. Family agreed to DNR status 1/18 and Palliative Care was consulted to discuss goal of care 1/19. Antibiotics were changed to doxycycline and Flagyl (GBS in sputum culture) 1/21; vancomycin was added 1/23 after culture also became positive for Staph aureus and Klebsiella. Her respiratory status has improved throughout her course and she was extubated (code changed to partial code for intubation) 1/21 only to be re-intubated for worsening stridor (history of tracheostomy). Upon re-intubation, copious amounts of secretions were suctioned. KUB suggested a mild ileus A bowel regimen was instituted and the patient was able to have bowel movements 1/22. Tube feeds were started 1/23. Her course was complicated by Afib as well noted on telemetry 1/21; metoprolol tartrate started 1/22.She failed a leak test 1/22; Surgery was consulted for tracheostomy which is pending conversation with family regarding goals of care.    Patient was set to go for trach on 01/26, but attempt was made for another trial extubation and the patient did well after extubation, she was transition to face mask and was able to maintain her saturations although had significant difficulty with secretions.  She was started on Robinul and scopolamine patch which did seem to improve for secretions.  Her respiratory status continued to be somewhat tenuous, so she was monitored in the ICU.      Interval History: Patient seen and examined.  No acute events overnight.     remains on oxymizer Thick secretions improved with scopolamine patch.  Plan of care reviewed with the nurse at the bedside.     Review of Systems   Unable to perform ROS: Dementia     Objective:     Vital Signs (Most Recent):  Temp: 97.8 °F (36.6 °C) (01/29/22 0800)  Pulse: 70 (01/29/22 1045)  Resp: 18  (01/29/22 1045)  BP: (!) 150/65 (01/29/22 1045)  SpO2: 100 % (01/29/22 1045) Vital Signs (24h Range):  Temp:  [97.8 °F (36.6 °C)-99.5 °F (37.5 °C)] 97.8 °F (36.6 °C)  Pulse:  [69-87] 70  Resp:  [18-30] 18  SpO2:  [90 %-100 %] 100 %  BP: (132-186)/(56-85) 150/65     Weight: 69.2 kg (152 lb 8.9 oz)  Body mass index is 26.19 kg/m².    Intake/Output Summary (Last 24 hours) at 1/29/2022 1155  Last data filed at 1/29/2022 0800  Gross per 24 hour   Intake 2456.53 ml   Output 3300 ml   Net -843.47 ml      Physical Exam  Vitals and nursing note reviewed.   Constitutional:       General: She is not in acute distress.     Appearance: She is ill-appearing.   Eyes:      Pupils: Pupils are equal, round, and reactive to light.   Cardiovascular:      Rate and Rhythm: Normal rate and regular rhythm.      Heart sounds: No murmur heard.      Pulmonary:      Breath sounds: Rhonchi present.      Comments: Increased work of breathing noted with coarse bilateral breath sounds.  Stridor noted.  Abdominal:      General: There is no distension.      Palpations: Abdomen is soft.      Tenderness: There is no abdominal tenderness.   Skin:     Coloration: Skin is not pale.      Findings: No rash.   Neurological:      Mental Status: She is disoriented.         Significant Labs:   All pertinent labs within the past 24 hours have been reviewed.  BMP:   Recent Labs   Lab 01/29/22  0319   *   *   K 3.6   CL 92*   CO2 33*   BUN 15   CREATININE 0.6   CALCIUM 8.4*   MG 1.6     CBC:   Recent Labs   Lab 01/28/22  1315 01/29/22  0319   WBC 11.59 13.55*   HGB 8.2* 8.5*   HCT 25.2* 25.1*    322       Significant Imaging: I have reviewed all pertinent imaging results/findings within the past 24 hours.      Assessment/Plan:      * Acute hypoxemic respiratory failure  Patient with Hypoxic Respiratory failure which is Acute on chronic.  she is not on home oxygen. Supplemental oxygen was provided and noted- Oxygen Concentration (%):  [40] 40.  "  Signs/symptoms of respiratory failure include- tachypnea, increased work of breathing and respiratory distress. Contributing diagnoses includes - COPD and Pneumonia Labs and images were reviewed. Patient Has recent ABG, which has been reviewed. Will treat underlying causes and adjust management of respiratory failure as follows- Secondary to aspiration pneumonia. GBS, Klebsiella and Staph aureus in sputum.  Continue antibiotics, will monitor patient closely high risk for re-intubation.  If she is reintubated, will likely proceed with tracheostomy.           Atrial fibrillation  -Telemetry  -Metoprolol 5 IV PRN for HR > 130  -Defer full dose anticoagulation at this time given thrombocytopenia and critical illness  -Metoprolol 25 BID      ARDS (adult respiratory distress syndrome)  -Pulmonary consulted  -Treat aspiration pneumonia and sepsis      Gastrostomy tube dependent  Continue to hold gastrostomy tube feeds for the moment, as patient's respiratory status is tenuous.  Will continue TPN, and start trickle feeds today,      Aspiration into airway  See above under aspiration pneumonia    Pneumonia  Patient with current diagnosis of pneumonia due to bacterial etiology due to  MRSA, Haemophilus and GBS which is uncontrolled due to persistent hypoxia . I have reviewed the pertinent imaging. Current antimicrobial regimen consists of   Antibiotics (From admission, onward)            Start     Stop Route Frequency Ordered    01/27/22 2000  vancomycin 1.25 g in dextrose 5% 250 mL IVPB (ready to mix)         -- IV Every 24 hours (non-standard times) 01/27/22 1247    01/23/22 1047  vancomycin - pharmacy to dose  (vancomycin IVPB)        "And" Linked Group Details    -- IV pharmacy to manage frequency 01/23/22 0947    01/21/22 0730  doxycycline (VIBRAMYCIN) 100 mg in dextrose 5 % 250 mL IVPB         -- IV Every 12 hours (non-standard times) 01/21/22 0617    01/19/22 1200  vancomycin 125 mg/5 mL oral solution 125 mg  (C. " difficile Infection (CDI) Treatment Order Panel)         01/29 1159 PER G TUBE Every 6 hours 01/19/22 0920    01/18/22 1545  metronidazole IVPB 500 mg         -- IV Every 8 hours (non-standard times) 01/18/22 1441      . Cultures drawn and noted-   Microbiology Results (last 7 days)     Procedure Component Value Units Date/Time    Culture, Respiratory with Gram Stain [[id number]]  (Abnormal)  (Susceptibility) Collected: 01/26/22 0745    Order Status: Completed Specimen: Respiratory from Sputum Updated: 01/28/22 1316     Respiratory Culture No S aureus or Pseudomonas isolated.      PROTEUS MIRABILIS ESBL  Few        SERRATIA MARCESCENS  Moderate       Gram Stain (Respiratory) <10 epithelial cells per low power field.     Gram Stain (Respiratory) Rare WBC's     Gram Stain (Respiratory) No organisms seen    Blood culture [[id number]] Collected: 01/18/22 1726    Order Status: Completed Specimen: Blood from Antecubital, Left Arm Updated: 01/23/22 2322     Blood Culture, Routine No growth after 5 days.    Blood culture [[id number]] Collected: 01/18/22 1802    Order Status: Completed Specimen: Blood from Antecubital, Right Arm Updated: 01/23/22 2322     Blood Culture, Routine No growth after 5 days.    Culture, Respiratory with Gram Stain [[id number]]  (Abnormal)  (Susceptibility) Collected: 01/19/22 0114    Order Status: Completed Specimen: Sputum, Expectorated Updated: 01/23/22 1045     Respiratory Culture No Pseudomonas isolated.      STREPTOCOCCUS AGALACTIAE (GROUP B)  Few  Beta-hemolytic streptococci are routinely susceptible to   penicillins,cephalosporins and carbapenems.  Susceptibility testing not routinely performed        KLEBSIELLA PNEUMONIAE  Rare        METHICILLIN RESISTANT STAPHYLOCOCCUS AUREUS  Few       Gram Stain (Respiratory) <10 epithelial cells per low power field.     Gram Stain (Respiratory) Rare WBC's     Gram Stain (Respiratory) Rare Gram positive cocci     Gram Stain (Respiratory) Rare yeast        Will monitor patient closely and continue current treatment plan unchanged.        Type 2 diabetes mellitus, with long-term current use of insulin  Patient's FSGs are controlled on current medication regimen.  Most recent fingerstick glucose reviewed-   Recent Labs   Lab 01/27/22  1837 01/27/22  2139 01/28/22  0528 01/28/22  1215   POCTGLUCOSE 162* 178* 163* 197*     Current correctional scale  Medium  Maintain anti-hyperglycemic dose as follows-   Antihyperglycemics (From admission, onward)            Start     Stop Route Frequency Ordered    01/18/22 1438  insulin aspart U-100 pen 1-10 Units         -- SubQ Before meals & nightly PRN 01/18/22 1438        Hold Oral hypoglycemics while patient is in the hospital.        YARI (generalized anxiety disorder)  Extubated on 01/26.  Use Precedex as needed to control anxiety.  Patient does not appear anxious on exam.      Hypothyroidism  Patient has chronic hypothyroidism. TFTs reviewed-   Lab Results   Component Value Date    TSH 1.130 07/13/2017   . Will continue chronic levothyroxine and adjust for and clinical changes.        Hyperlipidemia   Patient is chronically on statin.will continue for now. Monitor clinically. Last LDL was   Lab Results   Component Value Date    LDLCALC 94.4 07/13/2017          GERD (gastroesophageal reflux disease)  -Hold PPI given C diff colitis  -IV famotidine        VTE Risk Mitigation (From admission, onward)         Ordered     enoxaparin injection 40 mg  Daily         01/18/22 1438     IP VTE HIGH RISK PATIENT  Once         01/18/22 1438     Place sequential compression device  Until discontinued         01/18/22 1438                Discharge Planning   DEMETRIUS:      Code Status: Partial Code   Is the patient medically ready for discharge?:     Reason for patient still in hospital (select all that apply): Patient trending condition and Treatment  Discharge Plan A: Return to nursing home            Critical care time spent on the evaluation and  treatment of severe organ dysfunction, review of pertinent labs and imaging studies, discussions with consulting providers and discussions with patient/family: 60 minutes.      Neda Porter MD  Department of Hospital Medicine   [facility]

## 2022-01-29 NOTE — PLAN OF CARE
POC reviewed. VSS, afebrile. Sats remain appropriate on 3L Oxymask. Continues with weak, ineffective cough. NT/deep suctions PRN. Nods/responds appropriately, follows commands. Speech sometimes incomprehensible. Denies pain/discomfort. Tolerating trickle feeds via PEG. Flexi with liquid stool. Dressing to sacral breakdown CDI. Purewick with adequate UOP. Son updated at bedside. Comfort promoted, safety  maintained.     Problem: Adult Inpatient Plan of Care  Goal: Plan of Care Review  Outcome: Ongoing, Progressing

## 2022-01-30 LAB
ALBUMIN SERPL BCP-MCNC: 2.2 G/DL (ref 3.5–5.2)
ALP SERPL-CCNC: 60 U/L (ref 55–135)
ALT SERPL W/O P-5'-P-CCNC: 12 U/L (ref 10–44)
ANION GAP SERPL CALC-SCNC: 11 MMOL/L (ref 8–16)
AST SERPL-CCNC: 16 U/L (ref 10–40)
BASOPHILS # BLD AUTO: 0.03 K/UL (ref 0–0.2)
BASOPHILS NFR BLD: 0.2 % (ref 0–1.9)
BILIRUB SERPL-MCNC: 0.5 MG/DL (ref 0.1–1)
BUN SERPL-MCNC: 18 MG/DL (ref 8–23)
CALCIUM SERPL-MCNC: 8.4 MG/DL (ref 8.7–10.5)
CHLORIDE SERPL-SCNC: 91 MMOL/L (ref 95–110)
CO2 SERPL-SCNC: 31 MMOL/L (ref 23–29)
CREAT SERPL-MCNC: 0.6 MG/DL (ref 0.5–1.4)
DIFFERENTIAL METHOD: ABNORMAL
EOSINOPHIL # BLD AUTO: 0.2 K/UL (ref 0–0.5)
EOSINOPHIL NFR BLD: 1.2 % (ref 0–8)
ERYTHROCYTE [DISTWIDTH] IN BLOOD BY AUTOMATED COUNT: 13.6 % (ref 11.5–14.5)
EST. GFR  (AFRICAN AMERICAN): >60 ML/MIN/1.73 M^2
EST. GFR  (NON AFRICAN AMERICAN): >60 ML/MIN/1.73 M^2
GLUCOSE SERPL-MCNC: 92 MG/DL (ref 70–110)
HCT VFR BLD AUTO: 25.4 % (ref 37–48.5)
HGB BLD-MCNC: 8.4 G/DL (ref 12–16)
IMM GRANULOCYTES # BLD AUTO: 0.12 K/UL (ref 0–0.04)
IMM GRANULOCYTES NFR BLD AUTO: 0.8 % (ref 0–0.5)
LYMPHOCYTES # BLD AUTO: 1.6 K/UL (ref 1–4.8)
LYMPHOCYTES NFR BLD: 10.6 % (ref 18–48)
MAGNESIUM SERPL-MCNC: 1.6 MG/DL (ref 1.6–2.6)
MCH RBC QN AUTO: 32.4 PG (ref 27–31)
MCHC RBC AUTO-ENTMCNC: 33.1 G/DL (ref 32–36)
MCV RBC AUTO: 98 FL (ref 82–98)
MONOCYTES # BLD AUTO: 1.3 K/UL (ref 0.3–1)
MONOCYTES NFR BLD: 8.4 % (ref 4–15)
NEUTROPHILS # BLD AUTO: 12 K/UL (ref 1.8–7.7)
NEUTROPHILS NFR BLD: 78.8 % (ref 38–73)
NRBC BLD-RTO: 0 /100 WBC
PHOSPHATE SERPL-MCNC: 3.7 MG/DL (ref 2.7–4.5)
PLATELET # BLD AUTO: 325 K/UL (ref 150–450)
PMV BLD AUTO: 10.6 FL (ref 9.2–12.9)
POCT GLUCOSE: 109 MG/DL (ref 70–110)
POCT GLUCOSE: 116 MG/DL (ref 70–110)
POCT GLUCOSE: 124 MG/DL (ref 70–110)
POCT GLUCOSE: 128 MG/DL (ref 70–110)
POTASSIUM SERPL-SCNC: 3.8 MMOL/L (ref 3.5–5.1)
PROT SERPL-MCNC: 5.9 G/DL (ref 6–8.4)
RBC # BLD AUTO: 2.59 M/UL (ref 4–5.4)
SODIUM SERPL-SCNC: 133 MMOL/L (ref 136–145)
WBC # BLD AUTO: 15.27 K/UL (ref 3.9–12.7)

## 2022-01-30 PROCEDURE — 25000003 PHARM REV CODE 250: Performed by: INTERNAL MEDICINE

## 2022-01-30 PROCEDURE — 80053 COMPREHEN METABOLIC PANEL: CPT | Performed by: STUDENT IN AN ORGANIZED HEALTH CARE EDUCATION/TRAINING PROGRAM

## 2022-01-30 PROCEDURE — 85025 COMPLETE CBC W/AUTO DIFF WBC: CPT | Performed by: STUDENT IN AN ORGANIZED HEALTH CARE EDUCATION/TRAINING PROGRAM

## 2022-01-30 PROCEDURE — S0030 INJECTION, METRONIDAZOLE: HCPCS | Performed by: STUDENT IN AN ORGANIZED HEALTH CARE EDUCATION/TRAINING PROGRAM

## 2022-01-30 PROCEDURE — 25000003 PHARM REV CODE 250: Performed by: STUDENT IN AN ORGANIZED HEALTH CARE EDUCATION/TRAINING PROGRAM

## 2022-01-30 PROCEDURE — 63600175 PHARM REV CODE 636 W HCPCS: Performed by: STUDENT IN AN ORGANIZED HEALTH CARE EDUCATION/TRAINING PROGRAM

## 2022-01-30 PROCEDURE — 99233 SBSQ HOSP IP/OBS HIGH 50: CPT | Mod: ,,, | Performed by: INTERNAL MEDICINE

## 2022-01-30 PROCEDURE — 63600175 PHARM REV CODE 636 W HCPCS: Performed by: INTERNAL MEDICINE

## 2022-01-30 PROCEDURE — 94761 N-INVAS EAR/PLS OXIMETRY MLT: CPT

## 2022-01-30 PROCEDURE — 99233 PR SUBSEQUENT HOSPITAL CARE,LEVL III: ICD-10-PCS | Mod: ,,, | Performed by: INTERNAL MEDICINE

## 2022-01-30 PROCEDURE — 83735 ASSAY OF MAGNESIUM: CPT | Performed by: STUDENT IN AN ORGANIZED HEALTH CARE EDUCATION/TRAINING PROGRAM

## 2022-01-30 PROCEDURE — 27000221 HC OXYGEN, UP TO 24 HOURS

## 2022-01-30 PROCEDURE — 84100 ASSAY OF PHOSPHORUS: CPT | Performed by: STUDENT IN AN ORGANIZED HEALTH CARE EDUCATION/TRAINING PROGRAM

## 2022-01-30 PROCEDURE — 31720 CLEARANCE OF AIRWAYS: CPT

## 2022-01-30 PROCEDURE — 27000207 HC ISOLATION

## 2022-01-30 PROCEDURE — 20000000 HC ICU ROOM

## 2022-01-30 PROCEDURE — 99900026 HC AIRWAY MAINTENANCE (STAT)

## 2022-01-30 PROCEDURE — 25000003 PHARM REV CODE 250: Performed by: HOSPITALIST

## 2022-01-30 RX ADMIN — METOPROLOL TARTRATE 12.5 MG: 25 TABLET, FILM COATED ORAL at 09:01

## 2022-01-30 RX ADMIN — DOXYCYCLINE 100 MG: 100 INJECTION, POWDER, LYOPHILIZED, FOR SOLUTION INTRAVENOUS at 08:01

## 2022-01-30 RX ADMIN — METOCLOPRAMIDE HYDROCHLORIDE 10 MG: 5 SOLUTION ORAL at 05:01

## 2022-01-30 RX ADMIN — METOPROLOL TARTRATE 12.5 MG: 25 TABLET, FILM COATED ORAL at 08:01

## 2022-01-30 RX ADMIN — SCOPALAMINE 1 PATCH: 1 PATCH, EXTENDED RELEASE TRANSDERMAL at 11:01

## 2022-01-30 RX ADMIN — ATORVASTATIN CALCIUM 40 MG: 40 TABLET, FILM COATED ORAL at 08:01

## 2022-01-30 RX ADMIN — DOXYCYCLINE 100 MG: 100 INJECTION, POWDER, LYOPHILIZED, FOR SOLUTION INTRAVENOUS at 09:01

## 2022-01-30 RX ADMIN — ENOXAPARIN SODIUM 40 MG: 100 INJECTION SUBCUTANEOUS at 05:01

## 2022-01-30 RX ADMIN — MIDODRINE HYDROCHLORIDE 5 MG: 5 TABLET ORAL at 03:01

## 2022-01-30 RX ADMIN — FAMOTIDINE 20 MG: 10 INJECTION INTRAVENOUS at 08:01

## 2022-01-30 RX ADMIN — METOROPROLOL TARTRATE 5 MG: 5 INJECTION, SOLUTION INTRAVENOUS at 05:01

## 2022-01-30 RX ADMIN — FAMOTIDINE 20 MG: 10 INJECTION INTRAVENOUS at 09:01

## 2022-01-30 RX ADMIN — MIDODRINE HYDROCHLORIDE 5 MG: 5 TABLET ORAL at 08:01

## 2022-01-30 RX ADMIN — CITALOPRAM HYDROBROMIDE 40 MG: 10 TABLET ORAL at 09:01

## 2022-01-30 RX ADMIN — LEVOTHYROXINE SODIUM 25 MCG: 0.03 TABLET ORAL at 05:01

## 2022-01-30 RX ADMIN — VANCOMYCIN HYDROCHLORIDE 1500 MG: 1.5 INJECTION, POWDER, LYOPHILIZED, FOR SOLUTION INTRAVENOUS at 10:01

## 2022-01-30 RX ADMIN — MIDODRINE HYDROCHLORIDE 5 MG: 5 TABLET ORAL at 09:01

## 2022-01-30 RX ADMIN — METOCLOPRAMIDE HYDROCHLORIDE 10 MG: 5 SOLUTION ORAL at 11:01

## 2022-01-30 RX ADMIN — METRONIDAZOLE 500 MG: 500 INJECTION, SOLUTION INTRAVENOUS at 09:01

## 2022-01-30 RX ADMIN — METRONIDAZOLE 500 MG: 500 INJECTION, SOLUTION INTRAVENOUS at 04:01

## 2022-01-30 NOTE — CARE UPDATE
01/30/22 0830   PRE-TX-O2   O2 Device (Oxygen Therapy) Oxymask   $ Is the patient on Low Flow Oxygen? Yes   Flow (L/min) 5   Oxygen Concentration (%) 40   SpO2 97 %   Pulse Oximetry Type Continuous   $ Pulse Oximetry - Multiple Charge Pulse Oximetry - Multiple   Pulse 84   Resp 19   Positioning HOB elevated 30 degrees   POx, Sx prn

## 2022-01-30 NOTE — PROGRESS NOTES
[facility] Hospital Medicine  Progress Note    Patient Name: Sandra Wilson  MRN: [id number]  Patient Class: IP- Inpatient   Admission Date: 1/18/2022  Length of Stay: 12 days  Attending Physician: Neda Porter MD  Primary Care Provider: Primary Doctor No        Subjective:     Principal Problem:Acute hypoxemic respiratory failure        HPI:  Sandra Wilson is an 81 year old female with a past medical history of CVAs, CNS aneurysm, PEG status, CAD, HLD, hypothryoidism, DM, aortic stenosis, and colon, breast, and ovarian cancer who presented from long term care with vomiting, diarrhea, and shortness of breath. Workup in the ED showed likely aspiration pneumonitis and C. Diff colitis. Her O2 requirement increased while in the ED requiring intubation with sedation. She also required Levophed as she likely developed septic shock. Antibiotics were broadened to cefepime, vancomycin, and Flagyl. Pulmonary was consulted. Family was notified. The patient was unable to provide history given acuity of illness.      Overview/Hospital Course:  Sandra Wilson is an 81 year old female with a past medical history of CVAs, CNS aneurysm, PEG status, CAD, HLD, hypothyroidism, DM, aortic stenosis, and colon, breast, and ovarian cancer who presented from long term care with vomiting, diarrhea, and shortness of breath secondary to acute hypoxic respiratory failure from aspiration pneumonia in the setting of C diff colitis leading to septic shock. She was intubated in the ED and started on Levophed infusion via RIJ CVC placed by Dr. Vignesh Gaspar. She was started on broad spectrum antibiotics with cefepime, Flagyl, vancomycin and admitted to the ICU. She was also started on enteral vancomycin for severe C diff colitis given elevated WBC count and STEFFANY. There was concern for developing ARDS given P/F ratio of 87 (severe); 190 1/22 (moderate). Pulmonary was consulted. She also presented with demand ischemia likely  secondary to septic shock. Troponin was trended and improved as shock resolved. Normal saline infusion was added for hyponatremia and STEFFANY which improved both. Levophed was able to be weaned. Family agreed to DNR status 1/18 and Palliative Care was consulted to discuss goal of care 1/19. Antibiotics were changed to doxycycline and Flagyl (GBS in sputum culture) 1/21; vancomycin was added 1/23 after culture also became positive for Staph aureus and Klebsiella. Her respiratory status has improved throughout her course and she was extubated (code changed to partial code for intubation) 1/21 only to be re-intubated for worsening stridor (history of tracheostomy). Upon re-intubation, copious amounts of secretions were suctioned. KUB suggested a mild ileus A bowel regimen was instituted and the patient was able to have bowel movements 1/22. Tube feeds were started 1/23. Her course was complicated by Afib as well noted on telemetry 1/21; metoprolol tartrate started 1/22.She failed a leak test 1/22; Surgery was consulted for tracheostomy which is pending conversation with family regarding goals of care.    Patient was set to go for trach on 01/26, but attempt was made for another trial extubation and the patient did well after extubation, she was transition to face mask and was able to maintain her saturations although had significant difficulty with secretions.  She was started on Robinul and scopolamine patch which did seem to improve for secretions.  Her respiratory status continued to be somewhat tenuous, so she was monitored in the ICU.      Interval History:  No acute events overnight continues to be on Oxy mask 5 L. , white count 15 H&H stable.  Tube feeding at the rate of 15    Review of Systems   Unable to perform ROS: Dementia     Objective:     Vital Signs (Most Recent):  Temp: 97.9 °F (36.6 °C) (01/30/22 1130)  Pulse: 71 (01/30/22 1100)  Resp: 18 (01/30/22 1100)  BP: 126/62 (01/30/22 1100)  SpO2: 98 % (01/30/22  1100) Vital Signs (24h Range):  Temp:  [97.9 °F (36.6 °C)-99.3 °F (37.4 °C)] 97.9 °F (36.6 °C)  Pulse:  [63-89] 71  Resp:  [18-21] 18  SpO2:  [96 %-100 %] 98 %  BP: ()/(53-71) 126/62     Weight: 68.9 kg (151 lb 14.4 oz)  Body mass index is 26.07 kg/m².    Intake/Output Summary (Last 24 hours) at 1/30/2022 1517  Last data filed at 1/30/2022 1100  Gross per 24 hour   Intake 3205.82 ml   Output 1600 ml   Net 1605.82 ml      Physical Exam  Vitals and nursing note reviewed.   Constitutional:       General: She is not in acute distress.     Appearance: She is ill-appearing.   HENT:      Head: Normocephalic and atraumatic.      Mouth/Throat:      Mouth: Mucous membranes are moist.   Eyes:      Pupils: Pupils are equal, round, and reactive to light.   Cardiovascular:      Rate and Rhythm: Normal rate and regular rhythm.      Heart sounds: No murmur heard.      Pulmonary:      Effort: No respiratory distress.      Breath sounds: Normal breath sounds. No wheezing or rhonchi.   Abdominal:      General: There is no distension.      Palpations: Abdomen is soft.      Tenderness: There is no abdominal tenderness.   Skin:     Coloration: Skin is not pale.      Findings: No rash.   Neurological:      Mental Status: She is alert. She is disoriented.      Cranial Nerves: No cranial nerve deficit.         Significant Labs:   All pertinent labs within the past 24 hours have been reviewed.  BMP:   Recent Labs   Lab 01/30/22  0245   GLU 92   *   K 3.8   CL 91*   CO2 31*   BUN 18   CREATININE 0.6   CALCIUM 8.4*   MG 1.6     CBC:   Recent Labs   Lab 01/29/22  0319 01/30/22  0245   WBC 13.55* 15.27*   HGB 8.5* 8.4*   HCT 25.1* 25.4*    325       Significant Imaging: I have reviewed all pertinent imaging results/findings within the past 24 hours.      Assessment/Plan:      * Acute hypoxemic respiratory failure  Patient with Hypoxic Respiratory failure which is Acute on chronic.  she is not on home oxygen. Supplemental  "oxygen was provided and noted- Oxygen Concentration (%):  [40] 40.   Signs/symptoms of respiratory failure include- tachypnea, increased work of breathing and respiratory distress. Contributing diagnoses includes - COPD and Pneumonia Labs and images were reviewed. Patient Has recent ABG, which has been reviewed. Will treat underlying causes and adjust management of respiratory failure as follows- Secondary to aspiration pneumonia. GBS, Klebsiella and Staph aureus in sputum.  Continue antibiotics, will monitor patient closely high risk for re-intubation.  If she is reintubated, will likely proceed with tracheostomy.           Atrial fibrillation  -Telemetry  -Metoprolol 5 IV PRN for HR > 130  -Defer full dose anticoagulation at this time given thrombocytopenia and critical illness  -Metoprolol 25 BID      ARDS (adult respiratory distress syndrome)  -Pulmonary consulted  -Treat aspiration pneumonia and sepsis      Gastrostomy tube dependent  Continue to hold gastrostomy tube feeds for the moment, as patient's respiratory status is tenuous.  Will continue TPN, and start trickle feeds today,      Aspiration into airway  See above under aspiration pneumonia    Pneumonia  Patient with current diagnosis of pneumonia due to bacterial etiology due to  MRSA, Haemophilus and GBS which is uncontrolled due to persistent hypoxia . I have reviewed the pertinent imaging. Current antimicrobial regimen consists of   Antibiotics (From admission, onward)            Start     Stop Route Frequency Ordered    01/30/22 2200  vancomycin 1.5 g in dextrose 5 % 250 mL IVPB (ready to mix)         -- IV Every 24 hours (non-standard times) 01/30/22 1032    01/23/22 1047  vancomycin - pharmacy to dose  (vancomycin IVPB)        "And" Linked Group Details    -- IV pharmacy to manage frequency 01/23/22 0947    01/21/22 0730  doxycycline (VIBRAMYCIN) 100 mg in dextrose 5 % 250 mL IVPB         -- IV Every 12 hours (non-standard times) 01/21/22 0617    " 01/18/22 1545  metronidazole IVPB 500 mg         -- IV Every 8 hours (non-standard times) 01/18/22 1441      . Cultures drawn and noted-   Microbiology Results (last 7 days)     Procedure Component Value Units Date/Time    Culture, Respiratory with Gram Stain [[id number]]  (Abnormal)  (Susceptibility) Collected: 01/26/22 0745    Order Status: Completed Specimen: Respiratory from Sputum Updated: 01/28/22 1316     Respiratory Culture No S aureus or Pseudomonas isolated.      PROTEUS MIRABILIS ESBL  Few        SERRATIA MARCESCENS  Moderate       Gram Stain (Respiratory) <10 epithelial cells per low power field.     Gram Stain (Respiratory) Rare WBC's     Gram Stain (Respiratory) No organisms seen    Blood culture [[id number]] Collected: 01/18/22 1726    Order Status: Completed Specimen: Blood from Antecubital, Left Arm Updated: 01/23/22 2322     Blood Culture, Routine No growth after 5 days.    Blood culture [[id number]] Collected: 01/18/22 1802    Order Status: Completed Specimen: Blood from Antecubital, Right Arm Updated: 01/23/22 2322     Blood Culture, Routine No growth after 5 days.       Will monitor patient closely and continue current treatment plan unchanged.        Type 2 diabetes mellitus, with long-term current use of insulin  Patient's FSGs are controlled on current medication regimen.  Most recent fingerstick glucose reviewed-   Recent Labs   Lab 01/27/22  1837 01/27/22  2139 01/28/22  0528 01/28/22  1215   POCTGLUCOSE 162* 178* 163* 197*     Current correctional scale  Medium  Maintain anti-hyperglycemic dose as follows-   Antihyperglycemics (From admission, onward)            Start     Stop Route Frequency Ordered    01/18/22 1438  insulin aspart U-100 pen 1-10 Units         -- SubQ Before meals & nightly PRN 01/18/22 1438        Hold Oral hypoglycemics while patient is in the hospital.        YARI (generalized anxiety disorder)  Extubated on 01/26.  Use Precedex as needed to control anxiety.  Patient does  not appear anxious on exam.      Hypothyroidism  Patient has chronic hypothyroidism. TFTs reviewed-   Lab Results   Component Value Date    TSH 1.130 07/13/2017   . Will continue chronic levothyroxine and adjust for and clinical changes.        Hyperlipidemia   Patient is chronically on statin.will continue for now. Monitor clinically. Last LDL was   Lab Results   Component Value Date    LDLCALC 94.4 07/13/2017          GERD (gastroesophageal reflux disease)  -Hold PPI given C diff colitis  -IV famotidine        VTE Risk Mitigation (From admission, onward)         Ordered     enoxaparin injection 40 mg  Daily         01/18/22 1438     IP VTE HIGH RISK PATIENT  Once         01/18/22 1438     Place sequential compression device  Until discontinued         01/18/22 1438                Discharge Planning   DEMETRIUS:      Code Status: Partial Code   Is the patient medically ready for discharge?:     Reason for patient still in hospital (select all that apply): Patient trending condition and Treatment  Discharge Plan A: Return to nursing home            Critical care time spent on the evaluation and treatment of severe organ dysfunction, review of pertinent labs and imaging studies, discussions with consulting providers and discussions with patient/family: 60 minutes.      Neda Porter MD  Department of Hospital Medicine   [facility]

## 2022-01-30 NOTE — PROGRESS NOTES
Pharmacokinetic Assessment Follow Up: IV Vancomycin    Vancomycin serum concentration assessment(s):    The trough level was drawn correctly and can be used to guide therapy at this time. The measurement is within the desired definitive target range of 15 to 20 mcg/mL.    Vancomycin Regimen Plan:    Change regimen to Vancomycin 1500 mg IV every 12 hours with next serum trough concentration measured at 2100 prior to 3rd dose on 1/30    Drug levels (last 3 results):  Recent Labs   Lab Result Units 01/27/22  1148 01/29/22  1959   Vancomycin-Trough ug/mL 21.1 16.4       Pharmacy will continue to follow and monitor vancomycin.    Please contact pharmacy at extension [phone] for questions regarding this assessment.    Thank you for the consult,   Hari Hernandes       Patient brief summary:  Sandra Wilson is a 81 y.o. female initiated on antimicrobial therapy with IV Vancomycin for treatment of lower respiratory infection        Drug Allergies:   Review of patient's allergies indicates:   Allergen Reactions    Cephalexin (bulk)      Flushing and itching    Lidocaine base      rash    Lisinopril Other (See Comments)     cough       Actual Body Weight:   69.2 kg    Renal Function:   Estimated Creatinine Clearance: 70.2 mL/min (based on SCr of 0.6 mg/dL).,     Dialysis Method (if applicable):  N/A    CBC (last 72 hours):  Recent Labs   Lab Result Units 01/27/22 0317 01/28/22 1315 01/29/22 0319   WBC K/uL 22.59* 11.59 13.55*   Hemoglobin g/dL 8.7* 8.2* 8.5*   Hematocrit % 26.6* 25.2* 25.1*   Platelets K/uL 350 280 322   Gran % % 82.1* 77.8* 78.5*   Lymph % % 8.1* 10.5* 11.1*   Mono % % 6.0 8.8 8.0   Eosinophil % % 0.0 1.2 1.3   Basophil % % 0.2 0.1 0.2   Differential Method  Automated Automated Automated       Metabolic Panel (last 72 hours):  Recent Labs   Lab Result Units 01/27/22 0317 01/28/22 1315 01/29/22 0319   Sodium mmol/L 135* 132* 133*   Potassium mmol/L 3.4* 3.1* 3.6   Chloride mmol/L 99 95 92*   CO2 mmol/L 26 28 33*    Glucose mg/dL 104 151* 132*   BUN mg/dL 18 15 15   Creatinine mg/dL 0.6 0.6 0.6   Albumin g/dL 2.2* 2.2* 2.2*   Total Bilirubin mg/dL 0.6 0.4 0.4   Alkaline Phosphatase U/L 73 57 62   AST U/L 16 13 19   ALT U/L 18 16 16   Magnesium mg/dL 1.9 1.6 1.6   Phosphorus mg/dL 2.6* 2.8 2.6*       Vancomycin Administrations:  vancomycin given in the last 96 hours                     vancomycin 125 mg/5 mL oral solution 125 mg (mg) 125 mg Given 01/29/22 0615     125 mg Given  0023     125 mg Given 01/28/22 1736     125 mg Given  1237     125 mg Given  0524     125 mg Given  0000     125 mg Given 01/27/22 1715     125 mg Given  1250     125 mg Given  0516     125 mg Given  0047     125 mg Given 01/26/22 1724     125 mg Given  1212     125 mg Given  0530     125 mg Given  0010    vancomycin 1.25 g in dextrose 5% 250 mL IVPB (ready to mix) (mg) 1,250 mg New Bag 01/28/22 2146     1,250 mg New Bag 01/27/22 2138    vancomycin 1.5 g in dextrose 5 % 250 mL IVPB (ready to mix) (mg) 1,500 mg New Bag 01/26/22 1228                    Microbiologic Results:  Microbiology Results (last 7 days)       Procedure Component Value Units Date/Time    Culture, Respiratory with Gram Stain [[id number]]  (Abnormal)  (Susceptibility) Collected: 01/26/22 0745    Order Status: Completed Specimen: Respiratory from Sputum Updated: 01/28/22 1316     Respiratory Culture No S aureus or Pseudomonas isolated.      PROTEUS MIRABILIS ESBL  Few        SERRATIA MARCESCENS  Moderate       Gram Stain (Respiratory) <10 epithelial cells per low power field.     Gram Stain (Respiratory) Rare WBC's     Gram Stain (Respiratory) No organisms seen    Blood culture [[id number]] Collected: 01/18/22 1726    Order Status: Completed Specimen: Blood from Antecubital, Left Arm Updated: 01/23/22 2322     Blood Culture, Routine No growth after 5 days.    Blood culture [[id number]] Collected: 01/18/22 1802    Order Status: Completed Specimen: Blood from Antecubital, Right Arm Updated:  01/23/22 2322     Blood Culture, Routine No growth after 5 days.    Culture, Respiratory with Gram Stain [[id number]]  (Abnormal)  (Susceptibility) Collected: 01/19/22 0114    Order Status: Completed Specimen: Sputum, Expectorated Updated: 01/23/22 1045     Respiratory Culture No Pseudomonas isolated.      STREPTOCOCCUS AGALACTIAE (GROUP B)  Few  Beta-hemolytic streptococci are routinely susceptible to   penicillins,cephalosporins and carbapenems.  Susceptibility testing not routinely performed        KLEBSIELLA PNEUMONIAE  Rare        METHICILLIN RESISTANT STAPHYLOCOCCUS AUREUS  Few       Gram Stain (Respiratory) <10 epithelial cells per low power field.     Gram Stain (Respiratory) Rare WBC's     Gram Stain (Respiratory) Rare Gram positive cocci     Gram Stain (Respiratory) Rare yeast

## 2022-01-30 NOTE — ASSESSMENT & PLAN NOTE
"Patient with current diagnosis of pneumonia due to bacterial etiology due to  MRSA, Haemophilus and GBS which is uncontrolled due to persistent hypoxia . I have reviewed the pertinent imaging. Current antimicrobial regimen consists of   Antibiotics (From admission, onward)            Start     Stop Route Frequency Ordered    01/30/22 2200  vancomycin 1.5 g in dextrose 5 % 250 mL IVPB (ready to mix)         -- IV Every 24 hours (non-standard times) 01/30/22 1032    01/23/22 1047  vancomycin - pharmacy to dose  (vancomycin IVPB)        "And" Linked Group Details    -- IV pharmacy to manage frequency 01/23/22 0947    01/21/22 0730  doxycycline (VIBRAMYCIN) 100 mg in dextrose 5 % 250 mL IVPB         -- IV Every 12 hours (non-standard times) 01/21/22 0617    01/18/22 1545  metronidazole IVPB 500 mg         -- IV Every 8 hours (non-standard times) 01/18/22 1441      . Cultures drawn and noted-   Microbiology Results (last 7 days)     Procedure Component Value Units Date/Time    Culture, Respiratory with Gram Stain [[id number]]  (Abnormal)  (Susceptibility) Collected: 01/26/22 0745    Order Status: Completed Specimen: Respiratory from Sputum Updated: 01/28/22 1316     Respiratory Culture No S aureus or Pseudomonas isolated.      PROTEUS MIRABILIS ESBL  Few        SERRATIA MARCESCENS  Moderate       Gram Stain (Respiratory) <10 epithelial cells per low power field.     Gram Stain (Respiratory) Rare WBC's     Gram Stain (Respiratory) No organisms seen    Blood culture [[id number]] Collected: 01/18/22 1726    Order Status: Completed Specimen: Blood from Antecubital, Left Arm Updated: 01/23/22 2322     Blood Culture, Routine No growth after 5 days.    Blood culture [[id number]] Collected: 01/18/22 1802    Order Status: Completed Specimen: Blood from Antecubital, Right Arm Updated: 01/23/22 2322     Blood Culture, Routine No growth after 5 days.       Will monitor patient closely and continue current treatment plan " unchanged.

## 2022-01-30 NOTE — PROGRESS NOTES
Sandra Wilson [id number] is a 81 y.o. female who has been consulted for vancomycin dosing for suspected pneumonia.    The patient has the following labs:     Date Creatinine (mg/dl)    BUN WBC Count   1/30/2022 Estimated Creatinine Clearance: 70.1 mL/min (based on SCr of 0.6 mg/dL). Lab Results   Component Value Date    BUN 18 01/30/2022     Lab Results   Component Value Date    WBC 15.27 (H) 01/30/2022         Ref. Range 1/25/2022 10:32 1/27/2022 11:48 1/29/2022 19:59   Vancomycin-Trough Latest Ref Range: 10.0 - 22.0 ug/mL 18.5 21.1 16.4     Current weight is 68.9 kg (151 lb 14.4 oz)    The current regimen is vancomycin 1500 mg IV every 24 hours. The vancomycin trough has been ordered for 1/31 at approximately 2100.      Patient will be followed by pharmacy for changes in renal function, toxicity, and efficacy.     Thank you for allowing us to participate in this patient's care.     Alie Alonzo

## 2022-01-30 NOTE — PLAN OF CARE
MICU DAILY GOALS       A: Awake    RASS: Goal - RASS Goal: 0-->alert and calm  Actual - RASS (Hernandez Agitation-Sedation Scale): 0-->alert and calm   Restraint necessity: Clinical Justification: Removing medical devices  B: Breath   SBT: n/a, patient using oxymask    C: Coordinate A & B, analgesics/sedatives   Pain: n/a    D: Delirium   CAM-ICU: Overall CAM-ICU: Positive  E: Early(intubated/ Progressive (non-intubated) Mobility   MOVE Screen: Pass   Activity: Activity Management: Rolling - L1  FAS: Feeding/Nutrition   Diet order: Diet/Nutrition Received: tube feeding,TPN (total parenteral nutrition),   Fluid restriction:    T: Thrombus   DVT prophylaxis: VTE Required Core Measure: (SCDs) Sequential compression device initiated/maintained  H: HOB Elevation   Head of Bed (HOB) Positioning: HOB at 30-45 degrees  U: Ulcer Prophylaxis   GI: yes  G: Glucose control   managed Glycemic Management: blood glucose monitored  S: Skin   Bundle compliance: yes   Bathing/Skin Care: back care,bath, complete,dressed/undressed,foot care,incontinence care,linen changed Date:  B: Bowel Function   diarrhea   I: Indwelling Catheters   Jackson necessity: [REMOVED]      Urethral Catheter 01/18/22 0930 Non-latex 10 Fr.-Reason for Continuing Urinary Catheterization: Critically ill in ICU and requiring hourly monitoring of intake/output   CVC necessity: Yes     D: De-escalation Antibx   Yes  Plan for the day   Effective airway clearance, decrease infection, improve oxygenation.  Family/Goals of care/Code Status   Code Status: Partial Code     VS and assessment per flow sheet, patient progressing towards goals as tolerated, plan of care reviewed with Sandra Wilson, all concerns addressed, will continue to monitor.

## 2022-01-30 NOTE — PROGRESS NOTES
"  01/30/2022      Admit Date: 1/18/2022  Sandra Wilson  New Patient Consult    Chief Complaint   Patient presents with    Shortness of Breath    Vomiting     PEG tube        History of Present Illness:  Intubated.          From Dr Gaspar's hpi, ERP:Sandra Wilson is a 81 y.o. female who presents to the ED via EMS  with an onset of vomiting and SOB. EMS reported that upon examination patient was less responsive than usually but is able to answer questions. The report from the EMS upon arrival was an O2 sats in the 70's. EMS proceeded to give supplemental oxygen and the O2 sats raised up to 93% on 3 liters of O2 with  "junky" right upper lung sounds" and greenish diarrhea. She was vomiting en route. They also reported that the patient is a patient that has dislodged her PEG tubing many times. PMHx of HTN, CAD, OA, GERD, ctroke, Colon cancer, ovarian cancer, breast cancer.PSHx of Cardiac catheterization, colonoscopy.         Progress Note  PULMONARY    Admit Date: 1/18/2022 01/30/2022      SUBJECTIVE:     1/19 intubated, sedated,  1/20 no c/o intubated, sedated  1/21 no new c/o intubated  1/22 no c/o intubated   1/23 no  New c/o intubated  1/24 no new c/o  1/25 no new c/o  1/26 no new co  1/28- resp status stable on 5L oxymizer mask. Having loose BMs in diaper. Pt does not verbalize. Slight stridor but no distress, oxygening well  1/29- remains on oxymizer, resp status stable. Hypertensive to 170s-180s systolic  1/30- continues on oxymizer, no new issues      PFSH and Allergies reviewed.    OBJECTIVE:     Vitals (Most recent):  Vitals:    01/30/22 1130   BP:    Pulse:    Resp:    Temp: 97.9 °F (36.6 °C)       Vitals (24 hour range):  Temp:  [97.9 °F (36.6 °C)-99.3 °F (37.4 °C)]   Pulse:  [63-89]   Resp:  [18-21]   BP: ()/(53-71)   SpO2:  [96 %-100 %]       Intake/Output Summary (Last 24 hours) at 1/30/2022 1434  Last data filed at 1/30/2022 1100  Gross per 24 hour   Intake 3205.82 ml   Output 1600 ml "   Net 1605.82 ml        Too value is ED when Physical Exam:  The patient's neuro status (alertness,orientation,cognitive function,motor skills,), pharyngeal exam (oral lesions, hygiene, abn dentition,), Neck (jvd,mass,thyroid,nodes in neck and above/below clavicle),RESPIRATORY(symmetry,effort,fremitus,percussion,auscultation),  Cor(rhythm,heart tones including gallops,perfusion,edema)ABD(distention,hepatic&splenomegaly,tenderness,masses), Skin(rash,cyanosis),Psyc(affect,judgement,).  Exam negative except for these pertinent findings:    No distress, verbalizes weakly, oriented x1  Hoarse voice  No stridor  Trach scar  Chest symmetric, projected upper airway slight rhonchi- sounds improved  abd soft with PEG tube  No edema      Radiographs reviewed: view by direct vision    Results for orders placed during the hospital encounter of 01/18/22    X-Ray Chest 1 View    Narrative  EXAMINATION:  Mm and is is  XR CHEST 1 VIEW    CLINICAL HISTORY:  respiratory failure;    TECHNIQUE:  Single frontal view of the chest was performed.    COMPARISON:  Chest portable of January 18, 2022    FINDINGS:  An endotracheal tube ends in the trachea above the level the malorie.  An NG tube traverses the esophagus to the stomach.  A central line enters at the right neck and ends in the superior vena cava.  The cardiac size and contours within normal limits.  A loop recorder is noted in the left chest.  There is continued central right lung infiltrate and small infiltrate at the left lung base.  No pneumothorax is seen.    Impression  Continued intrapulmonary infiltrates right greater than left.  Endotracheal tube, NG tube and right central lines in position.      Electronically signed by: Mathew Yanez MD  Date:    01/18/2022  Time:    15:50  ]    Labs     Recent Labs   Lab 01/30/22  0245   WBC 15.27*   HGB 8.4*   HCT 25.4*        Recent Labs   Lab 01/30/22  0245   *   K 3.8   CL 91*   CO2 31*   BUN 18   CREATININE 0.6   GLU  92   CALCIUM 8.4*   MG 1.6   PHOS 3.7   AST 16   ALT 12   ALKPHOS 60   BILITOT 0.5   PROT 5.9*   ALBUMIN 2.2*     No results for input(s): PH, PCO2, PO2, HCO3 in the last 24 hours.  Microbiology Results (last 7 days)     Procedure Component Value Units Date/Time    Culture, Respiratory with Gram Stain [[id number]]  (Abnormal)  (Susceptibility) Collected: 01/26/22 0745    Order Status: Completed Specimen: Respiratory from Sputum Updated: 01/28/22 1316     Respiratory Culture No S aureus or Pseudomonas isolated.      PROTEUS MIRABILIS ESBL  Few        SERRATIA MARCESCENS  Moderate       Gram Stain (Respiratory) <10 epithelial cells per low power field.     Gram Stain (Respiratory) Rare WBC's     Gram Stain (Respiratory) No organisms seen    Blood culture [[id number]] Collected: 01/18/22 1726    Order Status: Completed Specimen: Blood from Antecubital, Left Arm Updated: 01/23/22 2322     Blood Culture, Routine No growth after 5 days.    Blood culture [[id number]] Collected: 01/18/22 1802    Order Status: Completed Specimen: Blood from Antecubital, Right Arm Updated: 01/23/22 2322     Blood Culture, Routine No growth after 5 days.        Echo   Summary    · Mild concentric hypertrophy and normal systolic function.  · Mild-to-moderate mitral regurgitation.  · Mild tricuspid regurgitation.  · Mild pulmonic regurgitation.  · The estimated ejection fraction is 55%.  · Indeterminate left ventricular diastolic function.  · Normal right ventricular size with normal right ventricular systolic function.  · Mild left atrial enlargement.  · There is moderate-to-severe aortic valve stenosis.  · Aortic valve area is cm2; peak velocity is 3.42 m/s; mean gradient is 32 mmHg. Peak gradient 47mmHg  · Mild aortic regurgitation.  · Normal central venous pressure (3 mmHg).  · The estimated PA systolic pressure is 32 mmHg.  · There is no pulmonary hypertension.  · Moderate to severe AS. DI= 0.23 and 0.20          Impression:  Active Hospital  Problems    Diagnosis  POA    *Acute hypoxemic respiratory failure [J96.01]  Yes    Atrial fibrillation [I48.91]  No    Gastrostomy tube dependent [Z93.1]  Not Applicable    ARDS (adult respiratory distress syndrome) [J80]  Yes    YARI (generalized anxiety disorder) [F41.1]  Yes    Type 2 diabetes mellitus, with long-term current use of insulin [E11.9, Z79.4]  Not Applicable    Pneumonia [J18.9]  Yes    Aspiration into airway [T17.908A]  Yes    Hypothyroidism [E03.9]  Yes    Hyperlipidemia [E78.5]  Yes     Chronic    GERD (gastroesophageal reflux disease) [K21.9]  Yes     Chronic      Resolved Hospital Problems    Diagnosis Date Resolved POA    Ileus [K56.7] 01/23/2022 No    Demand ischemia [I24.8] 01/24/2022 Yes    Sepsis [A41.9] 01/24/2022 Yes    STEFFANY (acute kidney injury) [N17.9] 01/21/2022 Yes    Hyponatremia [E87.1] 01/20/2022 Yes                        Plan: no fever, vss, was hypotensive earlier.  Cefepime/vanc,flagyl,  chr midodrine, levophed 0.9 ,  ddimer 5.2, creat 0.9  c diff ag + but toxin neg.  abg 7.27 with ox 48,   Dnr,  cxr impressive R>>>L aspiration pattern.     Prior subdural --       Recruitment manuver done with peep 30 for 6 seconds with art line bp falling from sbp 100 to 60's.  Sat improved from 92 to 94.  Pt not really peep responsive.    Pt should improve ox with left side down or prone.  Peep may help but infiltrates more R>L -- should be posterior lung bases.  Might consider cta if not progressing.      discussed with resp and nursing. Optimally would be left side down or prone.        1/19 no fever, vss with rr to 43, flagyl/zosyn/vanc/cefepime, n70-80% ox,  Wbc 14.8 from 5k,  Creat 1.5 form 0.9,   abg 02 61 with ph 7.33- peep 5,   I/o 687/133-  Will screen for dvt/pe with leg study  Will dose 500 cc saline,   F/u cxr more opacification right>> left lung.      1/20 no fever, vss, bp low at times, I.o 2770/1870, cefepime/flagyl/vanc, ox 60%, wbc 14.6, plts 144- new, creat  1.0,. cxr with some clearing,   F/u abg, wean soon likely but need better ox to extubate.    No dvt, ddimer up at admit,     Pt min vol 8 and seemed to do well with 50% ox while I rounded.  Discussed with nursing and respiratory-- if can get to 40% and pass wean trial would recommend extubation, would be optimal to discuss with family prior plans not to re intubate.  She is progressing-- would consider not discussing comfort care if improving.      1/21 no fever, vss, tm 100, wbc 15.2, hgb 8, from 13.6 bon 18th- admit, plt 130 from 244,   Creat 0.7,   cxr progressive clearing right , some increase opacification left suggested.   Strep in sputum,  Taper abx to doxy and flagy with c diff and strep in mucous-- monitor, check    procal am.    Will change code status to allow re intubation, and  extubate this am.      On enteral vanc for c diff concerns.   Discussed with January short.  Reviewed revised code status.      Addendum-- having stridor post extubation.  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  Pt was at [facility] when patient removed trach 2 months ago.  Pt has no h/o stridor per daughter.  Stridor did not change with jaw thrush nor nt trumpet.  Discussed would recommend intubation and replace trach as best option. Discussed with Dr Charles.  1/22 no fever,vss, wbc 16.3, hgb 8.1 from 13.6 admit?  , procal is down to 3 from 17 admit,     Pt failed extubation with upper airway stridor and poor loc.      Do leak test:on vent with cuff down and tidal vol delivered 450 would need over 110 cc to leak to pass, need exhaled tidal vol to be less than 340 cc to  pass leak test -- do now and repeat q 4 x 3 total.  Will dose steroids for laryngospasm.  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  Will discuss with family later- trial extubation if passes, trach, comfort care???      Discussed with daughter, asked if code status should be altered?  After discussion - will consider extubation  trial if good leak test (would try to notify daughter if passes) --- otherwise trach.      1/23 failed leak test, suspect trach stenosis - trach Wednesday?  No fever, vss, doxy/flagyl, vanc enteral, wbc 14.3,   F/u cxr am  Called Ira, daughter.  Discussed need for Palliative care eval.      1/24 no fever, vss, doxy/flagyl/vanc/vanc iv, wbc 15.5,   cxr patchy fluffy infiltrates R>L    No change in plan, defer to palliative care.  Could do trial extubation but likely to fail. Could bronch when tube pulled?      1/25 no fever, vss, doxy/flagyl,vanc iv/enteral, wbc 17.5 hgb 7,7 from 13.6 admit, day 8  abx    Pt seems to have good leak today.  Will have resp quantify, do wean trial, decrease sedation, and consider trial extubation if does well.  1/26 no fever, vss, wbc 19.1, check sputum, good leak test and weaned well.  Loc Is very good for extubation.     Has  shunt for yrs with no problems appreciated.    Loc very good.    Discussed with daughter.    Re evaluated post extubation.  The patient can speak well.  There is no stridor.  There is no respiratory distress.  Patient perhaps is a little slow.    Called and discussed case again with daughter.  Might consider bronchoscopy later on but would not  at all at this time.  Case discussed with .        The patient had severe pharyngeal dysphagia with aspiration of thin liquids in julius aspiration of nectar thick liquids in December 2021 by speech therapy evaluation.  Patient had an ineffective cough during aspiration.    Would recommend prolonged NPO.  Tube feedings the for would be adequate.      1/27 no fever, vss, doxy/flagyl/vanc iv/po, grew strep/kleb (no sensitivity)/mrsa from sputumon 18th.  7 days iv vanc, has c diff toxin neg on enteral vanc,  On flagyl and doxy-- should be adequate for pneumonia.  3 lpm, wbc 22.6,     F/u cxr-- stable infiltrates as would be expected.    Some upper airway noise.  Not bad enough to trach.  Would  consider bronch to inspect airway in a few days.  If worsens--intubation and trach would be recommended.    Will dose scopolamine patch consider resume robinul.  Will have robinul available for prn  Use.     Discussed with daughter, Ira.  Could be just aspiration/laryngospasm issues as tracheal problem should not have progressed to extubation after good leak test.      Above from Dr Gao and below from Dr Gaspar:    - continue supplemental O2 via oximizer   - stable to improving upper airway rhonchi- monitor  - suction prn  - scopolamine patch   - continue antibiotics  - tube feedings via PEG  - rectal tube for diarrhea      Miracle Gaspar MD  Pulmonary & Critical Care Medicine

## 2022-01-31 LAB
ALBUMIN SERPL BCP-MCNC: 2.2 G/DL (ref 3.5–5.2)
ALP SERPL-CCNC: 56 U/L (ref 55–135)
ALT SERPL W/O P-5'-P-CCNC: 11 U/L (ref 10–44)
ANION GAP SERPL CALC-SCNC: 10 MMOL/L (ref 8–16)
AST SERPL-CCNC: 11 U/L (ref 10–40)
BASOPHILS # BLD AUTO: 0.06 K/UL (ref 0–0.2)
BASOPHILS NFR BLD: 0.5 % (ref 0–1.9)
BILIRUB SERPL-MCNC: 0.4 MG/DL (ref 0.1–1)
BUN SERPL-MCNC: 18 MG/DL (ref 8–23)
CALCIUM SERPL-MCNC: 8.2 MG/DL (ref 8.7–10.5)
CHLORIDE SERPL-SCNC: 92 MMOL/L (ref 95–110)
CO2 SERPL-SCNC: 31 MMOL/L (ref 23–29)
CREAT SERPL-MCNC: 0.6 MG/DL (ref 0.5–1.4)
DIFFERENTIAL METHOD: ABNORMAL
EOSINOPHIL # BLD AUTO: 0.1 K/UL (ref 0–0.5)
EOSINOPHIL NFR BLD: 1.1 % (ref 0–8)
ERYTHROCYTE [DISTWIDTH] IN BLOOD BY AUTOMATED COUNT: 14 % (ref 11.5–14.5)
EST. GFR  (AFRICAN AMERICAN): >60 ML/MIN/1.73 M^2
EST. GFR  (NON AFRICAN AMERICAN): >60 ML/MIN/1.73 M^2
GLUCOSE SERPL-MCNC: 94 MG/DL (ref 70–110)
HCT VFR BLD AUTO: 23 % (ref 37–48.5)
HGB BLD-MCNC: 7.6 G/DL (ref 12–16)
IMM GRANULOCYTES # BLD AUTO: 0.08 K/UL (ref 0–0.04)
IMM GRANULOCYTES NFR BLD AUTO: 0.6 % (ref 0–0.5)
LYMPHOCYTES # BLD AUTO: 1.2 K/UL (ref 1–4.8)
LYMPHOCYTES NFR BLD: 8.9 % (ref 18–48)
MAGNESIUM SERPL-MCNC: 1.7 MG/DL (ref 1.6–2.6)
MCH RBC QN AUTO: 32.5 PG (ref 27–31)
MCHC RBC AUTO-ENTMCNC: 33 G/DL (ref 32–36)
MCV RBC AUTO: 98 FL (ref 82–98)
MONOCYTES # BLD AUTO: 1.2 K/UL (ref 0.3–1)
MONOCYTES NFR BLD: 9.1 % (ref 4–15)
NEUTROPHILS # BLD AUTO: 10.6 K/UL (ref 1.8–7.7)
NEUTROPHILS NFR BLD: 79.8 % (ref 38–73)
NRBC BLD-RTO: 0 /100 WBC
PHOSPHATE SERPL-MCNC: 3 MG/DL (ref 2.7–4.5)
PLATELET # BLD AUTO: 327 K/UL (ref 150–450)
PMV BLD AUTO: 10.7 FL (ref 9.2–12.9)
POCT GLUCOSE: 102 MG/DL (ref 70–110)
POCT GLUCOSE: 107 MG/DL (ref 70–110)
POCT GLUCOSE: 118 MG/DL (ref 70–110)
POCT GLUCOSE: 122 MG/DL (ref 70–110)
POCT GLUCOSE: 139 MG/DL (ref 70–110)
POTASSIUM SERPL-SCNC: 3.6 MMOL/L (ref 3.5–5.1)
PROT SERPL-MCNC: 5.6 G/DL (ref 6–8.4)
RBC # BLD AUTO: 2.34 M/UL (ref 4–5.4)
SODIUM SERPL-SCNC: 133 MMOL/L (ref 136–145)
VANCOMYCIN TROUGH SERPL-MCNC: 22 UG/ML (ref 10–22)
WBC # BLD AUTO: 13.31 K/UL (ref 3.9–12.7)

## 2022-01-31 PROCEDURE — 25000003 PHARM REV CODE 250: Performed by: STUDENT IN AN ORGANIZED HEALTH CARE EDUCATION/TRAINING PROGRAM

## 2022-01-31 PROCEDURE — 84100 ASSAY OF PHOSPHORUS: CPT | Performed by: STUDENT IN AN ORGANIZED HEALTH CARE EDUCATION/TRAINING PROGRAM

## 2022-01-31 PROCEDURE — 99900026 HC AIRWAY MAINTENANCE (STAT)

## 2022-01-31 PROCEDURE — 80202 ASSAY OF VANCOMYCIN: CPT | Performed by: INTERNAL MEDICINE

## 2022-01-31 PROCEDURE — 83735 ASSAY OF MAGNESIUM: CPT | Performed by: STUDENT IN AN ORGANIZED HEALTH CARE EDUCATION/TRAINING PROGRAM

## 2022-01-31 PROCEDURE — 27000207 HC ISOLATION

## 2022-01-31 PROCEDURE — 25000003 PHARM REV CODE 250: Performed by: HOSPITALIST

## 2022-01-31 PROCEDURE — 20000000 HC ICU ROOM

## 2022-01-31 PROCEDURE — 94761 N-INVAS EAR/PLS OXIMETRY MLT: CPT

## 2022-01-31 PROCEDURE — 27000221 HC OXYGEN, UP TO 24 HOURS

## 2022-01-31 PROCEDURE — 99233 SBSQ HOSP IP/OBS HIGH 50: CPT | Mod: ,,, | Performed by: INTERNAL MEDICINE

## 2022-01-31 PROCEDURE — 31720 CLEARANCE OF AIRWAYS: CPT

## 2022-01-31 PROCEDURE — 63600175 PHARM REV CODE 636 W HCPCS: Performed by: STUDENT IN AN ORGANIZED HEALTH CARE EDUCATION/TRAINING PROGRAM

## 2022-01-31 PROCEDURE — S0030 INJECTION, METRONIDAZOLE: HCPCS | Performed by: STUDENT IN AN ORGANIZED HEALTH CARE EDUCATION/TRAINING PROGRAM

## 2022-01-31 PROCEDURE — 85025 COMPLETE CBC W/AUTO DIFF WBC: CPT | Performed by: STUDENT IN AN ORGANIZED HEALTH CARE EDUCATION/TRAINING PROGRAM

## 2022-01-31 PROCEDURE — 99233 PR SUBSEQUENT HOSPITAL CARE,LEVL III: ICD-10-PCS | Mod: ,,, | Performed by: INTERNAL MEDICINE

## 2022-01-31 PROCEDURE — 80053 COMPREHEN METABOLIC PANEL: CPT | Performed by: STUDENT IN AN ORGANIZED HEALTH CARE EDUCATION/TRAINING PROGRAM

## 2022-01-31 RX ADMIN — MIDODRINE HYDROCHLORIDE 5 MG: 5 TABLET ORAL at 05:01

## 2022-01-31 RX ADMIN — MIDODRINE HYDROCHLORIDE 5 MG: 5 TABLET ORAL at 08:01

## 2022-01-31 RX ADMIN — METRONIDAZOLE 500 MG: 500 INJECTION, SOLUTION INTRAVENOUS at 07:01

## 2022-01-31 RX ADMIN — METOCLOPRAMIDE HYDROCHLORIDE 10 MG: 5 SOLUTION ORAL at 06:01

## 2022-01-31 RX ADMIN — METRONIDAZOLE 500 MG: 500 INJECTION, SOLUTION INTRAVENOUS at 12:01

## 2022-01-31 RX ADMIN — LEVOTHYROXINE SODIUM 25 MCG: 0.03 TABLET ORAL at 06:01

## 2022-01-31 RX ADMIN — ENOXAPARIN SODIUM 40 MG: 100 INJECTION SUBCUTANEOUS at 05:01

## 2022-01-31 RX ADMIN — FAMOTIDINE 20 MG: 10 INJECTION INTRAVENOUS at 08:01

## 2022-01-31 RX ADMIN — METOCLOPRAMIDE HYDROCHLORIDE 10 MG: 5 SOLUTION ORAL at 12:01

## 2022-01-31 RX ADMIN — METOPROLOL TARTRATE 12.5 MG: 25 TABLET, FILM COATED ORAL at 08:01

## 2022-01-31 RX ADMIN — METRONIDAZOLE 500 MG: 500 INJECTION, SOLUTION INTRAVENOUS at 05:01

## 2022-01-31 RX ADMIN — CITALOPRAM HYDROBROMIDE 40 MG: 10 TABLET ORAL at 08:01

## 2022-01-31 RX ADMIN — ATORVASTATIN CALCIUM 40 MG: 40 TABLET, FILM COATED ORAL at 08:01

## 2022-01-31 NOTE — SUBJECTIVE & OBJECTIVE
Interval History: No acute events overnight continues to be on Oxy mask 5 L. , white count 13 H&H 7.6 and 23   Tube feeding at the rate of 40 tolerating it without any issues.     Review of Systems   Unable to perform ROS: Dementia     Objective:     Vital Signs (Most Recent):  Temp: 97.9 °F (36.6 °C) (01/31/22 1130)  Pulse: 72 (01/31/22 1526)  Resp: 14 (01/31/22 1526)  BP: (!) 144/66 (01/31/22 1430)  SpO2: 98 % (01/31/22 1526) Vital Signs (24h Range):  Temp:  [97.9 °F (36.6 °C)-98.8 °F (37.1 °C)] 97.9 °F (36.6 °C)  Pulse:  [63-87] 72  Resp:  [14-27] 14  SpO2:  [94 %-100 %] 98 %  BP: (101-171)/(54-74) 144/66     Weight: 69.1 kg (152 lb 5.4 oz)  Body mass index is 26.15 kg/m².    Intake/Output Summary (Last 24 hours) at 1/31/2022 1655  Last data filed at 1/31/2022 1239  Gross per 24 hour   Intake 780.66 ml   Output 1535 ml   Net -754.34 ml      Physical Exam  Vitals and nursing note reviewed.   Constitutional:       General: She is not in acute distress.     Appearance: She is ill-appearing.   HENT:      Head: Normocephalic and atraumatic.      Mouth/Throat:      Mouth: Mucous membranes are moist.   Eyes:      Pupils: Pupils are equal, round, and reactive to light.   Cardiovascular:      Rate and Rhythm: Normal rate and regular rhythm.      Heart sounds: No murmur heard.      Pulmonary:      Effort: No respiratory distress.      Breath sounds: Normal breath sounds. No wheezing or rhonchi.   Abdominal:      General: There is no distension.      Palpations: Abdomen is soft.      Tenderness: There is no abdominal tenderness.   Skin:     Coloration: Skin is not pale.      Findings: No rash.   Neurological:      Mental Status: She is alert. She is disoriented.      Cranial Nerves: No cranial nerve deficit.         Significant Labs:   All pertinent labs within the past 24 hours have been reviewed.  BMP:   Recent Labs   Lab 01/31/22  0258   GLU 94   *   K 3.6   CL 92*   CO2 31*   BUN 18   CREATININE 0.6   CALCIUM 8.2*    MG 1.7     CBC:   Recent Labs   Lab 01/30/22  0245 01/31/22  0258   WBC 15.27* 13.31*   HGB 8.4* 7.6*   HCT 25.4* 23.0*    327       Significant Imaging: I have reviewed all pertinent imaging results/findings within the past 24 hours.

## 2022-01-31 NOTE — ASSESSMENT & PLAN NOTE
"Patient with current diagnosis of pneumonia due to bacterial etiology due to  MRSA, Haemophilus and GBS which is uncontrolled due to persistent hypoxia . I have reviewed the pertinent imaging. Current antimicrobial regimen consists of   Antibiotics (From admission, onward)            Start     Stop Route Frequency Ordered    01/30/22 2200  vancomycin 1.5 g in dextrose 5 % 250 mL IVPB (ready to mix)         -- IV Every 24 hours (non-standard times) 01/30/22 1032    01/23/22 1047  vancomycin - pharmacy to dose  (vancomycin IVPB)        "And" Linked Group Details    -- IV pharmacy to manage frequency 01/23/22 0947    01/18/22 1545  metronidazole IVPB 500 mg         -- IV Every 8 hours (non-standard times) 01/18/22 1441      . Cultures drawn and noted-   Microbiology Results (last 7 days)     Procedure Component Value Units Date/Time    Culture, Respiratory with Gram Stain [[id number]]  (Abnormal)  (Susceptibility) Collected: 01/26/22 0745    Order Status: Completed Specimen: Respiratory from Sputum Updated: 01/28/22 1316     Respiratory Culture No S aureus or Pseudomonas isolated.      PROTEUS MIRABILIS ESBL  Few        SERRATIA MARCESCENS  Moderate       Gram Stain (Respiratory) <10 epithelial cells per low power field.     Gram Stain (Respiratory) Rare WBC's     Gram Stain (Respiratory) No organisms seen       Will monitor patient closely and continue current treatment plan unchanged.      "

## 2022-01-31 NOTE — PROGRESS NOTES
[facility]  Adult Nutrition  Progress Note    SUMMARY     Intervention: enteral and parenteral nutrition therapy     Recommendations  1) Continue TF Isosource 1.5 @ 20 mL/hr and advance to goal rate of 45 mL/hr as pt tolerates + Brody BID and Beneprotein BID + 115 ml flush q 4 hr  --Will provide 1620 kcal, 73 g protein ( + beneprotein), 820 ml free water  (100% EEN, 100% EPN )    2) If unable to advance TF to at least 30 ml/hr, continue TPN D 15 AA 5 @ 65 ml/hr + lipids ( 78 ( 100% EPN), 1607 kcal (100% EEN)      3) weigh weekly, SLP consult if appropriate     Goals: 1) Initation of enteral nutrition by RD follow up 2) Nutrition support meeting > 50% of needs at f/u  Nutrition Goal Status: met/ not meeting-continues  Communication of RD Recs: POC, sticky note, reviewed with MD/RN     Assessment and Plan  Nutrition Problem  Inadequate energy intake     Related to (etiology):   NPO status, no TF running, dysphagia     Signs and Symptoms (as evidenced by):   Pt receiving < 50% EEN/EPN x 6 days     Interventions(treatment strategy):  Collaboration with other providers  Enteral nutrition     Nutrition Diagnosis Status:   continues     Malnutrition  Edema: 1-3+  Gabriel: 11 + PEG, new coccyx ulcer  NFPE 1/24/21 no significant wasting seen  PO intakes < 50% of needs x > 5-6 days  No significant wt loss per chart review     Reason for Assessment  Reason For Assessment: follow up  Diagnosis: other (see comments) (Acute hypoxemic respiratory failure)  Relevant Medical History: GERD, HLD, HTN, CVAs, CNS aneurysm, PEG, CAD, hypothyroidism, DM, colon, breast and ovarian cancer.  Interdisciplinary Rounds: attended     General Information Comments: Remote assessment for coverage. Pt intubated and sedated, requiring levophed, on propofol, MAP 75, plan to try for extubation today per note. With PEG, noted pt has hx of removing PEG tube. C-diff positive, also noted with STEFFANY - renal labs improving, K, phos low,  "repleting. Per chart, with 9.5% wt loss over the last 2 months, significant. NFPE needed to determine malnutrition status. RD to monitor and follow up.  Nutrition Discharge Planning: Pending clinical course  1/24/22 TF recs left 1/21, TF started @ 10 ml/hr yesterday, continues at same rate. No BM. + vent-possible plan for trach placement. NFPE done 1/24/21 no significant wasting seen.  1/26/22 + PEG. TF held for possible trach placemen however now pt extubated. Plan to continue with TFs.  1/28/21 TF held 1/26-today r/t tenuous respiratory status. TPN started yesterday per MD ( meeting 72% protein needs and 94% energy needs). Pt with O2 mask on today. Plan per MD to continue TPN and start trickle TF in the next 24-48 hr.  1/31/22 Pt was started on trickle TF's over the weekend. TPN reordered today as pt TF not advancing fast enough.  TF at 20 ml/hr, and tolerating today. Plan to advance toward goal per MD.     Discharge Planning:  TF above or cardiac, DM 1500 kcal diet     Nutrition/ Diet History  unable to obtain  Spiritual/cultural/Hinduism factors affecting PO intakes  Factors affecting PO intakes: NPO, trouble swallowing     Nutrition Risk Screen  Nutrition Risk Screen: no indicators present     Anthropometrics  Height: 5' 4" (162.6 cm)  Height (inches): 64 in  Weight Method: Bed Scale  Weight: 69.1 kg 1/31, 71.5 kg 1/22, 70.3 kg 1/24, 69 kg (admission)  Weight (lb): 152 lb ( edema)  Ideal Body Weight (IBW), Female: 120 lb  BMI (Calculated): 26.1 admission  81.6 kg 1/2020     Lab/Procedures/Meds  Pertinent Labs Reviewed: reviewed  BMP  Lab Results   Component Value Date     (L) 01/31/2022    K 3.6 01/31/2022    CL 92 (L) 01/31/2022    CO2 31 (H) 01/31/2022    BUN 18 01/31/2022    CREATININE 0.6 01/31/2022    CALCIUM 8.2 (L) 01/31/2022    ANIONGAP 10 01/31/2022    ESTGFRAFRICA >60 01/31/2022    EGFRNONAA >60 01/31/2022     POCT Glucose   Date Value Ref Range Status   01/31/2022 102 70 - 110 mg/dL Final "   01/31/2022 107 70 - 110 mg/dL Final   01/31/2022 139 (H) 70 - 110 mg/dL Final   01/30/2022 109 70 - 110 mg/dL Final   01/30/2022 116 (H) 70 - 110 mg/dL Final   01/30/2022 128 (H) 70 - 110 mg/dL Final   01/30/2022 124 (H) 70 - 110 mg/dL Final   01/29/2022 154 (H) 70 - 110 mg/dL Final   01/29/2022 161 (H) 70 - 110 mg/dL Final   01/29/2022 148 (H) 70 - 110 mg/dL Final   01/28/2022 172 (H) 70 - 110 mg/dL Final   01/28/2022 169 (H) 70 - 110 mg/dL Final     Lab Results   Component Value Date    CALCIUM 8.2 (L) 01/31/2022    PHOS 3.0 01/31/2022       Pertinent Medications Reviewed: reviewed  Statin, scopolamine, insulin, Kbicarb, KNaPhos     Estimated/Assessed Needs  Weight Used For Calorie Calculations: 68.9 kg (152 lb)  Energy Calorie Requirements (kcal): MSJ ( x 1.25-1.4) = 9904-7815 kcal  Energy Need Method: MSJ  Protein Requirements:  g/day based on 1.2-1.5 g protein /kg  Weight Used For Protein Calculations: 68.9 kg (152 lb)  Fluid Requirements (mL): 1 mL/kcal or per MD  Estimated Fluid Requirement Method: RDA Method  RDA Method (mL): 1450 ml  CHO Requirement: 160-195 g     Nutrition Prescription Ordered  Current Diet Order: TF + TPN above     Evaluation of Received Nutrient/Fluid Intake  Energy Calories Required:  exceeding needs  Protein Required: exceeding needs  Fluid Required: meeting needs  TPN @ 65 ml/hr + flushes via TF/free water  Tolerance: tolerating  % Intake of Estimated Energy Needs: >100%  % Meal Intake: NPO + TPN +TF     Nutrition Risk  Level of Risk/Frequency of Follow-up: moderate (2x weekly)      Monitor and Evaluation  Food and Nutrient Intake: enteral nutrition intake  Food and Nutrient Adminstration: enteral and parenteral nutrition administration  Knowledge/Beliefs/Attitudes: food and nutrition knowledge/skill  Physical Activity and Function: nutrition-related ADLs and IADLs  Anthropometric Measurements: weight change  Biochemical Data, Medical Tests and Procedures: electrolyte and  renal panel,gastrointestinal profile,glucose/endocrine profile  Nutrition-Focused Physical Findings: overall appearance,extremities, muscles and bones,skin      Nutrition Follow-Up  RD Follow-up?: Yes

## 2022-01-31 NOTE — PROGRESS NOTES
"  01/31/2022      Admit Date: 1/18/2022  Sandra Wilson  New Patient Consult    Chief Complaint   Patient presents with    Shortness of Breath    Vomiting     PEG tube        History of Present Illness:  Intubated.          From Dr Gaspar's hpi, ERP:Sandra Wilson is a 81 y.o. female who presents to the ED via EMS  with an onset of vomiting and SOB. EMS reported that upon examination patient was less responsive than usually but is able to answer questions. The report from the EMS upon arrival was an O2 sats in the 70's. EMS proceeded to give supplemental oxygen and the O2 sats raised up to 93% on 3 liters of O2 with  "junky" right upper lung sounds" and greenish diarrhea. She was vomiting en route. They also reported that the patient is a patient that has dislodged her PEG tubing many times. PMHx of HTN, CAD, OA, GERD, ctroke, Colon cancer, ovarian cancer, breast cancer.PSHx of Cardiac catheterization, colonoscopy.         Progress Note  PULMONARY    Admit Date: 1/18/2022 01/31/2022      SUBJECTIVE:     1/19 intubated, sedated,  1/20 no c/o intubated, sedated  1/21 no new c/o intubated  1/22 no c/o intubated   1/23 no  New c/o intubated  1/24 no new c/o  1/25 no new c/o  1/26 no new co  1/28- resp status stable on 5L oxymizer mask. Having loose BMs in diaper. Pt does not verbalize. Slight stridor but no distress, oxygening well  1/29- remains on oxymizer, resp status stable. Hypertensive to 170s-180s systolic  1/30- continues on oxymizer, no new issues  1/31 no new c/o      PFSH and Allergies reviewed.    OBJECTIVE:     Vitals (Most recent):  Vitals:    01/31/22 0500   BP: 136/61   Pulse: 72   Resp: 19   Temp:        Vitals (24 hour range):  Temp:  [97.9 °F (36.6 °C)-98.8 °F (37.1 °C)]   Pulse:  [66-87]   Resp:  [17-27]   BP: (109-171)/(55-74)   SpO2:  [94 %-100 %]       Intake/Output Summary (Last 24 hours) at 1/31/2022 0706  Last data filed at 1/31/2022 0637  Gross per 24 hour   Intake 1190.74 ml   Output " 1535 ml   Net -344.26 ml        Too value is ED when Physical Exam:  The patient's neuro status (alertness,orientation,cognitive function,motor skills,), pharyngeal exam (oral lesions, hygiene, abn dentition,), Neck (jvd,mass,thyroid,nodes in neck and above/below clavicle),RESPIRATORY(symmetry,effort,fremitus,percussion,auscultation),  Cor(rhythm,heart tones including gallops,perfusion,edema)ABD(distention,hepatic&splenomegaly,tenderness,masses), Skin(rash,cyanosis),Psyc(affect,judgement,).  Exam negative except for these pertinent findings:    No distress, verbalizes weakly,   Soft voice  No stridor  Trach scar  chest is symmetric, no distress, normal percussion, normal fremitus and good normal breath sounds    abd soft with PEG tube  No edema      Radiographs reviewed: view by direct vision    Results for orders placed during the hospital encounter of 01/18/22    X-Ray Chest 1 View    Narrative  EXAMINATION:  Mm and is is  XR CHEST 1 VIEW    CLINICAL HISTORY:  respiratory failure;    TECHNIQUE:  Single frontal view of the chest was performed.    COMPARISON:  Chest portable of January 18, 2022    FINDINGS:  An endotracheal tube ends in the trachea above the level the malorie.  An NG tube traverses the esophagus to the stomach.  A central line enters at the right neck and ends in the superior vena cava.  The cardiac size and contours within normal limits.  A loop recorder is noted in the left chest.  There is continued central right lung infiltrate and small infiltrate at the left lung base.  No pneumothorax is seen.    Impression  Continued intrapulmonary infiltrates right greater than left.  Endotracheal tube, NG tube and right central lines in position.      Electronically signed by: Mathew Yanez MD  Date:    01/18/2022  Time:    15:50  ]    Labs     Recent Labs   Lab 01/31/22  0258   WBC 13.31*   HGB 7.6*   HCT 23.0*        Recent Labs   Lab 01/31/22  0258   *   K 3.6   CL 92*   CO2 31*   BUN 18    CREATININE 0.6   GLU 94   CALCIUM 8.2*   MG 1.7   PHOS 3.0   AST 11   ALT 11   ALKPHOS 56   BILITOT 0.4   PROT 5.6*   ALBUMIN 2.2*     No results for input(s): PH, PCO2, PO2, HCO3 in the last 24 hours.  Microbiology Results (last 7 days)     Procedure Component Value Units Date/Time    Culture, Respiratory with Gram Stain [[id number]]  (Abnormal)  (Susceptibility) Collected: 01/26/22 0745    Order Status: Completed Specimen: Respiratory from Sputum Updated: 01/28/22 1316     Respiratory Culture No S aureus or Pseudomonas isolated.      PROTEUS MIRABILIS ESBL  Few        SERRATIA MARCESCENS  Moderate       Gram Stain (Respiratory) <10 epithelial cells per low power field.     Gram Stain (Respiratory) Rare WBC's     Gram Stain (Respiratory) No organisms seen        Echo   Summary    · Mild concentric hypertrophy and normal systolic function.  · Mild-to-moderate mitral regurgitation.  · Mild tricuspid regurgitation.  · Mild pulmonic regurgitation.  · The estimated ejection fraction is 55%.  · Indeterminate left ventricular diastolic function.  · Normal right ventricular size with normal right ventricular systolic function.  · Mild left atrial enlargement.  · There is moderate-to-severe aortic valve stenosis.  · Aortic valve area is cm2; peak velocity is 3.42 m/s; mean gradient is 32 mmHg. Peak gradient 47mmHg  · Mild aortic regurgitation.  · Normal central venous pressure (3 mmHg).  · The estimated PA systolic pressure is 32 mmHg.  · There is no pulmonary hypertension.  · Moderate to severe AS. 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]  Not Applicable    ARDS (adult respiratory distress syndrome) [J80]  Yes    YARI (generalized anxiety disorder) [F41.1]  Yes    Type 2 diabetes mellitus, with long-term current use of insulin [E11.9, Z79.4]  Not  Applicable    Pneumonia [J18.9]  Yes    Aspiration into airway [T17.908A]  Yes    Hypothyroidism [E03.9]  Yes    Hyperlipidemia [E78.5]  Yes     Chronic    GERD (gastroesophageal reflux disease) [K21.9]  Yes     Chronic      Resolved Hospital Problems    Diagnosis Date Resolved POA    Ileus [K56.7] 01/23/2022 No    Demand ischemia [I24.8] 01/24/2022 Yes    Sepsis [A41.9] 01/24/2022 Yes    STEFFANY (acute kidney injury) [N17.9] 01/21/2022 Yes    Hyponatremia [E87.1] 01/20/2022 Yes                        Plan: no fever, vss, was hypotensive earlier.  Cefepime/vanc,flagyl,  chr midodrine, levophed 0.9 ,  ddimer 5.2, creat 0.9  c diff ag + but toxin neg.  abg 7.27 with ox 48,   Dnr,  cxr impressive R>>>L aspiration pattern.     Prior subdural --       Recruitment manuver done with peep 30 for 6 seconds with art line bp falling from sbp 100 to 60's.  Sat improved from 92 to 94.  Pt not really peep responsive.    Pt should improve ox with left side down or prone.  Peep may help but infiltrates more R>L -- should be posterior lung bases.  Might consider cta if not progressing.      discussed with resp and nursing. Optimally would be left side down or prone.        1/19 no fever, vss with rr to 43, flagyl/zosyn/vanc/cefepime, n70-80% ox,  Wbc 14.8 from 5k,  Creat 1.5 form 0.9,   abg 02 61 with ph 7.33- peep 5,   I/o 687/133-  Will screen for dvt/pe with leg study  Will dose 500 cc saline,   F/u cxr more opacification right>> left lung.      1/20 no fever, vss, bp low at times, I.o 2770/1870, cefepime/flagyl/vanc, ox 60%, wbc 14.6, plts 144- new, creat 1.0,. cxr with some clearing,   F/u abg, wean soon likely but need better ox to extubate.    No dvt, ddimer up at admit,     Pt min vol 8 and seemed to do well with 50% ox while I rounded.  Discussed with nursing and respiratory-- if can get to 40% and pass wean trial would recommend extubation, would be optimal to discuss with family prior plans not to re intubate.  She  is progressing-- would consider not discussing comfort care if improving.      1/21 no fever, vss, tm 100, wbc 15.2, hgb 8, from 13.6 bon 18th- admit, plt 130 from 244,   Creat 0.7,   cxr progressive clearing right , some increase opacification left suggested.   Strep in sputum,  Taper abx to doxy and flagy with c diff and strep in mucous-- monitor, check    procal am.    Will change code status to allow re intubation, and  extubate this am.      On enteral vanc for c diff concerns.   Discussed with January short.  Reviewed revised code status.      Addendum-- having stridor post extubation.  Pt had aneurysm last summer with trach placed at [facility].  Pt was at [place] when patient removed trach 2 months ago.  Pt has no h/o stridor per daughter.  Stridor did not change with jaw thrush nor nt trumpet.  Discussed would recommend intubation and replace trach as best option. Discussed with Dr Charles.  1/22 no fever,vss, wbc 16.3, hgb 8.1 from 13.6 admit?  , procal is down to 3 from 17 admit,     Pt failed extubation with upper airway stridor and poor loc.      Do leak test:on vent with cuff down and tidal vol delivered 450 would need over 110 cc to leak to pass, need exhaled tidal vol to be less than 340 cc to  pass leak test -- do now and repeat q 4 x 3 total.  Will dose steroids for laryngospasm.  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  Will discuss with family later- trial extubation if passes, trach, comfort care???      Discussed with daughter, asked if code status should be altered?  After discussion - will consider extubation trial if good leak test (would try to notify daughter if passes) --- otherwise trach.      1/23 failed leak test, suspect trach stenosis - trach Wednesday?  No fever, vss, doxy/flagyl, vanc enteral, wbc 14.3,   F/u cxr am  Called Ira, daughter.  Discussed need for Palliative care eval.      1/24 no fever, vss, doxy/flagyl/vanc/vanc iv, wbc 15.5,   cxr patchy fluffy  infiltrates R>L    No change in plan, defer to palliative care.  Could do trial extubation but likely to fail. Could bronch when tube pulled?      1/25 no fever, vss, doxy/flagyl,vanc iv/enteral, wbc 17.5 hgb 7,7 from 13.6 admit, day 8  abx    Pt seems to have good leak today.  Will have resp quantify, do wean trial, decrease sedation, and consider trial extubation if does well.  1/26 no fever, vss, wbc 19.1, check sputum, good leak test and weaned well.  Loc Is very good for extubation.     Has  shunt for yrs with no problems appreciated.    Loc very good.    Discussed with daughter.    Re evaluated post extubation.  The patient can speak well.  There is no stridor.  There is no respiratory distress.  Patient perhaps is a little slow.    Called and discussed case again with daughter.  Might consider bronchoscopy later on but would not  at all at this time.  Case discussed with .        The patient had severe pharyngeal dysphagia with aspiration of thin liquids in julius aspiration of nectar thick liquids in December 2021 by speech therapy evaluation.  Patient had an ineffective cough during aspiration.    Would recommend prolonged NPO.  Tube feedings the for would be adequate.      1/27 no fever, vss, doxy/flagyl/vanc iv/po, grew strep/kleb (no sensitivity)/mrsa from sputumon 18th.  7 days iv vanc, has c diff toxin neg on enteral vanc,  On flagyl and doxy-- should be adequate for pneumonia.  3 lpm, wbc 22.6,     F/u cxr-- stable infiltrates as would be expected.    Some upper airway noise.  Not bad enough to trach.  Would consider bronch to inspect airway in a few days.  If worsens--intubation and trach would be recommended.    Will dose scopolamine patch consider resume robinul.  Will have robinul available for prn  Use.     Discussed with daughter, Ira.  Could be just aspiration/laryngospasm issues as tracheal problem should not have progressed to extubation after good leak test.       Above from Dr Gao and below from Dr Gaspar:    - continue supplemental O2 via oximizer   - stable to improving upper airway rhonchi- monitor  - suction prn  - scopolamine patch   - continue antibiotics  - tube feedings via PEG  - rectal tube for diarrhea    Above from Dr Gaspar and below from Dr Gao:  1/31 no fever, vss, doxy/vanc for mrsa in sputum, also flagyl, 40% ox, wbc 13, hgb 7.6,     F/u cxr am  Taper abx? Will stop doxy with diarrhea.  On c diff prevention-- iv vanc for mrsa on sputum culture--had mrsa grow also.    Return to [place]?  Called daughter, jennifer, and discussed.  Could go back to [facility] soon.

## 2022-01-31 NOTE — PLAN OF CARE
Problem: Adult Inpatient Plan of Care  Goal: Plan of Care Review  Outcome: Ongoing, Progressing  Goal: Patient-Specific Goal (Individualized)  Outcome: Ongoing, Progressing  Goal: Absence of Hospital-Acquired Illness or Injury  Outcome: Ongoing, Progressing  Goal: Optimal Comfort and Wellbeing  Outcome: Ongoing, Progressing  Goal: Readiness for Transition of Care  Outcome: Ongoing, Progressing     Problem: Infection  Goal: Absence of Infection Signs and Symptoms  Outcome: Ongoing, Progressing     Problem: Diabetes Comorbidity  Goal: Blood Glucose Level Within Targeted Range  Outcome: Ongoing, Progressing     Problem: Fluid Imbalance (Pneumonia)  Goal: Fluid Balance  Outcome: Ongoing, Progressing     Problem: Infection (Pneumonia)  Goal: Resolution of Infection Signs and Symptoms  Outcome: Ongoing, Progressing     Problem: Respiratory Compromise (Pneumonia)  Goal: Effective Oxygenation and Ventilation  Outcome: Ongoing, Progressing     Problem: Fall Injury Risk  Goal: Absence of Fall and Fall-Related Injury  Outcome: Ongoing, Progressing     Problem: Skin Injury Risk Increased  Goal: Skin Health and Integrity  Outcome: Ongoing, Progressing     Problem: Coping Ineffective  Goal: Effective Coping  Outcome: Ongoing, Progressing     Problem: Adjustment to Illness (Sepsis/Septic Shock)  Goal: Optimal Coping  Outcome: Ongoing, Progressing     Problem: Bleeding (Sepsis/Septic Shock)  Goal: Absence of Bleeding  Outcome: Ongoing, Progressing     Problem: Glycemic Control Impaired (Sepsis/Septic Shock)  Goal: Blood Glucose Level Within Desired Range  Outcome: Ongoing, Progressing     Problem: Infection Progression (Sepsis/Septic Shock)  Goal: Absence of Infection Signs and Symptoms  Outcome: Ongoing, Progressing     Problem: Nutrition Impaired (Sepsis/Septic Shock)  Goal: Optimal Nutrition Intake  Outcome: Ongoing, Progressing     Problem: Fluid and Electrolyte Imbalance (Acute Kidney Injury/Impairment)  Goal: Fluid and  Electrolyte Balance  Outcome: Ongoing, Progressing     Problem: Oral Intake Inadequate (Acute Kidney Injury/Impairment)  Goal: Optimal Nutrition Intake  Outcome: Ongoing, Progressing     Problem: Renal Function Impairment (Acute Kidney Injury/Impairment)  Goal: Effective Renal Function  Outcome: Ongoing, Progressing     Problem: ARDS (Acute Respiratory Distress Syndrome)  Goal: Effective Oxygenation  Outcome: Ongoing, Progressing     Problem: Oral Intake Inadequate  Goal: Improved Oral Intake  Outcome: Ongoing, Progressing     Problem: Impaired Wound Healing  Goal: Optimal Wound Healing  Outcome: Ongoing, Progressing      Patient remains on oxymask and tolerating well. Patient continues to have thick secretions that she is unable to cough up due to weak cough. Patient requires NT suctioning to remove secretions. O2 sats stable. Patient disoriented x3. Continues to have diarrhea, flexiseal in place 300 mL of liquid stool noted. Holding miralax and senna for now. Purewick in place and draining well. Adequate output noted. Tube feedings continued as ordered, BS monitoring and management. HR stable tonight, patient had converted back to NSR at initial assessment. No acute events overnight. VS and assessment per flow sheet, patient progressing towards goals as tolerated, plan of care reviewed with Sandra Wilson, will continue to monitor.

## 2022-01-31 NOTE — PLAN OF CARE
Care Plan    POC reviewed with Sandra Wilson and family. Questions and concerns addressed. No acute events today. Pt progressing toward goals. Will continue to monitor. See below and flowsheets for full assessment and VS info.       Neuro:  [place] Coma Scale  Best Eye Response: 4-->(E4) spontaneous  Best Motor Response: 6-->(M6) obeys commands  Best Verbal Response: 5-->(V5) oriented  [place] Coma Scale Score: 15  Assessment Qualifiers: patient not sedated/intubated,no eye obstruction present  Pupil PERRLA: yes  24 hr Temp:  [97.9 °F (36.6 °C)-99.3 °F (37.4 °C)]      CV:  Rhythm: atrial rhythm  DVT prophylaxis: VTE Required Core Measure: (SCDs) Sequential compression device initiated/maintained,Pharmacological prophylaxis initiated/maintained    Resp:  O2 Device (Oxygen Therapy): Oxymask  Vent Mode: SPONT-PS  Set Rate: 8 BPM  Oxygen Concentration (%): 40  Vt Set: 450 mL  PEEP/CPAP: 1.9 cmH20  Pressure Support: 10 cmH20    GI/:  GI prophylaxis: yes  Diet/Nutrition Received: TPN (total parenteral nutrition),tube feeding  Last Bowel Movement: 01/29/22  Voiding Characteristics: external catheter   Intake/Output Summary (Last 24 hours) at 1/30/2022 1844  Last data filed at 1/30/2022 1800  Gross per 24 hour   Intake 1984.43 ml   Output 1775 ml   Net 209.43 ml       Labs/Accuchecks:  Recent Labs   Lab 01/30/22  0245   WBC 15.27*   RBC 2.59*   HGB 8.4*   HCT 25.4*         Recent Labs   Lab 01/30/22  0245   *   K 3.8   CO2 31*   CL 91*   BUN 18   CREATININE 0.6   ALKPHOS 60   ALT 12   AST 16   BILITOT 0.5    No results for input(s): PROTIME, INR, APTT, HEPANTIXA in the last 168 hours. No results for input(s): CPK, CPKMB, TROPONINI, MB in the last 168 hours.    Electrolytes: N/A - electrolytes WDL  Accuchecks: Q4H    Gtts/LDAs:   sodium chloride 0.9% 5 mL/hr at 01/30/22 1800    Amino acid 5% - dextrose 15% (CLINIMIX-E) solution with additives.  CENTRAL LINE ONLY (1395 mOsm/L) Stopped (01/30/22 0719)  "      Lines/Drains/Airways       Central Venous Catheter Line              Percutaneous Central Line Insertion/Assessment - Triple Lumen  01/18/22 1400 right internal jugular 12 days              Drain                   Gastrostomy/Enterostomy Percutaneous endoscopic gastrostomy (PEG) LUQ -- days    Female External Urinary Catheter 01/26/22 1601 4 days         Rectal Tube 01/28/22 1200 rectal tube w/ balloon (indicate number of mLs) 2 days                    Skin/Wounds  Bathing/Skin Care: back care;bath, complete;dressed/undressed;foot care;incontinence care;linen changed (01/30/22 0300)  Wounds: No  Wound care consulted: No    Consults  Consults (From admission, onward)          Status Ordering Provider     Inpatient consult to Registered Dietitian/Nutritionist  Once        Provider:  (Not yet assigned)    Completed MELISSA PINTO     Pharmacy to dose Vancomycin consult  Once        Provider:  (Not yet assigned)   "And" Linked Group Details    Acknowledged DONNA CONTEH     Inpatient consult to General Surgery  Once        Provider:  Abdulkadir Marrufo MD    Completed JAYCE ARITA     Inpatient consult to Registered Dietitian/Nutritionist  Once        Provider:  (Not yet assigned)    Completed DONNA CONTEH     Inpatient consult to Palliative Care  Once        Provider:  Catracho Crabtree MD    Completed DONNA CONTEH     Inpatient consult to Pulmonology  Once        Provider:  Jayce Arita MD    Completed DONNA CONTEH           "

## 2022-01-31 NOTE — PROGRESS NOTES
[facility] Hospital Medicine  Progress Note    Patient Name: Sandra Wilson  MRN: [id number]  Patient Class: IP- Inpatient   Admission Date: 1/18/2022  Length of Stay: 13 days  Attending Physician: Neda Porter MD  Primary Care Provider: Primary Doctor No        Subjective:     Principal Problem:Acute hypoxemic respiratory failure        HPI:  Sandra Wilson is an 81 year old female with a past medical history of CVAs, CNS aneurysm, PEG status, CAD, HLD, hypothryoidism, DM, aortic stenosis, and colon, breast, and ovarian cancer who presented from long term care with vomiting, diarrhea, and shortness of breath. Workup in the ED showed likely aspiration pneumonitis and C. Diff colitis. Her O2 requirement increased while in the ED requiring intubation with sedation. She also required Levophed as she likely developed septic shock. Antibiotics were broadened to cefepime, vancomycin, and Flagyl. Pulmonary was consulted. Family was notified. The patient was unable to provide history given acuity of illness.      Overview/Hospital Course:  Sandra Wilson is an 81 year old female with a past medical history of CVAs, CNS aneurysm, PEG status, CAD, HLD, hypothyroidism, DM, aortic stenosis, and colon, breast, and ovarian cancer who presented from long term care with vomiting, diarrhea, and shortness of breath secondary to acute hypoxic respiratory failure from aspiration pneumonia in the setting of C diff colitis leading to septic shock. She was intubated in the ED and started on Levophed infusion via RIJ CVC placed by Dr. Vignesh Gaspar. She was started on broad spectrum antibiotics with cefepime, Flagyl, vancomycin and admitted to the ICU. She was also started on enteral vancomycin for severe C diff colitis given elevated WBC count and STEFFANY. There was concern for developing ARDS given P/F ratio of 87 (severe); 190 1/22 (moderate). Pulmonary was consulted. She also presented with demand ischemia likely  secondary to septic shock. Troponin was trended and improved as shock resolved. Normal saline infusion was added for hyponatremia and STEFFANY which improved both. Levophed was able to be weaned. Family agreed to DNR status 1/18 and Palliative Care was consulted to discuss goal of care 1/19. Antibiotics were changed to doxycycline and Flagyl (GBS in sputum culture) 1/21; vancomycin was added 1/23 after culture also became positive for Staph aureus and Klebsiella. Her respiratory status has improved throughout her course and she was extubated (code changed to partial code for intubation) 1/21 only to be re-intubated for worsening stridor (history of tracheostomy). Upon re-intubation, copious amounts of secretions were suctioned. KUB suggested a mild ileus A bowel regimen was instituted and the patient was able to have bowel movements 1/22. Tube feeds were started 1/23. Her course was complicated by Afib as well noted on telemetry 1/21; metoprolol tartrate started 1/22.She failed a leak test 1/22; Surgery was consulted for tracheostomy which is pending conversation with family regarding goals of care.    Patient was set to go for trach on 01/26, but attempt was made for another trial extubation and the patient did well after extubation, she was transition to face mask and was able to maintain her saturations although had significant difficulty with secretions.  She was started on Robinul and scopolamine patch which did seem to improve for secretions.  Her respiratory status continued to be somewhat tenuous, so she was monitored in the ICU.      Interval History: No acute events overnight continues to be on Oxy mask 5 L. , white count 13 H&H 7.6 and 23   Tube feeding at the rate of 40 tolerating it without any issues.     Review of Systems   Unable to perform ROS: Dementia     Objective:     Vital Signs (Most Recent):  Temp: 97.9 °F (36.6 °C) (01/31/22 1130)  Pulse: 72 (01/31/22 1526)  Resp: 14 (01/31/22 1526)  BP: (!)  144/66 (01/31/22 1430)  SpO2: 98 % (01/31/22 1526) Vital Signs (24h Range):  Temp:  [97.9 °F (36.6 °C)-98.8 °F (37.1 °C)] 97.9 °F (36.6 °C)  Pulse:  [63-87] 72  Resp:  [14-27] 14  SpO2:  [94 %-100 %] 98 %  BP: (101-171)/(54-74) 144/66     Weight: 69.1 kg (152 lb 5.4 oz)  Body mass index is 26.15 kg/m².    Intake/Output Summary (Last 24 hours) at 1/31/2022 1655  Last data filed at 1/31/2022 1239  Gross per 24 hour   Intake 780.66 ml   Output 1535 ml   Net -754.34 ml      Physical Exam  Vitals and nursing note reviewed.   Constitutional:       General: She is not in acute distress.     Appearance: She is ill-appearing.   HENT:      Head: Normocephalic and atraumatic.      Mouth/Throat:      Mouth: Mucous membranes are moist.   Eyes:      Pupils: Pupils are equal, round, and reactive to light.   Cardiovascular:      Rate and Rhythm: Normal rate and regular rhythm.      Heart sounds: No murmur heard.      Pulmonary:      Effort: No respiratory distress.      Breath sounds: Normal breath sounds. No wheezing or rhonchi.   Abdominal:      General: There is no distension.      Palpations: Abdomen is soft.      Tenderness: There is no abdominal tenderness.   Skin:     Coloration: Skin is not pale.      Findings: No rash.   Neurological:      Mental Status: She is alert. She is disoriented.      Cranial Nerves: No cranial nerve deficit.         Significant Labs:   All pertinent labs within the past 24 hours have been reviewed.  BMP:   Recent Labs   Lab 01/31/22  0258   GLU 94   *   K 3.6   CL 92*   CO2 31*   BUN 18   CREATININE 0.6   CALCIUM 8.2*   MG 1.7     CBC:   Recent Labs   Lab 01/30/22  0245 01/31/22  0258   WBC 15.27* 13.31*   HGB 8.4* 7.6*   HCT 25.4* 23.0*    327       Significant Imaging: I have reviewed all pertinent imaging results/findings within the past 24 hours.      Assessment/Plan:      * Acute hypoxemic respiratory failure  Patient with Hypoxic Respiratory failure which is Acute on chronic.   "she is not on home oxygen. Supplemental oxygen was provided and noted- Oxygen Concentration (%):  [40] 40.   Signs/symptoms of respiratory failure include- tachypnea, increased work of breathing and respiratory distress. Contributing diagnoses includes - COPD and Pneumonia Labs and images were reviewed. Patient Has recent ABG, which has been reviewed. Will treat underlying causes and adjust management of respiratory failure as follows- Secondary to aspiration pneumonia. GBS, Klebsiella and Staph aureus in sputum.  Continue antibiotics, will monitor patient closely high risk for re-intubation.  If she is reintubated, will likely proceed with tracheostomy.           Atrial fibrillation  -Telemetry  -Metoprolol 5 IV PRN for HR > 130  -Defer full dose anticoagulation at this time given thrombocytopenia and critical illness  -Metoprolol 25 BID      ARDS (adult respiratory distress syndrome)  -Pulmonary consulted  -Treat aspiration pneumonia and sepsis      Gastrostomy tube dependent  Will keep advancing tube feeding currently tolerating tube feeds      Aspiration into airway  See above under aspiration pneumonia    Pneumonia  Patient with current diagnosis of pneumonia due to bacterial etiology due to  MRSA, Haemophilus and GBS which is uncontrolled due to persistent hypoxia . I have reviewed the pertinent imaging. Current antimicrobial regimen consists of   Antibiotics (From admission, onward)            Start     Stop Route Frequency Ordered    01/30/22 2200  vancomycin 1.5 g in dextrose 5 % 250 mL IVPB (ready to mix)         -- IV Every 24 hours (non-standard times) 01/30/22 1032    01/23/22 1047  vancomycin - pharmacy to dose  (vancomycin IVPB)        "And" Linked Group Details    -- IV pharmacy to manage frequency 01/23/22 0947    01/18/22 1545  metronidazole IVPB 500 mg         -- IV Every 8 hours (non-standard times) 01/18/22 1441      . Cultures drawn and noted-   Microbiology Results (last 7 days)     Procedure " Component Value Units Date/Time    Culture, Respiratory with Gram Stain [[id number]]  (Abnormal)  (Susceptibility) Collected: 01/26/22 0745    Order Status: Completed Specimen: Respiratory from Sputum Updated: 01/28/22 1316     Respiratory Culture No S aureus or Pseudomonas isolated.      PROTEUS MIRABILIS ESBL  Few        SERRATIA MARCESCENS  Moderate       Gram Stain (Respiratory) <10 epithelial cells per low power field.     Gram Stain (Respiratory) Rare WBC's     Gram Stain (Respiratory) No organisms seen       Will monitor patient closely and continue current treatment plan unchanged.        Type 2 diabetes mellitus, with long-term current use of insulin  Patient's FSGs are controlled on current medication regimen.  Most recent fingerstick glucose reviewed-   Recent Labs   Lab 01/27/22  1837 01/27/22  2139 01/28/22  0528 01/28/22  1215   POCTGLUCOSE 162* 178* 163* 197*     Current correctional scale  Medium  Maintain anti-hyperglycemic dose as follows-   Antihyperglycemics (From admission, onward)            Start     Stop Route Frequency Ordered    01/18/22 1438  insulin aspart U-100 pen 1-10 Units         -- SubQ Before meals & nightly PRN 01/18/22 1438        Hold Oral hypoglycemics while patient is in the hospital.        YARI (generalized anxiety disorder)  Extubated on 01/26.  Use Precedex as needed to control anxiety.  Patient does not appear anxious on exam.      Hypothyroidism  Patient has chronic hypothyroidism. TFTs reviewed-   Lab Results   Component Value Date    TSH 1.130 07/13/2017   . Will continue chronic levothyroxine and adjust for and clinical changes.        Hyperlipidemia   Patient is chronically on statin.will continue for now. Monitor clinically. Last LDL was   Lab Results   Component Value Date    LDLCALC 94.4 07/13/2017          GERD (gastroesophageal reflux disease)  -Hold PPI given C diff colitis  -IV famotidine        VTE Risk Mitigation (From admission, onward)         Ordered      enoxaparin injection 40 mg  Daily         01/18/22 1438     IP VTE HIGH RISK PATIENT  Once         01/18/22 1438     Place sequential compression device  Until discontinued         01/18/22 1438                Discharge Planning   DEMETRIUS:      Code Status: Partial Code   Is the patient medically ready for discharge?:     Reason for patient still in hospital (select all that apply): Patient trending condition and Treatment  Discharge Plan A: Return to nursing home            Critical care time spent on the evaluation and treatment of severe organ dysfunction, review of pertinent labs and imaging studies, discussions with consulting providers and discussions with patient/family: 60 minutes.      Neda Porter MD  Department of Hospital Medicine   [facility]

## 2022-01-31 NOTE — PLAN OF CARE
Intervention: enteral and parenteral nutrition therapy     Recommendations  1) Continue TF Isosource 1.5 @ 20 mL/hr and advance to goal rate of 45 mL/hr as pt tolerates + Brody BID and Beneprotein BID + 115 ml flush q 4 hr  --Will provide 1620 kcal, 73 g protein ( + beneprotein), 820 ml free water  (100% EEN, 100% EPN )    2) If unable to advance TF to at least 30 ml/hr, continue TPN D 15 AA 5 @ 65 ml/hr + lipids ( 78 ( 100% EPN), 1607 kcal (100% EEN)      3) weigh weekly, SLP consult if appropriate     Goals: 1) Initation of enteral nutrition by RD follow up 2) Nutrition support meeting > 50% of needs at f/u  Nutrition Goal Status: met/ not meeting-continues  Communication of RD Recs: POC, sticky note, reviewed with MD/RN

## 2022-01-31 NOTE — CARE UPDATE
01/31/22 1239   Patient Assessment/Suction   Level of Consciousness (AVPU) alert   Respiratory Effort Unlabored   Expansion/Accessory Muscles/Retractions no use of accessory muscles   All Lung Fields Breath Sounds diminished   PRE-TX-O2   O2 Device (Oxygen Therapy) Oxymask   $ Is the patient on Low Flow Oxygen? Yes   Flow (L/min) 3   SpO2 98 %   Pulse Oximetry Type Continuous   $ Pulse Oximetry - Multiple Charge Pulse Oximetry - Multiple   Pulse 68   Resp 18

## 2022-02-01 PROBLEM — I48.0 PAROXYSMAL ATRIAL FIBRILLATION: Status: ACTIVE | Noted: 2022-01-21

## 2022-02-01 PROBLEM — D64.9 ANEMIA: Status: ACTIVE | Noted: 2022-02-01

## 2022-02-01 PROBLEM — J80 ARDS (ADULT RESPIRATORY DISTRESS SYNDROME): Status: RESOLVED | Noted: 2022-01-19 | Resolved: 2022-02-01

## 2022-02-01 LAB
ALBUMIN SERPL BCP-MCNC: 2.1 G/DL (ref 3.5–5.2)
ALP SERPL-CCNC: 56 U/L (ref 55–135)
ALT SERPL W/O P-5'-P-CCNC: 14 U/L (ref 10–44)
ANION GAP SERPL CALC-SCNC: 7 MMOL/L (ref 8–16)
AST SERPL-CCNC: 13 U/L (ref 10–40)
BASOPHILS # BLD AUTO: 0.04 K/UL (ref 0–0.2)
BASOPHILS NFR BLD: 0.4 % (ref 0–1.9)
BILIRUB SERPL-MCNC: 0.3 MG/DL (ref 0.1–1)
BUN SERPL-MCNC: 16 MG/DL (ref 8–23)
CALCIUM SERPL-MCNC: 8.2 MG/DL (ref 8.7–10.5)
CHLORIDE SERPL-SCNC: 95 MMOL/L (ref 95–110)
CO2 SERPL-SCNC: 33 MMOL/L (ref 23–29)
CREAT SERPL-MCNC: 0.6 MG/DL (ref 0.5–1.4)
DIFFERENTIAL METHOD: ABNORMAL
EOSINOPHIL # BLD AUTO: 0.1 K/UL (ref 0–0.5)
EOSINOPHIL NFR BLD: 1 % (ref 0–8)
ERYTHROCYTE [DISTWIDTH] IN BLOOD BY AUTOMATED COUNT: 14.3 % (ref 11.5–14.5)
EST. GFR  (AFRICAN AMERICAN): >60 ML/MIN/1.73 M^2
EST. GFR  (NON AFRICAN AMERICAN): >60 ML/MIN/1.73 M^2
GLUCOSE SERPL-MCNC: 112 MG/DL (ref 70–110)
HCT VFR BLD AUTO: 22.6 % (ref 37–48.5)
HGB BLD-MCNC: 7.3 G/DL (ref 12–16)
IMM GRANULOCYTES # BLD AUTO: 0.05 K/UL (ref 0–0.04)
IMM GRANULOCYTES NFR BLD AUTO: 0.4 % (ref 0–0.5)
LYMPHOCYTES # BLD AUTO: 1.1 K/UL (ref 1–4.8)
LYMPHOCYTES NFR BLD: 9.8 % (ref 18–48)
MAGNESIUM SERPL-MCNC: 1.7 MG/DL (ref 1.6–2.6)
MCH RBC QN AUTO: 32 PG (ref 27–31)
MCHC RBC AUTO-ENTMCNC: 32.3 G/DL (ref 32–36)
MCV RBC AUTO: 99 FL (ref 82–98)
MONOCYTES # BLD AUTO: 1.3 K/UL (ref 0.3–1)
MONOCYTES NFR BLD: 11.1 % (ref 4–15)
NEUTROPHILS # BLD AUTO: 8.8 K/UL (ref 1.8–7.7)
NEUTROPHILS NFR BLD: 77.3 % (ref 38–73)
NRBC BLD-RTO: 0 /100 WBC
PHOSPHATE SERPL-MCNC: 2.8 MG/DL (ref 2.7–4.5)
PLATELET # BLD AUTO: 327 K/UL (ref 150–450)
PMV BLD AUTO: 10.9 FL (ref 9.2–12.9)
POCT GLUCOSE: 111 MG/DL (ref 70–110)
POCT GLUCOSE: 124 MG/DL (ref 70–110)
POTASSIUM SERPL-SCNC: 3.8 MMOL/L (ref 3.5–5.1)
PROT SERPL-MCNC: 5.6 G/DL (ref 6–8.4)
RBC # BLD AUTO: 2.28 M/UL (ref 4–5.4)
SODIUM SERPL-SCNC: 135 MMOL/L (ref 136–145)
WBC # BLD AUTO: 11.4 K/UL (ref 3.9–12.7)

## 2022-02-01 PROCEDURE — 27000207 HC ISOLATION

## 2022-02-01 PROCEDURE — 25000003 PHARM REV CODE 250: Performed by: STUDENT IN AN ORGANIZED HEALTH CARE EDUCATION/TRAINING PROGRAM

## 2022-02-01 PROCEDURE — 99233 SBSQ HOSP IP/OBS HIGH 50: CPT | Mod: ,,, | Performed by: INTERNAL MEDICINE

## 2022-02-01 PROCEDURE — 25000003 PHARM REV CODE 250: Performed by: HOSPITALIST

## 2022-02-01 PROCEDURE — 20000000 HC ICU ROOM

## 2022-02-01 PROCEDURE — 63600175 PHARM REV CODE 636 W HCPCS: Performed by: STUDENT IN AN ORGANIZED HEALTH CARE EDUCATION/TRAINING PROGRAM

## 2022-02-01 PROCEDURE — 94761 N-INVAS EAR/PLS OXIMETRY MLT: CPT

## 2022-02-01 PROCEDURE — 63600175 PHARM REV CODE 636 W HCPCS: Performed by: NURSE PRACTITIONER

## 2022-02-01 PROCEDURE — S0030 INJECTION, METRONIDAZOLE: HCPCS | Performed by: STUDENT IN AN ORGANIZED HEALTH CARE EDUCATION/TRAINING PROGRAM

## 2022-02-01 PROCEDURE — 27000221 HC OXYGEN, UP TO 24 HOURS

## 2022-02-01 PROCEDURE — 83735 ASSAY OF MAGNESIUM: CPT | Performed by: STUDENT IN AN ORGANIZED HEALTH CARE EDUCATION/TRAINING PROGRAM

## 2022-02-01 PROCEDURE — 84100 ASSAY OF PHOSPHORUS: CPT | Performed by: STUDENT IN AN ORGANIZED HEALTH CARE EDUCATION/TRAINING PROGRAM

## 2022-02-01 PROCEDURE — 85025 COMPLETE CBC W/AUTO DIFF WBC: CPT | Performed by: STUDENT IN AN ORGANIZED HEALTH CARE EDUCATION/TRAINING PROGRAM

## 2022-02-01 PROCEDURE — 99900026 HC AIRWAY MAINTENANCE (STAT)

## 2022-02-01 PROCEDURE — 99233 PR SUBSEQUENT HOSPITAL CARE,LEVL III: ICD-10-PCS | Mod: ,,, | Performed by: INTERNAL MEDICINE

## 2022-02-01 PROCEDURE — 25000003 PHARM REV CODE 250: Performed by: INTERNAL MEDICINE

## 2022-02-01 PROCEDURE — 31720 CLEARANCE OF AIRWAYS: CPT

## 2022-02-01 PROCEDURE — 80053 COMPREHEN METABOLIC PANEL: CPT | Performed by: STUDENT IN AN ORGANIZED HEALTH CARE EDUCATION/TRAINING PROGRAM

## 2022-02-01 RX ORDER — GLYCOPYRROLATE 1 MG/1
1 TABLET ORAL 3 TIMES DAILY
Status: DISCONTINUED | OUTPATIENT
Start: 2022-02-01 | End: 2022-02-26

## 2022-02-01 RX ADMIN — CITALOPRAM HYDROBROMIDE 40 MG: 10 TABLET ORAL at 08:02

## 2022-02-01 RX ADMIN — METRONIDAZOLE 500 MG: 500 INJECTION, SOLUTION INTRAVENOUS at 06:02

## 2022-02-01 RX ADMIN — LEVOTHYROXINE SODIUM 25 MCG: 0.03 TABLET ORAL at 05:02

## 2022-02-01 RX ADMIN — GLYCOPYRROLATE 1 MG: 1 TABLET ORAL at 09:02

## 2022-02-01 RX ADMIN — METRONIDAZOLE 500 MG: 500 INJECTION, SOLUTION INTRAVENOUS at 12:02

## 2022-02-01 RX ADMIN — MIDODRINE HYDROCHLORIDE 5 MG: 5 TABLET ORAL at 09:02

## 2022-02-01 RX ADMIN — METOPROLOL TARTRATE 12.5 MG: 25 TABLET, FILM COATED ORAL at 08:02

## 2022-02-01 RX ADMIN — ALTEPLASE 2 MG: 2.2 INJECTION, POWDER, LYOPHILIZED, FOR SOLUTION INTRAVENOUS at 11:02

## 2022-02-01 RX ADMIN — MIDODRINE HYDROCHLORIDE 5 MG: 5 TABLET ORAL at 08:02

## 2022-02-01 RX ADMIN — FAMOTIDINE 20 MG: 10 INJECTION INTRAVENOUS at 09:02

## 2022-02-01 RX ADMIN — METRONIDAZOLE 500 MG: 500 INJECTION, SOLUTION INTRAVENOUS at 11:02

## 2022-02-01 RX ADMIN — METOPROLOL TARTRATE 12.5 MG: 25 TABLET, FILM COATED ORAL at 09:02

## 2022-02-01 RX ADMIN — METRONIDAZOLE 500 MG: 500 INJECTION, SOLUTION INTRAVENOUS at 08:02

## 2022-02-01 RX ADMIN — FAMOTIDINE 20 MG: 10 INJECTION INTRAVENOUS at 08:02

## 2022-02-01 RX ADMIN — ATORVASTATIN CALCIUM 40 MG: 40 TABLET, FILM COATED ORAL at 09:02

## 2022-02-01 RX ADMIN — VANCOMYCIN HYDROCHLORIDE 1250 MG: 1.25 INJECTION, POWDER, LYOPHILIZED, FOR SOLUTION INTRAVENOUS at 05:02

## 2022-02-01 RX ADMIN — ENOXAPARIN SODIUM 40 MG: 100 INJECTION SUBCUTANEOUS at 06:02

## 2022-02-01 RX ADMIN — GLYCOPYRROLATE 1 MG: 1 TABLET ORAL at 02:02

## 2022-02-01 NOTE — PROGRESS NOTES
"  02/01/2022      Admit Date: 1/18/2022  Sandra Wilson  New Patient Consult    Chief Complaint   Patient presents with    Shortness of Breath    Vomiting     PEG tube        History of Present Illness:  Intubated.          From Dr Gaspar's hpi, ERP:Sandra Wilson is a 81 y.o. female who presents to the ED via EMS  with an onset of vomiting and SOB. EMS reported that upon examination patient was less responsive than usually but is able to answer questions. The report from the EMS upon arrival was an O2 sats in the 70's. EMS proceeded to give supplemental oxygen and the O2 sats raised up to 93% on 3 liters of O2 with  "junky" right upper lung sounds" and greenish diarrhea. She was vomiting en route. They also reported that the patient is a patient that has dislodged her PEG tubing many times. PMHx of HTN, CAD, OA, GERD, ctroke, Colon cancer, ovarian cancer, breast cancer.PSHx of Cardiac catheterization, colonoscopy.         Progress Note  PULMONARY    Admit Date: 1/18/2022 02/01/2022      SUBJECTIVE:     1/19 intubated, sedated,  1/20 no c/o intubated, sedated  1/21 no new c/o intubated  1/22 no c/o intubated   1/23 no  New c/o intubated  1/24 no new c/o  1/25 no new c/o  1/26 no new co  1/28- resp status stable on 5L oxymizer mask. Having loose BMs in diaper. Pt does not verbalize. Slight stridor but no distress, oxygening well  1/29- remains on oxymizer, resp status stable. Hypertensive to 170s-180s systolic  1/30- continues on oxymizer, no new issues  1/31 no new c/o  2/1 no new c/o       PFSH and Allergies reviewed.    OBJECTIVE:     Vitals (Most recent):  Vitals:    02/01/22 0230   BP: 138/60   Pulse: 75   Resp: (!) 21   Temp:        Vitals (24 hour range):  Temp:  [97.8 °F (36.6 °C)-98.7 °F (37.1 °C)]   Pulse:  [63-81]   Resp:  [14-22]   BP: (101-154)/(54-68)   SpO2:  [95 %-100 %]       Intake/Output Summary (Last 24 hours) at 2/1/2022 0612  Last data filed at 2/1/2022 0442  Gross per 24 hour   Intake " 721.2 ml   Output 1600 ml   Net -878.8 ml        Too value is ED when Physical Exam:  The patient's neuro status (alertness,orientation,cognitive function,motor skills,), pharyngeal exam (oral lesions, hygiene, abn dentition,), Neck (jvd,mass,thyroid,nodes in neck and above/below clavicle),RESPIRATORY(symmetry,effort,fremitus,percussion,auscultation),  Cor(rhythm,heart tones including gallops,perfusion,edema)ABD(distention,hepatic&splenomegaly,tenderness,masses), Skin(rash,cyanosis),Psyc(affect,judgement,).  Exam negative except for these pertinent findings:    No distress, verbalizes weakly,   Soft voice  No stridor-- has wet airway sounds and needs nt suctioning  Trach scar  chest is symmetric, no distress, normal percussion, normal fremitus and good normal breath sounds    abd soft with PEG tube  No edema      Radiographs reviewed: view by direct vision    Results for orders placed during the hospital encounter of 01/18/22    X-Ray Chest 1 View    Narrative  EXAMINATION:  Mm and is is  XR CHEST 1 VIEW    CLINICAL HISTORY:  respiratory failure;    TECHNIQUE:  Single frontal view of the chest was performed.    COMPARISON:  Chest portable of January 18, 2022    FINDINGS:  An endotracheal tube ends in the trachea above the level the malorie.  An NG tube traverses the esophagus to the stomach.  A central line enters at the right neck and ends in the superior vena cava.  The cardiac size and contours within normal limits.  A loop recorder is noted in the left chest.  There is continued central right lung infiltrate and small infiltrate at the left lung base.  No pneumothorax is seen.    Impression  Continued intrapulmonary infiltrates right greater than left.  Endotracheal tube, NG tube and right central lines in position.      Electronically signed by: Mathew Yanez MD  Date:    01/18/2022  Time:    15:50  ]    Labs     Recent Labs   Lab 02/01/22  0328   WBC 11.40   HGB 7.3*   HCT 22.6*        Recent Labs    Lab 02/01/22  0328   *   K 3.8   CL 95   CO2 33*   BUN 16   CREATININE 0.6   *   CALCIUM 8.2*   MG 1.7   PHOS 2.8   AST 13   ALT 14   ALKPHOS 56   BILITOT 0.3   PROT 5.6*   ALBUMIN 2.1*     No results for input(s): PH, PCO2, PO2, HCO3 in the last 24 hours.  Microbiology Results (last 7 days)     Procedure Component Value Units Date/Time    Culture, Respiratory with Gram Stain [[id number]]  (Abnormal)  (Susceptibility) Collected: 01/26/22 0745    Order Status: Completed Specimen: Respiratory from Sputum Updated: 01/28/22 1316     Respiratory Culture No S aureus or Pseudomonas isolated.      PROTEUS MIRABILIS ESBL  Few        SERRATIA MARCESCENS  Moderate       Gram Stain (Respiratory) <10 epithelial cells per low power field.     Gram Stain (Respiratory) Rare WBC's     Gram Stain (Respiratory) No organisms seen        Echo   Summary    · Mild concentric hypertrophy and normal systolic function.  · Mild-to-moderate mitral regurgitation.  · Mild tricuspid regurgitation.  · Mild pulmonic regurgitation.  · The estimated ejection fraction is 55%.  · Indeterminate left ventricular diastolic function.  · Normal right ventricular size with normal right ventricular systolic function.  · Mild left atrial enlargement.  · There is moderate-to-severe aortic valve stenosis.  · Aortic valve area is cm2; peak velocity is 3.42 m/s; mean gradient is 32 mmHg. Peak gradient 47mmHg  · Mild aortic regurgitation.  · Normal central venous pressure (3 mmHg).  · The estimated PA systolic pressure is 32 mmHg.  · There is no pulmonary hypertension.  · Moderate to severe AS. 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]  Not Applicable    ARDS (adult respiratory distress syndrome) [J80]  Yes    YARI (generalized anxiety disorder) [F41.1]  Yes    Type 2 diabetes  mellitus, with long-term current use of insulin [E11.9, Z79.4]  Not Applicable    Pneumonia [J18.9]  Yes    Aspiration into airway [T17.908A]  Yes    Hypothyroidism [E03.9]  Yes    Hyperlipidemia [E78.5]  Yes     Chronic    GERD (gastroesophageal reflux disease) [K21.9]  Yes     Chronic      Resolved Hospital Problems    Diagnosis Date Resolved POA    Ileus [K56.7] 01/23/2022 No    Demand ischemia [I24.8] 01/24/2022 Yes    Sepsis [A41.9] 01/24/2022 Yes    STEFFANY (acute kidney injury) [N17.9] 01/21/2022 Yes    Hyponatremia [E87.1] 01/20/2022 Yes                        Plan: no fever, vss, was hypotensive earlier.  Cefepime/vanc,flagyl,  chr midodrine, levophed 0.9 ,  ddimer 5.2, creat 0.9  c diff ag + but toxin neg.  abg 7.27 with ox 48,   Dnr,  cxr impressive R>>>L aspiration pattern.     Prior subdural --       Recruitment manuver done with peep 30 for 6 seconds with art line bp falling from sbp 100 to 60's.  Sat improved from 92 to 94.  Pt not really peep responsive.    Pt should improve ox with left side down or prone.  Peep may help but infiltrates more R>L -- should be posterior lung bases.  Might consider cta if not progressing.      discussed with resp and nursing. Optimally would be left side down or prone.        1/19 no fever, vss with rr to 43, flagyl/zosyn/vanc/cefepime, n70-80% ox,  Wbc 14.8 from 5k,  Creat 1.5 form 0.9,   abg 02 61 with ph 7.33- peep 5,   I/o 687/133-  Will screen for dvt/pe with leg study  Will dose 500 cc saline,   F/u cxr more opacification right>> left lung.      1/20 no fever, vss, bp low at times, I.o 2770/1870, cefepime/flagyl/vanc, ox 60%, wbc 14.6, plts 144- new, creat 1.0,. cxr with some clearing,   F/u abg, wean soon likely but need better ox to extubate.    No dvt, ddimer up at admit,     Pt min vol 8 and seemed to do well with 50% ox while I rounded.  Discussed with nursing and respiratory-- if can get to 40% and pass wean trial would recommend extubation, would be  optimal to discuss with family prior plans not to re intubate.  She is progressing-- would consider not discussing comfort care if improving.      1/21 no fever, vss, tm 100, wbc 15.2, hgb 8, from 13.6 bon 18th- admit, plt 130 from 244,   Creat 0.7,   cxr progressive clearing right , some increase opacification left suggested.   Strep in sputum,  Taper abx to doxy and flagy with c diff and strep in mucous-- monitor, check    procal am.    Will change code status to allow re intubation, and  extubate this am.      On enteral vanc for c diff concerns.   Discussed with January short.  Reviewed revised code status.      Addendum-- having stridor post extubation.  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  Pt was at [facility] when patient removed trach 2 months ago.  Pt has no h/o stridor per daughter.  Stridor did not change with jaw thrush nor nt trumpet.  Discussed would recommend intubation and replace trach as best option. Discussed with Dr Charles.  1/22 no fever,vss, wbc 16.3, hgb 8.1 from 13.6 admit?  , procal is down to 3 from 17 admit,     Pt failed extubation with upper airway stridor and poor loc.      Do leak test:on vent with cuff down and tidal vol delivered 450 would need over 110 cc to leak to pass, need exhaled tidal vol to be less than 340 cc to  pass leak test -- do now and repeat q 4 x 3 total.  Will dose steroids for laryngospasm.  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  Will discuss with family later- trial extubation if passes, trach, comfort care???      Discussed with daughter, asked if code status should be altered?  After discussion - will consider extubation trial if good leak test (would try to notify daughter if passes) --- otherwise trach.      1/23 failed leak test, suspect trach stenosis - trach Wednesday?  No fever, vss, doxy/flagyl, vanc enteral, wbc 14.3,   F/u cxr am  Called Ira, daughter.  Discussed need for Palliative care eval.      1/24 no  fever, vss, doxy/flagyl/vanc/vanc iv, wbc 15.5,   cxr patchy fluffy infiltrates R>L    No change in plan, defer to palliative care.  Could do trial extubation but likely to fail. Could bronch when tube pulled?      1/25 no fever, vss, doxy/flagyl,vanc iv/enteral, wbc 17.5 hgb 7,7 from 13.6 admit, day 8  abx    Pt seems to have good leak today.  Will have resp quantify, do wean trial, decrease sedation, and consider trial extubation if does well.  1/26 no fever, vss, wbc 19.1, check sputum, good leak test and weaned well.  Loc Is very good for extubation.     Has  shunt for yrs with no problems appreciated.    Loc very good.    Discussed with daughter.    Re evaluated post extubation.  The patient can speak well.  There is no stridor.  There is no respiratory distress.  Patient perhaps is a little slow.    Called and discussed case again with daughter.  Might consider bronchoscopy later on but would not  at all at this time.  Case discussed with .        The patient had severe pharyngeal dysphagia with aspiration of thin liquids in julius aspiration of nectar thick liquids in December 2021 by speech therapy evaluation.  Patient had an ineffective cough during aspiration.    Would recommend prolonged NPO.  Tube feedings the for would be adequate.      1/27 no fever, vss, doxy/flagyl/vanc iv/po, grew strep/kleb (no sensitivity)/mrsa from sputumon 18th.  7 days iv vanc, has c diff toxin neg on enteral vanc,  On flagyl and doxy-- should be adequate for pneumonia.  3 lpm, wbc 22.6,     F/u cxr-- stable infiltrates as would be expected.    Some upper airway noise.  Not bad enough to trach.  Would consider bronch to inspect airway in a few days.  If worsens--intubation and trach would be recommended.    Will dose scopolamine patch consider resume robinul.  Will have robinul available for prn  Use.     Discussed with daughter, Ira.  Could be just aspiration/laryngospasm issues as tracheal problem  should not have progressed to extubation after good leak test.      Above from Dr Gao and below from Dr Gaspar:    - continue supplemental O2 via oximizer   - stable to improving upper airway rhonchi- monitor  - suction prn  - scopolamine patch   - continue antibiotics  - tube feedings via PEG  - rectal tube for diarrhea    Above from Dr Gaspar and below from Dr Gao:  1/31 no fever, vss, doxy/vanc for mrsa in sputum, also flagyl, 40% ox, wbc 13, hgb 7.6,     F/u cxr am  Taper abx? Will stop doxy with diarrhea.  On c diff prevention-- iv vanc for mrsa on sputum culture-   Return to [place]?  Called daughter, ira, and discussed.  Could go back to [facility] soon.    2/1 no fever, vss, 710/890 I/o, 40%, hgb 7,3, cxr stagnant with rll density -- as would be expected.   Recurrent low hgb still - chr low, no anticoagg,   Would stop iv abx am.    Has wet airway and needs nt suctioning 3 times daily or so. Will resume robinul mg tid. Stop scop patch am.  Would not rec trach but outlook is not good.  She cannot protect airway.  Will discuss with daughter later.    Called Ira, daughter,  No answer.

## 2022-02-01 NOTE — PLAN OF CARE
Problem: Adult Inpatient Plan of Care  Goal: Plan of Care Review  Outcome: Ongoing, Progressing  Goal: Patient-Specific Goal (Individualized)  Outcome: Ongoing, Progressing  Goal: Absence of Hospital-Acquired Illness or Injury  Outcome: Ongoing, Progressing  Goal: Optimal Comfort and Wellbeing  Outcome: Ongoing, Progressing  Goal: Readiness for Transition of Care  Outcome: Ongoing, Progressing     Problem: Infection  Goal: Absence of Infection Signs and Symptoms  Outcome: Ongoing, Progressing     Problem: Diabetes Comorbidity  Goal: Blood Glucose Level Within Targeted Range  Outcome: Ongoing, Progressing     Problem: Fluid Imbalance (Pneumonia)  Goal: Fluid Balance  Outcome: Ongoing, Progressing     Problem: Infection (Pneumonia)  Goal: Resolution of Infection Signs and Symptoms  Outcome: Ongoing, Progressing     Problem: Respiratory Compromise (Pneumonia)  Goal: Effective Oxygenation and Ventilation  Outcome: Ongoing, Progressing     Problem: Fall Injury Risk  Goal: Absence of Fall and Fall-Related Injury  Outcome: Ongoing, Progressing     Problem: Skin Injury Risk Increased  Goal: Skin Health and Integrity  Outcome: Ongoing, Progressing     Problem: Coping Ineffective  Goal: Effective Coping  Outcome: Ongoing, Progressing     Problem: Adjustment to Illness (Sepsis/Septic Shock)  Goal: Optimal Coping  Outcome: Ongoing, Progressing     Problem: Bleeding (Sepsis/Septic Shock)  Goal: Absence of Bleeding  Outcome: Ongoing, Progressing     Problem: Glycemic Control Impaired (Sepsis/Septic Shock)  Goal: Blood Glucose Level Within Desired Range  Outcome: Ongoing, Progressing     Problem: Infection Progression (Sepsis/Septic Shock)  Goal: Absence of Infection Signs and Symptoms  Outcome: Ongoing, Progressing     Problem: Nutrition Impaired (Sepsis/Septic Shock)  Goal: Optimal Nutrition Intake  Outcome: Ongoing, Progressing     Problem: Fluid and Electrolyte Imbalance (Acute Kidney Injury/Impairment)  Goal: Fluid and  Electrolyte Balance  Outcome: Ongoing, Progressing     Problem: Oral Intake Inadequate (Acute Kidney Injury/Impairment)  Goal: Optimal Nutrition Intake  Outcome: Ongoing, Progressing     Problem: Renal Function Impairment (Acute Kidney Injury/Impairment)  Goal: Effective Renal Function  Outcome: Ongoing, Progressing     Problem: ARDS (Acute Respiratory Distress Syndrome)  Goal: Effective Oxygenation  Outcome: Ongoing, Progressing     Problem: Oral Intake Inadequate  Goal: Improved Oral Intake  Outcome: Ongoing, Progressing     Problem: Impaired Wound Healing  Goal: Optimal Wound Healing  Outcome: Ongoing, Progressing    Patient remains on oxymask. No acute events overnight. Patient continues to have trouble clearing her secretion and has to be frequently suctioned. Patient is awake and alert, disoriented to place time and situation. Patient continued on tube feedings, advanced to goal of 45 mL/hr. Purewick and flexiseal remain in place. VS stable through the shift. No acute distress noted. Safety maintained, bed low and locked, bed alarm on.

## 2022-02-01 NOTE — PLAN OF CARE
Problem: Adult Inpatient Plan of Care  Goal: Plan of Care Review  Outcome: Ongoing, Progressing  Respirations unlabored. Nonprod cough. NT suctioning prn. Thick yellowish secretions.   Peg intact. Tube feedings increased intermittently to 40 cc hour this shift.   Purwick in place. Adequate urine output.   Flexiseal intact. Liquid brown stool.   Rotation bed in use. Safety maintained.

## 2022-02-01 NOTE — PROGRESS NOTES
Pharmacokinetic Assessment Follow Up: IV Vancomycin    Vancomycin serum concentration assessment(s):    The trough level was drawn correctly and can be used to guide therapy at this time. The measurement is above the desired definitive target range of 15 to 20 mcg/mL.    Vancomycin Regimen Plan:    Change regimen to Vancomycin 1250 mg IV every 24 hours with next serum trough concentration measured at 0430 prior to 3rd dose on 2/3    Drug levels (last 3 results):  Recent Labs   Lab Result Units 01/29/22 1959 01/31/22 2053   Vancomycin-Trough ug/mL 16.4 22.0       Pharmacy will continue to follow and monitor vancomycin.    Please contact pharmacy at extension [phone] for questions regarding this assessment.    Thank you for the consult,   Hari Hernandes       Patient brief summary:  Sandra Wilson is a 81 y.o. female initiated on antimicrobial therapy with IV Vancomycin for treatment of lower respiratory infection        Drug Allergies:   Review of patient's allergies indicates:   Allergen Reactions    Cephalexin (bulk)      Flushing and itching    Lidocaine base      rash    Lisinopril Other (See Comments)     cough       Actual Body Weight:   69.1 kg    Renal Function:   Estimated Creatinine Clearance: 70.2 mL/min (based on SCr of 0.6 mg/dL).,     Dialysis Method (if applicable):  N/A    CBC (last 72 hours):  Recent Labs   Lab Result Units 01/29/22 0319 01/30/22 0245 01/31/22 0258   WBC K/uL 13.55* 15.27* 13.31*   Hemoglobin g/dL 8.5* 8.4* 7.6*   Hematocrit % 25.1* 25.4* 23.0*   Platelets K/uL 322 325 327   Gran % % 78.5* 78.8* 79.8*   Lymph % % 11.1* 10.6* 8.9*   Mono % % 8.0 8.4 9.1   Eosinophil % % 1.3 1.2 1.1   Basophil % % 0.2 0.2 0.5   Differential Method  Automated Automated Automated       Metabolic Panel (last 72 hours):  Recent Labs   Lab Result Units 01/29/22 0319 01/30/22 0245 01/31/22 0258   Sodium mmol/L 133* 133* 133*   Potassium mmol/L 3.6 3.8 3.6   Chloride mmol/L 92* 91* 92*   CO2 mmol/L 33* 31*  31*   Glucose mg/dL 132* 92 94   BUN mg/dL 15 18 18   Creatinine mg/dL 0.6 0.6 0.6   Albumin g/dL 2.2* 2.2* 2.2*   Total Bilirubin mg/dL 0.4 0.5 0.4   Alkaline Phosphatase U/L 62 60 56   AST U/L 19 16 11   ALT U/L 16 12 11   Magnesium mg/dL 1.6 1.6 1.7   Phosphorus mg/dL 2.6* 3.7 3.0       Vancomycin Administrations:  vancomycin given in the last 96 hours                     vancomycin 1.5 g in dextrose 5 % 250 mL IVPB (ready to mix) (mg) 1,500 mg New Bag 01/30/22 2245    vancomycin 1.5 g in dextrose 5 % 250 mL IVPB (ready to mix) (mg) 1,500 mg New Bag 01/29/22 2138    vancomycin 125 mg/5 mL oral solution 125 mg (mg) 125 mg Given 01/29/22 0615     125 mg Given  0023     125 mg Given 01/28/22 1736     125 mg Given  1237     125 mg Given  0524     125 mg Given  0000    vancomycin 1.25 g in dextrose 5% 250 mL IVPB (ready to mix) (mg) 1,250 mg New Bag 01/28/22 2146                    Microbiologic Results:  Microbiology Results (last 7 days)       Procedure Component Value Units Date/Time    Culture, Respiratory with Gram Stain [[id number]]  (Abnormal)  (Susceptibility) Collected: 01/26/22 0745    Order Status: Completed Specimen: Respiratory from Sputum Updated: 01/28/22 1316     Respiratory Culture No S aureus or Pseudomonas isolated.      PROTEUS MIRABILIS ESBL  Few        SERRATIA MARCESCENS  Moderate       Gram Stain (Respiratory) <10 epithelial cells per low power field.     Gram Stain (Respiratory) Rare WBC's     Gram Stain (Respiratory) No organisms seen

## 2022-02-02 PROBLEM — J69.0 ASPIRATION PNEUMONIA: Status: ACTIVE | Noted: 2022-01-18

## 2022-02-02 PROBLEM — J15.69 GRAM-NEGATIVE PNEUMONIA: Status: ACTIVE | Noted: 2022-01-18

## 2022-02-02 PROBLEM — J15.212 MRSA PNEUMONIA: Status: ACTIVE | Noted: 2022-01-18

## 2022-02-02 LAB
ALBUMIN SERPL BCP-MCNC: 2.2 G/DL (ref 3.5–5.2)
ALP SERPL-CCNC: 60 U/L (ref 55–135)
ALT SERPL W/O P-5'-P-CCNC: 13 U/L (ref 10–44)
ANION GAP SERPL CALC-SCNC: 8 MMOL/L (ref 8–16)
AST SERPL-CCNC: 15 U/L (ref 10–40)
BASOPHILS # BLD AUTO: 0.06 K/UL (ref 0–0.2)
BASOPHILS NFR BLD: 0.6 % (ref 0–1.9)
BILIRUB SERPL-MCNC: 0.3 MG/DL (ref 0.1–1)
BUN SERPL-MCNC: 14 MG/DL (ref 8–23)
CALCIUM SERPL-MCNC: 8.2 MG/DL (ref 8.7–10.5)
CHLORIDE SERPL-SCNC: 95 MMOL/L (ref 95–110)
CO2 SERPL-SCNC: 32 MMOL/L (ref 23–29)
CREAT SERPL-MCNC: 0.6 MG/DL (ref 0.5–1.4)
DIFFERENTIAL METHOD: ABNORMAL
EOSINOPHIL # BLD AUTO: 0.2 K/UL (ref 0–0.5)
EOSINOPHIL NFR BLD: 1.4 % (ref 0–8)
ERYTHROCYTE [DISTWIDTH] IN BLOOD BY AUTOMATED COUNT: 14.2 % (ref 11.5–14.5)
EST. GFR  (AFRICAN AMERICAN): >60 ML/MIN/1.73 M^2
EST. GFR  (NON AFRICAN AMERICAN): >60 ML/MIN/1.73 M^2
GLUCOSE SERPL-MCNC: 100 MG/DL (ref 70–110)
HCT VFR BLD AUTO: 22.9 % (ref 37–48.5)
HGB BLD-MCNC: 7.4 G/DL (ref 12–16)
IMM GRANULOCYTES # BLD AUTO: 0.09 K/UL (ref 0–0.04)
IMM GRANULOCYTES NFR BLD AUTO: 0.8 % (ref 0–0.5)
LYMPHOCYTES # BLD AUTO: 1.2 K/UL (ref 1–4.8)
LYMPHOCYTES NFR BLD: 11.1 % (ref 18–48)
MAGNESIUM SERPL-MCNC: 1.5 MG/DL (ref 1.6–2.6)
MCH RBC QN AUTO: 32.3 PG (ref 27–31)
MCHC RBC AUTO-ENTMCNC: 32.3 G/DL (ref 32–36)
MCV RBC AUTO: 100 FL (ref 82–98)
MONOCYTES # BLD AUTO: 1.3 K/UL (ref 0.3–1)
MONOCYTES NFR BLD: 12.5 % (ref 4–15)
NEUTROPHILS # BLD AUTO: 7.9 K/UL (ref 1.8–7.7)
NEUTROPHILS NFR BLD: 73.6 % (ref 38–73)
NRBC BLD-RTO: 0 /100 WBC
PHOSPHATE SERPL-MCNC: 2.8 MG/DL (ref 2.7–4.5)
PLATELET # BLD AUTO: 294 K/UL (ref 150–450)
PMV BLD AUTO: 10.4 FL (ref 9.2–12.9)
POCT GLUCOSE: 104 MG/DL (ref 70–110)
POCT GLUCOSE: 111 MG/DL (ref 70–110)
POCT GLUCOSE: 113 MG/DL (ref 70–110)
POCT GLUCOSE: 127 MG/DL (ref 70–110)
POTASSIUM SERPL-SCNC: 3.7 MMOL/L (ref 3.5–5.1)
PROT SERPL-MCNC: 5.8 G/DL (ref 6–8.4)
RBC # BLD AUTO: 2.29 M/UL (ref 4–5.4)
SODIUM SERPL-SCNC: 135 MMOL/L (ref 136–145)
WBC # BLD AUTO: 10.66 K/UL (ref 3.9–12.7)

## 2022-02-02 PROCEDURE — 63600175 PHARM REV CODE 636 W HCPCS

## 2022-02-02 PROCEDURE — 85025 COMPLETE CBC W/AUTO DIFF WBC: CPT | Performed by: STUDENT IN AN ORGANIZED HEALTH CARE EDUCATION/TRAINING PROGRAM

## 2022-02-02 PROCEDURE — 84100 ASSAY OF PHOSPHORUS: CPT | Performed by: STUDENT IN AN ORGANIZED HEALTH CARE EDUCATION/TRAINING PROGRAM

## 2022-02-02 PROCEDURE — 63600175 PHARM REV CODE 636 W HCPCS: Performed by: STUDENT IN AN ORGANIZED HEALTH CARE EDUCATION/TRAINING PROGRAM

## 2022-02-02 PROCEDURE — 20000000 HC ICU ROOM

## 2022-02-02 PROCEDURE — 27000207 HC ISOLATION

## 2022-02-02 PROCEDURE — 99233 PR SUBSEQUENT HOSPITAL CARE,LEVL III: ICD-10-PCS | Mod: ,,, | Performed by: INTERNAL MEDICINE

## 2022-02-02 PROCEDURE — 27000221 HC OXYGEN, UP TO 24 HOURS

## 2022-02-02 PROCEDURE — 94761 N-INVAS EAR/PLS OXIMETRY MLT: CPT

## 2022-02-02 PROCEDURE — 25000003 PHARM REV CODE 250: Performed by: STUDENT IN AN ORGANIZED HEALTH CARE EDUCATION/TRAINING PROGRAM

## 2022-02-02 PROCEDURE — 99233 SBSQ HOSP IP/OBS HIGH 50: CPT | Mod: ,,, | Performed by: INTERNAL MEDICINE

## 2022-02-02 PROCEDURE — 25000003 PHARM REV CODE 250: Performed by: HOSPITALIST

## 2022-02-02 PROCEDURE — 25000003 PHARM REV CODE 250: Performed by: INTERNAL MEDICINE

## 2022-02-02 PROCEDURE — 31720 CLEARANCE OF AIRWAYS: CPT

## 2022-02-02 PROCEDURE — S0030 INJECTION, METRONIDAZOLE: HCPCS | Performed by: STUDENT IN AN ORGANIZED HEALTH CARE EDUCATION/TRAINING PROGRAM

## 2022-02-02 PROCEDURE — 80053 COMPREHEN METABOLIC PANEL: CPT | Performed by: STUDENT IN AN ORGANIZED HEALTH CARE EDUCATION/TRAINING PROGRAM

## 2022-02-02 PROCEDURE — 83735 ASSAY OF MAGNESIUM: CPT | Performed by: STUDENT IN AN ORGANIZED HEALTH CARE EDUCATION/TRAINING PROGRAM

## 2022-02-02 RX ORDER — MAGNESIUM SULFATE HEPTAHYDRATE 40 MG/ML
2 INJECTION, SOLUTION INTRAVENOUS
Status: DISCONTINUED | OUTPATIENT
Start: 2022-02-02 | End: 2022-02-07

## 2022-02-02 RX ORDER — MAGNESIUM SULFATE HEPTAHYDRATE 40 MG/ML
4 INJECTION, SOLUTION INTRAVENOUS
Status: DISCONTINUED | OUTPATIENT
Start: 2022-02-02 | End: 2022-02-07

## 2022-02-02 RX ORDER — AZELASTINE 1 MG/ML
1 SPRAY, METERED NASAL 2 TIMES DAILY
Status: DISCONTINUED | OUTPATIENT
Start: 2022-02-02 | End: 2022-03-14 | Stop reason: HOSPADM

## 2022-02-02 RX ORDER — SCOLOPAMINE TRANSDERMAL SYSTEM 1 MG/1
1 PATCH, EXTENDED RELEASE TRANSDERMAL
Status: DISCONTINUED | OUTPATIENT
Start: 2022-02-02 | End: 2022-02-09

## 2022-02-02 RX ADMIN — GLYCOPYRROLATE 1 MG: 1 TABLET ORAL at 09:02

## 2022-02-02 RX ADMIN — METRONIDAZOLE 500 MG: 500 INJECTION, SOLUTION INTRAVENOUS at 04:02

## 2022-02-02 RX ADMIN — METOPROLOL TARTRATE 12.5 MG: 25 TABLET, FILM COATED ORAL at 09:02

## 2022-02-02 RX ADMIN — ENOXAPARIN SODIUM 40 MG: 100 INJECTION SUBCUTANEOUS at 04:02

## 2022-02-02 RX ADMIN — MIDODRINE HYDROCHLORIDE 5 MG: 5 TABLET ORAL at 09:02

## 2022-02-02 RX ADMIN — AZELASTINE 137 MCG: 1 SPRAY, METERED NASAL at 01:02

## 2022-02-02 RX ADMIN — MIDODRINE HYDROCHLORIDE 5 MG: 5 TABLET ORAL at 03:02

## 2022-02-02 RX ADMIN — SCOPALAMINE 1 PATCH: 1 PATCH, EXTENDED RELEASE TRANSDERMAL at 09:02

## 2022-02-02 RX ADMIN — MAGNESIUM SULFATE HEPTAHYDRATE 2 G: 2 INJECTION, SOLUTION INTRAVENOUS at 01:02

## 2022-02-02 RX ADMIN — AZELASTINE 137 MCG: 1 SPRAY, METERED NASAL at 09:02

## 2022-02-02 RX ADMIN — FAMOTIDINE 20 MG: 10 INJECTION INTRAVENOUS at 09:02

## 2022-02-02 RX ADMIN — VANCOMYCIN HYDROCHLORIDE 1250 MG: 1.25 INJECTION, POWDER, LYOPHILIZED, FOR SOLUTION INTRAVENOUS at 05:02

## 2022-02-02 RX ADMIN — GLYCOPYRROLATE 1 MG: 1 TABLET ORAL at 03:02

## 2022-02-02 RX ADMIN — METRONIDAZOLE 500 MG: 500 INJECTION, SOLUTION INTRAVENOUS at 07:02

## 2022-02-02 RX ADMIN — CITALOPRAM HYDROBROMIDE 40 MG: 10 TABLET ORAL at 09:02

## 2022-02-02 RX ADMIN — LEVOTHYROXINE SODIUM 25 MCG: 0.03 TABLET ORAL at 05:02

## 2022-02-02 RX ADMIN — METRONIDAZOLE 500 MG: 500 INJECTION, SOLUTION INTRAVENOUS at 11:02

## 2022-02-02 RX ADMIN — ATORVASTATIN CALCIUM 40 MG: 40 TABLET, FILM COATED ORAL at 09:02

## 2022-02-02 NOTE — CARE UPDATE
02/02/22 1226   Patient Assessment/Suction   Level of Consciousness (AVPU) alert   Respiratory Effort Normal;Unlabored   Expansion/Accessory Muscles/Retractions no use of accessory muscles;no retractions;expansion symmetric   All Lung Fields Breath Sounds coarse   Rhythm/Pattern, Respiratory unlabored;pattern regular;depth regular   Cough Frequency with stimulation   Cough Type assisted;productive   Suction Method nasal;oral   $ Suction Charges NT Suction Procedure   Secretions Amount moderate   Secretions Color white;red-streaked   Secretions Characteristics thick   PRE-TX-O2   O2 Device (Oxygen Therapy) Oxymask   $ Is the patient on Low Flow Oxygen? Yes   SpO2 99 %   Pulse Oximetry Type Continuous   $ Pulse Oximetry - Multiple Charge Pulse Oximetry - Multiple   Pulse 79   Resp (!) 22

## 2022-02-02 NOTE — PLAN OF CARE
Problem: Adult Inpatient Plan of Care  Goal: Plan of Care Review  Outcome: Ongoing, Progressing  Goal: Patient-Specific Goal (Individualized)  Outcome: Ongoing, Progressing  Goal: Absence of Hospital-Acquired Illness or Injury  Outcome: Ongoing, Progressing  Goal: Optimal Comfort and Wellbeing  Outcome: Ongoing, Progressing  Goal: Readiness for Transition of Care  Outcome: Ongoing, Progressing     Problem: Infection  Goal: Absence of Infection Signs and Symptoms  Outcome: Ongoing, Progressing     Problem: Fluid Imbalance (Pneumonia)  Goal: Fluid Balance  Outcome: Ongoing, Progressing     Problem: Infection (Pneumonia)  Goal: Resolution of Infection Signs and Symptoms  Outcome: Ongoing, Progressing     Problem: Respiratory Compromise (Pneumonia)  Goal: Effective Oxygenation and Ventilation  Outcome: Ongoing, Progressing     Problem: Fall Injury Risk  Goal: Absence of Fall and Fall-Related Injury  Outcome: Ongoing, Progressing     Problem: Skin Injury Risk Increased  Goal: Skin Health and Integrity  Outcome: Ongoing, Progressing     Problem: Adjustment to Illness (Sepsis/Septic Shock)  Goal: Optimal Coping  Outcome: Ongoing, Progressing     Problem: Bleeding (Sepsis/Septic Shock)  Goal: Absence of Bleeding  Outcome: Ongoing, Progressing     Problem: Infection Progression (Sepsis/Septic Shock)  Goal: Absence of Infection Signs and Symptoms  Outcome: Ongoing, Progressing     Problem: Fluid and Electrolyte Imbalance (Acute Kidney Injury/Impairment)  Goal: Fluid and Electrolyte Balance  Outcome: Ongoing, Progressing     Problem: Oral Intake Inadequate (Acute Kidney Injury/Impairment)  Goal: Optimal Nutrition Intake  Outcome: Ongoing, Progressing     Problem: Renal Function Impairment (Acute Kidney Injury/Impairment)  Goal: Effective Renal Function  Outcome: Ongoing, Progressing     Problem: ARDS (Acute Respiratory Distress Syndrome)  Goal: Effective Oxygenation  Outcome: Ongoing, Progressing     Problem: Oral Intake  Inadequate  Goal: Improved Oral Intake  Outcome: Ongoing, Progressing     Problem: Impaired Wound Healing  Goal: Optimal Wound Healing  Outcome: Ongoing, Progressing

## 2022-02-02 NOTE — PLAN OF CARE
Still needing NT suctioning about every 4 hours due to poor cough. Follows commands.Tube feed at goal. Bed in rotate mode and voiding per purewick.

## 2022-02-02 NOTE — ASSESSMENT & PLAN NOTE
Improving.  Continue supplementing oxygen.  Monitor sats.  Monitor work of breathing.  She's partial code, specifying that she'd want to be intubated if need be.

## 2022-02-02 NOTE — CARE UPDATE
02/02/22 0717   Patient Assessment/Suction   Level of Consciousness (AVPU) alert   Respiratory Effort Normal;Unlabored   Expansion/Accessory Muscles/Retractions no use of accessory muscles;no retractions;expansion symmetric   All Lung Fields Breath Sounds bronchial;diminished   Rhythm/Pattern, Respiratory shallow;unlabored   Cough Frequency infrequent   Cough Type nonproductive;weak   PRE-TX-O2   O2 Device (Oxygen Therapy) Oxymask   $ Is the patient on Low Flow Oxygen? Yes   Flow (L/min) 4   SpO2 99 %   Pulse Oximetry Type Continuous   $ Pulse Oximetry - Multiple Charge Pulse Oximetry - Multiple   Pulse 73   Resp 19   Positioning HOB elevated 30 degrees

## 2022-02-02 NOTE — PROGRESS NOTES
"  02/02/2022      Admit Date: 1/18/2022  Sandra Wilson  New Patient Consult    Chief Complaint   Patient presents with    Shortness of Breath    Vomiting     PEG tube        History of Present Illness:  Intubated.          From Dr Gaspar's hpi, ERP:Sandra Wilson is a 81 y.o. female who presents to the ED via EMS  with an onset of vomiting and SOB. EMS reported that upon examination patient was less responsive than usually but is able to answer questions. The report from the EMS upon arrival was an O2 sats in the 70's. EMS proceeded to give supplemental oxygen and the O2 sats raised up to 93% on 3 liters of O2 with  "junky" right upper lung sounds" and greenish diarrhea. She was vomiting en route. They also reported that the patient is a patient that has dislodged her PEG tubing many times. PMHx of HTN, CAD, OA, GERD, ctroke, Colon cancer, ovarian cancer, breast cancer.PSHx of Cardiac catheterization, colonoscopy.         Progress Note  PULMONARY    Admit Date: 1/18/2022 02/02/2022      SUBJECTIVE:     1/19 intubated, sedated,  1/20 no c/o intubated, sedated  1/21 no new c/o intubated  1/22 no c/o intubated   1/23 no  New c/o intubated  1/24 no new c/o  1/25 no new c/o  1/26 no new co  1/28- resp status stable on 5L oxymizer mask. Having loose BMs in diaper. Pt does not verbalize. Slight stridor but no distress, oxygening well  1/29- remains on oxymizer, resp status stable. Hypertensive to 170s-180s systolic  1/30- continues on oxymizer, no new issues  1/31 no new c/o  2/1 no new c/o   2/2 no new c/o      PFSH and Allergies reviewed.    OBJECTIVE:     Vitals (Most recent):  Vitals:    02/02/22 0600   BP: (!) 143/60   Pulse: 71   Resp: 19   Temp:        Vitals (24 hour range):  Temp:  [98.1 °F (36.7 °C)-99.3 °F (37.4 °C)]   Pulse:  [68-79]   Resp:  [15-22]   BP: (120-163)/(58-89)   SpO2:  [89 %-100 %]       Intake/Output Summary (Last 24 hours) at 2/2/2022 0646  Last data filed at 2/2/2022 0637  Gross per 24 " hour   Intake 2374.41 ml   Output 1300 ml   Net 1074.41 ml        Too value is ED when Physical Exam:  The patient's neuro status (alertness,orientation,cognitive function,motor skills,), pharyngeal exam (oral lesions, hygiene, abn dentition,), Neck (jvd,mass,thyroid,nodes in neck and above/below clavicle),RESPIRATORY(symmetry,effort,fremitus,percussion,auscultation),  Cor(rhythm,heart tones including gallops,perfusion,edema)ABD(distention,hepatic&splenomegaly,tenderness,masses), Skin(rash,cyanosis),Psyc(affect,judgement,).  Exam negative except for these pertinent findings:    No distress, verbalizes weakly,   Soft voice  No stridor-- has wet airway sounds and needs nt suctioning  Trach scar  chest is symmetric, no distress, normal percussion, normal fremitus and good normal breath sounds    abd soft with PEG tube  No edema      Radiographs reviewed: view by direct vision    Results for orders placed during the hospital encounter of 01/18/22    X-Ray Chest 1 View    Narrative  EXAMINATION:  Mm and is is  XR CHEST 1 VIEW    CLINICAL HISTORY:  respiratory failure;    TECHNIQUE:  Single frontal view of the chest was performed.    COMPARISON:  Chest portable of January 18, 2022    FINDINGS:  An endotracheal tube ends in the trachea above the level the malorie.  An NG tube traverses the esophagus to the stomach.  A central line enters at the right neck and ends in the superior vena cava.  The cardiac size and contours within normal limits.  A loop recorder is noted in the left chest.  There is continued central right lung infiltrate and small infiltrate at the left lung base.  No pneumothorax is seen.    Impression  Continued intrapulmonary infiltrates right greater than left.  Endotracheal tube, NG tube and right central lines in position.      Electronically signed by: Mathew Yanez MD  Date:    01/18/2022  Time:    15:50  ]    Labs     Recent Labs   Lab 02/02/22  0421   WBC 10.66   HGB 7.4*   HCT 22.9*         Recent Labs   Lab 02/02/22  0421   *   K 3.7   CL 95   CO2 32*   BUN 14   CREATININE 0.6      CALCIUM 8.2*   MG 1.5*   PHOS 2.8   AST 15   ALT 13   ALKPHOS 60   BILITOT 0.3   PROT 5.8*   ALBUMIN 2.2*     No results for input(s): PH, PCO2, PO2, HCO3 in the last 24 hours.  Microbiology Results (last 7 days)     Procedure Component Value Units Date/Time    Culture, Respiratory with Gram Stain [[id number]]  (Abnormal)  (Susceptibility) Collected: 01/26/22 0745    Order Status: Completed Specimen: Respiratory from Sputum Updated: 01/28/22 1316     Respiratory Culture No S aureus or Pseudomonas isolated.      PROTEUS MIRABILIS ESBL  Few        SERRATIA MARCESCENS  Moderate       Gram Stain (Respiratory) <10 epithelial cells per low power field.     Gram Stain (Respiratory) Rare WBC's     Gram Stain (Respiratory) No organisms seen        Echo   Summary    · Mild concentric hypertrophy and normal systolic function.  · Mild-to-moderate mitral regurgitation.  · Mild tricuspid regurgitation.  · Mild pulmonic regurgitation.  · The estimated ejection fraction is 55%.  · Indeterminate left ventricular diastolic function.  · Normal right ventricular size with normal right ventricular systolic function.  · Mild left atrial enlargement.  · There is moderate-to-severe aortic valve stenosis.  · Aortic valve area is cm2; peak velocity is 3.42 m/s; mean gradient is 32 mmHg. Peak gradient 47mmHg  · Mild aortic regurgitation.  · Normal central venous pressure (3 mmHg).  · The estimated PA systolic pressure is 32 mmHg.  · There is no pulmonary hypertension.  · Moderate to severe AS. DI= 0.23 and 0.20          Impression:  Active Hospital Problems    Diagnosis  POA    *Acute hypoxemic respiratory failure [J96.01]  Yes    Anemia [D64.9]  Yes    Paroxysmal atrial fibrillation [I48.0]  No    Gastrostomy tube dependent [Z93.1]  Not Applicable    YARI (generalized anxiety disorder) [F41.1]  Yes    Type 2 diabetes mellitus,  with long-term current use of insulin [E11.9, Z79.4]  Not Applicable    Pneumonia [J18.9]  Yes    Hypothyroidism [E03.9]  Yes    Hyperlipidemia [E78.5]  Yes     Chronic    GERD (gastroesophageal reflux disease) [K21.9]  Yes     Chronic      Resolved Hospital Problems    Diagnosis Date Resolved POA    Ileus [K56.7] 01/23/2022 No    Demand ischemia [I24.8] 01/24/2022 Yes    Sepsis [A41.9] 01/24/2022 Yes    STEFFANY (acute kidney injury) [N17.9] 01/21/2022 Yes    Hyponatremia [E87.1] 01/20/2022 Yes    ARDS (adult respiratory distress syndrome) [J80] 02/01/2022 Yes                        Plan: no fever, vss, was hypotensive earlier.  Cefepime/vanc,flagyl,  chr midodrine, levophed 0.9 ,  ddimer 5.2, creat 0.9  c diff ag + but toxin neg.  abg 7.27 with ox 48,   Dnr,  cxr impressive R>>>L aspiration pattern.     Prior subdural --       Recruitment manuver done with peep 30 for 6 seconds with art line bp falling from sbp 100 to 60's.  Sat improved from 92 to 94.  Pt not really peep responsive.    Pt should improve ox with left side down or prone.  Peep may help but infiltrates more R>L -- should be posterior lung bases.  Might consider cta if not progressing.      discussed with resp and nursing. Optimally would be left side down or prone.        1/19 no fever, vss with rr to 43, flagyl/zosyn/vanc/cefepime, n70-80% ox,  Wbc 14.8 from 5k,  Creat 1.5 form 0.9,   abg 02 61 with ph 7.33- peep 5,   I/o 687/133-  Will screen for dvt/pe with leg study  Will dose 500 cc saline,   F/u cxr more opacification right>> left lung.      1/20 no fever, vss, bp low at times, I.o 2770/1870, cefepime/flagyl/vanc, ox 60%, wbc 14.6, plts 144- new, creat 1.0,. cxr with some clearing,   F/u abg, wean soon likely but need better ox to extubate.    No dvt, ddimer up at admit,     Pt min vol 8 and seemed to do well with 50% ox while I rounded.  Discussed with nursing and respiratory-- if can get to 40% and pass wean trial would recommend  extubation, would be optimal to discuss with family prior plans not to re intubate.  She is progressing-- would consider not discussing comfort care if improving.      1/21 no fever, vss, tm 100, wbc 15.2, hgb 8, from 13.6 bon 18th- admit, plt 130 from 244,   Creat 0.7,   cxr progressive clearing right , some increase opacification left suggested.   Strep in sputum,  Taper abx to doxy and flagy with c diff and strep in mucous-- monitor, check    procal am.    Will change code status to allow re intubation, and  extubate this am.      On enteral vanc for c diff concerns.   Discussed with January short.  Reviewed revised code status.      Addendum-- having stridor post extubation.  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  Pt was at [place] when patient removed trach 2 months ago.  Pt has no h/o stridor per daughter.  Stridor did not change with jaw thrush nor nt trumpet.  Discussed would recommend intubation and replace trach as best option. Discussed with Dr Charles.  1/22 no fever,vss, wbc 16.3, hgb 8.1 from 13.6 admit?  , procal is down to 3 from 17 admit,     Pt failed extubation with upper airway stridor and poor loc.      Do leak test:on vent with cuff down and tidal vol delivered 450 would need over 110 cc to leak to pass, need exhaled tidal vol to be less than 340 cc to  pass leak test -- do now and repeat q 4 x 3 total.  Will dose steroids for laryngospasm.  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  Will discuss with family later- trial extubation if passes, trach, comfort care???      Discussed with daughter, asked if code status should be altered?  After discussion - will consider extubation trial if good leak test (would try to notify daughter if passes) --- otherwise trach.      1/23 failed leak test, suspect trach stenosis - trach Wednesday?  No fever, vss, doxy/flagyl, vanc enteral, wbc 14.3,   F/u cxr am  Called jose daniel Roth.  Discussed need for Palliative care  eval.      1/24 no fever, vss, doxy/flagyl/vanc/vanc iv, wbc 15.5,   cxr patchy fluffy infiltrates R>L    No change in plan, defer to palliative care.  Could do trial extubation but likely to fail. Could bronch when tube pulled?      1/25 no fever, vss, doxy/flagyl,vanc iv/enteral, wbc 17.5 hgb 7,7 from 13.6 admit, day 8  abx    Pt seems to have good leak today.  Will have resp quantify, do wean trial, decrease sedation, and consider trial extubation if does well.  1/26 no fever, vss, wbc 19.1, check sputum, good leak test and weaned well.  Loc Is very good for extubation.     Has  shunt for yrs with no problems appreciated.    Loc very good.    Discussed with daughter.    Re evaluated post extubation.  The patient can speak well.  There is no stridor.  There is no respiratory distress.  Patient perhaps is a little slow.    Called and discussed case again with daughter.  Might consider bronchoscopy later on but would not  at all at this time.  Case discussed with .        The patient had severe pharyngeal dysphagia with aspiration of thin liquids in julius aspiration of nectar thick liquids in December 2021 by speech therapy evaluation.  Patient had an ineffective cough during aspiration.    Would recommend prolonged NPO.  Tube feedings the for would be adequate.      1/27 no fever, vss, doxy/flagyl/vanc iv/po, grew strep/kleb (no sensitivity)/mrsa from sputumon 18th.  7 days iv vanc, has c diff toxin neg on enteral vanc,  On flagyl and doxy-- should be adequate for pneumonia.  3 lpm, wbc 22.6,     F/u cxr-- stable infiltrates as would be expected.    Some upper airway noise.  Not bad enough to trach.  Would consider bronch to inspect airway in a few days.  If worsens--intubation and trach would be recommended.    Will dose scopolamine patch consider resume robinul.  Will have robinul available for prn  Use.     Discussed with daughter, Ira.  Could be just aspiration/laryngospasm issues  as tracheal problem should not have progressed to extubation after good leak test.      Above from Dr Gao and below from Dr Gaspar:    - continue supplemental O2 via oximizer   - stable to improving upper airway rhonchi- monitor  - suction prn  - scopolamine patch   - continue antibiotics  - tube feedings via PEG  - rectal tube for diarrhea    Above from Dr Gaspar and below from Dr Gao:  1/31 no fever, vss, doxy/vanc for mrsa in sputum, also flagyl, 40% ox, wbc 13, hgb 7.6,     F/u cxr am  Taper abx? Will stop doxy with diarrhea.  On c diff prevention-- iv vanc for mrsa on sputum culture-   Return to [facility]?  Called daughter, ira, and discussed.  Could [facility] back to [facility].    2/1 no fever, vss, 710/890 I/o, 40%, hgb 7,3, cxr stagnant with rll density -- as would be expected.   Recurrent low hgb still - chr low, no anticoagg,   Would stop iv abx am.    Has wet airway and needs nt suctioning 3 times daily or so. Will resume robinul mg tid. Stop scop patch am.  Would not rec trach but outlook is not good.  She cannot protect airway.  Will discuss with daughter later.    Called Ira, daughter,  No answer.         2/2 no fever, vss, on flagy/vanc enterally only, 4lpm, wbc 10.7,  Stagnant, seems stable to return to [facility]--but having to do nasotracheal suctioning 4 times a day or more (?) ,  Discussed with daughter, Ira.  Not sure best management - family would like to get to [facility]-- could work at [facility] with nt suctioning?

## 2022-02-02 NOTE — PROGRESS NOTES
[facility] Hospital Medicine  Progress Note    Patient Name: Sandra Wilson  MRN: [id number]  Patient Class: IP- Inpatient   Admission Date: 1/18/2022  Length of Stay: 14 days  Attending Physician: Felipe Kruse MD  Primary Care Provider: Primary Doctor No        Subjective:     Principal Problem:Acute hypoxemic respiratory failure        HPI:  Sandra Wilson is an 81 year old female with a past medical history of CVAs, CNS aneurysm, PEG status, CAD, HLD, hypothryoidism, DM, aortic stenosis, and colon, breast, and ovarian cancer who presented from long term care with vomiting, diarrhea, and shortness of breath. Workup in the ED showed likely aspiration pneumonitis and C. Diff colitis. Her O2 requirement increased while in the ED requiring intubation with sedation. She also required Levophed as she likely developed septic shock. Antibiotics were broadened to cefepime, vancomycin, and Flagyl. Pulmonary was consulted. Family was notified. The patient was unable to provide history given acuity of illness.      Overview/Hospital Course:  Sandra Wilson is an 81 year old female with a past medical history of CVAs, CNS aneurysm, PEG status, CAD, HLD, hypothyroidism, DM, aortic stenosis, and colon, breast, and ovarian cancer who presented from long term care with vomiting, diarrhea, and shortness of breath secondary to acute hypoxic respiratory failure from aspiration pneumonia in the setting of C diff colitis leading to septic shock. She was intubated in the ED and started on Levophed infusion via RIJ CVC placed by Dr. Vignesh Gaspar. She was started on broad spectrum antibiotics with cefepime, Flagyl, vancomycin and admitted to the ICU. She was also started on enteral vancomycin for severe C diff colitis given elevated WBC count and STEFFANY. There was concern for developing ARDS given P/F ratio of 87 (severe); 190 1/22 (moderate). Pulmonary was consulted. She also presented with demand ischemia likely  secondary to septic shock. Troponin was trended and improved as shock resolved. Normal saline infusion was added for hyponatremia and STEFFANY which improved both. Levophed was able to be weaned. Family agreed to DNR status 1/18 and Palliative Care was consulted to discuss goal of care 1/19. Antibiotics were changed to doxycycline and Flagyl (GBS in sputum culture) 1/21; vancomycin was added 1/23 after culture also became positive for Staph aureus and Klebsiella. Her respiratory status has improved throughout her course and she was extubated (code changed to partial code for intubation) 1/21 only to be re-intubated for worsening stridor (history of tracheostomy). Upon re-intubation, copious amounts of secretions were suctioned. KUB suggested a mild ileus A bowel regimen was instituted and the patient was able to have bowel movements 1/22. Tube feeds were started 1/23. Her course was complicated by Afib as well noted on telemetry 1/21; metoprolol tartrate started 1/22.She failed a leak test 1/22; Surgery was consulted for tracheostomy which is pending conversation with family regarding goals of care.    Patient was set to go for trach on 01/26, but attempt was made for another trial extubation and the patient did well after extubation, she was transition to face mask and was able to maintain her saturations although had significant difficulty with secretions.  She was started on Robinul and scopolamine patch which did seem to improve for secretions.  Her respiratory status continued to be somewhat tenuous, so she was monitored in the ICU.      Interval History:  No changes in status.  No fevers.  Significant airway secretions.    Review of Systems   Unable to perform ROS: Other (not saying much.  her attention is drawn to the TV show)     Objective:     Vital Signs (Most Recent):  Temp: 98.2 °F (36.8 °C) (02/01/22 1600)  Pulse: 71 (02/01/22 1900)  Resp: 18 (02/01/22 1900)  BP: (!) 155/70 (02/01/22 1900)  SpO2: 100 %  (02/01/22 1900) Vital Signs (24h Range):  Temp:  [98.1 °F (36.7 °C)-99.3 °F (37.4 °C)] 98.2 °F (36.8 °C)  Pulse:  [68-79] 71  Resp:  [15-22] 18  SpO2:  [89 %-100 %] 100 %  BP: (127-155)/() 155/70     Weight: 69 kg (152 lb 1.9 oz)  Body mass index is 26.11 kg/m².    Intake/Output Summary (Last 24 hours) at 2/1/2022 2157  Last data filed at 2/1/2022 1841  Gross per 24 hour   Intake 1199.83 ml   Output 1150 ml   Net 49.83 ml      Physical Exam  Constitutional:       General: She is not in acute distress.     Appearance: She is not ill-appearing.   Eyes:      General:         Right eye: No discharge.         Left eye: No discharge.   Neck:      Vascular: No JVD.   Cardiovascular:      Rate and Rhythm: Normal rate and regular rhythm.   Pulmonary:      Effort: Pulmonary effort is normal.      Breath sounds: Normal breath sounds.   Abdominal:      General: Abdomen is flat. Bowel sounds are normal. There is no distension.      Palpations: Abdomen is soft.      Tenderness: There is no abdominal tenderness.   Musculoskeletal:      Right lower leg: No edema.      Left lower leg: No edema.   Skin:     General: Skin is warm and moist.      Findings: No rash.   Neurological:      Mental Status: She is alert. She is confused.   Psychiatric:         Attention and Perception: She is inattentive.         Mood and Affect: Mood and affect normal.         Speech: She is noncommunicative.         Significant Labs: All pertinent labs within the past 24 hours have been reviewed.    Significant Imaging: I have reviewed all pertinent imaging results/findings within the past 24 hours.      Assessment/Plan:      * Acute hypoxemic respiratory failure  Improving.  Continue supplementing oxygen.  Monitor sats.  Monitor work of breathing.  She's partial code, specifying that she'd want to be intubated if need be.    Anemia  Worsening.  Monitor HGB.  Transfuse if less than 7.    Hemoglobin   Date Value Ref Range Status   02/01/2022 7.3 (L)  "12.0 - 16.0 g/dL Final   01/31/2022 7.6 (L) 12.0 - 16.0 g/dL Final   01/30/2022 8.4 (L) 12.0 - 16.0 g/dL Final   01/29/2022 8.5 (L) 12.0 - 16.0 g/dL Final   ]      Paroxysmal atrial fibrillation  -Telemetry  - controlled on metoprolol 12.5 mg BID  - HGB is dropping, so will hold off on giving DOAC      ARDS (adult respiratory distress syndrome)  Resolved.      Gastrostomy tube dependent  Stable.  No issues with it    Pneumonia  Continue antibiotics as they are.    Antibiotics (From admission, onward)            Start     Stop Route Frequency Ordered    02/01/22 0530  vancomycin 1.25 g in dextrose 5% 250 mL IVPB (ready to mix)         -- IV Every 24 hours (non-standard times) 01/31/22 2154    01/23/22 1047  vancomycin - pharmacy to dose  (vancomycin IVPB)        "And" Linked Group Details    -- IV pharmacy to manage frequency 01/23/22 0947    01/18/22 1545  metronidazole IVPB 500 mg         -- IV Every 8 hours (non-standard times) 01/18/22 1441      . Cultures drawn and noted-   Microbiology Results (last 7 days)     Procedure Component Value Units Date/Time    Culture, Respiratory with Gram Stain [[id number]]  (Abnormal)  (Susceptibility) Collected: 01/26/22 0745    Order Status: Completed Specimen: Respiratory from Sputum Updated: 01/28/22 1316     Respiratory Culture No S aureus or Pseudomonas isolated.      PROTEUS MIRABILIS ESBL  Few        SERRATIA MARCESCENS  Moderate       Gram Stain (Respiratory) <10 epithelial cells per low power field.     Gram Stain (Respiratory) Rare WBC's     Gram Stain (Respiratory) No organisms seen       Will monitor patient closely and continue current treatment plan unchanged.        Type 2 diabetes mellitus, with long-term current use of insulin  Patient's FSGs are controlled on current medication regimen.  Most recent fingerstick glucose reviewed-   Recent Labs   Lab 01/31/22  2347 02/01/22  0530 02/01/22  1822   POCTGLUCOSE 122* 111* 124*     Current correctional scale  " Medium  Maintain anti-hyperglycemic dose as follows-   Antihyperglycemics (From admission, onward)            Start     Stop Route Frequency Ordered    01/18/22 1438  insulin aspart U-100 pen 1-10 Units         -- SubQ Before meals & nightly PRN 01/18/22 1438        Hold Oral hypoglycemics while patient is in the hospital.        YARI (generalized anxiety disorder)  Extubated on 01/26.  Using Precedex as needed to control anxiety.  Patient does not appear anxious on exam.      Hypothyroidism  Patient has chronic hypothyroidism. TFTs reviewed-   Lab Results   Component Value Date    TSH 1.130 07/13/2017   . Will continue chronic levothyroxine and adjust for and clinical changes.        Hyperlipidemia   Patient is chronically on statin.will continue for now. Monitor clinically. Last LDL was   Lab Results   Component Value Date    LDLCALC 94.4 07/13/2017          GERD (gastroesophageal reflux disease)  On famotidine.    VTE Risk Mitigation (From admission, onward)         Ordered     enoxaparin injection 40 mg  Daily         01/18/22 1438     IP VTE HIGH RISK PATIENT  Once         01/18/22 1438     Place sequential compression device  Until discontinued         01/18/22 1438                Discharge Planning   DEMETRIUS:      Code Status: Partial Code   Is the patient medically ready for discharge?:     Reason for patient still in hospital (select all that apply): Patient trending condition, Laboratory test and Treatment  Discharge Plan A: Return to nursing home          Felipe Kruse MD  Department of Hospital Medicine   [facility]

## 2022-02-02 NOTE — PLAN OF CARE
Per Katie with [facility] she is reviewing for acceptance and will reach out to family. CM following.    02/02/22 1426   Post-Acute Status   Post-Acute Authorization Placement   Post-Acute Placement Status Pending payor review/awaiting authorization (if required)

## 2022-02-02 NOTE — SUBJECTIVE & OBJECTIVE
Interval History:  No changes in status.  No fevers.  Significant airway secretions.    Review of Systems   Unable to perform ROS: Other (not saying much.  her attention is drawn to the TV show)     Objective:     Vital Signs (Most Recent):  Temp: 98.2 °F (36.8 °C) (02/01/22 1600)  Pulse: 71 (02/01/22 1900)  Resp: 18 (02/01/22 1900)  BP: (!) 155/70 (02/01/22 1900)  SpO2: 100 % (02/01/22 1900) Vital Signs (24h Range):  Temp:  [98.1 °F (36.7 °C)-99.3 °F (37.4 °C)] 98.2 °F (36.8 °C)  Pulse:  [68-79] 71  Resp:  [15-22] 18  SpO2:  [89 %-100 %] 100 %  BP: (127-155)/() 155/70     Weight: 69 kg (152 lb 1.9 oz)  Body mass index is 26.11 kg/m².    Intake/Output Summary (Last 24 hours) at 2/1/2022 2157  Last data filed at 2/1/2022 1841  Gross per 24 hour   Intake 1199.83 ml   Output 1150 ml   Net 49.83 ml      Physical Exam  Constitutional:       General: She is not in acute distress.     Appearance: She is not ill-appearing.   Eyes:      General:         Right eye: No discharge.         Left eye: No discharge.   Neck:      Vascular: No JVD.   Cardiovascular:      Rate and Rhythm: Normal rate and regular rhythm.   Pulmonary:      Effort: Pulmonary effort is normal.      Breath sounds: Normal breath sounds.   Abdominal:      General: Abdomen is flat. Bowel sounds are normal. There is no distension.      Palpations: Abdomen is soft.      Tenderness: There is no abdominal tenderness.   Musculoskeletal:      Right lower leg: No edema.      Left lower leg: No edema.   Skin:     General: Skin is warm and moist.      Findings: No rash.   Neurological:      Mental Status: She is alert. She is confused.   Psychiatric:         Attention and Perception: She is inattentive.         Mood and Affect: Mood and affect normal.         Speech: She is noncommunicative.         Significant Labs: All pertinent labs within the past 24 hours have been reviewed.    Significant Imaging: I have reviewed all pertinent imaging results/findings  within the past 24 hours.

## 2022-02-02 NOTE — ASSESSMENT & PLAN NOTE
-Telemetry  - controlled on metoprolol 12.5 mg BID  - HGB is dropping, so will hold off on giving DOAC

## 2022-02-02 NOTE — ASSESSMENT & PLAN NOTE
Worsening.  Monitor HGB.  Transfuse if less than 7.    Hemoglobin   Date Value Ref Range Status   02/01/2022 7.3 (L) 12.0 - 16.0 g/dL Final   01/31/2022 7.6 (L) 12.0 - 16.0 g/dL Final   01/30/2022 8.4 (L) 12.0 - 16.0 g/dL Final   01/29/2022 8.5 (L) 12.0 - 16.0 g/dL Final   ]

## 2022-02-02 NOTE — ASSESSMENT & PLAN NOTE
Patient's FSGs are controlled on current medication regimen.  Most recent fingerstick glucose reviewed-   Recent Labs   Lab 01/31/22  2347 02/01/22  0530 02/01/22  1822   POCTGLUCOSE 122* 111* 124*     Current correctional scale  Medium  Maintain anti-hyperglycemic dose as follows-   Antihyperglycemics (From admission, onward)            Start     Stop Route Frequency Ordered    01/18/22 1438  insulin aspart U-100 pen 1-10 Units         -- SubQ Before meals & nightly PRN 01/18/22 1438        Hold Oral hypoglycemics while patient is in the hospital.

## 2022-02-02 NOTE — ASSESSMENT & PLAN NOTE
"Continue antibiotics as they are.    Antibiotics (From admission, onward)            Start     Stop Route Frequency Ordered    02/01/22 0530  vancomycin 1.25 g in dextrose 5% 250 mL IVPB (ready to mix)         -- IV Every 24 hours (non-standard times) 01/31/22 2154    01/23/22 1047  vancomycin - pharmacy to dose  (vancomycin IVPB)        "And" Linked Group Details    -- IV pharmacy to manage frequency 01/23/22 0947    01/18/22 1545  metronidazole IVPB 500 mg         -- IV Every 8 hours (non-standard times) 01/18/22 1441      . Cultures drawn and noted-   Microbiology Results (last 7 days)     Procedure Component Value Units Date/Time    Culture, Respiratory with Gram Stain [[id number]]  (Abnormal)  (Susceptibility) Collected: 01/26/22 0745    Order Status: Completed Specimen: Respiratory from Sputum Updated: 01/28/22 1316     Respiratory Culture No S aureus or Pseudomonas isolated.      PROTEUS MIRABILIS ESBL  Few        SERRATIA MARCESCENS  Moderate       Gram Stain (Respiratory) <10 epithelial cells per low power field.     Gram Stain (Respiratory) Rare WBC's     Gram Stain (Respiratory) No organisms seen       Will monitor patient closely and continue current treatment plan unchanged.      "

## 2022-02-02 NOTE — PLAN OF CARE
LTAC packet, consult and meds sent to [facility] and [facility] for review. CM following.    02/02/22 1054   Post-Acute Status   Post-Acute Authorization Placement   Post-Acute Placement Status Referrals Sent

## 2022-02-02 NOTE — PLAN OF CARE
Problem: Adult Inpatient Plan of Care  Goal: Plan of Care Review  Outcome: Ongoing, Progressing   Awake, watching TV most of the day. Oriented to self only.   Respirations unlabored.  3L Oxymask in use.  NT suctioning prn. Thick yellow secretions.   Purwick in place. Good urine output.  Flexiseal intact. Small amt brown liquid stool.   Rotation bed in use. Safety maintained.

## 2022-02-02 NOTE — ASSESSMENT & PLAN NOTE
Extubated on 01/26.  Using Precedex as needed to control anxiety.  Patient does not appear anxious on exam.

## 2022-02-03 LAB
ALBUMIN SERPL BCP-MCNC: 2.1 G/DL (ref 3.5–5.2)
ALP SERPL-CCNC: 58 U/L (ref 55–135)
ALT SERPL W/O P-5'-P-CCNC: 10 U/L (ref 10–44)
ANION GAP SERPL CALC-SCNC: 6 MMOL/L (ref 8–16)
AST SERPL-CCNC: 17 U/L (ref 10–40)
BASOPHILS # BLD AUTO: 0.05 K/UL (ref 0–0.2)
BASOPHILS NFR BLD: 0.5 % (ref 0–1.9)
BILIRUB SERPL-MCNC: 0.3 MG/DL (ref 0.1–1)
BUN SERPL-MCNC: 15 MG/DL (ref 8–23)
CALCIUM SERPL-MCNC: 8.1 MG/DL (ref 8.7–10.5)
CHLORIDE SERPL-SCNC: 94 MMOL/L (ref 95–110)
CO2 SERPL-SCNC: 34 MMOL/L (ref 23–29)
CREAT SERPL-MCNC: 0.6 MG/DL (ref 0.5–1.4)
DIFFERENTIAL METHOD: ABNORMAL
EOSINOPHIL # BLD AUTO: 0.1 K/UL (ref 0–0.5)
EOSINOPHIL NFR BLD: 1.3 % (ref 0–8)
ERYTHROCYTE [DISTWIDTH] IN BLOOD BY AUTOMATED COUNT: 14.2 % (ref 11.5–14.5)
EST. GFR  (AFRICAN AMERICAN): >60 ML/MIN/1.73 M^2
EST. GFR  (NON AFRICAN AMERICAN): >60 ML/MIN/1.73 M^2
GLUCOSE SERPL-MCNC: 117 MG/DL (ref 70–110)
HCT VFR BLD AUTO: 21.7 % (ref 37–48.5)
HGB BLD-MCNC: 7.1 G/DL (ref 12–16)
IMM GRANULOCYTES # BLD AUTO: 0.05 K/UL (ref 0–0.04)
IMM GRANULOCYTES NFR BLD AUTO: 0.5 % (ref 0–0.5)
LYMPHOCYTES # BLD AUTO: 1.1 K/UL (ref 1–4.8)
LYMPHOCYTES NFR BLD: 11.7 % (ref 18–48)
MAGNESIUM SERPL-MCNC: 1.5 MG/DL (ref 1.6–2.6)
MCH RBC QN AUTO: 32.6 PG (ref 27–31)
MCHC RBC AUTO-ENTMCNC: 32.7 G/DL (ref 32–36)
MCV RBC AUTO: 100 FL (ref 82–98)
MONOCYTES # BLD AUTO: 1.1 K/UL (ref 0.3–1)
MONOCYTES NFR BLD: 11.5 % (ref 4–15)
NEUTROPHILS # BLD AUTO: 7.2 K/UL (ref 1.8–7.7)
NEUTROPHILS NFR BLD: 74.5 % (ref 38–73)
NRBC BLD-RTO: 0 /100 WBC
PHOSPHATE SERPL-MCNC: 3 MG/DL (ref 2.7–4.5)
PLATELET # BLD AUTO: 277 K/UL (ref 150–450)
PMV BLD AUTO: 10.7 FL (ref 9.2–12.9)
POCT GLUCOSE: 103 MG/DL (ref 70–110)
POCT GLUCOSE: 122 MG/DL (ref 70–110)
POCT GLUCOSE: 132 MG/DL (ref 70–110)
POCT GLUCOSE: 83 MG/DL (ref 70–110)
POTASSIUM SERPL-SCNC: 3.9 MMOL/L (ref 3.5–5.1)
PROT SERPL-MCNC: 5.7 G/DL (ref 6–8.4)
RBC # BLD AUTO: 2.18 M/UL (ref 4–5.4)
SODIUM SERPL-SCNC: 134 MMOL/L (ref 136–145)
VANCOMYCIN TROUGH SERPL-MCNC: 20.3 UG/ML (ref 10–22)
WBC # BLD AUTO: 9.59 K/UL (ref 3.9–12.7)

## 2022-02-03 PROCEDURE — 63600175 PHARM REV CODE 636 W HCPCS: Performed by: HOSPITALIST

## 2022-02-03 PROCEDURE — 80053 COMPREHEN METABOLIC PANEL: CPT | Performed by: STUDENT IN AN ORGANIZED HEALTH CARE EDUCATION/TRAINING PROGRAM

## 2022-02-03 PROCEDURE — 27000221 HC OXYGEN, UP TO 24 HOURS

## 2022-02-03 PROCEDURE — 25000003 PHARM REV CODE 250: Performed by: STUDENT IN AN ORGANIZED HEALTH CARE EDUCATION/TRAINING PROGRAM

## 2022-02-03 PROCEDURE — 99233 PR SUBSEQUENT HOSPITAL CARE,LEVL III: ICD-10-PCS | Mod: ,,, | Performed by: INTERNAL MEDICINE

## 2022-02-03 PROCEDURE — 25000003 PHARM REV CODE 250: Performed by: HOSPITALIST

## 2022-02-03 PROCEDURE — 83735 ASSAY OF MAGNESIUM: CPT | Performed by: STUDENT IN AN ORGANIZED HEALTH CARE EDUCATION/TRAINING PROGRAM

## 2022-02-03 PROCEDURE — 84100 ASSAY OF PHOSPHORUS: CPT | Performed by: STUDENT IN AN ORGANIZED HEALTH CARE EDUCATION/TRAINING PROGRAM

## 2022-02-03 PROCEDURE — 63600175 PHARM REV CODE 636 W HCPCS: Performed by: STUDENT IN AN ORGANIZED HEALTH CARE EDUCATION/TRAINING PROGRAM

## 2022-02-03 PROCEDURE — 25000003 PHARM REV CODE 250: Performed by: INTERNAL MEDICINE

## 2022-02-03 PROCEDURE — 80202 ASSAY OF VANCOMYCIN: CPT | Performed by: INTERNAL MEDICINE

## 2022-02-03 PROCEDURE — 20000000 HC ICU ROOM

## 2022-02-03 PROCEDURE — S0030 INJECTION, METRONIDAZOLE: HCPCS | Performed by: STUDENT IN AN ORGANIZED HEALTH CARE EDUCATION/TRAINING PROGRAM

## 2022-02-03 PROCEDURE — 94761 N-INVAS EAR/PLS OXIMETRY MLT: CPT

## 2022-02-03 PROCEDURE — 36410 VNPNXR 3YR/> PHY/QHP DX/THER: CPT

## 2022-02-03 PROCEDURE — 27000207 HC ISOLATION

## 2022-02-03 PROCEDURE — 85025 COMPLETE CBC W/AUTO DIFF WBC: CPT | Performed by: STUDENT IN AN ORGANIZED HEALTH CARE EDUCATION/TRAINING PROGRAM

## 2022-02-03 PROCEDURE — 63600175 PHARM REV CODE 636 W HCPCS

## 2022-02-03 PROCEDURE — 99233 SBSQ HOSP IP/OBS HIGH 50: CPT | Mod: ,,, | Performed by: INTERNAL MEDICINE

## 2022-02-03 RX ORDER — VANCOMYCIN HCL IN 5 % DEXTROSE 1G/250ML
15 PLASTIC BAG, INJECTION (ML) INTRAVENOUS
Status: DISCONTINUED | OUTPATIENT
Start: 2022-02-03 | End: 2022-02-07

## 2022-02-03 RX ADMIN — ATORVASTATIN CALCIUM 40 MG: 40 TABLET, FILM COATED ORAL at 08:02

## 2022-02-03 RX ADMIN — ENOXAPARIN SODIUM 40 MG: 100 INJECTION SUBCUTANEOUS at 04:02

## 2022-02-03 RX ADMIN — AZELASTINE 137 MCG: 1 SPRAY, METERED NASAL at 08:02

## 2022-02-03 RX ADMIN — METRONIDAZOLE 500 MG: 500 INJECTION, SOLUTION INTRAVENOUS at 11:02

## 2022-02-03 RX ADMIN — FAMOTIDINE 20 MG: 10 INJECTION INTRAVENOUS at 08:02

## 2022-02-03 RX ADMIN — MIDODRINE HYDROCHLORIDE 5 MG: 5 TABLET ORAL at 08:02

## 2022-02-03 RX ADMIN — MIDODRINE HYDROCHLORIDE 5 MG: 5 TABLET ORAL at 03:02

## 2022-02-03 RX ADMIN — GLYCOPYRROLATE 1 MG: 1 TABLET ORAL at 03:02

## 2022-02-03 RX ADMIN — METOPROLOL TARTRATE 12.5 MG: 25 TABLET, FILM COATED ORAL at 08:02

## 2022-02-03 RX ADMIN — CITALOPRAM HYDROBROMIDE 40 MG: 10 TABLET ORAL at 08:02

## 2022-02-03 RX ADMIN — MAGNESIUM SULFATE HEPTAHYDRATE 2 G: 2 INJECTION, SOLUTION INTRAVENOUS at 08:02

## 2022-02-03 RX ADMIN — VANCOMYCIN HYDROCHLORIDE 1000 MG: 1 INJECTION, POWDER, LYOPHILIZED, FOR SOLUTION INTRAVENOUS at 04:02

## 2022-02-03 RX ADMIN — GLYCOPYRROLATE 1 MG: 1 TABLET ORAL at 08:02

## 2022-02-03 RX ADMIN — LEVOTHYROXINE SODIUM 25 MCG: 0.03 TABLET ORAL at 05:02

## 2022-02-03 RX ADMIN — METRONIDAZOLE 500 MG: 500 INJECTION, SOLUTION INTRAVENOUS at 08:02

## 2022-02-03 RX ADMIN — METRONIDAZOLE 500 MG: 500 INJECTION, SOLUTION INTRAVENOUS at 03:02

## 2022-02-03 NOTE — CARE UPDATE
Oxygen weaned to 2lpm       02/03/22 1203   PRE-TX-O2   O2 Device (Oxygen Therapy) Oxymask   Flow (L/min) 2   SpO2 98 %   Pulse Oximetry Type Continuous   $ Pulse Oximetry - Multiple Charge Pulse Oximetry - Multiple   Pulse 65   Resp 20   BP (!) 107/53

## 2022-02-03 NOTE — PLAN OF CARE
Intervention: continue enteral nutrition therapy     Recommendations  1) Continue TF Isosource 1.5 @ 45 mL/hr + Brody BID and Beneprotein BID + 115 ml flush q 4 hr  --Will provide 1620 kcal, 73 g protein ( + beneprotein), 820 ml free water  (100% EEN, 100% EPN )      2) weigh weekly, SLP consult if appropriate     Goals: 1) Initation of enteral nutrition by RD follow up 2) Nutrition support meeting > 50% of needs at f/u  Nutrition Goal Status: meting/ meting  Communication of RD Recs: POC, sticky note, reviewed with MD/RN

## 2022-02-03 NOTE — PLAN OF CARE
Patient not medically clear, 4L Oxymask, recent extubation with excessive secretions- nasotracheal sunction.  [facility] reviewing for LTAC, awaiting response from [facility].  Discharge plan is LTAC pending acceptance.

## 2022-02-03 NOTE — PLAN OF CARE
Problem: Adult Inpatient Plan of Care  Goal: Plan of Care Review  Outcome: Ongoing, Progressing     Problem: Adult Inpatient Plan of Care  Goal: Optimal Comfort and Wellbeing  Outcome: Ongoing, Progressing     Problem: Adult Inpatient Plan of Care  Goal: Readiness for Transition of Care  Outcome: Ongoing, Progressing   Still requires frequent oral suctioning , coughing but not able to expectorate. Thick tan color. No fever noted. Able to follow commands. In sinus rhythm HR 70. Tolerating tube feeding. At goal rate. Voiding per purewick. Lower ext are stiff. Bed on rotation  mode. Safety/fall prevention maintain. Pt's son called last night was updated.

## 2022-02-03 NOTE — ASSESSMENT & PLAN NOTE
"Continue antibiotics as they are but will stop them soon.  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  She's producing a lot of airway secretions and needs frequent suctioning.  She's high risk of aspiration pneumonia.  Will try to get her into LTACH for long-term care.    Antibiotics (From admission, onward)            Start     Stop Route Frequency Ordered    02/01/22 0530  vancomycin 1.25 g in dextrose 5% 250 mL IVPB (ready to mix)         -- IV Every 24 hours (non-standard times) 01/31/22 2154    01/23/22 1047  vancomycin - pharmacy to dose  (vancomycin IVPB)        "And" Linked Group Details    -- IV pharmacy to manage frequency 01/23/22 0947    01/18/22 1545  metronidazole IVPB 500 mg         -- IV Every 8 hours (non-standard times) 01/18/22 1441         Will monitor patient closely and continue current treatment plan unchanged.      "

## 2022-02-03 NOTE — ASSESSMENT & PLAN NOTE
Worsening.  Monitor HGB.  Transfuse if less than 7.    Hemoglobin   Date Value Ref Range Status   02/02/2022 7.4 (L) 12.0 - 16.0 g/dL Final   02/01/2022 7.3 (L) 12.0 - 16.0 g/dL Final   01/31/2022 7.6 (L) 12.0 - 16.0 g/dL Final   01/30/2022 8.4 (L) 12.0 - 16.0 g/dL Final   ]

## 2022-02-03 NOTE — ASSESSMENT & PLAN NOTE
Patient's FSGs are controlled on current medication regimen.  Most recent fingerstick glucose reviewed-   Recent Labs   Lab 02/02/22  0023 02/02/22  0519 02/02/22  1756   POCTGLUCOSE 104 113* 127*     Current correctional scale  Medium  Maintain anti-hyperglycemic dose as follows-   Antihyperglycemics (From admission, onward)            Start     Stop Route Frequency Ordered    01/18/22 1438  insulin aspart U-100 pen 1-10 Units         -- SubQ Before meals & nightly PRN 01/18/22 1438        Hold Oral hypoglycemics while patient is in the hospital.

## 2022-02-03 NOTE — CARE UPDATE
02/02/22 1955   Patient Assessment/Suction   Level of Consciousness (AVPU) alert   Respiratory Effort Normal;Unlabored   Suction Method nasal;tracheal   $ Suction Charges NT Suction Procedure   Secretions Amount moderate   Secretions Color red-streaked;tan   Secretions Characteristics thick   PRE-TX-O2   O2 Device (Oxygen Therapy) Oxymask   Flow (L/min) 4   SpO2 100 %   Pulse Oximetry Type Continuous   Pulse 73   Resp 19

## 2022-02-03 NOTE — PLAN OF CARE
POC reviewed with daughter as pt unable to verbalize understanding. Pt on 2L via Oxymask. Central line discontinued and midline inserted. BG monitored. Comfort and wellbeing maintained. Fall and safety precautions in place. Special contact precautions maintained.     Problem: Adult Inpatient Plan of Care  Goal: Plan of Care Review  Outcome: Ongoing, Progressing  Goal: Patient-Specific Goal (Individualized)  Outcome: Ongoing, Progressing  Goal: Optimal Comfort and Wellbeing  Outcome: Ongoing, Progressing     Problem: Infection  Goal: Absence of Infection Signs and Symptoms  Outcome: Ongoing, Progressing

## 2022-02-03 NOTE — PROGRESS NOTES
Pharmacokinetic Assessment Follow Up: IV Vancomycin    Vancomycin serum concentration assessment(s):    The trough level was drawn correctly and can be used to guide therapy at this time. The measurement is above the desired definitive target range of 15 to 20 mcg/mL.    Vancomycin Regimen Plan:    Change regimen to Vancomycin 1000 mg IV every q24h hours with next serum trough concentration measured at 1400 prior to 3rd dose on 02/05/22    Drug levels (last 3 results):  Recent Labs   Lab Result Units 01/31/22 2053 02/03/22  0437   Vancomycin-Trough ug/mL 22.0 20.3       Pharmacy will continue to follow and monitor vancomycin.    Please contact pharmacy at extension [phone] for questions regarding this assessment.    Thank you for the consult,   Dallas Varma       Patient brief summary:  Sandra Wilson is a 81 y.o. female initiated on antimicrobial therapy with IV Vancomycin for treatment of lower respiratory infection    The patient's current regimen is 1250mg q24h    Drug Allergies:   Review of patient's allergies indicates:   Allergen Reactions    Cephalexin (bulk)      Flushing and itching    Lidocaine base      rash    Lisinopril Other (See Comments)     cough       Actual Body Weight:   67.5kg    Renal Function:   Estimated Creatinine Clearance: 69.4 mL/min (based on SCr of 0.6 mg/dL).,     CBC (last 72 hours):  Recent Labs   Lab Result Units 02/01/22 0328 02/02/22 0421 02/03/22 0437   WBC K/uL 11.40 10.66 9.59   Hemoglobin g/dL 7.3* 7.4* 7.1*   Hematocrit % 22.6* 22.9* 21.7*   Platelets K/uL 327 294 277   Gran % % 77.3* 73.6* 74.5*   Lymph % % 9.8* 11.1* 11.7*   Mono % % 11.1 12.5 11.5   Eosinophil % % 1.0 1.4 1.3   Basophil % % 0.4 0.6 0.5   Differential Method  Automated Automated Automated       Metabolic Panel (last 72 hours):  Recent Labs   Lab Result Units 02/01/22 0328 02/02/22 0421 02/03/22 0437   Sodium mmol/L 135* 135* 134*   Potassium mmol/L 3.8 3.7 3.9   Chloride mmol/L 95 95 94*   CO2  mmol/L 33* 32* 34*   Glucose mg/dL 112* 100 117*   BUN mg/dL 16 14 15   Creatinine mg/dL 0.6 0.6 0.6   Albumin g/dL 2.1* 2.2* 2.1*   Total Bilirubin mg/dL 0.3 0.3 0.3   Alkaline Phosphatase U/L 56 60 58   AST U/L 13 15 17   ALT U/L 14 13 10   Magnesium mg/dL 1.7 1.5* 1.5*   Phosphorus mg/dL 2.8 2.8 3.0       Vancomycin Administrations:  vancomycin given in the last 96 hours                     vancomycin 1.25 g in dextrose 5% 250 mL IVPB (ready to mix) (mg) 1,250 mg New Bag 02/02/22 0521     1,250 mg New Bag 02/01/22 0534    vancomycin 1.5 g in dextrose 5 % 250 mL IVPB (ready to mix) (mg) 1,500 mg New Bag 01/30/22 2245                    Microbiologic Results:  Microbiology Results (last 7 days)       Procedure Component Value Units Date/Time    Culture, Respiratory with Gram Stain [[id number]]  (Abnormal)  (Susceptibility) Collected: 01/26/22 0745    Order Status: Completed Specimen: Respiratory from Sputum Updated: 01/28/22 1316     Respiratory Culture No S aureus or Pseudomonas isolated.      PROTEUS MIRABILIS ESBL  Few        SERRATIA MARCESCENS  Moderate       Gram Stain (Respiratory) <10 epithelial cells per low power field.     Gram Stain (Respiratory) Rare WBC's     Gram Stain (Respiratory) No organisms seen

## 2022-02-03 NOTE — SUBJECTIVE & OBJECTIVE
Interval History:  No changes in status.  No fevers.  Significant airway secretions.  Needs suctioning several times a day.  LTACH referral ordered today.    Review of Systems   Constitutional: Positive for fatigue. Negative for chills and fever.   Respiratory: Positive for cough. Negative for shortness of breath.    Cardiovascular: Negative for chest pain and leg swelling.   Gastrointestinal: Negative for abdominal pain, nausea and vomiting.     Objective:     Vital Signs (Most Recent):  Temp: 98.1 °F (36.7 °C) (02/02/22 1930)  Pulse: 73 (02/02/22 2100)  Resp: 17 (02/02/22 2100)  BP: (!) 123/56 (02/02/22 2100)  SpO2: 100 % (02/02/22 2100) Vital Signs (24h Range):  Temp:  [97.9 °F (36.6 °C)-98.6 °F (37 °C)] 98.1 °F (36.7 °C)  Pulse:  [67-79] 73  Resp:  [16-22] 17  SpO2:  [91 %-100 %] 100 %  BP: (120-162)/(56-76) 123/56     Weight: 65.9 kg (145 lb 4.5 oz)  Body mass index is 24.94 kg/m².    Intake/Output Summary (Last 24 hours) at 2/2/2022 2122  Last data filed at 2/2/2022 1800  Gross per 24 hour   Intake 2684.68 ml   Output 1250 ml   Net 1434.68 ml      Physical Exam  Constitutional:       General: She is not in acute distress.     Appearance: She is not ill-appearing.   Eyes:      General:         Right eye: No discharge.         Left eye: No discharge.   Neck:      Vascular: No JVD.   Cardiovascular:      Rate and Rhythm: Normal rate and regular rhythm.   Pulmonary:      Effort: Pulmonary effort is normal.      Breath sounds: Normal breath sounds.   Abdominal:      General: Abdomen is flat. Bowel sounds are normal. There is no distension.      Palpations: Abdomen is soft.      Tenderness: There is no abdominal tenderness.   Musculoskeletal:      Right lower leg: No edema.      Left lower leg: No edema.   Skin:     General: Skin is warm and moist.      Findings: No rash.   Neurological:      Mental Status: She is alert. She is confused.   Psychiatric:         Mood and Affect: Mood and affect normal.          Significant Labs: All pertinent labs within the past 24 hours have been reviewed.    Significant Imaging: I have reviewed all pertinent imaging results/findings within the past 24 hours.

## 2022-02-03 NOTE — PROGRESS NOTES
"  02/03/2022      Admit Date: 1/18/2022  Sandra Wilson  New Patient Consult    Chief Complaint   Patient presents with    Shortness of Breath    Vomiting     PEG tube        History of Present Illness:  Intubated.          From Dr Gaspar's hpi, ERP:Sandra Wilson is a 81 y.o. female who presents to the ED via EMS  with an onset of vomiting and SOB. EMS reported that upon examination patient was less responsive than usually but is able to answer questions. The report from the EMS upon arrival was an O2 sats in the 70's. EMS proceeded to give supplemental oxygen and the O2 sats raised up to 93% on 3 liters of O2 with  "junky" right upper lung sounds" and greenish diarrhea. She was vomiting en route. They also reported that the patient is a patient that has dislodged her PEG tubing many times. PMHx of HTN, CAD, OA, GERD, ctroke, Colon cancer, ovarian cancer, breast cancer.PSHx of Cardiac catheterization, colonoscopy.         Progress Note  PULMONARY    Admit Date: 1/18/2022 02/03/2022      SUBJECTIVE:     1/19 intubated, sedated,  1/20 no c/o intubated, sedated  1/21 no new c/o intubated  1/22 no c/o intubated   1/23 no  New c/o intubated  1/24 no new c/o  1/25 no new c/o  1/26 no new co  1/28- resp status stable on 5L oxymizer mask. Having loose BMs in diaper. Pt does not verbalize. Slight stridor but no distress, oxygening well  1/29- remains on oxymizer, resp status stable. Hypertensive to 170s-180s systolic  1/30- continues on oxymizer, no new issues  1/31 no new c/o  2/1 no new c/o   2/2 no new c/o  2/3 no new c/o      PFSH and Allergies reviewed.    OBJECTIVE:     Vitals (Most recent):  Vitals:    02/03/22 0430   BP:    Pulse: 73   Resp: 20   Temp: 98.6 °F (37 °C)       Vitals (24 hour range):  Temp:  [97.9 °F (36.6 °C)-98.6 °F (37 °C)]   Pulse:  [67-79]   Resp:  [16-22]   BP: (115-162)/(55-76)   SpO2:  [92 %-100 %]       Intake/Output Summary (Last 24 hours) at 2/3/2022 0502  Last data filed at 2/3/2022 " 0200  Gross per 24 hour   Intake 2350.46 ml   Output 1900 ml   Net 450.46 ml        Too value is ED when Physical Exam:  The patient's neuro status (alertness,orientation,cognitive function,motor skills,), pharyngeal exam (oral lesions, hygiene, abn dentition,), Neck (jvd,mass,thyroid,nodes in neck and above/below clavicle),RESPIRATORY(symmetry,effort,fremitus,percussion,auscultation),  Cor(rhythm,heart tones including gallops,perfusion,edema)ABD(distention,hepatic&splenomegaly,tenderness,masses), Skin(rash,cyanosis),Psyc(affect,judgement,).  Exam negative except for these pertinent findings:    No distress, verbalizes weakly,   Soft voice  No stridor-- has wet airway sounds and needs nt suctioning  Trach scar  chest is symmetric, no distress, normal percussion, normal fremitus and good normal breath sounds    abd soft with PEG tube  No edema      Radiographs reviewed: view by direct vision    Results for orders placed during the hospital encounter of 01/18/22    X-Ray Chest 1 View    Narrative  EXAMINATION:  Mm and is is  XR CHEST 1 VIEW    CLINICAL HISTORY:  respiratory failure;    TECHNIQUE:  Single frontal view of the chest was performed.    COMPARISON:  Chest portable of January 18, 2022    FINDINGS:  An endotracheal tube ends in the trachea above the level the malorie.  An NG tube traverses the esophagus to the stomach.  A central line enters at the right neck and ends in the superior vena cava.  The cardiac size and contours within normal limits.  A loop recorder is noted in the left chest.  There is continued central right lung infiltrate and small infiltrate at the left lung base.  No pneumothorax is seen.    Impression  Continued intrapulmonary infiltrates right greater than left.  Endotracheal tube, NG tube and right central lines in position.      Electronically signed by: Mathew Yanez MD  Date:    01/18/2022  Time:    15:50  ]    Labs     Recent Labs   Lab 02/03/22  0437   WBC 9.59   HGB 7.1*   HCT  21.7*        No results for input(s): NA, K, CL, CO2, BUN, CREATININE, GLU, CALCIUM, CAION, MG, PHOS, AST, ALT, ALKPHOS, BILITOT, BILIDIR, PROT, ALBUMIN, PREALBUMIN, AMYLASE, LIPASE, CRP, HSCRP, SEDRATE, PROCAL, INR, PTT, LABHEPA, LACTATE, TROPONINI, CPK, CPKMB, MB, BNP in the last 24 hours.  No results for input(s): PH, PCO2, PO2, HCO3 in the last 24 hours.  Microbiology Results (last 7 days)     Procedure Component Value Units Date/Time    Culture, Respiratory with Gram Stain [[id number]]  (Abnormal)  (Susceptibility) Collected: 01/26/22 0743    Order Status: Completed Specimen: Respiratory from Sputum Updated: 01/28/22 1316     Respiratory Culture No S aureus or Pseudomonas isolated.      PROTEUS MIRABILIS ESBL  Few        SERRATIA MARCESCENS  Moderate       Gram Stain (Respiratory) <10 epithelial cells per low power field.     Gram Stain (Respiratory) Rare WBC's     Gram Stain (Respiratory) No organisms seen        Echo   Summary    · Mild concentric hypertrophy and normal systolic function.  · Mild-to-moderate mitral regurgitation.  · Mild tricuspid regurgitation.  · Mild pulmonic regurgitation.  · The estimated ejection fraction is 55%.  · Indeterminate left ventricular diastolic function.  · Normal right ventricular size with normal right ventricular systolic function.  · Mild left atrial enlargement.  · There is moderate-to-severe aortic valve stenosis.  · Aortic valve area is cm2; peak velocity is 3.42 m/s; mean gradient is 32 mmHg. Peak gradient 47mmHg  · Mild aortic regurgitation.  · Normal central venous pressure (3 mmHg).  · The estimated PA systolic pressure is 32 mmHg.  · There is no pulmonary hypertension.  · Moderate to severe AS. DI= 0.23 and 0.20          Impression:  Active Hospital Problems    Diagnosis  POA    *Acute hypoxemic respiratory failure [J96.01]  Yes    Anemia [D64.9]  Yes    Paroxysmal atrial fibrillation [I48.0]  No    Gastrostomy tube dependent [Z93.1]  Not Applicable     YARI (generalized anxiety disorder) [F41.1]  Yes    Type 2 diabetes mellitus, with long-term current use of insulin [E11.9, Z79.4]  Not Applicable    Aspiration pneumonia [J69.0]  Yes    Hypothyroidism [E03.9]  Yes    Hyperlipidemia [E78.5]  Yes     Chronic    GERD (gastroesophageal reflux disease) [K21.9]  Yes     Chronic      Resolved Hospital Problems    Diagnosis Date Resolved POA    Ileus [K56.7] 01/23/2022 No    Demand ischemia [I24.8] 01/24/2022 Yes    Sepsis [A41.9] 01/24/2022 Yes    STEFFANY (acute kidney injury) [N17.9] 01/21/2022 Yes    Hyponatremia [E87.1] 01/20/2022 Yes    ARDS (adult respiratory distress syndrome) [J80] 02/01/2022 Yes                        Plan: no fever, vss, was hypotensive earlier.  Cefepime/vanc,flagyl,  chr midodrine, levophed 0.9 ,  ddimer 5.2, creat 0.9  c diff ag + but toxin neg.  abg 7.27 with ox 48,   Dnr,  cxr impressive R>>>L aspiration pattern.     Prior subdural --       Recruitment manuver done with peep 30 for 6 seconds with art line bp falling from sbp 100 to 60's.  Sat improved from 92 to 94.  Pt not really peep responsive.    Pt should improve ox with left side down or prone.  Peep may help but infiltrates more R>L -- should be posterior lung bases.  Might consider cta if not progressing.      discussed with resp and nursing. Optimally would be left side down or prone.        1/19 no fever, vss with rr to 43, flagyl/zosyn/vanc/cefepime, n70-80% ox,  Wbc 14.8 from 5k,  Creat 1.5 form 0.9,   abg 02 61 with ph 7.33- peep 5,   I/o 687/133-  Will screen for dvt/pe with leg study  Will dose 500 cc saline,   F/u cxr more opacification right>> left lung.      1/20 no fever, vss, bp low at times, I.o 2770/1870, cefepime/flagyl/vanc, ox 60%, wbc 14.6, plts 144- new, creat 1.0,. cxr with some clearing,   F/u abg, wean soon likely but need better ox to extubate.    No dvt, ddimer up at admit,     Pt min vol 8 and seemed to do well with 50% ox while I rounded.  Discussed  with nursing and respiratory-- if can get to 40% and pass wean trial would recommend extubation, would be optimal to discuss with family prior plans not to re intubate.  She is progressing-- would consider not discussing comfort care if improving.      1/21 no fever, vss, tm 100, wbc 15.2, hgb 8, from 13.6 bon 18th- admit, plt 130 from 244,   Creat 0.7,   cxr progressive clearing right , some increase opacification left suggested.   Strep in sputum,  Taper abx to doxy and flagy with c diff and strep in mucous-- monitor, check    procal am.    Will change code status to allow re intubation, and  extubate this am.      On enteral vanc for c diff concerns.   Discussed with January short.  Reviewed revised code status.      Addendum-- having stridor post extubation.  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  Pt was at [place] when patient removed trach 2 months ago.  Pt has no h/o stridor per daughter.  Stridor did not change with jaw thrush nor nt trumpet.  Discussed would recommend intubation and replace trach as best option. Discussed with Dr Charles.  1/22 no fever,vss, wbc 16.3, hgb 8.1 from 13.6 admit?  , procal is down to 3 from 17 admit,     Pt failed extubation with upper airway stridor and poor loc.      Do leak test:on vent with cuff down and tidal vol delivered 450 would need over 110 cc to leak to pass, need exhaled tidal vol to be less than 340 cc to  pass leak test -- do now and repeat q 4 x 3 total.  Will dose steroids for laryngospasm.  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  Will discuss with family later- trial extubation if passes, trach, comfort care???      Discussed with daughter, asked if code status should be altered?  After discussion - will consider extubation trial if good leak test (would try to notify daughter if passes) --- otherwise trach.      1/23 failed leak test, suspect trach stenosis - trach Wednesday?  No fever, vss, doxy/flagyl, vanc enteral,  wbc 14.3,   F/u cxr am  Called Ira, daughter.  Discussed need for Palliative care eval.      1/24 no fever, vss, doxy/flagyl/vanc/vanc iv, wbc 15.5,   cxr patchy fluffy infiltrates R>L    No change in plan, defer to palliative care.  Could do trial extubation but likely to fail. Could bronch when tube pulled?      1/25 no fever, vss, doxy/flagyl,vanc iv/enteral, wbc 17.5 hgb 7,7 from 13.6 admit, day 8  abx    Pt seems to have good leak today.  Will have resp quantify, do wean trial, decrease sedation, and consider trial extubation if does well.  1/26 no fever, vss, wbc 19.1, check sputum, good leak test and weaned well.  Loc Is very good for extubation.     Has  shunt for yrs with no problems appreciated.    Loc very good.    Discussed with daughter.    Re evaluated post extubation.  The patient can speak well.  There is no stridor.  There is no respiratory distress.  Patient perhaps is a little slow.    Called and discussed case again with daughter.  Might consider bronchoscopy later on but would not  at all at this time.  Case discussed with .        The patient had severe pharyngeal dysphagia with aspiration of thin liquids in julius aspiration of nectar thick liquids in December 2021 by speech therapy evaluation.  Patient had an ineffective cough during aspiration.    Would recommend prolonged NPO.  Tube feedings the for would be adequate.      1/27 no fever, vss, doxy/flagyl/vanc iv/po, grew strep/kleb (no sensitivity)/mrsa from sputumon 18th.  7 days iv vanc, has c diff toxin neg on enteral vanc,  On flagyl and doxy-- should be adequate for pneumonia.  3 lpm, wbc 22.6,     F/u cxr-- stable infiltrates as would be expected.    Some upper airway noise.  Not bad enough to trach.  Would consider bronch to inspect airway in a few days.  If worsens--intubation and trach would be recommended.    Will dose scopolamine patch consider resume robinul.  Will have robinul available for prn   Use.     Discussed with daughter, Ira.  Could be just aspiration/laryngospasm issues as tracheal problem should not have progressed to extubation after good leak test.      Above from Dr Gao and below from Dr Gaspar:    - continue supplemental O2 via oximizer   - stable to improving upper airway rhonchi- monitor  - suction prn  - scopolamine patch   - continue antibiotics  - tube feedings via PEG  - rectal tube for diarrhea    Above from Dr Gaspar and below from Dr Gao:  1/31 no fever, vss, doxy/vanc for mrsa in sputum, also flagyl, 40% ox, wbc 13, hgb 7.6,     F/u cxr am  Taper abx? Will stop doxy with diarrhea.  On c diff prevention-- iv vanc for mrsa on sputum culture-   Return to [place]?  Called daughter, ira, and discussed.  Could go back to [facility] soon.    2/1 no fever, vss, 710/890 I/o, 40%, hgb 7,3, cxr stagnant with rll density -- as would be expected.   Recurrent low hgb still - chr low, no anticoagg,   Would stop iv abx am.    Has wet airway and needs nt suctioning 3 times daily or so. Will resume robinul mg tid. Stop scop patch am.  Would not rec trach but outlook is not good.  She cannot protect airway.  Will discuss with daughter later.    Called Ira, daughter,  No answer.         2/2 no fever, vss, on flagy/vanc enterally only, 4lpm, wbc 10.7,  Stagnant, seems stable to return to ecf--but having to do nasotracheal suctioning 4 times a day or more (?) ,  Discussed with daughter, Ira.  Not sure best management - family would like to get to [facility]-- could work at [facility] with nt suctioning?     2/3 no fever, vss,needing nt suctioning, 4lpm ox, stable but still needs nt suctioning.  Will try to keep hob 45 degrees.  Sinuses were good ct 12/16/2021.

## 2022-02-03 NOTE — PROGRESS NOTES
[facility]  Adult Nutrition  Progress Note    SUMMARY       Intervention: continue enteral nutrition therapy     Recommendations  1) Continue TF Isosource 1.5 @ 45 mL/hr + Brody BID and Beneprotein BID + 115 ml flush q 4 hr  --Will provide 1620 kcal, 73 g protein ( + beneprotein), 820 ml free water  (100% EEN, 100% EPN )      2) weigh weekly, SLP consult if appropriate     Goals: 1) Initation of enteral nutrition by RD follow up 2) Nutrition support meeting > 50% of needs at f/u  Nutrition Goal Status: meting/ meting  Communication of RD Recs: POC, sticky note, reviewed with MD/RN     Assessment and Plan  Nutrition Problem  Inadequate energy intake     Related to (etiology):   NPO status, no TF running, dysphagia     Signs and Symptoms (as evidenced by):   Pt receiving < 50% EEN/EPN x 6 days     Interventions(treatment strategy):  Collaboration with other providers  Enteral nutrition     Nutrition Diagnosis Status:   resolved     Malnutrition  Edema: 2+  Gabriel: 13 + PEG, bruising, coccyx ulcer  NFPE 1/24/21 no significant wasting seen  PO intakes : 100% of needs  No significant wt loss per chart review     Reason for Assessment  Reason For Assessment: follow up  Diagnosis: other (see comments) (Acute hypoxemic respiratory failure)  Relevant Medical History: GERD, HLD, HTN, CVAs, CNS aneurysm, PEG, CAD, hypothyroidism, DM, colon, breast and ovarian cancer.  Interdisciplinary Rounds: not attended     General Information Comments: Remote assessment for coverage. Pt intubated and sedated, requiring levophed, on propofol, MAP 75, plan to try for extubation today per note. With PEG, noted pt has hx of removing PEG tube. C-diff positive, also noted with STEFFANY - renal labs improving, K, phos low, repleting. Per chart, with 9.5% wt loss over the last 2 months, significant. NFPE needed to determine malnutrition status. RD to monitor and follow up.  Nutrition Discharge Planning: Pending clinical  "course  1/24/22 TF recs left 1/21, TF started @ 10 ml/hr yesterday, continues at same rate. No BM. + vent-possible plan for trach placement. NFPE done 1/24/21 no significant wasting seen.  1/26/22 + PEG. TF held for possible trach placemen however now pt extubated. Plan to continue with TFs.  1/28/21 TF held 1/26-today r/t tenuous respiratory status. TPN started yesterday per MD ( meeting 72% protein needs and 94% energy needs). Pt with O2 mask on today. Plan per MD to continue TPN and start trickle TF in the next 24-48 hr.  1/31/22 Pt was started on trickle TF's over the weekend. TPN reordered today as pt TF not advancing fast enough.  TF at 20 ml/hr, and tolerating today. Plan to advance toward goal per MD.  2/3/22 Pt appeared well nourished visually.  TF had been advanced to 45ml/hr and pt was tolerating.  Pt on oxymask at 4L/min.       Discharge Planning:  TF above or cardiac, DM 1500 kcal diet     Nutrition/ Diet History  unable to obtain  Spiritual/cultural/[place] factors affecting PO intakes  Factors affecting PO intakes: NPO, trouble swallowing     Nutrition Risk Screen  Nutrition Risk Screen: no indicators present     Anthropometrics  Height: 5' 4" (162.6 cm)  Height (inches): 64 in  Weight Method: Bed Scale  Weight: 67.5kg (2/3/22), 69.1 kg 1/31, 71.5 kg 1/22, 70.3 kg 1/24, 69 kg (admission)  Weight (lb): 148.7 lb ( edema)  Ideal Body Weight (IBW), Female: 120 lb  BMI (Calculated): 26.1 admission  81.6 kg 1/2020     Lab/Procedures/Meds  Pertinent Labs Reviewed: reviewed  BMP  Lab Results   Component Value Date     (L) 02/03/2022    K 3.9 02/03/2022    CL 94 (L) 02/03/2022    CO2 34 (H) 02/03/2022    BUN 15 02/03/2022    CREATININE 0.6 02/03/2022    CALCIUM 8.1 (L) 02/03/2022    ANIONGAP 6 (L) 02/03/2022    ESTGFRAFRICA >60 02/03/2022    EGFRNONAA >60 02/03/2022     POCT Glucose   Date Value Ref Range Status   02/03/2022 83 70 - 110 mg/dL Final   02/03/2022 122 (H) 70 - 110 mg/dL Final   02/02/2022 " 111 (H) 70 - 110 mg/dL Final   02/02/2022 127 (H) 70 - 110 mg/dL Final   02/02/2022 113 (H) 70 - 110 mg/dL Final   02/02/2022 104 70 - 110 mg/dL Final   02/01/2022 124 (H) 70 - 110 mg/dL Final   02/01/2022 111 (H) 70 - 110 mg/dL Final   01/31/2022 122 (H) 70 - 110 mg/dL Final   01/31/2022 118 (H) 70 - 110 mg/dL Final   01/31/2022 102 70 - 110 mg/dL Final     Lab Results   Component Value Date    CALCIUM 8.1 (L) 02/03/2022    PHOS 3.0 02/03/2022       Lab Results   Component Value Date    WBC 9.59 02/03/2022    HGB 7.1 (L) 02/03/2022    HCT 21.7 (L) 02/03/2022     (H) 02/03/2022     02/03/2022        Pertinent Medications Reviewed: reviewed  Atrovastatin, hydralazine, insulin, synthroid, metaclopromide HCl, midodrine, MV, pantoprazole         Estimated/Assessed Needs  Weight Used For Calorie Calculations: 68.9 kg (152 lb)  Energy Calorie Requirements (kcal): MSJ ( x 1.25-1.4) = 4387-3420 kcal  Energy Need Method: MSJ  Protein Requirements:  g/day based on 1.2-1.5 g protein /kg  Weight Used For Protein Calculations: 68.9 kg (152 lb)  Fluid Requirements (mL): 1 mL/kcal or per MD  Estimated Fluid Requirement Method: RDA Method  RDA Method (mL): 1450 ml  CHO Requirement: 160-195 g     Nutrition Prescription Ordered  Current Diet Order: TF above     Evaluation of Received Nutrient/Fluid Intake  Energy Calories Required:  exceeding needs  Protein Required: meeting needs  Fluid Required: meeting needs  Tolerance: tolerating  % Intake of Estimated Energy Needs: >100%  % Meal Intake: NPO +TF     Nutrition Risk  Level of Risk/Frequency of Follow-up: moderate (1x weekly)      Monitor and Evaluation  Food and Nutrient Intake: enteral nutrition intake  Food and Nutrient Adminstration: enteral nutrition administration  Knowledge/Beliefs/Attitudes: food and nutrition knowledge/skill  Physical Activity and Function: nutrition-related ADLs and IADLs  Anthropometric Measurements: weight change  Biochemical Data,  Medical Tests and Procedures: electrolyte and renal panel,gastrointestinal profile,glucose/endocrine profile  Nutrition-Focused Physical Findings: overall appearance,extremities, muscles and bones,skin      Nutrition Follow-Up  RD Follow-up?: Yes

## 2022-02-04 LAB
ALBUMIN SERPL BCP-MCNC: 2.2 G/DL (ref 3.5–5.2)
ALP SERPL-CCNC: 53 U/L (ref 55–135)
ALT SERPL W/O P-5'-P-CCNC: 9 U/L (ref 10–44)
ANION GAP SERPL CALC-SCNC: 10 MMOL/L (ref 8–16)
AST SERPL-CCNC: 12 U/L (ref 10–40)
BASOPHILS # BLD AUTO: 0.06 K/UL (ref 0–0.2)
BASOPHILS NFR BLD: 0.7 % (ref 0–1.9)
BILIRUB SERPL-MCNC: 0.3 MG/DL (ref 0.1–1)
BUN SERPL-MCNC: 14 MG/DL (ref 8–23)
CALCIUM SERPL-MCNC: 7.9 MG/DL (ref 8.7–10.5)
CHLORIDE SERPL-SCNC: 94 MMOL/L (ref 95–110)
CO2 SERPL-SCNC: 30 MMOL/L (ref 23–29)
CREAT SERPL-MCNC: 0.7 MG/DL (ref 0.5–1.4)
DIFFERENTIAL METHOD: ABNORMAL
EOSINOPHIL # BLD AUTO: 0.1 K/UL (ref 0–0.5)
EOSINOPHIL NFR BLD: 1.7 % (ref 0–8)
ERYTHROCYTE [DISTWIDTH] IN BLOOD BY AUTOMATED COUNT: 14.5 % (ref 11.5–14.5)
EST. GFR  (AFRICAN AMERICAN): >60 ML/MIN/1.73 M^2
EST. GFR  (NON AFRICAN AMERICAN): >60 ML/MIN/1.73 M^2
GLUCOSE SERPL-MCNC: 102 MG/DL (ref 70–110)
HCT VFR BLD AUTO: 21.3 % (ref 37–48.5)
HGB BLD-MCNC: 6.9 G/DL (ref 12–16)
IMM GRANULOCYTES # BLD AUTO: 0.05 K/UL (ref 0–0.04)
IMM GRANULOCYTES NFR BLD AUTO: 0.6 % (ref 0–0.5)
LYMPHOCYTES # BLD AUTO: 1.3 K/UL (ref 1–4.8)
LYMPHOCYTES NFR BLD: 15.2 % (ref 18–48)
MAGNESIUM SERPL-MCNC: 2.2 MG/DL (ref 1.6–2.6)
MCH RBC QN AUTO: 32.4 PG (ref 27–31)
MCHC RBC AUTO-ENTMCNC: 32.4 G/DL (ref 32–36)
MCV RBC AUTO: 100 FL (ref 82–98)
MONOCYTES # BLD AUTO: 1.2 K/UL (ref 0.3–1)
MONOCYTES NFR BLD: 14.5 % (ref 4–15)
NEUTROPHILS # BLD AUTO: 5.7 K/UL (ref 1.8–7.7)
NEUTROPHILS NFR BLD: 67.3 % (ref 38–73)
NRBC BLD-RTO: 0 /100 WBC
PHOSPHATE SERPL-MCNC: 3 MG/DL (ref 2.7–4.5)
PLATELET # BLD AUTO: 266 K/UL (ref 150–450)
PMV BLD AUTO: 11.1 FL (ref 9.2–12.9)
POCT GLUCOSE: 117 MG/DL (ref 70–110)
POCT GLUCOSE: 135 MG/DL (ref 70–110)
POCT GLUCOSE: 91 MG/DL (ref 70–110)
POCT GLUCOSE: 97 MG/DL (ref 70–110)
POTASSIUM SERPL-SCNC: 4 MMOL/L (ref 3.5–5.1)
PREALB SERPL-MCNC: 14 MG/DL (ref 20–43)
PROT SERPL-MCNC: 5.7 G/DL (ref 6–8.4)
RBC # BLD AUTO: 2.13 M/UL (ref 4–5.4)
SODIUM SERPL-SCNC: 134 MMOL/L (ref 136–145)
TRIGL SERPL-MCNC: 50 MG/DL (ref 30–150)
WBC # BLD AUTO: 8.44 K/UL (ref 3.9–12.7)

## 2022-02-04 PROCEDURE — 99232 PR SUBSEQUENT HOSPITAL CARE,LEVL II: ICD-10-PCS | Mod: ,,, | Performed by: INTERNAL MEDICINE

## 2022-02-04 PROCEDURE — 25000003 PHARM REV CODE 250: Performed by: INTERNAL MEDICINE

## 2022-02-04 PROCEDURE — S0030 INJECTION, METRONIDAZOLE: HCPCS | Performed by: STUDENT IN AN ORGANIZED HEALTH CARE EDUCATION/TRAINING PROGRAM

## 2022-02-04 PROCEDURE — 99900026 HC AIRWAY MAINTENANCE (STAT)

## 2022-02-04 PROCEDURE — 84134 ASSAY OF PREALBUMIN: CPT | Performed by: HOSPITALIST

## 2022-02-04 PROCEDURE — 25000003 PHARM REV CODE 250: Performed by: HOSPITALIST

## 2022-02-04 PROCEDURE — 85025 COMPLETE CBC W/AUTO DIFF WBC: CPT | Performed by: STUDENT IN AN ORGANIZED HEALTH CARE EDUCATION/TRAINING PROGRAM

## 2022-02-04 PROCEDURE — 84100 ASSAY OF PHOSPHORUS: CPT | Performed by: STUDENT IN AN ORGANIZED HEALTH CARE EDUCATION/TRAINING PROGRAM

## 2022-02-04 PROCEDURE — 25000003 PHARM REV CODE 250: Performed by: STUDENT IN AN ORGANIZED HEALTH CARE EDUCATION/TRAINING PROGRAM

## 2022-02-04 PROCEDURE — 27000221 HC OXYGEN, UP TO 24 HOURS

## 2022-02-04 PROCEDURE — 63600175 PHARM REV CODE 636 W HCPCS: Performed by: STUDENT IN AN ORGANIZED HEALTH CARE EDUCATION/TRAINING PROGRAM

## 2022-02-04 PROCEDURE — 83735 ASSAY OF MAGNESIUM: CPT | Performed by: STUDENT IN AN ORGANIZED HEALTH CARE EDUCATION/TRAINING PROGRAM

## 2022-02-04 PROCEDURE — 80053 COMPREHEN METABOLIC PANEL: CPT | Performed by: STUDENT IN AN ORGANIZED HEALTH CARE EDUCATION/TRAINING PROGRAM

## 2022-02-04 PROCEDURE — 84478 ASSAY OF TRIGLYCERIDES: CPT | Performed by: HOSPITALIST

## 2022-02-04 PROCEDURE — 94761 N-INVAS EAR/PLS OXIMETRY MLT: CPT

## 2022-02-04 PROCEDURE — 27000207 HC ISOLATION

## 2022-02-04 PROCEDURE — 99232 SBSQ HOSP IP/OBS MODERATE 35: CPT | Mod: ,,, | Performed by: INTERNAL MEDICINE

## 2022-02-04 PROCEDURE — 63600175 PHARM REV CODE 636 W HCPCS: Performed by: HOSPITALIST

## 2022-02-04 PROCEDURE — 20000000 HC ICU ROOM

## 2022-02-04 PROCEDURE — 31720 CLEARANCE OF AIRWAYS: CPT

## 2022-02-04 RX ADMIN — METRONIDAZOLE 500 MG: 500 INJECTION, SOLUTION INTRAVENOUS at 03:02

## 2022-02-04 RX ADMIN — METRONIDAZOLE 500 MG: 500 INJECTION, SOLUTION INTRAVENOUS at 11:02

## 2022-02-04 RX ADMIN — METOPROLOL TARTRATE 12.5 MG: 25 TABLET, FILM COATED ORAL at 08:02

## 2022-02-04 RX ADMIN — GLYCOPYRROLATE 1 MG: 1 TABLET ORAL at 08:02

## 2022-02-04 RX ADMIN — MIDODRINE HYDROCHLORIDE 5 MG: 5 TABLET ORAL at 08:02

## 2022-02-04 RX ADMIN — VANCOMYCIN HYDROCHLORIDE 1000 MG: 1 INJECTION, POWDER, LYOPHILIZED, FOR SOLUTION INTRAVENOUS at 03:02

## 2022-02-04 RX ADMIN — CITALOPRAM HYDROBROMIDE 40 MG: 10 TABLET ORAL at 08:02

## 2022-02-04 RX ADMIN — MIDODRINE HYDROCHLORIDE 5 MG: 5 TABLET ORAL at 03:02

## 2022-02-04 RX ADMIN — ATORVASTATIN CALCIUM 40 MG: 40 TABLET, FILM COATED ORAL at 07:02

## 2022-02-04 RX ADMIN — GLYCOPYRROLATE 1 MG: 1 TABLET ORAL at 07:02

## 2022-02-04 RX ADMIN — AZELASTINE 137 MCG: 1 SPRAY, METERED NASAL at 08:02

## 2022-02-04 RX ADMIN — METRONIDAZOLE 500 MG: 500 INJECTION, SOLUTION INTRAVENOUS at 08:02

## 2022-02-04 RX ADMIN — FAMOTIDINE 20 MG: 10 INJECTION INTRAVENOUS at 08:02

## 2022-02-04 RX ADMIN — MIDODRINE HYDROCHLORIDE 5 MG: 5 TABLET ORAL at 07:02

## 2022-02-04 RX ADMIN — AZELASTINE 137 MCG: 1 SPRAY, METERED NASAL at 07:02

## 2022-02-04 RX ADMIN — GLYCOPYRROLATE 1 MG: 1 TABLET ORAL at 03:02

## 2022-02-04 RX ADMIN — LEVOTHYROXINE SODIUM 25 MCG: 0.03 TABLET ORAL at 05:02

## 2022-02-04 RX ADMIN — FAMOTIDINE 20 MG: 10 INJECTION INTRAVENOUS at 07:02

## 2022-02-04 RX ADMIN — ENOXAPARIN SODIUM 40 MG: 100 INJECTION SUBCUTANEOUS at 06:02

## 2022-02-04 NOTE — ASSESSMENT & PLAN NOTE
"Continue antibiotics as they are but will stop them soon.  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  She's producing a lot of airway secretions and needs frequent suctioning.  She's high risk of aspiration pneumonia.  Will try to get her into LTACH for long-term care.    Antibiotics (From admission, onward)            Start     Stop Route Frequency Ordered    02/03/22 1500  vancomycin in dextrose 5 % 1 gram/250 mL IVPB 1,000 mg         -- IV Every 24 hours (non-standard times) 02/03/22 0516    01/23/22 1047  vancomycin - pharmacy to dose  (vancomycin IVPB)        "And" Linked Group Details    -- IV pharmacy to manage frequency 01/23/22 0947    01/18/22 1545  metronidazole IVPB 500 mg         -- IV Every 8 hours (non-standard times) 01/18/22 1441         Will monitor patient closely and continue current treatment plan unchanged.      "

## 2022-02-04 NOTE — PLAN OF CARE
Problem: Adult Inpatient Plan of Care  Goal: Plan of Care Review  Outcome: Ongoing, Progressing     Problem: Adult Inpatient Plan of Care  Goal: Patient-Specific Goal (Individualized)  Outcome: Ongoing, Progressing     Problem: Adult Inpatient Plan of Care  Goal: Optimal Comfort and Wellbeing  Outcome: Ongoing, Progressing     Problem: Adult Inpatient Plan of Care  Goal: Readiness for Transition of Care  Outcome: Ongoing, Progressing  Awake, follows commands. Denies any pain. Still not able to cough out secretions. Frequent oral suctioning done. Copious amount of thick tan secretions obtain. 2L oxymask. Sat 100%. HOB up. Sinus rhythm. Generalize edema. Peg tube tolerating tube feeding at goal rate. Flexeseal DC stool thick pasty brown. Mepilex to sacral area. Ext are stiff. Safety/fall prevention maintain.

## 2022-02-04 NOTE — PROGRESS NOTES
[facility] Hospital Medicine  Progress Note    Patient Name: Sandra Wilson  MRN: [id number]  Patient Class: IP- Inpatient   Admission Date: 1/18/2022  Length of Stay: 16 days  Attending Physician: Felipe Kruse MD  Primary Care Provider: Primary Doctor No        Subjective:     Principal Problem:Acute hypoxemic respiratory failure        HPI:  Sandra Wilson is an 81 year old female with a past medical history of CVAs, CNS aneurysm, PEG status, CAD, HLD, hypothryoidism, DM, aortic stenosis, and colon, breast, and ovarian cancer who presented from long term care with vomiting, diarrhea, and shortness of breath. Workup in the ED showed likely aspiration pneumonitis and C. Diff colitis. Her O2 requirement increased while in the ED requiring intubation with sedation. She also required Levophed as she likely developed septic shock. Antibiotics were broadened to cefepime, vancomycin, and Flagyl. Pulmonary was consulted. Family was notified. The patient was unable to provide history given acuity of illness.      Overview/Hospital Course:  Sandra Wilson is an 81 year old female with a past medical history of CVAs, CNS aneurysm, PEG status, CAD, HLD, hypothyroidism, DM, aortic stenosis, and colon, breast, and ovarian cancer who presented from long term care with vomiting, diarrhea, and shortness of breath secondary to acute hypoxic respiratory failure from aspiration pneumonia in the setting of C diff colitis leading to septic shock. She was intubated in the ED and started on Levophed infusion via RIJ CVC placed by Dr. Vignesh Gaspar. She was started on broad spectrum antibiotics with cefepime, Flagyl, vancomycin and admitted to the ICU. She was also started on enteral vancomycin for severe C diff colitis given elevated WBC count and STEFFANY. There was concern for developing ARDS given P/F ratio of 87 (severe); 190 1/22 (moderate). Pulmonary was consulted. She also presented with demand ischemia likely  secondary to septic shock. Troponin was trended and improved as shock resolved. Normal saline infusion was added for hyponatremia and STEFFANY which improved both. Levophed was able to be weaned. Family agreed to DNR status 1/18 and Palliative Care was consulted to discuss goal of care 1/19. Antibiotics were changed to doxycycline and Flagyl (GBS in sputum culture) 1/21; vancomycin was added 1/23 after culture also became positive for Staph aureus and Klebsiella. Her respiratory status has improved throughout her course and she was extubated (code changed to partial code for intubation) 1/21 only to be re-intubated for worsening stridor (history of tracheostomy). Upon re-intubation, copious amounts of secretions were suctioned. KUB suggested a mild ileus A bowel regimen was instituted and the patient was able to have bowel movements 1/22. Tube feeds were started 1/23. Her course was complicated by Afib as well noted on telemetry 1/21; metoprolol tartrate started 1/22.She failed a leak test 1/22; Surgery was consulted for tracheostomy which is pending conversation with family regarding goals of care.    Patient was set to go for trach on 01/26, but attempt was made for another trial extubation and the patient did well after extubation, she was transition to face mask and was able to maintain her saturations although had significant difficulty with secretions.  She was started on Robinul and scopolamine patch which did seem to improve for secretions.  Her respiratory status continued to be somewhat tenuous, so she was monitored in the ICU.      Interval History:  No changes in status.      Review of Systems   Has very quiet and hoarse voice.  Hard to understand.     Objective:     Vital Signs (Most Recent):  Temp: 98.9 °F (37.2 °C) (02/03/22 2000)  Pulse: 74 (02/03/22 2000)  Resp: 20 (02/03/22 2000)  BP: (!) 103/51 (02/03/22 2000)  SpO2: 98 % (02/03/22 2000) Vital Signs (24h Range):  Temp:  [98 °F (36.7 °C)-99.5 °F (37.5  °C)] 98.9 °F (37.2 °C)  Pulse:  [64-76] 74  Resp:  [17-22] 20  SpO2:  [95 %-100 %] 98 %  BP: ()/(48-61) 103/51     Weight: 67.5 kg (148 lb 13 oz)  Body mass index is 25.54 kg/m².    Intake/Output Summary (Last 24 hours) at 2/3/2022 2058  Last data filed at 2/3/2022 1819  Gross per 24 hour   Intake 2444.75 ml   Output 1350 ml   Net 1094.75 ml      Physical Exam  Constitutional:       General: She is not in acute distress.     Appearance: She is not ill-appearing.   Eyes:      General:         Right eye: No discharge.         Left eye: No discharge.   Neck:      Vascular: No JVD.   Cardiovascular:      Rate and Rhythm: Normal rate and regular rhythm.   Pulmonary:      Effort: Pulmonary effort is normal.      Breath sounds: Normal breath sounds.   Abdominal:      General: Abdomen is flat. Bowel sounds are normal. There is no distension.      Palpations: Abdomen is soft.      Tenderness: There is no abdominal tenderness.   Musculoskeletal:      Right lower leg: No edema.      Left lower leg: No edema.   Skin:     General: Skin is warm and moist.      Findings: No rash.   Neurological:      Mental Status: She is alert.  Unable to tell if she's oriented.  Psychiatric:         Mood and Affect: Mood and affect normal.         Significant Labs: All pertinent labs within the past 24 hours have been reviewed.    Significant Imaging: I have reviewed all pertinent imaging results/findings within the past 24 hours.      Assessment/Plan:      * Acute hypoxemic respiratory failure  Improving.  Continue supplementing oxygen.  Monitor sats.  Monitor work of breathing.  She's partial code, specifying that she'd want to be intubated if need be.    Anemia  Worsening.  Monitor HGB.  Transfuse if less than 7.    Hemoglobin   Date Value Ref Range Status   02/03/2022 7.1 (L) 12.0 - 16.0 g/dL Final   02/02/2022 7.4 (L) 12.0 - 16.0 g/dL Final   02/01/2022 7.3 (L) 12.0 - 16.0 g/dL Final   01/31/2022 7.6 (L) 12.0 - 16.0 g/dL Final  "  ]      Paroxysmal atrial fibrillation  -Telemetry  - controlled on metoprolol 12.5 mg BID  - HGB is dropping, so will hold off on giving DOAC      Gastrostomy tube dependent  Stable.  No issues with it    Aspiration pneumonia  Continue antibiotics as they are but will stop them soon.  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  She's producing a lot of airway secretions and needs frequent suctioning.  She's high risk of aspiration pneumonia.  Will try to get her into LTACH for long-term care.    Antibiotics (From admission, onward)            Start     Stop Route Frequency Ordered    02/03/22 1500  vancomycin in dextrose 5 % 1 gram/250 mL IVPB 1,000 mg         -- IV Every 24 hours (non-standard times) 02/03/22 0516    01/23/22 1047  vancomycin - pharmacy to dose  (vancomycin IVPB)        "And" Linked Group Details    -- IV pharmacy to manage frequency 01/23/22 0947    01/18/22 1545  metronidazole IVPB 500 mg         -- IV Every 8 hours (non-standard times) 01/18/22 1441         Will monitor patient closely and continue current treatment plan unchanged.        Type 2 diabetes mellitus, with long-term current use of insulin  Patient's FSGs are controlled on current medication regimen.  Most recent fingerstick glucose reviewed-   Recent Labs   Lab 02/02/22  2316 02/03/22  0558 02/03/22  1154 02/03/22  1810   POCTGLUCOSE 111* 122* 83 132*     Current correctional scale  Medium  Maintain anti-hyperglycemic dose as follows-   Antihyperglycemics (From admission, onward)            Start     Stop Route Frequency Ordered    01/18/22 1438  insulin aspart U-100 pen 1-10 Units         -- SubQ Before meals & nightly PRN 01/18/22 1438        Hold Oral hypoglycemics while patient is in the hospital.        YARI (generalized anxiety disorder)  Extubated on 01/26.  Using Precedex as needed to control anxiety.  Patient does not appear anxious on exam.      Hypothyroidism  Patient has chronic hypothyroidism. TFTs reviewed- "   Lab Results   Component Value Date    TSH 1.130 07/13/2017   . Will continue chronic levothyroxine and adjust for and clinical changes.        Hyperlipidemia   Patient is chronically on statin.will continue for now. Monitor clinically. Last LDL was   Lab Results   Component Value Date    LDLCALC 94.4 07/13/2017          GERD (gastroesophageal reflux disease)  On famotidine.      VTE Risk Mitigation (From admission, onward)         Ordered     enoxaparin injection 40 mg  Daily         01/18/22 1438     IP VTE HIGH RISK PATIENT  Once         01/18/22 1438     Place sequential compression device  Until discontinued         01/18/22 1438                Discharge Planning   DEMETRIUS:      Code Status: Partial Code   Is the patient medically ready for discharge?:     Reason for patient still in hospital (select all that apply): Patient trending condition, Treatment and Pending disposition  Discharge Plan A: Return to nursing home            Felipe Kruse MD  Department of Hospital Medicine   [facility]

## 2022-02-04 NOTE — ASSESSMENT & PLAN NOTE
Worsening.  Monitor HGB.  Transfuse if less than 7.    Hemoglobin   Date Value Ref Range Status   02/03/2022 7.1 (L) 12.0 - 16.0 g/dL Final   02/02/2022 7.4 (L) 12.0 - 16.0 g/dL Final   02/01/2022 7.3 (L) 12.0 - 16.0 g/dL Final   01/31/2022 7.6 (L) 12.0 - 16.0 g/dL Final   ]

## 2022-02-04 NOTE — PLAN OF CARE
POC reviewed with pt but unable to understand. Pt on 1L oxymask. BG monitored. Pt awaiting approval to LT[facility]. No acute changes during shift. Fall and safety precautions in place. Comfort and wellbeing maintained. Special contact precautions maintained.     Problem: Adult Inpatient Plan of Care  Goal: Plan of Care Review  Outcome: Ongoing, Progressing  Goal: Patient-Specific Goal (Individualized)  Outcome: Ongoing, Progressing  Goal: Optimal Comfort and Wellbeing  Outcome: Ongoing, Progressing     Problem: Infection  Goal: Absence of Infection Signs and Symptoms  Outcome: Ongoing, Progressing     Problem: Infection (Pneumonia)  Goal: Resolution of Infection Signs and Symptoms  Outcome: Ongoing, Progressing

## 2022-02-04 NOTE — CARE UPDATE
02/04/22 0919   Patient Assessment/Suction   Level of Consciousness (AVPU)   (pt asleep)   Respiratory Effort Normal;Unlabored   Expansion/Accessory Muscles/Retractions expansion symmetric;no retractions;no use of accessory muscles   All Lung Fields Breath Sounds rhonchi   Rhythm/Pattern, Respiratory depth regular;pattern regular;unlabored   Cough Frequency with stimulation   Cough Type productive   Suction Method oral   Secretions Amount moderate   Secretions Color tan;yellow   Secretions Characteristics thick   $ Swab or suction? Suction   PRE-TX-O2   O2 Device (Oxygen Therapy) Oxymask   $ Is the patient on Low Flow Oxygen? Yes   Flow (L/min) 2   Pulse Oximetry Type Continuous   $ Pulse Oximetry - Multiple Charge Pulse Oximetry - Multiple

## 2022-02-04 NOTE — PROGRESS NOTES
"  02/04/2022      Admit Date: 1/18/2022  Sandra Wilson  New Patient Consult    Chief Complaint   Patient presents with    Shortness of Breath    Vomiting     PEG tube        History of Present Illness:  Intubated.          From Dr Gaspar's hpi, ERP:Sandra Wilson is a 81 y.o. female who presents to the ED via EMS  with an onset of vomiting and SOB. EMS reported that upon examination patient was less responsive than usually but is able to answer questions. The report from the EMS upon arrival was an O2 sats in the 70's. EMS proceeded to give supplemental oxygen and the O2 sats raised up to 93% on 3 liters of O2 with  "junky" right upper lung sounds" and greenish diarrhea. She was vomiting en route. They also reported that the patient is a patient that has dislodged her PEG tubing many times. PMHx of HTN, CAD, OA, GERD, ctroke, Colon cancer, ovarian cancer, breast cancer.PSHx of Cardiac catheterization, colonoscopy.         Progress Note  PULMONARY    Admit Date: 1/18/2022 02/04/2022      SUBJECTIVE:     1/19 intubated, sedated,  1/20 no c/o intubated, sedated  1/21 no new c/o intubated  1/22 no c/o intubated   1/23 no  New c/o intubated  1/24 no new c/o  1/25 no new c/o  1/26 no new co  1/28- resp status stable on 5L oxymizer mask. Having loose BMs in diaper. Pt does not verbalize. Slight stridor but no distress, oxygening well  1/29- remains on oxymizer, resp status stable. Hypertensive to 170s-180s systolic  1/30- continues on oxymizer, no new issues  1/31 no new c/o  2/1 no new c/o   2/2 no new c/o  2/3 no new c/o  2/4 no new c/o      PFSH and Allergies reviewed.    OBJECTIVE:     Vitals (Most recent):  Vitals:    02/04/22 0600   BP: (!) 118/57   Pulse: 72   Resp: (!) 21   Temp:        Vitals (24 hour range):  Temp:  [98.3 °F (36.8 °C)-99.5 °F (37.5 °C)]   Pulse:  [64-76]   Resp:  [19-23]   BP: ()/(48-57)   SpO2:  [95 %-100 %]       Intake/Output Summary (Last 24 hours) at 2/4/2022 0655  Last data " filed at 2/4/2022 0400  Gross per 24 hour   Intake 1731.6 ml   Output 1300 ml   Net 431.6 ml        Too value is ED when Physical Exam:  The patient's neuro status (alertness,orientation,cognitive function,motor skills,), pharyngeal exam (oral lesions, hygiene, abn dentition,), Neck (jvd,mass,thyroid,nodes in neck and above/below clavicle),RESPIRATORY(symmetry,effort,fremitus,percussion,auscultation),  Cor(rhythm,heart tones including gallops,perfusion,edema)ABD(distention,hepatic&splenomegaly,tenderness,masses), Skin(rash,cyanosis),Psyc(affect,judgement,).  Exam negative except for these pertinent findings:    No distress, verbalizes weakly,   Soft voice  No stridor-- has wet airway sounds and needs nt suctioning  Trach scar  chest is symmetric, no distress, normal percussion, normal fremitus and good normal breath sounds    abd soft with PEG tube  No edema      Radiographs reviewed: view by direct vision    Results for orders placed during the hospital encounter of 01/18/22    X-Ray Chest 1 View    Narrative  EXAMINATION:  Mm and is is  XR CHEST 1 VIEW    CLINICAL HISTORY:  respiratory failure;    TECHNIQUE:  Single frontal view of the chest was performed.    COMPARISON:  Chest portable of January 18, 2022    FINDINGS:  An endotracheal tube ends in the trachea above the level the malorie.  An NG tube traverses the esophagus to the stomach.  A central line enters at the right neck and ends in the superior vena cava.  The cardiac size and contours within normal limits.  A loop recorder is noted in the left chest.  There is continued central right lung infiltrate and small infiltrate at the left lung base.  No pneumothorax is seen.    Impression  Continued intrapulmonary infiltrates right greater than left.  Endotracheal tube, NG tube and right central lines in position.      Electronically signed by: Mathew Yanez MD  Date:    01/18/2022  Time:    15:50  ]    Labs     Recent Labs   Lab 02/04/22  0401   WBC 8.44    HGB 6.9*   HCT 21.3*        Recent Labs   Lab 02/04/22  0401   *   K 4.0   CL 94*   CO2 30*   BUN 14   CREATININE 0.7      CALCIUM 7.9*   MG 2.2   PHOS 3.0   AST 12   ALT 9*   ALKPHOS 53*   BILITOT 0.3   PROT 5.7*   ALBUMIN 2.2*   PREALBUMIN 14*     No results for input(s): PH, PCO2, PO2, HCO3 in the last 24 hours.  Microbiology Results (last 7 days)     Procedure Component Value Units Date/Time    Culture, Respiratory with Gram Stain [[id number]]  (Abnormal)  (Susceptibility) Collected: 01/26/22 0745    Order Status: Completed Specimen: Respiratory from Sputum Updated: 01/28/22 1316     Respiratory Culture No S aureus or Pseudomonas isolated.      PROTEUS MIRABILIS ESBL  Few        SERRATIA MARCESCENS  Moderate       Gram Stain (Respiratory) <10 epithelial cells per low power field.     Gram Stain (Respiratory) Rare WBC's     Gram Stain (Respiratory) No organisms seen        Echo   Summary    · Mild concentric hypertrophy and normal systolic function.  · Mild-to-moderate mitral regurgitation.  · Mild tricuspid regurgitation.  · Mild pulmonic regurgitation.  · The estimated ejection fraction is 55%.  · Indeterminate left ventricular diastolic function.  · Normal right ventricular size with normal right ventricular systolic function.  · Mild left atrial enlargement.  · There is moderate-to-severe aortic valve stenosis.  · Aortic valve area is cm2; peak velocity is 3.42 m/s; mean gradient is 32 mmHg. Peak gradient 47mmHg  · Mild aortic regurgitation.  · Normal central venous pressure (3 mmHg).  · The estimated PA systolic pressure is 32 mmHg.  · There is no pulmonary hypertension.  · Moderate to severe AS. DI= 0.23 and 0.20          Impression:  Active Hospital Problems    Diagnosis  POA    *Acute hypoxemic respiratory failure [J96.01]  Yes    Anemia [D64.9]  Yes    Paroxysmal atrial fibrillation [I48.0]  No    Gastrostomy tube dependent [Z93.1]  Not Applicable    YARI (generalized anxiety  disorder) [F41.1]  Yes    Type 2 diabetes mellitus, with long-term current use of insulin [E11.9, Z79.4]  Not Applicable    Aspiration pneumonia [J69.0]  Yes    Hypothyroidism [E03.9]  Yes    Hyperlipidemia [E78.5]  Yes     Chronic    GERD (gastroesophageal reflux disease) [K21.9]  Yes     Chronic      Resolved Hospital Problems    Diagnosis Date Resolved POA    Ileus [K56.7] 01/23/2022 No    Demand ischemia [I24.8] 01/24/2022 Yes    Sepsis [A41.9] 01/24/2022 Yes    STEFFANY (acute kidney injury) [N17.9] 01/21/2022 Yes    Hyponatremia [E87.1] 01/20/2022 Yes    ARDS (adult respiratory distress syndrome) [J80] 02/01/2022 Yes                        Plan: no fever, vss, was hypotensive earlier.  Cefepime/vanc,flagyl,  chr midodrine, levophed 0.9 ,  ddimer 5.2, creat 0.9  c diff ag + but toxin neg.  abg 7.27 with ox 48,   Dnr,  cxr impressive R>>>L aspiration pattern.     Prior subdural --       Recruitment manuver done with peep 30 for 6 seconds with art line bp falling from sbp 100 to 60's.  Sat improved from 92 to 94.  Pt not really peep responsive.    Pt should improve ox with left side down or prone.  Peep may help but infiltrates more R>L -- should be posterior lung bases.  Might consider cta if not progressing.      discussed with resp and nursing. Optimally would be left side down or prone.        1/19 no fever, vss with rr to 43, flagyl/zosyn/vanc/cefepime, n70-80% ox,  Wbc 14.8 from 5k,  Creat 1.5 form 0.9,   abg 02 61 with ph 7.33- peep 5,   I/o 687/133-  Will screen for dvt/pe with leg study  Will dose 500 cc saline,   F/u cxr more opacification right>> left lung.      1/20 no fever, vss, bp low at times, I.o 2770/1870, cefepime/flagyl/vanc, ox 60%, wbc 14.6, plts 144- new, creat 1.0,. cxr with some clearing,   F/u abg, wean soon likely but need better ox to extubate.    No dvt, ddimer up at admit,     Pt min vol 8 and seemed to do well with 50% ox while I rounded.  Discussed with nursing and  respiratory-- if can get to 40% and pass wean trial would recommend extubation, would be optimal to discuss with family prior plans not to re intubate.  She is progressing-- would consider not discussing comfort care if improving.      1/21 no fever, vss, tm 100, wbc 15.2, hgb 8, from 13.6 bon 18th- admit, plt 130 from 244,   Creat 0.7,   cxr progressive clearing right , some increase opacification left suggested.   Strep in sputum,  Taper abx to doxy and flagy with c diff and strep in mucous-- monitor, check    procal am.    Will change code status to allow re intubation, and  extubate this am.      On enteral vanc for c diff concerns.   Discussed with January short.  Reviewed revised code status.      Addendum-- having stridor post extubation.  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  Pt was at [facility] when patient removed trach 2 months ago.  Pt has no h/o stridor per daughter.  Stridor did not change with jaw thrush nor nt trumpet.  Discussed would recommend intubation and replace trach as best option. Discussed with Dr Charles.  1/22 no fever,vss, wbc 16.3, hgb 8.1 from 13.6 admit?  , procal is down to 3 from 17 admit,     Pt failed extubation with upper airway stridor and poor loc.      Do leak test:on vent with cuff down and tidal vol delivered 450 would need over 110 cc to leak to pass, need exhaled tidal vol to be less than 340 cc to  pass leak test -- do now and repeat q 4 x 3 total.  Will dose steroids for laryngospasm.  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  Will discuss with family later- trial extubation if passes, trach, comfort care???      Discussed with daughter, asked if code status should be altered?  After discussion - will consider extubation trial if good leak test (would try to notify daughter if passes) --- otherwise trach.      1/23 failed leak test, suspect trach stenosis - trach Wednesday?  No fever, vss, doxy/flagyl, vanc enteral, wbc 14.3,   F/u  cxr am  Called Ira, daughter.  Discussed need for Palliative care eval.      1/24 no fever, vss, doxy/flagyl/vanc/vanc iv, wbc 15.5,   cxr patchy fluffy infiltrates R>L    No change in plan, defer to palliative care.  Could do trial extubation but likely to fail. Could bronch when tube pulled?      1/25 no fever, vss, doxy/flagyl,vanc iv/enteral, wbc 17.5 hgb 7,7 from 13.6 admit, day 8  abx    Pt seems to have good leak today.  Will have resp quantify, do wean trial, decrease sedation, and consider trial extubation if does well.  1/26 no fever, vss, wbc 19.1, check sputum, good leak test and weaned well.  Loc Is very good for extubation.     Has  shunt for yrs with no problems appreciated.    Loc very good.    Discussed with daughter.    Re evaluated post extubation.  The patient can speak well.  There is no stridor.  There is no respiratory distress.  Patient perhaps is a little slow.    Called and discussed case again with daughter.  Might consider bronchoscopy later on but would not  at all at this time.  Case discussed with .        The patient had severe pharyngeal dysphagia with aspiration of thin liquids in julius aspiration of nectar thick liquids in December 2021 by speech therapy evaluation.  Patient had an ineffective cough during aspiration.    Would recommend prolonged NPO.  Tube feedings the for would be adequate.      1/27 no fever, vss, doxy/flagyl/vanc iv/po, grew strep/kleb (no sensitivity)/mrsa from sputumon 18th.  7 days iv vanc, has c diff toxin neg on enteral vanc,  On flagyl and doxy-- should be adequate for pneumonia.  3 lpm, wbc 22.6,     F/u cxr-- stable infiltrates as would be expected.    Some upper airway noise.  Not bad enough to trach.  Would consider bronch to inspect airway in a few days.  If worsens--intubation and trach would be recommended.    Will dose scopolamine patch consider resume robinul.  Will have robinul available for prn  Use.     Discussed  with daughter Ira.  Could be just aspiration/laryngospasm issues as tracheal problem should not have progressed to extubation after good leak test.      Above from Dr Gao and below from Dr Gaspar:    - continue supplemental O2 via oximizer   - stable to improving upper airway rhonchi- monitor  - suction prn  - scopolamine patch   - continue antibiotics  - tube feedings via PEG  - rectal tube for diarrhea    Above from Dr Gaspar and below from Dr Gao:  1/31 no fever, vss, doxy/vanc for mrsa in sputum, also flagyl, 40% ox, wbc 13, hgb 7.6,     F/u cxr am  Taper abx? Will stop doxy with diarrhea.  On c diff prevention-- iv vanc for mrsa on sputum culture-   Return to [facility]?  Called daughter, ira, and discussed.  Could go back to [facility].    2/1 no fever, vss, 710/890 I/o, 40%, hgb 7,3, cxr stagnant with rll density -- as would be expected.   Recurrent low hgb still - chr low, no anticoagg,   Would stop iv abx am.    Has wet airway and needs nt suctioning 3 times daily or so. Will resume robinul mg tid. Stop scop patch am.  Would not rec trach but outlook is not good.  She cannot protect airway.  Will discuss with daughter later.    Called Ira, daughter,  No answer.         2/2 no fever, vss, on flagy/vanc enterally only, 4lpm, wbc 10.7,  Stagnant, seems stable to return to [facility]--but having to do nasotracheal suctioning 4 times a day or more (?) ,  Discussed with daughter, Ira.  Not sure best management - family would like to get to [facility]-- could work at [facility] with nt suctioning?     2/3 no fever, vss,needing nt suctioning, 4lpm ox, stable but still needs nt suctioning.  Will try to keep hob 45 degrees.  Sinuses were good ct 12/16/2021.     2/4 no fever, vss,     Nurse reports no nt suction today.  Should go to [facility] soon.  Could offer trach if cannot control airway/secretions but getting by now.  Will sign off.   Called daughter, Ira,  Explained no trach recommended -- family would prefer no trach  (luisa as may allow different longterm placement).

## 2022-02-04 NOTE — PLAN OF CARE
Message received from Trista with [facility] [phone] that she has the referral and is reviewing for acceptance and message received from pts daughter Ira Driver [phone] that families choice for LTAC is [facility] in [place]. I called Trista with [facility] and she stated that she spoke to the family and is submitting for [employer] auth this am. CM following.    02/04/22 0829   Post-Acute Status   Post-Acute Authorization Placement   Post-Acute Placement Status Pending payor review/awaiting authorization (if required)

## 2022-02-04 NOTE — ASSESSMENT & PLAN NOTE
Patient's FSGs are controlled on current medication regimen.  Most recent fingerstick glucose reviewed-   Recent Labs   Lab 02/02/22  2316 02/03/22  0558 02/03/22  1154 02/03/22  1810   POCTGLUCOSE 111* 122* 83 132*     Current correctional scale  Medium  Maintain anti-hyperglycemic dose as follows-   Antihyperglycemics (From admission, onward)            Start     Stop Route Frequency Ordered    01/18/22 1438  insulin aspart U-100 pen 1-10 Units         -- SubQ Before meals & nightly PRN 01/18/22 1438        Hold Oral hypoglycemics while patient is in the hospital.

## 2022-02-04 NOTE — SUBJECTIVE & OBJECTIVE
Interval History:  No changes in status.      Review of Systems   Constitutional: Positive for fatigue. Negative for chills and fever.   Respiratory: Positive for cough. Negative for shortness of breath.    Cardiovascular: Negative for chest pain and leg swelling.   Gastrointestinal: Negative for abdominal pain, nausea and vomiting.     Objective:     Vital Signs (Most Recent):  Temp: 98.9 °F (37.2 °C) (02/03/22 2000)  Pulse: 74 (02/03/22 2000)  Resp: 20 (02/03/22 2000)  BP: (!) 103/51 (02/03/22 2000)  SpO2: 98 % (02/03/22 2000) Vital Signs (24h Range):  Temp:  [98 °F (36.7 °C)-99.5 °F (37.5 °C)] 98.9 °F (37.2 °C)  Pulse:  [64-76] 74  Resp:  [17-22] 20  SpO2:  [95 %-100 %] 98 %  BP: ()/(48-61) 103/51     Weight: 67.5 kg (148 lb 13 oz)  Body mass index is 25.54 kg/m².    Intake/Output Summary (Last 24 hours) at 2/3/2022 2058  Last data filed at 2/3/2022 1819  Gross per 24 hour   Intake 2444.75 ml   Output 1350 ml   Net 1094.75 ml      Physical Exam  Constitutional:       General: She is not in acute distress.     Appearance: She is not ill-appearing.   Eyes:      General:         Right eye: No discharge.         Left eye: No discharge.   Neck:      Vascular: No JVD.   Cardiovascular:      Rate and Rhythm: Normal rate and regular rhythm.   Pulmonary:      Effort: Pulmonary effort is normal.      Breath sounds: Normal breath sounds.   Abdominal:      General: Abdomen is flat. Bowel sounds are normal. There is no distension.      Palpations: Abdomen is soft.      Tenderness: There is no abdominal tenderness.   Musculoskeletal:      Right lower leg: No edema.      Left lower leg: No edema.   Skin:     General: Skin is warm and moist.      Findings: No rash.   Neurological:      Mental Status: She is alert. She is confused.   Psychiatric:         Mood and Affect: Mood and affect normal.         Significant Labs: All pertinent labs within the past 24 hours have been reviewed.    Significant Imaging: I have reviewed  all pertinent imaging results/findings within the past 24 hours.

## 2022-02-05 LAB
ABO + RH BLD: NORMAL
ALBUMIN SERPL BCP-MCNC: 2.2 G/DL (ref 3.5–5.2)
ALP SERPL-CCNC: 56 U/L (ref 55–135)
ALT SERPL W/O P-5'-P-CCNC: 12 U/L (ref 10–44)
ANION GAP SERPL CALC-SCNC: 4 MMOL/L (ref 8–16)
AST SERPL-CCNC: 15 U/L (ref 10–40)
BASOPHILS # BLD AUTO: 0.08 K/UL (ref 0–0.2)
BASOPHILS NFR BLD: 1.1 % (ref 0–1.9)
BILIRUB SERPL-MCNC: 0.2 MG/DL (ref 0.1–1)
BLD GP AB SCN CELLS X3 SERPL QL: NORMAL
BLD PROD TYP BPU: NORMAL
BLOOD UNIT EXPIRATION DATE: NORMAL
BLOOD UNIT TYPE CODE: 600
BLOOD UNIT TYPE: NORMAL
BUN SERPL-MCNC: 14 MG/DL (ref 8–23)
CALCIUM SERPL-MCNC: 8.1 MG/DL (ref 8.7–10.5)
CHLORIDE SERPL-SCNC: 96 MMOL/L (ref 95–110)
CO2 SERPL-SCNC: 35 MMOL/L (ref 23–29)
CODING SYSTEM: NORMAL
CREAT SERPL-MCNC: 0.6 MG/DL (ref 0.5–1.4)
DIFFERENTIAL METHOD: ABNORMAL
DISPENSE STATUS: NORMAL
EOSINOPHIL # BLD AUTO: 0.1 K/UL (ref 0–0.5)
EOSINOPHIL NFR BLD: 1.8 % (ref 0–8)
ERYTHROCYTE [DISTWIDTH] IN BLOOD BY AUTOMATED COUNT: 14.4 % (ref 11.5–14.5)
EST. GFR  (AFRICAN AMERICAN): >60 ML/MIN/1.73 M^2
EST. GFR  (NON AFRICAN AMERICAN): >60 ML/MIN/1.73 M^2
GLUCOSE SERPL-MCNC: 117 MG/DL (ref 70–110)
HCT VFR BLD AUTO: 20.9 % (ref 37–48.5)
HGB BLD-MCNC: 6.7 G/DL (ref 12–16)
IMM GRANULOCYTES # BLD AUTO: 0.03 K/UL (ref 0–0.04)
IMM GRANULOCYTES NFR BLD AUTO: 0.4 % (ref 0–0.5)
LYMPHOCYTES # BLD AUTO: 1.1 K/UL (ref 1–4.8)
LYMPHOCYTES NFR BLD: 14.2 % (ref 18–48)
MAGNESIUM SERPL-MCNC: 1.9 MG/DL (ref 1.6–2.6)
MCH RBC QN AUTO: 32.2 PG (ref 27–31)
MCHC RBC AUTO-ENTMCNC: 32.1 G/DL (ref 32–36)
MCV RBC AUTO: 101 FL (ref 82–98)
MONOCYTES # BLD AUTO: 1.1 K/UL (ref 0.3–1)
MONOCYTES NFR BLD: 14.5 % (ref 4–15)
NEUTROPHILS # BLD AUTO: 5.2 K/UL (ref 1.8–7.7)
NEUTROPHILS NFR BLD: 68 % (ref 38–73)
NRBC BLD-RTO: 0 /100 WBC
NUM UNITS TRANS PACKED RBC: NORMAL
PHOSPHATE SERPL-MCNC: 3.1 MG/DL (ref 2.7–4.5)
PLATELET # BLD AUTO: 248 K/UL (ref 150–450)
PMV BLD AUTO: 11 FL (ref 9.2–12.9)
POCT GLUCOSE: 121 MG/DL (ref 70–110)
POCT GLUCOSE: 128 MG/DL (ref 70–110)
POCT GLUCOSE: 128 MG/DL (ref 70–110)
POTASSIUM SERPL-SCNC: 4.2 MMOL/L (ref 3.5–5.1)
PROT SERPL-MCNC: 5.8 G/DL (ref 6–8.4)
RBC # BLD AUTO: 2.08 M/UL (ref 4–5.4)
SODIUM SERPL-SCNC: 135 MMOL/L (ref 136–145)
VANCOMYCIN TROUGH SERPL-MCNC: 16.3 UG/ML (ref 10–22)
WBC # BLD AUTO: 7.6 K/UL (ref 3.9–12.7)

## 2022-02-05 PROCEDURE — 63600175 PHARM REV CODE 636 W HCPCS: Performed by: STUDENT IN AN ORGANIZED HEALTH CARE EDUCATION/TRAINING PROGRAM

## 2022-02-05 PROCEDURE — 63600175 PHARM REV CODE 636 W HCPCS: Performed by: HOSPITALIST

## 2022-02-05 PROCEDURE — 27000221 HC OXYGEN, UP TO 24 HOURS

## 2022-02-05 PROCEDURE — 25000003 PHARM REV CODE 250: Performed by: INTERNAL MEDICINE

## 2022-02-05 PROCEDURE — 20000000 HC ICU ROOM

## 2022-02-05 PROCEDURE — 99900026 HC AIRWAY MAINTENANCE (STAT)

## 2022-02-05 PROCEDURE — 25000003 PHARM REV CODE 250: Performed by: HOSPITALIST

## 2022-02-05 PROCEDURE — P9016 RBC LEUKOCYTES REDUCED: HCPCS | Performed by: INTERNAL MEDICINE

## 2022-02-05 PROCEDURE — 84100 ASSAY OF PHOSPHORUS: CPT | Performed by: STUDENT IN AN ORGANIZED HEALTH CARE EDUCATION/TRAINING PROGRAM

## 2022-02-05 PROCEDURE — 36430 TRANSFUSION BLD/BLD COMPNT: CPT

## 2022-02-05 PROCEDURE — 85025 COMPLETE CBC W/AUTO DIFF WBC: CPT | Performed by: STUDENT IN AN ORGANIZED HEALTH CARE EDUCATION/TRAINING PROGRAM

## 2022-02-05 PROCEDURE — 36415 COLL VENOUS BLD VENIPUNCTURE: CPT | Performed by: INTERNAL MEDICINE

## 2022-02-05 PROCEDURE — 25000003 PHARM REV CODE 250: Performed by: STUDENT IN AN ORGANIZED HEALTH CARE EDUCATION/TRAINING PROGRAM

## 2022-02-05 PROCEDURE — S0030 INJECTION, METRONIDAZOLE: HCPCS | Performed by: STUDENT IN AN ORGANIZED HEALTH CARE EDUCATION/TRAINING PROGRAM

## 2022-02-05 PROCEDURE — 80053 COMPREHEN METABOLIC PANEL: CPT | Performed by: STUDENT IN AN ORGANIZED HEALTH CARE EDUCATION/TRAINING PROGRAM

## 2022-02-05 PROCEDURE — 86920 COMPATIBILITY TEST SPIN: CPT | Performed by: INTERNAL MEDICINE

## 2022-02-05 PROCEDURE — 86850 RBC ANTIBODY SCREEN: CPT | Performed by: INTERNAL MEDICINE

## 2022-02-05 PROCEDURE — 94761 N-INVAS EAR/PLS OXIMETRY MLT: CPT

## 2022-02-05 PROCEDURE — 83735 ASSAY OF MAGNESIUM: CPT | Performed by: STUDENT IN AN ORGANIZED HEALTH CARE EDUCATION/TRAINING PROGRAM

## 2022-02-05 PROCEDURE — 80202 ASSAY OF VANCOMYCIN: CPT | Performed by: HOSPITALIST

## 2022-02-05 RX ORDER — HYDROCODONE BITARTRATE AND ACETAMINOPHEN 500; 5 MG/1; MG/1
TABLET ORAL
Status: DISCONTINUED | OUTPATIENT
Start: 2022-02-05 | End: 2022-03-14 | Stop reason: HOSPADM

## 2022-02-05 RX ADMIN — GLYCOPYRROLATE 1 MG: 1 TABLET ORAL at 09:02

## 2022-02-05 RX ADMIN — MIDODRINE HYDROCHLORIDE 5 MG: 5 TABLET ORAL at 09:02

## 2022-02-05 RX ADMIN — ATORVASTATIN CALCIUM 40 MG: 40 TABLET, FILM COATED ORAL at 09:02

## 2022-02-05 RX ADMIN — CITALOPRAM HYDROBROMIDE 40 MG: 10 TABLET ORAL at 09:02

## 2022-02-05 RX ADMIN — AZELASTINE 137 MCG: 1 SPRAY, METERED NASAL at 10:02

## 2022-02-05 RX ADMIN — AZELASTINE 137 MCG: 1 SPRAY, METERED NASAL at 09:02

## 2022-02-05 RX ADMIN — ENOXAPARIN SODIUM 40 MG: 100 INJECTION SUBCUTANEOUS at 06:02

## 2022-02-05 RX ADMIN — VANCOMYCIN HYDROCHLORIDE 1000 MG: 1 INJECTION, POWDER, LYOPHILIZED, FOR SOLUTION INTRAVENOUS at 04:02

## 2022-02-05 RX ADMIN — MIDODRINE HYDROCHLORIDE 5 MG: 5 TABLET ORAL at 04:02

## 2022-02-05 RX ADMIN — LEVOTHYROXINE SODIUM 25 MCG: 0.03 TABLET ORAL at 05:02

## 2022-02-05 RX ADMIN — METRONIDAZOLE 500 MG: 500 INJECTION, SOLUTION INTRAVENOUS at 07:02

## 2022-02-05 RX ADMIN — FAMOTIDINE 20 MG: 10 INJECTION INTRAVENOUS at 09:02

## 2022-02-05 RX ADMIN — METOPROLOL TARTRATE 12.5 MG: 25 TABLET, FILM COATED ORAL at 09:02

## 2022-02-05 RX ADMIN — METRONIDAZOLE 500 MG: 500 INJECTION, SOLUTION INTRAVENOUS at 04:02

## 2022-02-05 RX ADMIN — GLYCOPYRROLATE 1 MG: 1 TABLET ORAL at 04:02

## 2022-02-05 RX ADMIN — SCOPALAMINE 1 PATCH: 1 PATCH, EXTENDED RELEASE TRANSDERMAL at 12:02

## 2022-02-05 NOTE — PROGRESS NOTES
[facility] Hospital Medicine  Progress Note    Patient Name: Sandra Wilson  MRN: [id number]  Patient Class: IP- Inpatient   Admission Date: 1/18/2022  Length of Stay: 18 days  Attending Physician: Neda Porter MD  Primary Care Provider: Primary Doctor No        Subjective:     Principal Problem:Acute hypoxemic respiratory failure        HPI:  Sandra Wilson is an 81 year old female with a past medical history of CVAs, CNS aneurysm, PEG status, CAD, HLD, hypothryoidism, DM, aortic stenosis, and colon, breast, and ovarian cancer who presented from long term care with vomiting, diarrhea, and shortness of breath. Workup in the ED showed likely aspiration pneumonitis and C. Diff colitis. Her O2 requirement increased while in the ED requiring intubation with sedation. She also required Levophed as she likely developed septic shock. Antibiotics were broadened to cefepime, vancomycin, and Flagyl. Pulmonary was consulted. Family was notified. The patient was unable to provide history given acuity of illness.      Overview/Hospital Course:  Sandra Wilson is an 81 year old female with a past medical history of CVAs, CNS aneurysm, PEG status, CAD, HLD, hypothyroidism, DM, aortic stenosis, and colon, breast, and ovarian cancer who presented from long term care with vomiting, diarrhea, and shortness of breath secondary to acute hypoxic respiratory failure from aspiration pneumonia in the setting of C diff colitis leading to septic shock. She was intubated in the ED and started on Levophed infusion via RIJ CVC placed by Dr. Vignesh Gaspar. She was started on broad spectrum antibiotics with cefepime, Flagyl, vancomycin and admitted to the ICU. She was also started on enteral vancomycin for severe C diff colitis given elevated WBC count and STEFFANY. There was concern for developing ARDS given P/F ratio of 87 (severe); 190 1/22 (moderate). Pulmonary was consulted. She also presented with demand ischemia likely  secondary to septic shock. Troponin was trended and improved as shock resolved. Normal saline infusion was added for hyponatremia and STEFFANY which improved both. Levophed was able to be weaned. Family agreed to DNR status 1/18 and Palliative Care was consulted to discuss goal of care 1/19. Antibiotics were changed to doxycycline and Flagyl (GBS in sputum culture) 1/21; vancomycin was added 1/23 after culture also became positive for Staph aureus and Klebsiella. Her respiratory status has improved throughout her course and she was extubated (code changed to partial code for intubation) 1/21 only to be re-intubated for worsening stridor (history of tracheostomy). Upon re-intubation, copious amounts of secretions were suctioned. KUB suggested a mild ileus A bowel regimen was instituted and the patient was able to have bowel movements 1/22. Tube feeds were started 1/23. Her course was complicated by Afib as well noted on telemetry 1/21; metoprolol tartrate started 1/22.She failed a leak test 1/22; Surgery was consulted for tracheostomy which is pending conversation with family regarding goals of care.    Patient was set to go for trach on 01/26, but attempt was made for another trial extubation and the patient did well after extubation, she was transition to face mask and was able to maintain her saturations although had significant difficulty with secretions.  She was started on Robinul and scopolamine patch which did seem to improve for secretions.  Her respiratory status continued to be somewhat tenuous, so she was monitored in the ICU.  Family expressed desire to not have her get a tracheostomy.  Case management began working on LTACH referral.      Interval History: No acute events overnight. currently awaiting on LTACH placement.     Review of Systems   Unable to perform ROS: Patient nonverbal     Objective:     Vital Signs (Most Recent):  Temp: 98.6 °F (37 °C) (02/05/22 0400)  Pulse: 70 (02/05/22 0600)  Resp: (!) 22  (02/05/22 0600)  BP: (!) 115/56 (02/05/22 0600)  SpO2: (!) 94 % (02/05/22 0600) Vital Signs (24h Range):  Temp:  [97.7 °F (36.5 °C)-98.9 °F (37.2 °C)] 98.6 °F (37 °C)  Pulse:  [62-84] 70  Resp:  [16-22] 22  SpO2:  [92 %-100 %] 94 %  BP: (106-131)/(53-71) 115/56     Weight: 69.5 kg (153 lb 3.5 oz)  Body mass index is 26.3 kg/m².    Intake/Output Summary (Last 24 hours) at 2/5/2022 1128  Last data filed at 2/5/2022 0613  Gross per 24 hour   Intake 2035.22 ml   Output 2550 ml   Net -514.78 ml      Physical Exam  Constitutional:       General: She is not in acute distress.     Appearance: She is not ill-appearing.   Eyes:      General:         Right eye: No discharge.         Left eye: No discharge.   Neck:      Vascular: No JVD.   Cardiovascular:      Rate and Rhythm: Normal rate and regular rhythm.   Pulmonary:      Effort: Pulmonary effort is normal.      Breath sounds: Normal breath sounds.   Abdominal:      General: Abdomen is flat. Bowel sounds are normal. There is no distension.      Palpations: Abdomen is soft.      Tenderness: There is no abdominal tenderness.   Musculoskeletal:      Right lower leg: No edema.      Left lower leg: No edema.   Skin:     General: Skin is warm and moist.      Findings: No rash.   Neurological:      Mental Status: She is alert.      Comments: She focuses her attention on me but doesn't say anything   Psychiatric:         Mood and Affect: Mood and affect normal.         Significant Labs:   All pertinent labs within the past 24 hours have been reviewed.  CBC:   Recent Labs   Lab 02/04/22  0401 02/05/22 0411   WBC 8.44 7.60   HGB 6.9* 6.7*   HCT 21.3* 20.9*    248     CMP:   Recent Labs   Lab 02/04/22  0401 02/05/22  0411   * 135*   K 4.0 4.2   CL 94* 96   CO2 30* 35*    117*   BUN 14 14   CREATININE 0.7 0.6   CALCIUM 7.9* 8.1*   PROT 5.7* 5.8*   ALBUMIN 2.2* 2.2*   BILITOT 0.3 0.2   ALKPHOS 53* 56   AST 12 15   ALT 9* 12   ANIONGAP 10 4*   EGFRNONAA >60 >60  "      Significant Imaging: I have reviewed all pertinent imaging results/findings within the past 24 hours.      Assessment/Plan:      * Acute hypoxemic respiratory failure  Improving.  Continue supplementing oxygen.  Monitor sats.  Monitor work of breathing.  She's partial code, specifying that she'd want to be intubated if need be.    Anemia  Worsening.  Monitor HGB.  Transfuse if less than 7.    Hemoglobin   Date Value Ref Range Status   02/04/2022 6.9 (L) 12.0 - 16.0 g/dL Final   02/03/2022 7.1 (L) 12.0 - 16.0 g/dL Final   02/02/2022 7.4 (L) 12.0 - 16.0 g/dL Final   02/01/2022 7.3 (L) 12.0 - 16.0 g/dL Final   ]      Paroxysmal atrial fibrillation  -Telemetry  - controlled on metoprolol 12.5 mg BID  - HGB is dropping, so will hold off on giving DOAC      Gastrostomy tube dependent  Stable.  No issues with it    Aspiration pneumonia  Continue antibiotics as they are but will stop them soon.  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  She's producing a lot of airway secretions and needs frequent suctioning.  She's high risk of aspiration pneumonia.  Will try to get her into LTACH for long-term care.    Antibiotics (From admission, onward)            Start     Stop Route Frequency Ordered    02/03/22 1500  vancomycin in dextrose 5 % 1 gram/250 mL IVPB 1,000 mg         -- IV Every 24 hours (non-standard times) 02/03/22 0516    01/23/22 1047  vancomycin - pharmacy to dose  (vancomycin IVPB)        "And" Linked Group Details    -- IV pharmacy to manage frequency 01/23/22 0947    01/18/22 1545  metronidazole IVPB 500 mg         -- IV Every 8 hours (non-standard times) 01/18/22 1441         Will monitor patient closely and continue current treatment plan unchanged.        Type 2 diabetes mellitus, with long-term current use of insulin  Patient's FSGs are controlled on current medication regimen.  Most recent fingerstick glucose reviewed-   Recent Labs   Lab 02/03/22  2318 02/04/22  0508 02/04/22  1207 " 02/04/22  1801   POCTGLUCOSE 103 117* 91 135*     Current correctional scale  Medium  Maintain anti-hyperglycemic dose as follows-   Antihyperglycemics (From admission, onward)            Start     Stop Route Frequency Ordered    01/18/22 1438  insulin aspart U-100 pen 1-10 Units         -- SubQ Before meals & nightly PRN 01/18/22 1438        Hold Oral hypoglycemics while patient is in the hospital.        YARI (generalized anxiety disorder)  Extubated on 01/26.  Using Precedex as needed to control anxiety.  Patient does not appear anxious on exam.      Hypothyroidism  Patient has chronic hypothyroidism. TFTs reviewed-   Lab Results   Component Value Date    TSH 1.130 07/13/2017   . Will continue chronic levothyroxine and adjust for and clinical changes.        Hyperlipidemia   Patient is chronically on statin.will continue for now. Monitor clinically. Last LDL was   Lab Results   Component Value Date    LDLCALC 94.4 07/13/2017          GERD (gastroesophageal reflux disease)  On famotidine.      VTE Risk Mitigation (From admission, onward)         Ordered     enoxaparin injection 40 mg  Daily         01/18/22 1438     IP VTE HIGH RISK PATIENT  Once         01/18/22 1438     Place sequential compression device  Until discontinued         01/18/22 1438                Discharge Planning   DEMETRIUS:      Code Status: Partial Code   Is the patient medically ready for discharge?:     Reason for patient still in hospital (select all that apply): Patient trending condition and Treatment  Discharge Plan A: Return to nursing home            Critical care time spent on the evaluation and treatment of severe organ dysfunction, review of pertinent labs and imaging studies, discussions with consulting providers and discussions with patient/family: 60 minutes.      Neda Porter MD  Department of Hospital Medicine   [facility]

## 2022-02-05 NOTE — CARE UPDATE
02/05/22 0831   Patient Assessment/Suction   Level of Consciousness (AVPU) alert   Respiratory Effort Normal;Unlabored   Expansion/Accessory Muscles/Retractions expansion symmetric;no retractions;no use of accessory muscles   All Lung Fields Breath Sounds diminished   Rhythm/Pattern, Respiratory depth regular;pattern regular;unlabored   PRE-TX-O2   O2 Device (Oxygen Therapy) Oxymask   $ Is the patient on Low Flow Oxygen? Yes   Flow (L/min) 1   Pulse Oximetry Type Continuous   $ Pulse Oximetry - Multiple Charge Pulse Oximetry - Multiple

## 2022-02-05 NOTE — PROGRESS NOTES
[facility] Medicine  Progress Note    Patient Name: Sandra Wilson  MRN: [id number]  Patient Class: IP- Inpatient   Admission Date: 1/18/2022  Length of Stay: 17 days  Attending Physician: Felipe Kruse MD  Primary Care Provider: Primary Doctor No        Subjective:     Principal Problem:Acute hypoxemic respiratory failure        HPI:  Sandra Wilson is an 81 year old female with a past medical history of CVAs, CNS aneurysm, PEG status, CAD, HLD, hypothryoidism, DM, aortic stenosis, and colon, breast, and ovarian cancer who presented from long term care with vomiting, diarrhea, and shortness of breath. Workup in the ED showed likely aspiration pneumonitis and C. Diff colitis. Her O2 requirement increased while in the ED requiring intubation with sedation. She also required Levophed as she likely developed septic shock. Antibiotics were broadened to cefepime, vancomycin, and Flagyl. Pulmonary was consulted. Family was notified. The patient was unable to provide history given acuity of illness.      Overview/Hospital Course:  Sandra Wilson is an 81 year old female with a past medical history of CVAs, CNS aneurysm, PEG status, CAD, HLD, hypothyroidism, DM, aortic stenosis, and colon, breast, and ovarian cancer who presented from long term care with vomiting, diarrhea, and shortness of breath secondary to acute hypoxic respiratory failure from aspiration pneumonia in the setting of C diff colitis leading to septic shock. She was intubated in the ED and started on Levophed infusion via RIJ CVC placed by Dr. Vignesh Gaspar. She was started on broad spectrum antibiotics with cefepime, Flagyl, vancomycin and admitted to the ICU. She was also started on enteral vancomycin for severe C diff colitis given elevated WBC count and STEFFANY. There was concern for developing ARDS given P/F ratio of 87 (severe); 190 1/22 (moderate). Pulmonary was consulted. She also presented with demand ischemia likely  secondary to septic shock. Troponin was trended and improved as shock resolved. Normal saline infusion was added for hyponatremia and STEFFANY which improved both. Levophed was able to be weaned. Family agreed to DNR status 1/18 and Palliative Care was consulted to discuss goal of care 1/19. Antibiotics were changed to doxycycline and Flagyl (GBS in sputum culture) 1/21; vancomycin was added 1/23 after culture also became positive for Staph aureus and Klebsiella. Her respiratory status has improved throughout her course and she was extubated (code changed to partial code for intubation) 1/21 only to be re-intubated for worsening stridor (history of tracheostomy). Upon re-intubation, copious amounts of secretions were suctioned. KUB suggested a mild ileus A bowel regimen was instituted and the patient was able to have bowel movements 1/22. Tube feeds were started 1/23. Her course was complicated by Afib as well noted on telemetry 1/21; metoprolol tartrate started 1/22.She failed a leak test 1/22; Surgery was consulted for tracheostomy which is pending conversation with family regarding goals of care.    Patient was set to go for trach on 01/26, but attempt was made for another trial extubation and the patient did well after extubation, she was transition to face mask and was able to maintain her saturations although had significant difficulty with secretions.  She was started on Robinul and scopolamine patch which did seem to improve for secretions.  Her respiratory status continued to be somewhat tenuous, so she was monitored in the ICU.  Family expressed desire to not have her get a tracheostomy.  Case management began working on LTACH referral.      Interval History:  No changes in status.  She's alert but not saying anything today.    Review of Systems   Unable to perform ROS: Patient nonverbal     Objective:     Vital Signs (Most Recent):  Temp: 97.7 °F (36.5 °C) (02/04/22 1915)  Pulse: 67 (02/04/22 2000)  Resp: 18  (02/04/22 1936)  BP: (!) 122/59 (02/04/22 2000)  SpO2: 97 % (02/04/22 1936) Vital Signs (24h Range):  Temp:  [97.7 °F (36.5 °C)-99.2 °F (37.3 °C)] 97.7 °F (36.5 °C)  Pulse:  [62-76] 67  Resp:  [18-23] 18  SpO2:  [95 %-100 %] 97 %  BP: (105-131)/(51-63) 122/59     Weight: 68.7 kg (151 lb 7.3 oz)  Body mass index is 26 kg/m².    Intake/Output Summary (Last 24 hours) at 2/4/2022 2242  Last data filed at 2/4/2022 1800  Gross per 24 hour   Intake 2310.17 ml   Output 2550 ml   Net -239.83 ml      Physical Exam  Constitutional:       General: She is not in acute distress.     Appearance: She is not ill-appearing.   Eyes:      General:         Right eye: No discharge.         Left eye: No discharge.   Neck:      Vascular: No JVD.   Cardiovascular:      Rate and Rhythm: Normal rate and regular rhythm.   Pulmonary:      Effort: Pulmonary effort is normal.      Breath sounds: Normal breath sounds.   Abdominal:      General: Abdomen is flat. Bowel sounds are normal. There is no distension.      Palpations: Abdomen is soft.      Tenderness: There is no abdominal tenderness.   Musculoskeletal:      Right lower leg: No edema.      Left lower leg: No edema.   Skin:     General: Skin is warm and moist.      Findings: No rash.   Neurological:      Mental Status: She is alert.      Comments: She focuses her attention on me but doesn't say anything   Psychiatric:         Mood and Affect: Mood and affect normal.         Significant Labs: All pertinent labs within the past 24 hours have been reviewed.    Significant Imaging: I have reviewed all pertinent imaging results/findings within the past 24 hours.      Assessment/Plan:      * Acute hypoxemic respiratory failure  Improving.  Continue supplementing oxygen.  Monitor sats.  Monitor work of breathing.  She's partial code, specifying that she'd want to be intubated if need be.    Anemia  Worsening.  Monitor HGB.  Transfuse if less than 7.    Hemoglobin   Date Value Ref Range Status  "  02/04/2022 6.9 (L) 12.0 - 16.0 g/dL Final   02/03/2022 7.1 (L) 12.0 - 16.0 g/dL Final   02/02/2022 7.4 (L) 12.0 - 16.0 g/dL Final   02/01/2022 7.3 (L) 12.0 - 16.0 g/dL Final   ]      Paroxysmal atrial fibrillation  -Telemetry  - controlled on metoprolol 12.5 mg BID  - HGB is dropping, so will hold off on giving DOAC      Gastrostomy tube dependent  Stable.  No issues with it    Aspiration pneumonia  Continue antibiotics as they are but will stop them soon.  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  She's producing a lot of airway secretions and needs frequent suctioning.  She's high risk of aspiration pneumonia.  Will try to get her into LTACH for long-term care.    Antibiotics (From admission, onward)            Start     Stop Route Frequency Ordered    02/03/22 1500  vancomycin in dextrose 5 % 1 gram/250 mL IVPB 1,000 mg         -- IV Every 24 hours (non-standard times) 02/03/22 0516    01/23/22 1047  vancomycin - pharmacy to dose  (vancomycin IVPB)        "And" Linked Group Details    -- IV pharmacy to manage frequency 01/23/22 0947    01/18/22 1545  metronidazole IVPB 500 mg         -- IV Every 8 hours (non-standard times) 01/18/22 1441         Will monitor patient closely and continue current treatment plan unchanged.        Type 2 diabetes mellitus, with long-term current use of insulin  Patient's FSGs are controlled on current medication regimen.  Most recent fingerstick glucose reviewed-   Recent Labs   Lab 02/03/22  2318 02/04/22  0508 02/04/22  1207 02/04/22  1801   POCTGLUCOSE 103 117* 91 135*     Current correctional scale  Medium  Maintain anti-hyperglycemic dose as follows-   Antihyperglycemics (From admission, onward)            Start     Stop Route Frequency Ordered    01/18/22 1438  insulin aspart U-100 pen 1-10 Units         -- SubQ Before meals & nightly PRN 01/18/22 1438        Hold Oral hypoglycemics while patient is in the hospital.        YARI (generalized anxiety " disorder)  Extubated on 01/26.  Using Precedex as needed to control anxiety.  Patient does not appear anxious on exam.      Hypothyroidism  Patient has chronic hypothyroidism. TFTs reviewed-   Lab Results   Component Value Date    TSH 1.130 07/13/2017   . Will continue chronic levothyroxine and adjust for and clinical changes.        Hyperlipidemia   Patient is chronically on statin.will continue for now. Monitor clinically. Last LDL was   Lab Results   Component Value Date    LDLCALC 94.4 07/13/2017          GERD (gastroesophageal reflux disease)  On famotidine.      VTE Risk Mitigation (From admission, onward)         Ordered     enoxaparin injection 40 mg  Daily         01/18/22 1438     IP VTE HIGH RISK PATIENT  Once         01/18/22 1438     Place sequential compression device  Until discontinued         01/18/22 1438                Discharge Planning   DEMETRIUS:      Code Status: Partial Code   Is the patient medically ready for discharge?:     Reason for patient still in hospital (select all that apply): Patient trending condition, Treatment and Pending disposition  Discharge Plan A: Return to nursing home            Felipe Kruse MD  Department of Hospital Medicine   [facility]

## 2022-02-05 NOTE — PROGRESS NOTES
Pharmacokinetic Assessment Follow Up: IV Vancomycin    Vancomycin serum concentration assessment(s):    The trough level was drawn correctly and can be used to guide therapy at this time. The measurement is within the desired definitive target range of 15 to 20 mcg/mL.    Vancomycin Regimen Plan:    Continue regimen to Vancomycin 1000 mg IV every 24 hours with next serum trough concentration measured at approximately 1400 prior to third dose on 2/7.    Drug levels (last 3 results):  Recent Labs   Lab Result Units 02/03/22 0437 02/05/22  1415   Vancomycin-Trough ug/mL 20.3 16.3       Pharmacy will continue to follow and monitor vancomycin.    Please contact pharmacy at extension [phone] for questions regarding this assessment.    Thank you for the consult,   Alie Alonzo       Patient brief summary:  Sandra Wilson is a 81 y.o. female initiated on antimicrobial therapy with IV Vancomycin for treatment of lower respiratory infection.    The patient's current regimen is vancomycin 1000 mg IV every 24 hours.    Drug Allergies:   Review of patient's allergies indicates:   Allergen Reactions    Cephalexin (bulk)      Flushing and itching    Lidocaine base      rash    Lisinopril Other (See Comments)     cough       Actual Body Weight:   69.5 kg    Renal Function:   Estimated Creatinine Clearance: 70.3 mL/min (based on SCr of 0.6 mg/dL).,     Dialysis Method (if applicable):  N/A    CBC (last 72 hours):  Recent Labs   Lab Result Units 02/03/22 0437 02/04/22 0401 02/05/22  0411   WBC K/uL 9.59 8.44 7.60   Hemoglobin g/dL 7.1* 6.9* 6.7*   Hematocrit % 21.7* 21.3* 20.9*   Platelets K/uL 277 266 248   Gran % % 74.5* 67.3 68.0   Lymph % % 11.7* 15.2* 14.2*   Mono % % 11.5 14.5 14.5   Eosinophil % % 1.3 1.7 1.8   Basophil % % 0.5 0.7 1.1   Differential Method  Automated Automated Automated       Metabolic Panel (last 72 hours):  Recent Labs   Lab Result Units 02/03/22 0437 02/04/22  0401 02/05/22  0411   Sodium mmol/L  134* 134* 135*   Potassium mmol/L 3.9 4.0 4.2   Chloride mmol/L 94* 94* 96   CO2 mmol/L 34* 30* 35*   Glucose mg/dL 117* 102 117*   BUN mg/dL 15 14 14   Creatinine mg/dL 0.6 0.7 0.6   Albumin g/dL 2.1* 2.2* 2.2*   Total Bilirubin mg/dL 0.3 0.3 0.2   Alkaline Phosphatase U/L 58 53* 56   AST U/L 17 12 15   ALT U/L 10 9* 12   Magnesium mg/dL 1.5* 2.2 1.9   Phosphorus mg/dL 3.0 3.0 3.1       Vancomycin Administrations:  vancomycin given in the last 96 hours                     vancomycin in dextrose 5 % 1 gram/250 mL IVPB 1,000 mg ()  Restarted 02/04/22 1504     1,000 mg New Bag  1502     1,000 mg New Bag 02/03/22 1611    vancomycin 1.25 g in dextrose 5% 250 mL IVPB (ready to mix) (mg) 1,250 mg New Bag 02/02/22 0521                    Microbiologic Results:  Microbiology Results (last 7 days)       ** No results found for the last 168 hours. **

## 2022-02-05 NOTE — ASSESSMENT & PLAN NOTE
Worsening.  Monitor HGB.  Transfuse if less than 7.    Hemoglobin   Date Value Ref Range Status   02/04/2022 6.9 (L) 12.0 - 16.0 g/dL Final   02/03/2022 7.1 (L) 12.0 - 16.0 g/dL Final   02/02/2022 7.4 (L) 12.0 - 16.0 g/dL Final   02/01/2022 7.3 (L) 12.0 - 16.0 g/dL Final   ]

## 2022-02-05 NOTE — SUBJECTIVE & OBJECTIVE
Interval History: No acute events overnight. currently awaiting on LTACH placement, HnH dropping will transfuse 1 unit of blood.     Review of Systems   Unable to perform ROS: Patient nonverbal     Objective:     Vital Signs (Most Recent):  Temp: 98.6 °F (37 °C) (02/05/22 0400)  Pulse: 70 (02/05/22 0600)  Resp: (!) 22 (02/05/22 0600)  BP: (!) 115/56 (02/05/22 0600)  SpO2: (!) 94 % (02/05/22 0600) Vital Signs (24h Range):  Temp:  [97.7 °F (36.5 °C)-98.9 °F (37.2 °C)] 98.6 °F (37 °C)  Pulse:  [62-84] 70  Resp:  [16-22] 22  SpO2:  [92 %-100 %] 94 %  BP: (106-131)/(53-71) 115/56     Weight: 69.5 kg (153 lb 3.5 oz)  Body mass index is 26.3 kg/m².    Intake/Output Summary (Last 24 hours) at 2/5/2022 1128  Last data filed at 2/5/2022 0613  Gross per 24 hour   Intake 2035.22 ml   Output 2550 ml   Net -514.78 ml      Physical Exam  Constitutional:       General: She is not in acute distress.     Appearance: She is not ill-appearing.   Eyes:      General:         Right eye: No discharge.         Left eye: No discharge.   Neck:      Vascular: No JVD.   Cardiovascular:      Rate and Rhythm: Normal rate and regular rhythm.   Pulmonary:      Effort: Pulmonary effort is normal.      Breath sounds: Normal breath sounds.   Abdominal:      General: Abdomen is flat. Bowel sounds are normal. There is no distension.      Palpations: Abdomen is soft.      Tenderness: There is no abdominal tenderness.   Musculoskeletal:      Right lower leg: No edema.      Left lower leg: No edema.   Skin:     General: Skin is warm and moist.      Findings: No rash.   Neurological:      Mental Status: She is alert.      Comments: She focuses her attention on me but doesn't say anything   Psychiatric:         Mood and Affect: Mood and affect normal.         Significant Labs:   All pertinent labs within the past 24 hours have been reviewed.  CBC:   Recent Labs   Lab 02/04/22  0401 02/05/22  0411   WBC 8.44 7.60   HGB 6.9* 6.7*   HCT 21.3* 20.9*     248     CMP:   Recent Labs   Lab 02/04/22  0401 02/05/22  0411   * 135*   K 4.0 4.2   CL 94* 96   CO2 30* 35*    117*   BUN 14 14   CREATININE 0.7 0.6   CALCIUM 7.9* 8.1*   PROT 5.7* 5.8*   ALBUMIN 2.2* 2.2*   BILITOT 0.3 0.2   ALKPHOS 53* 56   AST 12 15   ALT 9* 12   ANIONGAP 10 4*   EGFRNONAA >60 >60       Significant Imaging: I have reviewed all pertinent imaging results/findings within the past 24 hours.

## 2022-02-05 NOTE — PLAN OF CARE
Problem: Adult Inpatient Plan of Care  Goal: Plan of Care Review  Outcome: Ongoing, Progressing     Problem: Adult Inpatient Plan of Care  Goal: Patient-Specific Goal (Individualized)  Outcome: Ongoing, Progressing     Problem: Adult Inpatient Plan of Care  Goal: Absence of Hospital-Acquired Illness or Injury  Outcome: Ongoing, Progressing     Problem: Adult Inpatient Plan of Care  Goal: Optimal Comfort and Wellbeing  Outcome: Ongoing, Progressing   ON 1L oxymask Sat 96%. Still requires frequent oral suctioning q3-4h. More talkative. Follows commands. Asking for the time, watching TV. Tolerating tube feeding. Had one large soft BM. Voiding per purewick. Bed on rotation mode. Safety/fall prevention maintain.

## 2022-02-05 NOTE — ASSESSMENT & PLAN NOTE
Patient's FSGs are controlled on current medication regimen.  Most recent fingerstick glucose reviewed-   Recent Labs   Lab 02/03/22  2318 02/04/22  0508 02/04/22  1207 02/04/22  1801   POCTGLUCOSE 103 117* 91 135*     Current correctional scale  Medium  Maintain anti-hyperglycemic dose as follows-   Antihyperglycemics (From admission, onward)            Start     Stop Route Frequency Ordered    01/18/22 1438  insulin aspart U-100 pen 1-10 Units         -- SubQ Before meals & nightly PRN 01/18/22 1438        Hold Oral hypoglycemics while patient is in the hospital.

## 2022-02-05 NOTE — SUBJECTIVE & OBJECTIVE
Interval History:  No changes in status.  She's alert but not saying anything today.    Review of Systems   Unable to perform ROS: Patient nonverbal     Objective:     Vital Signs (Most Recent):  Temp: 97.7 °F (36.5 °C) (02/04/22 1915)  Pulse: 67 (02/04/22 2000)  Resp: 18 (02/04/22 1936)  BP: (!) 122/59 (02/04/22 2000)  SpO2: 97 % (02/04/22 1936) Vital Signs (24h Range):  Temp:  [97.7 °F (36.5 °C)-99.2 °F (37.3 °C)] 97.7 °F (36.5 °C)  Pulse:  [62-76] 67  Resp:  [18-23] 18  SpO2:  [95 %-100 %] 97 %  BP: (105-131)/(51-63) 122/59     Weight: 68.7 kg (151 lb 7.3 oz)  Body mass index is 26 kg/m².    Intake/Output Summary (Last 24 hours) at 2/4/2022 2242  Last data filed at 2/4/2022 1800  Gross per 24 hour   Intake 2310.17 ml   Output 2550 ml   Net -239.83 ml      Physical Exam  Constitutional:       General: She is not in acute distress.     Appearance: She is not ill-appearing.   Eyes:      General:         Right eye: No discharge.         Left eye: No discharge.   Neck:      Vascular: No JVD.   Cardiovascular:      Rate and Rhythm: Normal rate and regular rhythm.   Pulmonary:      Effort: Pulmonary effort is normal.      Breath sounds: Normal breath sounds.   Abdominal:      General: Abdomen is flat. Bowel sounds are normal. There is no distension.      Palpations: Abdomen is soft.      Tenderness: There is no abdominal tenderness.   Musculoskeletal:      Right lower leg: No edema.      Left lower leg: No edema.   Skin:     General: Skin is warm and moist.      Findings: No rash.   Neurological:      Mental Status: She is alert.      Comments: She focuses her attention on me but doesn't say anything   Psychiatric:         Mood and Affect: Mood and affect normal.         Significant Labs: All pertinent labs within the past 24 hours have been reviewed.    Significant Imaging: I have reviewed all pertinent imaging results/findings within the past 24 hours.

## 2022-02-06 LAB
ALBUMIN SERPL BCP-MCNC: 2.2 G/DL (ref 3.5–5.2)
ALP SERPL-CCNC: 52 U/L (ref 55–135)
ALT SERPL W/O P-5'-P-CCNC: 13 U/L (ref 10–44)
ANION GAP SERPL CALC-SCNC: 5 MMOL/L (ref 8–16)
AST SERPL-CCNC: 20 U/L (ref 10–40)
BASOPHILS # BLD AUTO: 0.09 K/UL (ref 0–0.2)
BASOPHILS NFR BLD: 1.3 % (ref 0–1.9)
BILIRUB SERPL-MCNC: 0.4 MG/DL (ref 0.1–1)
BUN SERPL-MCNC: 16 MG/DL (ref 8–23)
CALCIUM SERPL-MCNC: 8.1 MG/DL (ref 8.7–10.5)
CHLORIDE SERPL-SCNC: 96 MMOL/L (ref 95–110)
CO2 SERPL-SCNC: 33 MMOL/L (ref 23–29)
CREAT SERPL-MCNC: 0.7 MG/DL (ref 0.5–1.4)
DIFFERENTIAL METHOD: ABNORMAL
EOSINOPHIL # BLD AUTO: 0.2 K/UL (ref 0–0.5)
EOSINOPHIL NFR BLD: 2.5 % (ref 0–8)
ERYTHROCYTE [DISTWIDTH] IN BLOOD BY AUTOMATED COUNT: 15.9 % (ref 11.5–14.5)
EST. GFR  (AFRICAN AMERICAN): >60 ML/MIN/1.73 M^2
EST. GFR  (NON AFRICAN AMERICAN): >60 ML/MIN/1.73 M^2
GLUCOSE SERPL-MCNC: 106 MG/DL (ref 70–110)
HCT VFR BLD AUTO: 25.6 % (ref 37–48.5)
HGB BLD-MCNC: 8.5 G/DL (ref 12–16)
IMM GRANULOCYTES # BLD AUTO: 0.04 K/UL (ref 0–0.04)
IMM GRANULOCYTES NFR BLD AUTO: 0.6 % (ref 0–0.5)
LYMPHOCYTES # BLD AUTO: 1.7 K/UL (ref 1–4.8)
LYMPHOCYTES NFR BLD: 24.9 % (ref 18–48)
MAGNESIUM SERPL-MCNC: 1.8 MG/DL (ref 1.6–2.6)
MCH RBC QN AUTO: 32.4 PG (ref 27–31)
MCHC RBC AUTO-ENTMCNC: 33.2 G/DL (ref 32–36)
MCV RBC AUTO: 98 FL (ref 82–98)
MONOCYTES # BLD AUTO: 0.9 K/UL (ref 0.3–1)
MONOCYTES NFR BLD: 13.5 % (ref 4–15)
NEUTROPHILS # BLD AUTO: 3.8 K/UL (ref 1.8–7.7)
NEUTROPHILS NFR BLD: 57.2 % (ref 38–73)
NRBC BLD-RTO: 0 /100 WBC
PHOSPHATE SERPL-MCNC: 3 MG/DL (ref 2.7–4.5)
PLATELET # BLD AUTO: 247 K/UL (ref 150–450)
PMV BLD AUTO: 11 FL (ref 9.2–12.9)
POCT GLUCOSE: 126 MG/DL (ref 70–110)
POCT GLUCOSE: 130 MG/DL (ref 70–110)
POCT GLUCOSE: 156 MG/DL (ref 70–110)
POCT GLUCOSE: 98 MG/DL (ref 70–110)
POTASSIUM SERPL-SCNC: 4.4 MMOL/L (ref 3.5–5.1)
PROT SERPL-MCNC: 5.9 G/DL (ref 6–8.4)
RBC # BLD AUTO: 2.62 M/UL (ref 4–5.4)
SODIUM SERPL-SCNC: 134 MMOL/L (ref 136–145)
WBC # BLD AUTO: 6.68 K/UL (ref 3.9–12.7)

## 2022-02-06 PROCEDURE — 63600175 PHARM REV CODE 636 W HCPCS: Performed by: HOSPITALIST

## 2022-02-06 PROCEDURE — 25000003 PHARM REV CODE 250: Performed by: STUDENT IN AN ORGANIZED HEALTH CARE EDUCATION/TRAINING PROGRAM

## 2022-02-06 PROCEDURE — S0030 INJECTION, METRONIDAZOLE: HCPCS | Performed by: STUDENT IN AN ORGANIZED HEALTH CARE EDUCATION/TRAINING PROGRAM

## 2022-02-06 PROCEDURE — 31720 CLEARANCE OF AIRWAYS: CPT

## 2022-02-06 PROCEDURE — 83735 ASSAY OF MAGNESIUM: CPT | Performed by: STUDENT IN AN ORGANIZED HEALTH CARE EDUCATION/TRAINING PROGRAM

## 2022-02-06 PROCEDURE — 25000003 PHARM REV CODE 250: Performed by: HOSPITALIST

## 2022-02-06 PROCEDURE — 25000003 PHARM REV CODE 250: Performed by: INTERNAL MEDICINE

## 2022-02-06 PROCEDURE — 84100 ASSAY OF PHOSPHORUS: CPT | Performed by: STUDENT IN AN ORGANIZED HEALTH CARE EDUCATION/TRAINING PROGRAM

## 2022-02-06 PROCEDURE — 80053 COMPREHEN METABOLIC PANEL: CPT | Performed by: STUDENT IN AN ORGANIZED HEALTH CARE EDUCATION/TRAINING PROGRAM

## 2022-02-06 PROCEDURE — 99900026 HC AIRWAY MAINTENANCE (STAT)

## 2022-02-06 PROCEDURE — 27000221 HC OXYGEN, UP TO 24 HOURS

## 2022-02-06 PROCEDURE — 85025 COMPLETE CBC W/AUTO DIFF WBC: CPT | Performed by: STUDENT IN AN ORGANIZED HEALTH CARE EDUCATION/TRAINING PROGRAM

## 2022-02-06 PROCEDURE — 20000000 HC ICU ROOM

## 2022-02-06 PROCEDURE — 63600175 PHARM REV CODE 636 W HCPCS: Performed by: STUDENT IN AN ORGANIZED HEALTH CARE EDUCATION/TRAINING PROGRAM

## 2022-02-06 PROCEDURE — 94761 N-INVAS EAR/PLS OXIMETRY MLT: CPT

## 2022-02-06 RX ADMIN — CITALOPRAM HYDROBROMIDE 40 MG: 10 TABLET ORAL at 08:02

## 2022-02-06 RX ADMIN — MIDODRINE HYDROCHLORIDE 5 MG: 5 TABLET ORAL at 09:02

## 2022-02-06 RX ADMIN — GLYCOPYRROLATE 1 MG: 1 TABLET ORAL at 08:02

## 2022-02-06 RX ADMIN — AZELASTINE 137 MCG: 1 SPRAY, METERED NASAL at 08:02

## 2022-02-06 RX ADMIN — GLYCOPYRROLATE 1 MG: 1 TABLET ORAL at 09:02

## 2022-02-06 RX ADMIN — MIDODRINE HYDROCHLORIDE 5 MG: 5 TABLET ORAL at 08:02

## 2022-02-06 RX ADMIN — ATORVASTATIN CALCIUM 40 MG: 40 TABLET, FILM COATED ORAL at 09:02

## 2022-02-06 RX ADMIN — METRONIDAZOLE 500 MG: 500 INJECTION, SOLUTION INTRAVENOUS at 11:02

## 2022-02-06 RX ADMIN — AZELASTINE 137 MCG: 1 SPRAY, METERED NASAL at 09:02

## 2022-02-06 RX ADMIN — ENOXAPARIN SODIUM 40 MG: 100 INJECTION SUBCUTANEOUS at 04:02

## 2022-02-06 RX ADMIN — LEVOTHYROXINE SODIUM 25 MCG: 0.03 TABLET ORAL at 06:02

## 2022-02-06 RX ADMIN — METOPROLOL TARTRATE 12.5 MG: 25 TABLET, FILM COATED ORAL at 09:02

## 2022-02-06 RX ADMIN — METRONIDAZOLE 500 MG: 500 INJECTION, SOLUTION INTRAVENOUS at 08:02

## 2022-02-06 RX ADMIN — GLYCOPYRROLATE 1 MG: 1 TABLET ORAL at 04:02

## 2022-02-06 RX ADMIN — METRONIDAZOLE 500 MG: 500 INJECTION, SOLUTION INTRAVENOUS at 12:02

## 2022-02-06 RX ADMIN — MIDODRINE HYDROCHLORIDE 5 MG: 5 TABLET ORAL at 04:02

## 2022-02-06 RX ADMIN — METRONIDAZOLE 500 MG: 500 INJECTION, SOLUTION INTRAVENOUS at 04:02

## 2022-02-06 RX ADMIN — FAMOTIDINE 20 MG: 10 INJECTION INTRAVENOUS at 08:02

## 2022-02-06 RX ADMIN — VANCOMYCIN HYDROCHLORIDE 1000 MG: 1 INJECTION, POWDER, LYOPHILIZED, FOR SOLUTION INTRAVENOUS at 05:02

## 2022-02-06 RX ADMIN — ACETAMINOPHEN 650 MG: 325 TABLET ORAL at 08:02

## 2022-02-06 RX ADMIN — FAMOTIDINE 20 MG: 10 INJECTION INTRAVENOUS at 09:02

## 2022-02-06 RX ADMIN — METOPROLOL TARTRATE 12.5 MG: 25 TABLET, FILM COATED ORAL at 08:02

## 2022-02-06 NOTE — PLAN OF CARE
Problem: Adult Inpatient Plan of Care  Goal: Plan of Care Review  Outcome: Ongoing, Progressing  Goal: Patient-Specific Goal (Individualized)  Outcome: Ongoing, Progressing  Goal: Absence of Hospital-Acquired Illness or Injury  Outcome: Ongoing, Progressing  Goal: Optimal Comfort and Wellbeing  Outcome: Ongoing, Progressing  Goal: Readiness for Transition of Care  Outcome: Ongoing, Progressing     Problem: Infection  Goal: Absence of Infection Signs and Symptoms  Outcome: Ongoing, Progressing     Problem: Fluid Imbalance (Pneumonia)  Goal: Fluid Balance  Outcome: Ongoing, Progressing     Problem: Infection (Pneumonia)  Goal: Resolution of Infection Signs and Symptoms  Outcome: Ongoing, Progressing     Problem: Respiratory Compromise (Pneumonia)  Goal: Effective Oxygenation and Ventilation  Outcome: Ongoing, Progressing     Problem: Fall Injury Risk  Goal: Absence of Fall and Fall-Related Injury  Outcome: Ongoing, Progressing     Problem: Skin Injury Risk Increased  Goal: Skin Health and Integrity  Outcome: Ongoing, Progressing     Problem: Adjustment to Illness (Sepsis/Septic Shock)  Goal: Optimal Coping  Outcome: Ongoing, Progressing     Problem: Bleeding (Sepsis/Septic Shock)  Goal: Absence of Bleeding  Outcome: Ongoing, Progressing     Problem: Infection Progression (Sepsis/Septic Shock)  Goal: Absence of Infection Signs and Symptoms  Outcome: Ongoing, Progressing     Problem: Fluid and Electrolyte Imbalance (Acute Kidney Injury/Impairment)  Goal: Fluid and Electrolyte Balance  Outcome: Ongoing, Progressing     Problem: Oral Intake Inadequate (Acute Kidney Injury/Impairment)  Goal: Optimal Nutrition Intake  Outcome: Ongoing, Progressing     Problem: Renal Function Impairment (Acute Kidney Injury/Impairment)  Goal: Effective Renal Function  Outcome: Ongoing, Progressing     Problem: ARDS (Acute Respiratory Distress Syndrome)  Goal: Effective Oxygenation  Outcome: Ongoing, Progressing     Problem: Oral Intake  Inadequate  Goal: Improved Oral Intake  Outcome: Ongoing, Progressing     Problem: Impaired Wound Healing  Goal: Optimal Wound Healing  Outcome: Ongoing, Progressing     Problem: Aspiration (Enteral Nutrition)  Goal: Absence of Aspiration Signs and Symptoms  Outcome: Ongoing, Progressing     Problem: Device-Related Complication Risk (Enteral Nutrition)  Goal: Safe, Effective Therapy Delivery  Outcome: Ongoing, Progressing     Problem: Feeding Intolerance (Enteral Nutrition)  Goal: Feeding Tolerance  Outcome: Ongoing, Progressing    Uneventful night for patient. Tolerating tf well. Consulted rt for tracheal suctioning as pt has weak cough and unable to participate in pulm toileting. Asp precautions.

## 2022-02-06 NOTE — SUBJECTIVE & OBJECTIVE
Interval History: HnH stable 8.5/25.6. No acute events overnight. Oxygen stable    Review of Systems   Unable to perform ROS: Patient nonverbal     Objective:     Vital Signs (Most Recent):  Temp: 98.9 °F (37.2 °C) (02/06/22 1230)  Pulse: 66 (02/06/22 1400)  Resp: (!) 21 (02/06/22 1400)  BP: (!) 113/55 (02/06/22 1400)  SpO2: 97 % (02/06/22 1400) Vital Signs (24h Range):  Temp:  [98.7 °F (37.1 °C)-100.2 °F (37.9 °C)] 98.9 °F (37.2 °C)  Pulse:  [61-92] 66  Resp:  [20-29] 21  SpO2:  [91 %-98 %] 97 %  BP: (106-133)/(52-61) 113/55     Weight: 66.1 kg (145 lb 11.6 oz)  Body mass index is 25.01 kg/m².    Intake/Output Summary (Last 24 hours) at 2/6/2022 1529  Last data filed at 2/6/2022 0730  Gross per 24 hour   Intake 1059.56 ml   Output 850 ml   Net 209.56 ml      Physical Exam  Constitutional:       General: She is not in acute distress.     Appearance: She is not ill-appearing.   Eyes:      General:         Right eye: No discharge.         Left eye: No discharge.   Neck:      Vascular: No JVD.   Cardiovascular:      Rate and Rhythm: Normal rate and regular rhythm.   Pulmonary:      Effort: Pulmonary effort is normal.      Breath sounds: Normal breath sounds.   Abdominal:      General: Abdomen is flat. Bowel sounds are normal. There is no distension.      Palpations: Abdomen is soft.      Tenderness: There is no abdominal tenderness.   Musculoskeletal:      Right lower leg: No edema.      Left lower leg: No edema.   Skin:     General: Skin is warm and moist.      Findings: No rash.   Neurological:      Mental Status: She is alert.      Comments: She focuses her attention on me but doesn't say anything   Psychiatric:         Mood and Affect: Mood and affect normal.         Significant Labs:   All pertinent labs within the past 24 hours have been reviewed.  CBC:   Recent Labs   Lab 02/05/22  0411 02/06/22  0612   WBC 7.60 6.68   HGB 6.7* 8.5*   HCT 20.9* 25.6*    247     CMP:   Recent Labs   Lab 02/05/22  0416  02/06/22  0612   * 134*   K 4.2 4.4   CL 96 96   CO2 35* 33*   * 106   BUN 14 16   CREATININE 0.6 0.7   CALCIUM 8.1* 8.1*   PROT 5.8* 5.9*   ALBUMIN 2.2* 2.2*   BILITOT 0.2 0.4   ALKPHOS 56 52*   AST 15 20   ALT 12 13   ANIONGAP 4* 5*   EGFRNONAA >60 >60       Significant Imaging: I have reviewed all pertinent imaging results/findings within the past 24 hours.

## 2022-02-06 NOTE — PLAN OF CARE
Care Plan    POC reviewed with Sandra Wilson and family. Questions and concerns addressed. No acute events today. Pt progressing toward goals. Will continue to monitor. See below and flowsheets for full assessment and VS info.     Blood products administered    Neuro:  Stewardson Coma Scale  Best Eye Response: 4-->(E4) spontaneous  Best Motor Response: 6-->(M6) obeys commands  Best Verbal Response: 4-->(V4) confused  Stewardson Coma Scale Score: 14  Assessment Qualifiers: no eye obstruction present,patient not sedated/intubated  Pupil PERRLA: yes  24 hr Temp:  [97.7 °F (36.5 °C)-99.4 °F (37.4 °C)]      CV:  Rhythm: normal sinus rhythm  DVT prophylaxis: VTE Required Core Measure: (SCDs) Sequential compression device initiated/maintained    Resp:  O2 Device (Oxygen Therapy): Oxymask  Vent Mode: SPONT-PS  Set Rate: 8 BPM  Oxygen Concentration (%): 40  Vt Set: 450 mL  PEEP/CPAP: 1.9 cmH20  Pressure Support: 10 cmH20    GI/:  GI prophylaxis: yes  Diet/Nutrition Received: tube feeding  Last Bowel Movement: 02/05/22  Voiding Characteristics: external catheter   Intake/Output Summary (Last 24 hours) at 2/5/2022 1825  Last data filed at 2/5/2022 1800  Gross per 24 hour   Intake 1931.87 ml   Output 2450 ml   Net -518.13 ml       Labs/Accuchecks:  Recent Labs   Lab 02/05/22  0411   WBC 7.60   RBC 2.08*   HGB 6.7*   HCT 20.9*         Recent Labs   Lab 02/05/22  0411   *   K 4.2   CO2 35*   CL 96   BUN 14   CREATININE 0.6   ALKPHOS 56   ALT 12   AST 15   BILITOT 0.2    No results for input(s): PROTIME, INR, APTT, HEPANTIXA in the last 168 hours. No results for input(s): CPK, CPKMB, TROPONINI, MB in the last 168 hours.    Electrolytes: N/A - electrolytes WDL  Accuchecks: Q6H    Gtts/LDAs:   sodium chloride 0.9% 5 mL/hr at 02/05/22 1800       Lines/Drains/Airways       Drain                   Gastrostomy/Enterostomy Percutaneous endoscopic gastrostomy (PEG) LUQ -- days    Female External Urinary Catheter 01/26/22  "1601 10 days              Peripheral Intravenous Line                   Midline Catheter Insertion/Assessment  - Single Lumen 02/03/22 1450 Left basilic vein (medial side of arm) other (see comments) 2 days                    Skin/Wounds  Bathing/Skin Care: bath, complete;dressed/undressed;incontinence care;linen changed (02/05/22 0200)  Wounds: No  Wound care consulted: No    Consults  Consults (From admission, onward)          Status Ordering Provider     Inpatient consult to Midline team  Once        Provider:  (Not yet assigned)    Acknowledged VERO PLAZA     Inpatient consult to Social Work/Case Management  Once        Provider:  (Not yet assigned)    Acknowledged VERO PLAZA     Inpatient consult to Registered Dietitian/Nutritionist  Once        Provider:  (Not yet assigned)    Completed MELISSA PINTO     Pharmacy to dose Vancomycin consult  Once        Provider:  (Not yet assigned)   "And" Linked Group Details    Acknowledged DONNA CONTEH     Inpatient consult to General Surgery  Once        Provider:  Abdulkadir Marrufo MD    Completed JAYCE ARITA     Inpatient consult to Registered Dietitian/Nutritionist  Once        Provider:  (Not yet assigned)    Completed DONNA CONTEH     Inpatient consult to Palliative Care  Once        Provider:  Catracho Crabtree MD    Completed DONNA CONTEH     Inpatient consult to Pulmonology  Once        Provider:  Jayce Arita MD    Completed DONNA CONTEH.           "

## 2022-02-06 NOTE — CARE UPDATE
02/06/22 0807   Patient Assessment/Suction   Level of Consciousness (AVPU) alert   Respiratory Effort Normal;Unlabored   Expansion/Accessory Muscles/Retractions expansion symmetric;no retractions;no use of accessory muscles   All Lung Fields Breath Sounds rhonchi   Rhythm/Pattern, Respiratory depth regular;pattern regular;unlabored   Cough Frequency infrequent   Cough Type assisted   Suction Method oral   Secretions Amount large   Secretions Color yellow   Secretions Characteristics thick   $ Swab or suction? Suction   PRE-TX-O2   O2 Device (Oxygen Therapy) Oxymask   $ Is the patient on Low Flow Oxygen? Yes   Flow (L/min) 1   Pulse Oximetry Type Continuous   $ Pulse Oximetry - Multiple Charge Pulse Oximetry - Multiple   Positioning HOB elevated 30 degrees

## 2022-02-06 NOTE — PROGRESS NOTES
[facility] Hospital Medicine  Progress Note    Patient Name: Sandra Wilson  MRN: [id number]  Patient Class: IP- Inpatient   Admission Date: 1/18/2022  Length of Stay: 19 days  Attending Physician: Neda Porter MD  Primary Care Provider: Primary Doctor No        Subjective:     Principal Problem:Acute hypoxemic respiratory failure        HPI:  Sandra Wilson is an 81 year old female with a past medical history of CVAs, CNS aneurysm, PEG status, CAD, HLD, hypothryoidism, DM, aortic stenosis, and colon, breast, and ovarian cancer who presented from long term care with vomiting, diarrhea, and shortness of breath. Workup in the ED showed likely aspiration pneumonitis and C. Diff colitis. Her O2 requirement increased while in the ED requiring intubation with sedation. She also required Levophed as she likely developed septic shock. Antibiotics were broadened to cefepime, vancomycin, and Flagyl. Pulmonary was consulted. Family was notified. The patient was unable to provide history given acuity of illness.      Overview/Hospital Course:  Sandra Wilson is an 81 year old female with a past medical history of CVAs, CNS aneurysm, PEG status, CAD, HLD, hypothyroidism, DM, aortic stenosis, and colon, breast, and ovarian cancer who presented from long term care with vomiting, diarrhea, and shortness of breath secondary to acute hypoxic respiratory failure from aspiration pneumonia in the setting of C diff colitis leading to septic shock. She was intubated in the ED and started on Levophed infusion via RIJ CVC placed by Dr. Vignesh Gaspar. She was started on broad spectrum antibiotics with cefepime, Flagyl, vancomycin and admitted to the ICU. She was also started on enteral vancomycin for severe C diff colitis given elevated WBC count and STEFFANY. There was concern for developing ARDS given P/F ratio of 87 (severe); 190 1/22 (moderate). Pulmonary was consulted. She also presented with demand ischemia likely  secondary to septic shock. Troponin was trended and improved as shock resolved. Normal saline infusion was added for hyponatremia and STEFFANY which improved both. Levophed was able to be weaned. Family agreed to DNR status 1/18 and Palliative Care was consulted to discuss goal of care 1/19. Antibiotics were changed to doxycycline and Flagyl (GBS in sputum culture) 1/21; vancomycin was added 1/23 after culture also became positive for Staph aureus and Klebsiella. Her respiratory status has improved throughout her course and she was extubated (code changed to partial code for intubation) 1/21 only to be re-intubated for worsening stridor (history of tracheostomy). Upon re-intubation, copious amounts of secretions were suctioned. KUB suggested a mild ileus A bowel regimen was instituted and the patient was able to have bowel movements 1/22. Tube feeds were started 1/23. Her course was complicated by Afib as well noted on telemetry 1/21; metoprolol tartrate started 1/22.She failed a leak test 1/22; Surgery was consulted for tracheostomy which is pending conversation with family regarding goals of care.    Patient was set to go for trach on 01/26, but attempt was made for another trial extubation and the patient did well after extubation, she was transition to face mask and was able to maintain her saturations although had significant difficulty with secretions.  She was started on Robinul and scopolamine patch which did seem to improve for secretions.  Her respiratory status continued to be somewhat tenuous, so she was monitored in the ICU.  Family expressed desire to not have her get a tracheostomy.  Case management began working on LTACH referral.      Interval History: HnH stable 8.5/25.6. No acute events overnight. Oxygen stable    Review of Systems   Unable to perform ROS: Patient nonverbal     Objective:     Vital Signs (Most Recent):  Temp: 98.9 °F (37.2 °C) (02/06/22 1230)  Pulse: 66 (02/06/22 1400)  Resp: (!) 21  (02/06/22 1400)  BP: (!) 113/55 (02/06/22 1400)  SpO2: 97 % (02/06/22 1400) Vital Signs (24h Range):  Temp:  [98.7 °F (37.1 °C)-100.2 °F (37.9 °C)] 98.9 °F (37.2 °C)  Pulse:  [61-92] 66  Resp:  [20-29] 21  SpO2:  [91 %-98 %] 97 %  BP: (106-133)/(52-61) 113/55     Weight: 66.1 kg (145 lb 11.6 oz)  Body mass index is 25.01 kg/m².    Intake/Output Summary (Last 24 hours) at 2/6/2022 1529  Last data filed at 2/6/2022 0730  Gross per 24 hour   Intake 1059.56 ml   Output 850 ml   Net 209.56 ml      Physical Exam  Constitutional:       General: She is not in acute distress.     Appearance: She is not ill-appearing.   Eyes:      General:         Right eye: No discharge.         Left eye: No discharge.   Neck:      Vascular: No JVD.   Cardiovascular:      Rate and Rhythm: Normal rate and regular rhythm.   Pulmonary:      Effort: Pulmonary effort is normal.      Breath sounds: Normal breath sounds.   Abdominal:      General: Abdomen is flat. Bowel sounds are normal. There is no distension.      Palpations: Abdomen is soft.      Tenderness: There is no abdominal tenderness.   Musculoskeletal:      Right lower leg: No edema.      Left lower leg: No edema.   Skin:     General: Skin is warm and moist.      Findings: No rash.   Neurological:      Mental Status: She is alert.      Comments: She focuses her attention on me but doesn't say anything   Psychiatric:         Mood and Affect: Mood and affect normal.         Significant Labs:   All pertinent labs within the past 24 hours have been reviewed.  CBC:   Recent Labs   Lab 02/05/22  0411 02/06/22 0612   WBC 7.60 6.68   HGB 6.7* 8.5*   HCT 20.9* 25.6*    247     CMP:   Recent Labs   Lab 02/05/22  0411 02/06/22 0612   * 134*   K 4.2 4.4   CL 96 96   CO2 35* 33*   * 106   BUN 14 16   CREATININE 0.6 0.7   CALCIUM 8.1* 8.1*   PROT 5.8* 5.9*   ALBUMIN 2.2* 2.2*   BILITOT 0.2 0.4   ALKPHOS 56 52*   AST 15 20   ALT 12 13   ANIONGAP 4* 5*   EGFRNONAA >60 >60  "      Significant Imaging: I have reviewed all pertinent imaging results/findings within the past 24 hours.      Assessment/Plan:      * Acute hypoxemic respiratory failure  Improving.  Continue supplementing oxygen.  Monitor sats.  Monitor work of breathing.  She's partial code, specifying that she'd want to be intubated if need be.    Anemia  Hnh improved after receiving a unit of blood  Transfuse if less than 7.    Hemoglobin   Date Value Ref Range Status   02/06/2022 8.5 (L) 12.0 - 16.0 g/dL Final   02/05/2022 6.7 (L) 12.0 - 16.0 g/dL Final   02/04/2022 6.9 (L) 12.0 - 16.0 g/dL Final   02/03/2022 7.1 (L) 12.0 - 16.0 g/dL Final   ]      Paroxysmal atrial fibrillation  -Telemetry  - controlled on metoprolol 12.5 mg BID  - HGB is dropping, so will hold off on giving DOAC      Gastrostomy tube dependent  Stable.  No issues with it    Aspiration pneumonia  Continue antibiotics as they are but will stop them soon.  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  She's producing a lot of airway secretions and needs frequent suctioning.  She's high risk of aspiration pneumonia.  Will try to get her into LTACH for long-term care.    Antibiotics (From admission, onward)            Start     Stop Route Frequency Ordered    02/03/22 1500  vancomycin in dextrose 5 % 1 gram/250 mL IVPB 1,000 mg         -- IV Every 24 hours (non-standard times) 02/03/22 0516    01/23/22 1047  vancomycin - pharmacy to dose  (vancomycin IVPB)        "And" Linked Group Details    -- IV pharmacy to manage frequency 01/23/22 0947    01/18/22 1545  metronidazole IVPB 500 mg         -- IV Every 8 hours (non-standard times) 01/18/22 1441         Will monitor patient closely and continue current treatment plan unchanged.        Type 2 diabetes mellitus, with long-term current use of insulin  Patient's FSGs are controlled on current medication regimen.  Most recent fingerstick glucose reviewed-   Recent Labs   Lab 02/03/22  2319 02/04/22  0502 " 02/04/22  1207 02/04/22  1801   POCTGLUCOSE 103 117* 91 135*     Current correctional scale  Medium  Maintain anti-hyperglycemic dose as follows-   Antihyperglycemics (From admission, onward)            Start     Stop Route Frequency Ordered    01/18/22 1438  insulin aspart U-100 pen 1-10 Units         -- SubQ Before meals & nightly PRN 01/18/22 1438        Hold Oral hypoglycemics while patient is in the hospital.        YARI (generalized anxiety disorder)  Extubated on 01/26.  Using Precedex as needed to control anxiety.  Patient does not appear anxious on exam.      Hypothyroidism  Patient has chronic hypothyroidism. TFTs reviewed-   Lab Results   Component Value Date    TSH 1.130 07/13/2017   . Will continue chronic levothyroxine and adjust for and clinical changes.        Hyperlipidemia   Patient is chronically on statin.will continue for now. Monitor clinically. Last LDL was   Lab Results   Component Value Date    LDLCALC 94.4 07/13/2017          GERD (gastroesophageal reflux disease)  On famotidine.      VTE Risk Mitigation (From admission, onward)         Ordered     enoxaparin injection 40 mg  Daily         01/18/22 1438     IP VTE HIGH RISK PATIENT  Once         01/18/22 1438     Place sequential compression device  Until discontinued         01/18/22 1438                Discharge Planning   DEMETRIUS:      Code Status: Partial Code   Is the patient medically ready for discharge?:     Reason for patient still in hospital (select all that apply): Patient trending condition and Treatment  Discharge Plan A: Return to nursing home            Critical care time spent on the evaluation and treatment of severe organ dysfunction, review of pertinent labs and imaging studies, discussions with consulting providers and discussions with patient/family: 60 minutes.      Neda Porter MD  Department of Hospital Medicine   [facility]

## 2022-02-06 NOTE — ASSESSMENT & PLAN NOTE
Hnh improved after receiving a unit of blood  Transfuse if less than 7.    Hemoglobin   Date Value Ref Range Status   02/06/2022 8.5 (L) 12.0 - 16.0 g/dL Final   02/05/2022 6.7 (L) 12.0 - 16.0 g/dL Final   02/04/2022 6.9 (L) 12.0 - 16.0 g/dL Final   02/03/2022 7.1 (L) 12.0 - 16.0 g/dL Final   ]

## 2022-02-06 NOTE — PROGRESS NOTES
Vanc rounding-Day 15-2/6/2022- 82 yo F pt on vanc 1000 mg IV every 24 hr. Wt= 66.1 kg (145 lb 11.6 oz) Estimated Creatinine Clearance: 59 mL/min (based on SCr of 0.7 mg/dL).   Pt being treated for suspected PNA. Goal serum conc is 15-20 mcg/mL. Vanc level ordered prior to third dose on 2/7 at approximately 1700.  Lab Results   Component Value Date    BUN 16 02/06/2022      Lab Results   Component Value Date    WBC 6.68 02/06/2022      Drug levels (last 3 results):  Recent Labs   Lab Result Units 02/05/22  1415   Vancomycin-Trough ug/mL 16.3     Vancomycin Administrations:  vancomycin given in the last 96 hours                     vancomycin in dextrose 5 % 1 gram/250 mL IVPB 1,000 mg (mg) 1,000 mg New Bag 02/05/22 1655      Restarted 02/04/22 1504     1,000 mg New Bag  1502     1,000 mg New Bag 02/03/22 1611

## 2022-02-07 LAB
ALBUMIN SERPL BCP-MCNC: 2.2 G/DL (ref 3.5–5.2)
ALP SERPL-CCNC: 45 U/L (ref 55–135)
ALT SERPL W/O P-5'-P-CCNC: 14 U/L (ref 10–44)
ANION GAP SERPL CALC-SCNC: 5 MMOL/L (ref 8–16)
AST SERPL-CCNC: 18 U/L (ref 10–40)
BASOPHILS # BLD AUTO: 0.09 K/UL (ref 0–0.2)
BASOPHILS NFR BLD: 1.3 % (ref 0–1.9)
BILIRUB SERPL-MCNC: 0.4 MG/DL (ref 0.1–1)
BUN SERPL-MCNC: 16 MG/DL (ref 8–23)
CALCIUM SERPL-MCNC: 8.2 MG/DL (ref 8.7–10.5)
CHLORIDE SERPL-SCNC: 97 MMOL/L (ref 95–110)
CO2 SERPL-SCNC: 33 MMOL/L (ref 23–29)
CREAT SERPL-MCNC: 0.7 MG/DL (ref 0.5–1.4)
DIFFERENTIAL METHOD: ABNORMAL
EOSINOPHIL # BLD AUTO: 0.2 K/UL (ref 0–0.5)
EOSINOPHIL NFR BLD: 2.4 % (ref 0–8)
ERYTHROCYTE [DISTWIDTH] IN BLOOD BY AUTOMATED COUNT: 14.8 % (ref 11.5–14.5)
EST. GFR  (AFRICAN AMERICAN): >60 ML/MIN/1.73 M^2
EST. GFR  (NON AFRICAN AMERICAN): >60 ML/MIN/1.73 M^2
GLUCOSE SERPL-MCNC: 82 MG/DL (ref 70–110)
HCT VFR BLD AUTO: 25.4 % (ref 37–48.5)
HGB BLD-MCNC: 8.3 G/DL (ref 12–16)
IMM GRANULOCYTES # BLD AUTO: 0.02 K/UL (ref 0–0.04)
IMM GRANULOCYTES NFR BLD AUTO: 0.3 % (ref 0–0.5)
LYMPHOCYTES # BLD AUTO: 1.5 K/UL (ref 1–4.8)
LYMPHOCYTES NFR BLD: 21 % (ref 18–48)
MAGNESIUM SERPL-MCNC: 1.7 MG/DL (ref 1.6–2.6)
MAGNESIUM SERPL-MCNC: 2.1 MG/DL (ref 1.6–2.6)
MCH RBC QN AUTO: 31.9 PG (ref 27–31)
MCHC RBC AUTO-ENTMCNC: 32.7 G/DL (ref 32–36)
MCV RBC AUTO: 98 FL (ref 82–98)
MONOCYTES # BLD AUTO: 1 K/UL (ref 0.3–1)
MONOCYTES NFR BLD: 13.9 % (ref 4–15)
NEUTROPHILS # BLD AUTO: 4.3 K/UL (ref 1.8–7.7)
NEUTROPHILS NFR BLD: 61.1 % (ref 38–73)
NRBC BLD-RTO: 0 /100 WBC
PHOSPHATE SERPL-MCNC: 3.2 MG/DL (ref 2.7–4.5)
PLATELET # BLD AUTO: 226 K/UL (ref 150–450)
PMV BLD AUTO: 11.3 FL (ref 9.2–12.9)
POCT GLUCOSE: 122 MG/DL (ref 70–110)
POTASSIUM SERPL-SCNC: 4.3 MMOL/L (ref 3.5–5.1)
PROT SERPL-MCNC: 6 G/DL (ref 6–8.4)
RBC # BLD AUTO: 2.6 M/UL (ref 4–5.4)
SODIUM SERPL-SCNC: 135 MMOL/L (ref 136–145)
VANCOMYCIN TROUGH SERPL-MCNC: 12.3 UG/ML (ref 10–22)
WBC # BLD AUTO: 7.05 K/UL (ref 3.9–12.7)

## 2022-02-07 PROCEDURE — 80053 COMPREHEN METABOLIC PANEL: CPT | Performed by: STUDENT IN AN ORGANIZED HEALTH CARE EDUCATION/TRAINING PROGRAM

## 2022-02-07 PROCEDURE — 25000003 PHARM REV CODE 250: Performed by: INTERNAL MEDICINE

## 2022-02-07 PROCEDURE — 25000003 PHARM REV CODE 250: Performed by: HOSPITALIST

## 2022-02-07 PROCEDURE — 11000001 HC ACUTE MED/SURG PRIVATE ROOM

## 2022-02-07 PROCEDURE — S0030 INJECTION, METRONIDAZOLE: HCPCS | Performed by: INTERNAL MEDICINE

## 2022-02-07 PROCEDURE — 63600175 PHARM REV CODE 636 W HCPCS: Performed by: INTERNAL MEDICINE

## 2022-02-07 PROCEDURE — S0030 INJECTION, METRONIDAZOLE: HCPCS | Performed by: STUDENT IN AN ORGANIZED HEALTH CARE EDUCATION/TRAINING PROGRAM

## 2022-02-07 PROCEDURE — 99900026 HC AIRWAY MAINTENANCE (STAT)

## 2022-02-07 PROCEDURE — 85025 COMPLETE CBC W/AUTO DIFF WBC: CPT | Performed by: STUDENT IN AN ORGANIZED HEALTH CARE EDUCATION/TRAINING PROGRAM

## 2022-02-07 PROCEDURE — 83735 ASSAY OF MAGNESIUM: CPT | Performed by: STUDENT IN AN ORGANIZED HEALTH CARE EDUCATION/TRAINING PROGRAM

## 2022-02-07 PROCEDURE — 63600175 PHARM REV CODE 636 W HCPCS

## 2022-02-07 PROCEDURE — 27000221 HC OXYGEN, UP TO 24 HOURS

## 2022-02-07 PROCEDURE — 84100 ASSAY OF PHOSPHORUS: CPT | Performed by: STUDENT IN AN ORGANIZED HEALTH CARE EDUCATION/TRAINING PROGRAM

## 2022-02-07 PROCEDURE — 83735 ASSAY OF MAGNESIUM: CPT | Mod: 91 | Performed by: INTERNAL MEDICINE

## 2022-02-07 PROCEDURE — 80202 ASSAY OF VANCOMYCIN: CPT | Performed by: INTERNAL MEDICINE

## 2022-02-07 PROCEDURE — 94761 N-INVAS EAR/PLS OXIMETRY MLT: CPT

## 2022-02-07 PROCEDURE — 25000003 PHARM REV CODE 250: Performed by: STUDENT IN AN ORGANIZED HEALTH CARE EDUCATION/TRAINING PROGRAM

## 2022-02-07 RX ORDER — VANCOMYCIN HCL IN 5 % DEXTROSE 1G/250ML
15 PLASTIC BAG, INJECTION (ML) INTRAVENOUS
Status: DISCONTINUED | OUTPATIENT
Start: 2022-02-07 | End: 2022-02-08

## 2022-02-07 RX ORDER — CITALOPRAM 10 MG/1
40 TABLET ORAL DAILY
Status: DISCONTINUED | OUTPATIENT
Start: 2022-02-07 | End: 2022-03-14 | Stop reason: HOSPADM

## 2022-02-07 RX ORDER — FUROSEMIDE 20 MG/1
20 TABLET ORAL ONCE
Status: COMPLETED | OUTPATIENT
Start: 2022-02-07 | End: 2022-02-07

## 2022-02-07 RX ORDER — FUROSEMIDE 20 MG/1
20 TABLET ORAL ONCE
Status: DISCONTINUED | OUTPATIENT
Start: 2022-02-07 | End: 2022-02-07

## 2022-02-07 RX ADMIN — GLYCOPYRROLATE 1 MG: 1 TABLET ORAL at 09:02

## 2022-02-07 RX ADMIN — METRONIDAZOLE 500 MG: 500 INJECTION, SOLUTION INTRAVENOUS at 08:02

## 2022-02-07 RX ADMIN — MAGNESIUM SULFATE HEPTAHYDRATE 2 G: 2 INJECTION, SOLUTION INTRAVENOUS at 10:02

## 2022-02-07 RX ADMIN — FUROSEMIDE 20 MG: 20 TABLET ORAL at 01:02

## 2022-02-07 RX ADMIN — FAMOTIDINE 20 MG: 10 INJECTION INTRAVENOUS at 10:02

## 2022-02-07 RX ADMIN — MIDODRINE HYDROCHLORIDE 5 MG: 5 TABLET ORAL at 02:02

## 2022-02-07 RX ADMIN — METOPROLOL TARTRATE 12.5 MG: 25 TABLET, FILM COATED ORAL at 09:02

## 2022-02-07 RX ADMIN — FAMOTIDINE 20 MG: 10 INJECTION INTRAVENOUS at 09:02

## 2022-02-07 RX ADMIN — LEVOTHYROXINE SODIUM 25 MCG: 0.03 TABLET ORAL at 06:02

## 2022-02-07 RX ADMIN — CITALOPRAM HYDROBROMIDE 40 MG: 10 TABLET ORAL at 10:02

## 2022-02-07 RX ADMIN — MIDODRINE HYDROCHLORIDE 5 MG: 5 TABLET ORAL at 10:02

## 2022-02-07 RX ADMIN — VANCOMYCIN HYDROCHLORIDE 1000 MG: 1 INJECTION, POWDER, LYOPHILIZED, FOR SOLUTION INTRAVENOUS at 10:02

## 2022-02-07 RX ADMIN — ENOXAPARIN SODIUM 40 MG: 100 INJECTION SUBCUTANEOUS at 06:02

## 2022-02-07 RX ADMIN — METRONIDAZOLE 500 MG: 500 INJECTION, SOLUTION INTRAVENOUS at 05:02

## 2022-02-07 RX ADMIN — AZELASTINE 137 MCG: 1 SPRAY, METERED NASAL at 09:02

## 2022-02-07 RX ADMIN — GLYCOPYRROLATE 1 MG: 1 TABLET ORAL at 02:02

## 2022-02-07 RX ADMIN — ATORVASTATIN CALCIUM 40 MG: 40 TABLET, FILM COATED ORAL at 10:02

## 2022-02-07 RX ADMIN — AZELASTINE 137 MCG: 1 SPRAY, METERED NASAL at 10:02

## 2022-02-07 RX ADMIN — GLYCOPYRROLATE 1 MG: 1 TABLET ORAL at 10:02

## 2022-02-07 RX ADMIN — MIDODRINE HYDROCHLORIDE 5 MG: 5 TABLET ORAL at 09:02

## 2022-02-07 NOTE — PLAN OF CARE
Care Plan    POC reviewed with Sandra Wilson and family. Questions and concerns addressed. No acute events today. Pt progressing toward goals. Will continue to monitor. See below and flowsheets for full assessment and VS info.       Neuro:  Beverly Coma Scale  Best Eye Response: 4-->(E4) spontaneous  Best Motor Response: 6-->(M6) obeys commands  Best Verbal Response: 4-->(V4) confused  Marty Coma Scale Score: 14  Assessment Qualifiers: no eye obstruction present,patient not sedated/intubated  Pupil PERRLA: yes  24 hr Temp:  [98.9 °F (37.2 °C)-100.2 °F (37.9 °C)]      CV:  Rhythm: normal sinus rhythm  DVT prophylaxis: VTE Required Core Measure: (SCDs) Sequential compression device initiated/maintained    Resp:  O2 Device (Oxygen Therapy): Oxymask  Vent Mode: SPONT-PS  Set Rate: 8 BPM  Oxygen Concentration (%): 40  Vt Set: 450 mL  PEEP/CPAP: 1.9 cmH20  Pressure Support: 10 cmH20    GI/:  GI prophylaxis: yes  Diet/Nutrition Received: NPO  Last Bowel Movement: 02/05/22  Voiding Characteristics: external catheter   Intake/Output Summary (Last 24 hours) at 2/6/2022 1841  Last data filed at 2/6/2022 1800  Gross per 24 hour   Intake 584.51 ml   Output 650 ml   Net -65.49 ml       Labs/Accuchecks:  Recent Labs   Lab 02/06/22  0612   WBC 6.68   RBC 2.62*   HGB 8.5*   HCT 25.6*         Recent Labs   Lab 02/06/22  0612   *   K 4.4   CO2 33*   CL 96   BUN 16   CREATININE 0.7   ALKPHOS 52*   ALT 13   AST 20   BILITOT 0.4    No results for input(s): PROTIME, INR, APTT, HEPANTIXA in the last 168 hours. No results for input(s): CPK, CPKMB, TROPONINI, MB in the last 168 hours.    Electrolytes: N/A - electrolytes WDL  Accuchecks: Q6H    Gtts/LDAs:   sodium chloride 0.9% Stopped (02/06/22 0643)       Lines/Drains/Airways       Drain                   Gastrostomy/Enterostomy Percutaneous endoscopic gastrostomy (PEG) LUQ -- days    Female External Urinary Catheter 01/26/22 1601 11 days              Peripheral  "Intravenous Line                   Midline Catheter Insertion/Assessment  - Single Lumen 02/03/22 1450 Left basilic vein (medial side of arm) other (see comments) 3 days                    Skin/Wounds  Bathing/Skin Care: back care (02/05/22 2000)  Wounds: No  Wound care consulted: No    Consults  Consults (From admission, onward)          Status Ordering Provider     Inpatient consult to Midline team  Once        Provider:  (Not yet assigned)    Acknowledged VERO PLAZA     Inpatient consult to Social Work/Case Management  Once        Provider:  (Not yet assigned)    Acknowledged VERO PLAZA     Inpatient consult to Registered Dietitian/Nutritionist  Once        Provider:  (Not yet assigned)    Completed MELISSA PINTO     Pharmacy to dose Vancomycin consult  Once        Provider:  (Not yet assigned)   "And" Linked Group Details    Acknowledged DONNA CONTEH     Inpatient consult to General Surgery  Once        Provider:  Abdulkadir Marrufo MD    Completed JAYCE ARITA     Inpatient consult to Registered Dietitian/Nutritionist  Once        Provider:  (Not yet assigned)    Completed DONNA CONTEH     Inpatient consult to Palliative Care  Once        Provider:  Catrcaho Crabtree MD    Completed DONNA CONTEH     Inpatient consult to Pulmonology  Once        Provider:  Jayce Arita MD    Completed DONNA CONTEH.           "

## 2022-02-07 NOTE — PROGRESS NOTES
[facility] Hospital Medicine  Progress Note    Patient Name: Sandra Wilson  MRN: [id number]  Patient Class: IP- Inpatient   Admission Date: 1/18/2022  Length of Stay: 20 days  Attending Physician: Kaylyn Ibarra MD  Primary Care Provider: Primary Doctor No        Subjective:     Principal Problem:Acute hypoxemic respiratory failure        HPI:  Sandra Wilson is an 81 year old female with a past medical history of CVAs, CNS aneurysm, PEG status, CAD, HLD, hypothryoidism, DM, aortic stenosis, and colon, breast, and ovarian cancer who presented from long term care with vomiting, diarrhea, and shortness of breath. Workup in the ED showed likely aspiration pneumonitis and C. Diff colitis. Her O2 requirement increased while in the ED requiring intubation with sedation. She also required Levophed as she likely developed septic shock. Antibiotics were broadened to cefepime, vancomycin, and Flagyl. Pulmonary was consulted. Family was notified. The patient was unable to provide history given acuity of illness.      Overview/Hospital Course:  Sandra Wilson is an 81 year old female with a past medical history of CVAs, CNS aneurysm, PEG status, CAD, HLD, hypothyroidism, DM, aortic stenosis, and colon, breast, and ovarian cancer who presented from long term care with vomiting, diarrhea, and shortness of breath secondary to acute hypoxic respiratory failure from aspiration pneumonia in the setting of C diff colitis leading to septic shock. She was intubated in the ED and started on Levophed infusion via RIJ CVC placed by Dr. Vignesh Gaspar. She was started on broad spectrum antibiotics with cefepime, Flagyl, vancomycin and admitted to the ICU. She was also started on enteral vancomycin for severe C diff colitis given elevated WBC count and STEFFANY. There was concern for developing ARDS given P/F ratio of 87 (severe); 190 1/22 (moderate). Pulmonary was consulted. She also presented with demand ischemia likely  secondary to septic shock. Troponin was trended and improved as shock resolved. Normal saline infusion was added for hyponatremia and STEFFANY which improved both. Levophed was able to be weaned. Family agreed to DNR status 1/18 and Palliative Care was consulted to discuss goal of care 1/19. Antibiotics were changed to doxycycline and Flagyl (GBS in sputum culture) 1/21; vancomycin was added 1/23 after culture also became positive for Staph aureus and Klebsiella. Her respiratory status has improved throughout her course and she was extubated (code changed to partial code for intubation) 1/21 only to be re-intubated for worsening stridor (history of tracheostomy). Upon re-intubation, copious amounts of secretions were suctioned. KUB suggested a mild ileus A bowel regimen was instituted and the patient was able to have bowel movements 1/22. Tube feeds were started 1/23. Her course was complicated by Afib as well noted on telemetry 1/21; metoprolol tartrate started 1/22.She failed a leak test 1/22; Surgery was consulted for tracheostomy which is pending conversation with family regarding goals of care.    Patient was set to go for trach on 01/26, but attempt was made for another trial extubation and the patient did well after extubation, she was transition to face mask and was able to maintain her saturations although had significant difficulty with secretions.  She was started on Robinul and scopolamine patch which did seem to improve for secretions.  Her respiratory status continued to be somewhat tenuous, so she was monitored in the ICU.  Family expressed desire to not have her get a tracheostomy.  Case management began working on LTACH referral.      No chest pain, shortness of breath, lightheadedness. Tolerating tube feeds    NAD, Alert  NC, AT  RRR  CTAB  SNTND+BS  Edema of hips present  PERRL    Vitals, labs and radiographs from past 24h reviewed and personally interpreted.         Assessment/Plan:      * Acute  "hypoxemic respiratory failure  Improving.  Continue supplementing oxygen.  Monitor sats.  Monitor work of breathing.  She's partial code, specifying that she'd want to be intubated if need be.    Anemia  Hnh improved after receiving a unit of blood  Transfuse if less than 7.    Hemoglobin   Date Value Ref Range Status   02/06/2022 8.5 (L) 12.0 - 16.0 g/dL Final   02/05/2022 6.7 (L) 12.0 - 16.0 g/dL Final   02/04/2022 6.9 (L) 12.0 - 16.0 g/dL Final   02/03/2022 7.1 (L) 12.0 - 16.0 g/dL Final   ]    Edema  -lasix 20 PO x 1 on 2/7/21    Paroxysmal atrial fibrillation  -Telemetry  - controlled on metoprolol 12.5 mg BID  - HGB is dropping, so will hold off on giving DOAC      Gastrostomy tube dependent  Stable.  No issues with it    Aspiration pneumonia  Day 14 of vancomycin is 2/8  On vancomycin because of MRSA isolated in sputum.  Requires frequent suctioning      Antibiotics (From admission, onward)            Start     Stop Route Frequency Ordered    02/03/22 1500  vancomycin in dextrose 5 % 1 gram/250 mL IVPB 1,000 mg         -- IV Every 24 hours (non-standard times) 02/03/22 0516    01/23/22 1047  vancomycin - pharmacy to dose  (vancomycin IVPB)        "And" Linked Group Details    -- IV pharmacy to manage frequency 01/23/22 0947    01/18/22 1545  metronidazole IVPB 500 mg         -- IV Every 8 hours (non-standard times) 01/18/22 1441         Will monitor patient closely and continue current treatment plan unchanged.        Type 2 diabetes mellitus, with long-term current use of insulin  Patient's FSGs are controlled on current medication regimen.  Most recent fingerstick glucose reviewed-   Recent Labs   Lab 02/03/22  2318 02/04/22  0508 02/04/22  1207 02/04/22  1801   POCTGLUCOSE 103 117* 91 135*     Current correctional scale  Medium  Maintain anti-hyperglycemic dose as follows-   Antihyperglycemics (From admission, onward)            Start     Stop Route Frequency Ordered    01/18/22 1438  insulin aspart U-100 " pen 1-10 Units         -- SubQ Before meals & nightly PRN 01/18/22 1438        Hold Oral hypoglycemics while patient is in the hospital.        YARI (generalized anxiety disorder)  Extubated on 01/26.  Using Precedex as needed to control anxiety.  Patient does not appear anxious on exam.      Hypothyroidism  Patient has chronic hypothyroidism. TFTs reviewed-   Lab Results   Component Value Date    TSH 1.130 07/13/2017   . Will continue chronic levothyroxine and adjust for and clinical changes.        Hyperlipidemia   Patient is chronically on statin.will continue for now. Monitor clinically. Last LDL was   Lab Results   Component Value Date    LDLCALC 94.4 07/13/2017          GERD (gastroesophageal reflux disease)  On famotidine.    VTE Risk Mitigation (From admission, onward)         Ordered     enoxaparin injection 40 mg  Daily         01/18/22 1438     IP VTE HIGH RISK PATIENT  Once         01/18/22 1438     Place sequential compression device  Until discontinued         01/18/22 1438                Discharge Planning   DEMETRIUS:      Code Status: Partial Code   Is the patient medically ready for discharge?:     Reason for patient still in hospital (select all that apply): Patient trending condition and Treatment  Discharge Plan A: Return to nursing home                Kaylyn Ibarra MD  Department of Hospital Medicine   [facility]

## 2022-02-07 NOTE — PLAN OF CARE
Call received from [facility] stating that before the ltac request is denied they would like to offer a peer to peer with the dr. JULIO C Mejia RN CM that a peer to peer needs to be scheduled before 9 am on Wed 2/9 and one can be scheduled at [phone]. CM following .    02/07/22 1532   Post-Acute Status   Post-Acute Authorization Placement   Post-Acute Placement Status Pending payor review/awaiting authorization (if required)

## 2022-02-07 NOTE — PLAN OF CARE
Problem: Adult Inpatient Plan of Care  Goal: Plan of Care Review  Outcome: Ongoing, Progressing  Goal: Patient-Specific Goal (Individualized)  Outcome: Ongoing, Progressing  Goal: Absence of Hospital-Acquired Illness or Injury  Outcome: Ongoing, Progressing  Goal: Optimal Comfort and Wellbeing  Outcome: Ongoing, Progressing  Goal: Readiness for Transition of Care  Outcome: Ongoing, Progressing     Problem: Infection  Goal: Absence of Infection Signs and Symptoms  Outcome: Ongoing, Progressing     Problem: Fluid Imbalance (Pneumonia)  Goal: Fluid Balance  Outcome: Ongoing, Progressing     Problem: Infection (Pneumonia)  Goal: Resolution of Infection Signs and Symptoms  Outcome: Ongoing, Progressing     Problem: Respiratory Compromise (Pneumonia)  Goal: Effective Oxygenation and Ventilation  Outcome: Ongoing, Progressing     Problem: Fall Injury Risk  Goal: Absence of Fall and Fall-Related Injury  Outcome: Ongoing, Progressing     Problem: Skin Injury Risk Increased  Goal: Skin Health and Integrity  Outcome: Ongoing, Progressing     Problem: Adjustment to Illness (Sepsis/Septic Shock)  Goal: Optimal Coping  Outcome: Ongoing, Progressing     Problem: Bleeding (Sepsis/Septic Shock)  Goal: Absence of Bleeding  Outcome: Ongoing, Progressing     Problem: Infection Progression (Sepsis/Septic Shock)  Goal: Absence of Infection Signs and Symptoms  Outcome: Ongoing, Progressing     Problem: Fluid and Electrolyte Imbalance (Acute Kidney Injury/Impairment)  Goal: Fluid and Electrolyte Balance  Outcome: Ongoing, Progressing     Problem: Oral Intake Inadequate (Acute Kidney Injury/Impairment)  Goal: Optimal Nutrition Intake  Outcome: Ongoing, Progressing     Problem: Renal Function Impairment (Acute Kidney Injury/Impairment)  Goal: Effective Renal Function  Outcome: Ongoing, Progressing     Problem: ARDS (Acute Respiratory Distress Syndrome)  Goal: Effective Oxygenation  Outcome: Ongoing, Progressing     Problem: Oral Intake  Inadequate  Goal: Improved Oral Intake  Outcome: Ongoing, Progressing     Problem: Impaired Wound Healing  Goal: Optimal Wound Healing  Outcome: Ongoing, Progressing     Problem: Aspiration (Enteral Nutrition)  Goal: Absence of Aspiration Signs and Symptoms  Outcome: Ongoing, Progressing     Problem: Device-Related Complication Risk (Enteral Nutrition)  Goal: Safe, Effective Therapy Delivery  Outcome: Ongoing, Progressing     Problem: Feeding Intolerance (Enteral Nutrition)  Goal: Feeding Tolerance  Outcome: Ongoing, Progressing     Pt calm, more alert than last night, responds to simple commands, will cont to monitor as pt progresses. VSS.

## 2022-02-07 NOTE — CARE UPDATE
02/07/22 0722   Patient Assessment/Suction   Level of Consciousness (AVPU) alert   Respiratory Effort Normal;Unlabored   Expansion/Accessory Muscles/Retractions expansion symmetric;no retractions;no use of accessory muscles   All Lung Fields Breath Sounds crackles   Rhythm/Pattern, Respiratory depth regular;pattern regular;unlabored   Cough Frequency infrequent   Cough Type weak;productive   Suction Method oral   Secretions Amount small   Secretions Color yellow;white   Secretions Characteristics thick   $ Swab or suction? Suction   PRE-TX-O2   O2 Device (Oxygen Therapy) Oxymask   $ Is the patient on Low Flow Oxygen? Yes   Flow (L/min) 1   Pulse Oximetry Type Continuous   $ Pulse Oximetry - Multiple Charge Pulse Oximetry - Multiple

## 2022-02-07 NOTE — NURSING TRANSFER
Nursing Transfer Note      2/7/2022     Report given to EVGENY Lerma    Reason patient is being transferred: Stepdown    Transfer To: 221    Transfer via bed    Transfer with NC to 1L O2, cardiac monitor    Transported by JULI Farris RN    Medicines sent: Nasal Spray    Any special needs or follow-up needed: Pt daughter and granddaughter attempted to be notified per MD Ibarra    Chart send with patient: Yes    Notified: daughter (call made by MD Ibarra, no answer received.    Patient reassessed at: 2/7/2022, 1505     Upon arrival to floor: cardiac monitor applied, patient oriented to room, call bell in reach and bed in lowest position

## 2022-02-07 NOTE — PLAN OF CARE
I called Trista with [facility] and left a message asking for updates on the pts [employer] auth for admit to [facility] . CM following.      11:58- Per Trista - pts [employer] auth is still pending She will let me know ASAP when a decision has been made. CM following.     02/07/22 0858   Post-Acute Status   Post-Acute Authorization Placement   Post-Acute Placement Status Pending payor review/awaiting authorization (if required)

## 2022-02-08 LAB
ANION GAP SERPL CALC-SCNC: 8 MMOL/L (ref 8–16)
BUN SERPL-MCNC: 15 MG/DL (ref 8–23)
CALCIUM SERPL-MCNC: 8.2 MG/DL (ref 8.7–10.5)
CHLORIDE SERPL-SCNC: 97 MMOL/L (ref 95–110)
CO2 SERPL-SCNC: 28 MMOL/L (ref 23–29)
CREAT SERPL-MCNC: 0.6 MG/DL (ref 0.5–1.4)
EST. GFR  (AFRICAN AMERICAN): >60 ML/MIN/1.73 M^2
EST. GFR  (NON AFRICAN AMERICAN): >60 ML/MIN/1.73 M^2
GLUCOSE SERPL-MCNC: 102 MG/DL (ref 70–110)
MAGNESIUM SERPL-MCNC: 2 MG/DL (ref 1.6–2.6)
POCT GLUCOSE: 115 MG/DL (ref 70–110)
POCT GLUCOSE: 124 MG/DL (ref 70–110)
POCT GLUCOSE: 129 MG/DL (ref 70–110)
POTASSIUM SERPL-SCNC: 4.1 MMOL/L (ref 3.5–5.1)
SODIUM SERPL-SCNC: 133 MMOL/L (ref 136–145)

## 2022-02-08 PROCEDURE — 83735 ASSAY OF MAGNESIUM: CPT | Performed by: INTERNAL MEDICINE

## 2022-02-08 PROCEDURE — 27000221 HC OXYGEN, UP TO 24 HOURS

## 2022-02-08 PROCEDURE — 25000003 PHARM REV CODE 250: Performed by: INTERNAL MEDICINE

## 2022-02-08 PROCEDURE — 80048 BASIC METABOLIC PNL TOTAL CA: CPT | Performed by: INTERNAL MEDICINE

## 2022-02-08 PROCEDURE — 11000001 HC ACUTE MED/SURG PRIVATE ROOM

## 2022-02-08 PROCEDURE — 36415 COLL VENOUS BLD VENIPUNCTURE: CPT | Performed by: INTERNAL MEDICINE

## 2022-02-08 PROCEDURE — 94761 N-INVAS EAR/PLS OXIMETRY MLT: CPT

## 2022-02-08 PROCEDURE — 97162 PT EVAL MOD COMPLEX 30 MIN: CPT

## 2022-02-08 PROCEDURE — 97530 THERAPEUTIC ACTIVITIES: CPT

## 2022-02-08 PROCEDURE — S0030 INJECTION, METRONIDAZOLE: HCPCS | Performed by: INTERNAL MEDICINE

## 2022-02-08 PROCEDURE — 63600175 PHARM REV CODE 636 W HCPCS: Performed by: INTERNAL MEDICINE

## 2022-02-08 PROCEDURE — 27000207 HC ISOLATION

## 2022-02-08 RX ADMIN — GLYCOPYRROLATE 1 MG: 1 TABLET ORAL at 09:02

## 2022-02-08 RX ADMIN — METOPROLOL TARTRATE 12.5 MG: 25 TABLET, FILM COATED ORAL at 09:02

## 2022-02-08 RX ADMIN — GLYCOPYRROLATE 1 MG: 1 TABLET ORAL at 11:02

## 2022-02-08 RX ADMIN — CITALOPRAM HYDROBROMIDE 40 MG: 10 TABLET ORAL at 09:02

## 2022-02-08 RX ADMIN — METRONIDAZOLE 500 MG: 500 INJECTION, SOLUTION INTRAVENOUS at 03:02

## 2022-02-08 RX ADMIN — ENOXAPARIN SODIUM 40 MG: 100 INJECTION SUBCUTANEOUS at 06:02

## 2022-02-08 RX ADMIN — ATORVASTATIN CALCIUM 40 MG: 40 TABLET, FILM COATED ORAL at 11:02

## 2022-02-08 RX ADMIN — MIDODRINE HYDROCHLORIDE 5 MG: 5 TABLET ORAL at 11:02

## 2022-02-08 RX ADMIN — SCOPALAMINE 1 PATCH: 1 PATCH, EXTENDED RELEASE TRANSDERMAL at 01:02

## 2022-02-08 RX ADMIN — AZELASTINE 137 MCG: 1 SPRAY, METERED NASAL at 11:02

## 2022-02-08 RX ADMIN — AZELASTINE 137 MCG: 1 SPRAY, METERED NASAL at 09:02

## 2022-02-08 RX ADMIN — MIDODRINE HYDROCHLORIDE 5 MG: 5 TABLET ORAL at 03:02

## 2022-02-08 RX ADMIN — LEVOTHYROXINE SODIUM 25 MCG: 0.03 TABLET ORAL at 06:02

## 2022-02-08 RX ADMIN — METRONIDAZOLE 500 MG: 500 INJECTION, SOLUTION INTRAVENOUS at 08:02

## 2022-02-08 RX ADMIN — FAMOTIDINE 20 MG: 10 INJECTION INTRAVENOUS at 11:02

## 2022-02-08 RX ADMIN — GLYCOPYRROLATE 1 MG: 1 TABLET ORAL at 06:02

## 2022-02-08 RX ADMIN — METRONIDAZOLE 500 MG: 500 INJECTION, SOLUTION INTRAVENOUS at 02:02

## 2022-02-08 RX ADMIN — METOPROLOL TARTRATE 12.5 MG: 25 TABLET, FILM COATED ORAL at 11:02

## 2022-02-08 RX ADMIN — MIDODRINE HYDROCHLORIDE 5 MG: 5 TABLET ORAL at 09:02

## 2022-02-08 NOTE — PT/OT/SLP EVAL
Physical Therapy Evaluation    Patient Name:  Sandra Wilson   MRN:  [id number]    Recommendations:     Discharge Recommendations:  nursing facility, skilled   Discharge Equipment Recommendations: none   Barriers to discharge: Decreased caregiver support    Assessment:     Sandra Wilson is a 81 y.o. female admitted with a medical diagnosis of Acute hypoxemic respiratory failure.  She presents with the following impairments/functional limitations:  weakness,impaired endurance,impaired functional mobilty,impaired balance,impaired cognition,decreased lower extremity function,decreased upper extremity function,decreased safety awareness,abnormal tone .    Pt seen supine in bed- awake, very little verbalization. Turns head with name calling. pt with PEG. Presents with generalized rigidity with legs extended. Max assist to sit EOB and mod assist for balance. thera ex with gentle stretches to LE's.  Pt to benefit from SNF    Rehab Prognosis: Fair; patient would benefit from acute skilled PT services to address these deficits and reach maximum level of function.    Recent Surgery: Procedure(s) (LRB):  CREATION, TRACHEOSTOMY (N/A) 13 Days Post-Op    Plan:     During this hospitalization, patient to be seen 3 x/week to address the identified rehab impairments via therapeutic activities,therapeutic exercises,neuromuscular re-education and progress toward the following goals:    · Plan of Care Expires:  02/28/22    Subjective   Speech slurred- very minimal verbalization  Chief Complaint: none  Patient/Family Comments/goals: none stated  Pain/Comfort:  · Pain Rating 1: 0/10    Patients cultural, spiritual, Jain conflicts given the current situation:      Living Environment:  [facility]  Prior to admission, patients level of function was assist for all mobility.  Equipment used at home: wheelchair.  DME owned (not currently used): none.  Upon discharge, patient will have assistance from NH.    Objective:      Communicated with nurse Lerma prior to session.  Patient found HOB elevated with peripheral IV,PEG Tube,telemetry,pressure relief boots  upon PT entry to room.    General Precautions: Standard, fall,contact   Orthopedic Precautions:N/A   Braces: N/A  Respiratory Status: Nasal cannula, flow 1 L/min    Exams:  · Postural Exam:  Patient presented with the following abnormalities:    · -       Rounded shoulders  · -       Forward head  · RLE ROM: Deficits: extended with very little passive knee flexion bilaterally  · RLE Strength: Deficits: 1/5  · LLE ROM: Deficits: extended and stiff  · LLE Strength: Deficits: 1/5    Functional Mobility:  · Bed Mobility:     · Scooting: maximal assistance  · Supine to Sit: maximal assistance  · Sit to Supine: maximal assistance    Therapeutic Activities and Exercises:   Patient was educated on the importance of OOB activity and functional mobility to negate negative effects of prolonged bed rest during hospitalization, safe transfers and ambulation, and D/C planning   thera ex with passive and gentle LE's stretches  EOB sitting max assist    AM-PAC 6 CLICK MOBILITY  Total Score:8     Patient left HOB elevated with all lines intact, call button in reach and heel protector boots/PEG feeding resumed.    GOALS:   Multidisciplinary Problems     Physical Therapy Goals        Problem: Physical Therapy Goal    Goal Priority Disciplines Outcome Goal Variances Interventions   Physical Therapy Goal     PT, PT/OT Ongoing, Progressing     Description: Goals to be met by: 2022     Patient will increase functional independence with mobility by performin. Supine to sit with Maximum Assistance  2. Sitting at edge of bed x20 minutes with Maximum Assistance  3. Lower extremity exercise program x20 reps                    History:     Past Medical History:   Diagnosis Date    Carotid stenosis     GERD (gastroesophageal reflux disease)     Hyperlipidemia     Hypertension      Osteoarthritis     Stroke        Past Surgical History:   Procedure Laterality Date    CARDIAC CATHETERIZATION      Carotid Endarderectomy      COLONOSCOPY  2010    repeat every 5    DEXA  2010    WNL    ESOPHAGOGASTRODUODENOSCOPY  6/9/2009 [facility]    Normal EGD.    ESOPHAGOGASTRODUODENOSCOPY  4/9/2012    NERD? Slight gastric mucosal atrophy.  Otherwise normal stomach and duodenum.  CLOtest negative.    TONSILLECTOMY         Time Tracking:     PT Received On: 02/08/22  PT Start Time: 1402     PT Stop Time: 1418  PT Total Time (min): 16 min     Billable Minutes: Evaluation 8 and Therapeutic Activity 8      02/08/2022

## 2022-02-08 NOTE — PROGRESS NOTES
Pharmacokinetic Assessment Follow Up: IV Vancomycin    Vancomycin serum concentration assessment(s):    The trough level was drawn 3.5 hours late and can be used to guide therapy at this time. The measurement is below the desired definitive target range of 15 to 20 mcg/mL, however, assumed to be within range if level drawn on time.    Vancomycin Regimen Plan:    Continue regimen to Vancomycin 1000 mg IV every 24 hours with next serum trough concentration measured at 2030 prior to 3rd dose on 02/09/22    Drug levels (last 3 results):  Recent Labs   Lab Result Units 02/05/22  1415 02/07/22  2032   Vancomycin-Trough ug/mL 16.3 12.3       Pharmacy will continue to follow and monitor vancomycin.    Please contact pharmacy at extension [phone] for questions regarding this assessment.    Thank you for the consult,   Dallas Varma       Patient brief summary:  Sandra Wilson is a 81 y.o. female initiated on antimicrobial therapy with IV Vancomycin for treatment of lower respiratory infection    The patient's current regimen is 1000mg q24h    Drug Allergies:   Review of patient's allergies indicates:   Allergen Reactions    Cephalexin (bulk)      Flushing and itching    Lidocaine base      rash    Lisinopril Other (See Comments)     cough       Actual Body Weight:   63.9kg    Renal Function:   Estimated Creatinine Clearance: 54.4 mL/min (based on SCr of 0.7 mg/dL).,     CBC (last 72 hours):  Recent Labs   Lab Result Units 02/05/22 0411 02/06/22 0612 02/07/22  0406   WBC K/uL 7.60 6.68 7.05   Hemoglobin g/dL 6.7* 8.5* 8.3*   Hematocrit % 20.9* 25.6* 25.4*   Platelets K/uL 248 247 226   Gran % % 68.0 57.2 61.1   Lymph % % 14.2* 24.9 21.0   Mono % % 14.5 13.5 13.9   Eosinophil % % 1.8 2.5 2.4   Basophil % % 1.1 1.3 1.3   Differential Method  Automated Automated Automated       Metabolic Panel (last 72 hours):  Recent Labs   Lab Result Units 02/05/22 0411 02/06/22  0612 02/07/22  0406   Sodium mmol/L 135* 134* 135*   Potassium  mmol/L 4.2 4.4 4.3   Chloride mmol/L 96 96 97   CO2 mmol/L 35* 33* 33*   Glucose mg/dL 117* 106 82   BUN mg/dL 14 16 16   Creatinine mg/dL 0.6 0.7 0.7   Albumin g/dL 2.2* 2.2* 2.2*   Total Bilirubin mg/dL 0.2 0.4 0.4   Alkaline Phosphatase U/L 56 52* 45*   AST U/L 15 20 18   ALT U/L 12 13 14   Magnesium mg/dL 1.9 1.8 1.7   Phosphorus mg/dL 3.1 3.0 3.2       Vancomycin Administrations:  vancomycin given in the last 96 hours                     vancomycin in dextrose 5 % 1 gram/250 mL IVPB 1,000 mg (mg) 1,000 mg New Bag 02/06/22 1758     1,000 mg New Bag 02/05/22 1655      Restarted 02/04/22 1504     1,000 mg New Bag  1502                    Microbiologic Results:  Microbiology Results (last 7 days)       ** No results found for the last 168 hours. **

## 2022-02-08 NOTE — PROGRESS NOTES
Sandra Wilson [id number] is a 81 y.o. female who has been consulted for vancomycin dosing.    Pharmacy consult for vancomycin dosing in no longer required.  Vancomycin was discontinued.    Thank you for allowing us to participate in this patient's care.     Emmanuel Varma, PharmD  [phone]

## 2022-02-08 NOTE — CARE UPDATE
02/08/22 0735   PRE-TX-O2   O2 Device (Oxygen Therapy) nasal cannula   $ Is the patient on Low Flow Oxygen? Yes   Flow (L/min) 1  (weaned from oximask at 2lpm to NC 1lpm)   SpO2 97 %   Pulse Oximetry Type Intermittent   $ Pulse Oximetry - Multiple Charge Pulse Oximetry - Multiple

## 2022-02-08 NOTE — PLAN OF CARE
Problem: Physical Therapy Goal  Goal: Physical Therapy Goal  Description: Goals to be met by: 2022     Patient will increase functional independence with mobility by performin. Supine to sit with Maximum Assistance  2. Sitting at edge of bed x20 minutes with Maximum Assistance  3. Lower extremity exercise program x20 reps   Outcome: Ongoing, Progressing   PT eval and treat. Presents with generalized rigidity. Max assist to sit EOB/thera ex to LE's. SNF

## 2022-02-08 NOTE — PLAN OF CARE
Per Dr. Ibarra-pt does not have an ltac need and he is not interested in a peer to peer. Pt can do SNF- PT/Ot ordered to evaluate for SNF at [facility]. Pt will need a [employer] auth for SNF services. CM following.      I called [facility] at [phone] and updated them that a peer to peer will not be necessary. She is going to send out the denial letter. CM following.     02/08/22 8605   Post-Acute Status   Post-Acute Authorization Placement   Post-Acute Placement Status Discharge Plan Changed

## 2022-02-09 LAB
POCT GLUCOSE: 107 MG/DL (ref 70–110)
POCT GLUCOSE: 117 MG/DL (ref 70–110)
POCT GLUCOSE: 122 MG/DL (ref 70–110)
POCT GLUCOSE: 93 MG/DL (ref 70–110)
VANCOMYCIN TROUGH SERPL-MCNC: 6.2 UG/ML (ref 10–22)

## 2022-02-09 PROCEDURE — 94761 N-INVAS EAR/PLS OXIMETRY MLT: CPT

## 2022-02-09 PROCEDURE — 27000207 HC ISOLATION

## 2022-02-09 PROCEDURE — 36415 COLL VENOUS BLD VENIPUNCTURE: CPT | Performed by: INTERNAL MEDICINE

## 2022-02-09 PROCEDURE — S0030 INJECTION, METRONIDAZOLE: HCPCS | Performed by: INTERNAL MEDICINE

## 2022-02-09 PROCEDURE — 25000003 PHARM REV CODE 250: Performed by: INTERNAL MEDICINE

## 2022-02-09 PROCEDURE — 80202 ASSAY OF VANCOMYCIN: CPT | Performed by: INTERNAL MEDICINE

## 2022-02-09 PROCEDURE — 27000221 HC OXYGEN, UP TO 24 HOURS

## 2022-02-09 PROCEDURE — 63600175 PHARM REV CODE 636 W HCPCS: Performed by: INTERNAL MEDICINE

## 2022-02-09 PROCEDURE — 11000001 HC ACUTE MED/SURG PRIVATE ROOM

## 2022-02-09 RX ORDER — METOPROLOL TARTRATE 25 MG/1
12.5 TABLET, FILM COATED ORAL 2 TIMES DAILY
Qty: 30 TABLET | Refills: 2 | Status: SHIPPED | OUTPATIENT
Start: 2022-02-09 | End: 2022-05-10

## 2022-02-09 RX ADMIN — CITALOPRAM HYDROBROMIDE 40 MG: 10 TABLET ORAL at 10:02

## 2022-02-09 RX ADMIN — MIDODRINE HYDROCHLORIDE 5 MG: 5 TABLET ORAL at 09:02

## 2022-02-09 RX ADMIN — METOPROLOL TARTRATE 12.5 MG: 25 TABLET, FILM COATED ORAL at 10:02

## 2022-02-09 RX ADMIN — LEVOTHYROXINE SODIUM 25 MCG: 0.03 TABLET ORAL at 06:02

## 2022-02-09 RX ADMIN — FAMOTIDINE 20 MG: 10 INJECTION INTRAVENOUS at 09:02

## 2022-02-09 RX ADMIN — ENOXAPARIN SODIUM 40 MG: 100 INJECTION SUBCUTANEOUS at 06:02

## 2022-02-09 RX ADMIN — METRONIDAZOLE 500 MG: 500 INJECTION, SOLUTION INTRAVENOUS at 01:02

## 2022-02-09 RX ADMIN — AZELASTINE 137 MCG: 1 SPRAY, METERED NASAL at 10:02

## 2022-02-09 RX ADMIN — METRONIDAZOLE 500 MG: 500 INJECTION, SOLUTION INTRAVENOUS at 10:02

## 2022-02-09 RX ADMIN — GLYCOPYRROLATE 1 MG: 1 TABLET ORAL at 06:02

## 2022-02-09 RX ADMIN — MIDODRINE HYDROCHLORIDE 5 MG: 5 TABLET ORAL at 06:02

## 2022-02-09 RX ADMIN — ATORVASTATIN CALCIUM 40 MG: 40 TABLET, FILM COATED ORAL at 09:02

## 2022-02-09 RX ADMIN — METOPROLOL TARTRATE 12.5 MG: 25 TABLET, FILM COATED ORAL at 09:02

## 2022-02-09 RX ADMIN — AZELASTINE 137 MCG: 1 SPRAY, METERED NASAL at 09:02

## 2022-02-09 RX ADMIN — GLYCOPYRROLATE 1 MG: 1 TABLET ORAL at 10:02

## 2022-02-09 RX ADMIN — ACETAMINOPHEN 650 MG: 325 TABLET ORAL at 10:02

## 2022-02-09 RX ADMIN — GLYCOPYRROLATE 1 MG: 1 TABLET ORAL at 09:02

## 2022-02-09 RX ADMIN — MIDODRINE HYDROCHLORIDE 5 MG: 5 TABLET ORAL at 10:02

## 2022-02-09 RX ADMIN — FAMOTIDINE 20 MG: 10 INJECTION INTRAVENOUS at 10:02

## 2022-02-09 NOTE — PLAN OF CARE
CRIS spoke to pts daughter about ltac denial and she requested that pt does not return to [facility]. I explained that we can assist in sending out additional referalls but cannot keep the pt in an acute care bed for correction preference. She asked me to fa packet to [facility] [place] to [facility] at [phone]. I sent a 3 day packet, current meds, chest xray, therapy notes and discharge summary per her request. I explained that the pt would be returning to [facility] today and she expressed an understanding. CM following.    02/09/22 1023   Discharge Assessment   Assessment Type Discharge Planning Reassessment

## 2022-02-09 NOTE — PROGRESS NOTES
[facility] Hospital Medicine  Progress Note    Patient Name: Sandra Wilson  MRN: [id number]  Patient Class: IP- Inpatient   Admission Date: 1/18/2022  Length of Stay: 22 days  Attending Physician: Kaylyn Ibarra MD  Primary Care Provider: Primary Doctor No        Subjective:     Principal Problem:Acute hypoxemic respiratory failure        HPI:  Sandra Wilson is an 81 year old female with a past medical history of CVAs, CNS aneurysm, PEG status, CAD, HLD, hypothryoidism, DM, aortic stenosis, and colon, breast, and ovarian cancer who presented from long term care with vomiting, diarrhea, and shortness of breath. Workup in the ED showed likely aspiration pneumonitis and C. Diff colitis. Her O2 requirement increased while in the ED requiring intubation with sedation. She also required Levophed as she likely developed septic shock. Antibiotics were broadened to cefepime, vancomycin, and Flagyl. Pulmonary was consulted. Family was notified. The patient was unable to provide history given acuity of illness.      Overview/Hospital Course:  Sandra Wilson is an 81 year old female with a past medical history of CVAs, CNS aneurysm, PEG status, CAD, HLD, hypothyroidism, DM, aortic stenosis, and colon, breast, and ovarian cancer who presented from long term care with vomiting, diarrhea, and shortness of breath secondary to acute hypoxic respiratory failure from aspiration pneumonia in the setting of C diff colitis leading to septic shock. She was intubated in the ED and started on Levophed infusion via RIJ CVC placed by Dr. Vignesh Gaspar. She was started on broad spectrum antibiotics with cefepime, Flagyl, vancomycin and admitted to the ICU. She was also started on enteral vancomycin for severe C diff colitis given elevated WBC count and STEFFANY. There was concern for developing ARDS given P/F ratio of 87 (severe); 190 1/22 (moderate). Pulmonary was consulted. She also presented with demand ischemia likely  secondary to septic shock. Troponin was trended and improved as shock resolved. Normal saline infusion was added for hyponatremia and STEFFANY which improved both. Levophed was able to be weaned. Family agreed to DNR status 1/18 and Palliative Care was consulted to discuss goal of care 1/19. Antibiotics were changed to doxycycline and Flagyl (GBS in sputum culture) 1/21; vancomycin was added 1/23 after culture also became positive for Staph aureus and Klebsiella. Her respiratory status has improved throughout her course and she was extubated (code changed to partial code for intubation) 1/21 only to be re-intubated for worsening stridor (history of tracheostomy). Upon re-intubation, copious amounts of secretions were suctioned. KUB suggested a mild ileus A bowel regimen was instituted and the patient was able to have bowel movements 1/22. Tube feeds were started 1/23. Her course was complicated by Afib as well noted on telemetry 1/21; metoprolol tartrate started 1/22.She failed a leak test 1/22; Surgery was consulted for tracheostomy which is pending conversation with family regarding goals of care.    Patient was set to go for trach on 01/26, but attempt was made for another trial extubation and the patient did well after extubation, she was transition to face mask and was able to maintain her saturations although had significant difficulty with secretions.  She was started on Robinul and scopolamine patch which did seem to improve for secretions.  Her respiratory status continued to be somewhat tenuous, so she was monitored in the ICU.  Family expressed desire to not have her get a tracheostomy.  Case management began working on LTACH referral.      No chest pain, shortness of breath, lightheadedness. Tolerating tube feeds    NAD, Alert  NC, AT  RRR  CTAB  SNTND+BS. PEG present  Edema of hips present  PERRL    Vitals, labs and radiographs from past 24h reviewed and personally interpreted.           HPI:     Sandra  Katie is an 81 year old female with a past medical history of CVAs, CNS aneurysm, PEG status, CAD, HLD, hypothryoidism, DM, aortic stenosis, and colon, breast, and ovarian cancer who presented from long term care with vomiting, diarrhea, and shortness of breath. Workup in the ED showed likely aspiration pneumonitis and C. Diff colitis. Her O2 requirement increased while in the ED requiring intubation with sedation. She also required Levophed as she likely developed septic shock. Antibiotics were broadened to cefepime, vancomycin, and Flagyl. Pulmonary was consulted. Family was notified. The patient was unable to provide history given acuity of illness.      Procedure(s) (LRB):  CREATION, TRACHEOSTOMY (N/A)      Hospital Course:     The patient was admitted with C diff colitis and was believed to be pneumonia.  She required an extended course of therapy with IV antibiotics for pneumonia as ultimately sputum culture she has now completed 14 days of vancomycin and is not requiring supplemental oxygen at rest.    C diff has been fully treated in house.  As she has only been receiving systemic vancomycin and Flagyl with past 10 days without exposure to other antibiotics, she would not need extended treatment beyond that which is occurred.    At the time of discharge she is alert and interactive.  She is tolerating tube feeds.    Extended course:     Sandra Wilson is an 81 year old female with a past medical history of CVAs, CNS aneurysm, PEG status, CAD, HLD, hypothyroidism, DM, aortic stenosis, and colon, breast, and ovarian cancer who presented from long term care with vomiting, diarrhea, and shortness of breath secondary to acute hypoxic respiratory failure from aspiration pneumonia in the setting of C diff colitis leading to septic shock. She was intubated in the ED and started on Levophed infusion via RIJ CVC placed by Dr. Vignesh Gaspar. She was started on broad spectrum antibiotics with cefepime, Flagyl,  vancomycin and admitted to the ICU. She was also started on enteral vancomycin for severe C diff colitis given elevated WBC count and STEFFANY. There was concern for developing ARDS given P/F ratio of 87 (severe); 190 1/22 (moderate). Pulmonary was consulted. She also presented with demand ischemia likely secondary to septic shock. Troponin was trended and improved as shock resolved. Normal saline infusion was added for hyponatremia and STEFFANY which improved both. Levophed was able to be weaned. Family agreed to DNR status 1/18 and Palliative Care was consulted to discuss goal of care 1/19. Antibiotics were changed to doxycycline and Flagyl (GBS in sputum culture) 1/21; vancomycin was added 1/23 after culture also became positive for Staph aureus and Klebsiella. Her respiratory status has improved throughout her course and she was extubated (code changed to partial code for intubation) 1/21 only to be re-intubated for worsening stridor (history of tracheostomy). Upon re-intubation, copious amounts of secretions were suctioned. KUB suggested a mild ileus A bowel regimen was instituted and the patient was able to have bowel movements 1/22. Tube feeds were started 1/23. Her course was complicated by Afib as well noted on telemetry 1/21; metoprolol tartrate started 1/22.She failed a leak test 1/22; Surgery was consulted for tracheostomy which is pending conversation with family regarding goals of care.     Patient was set to go for trach on 01/26, but attempt was made for another trial extubation and the patient did well after extubation, she was transition to face mask and was able to maintain her saturations although had significant difficulty with secretions.  She was started on Robinul and scopolamine patch which did seem to improve for secretions.  Her respiratory status continued to be somewhat tenuous, so she was monitored in the ICU.  Family expressed desire to not have her get a tracheostomy.  Case management began  working on LTACH referral.       Paroxysmal atrial fibrillation  -Telemetry  - controlled on metoprolol 12.5 mg BID   Has a history of IC hemorrhage. Will not initiate full anti-coagulation. Defer to PCP.        Gastrostomy tube dependent  Stable.  No issues with it     Aspiration pneumonia 2/2 MRSA, acute bacterial pneumonia 2/2 MRSA, Acute hypoxemic respiratory failure 2/2 acute bacterial pneumonia 2/2 MRSA          Type 2 diabetes mellitus, with long-term current use of insulin  -ISS           YARI (generalized anxiety disorder)       Hypothyroidism  Patient has chronic hypothyroidism. TFTs reviewed-         Lab Results   Component Value Date     TSH 1.130 07/13/2017   . Will continue chronic levothyroxine            Hyperlipidemia   Patient is chronically on statin.will continue for now. Monitor clinically. Last LDL was         Lab Results   Component Value Date     LDLCALC 94.4 07/13/2017            GERD (gastroesophageal reflux disease)  On famotidine.

## 2022-02-09 NOTE — PLAN OF CARE
Patient continues to tolerate Peg tube feedings and flush. Patient has shear and friction to the right sacrum area, mepilex applied. Bed in lowest position, call bell in reach. Contact precautions in place. O2  2L via N/C. Tele in place NSR. Will continue to monitor patient.

## 2022-02-09 NOTE — PLAN OF CARE
Per Amada at [facility] - she needs to call the family before the pt is allowed to return. The pt is currently discharged from their facility due to non-payment and is not currently a [facility] resident. The pt is private pay and has not cooperated in turning in information for the pt to complete a long term medicaid application. I told her that there is a SNF consult in with PT notes and asked if she can accept the pt for SNF services however the pt has not had a 30 day wellness period. I asked Amada to call the pts family to see if they will cooperate with the long term medicaid documents and to please keep me updated.     Per Mayi with [facility]- they are not able to care for the pts needs - Denied for admit.     CM following.    02/09/22 1150   Discharge Assessment   Assessment Type Discharge Planning Reassessment

## 2022-02-09 NOTE — PLAN OF CARE
Pts AVS, packet, nursing home orders and discharge summary sent to [facility] via careport and email and updated Amada via text. CM following.    02/09/22 1017   Post-Acute Status   Post-Acute Authorization Placement   Post-Acute Placement Status Pending post-acute provider review/more information requested

## 2022-02-09 NOTE — PLAN OF CARE
I spoke to pts daughter Ira again to discuss discharge plans. She inquired as to why pt can not go SNF and I explained that the pt has not had a a 30 day wellness period. She confirmed that the pt has been denied at [facility] and she spoke to them. She stated that her daughter is a HD nurse and she is just trying to get questions answered to update her before she calls me. I told her that the pt has been at [facility] since the summer time and has a balance and that likely any other facility will be aware of that balance and not accept the pt as well. She began to talk about substandard care at [facility] and I again explained that the pt will need a discharge plan whether it be to return to [facility] or home with her and also made it clear the the balance at [facility] needs to be handled. She stated that she will have her daughter return my call before 4:30 pm. CM following.    02/09/22 1533   Discharge Assessment   Assessment Type Discharge Planning Reassessment

## 2022-02-09 NOTE — PLAN OF CARE
[facility]     Department of Hospital Medicine     [address] [place] [address]     [phone] after hours  [phone] fax       NURSING HOME ORDERS    02/09/2022    Admit to Nursing Home:    Skilled Bed                                                 Diagnoses:  Active Hospital Problems    Diagnosis  POA    *Acute hypoxemic respiratory failure [J96.01]  Yes    Anemia [D64.9]  Yes    Paroxysmal atrial fibrillation [I48.0]  No    Gastrostomy tube dependent [Z93.1]  Not Applicable    YARI (generalized anxiety disorder) [F41.1]  Yes    Type 2 diabetes mellitus, with long-term current use of insulin [E11.9, Z79.4]  Not Applicable    Aspiration pneumonia [J69.0]  Yes    Hypothyroidism [E03.9]  Yes    Hyperlipidemia [E78.5]  Yes     Chronic    GERD (gastroesophageal reflux disease) [K21.9]  Yes     Chronic      Resolved Hospital Problems    Diagnosis Date Resolved POA    Ileus [K56.7] 01/23/2022 No    Demand ischemia [I24.8] 01/24/2022 Yes    Sepsis [A41.9] 01/24/2022 Yes    STEFFANY (acute kidney injury) [N17.9] 01/21/2022 Yes    Hyponatremia [E87.1] 01/20/2022 Yes    ARDS (adult respiratory distress syndrome) [J80] 02/01/2022 Yes       Patient is homebound due to:  Acute hypoxemic respiratory failure    Allergies:  Review of patient's allergies indicates:   Allergen Reactions    Cephalexin (bulk)      Flushing and itching    Lidocaine base      rash    Lisinopril Other (See Comments)     cough       Vitals:      Every shift     Diet: NPO       Tube Feeding: Isosource 1.5 Byron at 45 cc/hr with 115 mL free water flush q4h.  Also with Brody bid and beneprotein bid       Acitivities:      - Up in a chair each morning as tolerated   -    LABS:       CMP, CBC, Mg, Phos twice a week for two weeks      Nursing Precautions:    - Aspiration precautions:             - Total assistance with meals            -  Upright 90 degrees befor during and after meals              -  Suction at bedside          - Fall precautions per nursing home protocol     - Decubitus precautions:        -  for positioning   - Pressure reducing foam mattress   - Turn patient every two hours. Use wedge pillows to anchor patient    CONSULTS:      Physical Therapy to evaluate and treat     Occupational Therapy to evaluate and treat     Speech Therapy  to evaluate and treat         MISCELLANEOUS CARE:      PEG Care:  Clean site every 24 hours     Routine Skin for Bedridden Patients:  Apply moisture barrier cream to all    skin folds and wet areas in perineal area daily and after baths and                           all bowel movements.       DIABETES CARE:       Check blood sugar:          Fingerstick blood sugar AC and HS         Report CBG < 60 or > 400 to physician.                                          Insulin Sliding Scale          Glucose  Novolog Insulin Subcutaneous        0 - 60   Orange juice or glucose tablet, hold insulin      No insulin   201-250  2 units   251-300  4 units   301-350  6 units   351-400  8 units   >400   10 units then call physician      Medications: Discontinue all previous medication orders, if any. See new list below.       Medication List      START taking these medications    metoprolol tartrate 25 MG tablet  Commonly known as: LOPRESSOR  0.5 tablets (12.5 mg total) by Per G Tube route 2 (two) times daily.        CONTINUE taking these medications    atorvastatin 40 MG tablet  Commonly known as: LIPITOR  1 tablet (40 mg total) by Per G Tube route every evening. Per peg tube     citalopram 40 MG tablet  Commonly known as: CeleXA  citalopram 40 mg tablet   TAKE 1 TABLET BY MOUTH EVERY DAY     FLUoxetine 10 MG capsule  1 capsule (10 mg total) by Per G Tube route once daily. Per peg tube     fluticasone propionate 50 mcg/actuation nasal spray  Commonly known as: FLONASE  fluticasone propionate 50 mcg/actuation nasal spray,suspension     glycopyrrolate 1 mg  Tab  Commonly known as: ROBINUL  1 mg 2 (two) times daily. Per peg tube     ipratropium 21 mcg (0.03 %) nasal spray  Commonly known as: ATROVENT  ipratropium bromide 21 mcg (0.03 %) nasal spray     levothyroxine 25 MCG tablet  Commonly known as: SYNTHROID  25 mcg every morning. At 0600 per peg tube     midodrine 5 MG Tab  Commonly known as: PROAMATINE  1 tablet (5 mg total) by Per G Tube route 3 (three) times daily.     multivitamin tablet  Commonly known as: THERAGRAN  1 tablet once daily. Per peg tube     pantoprazole 40 mg suspension  Commonly known as: PROTONIX  1 packet (40 mg total) by Per G Tube route once daily. Per peg tube        STOP taking these medications    ALPRAZolam 0.25 MG tablet  Commonly known as: XANAX     celecoxib 100 MG capsule  Commonly known as: CeleBREX     hydrALAZINE 25 MG tablet  Commonly known as: APRESOLINE     HYDROcodone-acetaminophen 5-325 mg per tablet  Commonly known as: NORCO     insulin lispro 100 unit/mL injection     LIDOcaine 5 % Oint ointment  Commonly known as: XYLOCAINE     metoclopramide HCl 5 mg/5 mL Soln  Commonly known as: REGLAN                  _________________________________  Kaylyn Ibarra MD  02/09/2022

## 2022-02-09 NOTE — PLAN OF CARE
[facility] Hospital Medicine  Discharge Summary      Patient Name: Sandra Wilson  MRN: [id number]  Admission Date: 1/18/2022  Hospital Length of Stay: 22 days  Discharge Date and Time:  02/09/2022 9:32 AM  Attending Physician: Kaylyn Ibarra MD   Discharging Provider: Kaylyn Ibarra MD  Primary Care Provider: Primary Doctor No        HPI:     Sandra Wilson is an 81 year old female with a past medical history of CVAs, CNS aneurysm, PEG status, CAD, HLD, hypothryoidism, DM, aortic stenosis, and colon, breast, and ovarian cancer who presented from long term care with vomiting, diarrhea, and shortness of breath. Workup in the ED showed likely aspiration pneumonitis and C. Diff colitis. Her O2 requirement increased while in the ED requiring intubation with sedation. She also required Levophed as she likely developed septic shock. Antibiotics were broadened to cefepime, vancomycin, and Flagyl. Pulmonary was consulted. Family was notified. The patient was unable to provide history given acuity of illness.      Procedure(s) (LRB):  CREATION, TRACHEOSTOMY (N/A)      Hospital Course:     The patient was admitted with C diff colitis and was believed to be pneumonia.  She required an extended course of therapy with IV antibiotics for pneumonia as ultimately sputum culture she has now completed 14 days of vancomycin and is not requiring supplemental oxygen at rest.    C diff has been fully treated in house.  As she has only been receiving systemic vancomycin and Flagyl with past 10 days without exposure to other antibiotics, she would not need extended treatment beyond that which is occurred.    At the time of discharge she is alert and interactive.  She is tolerating tube feeds.    Extended course:     Sandra Wilson is an 81 year old female with a past medical history of CVAs, CNS aneurysm, PEG status, CAD, HLD, hypothyroidism, DM, aortic stenosis, and colon, breast, and ovarian cancer who presented  from long term care with vomiting, diarrhea, and shortness of breath secondary to acute hypoxic respiratory failure from aspiration pneumonia in the setting of C diff colitis leading to septic shock. She was intubated in the ED and started on Levophed infusion via RIJ CVC placed by Dr. Vignesh Gaspar. She was started on broad spectrum antibiotics with cefepime, Flagyl, vancomycin and admitted to the ICU. She was also started on enteral vancomycin for severe C diff colitis given elevated WBC count and STEFFANY. There was concern for developing ARDS given P/F ratio of 87 (severe); 190 1/22 (moderate). Pulmonary was consulted. She also presented with demand ischemia likely secondary to septic shock. Troponin was trended and improved as shock resolved. Normal saline infusion was added for hyponatremia and STEFFANY which improved both. Levophed was able to be weaned. Family agreed to DNR status 1/18 and Palliative Care was consulted to discuss goal of care 1/19. Antibiotics were changed to doxycycline and Flagyl (GBS in sputum culture) 1/21; vancomycin was added 1/23 after culture also became positive for Staph aureus and Klebsiella. Her respiratory status has improved throughout her course and she was extubated (code changed to partial code for intubation) 1/21 only to be re-intubated for worsening stridor (history of tracheostomy). Upon re-intubation, copious amounts of secretions were suctioned. KUB suggested a mild ileus A bowel regimen was instituted and the patient was able to have bowel movements 1/22. Tube feeds were started 1/23. Her course was complicated by Afib as well noted on telemetry 1/21; metoprolol tartrate started 1/22.She failed a leak test 1/22; Surgery was consulted for tracheostomy which is pending conversation with family regarding goals of care.     Patient was set to go for trach on 01/26, but attempt was made for another trial extubation and the patient did well after extubation, she was transition to  face mask and was able to maintain her saturations although had significant difficulty with secretions.  She was started on Robinul and scopolamine patch which did seem to improve for secretions.  Her respiratory status continued to be somewhat tenuous, so she was monitored in the ICU.  Family expressed desire to not have her get a tracheostomy.  Case management began working on LTACH referral.       Paroxysmal atrial fibrillation  -Telemetry  - controlled on metoprolol 12.5 mg BID   Has a history of IC hemorrhage. Will not initiate full anti-coagulation. Defer to PCP.        Gastrostomy tube dependent  Stable.  No issues with it     Aspiration pneumonia 2/2 MRSA, acute bacterial pneumonia 2/2 MRSA, Acute hypoxemic respiratory failure 2/2 acute bacterial pneumonia 2/2 MRSA          Type 2 diabetes mellitus, with long-term current use of insulin  -ISS           YARI (generalized anxiety disorder)       Hypothyroidism  Patient has chronic hypothyroidism. TFTs reviewed-         Lab Results   Component Value Date     TSH 1.130 07/13/2017   . Will continue chronic levothyroxine            Hyperlipidemia   Patient is chronically on statin.will continue for now. Monitor clinically. Last LDL was         Lab Results   Component Value Date     LDLCALC 94.4 07/13/2017            GERD (gastroesophageal reflux disease)  On famotidine.          Consults:   Consults (From admission, onward)        Status Ordering Provider     Inpatient consult to Social Work/Case Management  Once        Provider:  (Not yet assigned)    Acknowledged MIKE GAGNON     Inpatient consult to Midline team  Once        Provider:  (Not yet assigned)    Acknowledged MIKE GAGNON     Inpatient consult to Social Work/Case Management  Once        Provider:  (Not yet assigned)    Acknowledged MIKE GAGNON     Inpatient consult to Registered Dietitian/Nutritionist  Once        Provider:  (Not yet assigned)    Completed MELISSA PINTO     Pharmacy  "to dose Vancomycin consult  Once        Provider:  (Not yet assigned)   "And" Linked Group Details    Acknowledged MIKE GAGNON     Inpatient consult to General Surgery  Once        Provider:  Abdulkadir Marrufo MD    Completed JAYCE ARITA     Inpatient consult to Registered Dietitian/Nutritionist  Once        Provider:  (Not yet assigned)    Completed DONNA CONTEH     Inpatient consult to Palliative Care  Once        Provider:  Catracho Crabtree MD    Completed DONNA CONTEH     Inpatient consult to Pulmonology  Once        Provider:  Jayce Arita MD    Completed DONNA CONTEH          Final Active Diagnoses:    Diagnosis Date Noted POA    PRINCIPAL PROBLEM:  Acute hypoxemic respiratory failure [J96.01] 01/18/2022 Yes    Anemia [D64.9] 02/01/2022 Yes    Paroxysmal atrial fibrillation [I48.0] 01/21/2022 No    Gastrostomy tube dependent [Z93.1] 01/19/2022 Not Applicable    YARI (generalized anxiety disorder) [F41.1] 01/18/2022 Yes    Type 2 diabetes mellitus, with long-term current use of insulin [E11.9, Z79.4] 01/18/2022 Not Applicable    Aspiration pneumonia [J69.0] 01/18/2022 Yes    Hypothyroidism [E03.9] 12/15/2021 Yes    Hyperlipidemia [E78.5]  Yes     Chronic    GERD (gastroesophageal reflux disease) [K21.9]  Yes     Chronic      Problems Resolved During this Admission:    Diagnosis Date Noted Date Resolved POA    Ileus [K56.7] 01/21/2022 01/23/2022 No    Demand ischemia [I24.8] 01/19/2022 01/24/2022 Yes    Sepsis [A41.9] 01/19/2022 01/24/2022 Yes    STEFFANY (acute kidney injury) [N17.9] 01/19/2022 01/21/2022 Yes    Hyponatremia [E87.1] 01/19/2022 01/20/2022 Yes    ARDS (adult respiratory distress syndrome) [J80] 01/19/2022 02/01/2022 Yes      Discharged Condition: stable    Disposition: Skilled Nursing Facility    Follow Up:    Patient Instructions:      Diet NPO     Notify your health care provider if you experience any of the following:  temperature >100.4     Notify your health " care provider if you experience any of the following:  persistent nausea and vomiting or diarrhea     Notify your health care provider if you experience any of the following:  severe uncontrolled pain     Notify your health care provider if you experience any of the following:  redness, tenderness, or signs of infection (pain, swelling, redness, odor or green/yellow discharge around incision site)     Notify your health care provider if you experience any of the following:  difficulty breathing or increased cough     Notify your health care provider if you experience any of the following:  severe persistent headache     Notify your health care provider if you experience any of the following:  persistent dizziness, light-headedness, or visual disturbances     Notify your health care provider if you experience any of the following:  increased confusion or weakness     Activity as tolerated     Medications:  Reconciled Home Medications:      Medication List      START taking these medications    metoprolol tartrate 25 MG tablet  Commonly known as: LOPRESSOR  0.5 tablets (12.5 mg total) by Per G Tube route 2 (two) times daily.        CONTINUE taking these medications    atorvastatin 40 MG tablet  Commonly known as: LIPITOR  1 tablet (40 mg total) by Per G Tube route every evening. Per peg tube     citalopram 40 MG tablet  Commonly known as: CeleXA  citalopram 40 mg tablet   TAKE 1 TABLET BY MOUTH EVERY DAY     FLUoxetine 10 MG capsule  1 capsule (10 mg total) by Per G Tube route once daily. Per peg tube     fluticasone propionate 50 mcg/actuation nasal spray  Commonly known as: FLONASE  fluticasone propionate 50 mcg/actuation nasal spray,suspension     glycopyrrolate 1 mg Tab  Commonly known as: ROBINUL  1 mg 2 (two) times daily. Per peg tube     ipratropium 21 mcg (0.03 %) nasal spray  Commonly known as: ATROVENT  ipratropium bromide 21 mcg (0.03 %) nasal spray     levothyroxine 25 MCG tablet  Commonly known as:  SYNTHROID  25 mcg every morning. At 0600 per peg tube     midodrine 5 MG Tab  Commonly known as: PROAMATINE  1 tablet (5 mg total) by Per G Tube route 3 (three) times daily.     multivitamin tablet  Commonly known as: THERAGRAN  1 tablet once daily. Per peg tube     pantoprazole 40 mg suspension  Commonly known as: PROTONIX  1 packet (40 mg total) by Per G Tube route once daily. Per peg tube        STOP taking these medications    ALPRAZolam 0.25 MG tablet  Commonly known as: XANAX     celecoxib 100 MG capsule  Commonly known as: CeleBREX     hydrALAZINE 25 MG tablet  Commonly known as: APRESOLINE     HYDROcodone-acetaminophen 5-325 mg per tablet  Commonly known as: NORCO     insulin lispro 100 unit/mL injection     LIDOcaine 5 % Oint ointment  Commonly known as: XYLOCAINE     metoclopramide HCl 5 mg/5 mL Soln  Commonly known as: REGLAN                Pending Diagnostic Studies:     None        Indwelling Lines/Drains at time of discharge:   Lines/Drains/Airways     Drain                 Gastrostomy/Enterostomy Percutaneous endoscopic gastrostomy (PEG) LUQ -- days    Female External Urinary Catheter 01/26/22 1601 13 days                Time spent on the discharge of patient: 35 minutes         Kaylyn Ibarra MD  Department of Hospital Medicine  [facility]

## 2022-02-09 NOTE — PLAN OF CARE
I called pts daughter Ira at [phone] and asked her to call Zaira at [facility] for some documentation and she stated that she really doesn't want her to go back there. I explained that earlier this morning I made it clear that the patient was returning there and she expressed an understanding. I updated her that Mayi with [facility] had denied the pt for admit and she stated that she needed to call her and ask why. I updated her that the denial reason is irrelevant at this point and if she did not want to cooperate with long term medicaid application then was she taking the pt home with her? She asked who said I am not cooperating?  I updated her that is the documentation that Amada is waiting for. She stated that she is not calling Amada but she will have her daughter who is an RN call me back because she feels like she is not being listened to and updated timely. CM following.    02/09/22 9736   Post-Acute Status   Post-Acute Authorization Placement   Post-Acute Placement Status Pending post-acute provider review/more information requested

## 2022-02-10 LAB — POCT GLUCOSE: 98 MG/DL (ref 70–110)

## 2022-02-10 PROCEDURE — 11000001 HC ACUTE MED/SURG PRIVATE ROOM

## 2022-02-10 PROCEDURE — 94761 N-INVAS EAR/PLS OXIMETRY MLT: CPT

## 2022-02-10 PROCEDURE — 25000003 PHARM REV CODE 250: Performed by: INTERNAL MEDICINE

## 2022-02-10 PROCEDURE — 97165 OT EVAL LOW COMPLEX 30 MIN: CPT

## 2022-02-10 PROCEDURE — 63600175 PHARM REV CODE 636 W HCPCS: Performed by: INTERNAL MEDICINE

## 2022-02-10 PROCEDURE — 27000207 HC ISOLATION

## 2022-02-10 PROCEDURE — 27000221 HC OXYGEN, UP TO 24 HOURS

## 2022-02-10 RX ADMIN — GLYCOPYRROLATE 1 MG: 1 TABLET ORAL at 10:02

## 2022-02-10 RX ADMIN — AZELASTINE 137 MCG: 1 SPRAY, METERED NASAL at 10:02

## 2022-02-10 RX ADMIN — MIDODRINE HYDROCHLORIDE 5 MG: 5 TABLET ORAL at 10:02

## 2022-02-10 RX ADMIN — MIDODRINE HYDROCHLORIDE 5 MG: 5 TABLET ORAL at 06:02

## 2022-02-10 RX ADMIN — FAMOTIDINE 20 MG: 10 INJECTION INTRAVENOUS at 10:02

## 2022-02-10 RX ADMIN — AZELASTINE 137 MCG: 1 SPRAY, METERED NASAL at 09:02

## 2022-02-10 RX ADMIN — ATORVASTATIN CALCIUM 40 MG: 40 TABLET, FILM COATED ORAL at 10:02

## 2022-02-10 RX ADMIN — LEVOTHYROXINE SODIUM 25 MCG: 0.03 TABLET ORAL at 07:02

## 2022-02-10 RX ADMIN — METOPROLOL TARTRATE 12.5 MG: 25 TABLET, FILM COATED ORAL at 10:02

## 2022-02-10 RX ADMIN — GLYCOPYRROLATE 1 MG: 1 TABLET ORAL at 06:02

## 2022-02-10 RX ADMIN — ENOXAPARIN SODIUM 40 MG: 100 INJECTION SUBCUTANEOUS at 06:02

## 2022-02-10 RX ADMIN — CITALOPRAM HYDROBROMIDE 40 MG: 10 TABLET ORAL at 10:02

## 2022-02-10 RX ADMIN — FAMOTIDINE 20 MG: 10 INJECTION INTRAVENOUS at 09:02

## 2022-02-10 NOTE — PLAN OF CARE
Intervention: enteral and nutrition therapy     Recommendations  1) Continue TF Isosource 1.5 @ 45 mL/hr as pt tolerates + Brody BID and Beneprotein BID + 115 ml flush q 4 hr  --provides 1620 kcal, 73 g protein ( + beneprotein), 820 ml free water  (100% EEN, 100% EPN )     2) weigh weekly     Goals: 1) Initation of enteral nutrition by RD follow up 2) Nutrition support meeting > 50% of needs at f/u  Nutrition Goal Status: met/meeting-continues  Communication of RD Recs: POC, sticky note

## 2022-02-10 NOTE — PT/OT/SLP EVAL
Occupational Therapy   Evaluation and Discharge Note    Name: Sandra Wilson  MRN: [id number]  Admitting Diagnosis:  Acute hypoxemic respiratory failure   Recent Surgery: Procedure(s) (LRB):  CREATION, TRACHEOSTOMY (N/A) 15 Days Post-Op    Recommendations:     Discharge Recommendations: nursing facility, basic  Discharge Equipment Recommendations:     Barriers to discharge:       Assessment:     Sandra Wilson is a 81 y.o. female with a medical diagnosis of Acute hypoxemic respiratory failure. At this time, patient requires total assist with ADL's. Pt did not verbalize during OT Eval. Pt demonstrates spontaneous movement BUE's. Contractures noted BUE's.    Plan:     During this hospitalization, patient does not require further acute OT services.  Please re-consult if situation changes.    · Plan of Care Reviewed with: patient    Subjective     Chief Complaint: No verbalization  Patient/Family Comments/goals: No verbalization    Occupational Profile:  Living Environment: [place]  Previous level of function: Total assist with ADL's  Equipment Used at home:     Assistance upon Discharge: Facility staff    Pain/Comfort:  · Pain Rating 1: 0/10  · Pain Rating Post-Intervention 1: 0/10    Patients cultural, spiritual, Christian conflicts given the current situation:      Objective:     Communicated with: Nurse Lerma prior to session.  Patient found supine with telemetry,peripheral IV,PEG Tube,oxygen upon OT entry to room.    General Precautions: Standard, fall,contact   Orthopedic Precautions:N/A   Braces: N/A  Respiratory Status: Nasal cannula, flow 1 L/min     Occupational Performance:      Activities of Daily Living:  · Feeding:  PEG; NPO    · Grooming: total assistance    · Bathing: total assistance    · Upper Body Dressing: total assistance    · Lower Body Dressing: total assistance    · Toileting: total assistance      Cognitive/Visual Perceptual:  Pt alert; Spontaneous movement noted BUE's.    Physical  Exam:  Upper Extremity Strength:    -       Right Upper Extremity: Spontaneous movement noted RUE; Pt brought hand to face to scratch; R shoulder flexion PROM approximatley 40 degrees. R elbow PROM WFL's; Flexion contractures noted R hand  -       Left Upper Extremity: Spontaneous movement noted LUE; L shoulder flexion PROM approximately 40 degrees; L elbow PROM WFL's; Flexion contractures noted L hand   Splinting not indicated at this time.    AMPA 6 Click ADL:  AMPAC Total Score: 6    Treatment & Education:  OT provided education in role of OT. Pt did not respond verbally.   Education:    Patient left supine with all lines intact, call button in reach, bed alarm on and Nurse Maria Guadalupe notified    GOALS:   Multidisciplinary Problems     Occupational Therapy Goals     Not on file                History:     Past Medical History:   Diagnosis Date    Carotid stenosis     GERD (gastroesophageal reflux disease)     Hyperlipidemia     Hypertension     Osteoarthritis     Stroke        Past Surgical History:   Procedure Laterality Date    CARDIAC CATHETERIZATION      Carotid Endarderectomy      COLONOSCOPY  2010    repeat every 5    DEXA  2010    WNL    ESOPHAGOGASTRODUODENOSCOPY  6/9/2009 [facility]    Normal EGD.    ESOPHAGOGASTRODUODENOSCOPY  4/9/2012    NERD? Slight gastric mucosal atrophy.  Otherwise normal stomach and duodenum.  CLOtest negative.    TONSILLECTOMY         Time Tracking:     OT Date of Treatment: 02/10/22  OT Start Time: 1359  OT Stop Time: 1415  OT Total Time (min): 16 min    Billable Minutes:Evaluation 16    2/10/2022

## 2022-02-10 NOTE — PROGRESS NOTES
[facility]  Adult Nutrition  Progress Note    SUMMARY     Intervention: enteral and nutrition therapy     Recommendations  1) Continue TF Isosource 1.5 @ 45 mL/hr as pt tolerates + Brody BID and Beneprotein BID + 115 ml flush q 4 hr  --provides 1620 kcal, 73 g protein ( + beneprotein), 820 ml free water  (100% EEN, 100% EPN )     2) weigh weekly     Goals: 1) Initation of enteral nutrition by RD follow up 2) Nutrition support meeting > 50% of needs at f/u  Nutrition Goal Status: met/meeting-continues  Communication of RD Recs: POC, sticky note     Assessment and Plan  Nutrition Problem  Inadequate energy intake     Related to (etiology):   NPO status, no TF running, dysphagia     Signs and Symptoms (as evidenced by):   Pt receiving < 50% EEN/EPN x 6 days     Interventions(treatment strategy):  above     Nutrition Diagnosis Status:   continues     Malnutrition  Edema: 1-2+  Gabriel: 8 + PEG, new coccyx ulcer  NFPE 1/24/21 no significant wasting seen  PO intakes < 50% of needs x > 5-6 days  No significant wt loss per chart review     Reason for Assessment  Reason For Assessment: follow up  Diagnosis: other (see comments) (Acute hypoxemic respiratory failure)  Relevant Medical History: GERD, HLD, HTN, CVAs, CNS aneurysm, PEG, CAD, hypothyroidism, DM, colon, breast and ovarian cancer.  Interdisciplinary Rounds: did not attend     General Information Comments: Remote assessment for coverage. Pt intubated and sedated, requiring levophed, on propofol, MAP 75, plan to try for extubation today per note. With PEG, noted pt has hx of removing PEG tube. C-diff positive, also noted with STEFFANY - renal labs improving, K, phos low, repleting. Per chart, with 9.5% wt loss over the last 2 months, significant. NFPE needed to determine malnutrition status. RD to monitor and follow up.  Nutrition Discharge Planning: Pending clinical course  1/24/22 TF recs left 1/21, TF started @ 10 ml/hr yesterday, continues at same  "rate. No BM. + vent-possible plan for trach placement. NFPE done 1/24/21 no significant wasting seen.  1/26/22 + PEG. TF held for possible trach placemen however now pt extubated. Plan to continue with TFs.  1/28/21 TF held 1/26-today r/t tenuous respiratory status. TPN started yesterday per MD ( meeting 72% protein needs and 94% energy needs). Pt with O2 mask on today. Plan per MD to continue TPN and start trickle TF in the next 24-48 hr.  1/31/22 Pt was started on trickle TF's over the weekend. TPN reordered today as pt TF not advancing fast enough.  TF at 20 ml/hr, and tolerating today. Plan to advance toward goal per MD.  2/3/22 Pt appeared well nourished visually.  TF had been advanced to 45ml/hr and pt was tolerating.  Pt on oxymask at 4L/min.    2/10/22 Pt continues to tolerate TF @ goal via PEG + flush as ordered. Not responsive to questions at visit.     Discharge Planning:  TF above or cardiac, DM 1500 kcal diet     Nutrition/ Diet History  unable to obtain  Spiritual/cultural/Scientologist factors affecting PO intakes  Factors affecting PO intakes: NPO, trouble swallowing     Nutrition Risk Screen  Nutrition Risk Screen: no indicators present     Anthropometrics  Height: 5' 4" (162.6 cm)  Height (inches): 64 in  Weight Method: Bed Scale  Weight: 63.9 kg 2/6, 69.1 kg 1/31, 71.5 kg 1/22, 70.3 kg 1/24, 69 kg (admission)  Weight (lb): 140 lb ( edema)  Ideal Body Weight (IBW), Female: 120 lb  BMI (Calculated): 26.1 admission  81.6 kg 1/2020     Lab/Procedures/Meds  Pertinent Labs Reviewed: reviewed  BMP  Lab Results   Component Value Date     (L) 02/08/2022    K 4.1 02/08/2022    CL 97 02/08/2022    CO2 28 02/08/2022    BUN 15 02/08/2022    CREATININE 0.6 02/08/2022    CALCIUM 8.2 (L) 02/08/2022    ANIONGAP 8 02/08/2022    ESTGFRAFRICA >60 02/08/2022    EGFRNONAA >60 02/08/2022     POCT Glucose   Date Value Ref Range Status   02/10/2022 98 70 - 110 mg/dL Final   02/09/2022 117 (H) 70 - 110 mg/dL Final "   02/09/2022 107 70 - 110 mg/dL Final   02/09/2022 122 (H) 70 - 110 mg/dL Final   02/09/2022 93 70 - 110 mg/dL Final   02/08/2022 124 (H) 70 - 110 mg/dL Final   02/08/2022 115 (H) 70 - 110 mg/dL Final   02/08/2022 129 (H) 70 - 110 mg/dL Final     Pertinent Medications Reviewed: reviewed  Statin, NS 1-35 ml/hr, insulin     Estimated/Assessed Needs  Weight Used For Calorie Calculations: 68.9 kg (152 lb)  Energy Calorie Requirements (kcal): MSJ ( x 1.25-1.4) = 4348-0624 kcal  Energy Need Method: MSJ  Protein Requirements:  g/day based on 1.2-1.5 g protein /kg  Weight Used For Protein Calculations: 68.9 kg (152 lb)  Fluid Requirements (mL): 1 mL/kcal or per MD  Estimated Fluid Requirement Method: RDA Method  RDA Method (mL): 1450 ml  CHO Requirement: 160-195 g     Nutrition Prescription Ordered  Current Diet Order: TF above + NPO     Evaluation of Received Nutrient/Fluid Intake  Energy Calories Required:  meeting needs  Protein Required: meeting needs  Fluid Required: meeting needs  Tolerance: tolerating  % Intake of Estimated Energy Needs: 100%  % Meal Intake: NPO + TF     Nutrition Risk  Level of Risk/Frequency of Follow-up: moderate (2x weekly)      Monitor and Evaluation  Food and Nutrient Intake: enteral nutrition intake  Food and Nutrient Adminstration: enteral and parenteral nutrition administration  Knowledge/Beliefs/Attitudes: food and nutrition knowledge/skill  Physical Activity and Function: nutrition-related ADLs and IADLs  Anthropometric Measurements: weight change  Biochemical Data, Medical Tests and Procedures: electrolyte and renal panel,gastrointestinal profile,glucose/endocrine profile  Nutrition-Focused Physical Findings: overall appearance,extremities, muscles and bones,skin      Nutrition Follow-Up  RD Follow-up?: Yes

## 2022-02-10 NOTE — PLAN OF CARE
Problem: Infection  Goal: Absence of Infection Signs and Symptoms  Outcome: Ongoing, Progressing     Problem: Fluid Imbalance (Pneumonia)  Goal: Fluid Balance  Outcome: Ongoing, Progressing
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
## 2022-02-11 LAB
ANION GAP SERPL CALC-SCNC: 7 MMOL/L (ref 8–16)
BASOPHILS # BLD AUTO: 0.08 K/UL (ref 0–0.2)
BASOPHILS NFR BLD: 1.1 % (ref 0–1.9)
BUN SERPL-MCNC: 14 MG/DL (ref 8–23)
CALCIUM SERPL-MCNC: 8.4 MG/DL (ref 8.7–10.5)
CHLORIDE SERPL-SCNC: 98 MMOL/L (ref 95–110)
CO2 SERPL-SCNC: 30 MMOL/L (ref 23–29)
CREAT SERPL-MCNC: 0.6 MG/DL (ref 0.5–1.4)
DIFFERENTIAL METHOD: ABNORMAL
EOSINOPHIL # BLD AUTO: 0.5 K/UL (ref 0–0.5)
EOSINOPHIL NFR BLD: 6.1 % (ref 0–8)
ERYTHROCYTE [DISTWIDTH] IN BLOOD BY AUTOMATED COUNT: 14.9 % (ref 11.5–14.5)
EST. GFR  (AFRICAN AMERICAN): >60 ML/MIN/1.73 M^2
EST. GFR  (NON AFRICAN AMERICAN): >60 ML/MIN/1.73 M^2
GLUCOSE SERPL-MCNC: 110 MG/DL (ref 70–110)
HCT VFR BLD AUTO: 29.8 % (ref 37–48.5)
HGB BLD-MCNC: 9.5 G/DL (ref 12–16)
IMM GRANULOCYTES # BLD AUTO: 0.03 K/UL (ref 0–0.04)
IMM GRANULOCYTES NFR BLD AUTO: 0.4 % (ref 0–0.5)
LYMPHOCYTES # BLD AUTO: 1.7 K/UL (ref 1–4.8)
LYMPHOCYTES NFR BLD: 22.2 % (ref 18–48)
MCH RBC QN AUTO: 32.4 PG (ref 27–31)
MCHC RBC AUTO-ENTMCNC: 31.9 G/DL (ref 32–36)
MCV RBC AUTO: 102 FL (ref 82–98)
MONOCYTES # BLD AUTO: 0.9 K/UL (ref 0.3–1)
MONOCYTES NFR BLD: 11.3 % (ref 4–15)
NEUTROPHILS # BLD AUTO: 4.4 K/UL (ref 1.8–7.7)
NEUTROPHILS NFR BLD: 58.9 % (ref 38–73)
NRBC BLD-RTO: 0 /100 WBC
PLATELET # BLD AUTO: 249 K/UL (ref 150–450)
PMV BLD AUTO: 11.4 FL (ref 9.2–12.9)
POTASSIUM SERPL-SCNC: 4.2 MMOL/L (ref 3.5–5.1)
RBC # BLD AUTO: 2.93 M/UL (ref 4–5.4)
SODIUM SERPL-SCNC: 135 MMOL/L (ref 136–145)
WBC # BLD AUTO: 7.49 K/UL (ref 3.9–12.7)

## 2022-02-11 PROCEDURE — 11000001 HC ACUTE MED/SURG PRIVATE ROOM

## 2022-02-11 PROCEDURE — 25000003 PHARM REV CODE 250: Performed by: INTERNAL MEDICINE

## 2022-02-11 PROCEDURE — 27000207 HC ISOLATION

## 2022-02-11 PROCEDURE — 63600175 PHARM REV CODE 636 W HCPCS: Performed by: INTERNAL MEDICINE

## 2022-02-11 PROCEDURE — 27000221 HC OXYGEN, UP TO 24 HOURS

## 2022-02-11 PROCEDURE — 36415 COLL VENOUS BLD VENIPUNCTURE: CPT | Performed by: INTERNAL MEDICINE

## 2022-02-11 PROCEDURE — 85025 COMPLETE CBC W/AUTO DIFF WBC: CPT | Performed by: INTERNAL MEDICINE

## 2022-02-11 PROCEDURE — 94761 N-INVAS EAR/PLS OXIMETRY MLT: CPT

## 2022-02-11 PROCEDURE — 80048 BASIC METABOLIC PNL TOTAL CA: CPT | Performed by: INTERNAL MEDICINE

## 2022-02-11 RX ADMIN — GLYCOPYRROLATE 1 MG: 1 TABLET ORAL at 06:02

## 2022-02-11 RX ADMIN — AZELASTINE 137 MCG: 1 SPRAY, METERED NASAL at 08:02

## 2022-02-11 RX ADMIN — MIDODRINE HYDROCHLORIDE 5 MG: 5 TABLET ORAL at 08:02

## 2022-02-11 RX ADMIN — METOPROLOL TARTRATE 12.5 MG: 25 TABLET, FILM COATED ORAL at 08:02

## 2022-02-11 RX ADMIN — MIDODRINE HYDROCHLORIDE 5 MG: 5 TABLET ORAL at 09:02

## 2022-02-11 RX ADMIN — GLYCOPYRROLATE 1 MG: 1 TABLET ORAL at 09:02

## 2022-02-11 RX ADMIN — GLYCOPYRROLATE 1 MG: 1 TABLET ORAL at 08:02

## 2022-02-11 RX ADMIN — ATORVASTATIN CALCIUM 40 MG: 40 TABLET, FILM COATED ORAL at 09:02

## 2022-02-11 RX ADMIN — ENOXAPARIN SODIUM 40 MG: 100 INJECTION SUBCUTANEOUS at 06:02

## 2022-02-11 RX ADMIN — AZELASTINE 137 MCG: 1 SPRAY, METERED NASAL at 09:02

## 2022-02-11 RX ADMIN — FAMOTIDINE 20 MG: 10 INJECTION INTRAVENOUS at 11:02

## 2022-02-11 RX ADMIN — LEVOTHYROXINE SODIUM 25 MCG: 0.03 TABLET ORAL at 05:02

## 2022-02-11 RX ADMIN — CITALOPRAM HYDROBROMIDE 40 MG: 10 TABLET ORAL at 08:02

## 2022-02-11 RX ADMIN — MIDODRINE HYDROCHLORIDE 5 MG: 5 TABLET ORAL at 06:02

## 2022-02-11 RX ADMIN — METOPROLOL TARTRATE 12.5 MG: 25 TABLET, FILM COATED ORAL at 09:02

## 2022-02-11 RX ADMIN — FAMOTIDINE 20 MG: 10 INJECTION INTRAVENOUS at 09:02

## 2022-02-11 NOTE — PLAN OF CARE
02/10/22 7418   Patient Assessment/Suction   Level of Consciousness (AVPU) alert   Respiratory Effort Normal;Unlabored   Expansion/Accessory Muscles/Retractions no retractions;no use of accessory muscles   Rhythm/Pattern, Respiratory depth regular;pattern regular;unlabored   Cough Frequency no cough   PRE-TX-O2   O2 Device (Oxygen Therapy) nasal cannula   $ Is the patient on Low Flow Oxygen? Yes   Flow (L/min) 1   SpO2 97 %   Pulse Oximetry Type Intermittent   $ Pulse Oximetry - Multiple Charge Pulse Oximetry - Multiple   Pulse 69   Resp 18

## 2022-02-11 NOTE — PLAN OF CARE
KARLO messaged Amada for update on paper work to be sent in by the family in particular the patients daughter Zaira Roth replied the daughter has not brought in all the necessary paperwork.    0932 CRIS called and spoke with Ira (daughter) at [phone], Ira stated she has to go to several [facility] to obtain more financial statements and also get POA notorized.  Ira stated she is trying to get as much as she can done while working as a teacher.  Ira stated it may take here through the weekend to get all necessary paperwork to Amada at [place].      Ira expressed concern as to Amada at [place] refusing to take the patient back because she is a resident there regardless of outstanding paperwork and she does not understand how Amada is able to do that.    CRIS will continue to follow.

## 2022-02-11 NOTE — ASSESSMENT & PLAN NOTE
Continue to monitor H&H every 3 days currently stable no active bleeding noted    Hemoglobin   Date Value Ref Range Status   02/07/2022 8.3 (L) 12.0 - 16.0 g/dL Final   02/06/2022 8.5 (L) 12.0 - 16.0 g/dL Final   02/05/2022 6.7 (L) 12.0 - 16.0 g/dL Final   02/04/2022 6.9 (L) 12.0 - 16.0 g/dL Final   ]

## 2022-02-11 NOTE — ASSESSMENT & PLAN NOTE
-Telemetry  - controlled on metoprolol 12.5 mg BID  Will hold anticoagulant due to high risk of bleeding

## 2022-02-11 NOTE — PROGRESS NOTES
[facility] Medicine  Progress Note    Patient Name: Sandra Wilson  MRN: [id number]  Patient Class: IP- Inpatient   Admission Date: 1/18/2022  Length of Stay: 23 days  Attending Physician: Kaylyn Ibarra MD  Primary Care Provider: Primary Doctor No        Subjective:     Principal Problem:Acute hypoxemic respiratory failure        HPI:  Sandra Wilson is an 81 year old female with a past medical history of CVAs, CNS aneurysm, PEG status, CAD, HLD, hypothryoidism, DM, aortic stenosis, and colon, breast, and ovarian cancer who presented from long term care with vomiting, diarrhea, and shortness of breath. Workup in the ED showed likely aspiration pneumonitis and C. Diff colitis. Her O2 requirement increased while in the ED requiring intubation with sedation. She also required Levophed as she likely developed septic shock. Antibiotics were broadened to cefepime, vancomycin, and Flagyl. Pulmonary was consulted. Family was notified. The patient was unable to provide history given acuity of illness.      Overview/Hospital Course:  Sandra Wilson is an 81 year old female with a past medical history of CVAs, CNS aneurysm, PEG status, CAD, HLD, hypothyroidism, DM, aortic stenosis, and colon, breast, and ovarian cancer who presented from long term care with vomiting, diarrhea, and shortness of breath secondary to acute hypoxic respiratory failure from aspiration pneumonia in the setting of C diff colitis leading to septic shock. She was intubated in the ED and started on Levophed infusion via RIJ CVC placed by Dr. Vignesh Gaspar. She was started on broad spectrum antibiotics with cefepime, Flagyl, vancomycin and admitted to the ICU. She was also started on enteral vancomycin for severe C diff colitis given elevated WBC count and STEFFANY. There was concern for developing ARDS given P/F ratio of 87 (severe); 190 1/22 (moderate). Pulmonary was consulted. She also presented with demand ischemia likely  secondary to septic shock. Troponin was trended and improved as shock resolved. Normal saline infusion was added for hyponatremia and STEFFANY which improved both. Levophed was able to be weaned. Family agreed to DNR status 1/18 and Palliative Care was consulted to discuss goal of care 1/19. Antibiotics were changed to doxycycline and Flagyl (GBS in sputum culture) 1/21; vancomycin was added 1/23 after culture also became positive for Staph aureus and Klebsiella. Her respiratory status has improved throughout her course and she was extubated (code changed to partial code for intubation) 1/21 only to be re-intubated for worsening stridor (history of tracheostomy). Upon re-intubation, copious amounts of secretions were suctioned. KUB suggested a mild ileus A bowel regimen was instituted and the patient was able to have bowel movements 1/22. Tube feeds were started 1/23. Her course was complicated by Afib as well noted on telemetry 1/21; metoprolol tartrate started 1/22.She failed a leak test 1/22; Surgery was consulted for tracheostomy which is pending conversation with family regarding goals of care.    Patient was set to go for trach on 01/26, but attempt was made for another trial extubation and the patient did well after extubation, she was transition to face mask and was able to maintain her saturations although had significant difficulty with secretions.  She was started on Robinul and scopolamine patch which did seem to improve for secretions.  Her respiratory status continued to be somewhat tenuous, so she was monitored in the ICU.  Family expressed desire to not have her get a tracheostomy.  Case management began working on LTACH referral.      No chest pain, shortness of breath, lightheadedness. Tolerating tube feeds    NAD, Alert  NC, AT  RRR  CTAB  SNTND+BS. PEG present  Edema of hips present  PERRL    Vitals, labs and radiographs from past 24h reviewed and personally interpreted.           HPI:     Sandra  Katie is an 81 year old female with a past medical history of CVAs, CNS aneurysm, PEG status, CAD, HLD, hypothryoidism, DM, aortic stenosis, and colon, breast, and ovarian cancer who presented from long term care with vomiting, diarrhea, and shortness of breath. Workup in the ED showed likely aspiration pneumonitis and C. Diff colitis. Her O2 requirement increased while in the ED requiring intubation with sedation. She also required Levophed as she likely developed septic shock. Antibiotics were broadened to cefepime, vancomycin, and Flagyl. Pulmonary was consulted. Family was notified. The patient was unable to provide history given acuity of illness.      Procedure(s) (LRB):  CREATION, TRACHEOSTOMY (N/A)      Hospital Course:     The patient was admitted with C diff colitis and was believed to be pneumonia.  She required an extended course of therapy with IV antibiotics for pneumonia as ultimately sputum culture she has now completed 14 days of vancomycin and is not requiring supplemental oxygen at rest.    C diff has been fully treated in house.  As she has only been receiving systemic vancomycin and Flagyl with past 10 days without exposure to other antibiotics, she would not need extended treatment beyond that which is occurred.    At the time of discharge she is alert and interactive.  She is tolerating tube feeds.    CM/SW is pursuing discharge facility     Extended course:     Sandra Wilson is an 81 year old female with a past medical history of CVAs, CNS aneurysm, PEG status, CAD, HLD, hypothyroidism, DM, aortic stenosis, and colon, breast, and ovarian cancer who presented from long term care with vomiting, diarrhea, and shortness of breath secondary to acute hypoxic respiratory failure from aspiration pneumonia in the setting of C diff colitis leading to septic shock. She was intubated in the ED and started on Levophed infusion via RIJ CVC placed by Dr. Vignesh Gaspar. She was started on broad spectrum  antibiotics with cefepime, Flagyl, vancomycin and admitted to the ICU. She was also started on enteral vancomycin for severe C diff colitis given elevated WBC count and STEFFANY. There was concern for developing ARDS given P/F ratio of 87 (severe); 190 1/22 (moderate). Pulmonary was consulted. She also presented with demand ischemia likely secondary to septic shock. Troponin was trended and improved as shock resolved. Normal saline infusion was added for hyponatremia and STEFFANY which improved both. Levophed was able to be weaned. Family agreed to DNR status 1/18 and Palliative Care was consulted to discuss goal of care 1/19. Antibiotics were changed to doxycycline and Flagyl (GBS in sputum culture) 1/21; vancomycin was added 1/23 after culture also became positive for Staph aureus and Klebsiella. Her respiratory status has improved throughout her course and she was extubated (code changed to partial code for intubation) 1/21 only to be re-intubated for worsening stridor (history of tracheostomy). Upon re-intubation, copious amounts of secretions were suctioned. KUB suggested a mild ileus A bowel regimen was instituted and the patient was able to have bowel movements 1/22. Tube feeds were started 1/23. Her course was complicated by Afib as well noted on telemetry 1/21; metoprolol tartrate started 1/22.She failed a leak test 1/22; Surgery was consulted for tracheostomy which is pending conversation with family regarding goals of care.     Patient was set to go for trach on 01/26, but attempt was made for another trial extubation and the patient did well after extubation, she was transition to face mask and was able to maintain her saturations although had significant difficulty with secretions.  She was started on Robinul and scopolamine patch which did seem to improve for secretions.  Her respiratory status continued to be somewhat tenuous, so she was monitored in the ICU.  Family expressed desire to not have her get a  tracheostomy.  Case management began working on LTACH referral.       Paroxysmal atrial fibrillation  -Telemetry  - controlled on metoprolol 12.5 mg BID   Has a history of IC hemorrhage. Will not initiate full anti-coagulation. Defer to PCP.        Gastrostomy tube dependent  Stable.  No issues with it     Aspiration pneumonia 2/2 MRSA, acute bacterial pneumonia 2/2 MRSA, Acute hypoxemic respiratory failure 2/2 acute bacterial pneumonia 2/2 MRSA          Type 2 diabetes mellitus, with long-term current use of insulin  -ISS           YARI (generalized anxiety disorder)       Hypothyroidism  Patient has chronic hypothyroidism. TFTs reviewed-         Lab Results   Component Value Date     TSH 1.130 07/13/2017   . Will continue chronic levothyroxine            Hyperlipidemia   Patient is chronically on statin.will continue for now. Monitor clinically. Last LDL was         Lab Results   Component Value Date     LDLCALC 94.4 07/13/2017            GERD (gastroesophageal reflux disease)  On famotidine.
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
## 2022-02-11 NOTE — PLAN OF CARE
CM called Ira (daughter) at [phone] to update her on the patients pending discharge, no answer, CM left voicemail with call back information.    CM will continue to follow.      1426 CM called Ira (daughter at [phone] no answer, CM left a voicemail informing her that the patient has orders to discharge today and that if she disagrees with the decision to discharge she needs to call [facility] at [phone] to appeal the discharge.    CM will continue to follow.

## 2022-02-11 NOTE — PROGRESS NOTES
[facility] Hospital Medicine  Progress Note    Patient Name: Sandra Wilson  MRN: [id number]  Patient Class: IP- Inpatient   Admission Date: 1/18/2022  Length of Stay: 24 days  Attending Physician: Neda Porter MD  Primary Care Provider: Primary Doctor No        Subjective:     Principal Problem:Acute hypoxemic respiratory failure        HPI:  Sandra Wilson is an 81 year old female with a past medical history of CVAs, CNS aneurysm, PEG status, CAD, HLD, hypothryoidism, DM, aortic stenosis, and colon, breast, and ovarian cancer who presented from long term care with vomiting, diarrhea, and shortness of breath. Workup in the ED showed likely aspiration pneumonitis and C. Diff colitis. Her O2 requirement increased while in the ED requiring intubation with sedation. She also required Levophed as she likely developed septic shock. Antibiotics were broadened to cefepime, vancomycin, and Flagyl. Pulmonary was consulted. Family was notified. The patient was unable to provide history given acuity of illness.      Overview/Hospital Course:  Sandra Wilson is an 81 year old female with a past medical history of CVAs, CNS aneurysm, PEG status, CAD, HLD, hypothyroidism, DM, aortic stenosis, and colon, breast, and ovarian cancer who presented from long term care with vomiting, diarrhea, and shortness of breath secondary to acute hypoxic respiratory failure from aspiration pneumonia in the setting of C diff colitis leading to septic shock. She was intubated in the ED and started on Levophed infusion via RIJ CVC placed by Dr. Vignesh Gaspar. She was started on broad spectrum antibiotics with cefepime, Flagyl, vancomycin and admitted to the ICU. She was also started on enteral vancomycin for severe C diff colitis given elevated WBC count and STEFFANY. There was concern for developing ARDS given P/F ratio of 87 (severe); 190 1/22 (moderate). Pulmonary was consulted. She also presented with demand ischemia likely  secondary to septic shock. Troponin was trended and improved as shock resolved. Normal saline infusion was added for hyponatremia and STEFFANY which improved both. Levophed was able to be weaned. Family agreed to DNR status 1/18 and Palliative Care was consulted to discuss goal of care 1/19. Antibiotics were changed to doxycycline and Flagyl (GBS in sputum culture) 1/21; vancomycin was added 1/23 after culture also became positive for Staph aureus and Klebsiella. Her respiratory status has improved throughout her course and she was extubated (code changed to partial code for intubation) 1/21 only to be re-intubated for worsening stridor (history of tracheostomy). Upon re-intubation, copious amounts of secretions were suctioned. KUB suggested a mild ileus A bowel regimen was instituted and the patient was able to have bowel movements 1/22. Tube feeds were started 1/23. Her course was complicated by Afib as well noted on telemetry 1/21; metoprolol tartrate started 1/22.She failed a leak test 1/22; Surgery was consulted for tracheostomy which is pending conversation with family regarding goals of care.    Patient was set to go for trach on 01/26, but attempt was made for another trial extubation and the patient did well after extubation, she was transition to face mask and was able to maintain her saturations although had significant difficulty with secretions.  She was started on Robinul and scopolamine patch which did seem to improve for secretions.  Her respiratory status continued to be somewhat tenuous, so she was monitored in the ICU.  Family expressed desire to not have her get a tracheostomy.  Case management began working on LTACH referral.      Interval History:  No acute events overnight.  Vital stable    Review of Systems   Unable to perform ROS: Patient nonverbal     Objective:     Vital Signs (Most Recent):  Temp: 97.7 °F (36.5 °C) (02/11/22 1104)  Pulse: 68 (02/11/22 1104)  Resp: 16 (02/11/22 1104)  BP: (!)  119/56 (02/11/22 1104)  SpO2: 96 % (02/11/22 1104) Vital Signs (24h Range):  Temp:  [96.7 °F (35.9 °C)-99.2 °F (37.3 °C)] 97.7 °F (36.5 °C)  Pulse:  [68-77] 68  Resp:  [16-18] 16  SpO2:  [95 %-97 %] 96 %  BP: (102-135)/(55-83) 119/56     Weight: 63.9 kg (140 lb 14 oz)  Body mass index is 24.18 kg/m².  No intake or output data in the 24 hours ending 02/11/22 1346   Physical Exam  Constitutional:       General: She is not in acute distress.     Appearance: She is not ill-appearing.   Eyes:      General:         Right eye: No discharge.         Left eye: No discharge.   Neck:      Vascular: No JVD.   Cardiovascular:      Rate and Rhythm: Normal rate and regular rhythm.   Pulmonary:      Effort: Pulmonary effort is normal.      Breath sounds: Normal breath sounds.   Abdominal:      General: Abdomen is flat. Bowel sounds are normal. There is no distension.      Palpations: Abdomen is soft.      Tenderness: There is no abdominal tenderness.   Musculoskeletal:      Right lower leg: No edema.      Left lower leg: No edema.   Skin:     General: Skin is warm and moist.      Findings: No rash.   Neurological:      Mental Status: She is alert.      Comments: She focuses her attention on me but doesn't say anything   Psychiatric:         Mood and Affect: Mood and affect normal.         Significant Labs: All pertinent labs within the past 24 hours have been reviewed.  CBC: No results for input(s): WBC, HGB, HCT, PLT in the last 48 hours.  CMP: No results for input(s): NA, K, CL, CO2, GLU, BUN, CREATININE, CALCIUM, PROT, ALBUMIN, BILITOT, ALKPHOS, AST, ALT, ANIONGAP, EGFRNONAA in the last 48 hours.    Invalid input(s): ESTGF[place]    Significant Imaging: I have reviewed all pertinent imaging results/findings within the past 24 hours.      Assessment/Plan:      * Acute hypoxemic respiratory failure  Improving.  Continue supplementing oxygen.  Monitor sats.  Monitor work of breathing.  She's partial code, specifying that she'd want  to be intubated if need be.    Anemia  Continue to monitor H&H every 3 days currently stable no active bleeding noted    Hemoglobin   Date Value Ref Range Status   02/07/2022 8.3 (L) 12.0 - 16.0 g/dL Final   02/06/2022 8.5 (L) 12.0 - 16.0 g/dL Final   02/05/2022 6.7 (L) 12.0 - 16.0 g/dL Final   02/04/2022 6.9 (L) 12.0 - 16.0 g/dL Final   ]      Paroxysmal atrial fibrillation  -Telemetry  - controlled on metoprolol 12.5 mg BID  Will hold anticoagulant due to high risk of bleeding      Gastrostomy tube dependent  Stable.  No issues with it    Aspiration pneumonia  Continue antibiotics as they are but will stop them soon.  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  She's producing a lot of airway secretions and needs frequent suctioning.  She's high risk of aspiration pneumonia.  Will try to get her into LTACH for long-term care.    Antibiotics (From admission, onward)            None         Will monitor patient closely and continue current treatment plan unchanged.        Type 2 diabetes mellitus, with long-term current use of insulin  Patient's FSGs are controlled on current medication regimen.  Most recent fingerstick glucose reviewed-   Recent Labs   Lab 02/03/22  2318 02/04/22  0508 02/04/22  1207 02/04/22  1801   POCTGLUCOSE 103 117* 91 135*     Current correctional scale  Medium  Maintain anti-hyperglycemic dose as follows-   Antihyperglycemics (From admission, onward)            Start     Stop Route Frequency Ordered    01/18/22 1438  insulin aspart U-100 pen 1-10 Units         -- SubQ Before meals & nightly PRN 01/18/22 1438        Hold Oral hypoglycemics while patient is in the hospital.        YARI (generalized anxiety disorder)  Extubated on 01/26.  Using Precedex as needed to control anxiety.  Patient does not appear anxious on exam.      Hypothyroidism  Patient has chronic hypothyroidism. TFTs reviewed-   Lab Results   Component Value Date    TSH 1.130 07/13/2017   . Will continue chronic  levothyroxine and adjust for and clinical changes.        Hyperlipidemia   Patient is chronically on statin.will continue for now. Monitor clinically. Last LDL was   Lab Results   Component Value Date    LDLCALC 94.4 07/13/2017          GERD (gastroesophageal reflux disease)  On famotidine.      VTE Risk Mitigation (From admission, onward)         Ordered     enoxaparin injection 40 mg  Daily         01/18/22 1438     IP VTE HIGH RISK PATIENT  Once         01/18/22 1438     Place sequential compression device  Until discontinued         01/18/22 1438                Discharge Planning   DEMETRIUS: 2/11/2022     Code Status: Partial Code   Is the patient medically ready for discharge?:     Reason for patient still in hospital (select all that apply): Patient trending condition and Treatment  Discharge Plan A: Return to nursing home                  Neda Porter MD  Department of Hospital Medicine   [facility]

## 2022-02-11 NOTE — ASSESSMENT & PLAN NOTE
Continue antibiotics as they are but will stop them soon.  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  She's producing a lot of airway secretions and needs frequent suctioning.  She's high risk of aspiration pneumonia.  Will try to get her into LTACH for long-term care.    Antibiotics (From admission, onward)            None         Will monitor patient closely and continue current treatment plan unchanged.

## 2022-02-11 NOTE — SUBJECTIVE & OBJECTIVE
Interval History:  No acute events overnight.  Vital stable    Review of Systems   Unable to perform ROS: Patient nonverbal     Objective:     Vital Signs (Most Recent):  Temp: 97.7 °F (36.5 °C) (02/11/22 1104)  Pulse: 68 (02/11/22 1104)  Resp: 16 (02/11/22 1104)  BP: (!) 119/56 (02/11/22 1104)  SpO2: 96 % (02/11/22 1104) Vital Signs (24h Range):  Temp:  [96.7 °F (35.9 °C)-99.2 °F (37.3 °C)] 97.7 °F (36.5 °C)  Pulse:  [68-77] 68  Resp:  [16-18] 16  SpO2:  [95 %-97 %] 96 %  BP: (102-135)/(55-83) 119/56     Weight: 63.9 kg (140 lb 14 oz)  Body mass index is 24.18 kg/m².  No intake or output data in the 24 hours ending 02/11/22 1346   Physical Exam  Constitutional:       General: She is not in acute distress.     Appearance: She is not ill-appearing.   Eyes:      General:         Right eye: No discharge.         Left eye: No discharge.   Neck:      Vascular: No JVD.   Cardiovascular:      Rate and Rhythm: Normal rate and regular rhythm.   Pulmonary:      Effort: Pulmonary effort is normal.      Breath sounds: Normal breath sounds.   Abdominal:      General: Abdomen is flat. Bowel sounds are normal. There is no distension.      Palpations: Abdomen is soft.      Tenderness: There is no abdominal tenderness.   Musculoskeletal:      Right lower leg: No edema.      Left lower leg: No edema.   Skin:     General: Skin is warm and moist.      Findings: No rash.   Neurological:      Mental Status: She is alert.      Comments: She focuses her attention on me but doesn't say anything   Psychiatric:         Mood and Affect: Mood and affect normal.         Significant Labs: All pertinent labs within the past 24 hours have been reviewed.  CBC: No results for input(s): WBC, HGB, HCT, PLT in the last 48 hours.  CMP: No results for input(s): NA, K, CL, CO2, GLU, BUN, CREATININE, CALCIUM, PROT, ALBUMIN, BILITOT, ALKPHOS, AST, ALT, ANIONGAP, EGFRNONAA in the last 48 hours.    Invalid input(s): ESTGF[place]    Significant Imaging: I have  reviewed all pertinent imaging results/findings within the past 24 hours.

## 2022-02-12 PROCEDURE — 94761 N-INVAS EAR/PLS OXIMETRY MLT: CPT

## 2022-02-12 PROCEDURE — 11000001 HC ACUTE MED/SURG PRIVATE ROOM

## 2022-02-12 PROCEDURE — 25000003 PHARM REV CODE 250: Performed by: INTERNAL MEDICINE

## 2022-02-12 PROCEDURE — 27000221 HC OXYGEN, UP TO 24 HOURS

## 2022-02-12 PROCEDURE — 63600175 PHARM REV CODE 636 W HCPCS: Performed by: INTERNAL MEDICINE

## 2022-02-12 PROCEDURE — 27000207 HC ISOLATION

## 2022-02-12 RX ADMIN — MIDODRINE HYDROCHLORIDE 5 MG: 5 TABLET ORAL at 03:02

## 2022-02-12 RX ADMIN — GLYCOPYRROLATE 1 MG: 1 TABLET ORAL at 10:02

## 2022-02-12 RX ADMIN — METOPROLOL TARTRATE 12.5 MG: 25 TABLET, FILM COATED ORAL at 09:02

## 2022-02-12 RX ADMIN — FAMOTIDINE 20 MG: 10 INJECTION INTRAVENOUS at 09:02

## 2022-02-12 RX ADMIN — GLYCOPYRROLATE 1 MG: 1 TABLET ORAL at 03:02

## 2022-02-12 RX ADMIN — FAMOTIDINE 20 MG: 10 INJECTION INTRAVENOUS at 10:02

## 2022-02-12 RX ADMIN — AZELASTINE 137 MCG: 1 SPRAY, METERED NASAL at 09:02

## 2022-02-12 RX ADMIN — MIDODRINE HYDROCHLORIDE 5 MG: 5 TABLET ORAL at 10:02

## 2022-02-12 RX ADMIN — MIDODRINE HYDROCHLORIDE 5 MG: 5 TABLET ORAL at 09:02

## 2022-02-12 RX ADMIN — ENOXAPARIN SODIUM 40 MG: 100 INJECTION SUBCUTANEOUS at 05:02

## 2022-02-12 RX ADMIN — GLYCOPYRROLATE 1 MG: 1 TABLET ORAL at 09:02

## 2022-02-12 RX ADMIN — AZELASTINE 137 MCG: 1 SPRAY, METERED NASAL at 10:02

## 2022-02-12 RX ADMIN — ATORVASTATIN CALCIUM 40 MG: 40 TABLET, FILM COATED ORAL at 09:02

## 2022-02-12 RX ADMIN — CITALOPRAM HYDROBROMIDE 40 MG: 10 TABLET ORAL at 10:02

## 2022-02-12 NOTE — PLAN OF CARE
02/11/22 1902   Patient Assessment/Suction   Level of Consciousness (AVPU) responds to voice   Respiratory Effort Normal;Unlabored   Expansion/Accessory Muscles/Retractions no retractions;no use of accessory muscles   Rhythm/Pattern, Respiratory depth regular;pattern regular;unlabored   PRE-TX-O2   O2 Device (Oxygen Therapy) nasal cannula   $ Is the patient on Low Flow Oxygen? Yes   Flow (L/min) 1   SpO2 96 %   Pulse Oximetry Type Intermittent   $ Pulse Oximetry - Multiple Charge Pulse Oximetry - Multiple   Pulse 74   Resp 16

## 2022-02-12 NOTE — CARE UPDATE
02/12/22 0811   Patient Assessment/Suction   Level of Consciousness (AVPU) alert   PRE-TX-O2   O2 Device (Oxygen Therapy) nasal cannula   $ Is the patient on Low Flow Oxygen? Yes   Flow (L/min) 1   SpO2 97 %   Pulse Oximetry Type Intermittent   $ Pulse Oximetry - Multiple Charge Pulse Oximetry - Multiple

## 2022-02-12 NOTE — PLAN OF CARE
Patient resting in bed, appears asleep, eyes closed, respirations even and unlabored. NAD. No signs of pain or SOB. VSS. Afebrile. Cardiac monitoring maintained. Normal SR. Bedfast. Medications administered per MD/NP order. Incontinence care performed. Tube feeding going at 45 mL/hr. Bed alarm active. Side Rails up X2. Plan of care reviewed with patient. Verbalizes understanding. Frequent checks performed Q2H. Call light in reach. Pt free from fall or injury. Will monitor.

## 2022-02-12 NOTE — PLAN OF CARE
"CM spoke with Zaira at [facility]. Per Zaira, " Pt is unable to return due to pt outstanding debt." Cm will continue to follow-up.  "

## 2022-02-12 NOTE — PROGRESS NOTES
[facility] Hospital Medicine  Progress Note    Patient Name: Sandra Wilson  MRN: [id number]  Patient Class: IP- Inpatient   Admission Date: 1/18/2022  Length of Stay: 25 days  Attending Physician: Neda Porter MD  Primary Care Provider: Primary Doctor No        Subjective:     Principal Problem:Acute hypoxemic respiratory failure        HPI:  Sandra Wilson is an 81 year old female with a past medical history of CVAs, CNS aneurysm, PEG status, CAD, HLD, hypothryoidism, DM, aortic stenosis, and colon, breast, and ovarian cancer who presented from long term care with vomiting, diarrhea, and shortness of breath. Workup in the ED showed likely aspiration pneumonitis and C. Diff colitis. Her O2 requirement increased while in the ED requiring intubation with sedation. She also required Levophed as she likely developed septic shock. Antibiotics were broadened to cefepime, vancomycin, and Flagyl. Pulmonary was consulted. Family was notified. The patient was unable to provide history given acuity of illness.      Overview/Hospital Course:  Sandra Wilson is an 81 year old female with a past medical history of CVAs, CNS aneurysm, PEG status, CAD, HLD, hypothyroidism, DM, aortic stenosis, and colon, breast, and ovarian cancer who presented from long term care with vomiting, diarrhea, and shortness of breath secondary to acute hypoxic respiratory failure from aspiration pneumonia in the setting of C diff colitis leading to septic shock. She was intubated in the ED and started on Levophed infusion via RIJ CVC placed by Dr. Vignesh Gaspar. She was started on broad spectrum antibiotics with cefepime, Flagyl, vancomycin and admitted to the ICU. She was also started on enteral vancomycin for severe C diff colitis given elevated WBC count and STEFFANY. There was concern for developing ARDS given P/F ratio of 87 (severe); 190 1/22 (moderate). Pulmonary was consulted. She also presented with demand ischemia likely  secondary to septic shock. Troponin was trended and improved as shock resolved. Normal saline infusion was added for hyponatremia and STEFFANY which improved both. Levophed was able to be weaned. Family agreed to DNR status 1/18 and Palliative Care was consulted to discuss goal of care 1/19. Antibiotics were changed to doxycycline and Flagyl (GBS in sputum culture) 1/21; vancomycin was added 1/23 after culture also became positive for Staph aureus and Klebsiella. Her respiratory status has improved throughout her course and she was extubated (code changed to partial code for intubation) 1/21 only to be re-intubated for worsening stridor (history of tracheostomy). Upon re-intubation, copious amounts of secretions were suctioned. KUB suggested a mild ileus A bowel regimen was instituted and the patient was able to have bowel movements 1/22. Tube feeds were started 1/23. Her course was complicated by Afib as well noted on telemetry 1/21; metoprolol tartrate started 1/22.She failed a leak test 1/22; Surgery was consulted for tracheostomy which is pending conversation with family regarding goals of care.    Patient was set to go for trach on 01/26, but attempt was made for another trial extubation and the patient did well after extubation, she was transition to face mask and was able to maintain her saturations although had significant difficulty with secretions.  She was started on Robinul and scopolamine patch which did seem to improve for secretions.  Her respiratory status continued to be somewhat tenuous, so she was monitored in the ICU.  Family expressed desire to not have her get a tracheostomy.  Case management began working on LTACH referral.      Interval History:  No acute events overnight.  Pending discharge to [facility]    Review of Systems   Unable to perform ROS: Patient nonverbal     Objective:     Vital Signs (Most Recent):  Temp: 97.2 °F (36.2 °C) (02/12/22 0803)  Pulse: 82 (02/12/22 0803)  Resp: 18 (02/12/22  0803)  BP: 135/63 (02/12/22 0803)  SpO2: 97 % (02/12/22 0811) Vital Signs (24h Range):  Temp:  [97.2 °F (36.2 °C)-97.9 °F (36.6 °C)] 97.2 °F (36.2 °C)  Pulse:  [68-82] 82  Resp:  [16-18] 18  SpO2:  [95 %-97 %] 97 %  BP: (110-135)/(56-76) 135/63     Weight: 63.9 kg (140 lb 14 oz)  Body mass index is 24.18 kg/m².    Intake/Output Summary (Last 24 hours) at 2/12/2022 1036  Last data filed at 2/12/2022 0600  Gross per 24 hour   Intake --   Output 550 ml   Net -550 ml      Physical Exam  Constitutional:       General: She is not in acute distress.     Appearance: She is not ill-appearing.   HENT:      Head: Normocephalic.      Mouth/Throat:      Mouth: Mucous membranes are moist.   Eyes:      General:         Right eye: No discharge.         Left eye: No discharge.      Extraocular Movements: Extraocular movements intact.      Pupils: Pupils are equal, round, and reactive to light.   Neck:      Vascular: No JVD.   Cardiovascular:      Rate and Rhythm: Normal rate and regular rhythm.   Pulmonary:      Effort: Pulmonary effort is normal.      Breath sounds: Normal breath sounds.   Abdominal:      General: Abdomen is flat. Bowel sounds are normal. There is no distension.      Palpations: Abdomen is soft.      Tenderness: There is no abdominal tenderness.   Musculoskeletal:      Right lower leg: No edema.      Left lower leg: No edema.   Skin:     General: Skin is warm and moist.      Findings: No rash.   Neurological:      Mental Status: She is alert.      Comments: She focuses her attention on me but doesn't say anything   Psychiatric:         Mood and Affect: Mood and affect normal.         Significant Labs:   All pertinent labs within the past 24 hours have been reviewed.  CBC:   Recent Labs   Lab 02/11/22  1402   WBC 7.49   HGB 9.5*   HCT 29.8*        CMP:   Recent Labs   Lab 02/11/22  1402   *   K 4.2   CL 98   CO2 30*      BUN 14   CREATININE 0.6   CALCIUM 8.4*   ANIONGAP 7*   EGFRNONAA >60        Significant Imaging: I have reviewed all pertinent imaging results/findings within the past 24 hours.      Assessment/Plan:      * Acute hypoxemic respiratory failure  Improving.  Continue supplementing oxygen.  Monitor sats.  Monitor work of breathing.  She's partial code, specifying that she'd want to be intubated if need be.    Anemia  Continue to monitor H&H every 3 days currently stable no active bleeding noted    Hemoglobin   Date Value Ref Range Status   02/07/2022 8.3 (L) 12.0 - 16.0 g/dL Final   02/06/2022 8.5 (L) 12.0 - 16.0 g/dL Final   02/05/2022 6.7 (L) 12.0 - 16.0 g/dL Final   02/04/2022 6.9 (L) 12.0 - 16.0 g/dL Final   ]      Paroxysmal atrial fibrillation  -Telemetry  - controlled on metoprolol 12.5 mg BID  Will hold anticoagulant due to high risk of bleeding      Gastrostomy tube dependent  Stable.  No issues with it    Aspiration pneumonia  Continue antibiotics as they are but will stop them soon.  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  She's producing a lot of airway secretions and needs frequent suctioning.  She's high risk of aspiration pneumonia.  Will try to get her into LTACH for long-term care.    Antibiotics (From admission, onward)            None         Will monitor patient closely and continue current treatment plan unchanged.        Type 2 diabetes mellitus, with long-term current use of insulin  Patient's FSGs are controlled on current medication regimen.  Most recent fingerstick glucose reviewed-   Recent Labs   Lab 02/03/22  2318 02/04/22  0508 02/04/22  1207 02/04/22  1801   POCTGLUCOSE 103 117* 91 135*     Current correctional scale  Medium  Maintain anti-hyperglycemic dose as follows-   Antihyperglycemics (From admission, onward)            Start     Stop Route Frequency Ordered    01/18/22 1438  insulin aspart U-100 pen 1-10 Units         -- SubQ Before meals & nightly PRN 01/18/22 1438        Hold Oral hypoglycemics while patient is in the hospital.        YARI  (generalized anxiety disorder)  Extubated on 01/26.  Using Precedex as needed to control anxiety.  Patient does not appear anxious on exam.      Hypothyroidism  Patient has chronic hypothyroidism. TFTs reviewed-   Lab Results   Component Value Date    TSH 1.130 07/13/2017   . Will continue chronic levothyroxine and adjust for and clinical changes.        Hyperlipidemia   Patient is chronically on statin.will continue for now. Monitor clinically. Last LDL was   Lab Results   Component Value Date    LDLCALC 94.4 07/13/2017          GERD (gastroesophageal reflux disease)  On famotidine.      VTE Risk Mitigation (From admission, onward)         Ordered     enoxaparin injection 40 mg  Daily         01/18/22 1438     IP VTE HIGH RISK PATIENT  Once         01/18/22 1438     Place sequential compression device  Until discontinued         01/18/22 1438                Discharge Planning   DEMETRIUS: 2/11/2022     Code Status: Partial Code   Is the patient medically ready for discharge?:     Reason for patient still in hospital (select all that apply): Patient trending condition and Treatment  Discharge Plan A: Return to nursing home                  Neda Porter MD  Department of Hospital Medicine   [facility]

## 2022-02-12 NOTE — CONSULTS
[facility] Hospital Medicine  Telemedicine Consult Note    Thank you for your consult to [facility]. We have reviewed the patient chart. This patient does not meet criteria for [facility] service at this time due to Patient has a condition likely to benefit from in-depth physical exam, or palpation / auscultation, or serial abdominal exams, or patient is non verbal and would not be able to participate with [facility]. Will hand back to In-house service..      Emerson Blackmon MD  Department of Hospital Medicine   [facility]

## 2022-02-12 NOTE — SUBJECTIVE & OBJECTIVE
Interval History:  No acute events overnight.  Pending discharge to [facility]    Review of Systems   Unable to perform ROS: Patient nonverbal     Objective:     Vital Signs (Most Recent):  Temp: 97.2 °F (36.2 °C) (02/12/22 0803)  Pulse: 82 (02/12/22 0803)  Resp: 18 (02/12/22 0803)  BP: 135/63 (02/12/22 0803)  SpO2: 97 % (02/12/22 0811) Vital Signs (24h Range):  Temp:  [97.2 °F (36.2 °C)-97.9 °F (36.6 °C)] 97.2 °F (36.2 °C)  Pulse:  [68-82] 82  Resp:  [16-18] 18  SpO2:  [95 %-97 %] 97 %  BP: (110-135)/(56-76) 135/63     Weight: 63.9 kg (140 lb 14 oz)  Body mass index is 24.18 kg/m².    Intake/Output Summary (Last 24 hours) at 2/12/2022 1036  Last data filed at 2/12/2022 0600  Gross per 24 hour   Intake --   Output 550 ml   Net -550 ml      Physical Exam  Constitutional:       General: She is not in acute distress.     Appearance: She is not ill-appearing.   HENT:      Head: Normocephalic.      Mouth/Throat:      Mouth: Mucous membranes are moist.   Eyes:      General:         Right eye: No discharge.         Left eye: No discharge.      Extraocular Movements: Extraocular movements intact.      Pupils: Pupils are equal, round, and reactive to light.   Neck:      Vascular: No JVD.   Cardiovascular:      Rate and Rhythm: Normal rate and regular rhythm.   Pulmonary:      Effort: Pulmonary effort is normal.      Breath sounds: Normal breath sounds.   Abdominal:      General: Abdomen is flat. Bowel sounds are normal. There is no distension.      Palpations: Abdomen is soft.      Tenderness: There is no abdominal tenderness.   Musculoskeletal:      Right lower leg: No edema.      Left lower leg: No edema.   Skin:     General: Skin is warm and moist.      Findings: No rash.   Neurological:      Mental Status: She is alert.      Comments: She focuses her attention on me but doesn't say anything   Psychiatric:         Mood and Affect: Mood and affect normal.         Significant Labs:   All pertinent labs within the past 24 hours  have been reviewed.  CBC:   Recent Labs   Lab 02/11/22  1402   WBC 7.49   HGB 9.5*   HCT 29.8*        CMP:   Recent Labs   Lab 02/11/22  1402   *   K 4.2   CL 98   CO2 30*      BUN 14   CREATININE 0.6   CALCIUM 8.4*   ANIONGAP 7*   EGFRNONAA >60       Significant Imaging: I have reviewed all pertinent imaging results/findings within the past 24 hours.

## 2022-02-13 PROCEDURE — 27000221 HC OXYGEN, UP TO 24 HOURS

## 2022-02-13 PROCEDURE — 25000003 PHARM REV CODE 250: Performed by: INTERNAL MEDICINE

## 2022-02-13 PROCEDURE — 94761 N-INVAS EAR/PLS OXIMETRY MLT: CPT

## 2022-02-13 PROCEDURE — 27000207 HC ISOLATION

## 2022-02-13 PROCEDURE — 63600175 PHARM REV CODE 636 W HCPCS: Performed by: INTERNAL MEDICINE

## 2022-02-13 PROCEDURE — 43246 EGD PLACE GASTROSTOMY TUBE: CPT

## 2022-02-13 PROCEDURE — 11000001 HC ACUTE MED/SURG PRIVATE ROOM

## 2022-02-13 RX ADMIN — METOPROLOL TARTRATE 12.5 MG: 25 TABLET, FILM COATED ORAL at 09:02

## 2022-02-13 RX ADMIN — ENOXAPARIN SODIUM 40 MG: 100 INJECTION SUBCUTANEOUS at 07:02

## 2022-02-13 RX ADMIN — MIDODRINE HYDROCHLORIDE 5 MG: 5 TABLET ORAL at 04:02

## 2022-02-13 RX ADMIN — GLYCOPYRROLATE 1 MG: 1 TABLET ORAL at 04:02

## 2022-02-13 RX ADMIN — CITALOPRAM HYDROBROMIDE 40 MG: 10 TABLET ORAL at 09:02

## 2022-02-13 RX ADMIN — GLYCOPYRROLATE 1 MG: 1 TABLET ORAL at 09:02

## 2022-02-13 RX ADMIN — FAMOTIDINE 20 MG: 10 INJECTION INTRAVENOUS at 08:02

## 2022-02-13 RX ADMIN — METOPROLOL TARTRATE 12.5 MG: 25 TABLET, FILM COATED ORAL at 08:02

## 2022-02-13 RX ADMIN — MIDODRINE HYDROCHLORIDE 5 MG: 5 TABLET ORAL at 09:02

## 2022-02-13 RX ADMIN — FAMOTIDINE 20 MG: 10 INJECTION INTRAVENOUS at 09:02

## 2022-02-13 RX ADMIN — LEVOTHYROXINE SODIUM 25 MCG: 0.03 TABLET ORAL at 05:02

## 2022-02-13 RX ADMIN — AZELASTINE 137 MCG: 1 SPRAY, METERED NASAL at 09:02

## 2022-02-13 RX ADMIN — MIDODRINE HYDROCHLORIDE 5 MG: 5 TABLET ORAL at 08:02

## 2022-02-13 RX ADMIN — GLYCOPYRROLATE 1 MG: 1 TABLET ORAL at 08:02

## 2022-02-13 RX ADMIN — ATORVASTATIN CALCIUM 40 MG: 40 TABLET, FILM COATED ORAL at 08:02

## 2022-02-13 RX ADMIN — AZELASTINE 137 MCG: 1 SPRAY, METERED NASAL at 08:02

## 2022-02-13 NOTE — PLAN OF CARE
Patient resting well in bed. No acute changes noted. Resp even and unlabored. Mayte isosource feeding well at 45ml/hr. Residual less that 5ml. No noted N/V. No skin changes noted. No distress noted. Resp even and unlabored. Will cont to monitor. POC reviewed. Mayte meds well via pegtube. Turn q2h. Will cont to monitor and provide care as needed. Patient ecnourage to valeria for assist when needed.

## 2022-02-13 NOTE — PLAN OF CARE
"Isacc received a call from Ana M([facility]) verbalizing the following, " her mother sent over medicaid papers and 3 month bank stalemates. Then [facility] told her mother they needed 6 months of bank statements/checks. They was able to get POA papers signed with a . Her mother was unable to bring the remaining information, because she had to work and it was late when she received the remaining information." isacc  Informed granddaughter to please contact Zaira or the manager at [facility] to complete the process. Isacc asked Ana M for her number, she informed me that Radha has it. Cm to please follow-up with family.  "

## 2022-02-13 NOTE — PLAN OF CARE
Pt is cleared from  for discharge.  Pt unable to return to [place] due to financials.       02/12/22 0949   Final Note   Anticipated Discharge Disposition halfway [facility]

## 2022-02-13 NOTE — ASSESSMENT & PLAN NOTE
stable  Monitor sats.  Monitor work of breathing.  She's partial code, specifying that she'd want to be intubated if need be.

## 2022-02-13 NOTE — SUBJECTIVE & OBJECTIVE
Interval History: No acute events overnight. Pending discharge to [place]    Review of Systems   Unable to perform ROS: Patient nonverbal     Objective:     Vital Signs (Most Recent):  Temp: 96.8 °F (36 °C) (02/13/22 0750)  Pulse: 73 (02/13/22 0750)  Resp: 18 (02/13/22 0750)  BP: 132/61 (02/13/22 0750)  SpO2: 98 % (02/13/22 0816) Vital Signs (24h Range):  Temp:  [96.4 °F (35.8 °C)-98.2 °F (36.8 °C)] 96.8 °F (36 °C)  Pulse:  [66-79] 73  Resp:  [16-18] 18  SpO2:  [92 %-98 %] 98 %  BP: ()/(52-77) 132/61     Weight: 63.9 kg (140 lb 14 oz)  Body mass index is 24.18 kg/m².    Intake/Output Summary (Last 24 hours) at 2/13/2022 1018  Last data filed at 2/13/2022 0600  Gross per 24 hour   Intake 1045 ml   Output 350 ml   Net 695 ml      Physical Exam  Constitutional:       General: She is not in acute distress.     Appearance: She is not ill-appearing.   HENT:      Head: Normocephalic.      Mouth/Throat:      Mouth: Mucous membranes are moist.   Eyes:      General:         Right eye: No discharge.         Left eye: No discharge.      Extraocular Movements: Extraocular movements intact.      Pupils: Pupils are equal, round, and reactive to light.   Neck:      Vascular: No JVD.   Cardiovascular:      Rate and Rhythm: Normal rate and regular rhythm.   Pulmonary:      Effort: Pulmonary effort is normal.      Breath sounds: Normal breath sounds.   Abdominal:      General: Abdomen is flat. Bowel sounds are normal. There is no distension.      Palpations: Abdomen is soft.      Tenderness: There is no abdominal tenderness.   Musculoskeletal:      Right lower leg: No edema.      Left lower leg: No edema.   Skin:     General: Skin is warm and moist.      Findings: No rash.   Neurological:      Mental Status: She is alert.      Comments: She focuses her attention on me but doesn't say anything   Psychiatric:         Mood and Affect: Mood and affect normal.         Significant Labs:   All pertinent labs within the past 24 hours  have been reviewed.  BMP:   Recent Labs   Lab 02/11/22  1402      *   K 4.2   CL 98   CO2 30*   BUN 14   CREATININE 0.6   CALCIUM 8.4*     CBC:   Recent Labs   Lab 02/11/22  1402   WBC 7.49   HGB 9.5*   HCT 29.8*          Significant Imaging: I have reviewed all pertinent imaging results/findings within the past 24 hours.

## 2022-02-13 NOTE — ASSESSMENT & PLAN NOTE
Continue to monitor H&H every 3 days currently stable no active bleeding noted    Hemoglobin   Date Value Ref Range Status   02/11/2022 9.5 (L) 12.0 - 16.0 g/dL Final   02/07/2022 8.3 (L) 12.0 - 16.0 g/dL Final   02/06/2022 8.5 (L) 12.0 - 16.0 g/dL Final   02/05/2022 6.7 (L) 12.0 - 16.0 g/dL Final   ]

## 2022-02-13 NOTE — ASSESSMENT & PLAN NOTE
Patient's FSGs are controlled on current medication regimen.  Most recent fingerstick glucose reviewed-   No results for input(s): POCTGLUCOSE in the last 24 hours.  Current correctional scale  Medium  Maintain anti-hyperglycemic dose as follows-   Antihyperglycemics (From admission, onward)            Start     Stop Route Frequency Ordered    01/18/22 1438  insulin aspart U-100 pen 1-10 Units         -- SubQ Before meals & nightly PRN 01/18/22 1438        Hold Oral hypoglycemics while patient is in the hospital.

## 2022-02-13 NOTE — PROGRESS NOTES
[facility] Hospital Medicine  Progress Note    Patient Name: Sandra Wilson  MRN: [id number]  Patient Class: IP- Inpatient   Admission Date: 1/18/2022  Length of Stay: 26 days  Attending Physician: Neda Porter MD  Primary Care Provider: Primary Doctor No        Subjective:     Principal Problem:Acute hypoxemic respiratory failure        HPI:  Sandra Wilson is an 81 year old female with a past medical history of CVAs, CNS aneurysm, PEG status, CAD, HLD, hypothryoidism, DM, aortic stenosis, and colon, breast, and ovarian cancer who presented from long term care with vomiting, diarrhea, and shortness of breath. Workup in the ED showed likely aspiration pneumonitis and C. Diff colitis. Her O2 requirement increased while in the ED requiring intubation with sedation. She also required Levophed as she likely developed septic shock. Antibiotics were broadened to cefepime, vancomycin, and Flagyl. Pulmonary was consulted. Family was notified. The patient was unable to provide history given acuity of illness.      Overview/Hospital Course:  Sandra Wilson is an 81 year old female with a past medical history of CVAs, CNS aneurysm, PEG status, CAD, HLD, hypothyroidism, DM, aortic stenosis, and colon, breast, and ovarian cancer who presented from long term care with vomiting, diarrhea, and shortness of breath secondary to acute hypoxic respiratory failure from aspiration pneumonia in the setting of C diff colitis leading to septic shock. She was intubated in the ED and started on Levophed infusion via RIJ CVC placed by Dr. Vignesh Gaspar. She was started on broad spectrum antibiotics with cefepime, Flagyl, vancomycin and admitted to the ICU. She was also started on enteral vancomycin for severe C diff colitis given elevated WBC count and STEFFANY. There was concern for developing ARDS given P/F ratio of 87 (severe); 190 1/22 (moderate). Pulmonary was consulted. She also presented with demand ischemia likely  secondary to septic shock. Troponin was trended and improved as shock resolved. Normal saline infusion was added for hyponatremia and STEFFANY which improved both. Levophed was able to be weaned. Family agreed to DNR status 1/18 and Palliative Care was consulted to discuss goal of care 1/19. Antibiotics were changed to doxycycline and Flagyl (GBS in sputum culture) 1/21; vancomycin was added 1/23 after culture also became positive for Staph aureus and Klebsiella. Her respiratory status has improved throughout her course and she was extubated (code changed to partial code for intubation) 1/21 only to be re-intubated for worsening stridor (history of tracheostomy). Upon re-intubation, copious amounts of secretions were suctioned. KUB suggested a mild ileus A bowel regimen was instituted and the patient was able to have bowel movements 1/22. Tube feeds were started 1/23. Her course was complicated by Afib as well noted on telemetry 1/21; metoprolol tartrate started 1/22.She failed a leak test 1/22; Surgery was consulted for tracheostomy which is pending conversation with family regarding goals of care.    Patient was set to go for trach on 01/26, but attempt was made for another trial extubation and the patient did well after extubation, she was transition to face mask and was able to maintain her saturations although had significant difficulty with secretions.  She was started on Robinul and scopolamine patch which did seem to improve for secretions.  Her respiratory status continued to be somewhat tenuous, so she was monitored in the ICU.  Family expressed desire to not have her get a tracheostomy.  Case management began working on LTACH referral.      Interval History: No acute events overnight. Pending discharge to [place]    Review of Systems   Unable to perform ROS: Patient nonverbal     Objective:     Vital Signs (Most Recent):  Temp: 96.8 °F (36 °C) (02/13/22 0750)  Pulse: 73 (02/13/22 0750)  Resp: 18 (02/13/22  0750)  BP: 132/61 (02/13/22 0750)  SpO2: 98 % (02/13/22 0816) Vital Signs (24h Range):  Temp:  [96.4 °F (35.8 °C)-98.2 °F (36.8 °C)] 96.8 °F (36 °C)  Pulse:  [66-79] 73  Resp:  [16-18] 18  SpO2:  [92 %-98 %] 98 %  BP: ()/(52-77) 132/61     Weight: 63.9 kg (140 lb 14 oz)  Body mass index is 24.18 kg/m².    Intake/Output Summary (Last 24 hours) at 2/13/2022 1018  Last data filed at 2/13/2022 0600  Gross per 24 hour   Intake 1045 ml   Output 350 ml   Net 695 ml      Physical Exam  Constitutional:       General: She is not in acute distress.     Appearance: She is not ill-appearing.   HENT:      Head: Normocephalic.      Mouth/Throat:      Mouth: Mucous membranes are moist.   Eyes:      General:         Right eye: No discharge.         Left eye: No discharge.      Extraocular Movements: Extraocular movements intact.      Pupils: Pupils are equal, round, and reactive to light.   Neck:      Vascular: No JVD.   Cardiovascular:      Rate and Rhythm: Normal rate and regular rhythm.   Pulmonary:      Effort: Pulmonary effort is normal.      Breath sounds: Normal breath sounds.   Abdominal:      General: Abdomen is flat. Bowel sounds are normal. There is no distension.      Palpations: Abdomen is soft.      Tenderness: There is no abdominal tenderness.   Musculoskeletal:      Right lower leg: No edema.      Left lower leg: No edema.   Skin:     General: Skin is warm and moist.      Findings: No rash.   Neurological:      Mental Status: She is alert.      Comments: She focuses her attention on me but doesn't say anything   Psychiatric:         Mood and Affect: Mood and affect normal.         Significant Labs:   All pertinent labs within the past 24 hours have been reviewed.  BMP:   Recent Labs   Lab 02/11/22  1402      *   K 4.2   CL 98   CO2 30*   BUN 14   CREATININE 0.6   CALCIUM 8.4*     CBC:   Recent Labs   Lab 02/11/22  1402   WBC 7.49   HGB 9.5*   HCT 29.8*          Significant Imaging: I have  reviewed all pertinent imaging results/findings within the past 24 hours.      Assessment/Plan:      * Acute hypoxemic respiratory failure  stable  Monitor sats.  Monitor work of breathing.  She's partial code, specifying that she'd want to be intubated if need be.    Anemia  Continue to monitor H&H every 3 days currently stable no active bleeding noted    Hemoglobin   Date Value Ref Range Status   02/11/2022 9.5 (L) 12.0 - 16.0 g/dL Final   02/07/2022 8.3 (L) 12.0 - 16.0 g/dL Final   02/06/2022 8.5 (L) 12.0 - 16.0 g/dL Final   02/05/2022 6.7 (L) 12.0 - 16.0 g/dL Final   ]      Paroxysmal atrial fibrillation  -Telemetry  - controlled on metoprolol 12.5 mg BID  Will hold anticoagulant due to high risk of bleeding      Gastrostomy tube dependent  Stable.  No issues with it    Aspiration pneumonia  Continue antibiotics as they are but will stop them soon.  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  She's producing a lot of airway secretions and needs frequent suctioning.  She's high risk of aspiration pneumonia.  Will try to get her into LTACH for long-term care.    Antibiotics (From admission, onward)            None         Will monitor patient closely and continue current treatment plan unchanged.        Type 2 diabetes mellitus, with long-term current use of insulin  Patient's FSGs are controlled on current medication regimen.  Most recent fingerstick glucose reviewed-   No results for input(s): POCTGLUCOSE in the last 24 hours.  Current correctional scale  Medium  Maintain anti-hyperglycemic dose as follows-   Antihyperglycemics (From admission, onward)            Start     Stop Route Frequency Ordered    01/18/22 1438  insulin aspart U-100 pen 1-10 Units         -- SubQ Before meals & nightly PRN 01/18/22 1438        Hold Oral hypoglycemics while patient is in the hospital.        YARI (generalized anxiety disorder)  Extubated on 01/26.  Using Precedex as needed to control anxiety.  Patient does not appear  anxious on exam.      Hypothyroidism  Patient has chronic hypothyroidism. TFTs reviewed-   Lab Results   Component Value Date    TSH 1.130 07/13/2017   . Will continue chronic levothyroxine and adjust for and clinical changes.        Hyperlipidemia   Patient is chronically on statin.will continue for now. Monitor clinically. Last LDL was   Lab Results   Component Value Date    LDLCALC 94.4 07/13/2017          GERD (gastroesophageal reflux disease)  On famotidine.      VTE Risk Mitigation (From admission, onward)         Ordered     enoxaparin injection 40 mg  Daily         01/18/22 1438     IP VTE HIGH RISK PATIENT  Once         01/18/22 1438     Place sequential compression device  Until discontinued         01/18/22 1438                Discharge Planning   DEMETRIUS: 2/11/2022     Code Status: Partial Code   Is the patient medically ready for discharge?:     Reason for patient still in hospital (select all that apply): Patient trending condition and Treatment  Discharge Plan A: Return to nursing home                  Neda Porter MD  Department of Hospital Medicine   [facility]

## 2022-02-13 NOTE — CARE UPDATE
02/13/22 0816   PRE-TX-O2   O2 Device (Oxygen Therapy) nasal cannula   $ Is the patient on Low Flow Oxygen? Yes   SpO2 98 %   Pulse Oximetry Type Intermittent   $ Pulse Oximetry - Multiple Charge Pulse Oximetry - Multiple

## 2022-02-14 PROCEDURE — 27000207 HC ISOLATION

## 2022-02-14 PROCEDURE — 25000003 PHARM REV CODE 250: Performed by: INTERNAL MEDICINE

## 2022-02-14 PROCEDURE — 63600175 PHARM REV CODE 636 W HCPCS: Performed by: INTERNAL MEDICINE

## 2022-02-14 PROCEDURE — 11000001 HC ACUTE MED/SURG PRIVATE ROOM

## 2022-02-14 RX ADMIN — AZELASTINE 137 MCG: 1 SPRAY, METERED NASAL at 08:02

## 2022-02-14 RX ADMIN — MIDODRINE HYDROCHLORIDE 5 MG: 5 TABLET ORAL at 08:02

## 2022-02-14 RX ADMIN — ATORVASTATIN CALCIUM 40 MG: 40 TABLET, FILM COATED ORAL at 08:02

## 2022-02-14 RX ADMIN — LEVOTHYROXINE SODIUM 25 MCG: 0.03 TABLET ORAL at 05:02

## 2022-02-14 RX ADMIN — GLYCOPYRROLATE 1 MG: 1 TABLET ORAL at 03:02

## 2022-02-14 RX ADMIN — GLYCOPYRROLATE 1 MG: 1 TABLET ORAL at 08:02

## 2022-02-14 RX ADMIN — FAMOTIDINE 20 MG: 10 INJECTION INTRAVENOUS at 10:02

## 2022-02-14 RX ADMIN — MIDODRINE HYDROCHLORIDE 5 MG: 5 TABLET ORAL at 03:02

## 2022-02-14 RX ADMIN — ENOXAPARIN SODIUM 40 MG: 100 INJECTION SUBCUTANEOUS at 04:02

## 2022-02-14 RX ADMIN — METOPROLOL TARTRATE 12.5 MG: 25 TABLET, FILM COATED ORAL at 08:02

## 2022-02-14 RX ADMIN — CITALOPRAM HYDROBROMIDE 40 MG: 10 TABLET ORAL at 08:02

## 2022-02-14 RX ADMIN — FAMOTIDINE 20 MG: 10 INJECTION INTRAVENOUS at 09:02

## 2022-02-14 NOTE — PLAN OF CARE
Spoke with pt's daughter, Ira; she has not had a chance to call Zaira at [facility] yet. I let her know that I have not spoken with her daughter yet to answer any questions and give the Medicare discharge appeal information. Ira did take the [employer] number to call and start the discharge appeal process. And states she will call Zaira at [facility] when she gets off of work this afternoon.    02/14/22 7757   Discharge Reassessment   Assessment Type Discharge Planning Reassessment

## 2022-02-14 NOTE — PLAN OF CARE
Spoke with the pt's daughter, Ira regarding discharge orders and dispo for patient.Ira states they are being compliant with getting the Medicaid application completed and that they have left several messages for Moni and Roberto at this hospital with no return call over the weekend. I explained the CM coverage over the weekend, she states that her and her daughter spoke with Dagmar yesterday but she didn't know much about what was going on with the discharge situation.   Ira states that she has faxed Zaira at [facility] the additional paperwork that is needed for the Medicaid application this morning except for one account that [employer] will not give her access to. I explained that the patient was medically ready for discharge and we could not continue to hold her while they got the finances worked out, that the patient has discharge orders and they need to call Medicare to appeal the discharge if they were not planning to take the patient home. Ira is insisting that I speak with her daughter, Ana M since she is nurse and has questions for me that aren't being answered. I gave Ira my direct number and also left a VM for Ana M ([phone]) asking for a return call. I also asked Ira to call Zaira at [facility] to make sure she has everything she needs and what other documentation was pending for the Medicaid application.   Due to the fact that the pt patient was Private Pay at [facility] and not assisted since a Medicaid application was not filled, [facility] is not obligated to take the patient back at this time.    02/14/22 1111   Discharge Reassessment   Assessment Type Discharge Planning Reassessment

## 2022-02-14 NOTE — PLAN OF CARE
Non vocal. Afebrile. VSS. Cardiac monitoring. Safety measures maintained. Bed locked in lowest position, call light in reach. Continuous tube feedings by pump.  NAD noted. Instructed to call for asst and not to attempt to get OOB. WCTM & update POC prn.       Problem: Adult Inpatient Plan of Care  Goal: Plan of Care Review  Outcome: Ongoing, Progressing  Goal: Patient-Specific Goal (Individualized)  Outcome: Ongoing, Progressing  Goal: Absence of Hospital-Acquired Illness or Injury  Outcome: Ongoing, Progressing  Goal: Optimal Comfort and Wellbeing  Outcome: Ongoing, Progressing     Problem: Infection  Goal: Absence of Infection Signs and Symptoms  Outcome: Ongoing, Progressing     Problem: Fluid Imbalance (Pneumonia)  Goal: Fluid Balance  Outcome: Ongoing, Progressing     Problem: Infection (Pneumonia)  Goal: Resolution of Infection Signs and Symptoms  Outcome: Ongoing, Progressing     Problem: Respiratory Compromise (Pneumonia)  Goal: Effective Oxygenation and Ventilation  Outcome: Ongoing, Progressing     Problem: Fall Injury Risk  Goal: Absence of Fall and Fall-Related Injury  Outcome: Ongoing, Progressing     Problem: Skin Injury Risk Increased  Goal: Skin Health and Integrity  Outcome: Ongoing, Progressing     Problem: Adjustment to Illness (Sepsis/Septic Shock)  Goal: Optimal Coping  Outcome: Ongoing, Progressing     Problem: Bleeding (Sepsis/Septic Shock)  Goal: Absence of Bleeding  Outcome: Ongoing, Progressing     Problem: Infection Progression (Sepsis/Septic Shock)  Goal: Absence of Infection Signs and Symptoms  Outcome: Ongoing, Progressing     Problem: Fluid and Electrolyte Imbalance (Acute Kidney Injury/Impairment)  Goal: Fluid and Electrolyte Balance  Outcome: Ongoing, Progressing     Problem: Oral Intake Inadequate (Acute Kidney Injury/Impairment)  Goal: Optimal Nutrition Intake  Outcome: Ongoing, Progressing     Problem: Renal Function Impairment (Acute Kidney Injury/Impairment)  Goal: Effective  Renal Function  Outcome: Ongoing, Progressing     Problem: ARDS (Acute Respiratory Distress Syndrome)  Goal: Effective Oxygenation  Outcome: Ongoing, Progressing     Problem: Oral Intake Inadequate  Goal: Improved Oral Intake  Outcome: Ongoing, Progressing     Problem: Impaired Wound Healing  Goal: Optimal Wound Healing  Outcome: Ongoing, Progressing     Problem: Aspiration (Enteral Nutrition)  Goal: Absence of Aspiration Signs and Symptoms  Outcome: Ongoing, Progressing     Problem: Device-Related Complication Risk (Enteral Nutrition)  Goal: Safe, Effective Therapy Delivery  Outcome: Ongoing, Progressing     Problem: Feeding Intolerance (Enteral Nutrition)  Goal: Feeding Tolerance  Outcome: Ongoing, Progressing

## 2022-02-14 NOTE — SUBJECTIVE & OBJECTIVE
Interval History:  No acute events overnight.  Pending transfer to [place]    Review of Systems   Unable to perform ROS: Patient nonverbal     Objective:     Vital Signs (Most Recent):  Temp: 97.9 °F (36.6 °C) (02/14/22 1210)  Pulse: 63 (02/14/22 1210)  Resp: 16 (02/14/22 1210)  BP: 136/67 (02/14/22 1210)  SpO2: 99 % (02/14/22 1210) Vital Signs (24h Range):  Temp:  [96.4 °F (35.8 °C)-98.1 °F (36.7 °C)] 97.9 °F (36.6 °C)  Pulse:  [63-79] 63  Resp:  [16-18] 16  SpO2:  [93 %-99 %] 99 %  BP: (136-149)/(64-88) 136/67     Weight: 63.9 kg (140 lb 14 oz)  Body mass index is 24.18 kg/m².    Intake/Output Summary (Last 24 hours) at 2/14/2022 1440  Last data filed at 2/14/2022 1200  Gross per 24 hour   Intake 1340 ml   Output 2150 ml   Net -810 ml      Physical Exam  Constitutional:       General: She is not in acute distress.     Appearance: She is not ill-appearing.   HENT:      Head: Normocephalic.      Mouth/Throat:      Mouth: Mucous membranes are moist.   Eyes:      General:         Right eye: No discharge.         Left eye: No discharge.      Extraocular Movements: Extraocular movements intact.      Pupils: Pupils are equal, round, and reactive to light.   Neck:      Vascular: No JVD.   Cardiovascular:      Rate and Rhythm: Normal rate and regular rhythm.   Pulmonary:      Effort: Pulmonary effort is normal.      Breath sounds: Normal breath sounds.   Abdominal:      General: Abdomen is flat. Bowel sounds are normal. There is no distension.      Palpations: Abdomen is soft.      Tenderness: There is no abdominal tenderness.   Musculoskeletal:      Right lower leg: No edema.      Left lower leg: No edema.   Skin:     General: Skin is warm and moist.      Findings: No rash.   Neurological:      Mental Status: She is alert.      Comments: She focuses her attention on me but doesn't say anything   Psychiatric:         Mood and Affect: Mood and affect normal.         Significant Labs: All pertinent labs within the past 24  hours have been reviewed.  BMP: No results for input(s): GLU, NA, K, CL, CO2, BUN, CREATININE, CALCIUM, MG in the last 48 hours.  CBC: No results for input(s): WBC, HGB, HCT, PLT in the last 48 hours.    Significant Imaging: I have reviewed all pertinent imaging results/findings within the past 24 hours.

## 2022-02-14 NOTE — PROGRESS NOTES
[facility] Hospital Medicine  Progress Note    Patient Name: Sandra Wilson  MRN: [id number]  Patient Class: IP- Inpatient   Admission Date: 1/18/2022  Length of Stay: 27 days  Attending Physician: Neda Porter MD  Primary Care Provider: Primary Doctor No        Subjective:     Principal Problem:Acute hypoxemic respiratory failure        HPI:  Sandra Wilson is an 81 year old female with a past medical history of CVAs, CNS aneurysm, PEG status, CAD, HLD, hypothryoidism, DM, aortic stenosis, and colon, breast, and ovarian cancer who presented from long term care with vomiting, diarrhea, and shortness of breath. Workup in the ED showed likely aspiration pneumonitis and C. Diff colitis. Her O2 requirement increased while in the ED requiring intubation with sedation. She also required Levophed as she likely developed septic shock. Antibiotics were broadened to cefepime, vancomycin, and Flagyl. Pulmonary was consulted. Family was notified. The patient was unable to provide history given acuity of illness.      Overview/Hospital Course:  Sandra Wilson is an 81 year old female with a past medical history of CVAs, CNS aneurysm, PEG status, CAD, HLD, hypothyroidism, DM, aortic stenosis, and colon, breast, and ovarian cancer who presented from long term care with vomiting, diarrhea, and shortness of breath secondary to acute hypoxic respiratory failure from aspiration pneumonia in the setting of C diff colitis leading to septic shock. She was intubated in the ED and started on Levophed infusion via RIJ CVC placed by Dr. Vignesh Gaspar. She was started on broad spectrum antibiotics with cefepime, Flagyl, vancomycin and admitted to the ICU. She was also started on enteral vancomycin for severe C diff colitis given elevated WBC count and STEFFANY. There was concern for developing ARDS given P/F ratio of 87 (severe); 190 1/22 (moderate). Pulmonary was consulted. She also presented with demand ischemia likely  secondary to septic shock. Troponin was trended and improved as shock resolved. Normal saline infusion was added for hyponatremia and STEFFANY which improved both. Levophed was able to be weaned. Family agreed to DNR status 1/18 and Palliative Care was consulted to discuss goal of care 1/19. Antibiotics were changed to doxycycline and Flagyl (GBS in sputum culture) 1/21; vancomycin was added 1/23 after culture also became positive for Staph aureus and Klebsiella. Her respiratory status has improved throughout her course and she was extubated (code changed to partial code for intubation) 1/21 only to be re-intubated for worsening stridor (history of tracheostomy). Upon re-intubation, copious amounts of secretions were suctioned. KUB suggested a mild ileus A bowel regimen was instituted and the patient was able to have bowel movements 1/22. Tube feeds were started 1/23. Her course was complicated by Afib as well noted on telemetry 1/21; metoprolol tartrate started 1/22.She failed a leak test 1/22; Surgery was consulted for tracheostomy which is pending conversation with family regarding goals of care.    Patient was set to go for trach on 01/26, but attempt was made for another trial extubation and the patient did well after extubation, she was transition to face mask and was able to maintain her saturations although had significant difficulty with secretions.  She was started on Robinul and scopolamine patch which did seem to improve for secretions.  Her respiratory status continued to be somewhat tenuous, so she was monitored in the ICU.  Family expressed desire to not have her get a tracheostomy.  Case management began working on LTACH referral.      Interval History:  No acute events overnight.  Pending transfer to [place]    Review of Systems   Unable to perform ROS: Patient nonverbal     Objective:     Vital Signs (Most Recent):  Temp: 97.9 °F (36.6 °C) (02/14/22 1210)  Pulse: 63 (02/14/22 1210)  Resp: 16 (02/14/22  1210)  BP: 136/67 (02/14/22 1210)  SpO2: 99 % (02/14/22 1210) Vital Signs (24h Range):  Temp:  [96.4 °F (35.8 °C)-98.1 °F (36.7 °C)] 97.9 °F (36.6 °C)  Pulse:  [63-79] 63  Resp:  [16-18] 16  SpO2:  [93 %-99 %] 99 %  BP: (136-149)/(64-88) 136/67     Weight: 63.9 kg (140 lb 14 oz)  Body mass index is 24.18 kg/m².    Intake/Output Summary (Last 24 hours) at 2/14/2022 1440  Last data filed at 2/14/2022 1200  Gross per 24 hour   Intake 1340 ml   Output 2150 ml   Net -810 ml      Physical Exam  Constitutional:       General: She is not in acute distress.     Appearance: She is not ill-appearing.   HENT:      Head: Normocephalic.      Mouth/Throat:      Mouth: Mucous membranes are moist.   Eyes:      General:         Right eye: No discharge.         Left eye: No discharge.      Extraocular Movements: Extraocular movements intact.      Pupils: Pupils are equal, round, and reactive to light.   Neck:      Vascular: No JVD.   Cardiovascular:      Rate and Rhythm: Normal rate and regular rhythm.   Pulmonary:      Effort: Pulmonary effort is normal.      Breath sounds: Normal breath sounds.   Abdominal:      General: Abdomen is flat. Bowel sounds are normal. There is no distension.      Palpations: Abdomen is soft.      Tenderness: There is no abdominal tenderness.   Musculoskeletal:      Right lower leg: No edema.      Left lower leg: No edema.   Skin:     General: Skin is warm and moist.      Findings: No rash.   Neurological:      Mental Status: She is alert.      Comments: She focuses her attention on me but doesn't say anything   Psychiatric:         Mood and Affect: Mood and affect normal.         Significant Labs: All pertinent labs within the past 24 hours have been reviewed.  BMP: No results for input(s): GLU, NA, K, CL, CO2, BUN, CREATININE, CALCIUM, MG in the last 48 hours.  CBC: No results for input(s): WBC, HGB, HCT, PLT in the last 48 hours.    Significant Imaging: I have reviewed all pertinent imaging  results/findings within the past 24 hours.      Assessment/Plan:      * Acute hypoxemic respiratory failure  stable  Monitor sats.  Monitor work of breathing.  She's partial code, specifying that she'd want to be intubated if need be.    Anemia  Continue to monitor H&H every 3 days currently stable no active bleeding noted    Hemoglobin   Date Value Ref Range Status   02/11/2022 9.5 (L) 12.0 - 16.0 g/dL Final   02/07/2022 8.3 (L) 12.0 - 16.0 g/dL Final   02/06/2022 8.5 (L) 12.0 - 16.0 g/dL Final   02/05/2022 6.7 (L) 12.0 - 16.0 g/dL Final   ]      Paroxysmal atrial fibrillation  -Telemetry  - controlled on metoprolol 12.5 mg BID  Will hold anticoagulant due to high risk of bleeding      Gastrostomy tube dependent  Stable.  No issues with it    Aspiration pneumonia  Continue antibiotics as they are but will stop them soon.  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  She's producing a lot of airway secretions and needs frequent suctioning.  She's high risk of aspiration pneumonia.  Will try to get her into LTACH for long-term care.    Antibiotics (From admission, onward)            None         Will monitor patient closely and continue current treatment plan unchanged.        Type 2 diabetes mellitus, with long-term current use of insulin  Patient's FSGs are controlled on current medication regimen.  Most recent fingerstick glucose reviewed-   No results for input(s): POCTGLUCOSE in the last 24 hours.  Current correctional scale  Medium  Maintain anti-hyperglycemic dose as follows-   Antihyperglycemics (From admission, onward)            Start     Stop Route Frequency Ordered    01/18/22 1438  insulin aspart U-100 pen 1-10 Units         -- SubQ Before meals & nightly PRN 01/18/22 1438        Hold Oral hypoglycemics while patient is in the hospital.        YARI (generalized anxiety disorder)  Extubated on 01/26.  Using Precedex as needed to control anxiety.  Patient does not appear anxious on  exam.      Hypothyroidism  Patient has chronic hypothyroidism. TFTs reviewed-   Lab Results   Component Value Date    TSH 1.130 07/13/2017   . Will continue chronic levothyroxine and adjust for and clinical changes.        Hyperlipidemia   Patient is chronically on statin.will continue for now. Monitor clinically. Last LDL was   Lab Results   Component Value Date    LDLCALC 94.4 07/13/2017          GERD (gastroesophageal reflux disease)  On famotidine.      VTE Risk Mitigation (From admission, onward)         Ordered     enoxaparin injection 40 mg  Daily         01/18/22 1438     IP VTE HIGH RISK PATIENT  Once         01/18/22 1438     Place sequential compression device  Until discontinued         01/18/22 1438                Discharge Planning   DEMETRIUS: 2/11/2022     Code Status: Partial Code   Is the patient medically ready for discharge?:     Reason for patient still in hospital (select all that apply): Patient trending condition and Treatment  Discharge Plan A: Return to nursing home                  Neda Porter MD  Department of Hospital Medicine   [facility]

## 2022-02-14 NOTE — PLAN OF CARE
CM sent a SNF packet, current meds, therapy notes and chest xray to   [facility]- DENIED  [facility]- DENIED   [facility] - DENIED   [facility]- DENIED   to review for acceptance.CM following.    02/14/22 1122   Post-Acute Status   Post-Acute Authorization Placement   Post-Acute Placement Status Referrals Sent

## 2022-02-15 PROCEDURE — 11000001 HC ACUTE MED/SURG PRIVATE ROOM

## 2022-02-15 PROCEDURE — 27000207 HC ISOLATION

## 2022-02-15 PROCEDURE — 63600175 PHARM REV CODE 636 W HCPCS: Performed by: INTERNAL MEDICINE

## 2022-02-15 PROCEDURE — 27000221 HC OXYGEN, UP TO 24 HOURS: Performed by: STUDENT IN AN ORGANIZED HEALTH CARE EDUCATION/TRAINING PROGRAM

## 2022-02-15 PROCEDURE — 25000003 PHARM REV CODE 250: Performed by: INTERNAL MEDICINE

## 2022-02-15 PROCEDURE — 97530 THERAPEUTIC ACTIVITIES: CPT

## 2022-02-15 PROCEDURE — 43246 EGD PLACE GASTROSTOMY TUBE: CPT

## 2022-02-15 RX ADMIN — GLYCOPYRROLATE 1 MG: 1 TABLET ORAL at 08:02

## 2022-02-15 RX ADMIN — ENOXAPARIN SODIUM 40 MG: 100 INJECTION SUBCUTANEOUS at 04:02

## 2022-02-15 RX ADMIN — FAMOTIDINE 20 MG: 10 INJECTION INTRAVENOUS at 09:02

## 2022-02-15 RX ADMIN — MIDODRINE HYDROCHLORIDE 5 MG: 5 TABLET ORAL at 03:02

## 2022-02-15 RX ADMIN — AZELASTINE 137 MCG: 1 SPRAY, METERED NASAL at 09:02

## 2022-02-15 RX ADMIN — ATORVASTATIN CALCIUM 40 MG: 40 TABLET, FILM COATED ORAL at 09:02

## 2022-02-15 RX ADMIN — GLYCOPYRROLATE 1 MG: 1 TABLET ORAL at 03:02

## 2022-02-15 RX ADMIN — METOPROLOL TARTRATE 12.5 MG: 25 TABLET, FILM COATED ORAL at 09:02

## 2022-02-15 RX ADMIN — METOPROLOL TARTRATE 12.5 MG: 25 TABLET, FILM COATED ORAL at 08:02

## 2022-02-15 RX ADMIN — AZELASTINE 137 MCG: 1 SPRAY, METERED NASAL at 08:02

## 2022-02-15 RX ADMIN — MIDODRINE HYDROCHLORIDE 5 MG: 5 TABLET ORAL at 09:02

## 2022-02-15 RX ADMIN — MIDODRINE HYDROCHLORIDE 5 MG: 5 TABLET ORAL at 08:02

## 2022-02-15 RX ADMIN — FAMOTIDINE 20 MG: 10 INJECTION INTRAVENOUS at 11:02

## 2022-02-15 RX ADMIN — GLYCOPYRROLATE 1 MG: 1 TABLET ORAL at 09:02

## 2022-02-15 RX ADMIN — LEVOTHYROXINE SODIUM 25 MCG: 0.03 TABLET ORAL at 05:02

## 2022-02-15 RX ADMIN — CITALOPRAM HYDROBROMIDE 40 MG: 10 TABLET ORAL at 08:02

## 2022-02-15 NOTE — PROGRESS NOTES
[facility] Hospital Medicine  Progress Note    Patient Name: Sandra Wilson  MRN: [id number]  Patient Class: IP- Inpatient   Admission Date: 1/18/2022  Length of Stay: 28 days  Attending Physician: Neda Porter MD  Primary Care Provider: Primary Doctor No        Subjective:     Principal Problem:Acute hypoxemic respiratory failure        HPI:  Sandra Wilson is an 81 year old female with a past medical history of CVAs, CNS aneurysm, PEG status, CAD, HLD, hypothryoidism, DM, aortic stenosis, and colon, breast, and ovarian cancer who presented from long term care with vomiting, diarrhea, and shortness of breath. Workup in the ED showed likely aspiration pneumonitis and C. Diff colitis. Her O2 requirement increased while in the ED requiring intubation with sedation. She also required Levophed as she likely developed septic shock. Antibiotics were broadened to cefepime, vancomycin, and Flagyl. Pulmonary was consulted. Family was notified. The patient was unable to provide history given acuity of illness.      Overview/Hospital Course:  Sandra Wilson is an 81 year old female with a past medical history of CVAs, CNS aneurysm, PEG status, CAD, HLD, hypothyroidism, DM, aortic stenosis, and colon, breast, and ovarian cancer who presented from long term care with vomiting, diarrhea, and shortness of breath secondary to acute hypoxic respiratory failure from aspiration pneumonia in the setting of C diff colitis leading to septic shock. She was intubated in the ED and started on Levophed infusion via RIJ CVC placed by Dr. Vignesh Gaspar. She was started on broad spectrum antibiotics with cefepime, Flagyl, vancomycin and admitted to the ICU. She was also started on enteral vancomycin for severe C diff colitis given elevated WBC count and STEFFANY. There was concern for developing ARDS given P/F ratio of 87 (severe); 190 1/22 (moderate). Pulmonary was consulted. She also presented with demand ischemia likely  secondary to septic shock. Troponin was trended and improved as shock resolved. Normal saline infusion was added for hyponatremia and STEFFANY which improved both. Levophed was able to be weaned. Family agreed to DNR status 1/18 and Palliative Care was consulted to discuss goal of care 1/19. Antibiotics were changed to doxycycline and Flagyl (GBS in sputum culture) 1/21; vancomycin was added 1/23 after culture also became positive for Staph aureus and Klebsiella. Her respiratory status has improved throughout her course and she was extubated (code changed to partial code for intubation) 1/21 only to be re-intubated for worsening stridor (history of tracheostomy). Upon re-intubation, copious amounts of secretions were suctioned. KUB suggested a mild ileus A bowel regimen was instituted and the patient was able to have bowel movements 1/22. Tube feeds were started 1/23. Her course was complicated by Afib as well noted on telemetry 1/21; metoprolol tartrate started 1/22.She failed a leak test 1/22; Surgery was consulted for tracheostomy which is pending conversation with family regarding goals of care.    Patient was set to go for trach on 01/26, but attempt was made for another trial extubation and the patient did well after extubation, she was transition to face mask and was able to maintain her saturations although had significant difficulty with secretions.  She was started on Robinul and scopolamine patch which did seem to improve for secretions.  Her respiratory status continued to be somewhat tenuous, so she was monitored in the ICU.  Family expressed desire to not have her get a tracheostomy.  Case management began working on LTACH referral.      Interval History: no acute events overnight. Pending dc to NH. Family appealed dc      Review of Systems   Unable to perform ROS: Patient nonverbal     Objective:     Vital Signs (Most Recent):  Temp: 98 °F (36.7 °C) (02/15/22 1149)  Pulse: 66 (02/15/22 1149)  Resp: 16  (02/15/22 1149)  BP: 137/65 (02/15/22 1149)  SpO2: 99 % (02/15/22 1149) Vital Signs (24h Range):  Temp:  [96.2 °F (35.7 °C)-98.6 °F (37 °C)] 98 °F (36.7 °C)  Pulse:  [66-72] 66  Resp:  [16-20] 16  SpO2:  [96 %-99 %] 99 %  BP: ()/(57-71) 137/65     Weight: 63.9 kg (140 lb 14 oz)  Body mass index is 24.18 kg/m².    Intake/Output Summary (Last 24 hours) at 2/15/2022 1428  Last data filed at 2/15/2022 0400  Gross per 24 hour   Intake --   Output 1050 ml   Net -1050 ml      Physical Exam  Constitutional:       General: She is not in acute distress.     Appearance: She is not ill-appearing.   HENT:      Head: Normocephalic.      Mouth/Throat:      Mouth: Mucous membranes are moist.   Eyes:      General:         Right eye: No discharge.         Left eye: No discharge.      Extraocular Movements: Extraocular movements intact.      Pupils: Pupils are equal, round, and reactive to light.   Neck:      Vascular: No JVD.   Cardiovascular:      Rate and Rhythm: Normal rate and regular rhythm.   Pulmonary:      Effort: Pulmonary effort is normal.      Breath sounds: Normal breath sounds.   Abdominal:      General: Abdomen is flat. Bowel sounds are normal. There is no distension.      Palpations: Abdomen is soft.      Tenderness: There is no abdominal tenderness.   Musculoskeletal:      Right lower leg: No edema.      Left lower leg: No edema.   Skin:     General: Skin is warm and moist.      Findings: No rash.   Neurological:      Mental Status: She is alert.      Comments: She focuses her attention on me but doesn't say anything   Psychiatric:         Mood and Affect: Mood and affect normal.         Significant Labs: All pertinent labs within the past 24 hours have been reviewed.  CBC: No results for input(s): WBC, HGB, HCT, PLT in the last 48 hours.  CMP: No results for input(s): NA, K, CL, CO2, GLU, BUN, CREATININE, CALCIUM, PROT, ALBUMIN, BILITOT, ALKPHOS, AST, ALT, ANIONGAP, EGFRNONAA in the last 48 hours.    Invalid  input(s): ADAL    Significant Imaging: I have reviewed all pertinent imaging results/findings within the past 24 hours.      Assessment/Plan:      * Acute hypoxemic respiratory failure  stable  Monitor sats.  Monitor work of breathing.  She's partial code, specifying that she'd want to be intubated if need be.    Anemia  Continue to monitor H&H every 3 days currently stable no active bleeding noted    Hemoglobin   Date Value Ref Range Status   02/11/2022 9.5 (L) 12.0 - 16.0 g/dL Final   02/07/2022 8.3 (L) 12.0 - 16.0 g/dL Final   02/06/2022 8.5 (L) 12.0 - 16.0 g/dL Final   02/05/2022 6.7 (L) 12.0 - 16.0 g/dL Final   ]      Paroxysmal atrial fibrillation  -Telemetry  - controlled on metoprolol 12.5 mg BID  Will hold anticoagulant due to high risk of bleeding      Gastrostomy tube dependent  Stable.  No issues with it    Aspiration pneumonia  Continue antibiotics as they are but will stop them soon.  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  She's producing a lot of airway secretions and needs frequent suctioning.  She's high risk of aspiration pneumonia.  Will try to get her into LTACH for long-term care.    Antibiotics (From admission, onward)            None         Will monitor patient closely and continue current treatment plan unchanged.        Type 2 diabetes mellitus, with long-term current use of insulin  Patient's FSGs are controlled on current medication regimen.  Most recent fingerstick glucose reviewed-   No results for input(s): POCTGLUCOSE in the last 24 hours.  Current correctional scale  Medium  Maintain anti-hyperglycemic dose as follows-   Antihyperglycemics (From admission, onward)            Start     Stop Route Frequency Ordered    01/18/22 1438  insulin aspart U-100 pen 1-10 Units         -- SubQ Before meals & nightly PRN 01/18/22 1438        Hold Oral hypoglycemics while patient is in the hospital.        YARI (generalized anxiety disorder)  Extubated on 01/26.  Using Precedex  as needed to control anxiety.  Patient does not appear anxious on exam.      Hypothyroidism  Patient has chronic hypothyroidism. TFTs reviewed-   Lab Results   Component Value Date    TSH 1.130 07/13/2017   . Will continue chronic levothyroxine and adjust for and clinical changes.        Hyperlipidemia   Patient is chronically on statin.will continue for now. Monitor clinically. Last LDL was   Lab Results   Component Value Date    LDLCALC 94.4 07/13/2017          GERD (gastroesophageal reflux disease)  On famotidine.      VTE Risk Mitigation (From admission, onward)         Ordered     enoxaparin injection 40 mg  Daily         01/18/22 1438     IP VTE HIGH RISK PATIENT  Once         01/18/22 1438     Place sequential compression device  Until discontinued         01/18/22 1438                Discharge Planning   DEMETRIUS: 2/11/2022     Code Status: Partial Code   Is the patient medically ready for discharge?:     Reason for patient still in hospital (select all that apply): Patient trending condition and Treatment  Discharge Plan A: Return to nursing home                  Neda Porter MD  Department of Hospital Medicine   [facility]

## 2022-02-15 NOTE — PLAN OF CARE
Referral sent to the following facilities:   [facility]- denied  [facility]- denied  [facility]- denied  [facility]- under review  [facility]- denied  [facility]- unable to access [facility], hard faxed packet; received and reviewing    [facility]- no beds    [facility]- unable to access [facility], hard faxed packet- denied   [facility]-unable to access [facility], hard faxed packet - denied   [facility]- will review, no beds currently available  [facility]-  for admissions- denied  [facility]- denied  [facility]- no answer   [facility]- no beds   [facility]- denied, no beds    [facility]- unable to access [facility], hard faxed packet   [facility]- faxed manually 2/17/2022 @10:45am   [facility]   [facility]- not taking admissions at this time.    02/15/22 1416   Post-Acute Status   Post-Acute Authorization Placement   Post-Acute Placement Status Referrals Sent

## 2022-02-15 NOTE — SUBJECTIVE & OBJECTIVE
Interval History: no acute events overnight. Pending dc to NH. Family appealed dc      Review of Systems   Unable to perform ROS: Patient nonverbal     Objective:     Vital Signs (Most Recent):  Temp: 98 °F (36.7 °C) (02/15/22 1149)  Pulse: 66 (02/15/22 1149)  Resp: 16 (02/15/22 1149)  BP: 137/65 (02/15/22 1149)  SpO2: 99 % (02/15/22 1149) Vital Signs (24h Range):  Temp:  [96.2 °F (35.7 °C)-98.6 °F (37 °C)] 98 °F (36.7 °C)  Pulse:  [66-72] 66  Resp:  [16-20] 16  SpO2:  [96 %-99 %] 99 %  BP: ()/(57-71) 137/65     Weight: 63.9 kg (140 lb 14 oz)  Body mass index is 24.18 kg/m².    Intake/Output Summary (Last 24 hours) at 2/15/2022 1428  Last data filed at 2/15/2022 0400  Gross per 24 hour   Intake --   Output 1050 ml   Net -1050 ml      Physical Exam  Constitutional:       General: She is not in acute distress.     Appearance: She is not ill-appearing.   HENT:      Head: Normocephalic.      Mouth/Throat:      Mouth: Mucous membranes are moist.   Eyes:      General:         Right eye: No discharge.         Left eye: No discharge.      Extraocular Movements: Extraocular movements intact.      Pupils: Pupils are equal, round, and reactive to light.   Neck:      Vascular: No JVD.   Cardiovascular:      Rate and Rhythm: Normal rate and regular rhythm.   Pulmonary:      Effort: Pulmonary effort is normal.      Breath sounds: Normal breath sounds.   Abdominal:      General: Abdomen is flat. Bowel sounds are normal. There is no distension.      Palpations: Abdomen is soft.      Tenderness: There is no abdominal tenderness.   Musculoskeletal:      Right lower leg: No edema.      Left lower leg: No edema.   Skin:     General: Skin is warm and moist.      Findings: No rash.   Neurological:      Mental Status: She is alert.      Comments: She focuses her attention on me but doesn't say anything   Psychiatric:         Mood and Affect: Mood and affect normal.         Significant Labs: All pertinent labs within the past 24  hours have been reviewed.  CBC: No results for input(s): WBC, HGB, HCT, PLT in the last 48 hours.  CMP: No results for input(s): NA, K, CL, CO2, GLU, BUN, CREATININE, CALCIUM, PROT, ALBUMIN, BILITOT, ALKPHOS, AST, ALT, ANIONGAP, EGFRNONAA in the last 48 hours.    Invalid input(s): ESTGFAFR[place]    Significant Imaging: I have reviewed all pertinent imaging results/findings within the past 24 hours.

## 2022-02-15 NOTE — PT/OT/SLP PROGRESS
Physical Therapy Treatment    Patient Name:  Sandra Wilson   MRN:  [id number]    Recommendations:     Discharge Recommendations:  nursing facility, skilled,nursing facility, basic   Discharge Equipment Recommendations: none   Barriers to discharge: None    Assessment:     Sandra Wilson is a 81 y.o. female admitted with a medical diagnosis of Acute hypoxemic respiratory failure.  She presents with the following impairments/functional limitations:  weakness,impaired endurance,impaired functional mobilty,impaired balance,impaired cognition,decreased lower extremity function,decreased safety awareness,decreased ROM,impaired cardiopulmonary response to activity .    Pt seen supine in bed, alert, turns head to name calling and makes eye contact. Pt  Seen for EOB sitting max assist x2. Gentle stretching of LE's. Pt attempting to count verbally 1-10 with exercises..  Pt will need 24 hr supervision for safety    Rehab Prognosis: Fair; patient would benefit from acute skilled PT services to address these deficits and reach maximum level of function.    Recent Surgery: Procedure(s) (LRB):  CREATION, TRACHEOSTOMY (N/A) 20 Days Post-Op    Plan:     During this hospitalization, patient to be seen 3 x/week to address the identified rehab impairments via therapeutic activities,therapeutic exercises,neuromuscular re-education and progress toward the following goals:    · Plan of Care Expires:  02/28/22    Subjective     Chief Complaint: none  Patient/Family Comments/goals: none stated  Pain/Comfort:  · Pain Rating 1: 0/10      Objective:     Communicated with nurse Lerma prior to session.  Patient found HOB elevated with peripheral IV,PEG Tube,telemetry,pressure relief boots upon PT entry to room.     General Precautions: Standard, fall,contact   Orthopedic Precautions:N/A   Braces:    Respiratory Status: Room air     Functional Mobility:  · Bed Mobility:     · Scooting: maximal assistance  · Supine to Sit: maximal assistance  and of 2 persons  · Sit to Supine: maximal assistance and of 2 persons      AM-PAC 6 CLICK MOBILITY          Therapeutic Activities and Exercises:   Patient was educated on the importance of OOB activity and functional mobility to negate negative effects of prolonged bed rest during hospitalization, safe transfers and ambulation, and D/C planning   EOB sitting with 2 people assist  Generalized rigidity of LE's    Patient left HOB elevated with all lines intact, call button in reach, bed alarm on and PEG back to run with HOB elevated..    GOALS:   Multidisciplinary Problems     Physical Therapy Goals        Problem: Physical Therapy Goal    Goal Priority Disciplines Outcome Goal Variances Interventions   Physical Therapy Goal     PT, PT/OT Ongoing, Progressing     Description: Goals to be met by: 2022     Patient will increase functional independence with mobility by performin. Supine to sit with Maximum Assistance  2. Sitting at edge of bed x20 minutes with Maximum Assistance  3. Lower extremity exercise program x20 reps                    Time Tracking:     PT Received On: 02/15/22  PT Start Time: 0957     PT Stop Time: 1009  PT Total Time (min): 12 min     Billable Minutes: Therapeutic Activity 12    Treatment Type: Treatment  PT/PTA: PT     PTA Visit Number: 0     02/15/2022

## 2022-02-15 NOTE — PLAN OF CARE
10:00am- LM for pt's daughter, Ira to see if she called [employer] to start the appeal, followed up with Kristy and spoke with her daughter regarding the discharge plan for the pt.   10:44am- Spoke with Mere at [facility] who confirmed a discharge appeal had not been started.   10:51am- Received call from Ira, she states that she gave her daughter, Ana M the [facility] and my number, that Ana M called both [facility] and myself but nobody answered her. I told Ira that at this time [facility] was not willing to take the pt back regardless of Medicaid application status, that we had sent the referral out to other local nursing homes and the pt has not been accepted. I also let her know that we had made a referral to [facility] for concerns related to the patient being medically ready for discharge, not having a discharge plan and family not willing to take the patient home. Ira states she called an  who is suppose to speak with her tomorrow afternoon, I let her know that I would also be reaching out to our legal team for guidance with this situation. Ira states that she is going to send her daughter a text with the [employer] phone number for the appeal and my phone number for her to call me.   11:00am- Received call from pt's granddaughter, Ana M she states she is going to call [facility] to start the appeal but also wanted to discussed the options for discharge. I told her that [facility] would not accept the pt back. She said her mother told her she now has the Medicaid application completed and the additional information that was needed, she did ask about spending down the patients money and what they could spend it on, I told her I was not sure but there should be a phone number on the Medicaid application that should be able to tell them. I answered questions why the pt could not go under her SNF benefits to a NH, LTAC, sitters and hospice care.   After a lengthy discussion Ana M is in agreement with  me sending the pt's referral out to several other nursing homes, she is going to call [facility] to start the discharge appeal process, she is going to speak with her mother about possibly taking out a personal loan to pay a nursing home the first month if needed (she does understand a NH would have to accept the pt first) and that her mother might have to take a leave of absence to care for the pt. Ana M did ask what happens if the pt's daughter refuses to take the pt home, I told her that is one of the reasons we have involved [facility].   12:12pm- Left for [facility] asking for a return call to follow up.    02/15/22 1577   Discharge Reassessment   Assessment Type Discharge Planning Reassessment

## 2022-02-15 NOTE — PLAN OF CARE
Patient lying in bed. No acute changes noted. Reposition q2h and prn. Nurse encourage CNA to monitor buttock area so as to reduce breakdown. Patient has no s/s of pain or discomfort at present. Tele in place. Mayte tube feeding well. Residual less that 10 ml. Site checked and cleaned. No other concern at present. Mayte feeding well. Patient has no noted discomfort. POC reviewed. Difficulty with education and teaching noted. Redirection and total assist required with care. Will cont to monitor and provide care as needed. No other issues noted at present. Will cont to monitor and provide care as needed.

## 2022-02-15 NOTE — PLAN OF CARE
Patient continues to tolerate tube feedings Isosource @45ml/hr. O2 remains in place 1L via N/C. Peg tube intact flushes well. Patient tolerated all scheduled medications given via peg tube. Bed in lowest position, bed alarm on, room lit. Will continue to monitor patient for changes.

## 2022-02-15 NOTE — PLAN OF CARE
Problem: Physical Therapy Goal  Goal: Physical Therapy Goal  Description: Goals to be met by: 2022     Patient will increase functional independence with mobility by performin. Supine to sit with Maximum Assistance  2. Sitting at edge of bed x20 minutes with Maximum Assistance  3. Lower extremity exercise program x20 reps   Outcome: Ongoing, Progressing   Pt seen for EOB sitting max assist x2. Pt presents with LE's rigidity

## 2022-02-15 NOTE — PLAN OF CARE
Patient continues to tolerate continuous feedings via pump, Isosource 1.5 45ml/hr. [id number] NS. Pt safe and free of falls. Bed in lowest position. Call light in reach. Bed rails up x4. Bed alarm on and wheels locked. Will continue to monitor patient for changes.

## 2022-02-16 PROBLEM — J69.0 ASPIRATION PNEUMONIA: Status: RESOLVED | Noted: 2022-01-18 | Resolved: 2022-02-16

## 2022-02-16 PROCEDURE — C1751 CATH, INF, PER/CENT/MIDLINE: HCPCS

## 2022-02-16 PROCEDURE — 27000207 HC ISOLATION

## 2022-02-16 PROCEDURE — 97110 THERAPEUTIC EXERCISES: CPT

## 2022-02-16 PROCEDURE — 63600175 PHARM REV CODE 636 W HCPCS: Performed by: INTERNAL MEDICINE

## 2022-02-16 PROCEDURE — 11000001 HC ACUTE MED/SURG PRIVATE ROOM

## 2022-02-16 PROCEDURE — 25000003 PHARM REV CODE 250: Performed by: INTERNAL MEDICINE

## 2022-02-16 PROCEDURE — 27000221 HC OXYGEN, UP TO 24 HOURS

## 2022-02-16 PROCEDURE — 94761 N-INVAS EAR/PLS OXIMETRY MLT: CPT

## 2022-02-16 RX ADMIN — METOPROLOL TARTRATE 12.5 MG: 25 TABLET, FILM COATED ORAL at 09:02

## 2022-02-16 RX ADMIN — MIDODRINE HYDROCHLORIDE 5 MG: 5 TABLET ORAL at 09:02

## 2022-02-16 RX ADMIN — GLYCOPYRROLATE 1 MG: 1 TABLET ORAL at 09:02

## 2022-02-16 RX ADMIN — ATORVASTATIN CALCIUM 40 MG: 40 TABLET, FILM COATED ORAL at 08:02

## 2022-02-16 RX ADMIN — LEVOTHYROXINE SODIUM 25 MCG: 0.03 TABLET ORAL at 05:02

## 2022-02-16 RX ADMIN — FAMOTIDINE 20 MG: 10 INJECTION INTRAVENOUS at 08:02

## 2022-02-16 RX ADMIN — MIDODRINE HYDROCHLORIDE 5 MG: 5 TABLET ORAL at 08:02

## 2022-02-16 RX ADMIN — FAMOTIDINE 20 MG: 10 INJECTION INTRAVENOUS at 09:02

## 2022-02-16 RX ADMIN — GLYCOPYRROLATE 1 MG: 1 TABLET ORAL at 08:02

## 2022-02-16 RX ADMIN — ENOXAPARIN SODIUM 40 MG: 100 INJECTION SUBCUTANEOUS at 04:02

## 2022-02-16 RX ADMIN — CITALOPRAM HYDROBROMIDE 40 MG: 10 TABLET ORAL at 09:02

## 2022-02-16 RX ADMIN — AZELASTINE 137 MCG: 1 SPRAY, METERED NASAL at 09:02

## 2022-02-16 RX ADMIN — AZELASTINE 137 MCG: 1 SPRAY, METERED NASAL at 08:02

## 2022-02-16 RX ADMIN — MIDODRINE HYDROCHLORIDE 5 MG: 5 TABLET ORAL at 02:02

## 2022-02-16 RX ADMIN — METOPROLOL TARTRATE 12.5 MG: 25 TABLET, FILM COATED ORAL at 08:02

## 2022-02-16 RX ADMIN — GLYCOPYRROLATE 1 MG: 1 TABLET ORAL at 02:02

## 2022-02-16 NOTE — PT/OT/SLP PROGRESS
Physical Therapy Treatment    Patient Name:  Sandra Wilson   MRN:  [id number]    Recommendations:     Discharge Recommendations:  nursing facility, basic,nursing facility, skilled   Discharge Equipment Recommendations: none   Barriers to discharge: None    Assessment:     Sandra Wilson is a 81 y.o. female admitted with a medical diagnosis of Acute hypoxemic respiratory failure.  She presents with the following impairments/functional limitations:  weakness,impaired endurance,impaired functional mobilty,impaired balance,impaired cognition,decreased lower extremity function,decreased safety awareness,decreased ROM,impaired cardiopulmonary response to activity .    Pt sleepy today. Seen for gentle LE's thera ex and stretching. Re positioning in bed with total assist. Bilateral heels offloaded. Pt will need 24 hr care    Rehab Prognosis: Fair; patient would benefit from acute skilled PT services to address these deficits and reach maximum level of function.    Recent Surgery: Procedure(s) (LRB):  CREATION, TRACHEOSTOMY (N/A) 21 Days Post-Op    Plan:     During this hospitalization, patient to be seen 3 x/week to address the identified rehab impairments via therapeutic activities,therapeutic exercises,neuromuscular re-education and progress toward the following goals:    · Plan of Care Expires:  02/28/22    Subjective   Sleepy but became more awake at end of session with good eye contact  Chief Complaint: none  Patient/Family Comments/goals: none stated  Pain/Comfort:  ·        Objective:     Communicated with nurse Lerma prior to session.  Patient found HOB elevated with telemetry,peripheral IV,PEG Tube,pressure relief boots upon PT entry to room.     General Precautions: Standard, fall,contact   Orthopedic Precautions:N/A   Braces:    Respiratory Status: Room air     Functional Mobility:  · Bed Mobility:     · Scooting: total assistance      AM-PAC 6 CLICK MOBILITY          Therapeutic Activities and Exercises:   pt  seen for thera ex and repositioning in bed  Gentle stretches of LE's  Pt with rigidity  Repositioned HOB with bilateral pressure relief boots re applied  Nurse Maria Guadalupe at the bedside    Patient left HOB elevated with all lines intact and PEG intact..    GOALS:   Multidisciplinary Problems     Physical Therapy Goals        Problem: Physical Therapy Goal    Goal Priority Disciplines Outcome Goal Variances Interventions   Physical Therapy Goal     PT, PT/OT Ongoing, Progressing     Description: Goals to be met by: 2022     Patient will increase functional independence with mobility by performin. Supine to sit with Maximum Assistance  2. Sitting at edge of bed x20 minutes with Maximum Assistance  3. Lower extremity exercise program x20 reps                    Time Tracking:     PT Received On: 22  PT Start Time: 924     PT Stop Time: 943  PT Total Time (min): 19 min     Billable Minutes: Therapeutic Exercise 19    Treatment Type: Treatment  PT/PTA: PT     PTA Visit Number: 0     2022

## 2022-02-16 NOTE — PLAN OF CARE
Patient awake, tolerated scheduled medications via peg tube. O2 @ 1L via N/C.Peg tube feedings 45ml/hr. KVO running NS @20. Bed in lowest position and bed alarm set. Tele in place NSR. Will continue to monitor patient for changes.

## 2022-02-16 NOTE — PLAN OF CARE
Problem: Physical Therapy Goal  Goal: Physical Therapy Goal  Description: Goals to be met by: 2022     Patient will increase functional independence with mobility by performin. Supine to sit with Maximum Assistance  2. Sitting at edge of bed x20 minutes with Maximum Assistance  3. Lower extremity exercise program x20 reps   Outcome: Ongoing, Progressing   Pt sleepy. Thera ex and gentle stretching to LE's. Total assist for positioning

## 2022-02-16 NOTE — CARE UPDATE
02/15/22 1950   PRE-TX-O2   O2 Device (Oxygen Therapy) nasal cannula   $ Is the patient on Low Flow Oxygen? Yes   Flow (L/min) 1   SpO2 97 %   Pulse Oximetry Type Intermittent

## 2022-02-16 NOTE — PHYSICIAN QUERY
PT Name: Sandra Wilson  MR #: [id number]     DOCUMENTATION CLARIFICATION     CDS/: SAMARA Arcos, RN, CDS               Contact information:[email or web address]   This form is a permanent document in the medical record.     Query Date: February 16, 2022    By submitting this query, we are merely seeking further clarification of documentation.  Please utilize your independent clinical judgment when addressing the question(s) below.    The Medical Record contains the following:   Indicators   Supporting Clinical Findings Location in Medical Record    Non-blanchable erythema/redness     X Ulcer/Injury/Skin Breakdown  Wound care assessed sacral wound - moisture associated.      Patient has shear and friction to the right sacrum area, mepilex applied    Nurse note, 1/28 at 714 PM     Nurse note, 2/8 at 731 PM    Deep Tissue Injury     X Wound care consult  Patient admitted with skin injury to her sacral area and buttocks. Patient at this time has a flexi seal fecal containment system in place to help manage her stool and keep this off her skin due to incontinent associated dermatitis.           Pt's medial sacral area is closed, dried skin and cream in place.  Pt does have skin discoloration vs stage1 PI to sacral area.         Wound care, 1/28                                   Wound care, 2/11   X Acute/Chronic Illness  Past medical history of CVAs, CNS aneurysm, PEG status, CAD, HLD, hypothryoidism, DM, aortic stenosis, and colon, breast, and ovarian cancer    Presented from long term care with vomiting, diarrhea, and shortness of breath secondary to acute hypoxic respiratory failure from aspiration pneumonia in the setting of C diff colitis leading to septic shock. She was intubated in the ED     HMDr. Porter, 2/15   X Medication/Treatment  Cleaned the area with wound cleanser. The macerated skin to the sacral area wiped off easily and once cleaned the open sacral wound measured 1.5cm x 0.8cm x 0.1cm.   Cleaned this area and applied a cut piece of Urgotul dressing to cover the open wound and then secured with a foam sacral dressing and obtained a good seal. Foam dressing covered both buttocks and sacral areas. Wound care to continue to follow this patient.     Triad wound cream 2x a day to buttocks/sacrum as well as an offloading air mattress    Wound care, 1/28               Wound care, 2/11   X Other  Altered Skin Integrity: Coccyx   Description:  Partial thickness tissue loss. Shallow open ulcer with a red or pink wound bed, without slough. Intact or Open/Ruptured Serum-filled blister.     LDA 2/7 at 0730     The clinical guidelines noted are only a system guideline. It does not replace the providers clinical judgment.    Per the National Pressure Injury Advisory Panel:   A pressure injury is localized damage to the skin and underlying soft tissue usually over a bony prominence or related to a medical or other device. The injury can present as intact skin or an open ulcer and may be painful. The injury occurs as a result of intense and/or prolonged pressure or pressure in combination with shear. The tolerance of soft tissue for pressure and shear may also be affected by microclimate, nutrition, perfusion, co-morbidities and condition of the soft tissue.       Stage 1 Pressure Injury:  Intact skin with a localized area of non-blanchable erythema, which may appear differently in darkly pigmented skin. Color changes do not include purple or maroon discoloration; these may indicate deep tissue pressure injury.    Stage 2 Pressure Injury:  Partial-thickness loss of skin with exposed dermis. The wound bed is viable, pink or red, moist, and may also present as an intact or ruptured serum-filled blister.    Stage 3 Pressure Injury:  Full-thickness loss of skin, in which adipose (fat) is visible in the ulcer and granulation tissue and epibole (rolled wound edges) are often present. Slough and/or eschar may be visible.  Undermining and tunneling may occur.    Stage 4 Pressure Injury:  Full-thickness skin and tissue loss with exposed or directly palpable fascia, muscle, tendon, ligament, cartilage or bone in the ulcer. Slough and/or eschar may be visible. Epibole (rolled edges), undermining and/or tunneling often occur.    Unstageable Pressure Injury:  Full-thickness skin and tissue loss in which the extent of tissue damage within the ulcer cannot be confirmed because it is obscured by slough or eschar. If slough or eschar is removed, a Stage 3 or Stage 4 pressure injury will be revealed.        Provider, please provide the integumentary diagnosis related to the documentation of Sacral area:     [   ] Moisture associated dermatitis   [ x  ] Pressure Injury/Decubitus Ulcer, Stage 1   [   ] Pressure Injury/Decubitus Ulcer, Stage 2   [   ] Other Integumentary Diagnosis (please specify):______________   [  ] Clinically Undetermined             Please document in your progress notes daily for the duration of treatment until resolved and include in your discharge summary.    Reference:    LEI Martines., Raúl, JULI PULIDO., Goldberg, M., ABDON Garrison., ABDON Raman., & ASHOK James. (2016). Revised National Pressure Ulcer Advisory Panel Pressure Injury Staging System: Revised Pressure Injury Staging System. J Wound Ostomy Continence Nurs, 43(6), 585-597. doi:10.1097/won.[id number]    Form No.[id number]

## 2022-02-16 NOTE — SUBJECTIVE & OBJECTIVE
Interval History:  No Acute events overnight currently waiting on  placement    Review of Systems   Unable to perform ROS: Patient nonverbal     Objective:     Vital Signs (Most Recent):  Temp: 96.7 °F (35.9 °C) (02/16/22 1059)  Pulse: 63 (02/16/22 1059)  Resp: 16 (02/16/22 1059)  BP: (!) 146/72 (02/16/22 1059)  SpO2: 99 % (02/16/22 1059) Vital Signs (24h Range):  Temp:  [96.1 °F (35.6 °C)-98.5 °F (36.9 °C)] 96.7 °F (35.9 °C)  Pulse:  [63-73] 63  Resp:  [16-18] 16  SpO2:  [97 %-99 %] 99 %  BP: (129-158)/(62-72) 146/72     Weight: 63.9 kg (140 lb 14 oz)  Body mass index is 24.18 kg/m².    Intake/Output Summary (Last 24 hours) at 2/16/2022 1157  Last data filed at 2/15/2022 1600  Gross per 24 hour   Intake --   Output 650 ml   Net -650 ml      Physical Exam  Constitutional:       General: She is not in acute distress.     Appearance: She is not ill-appearing.   HENT:      Head: Normocephalic.      Mouth/Throat:      Mouth: Mucous membranes are moist.   Eyes:      General:         Right eye: No discharge.         Left eye: No discharge.      Extraocular Movements: Extraocular movements intact.      Pupils: Pupils are equal, round, and reactive to light.   Neck:      Vascular: No JVD.   Cardiovascular:      Rate and Rhythm: Normal rate and regular rhythm.   Pulmonary:      Effort: Pulmonary effort is normal.      Breath sounds: Normal breath sounds.   Abdominal:      General: Abdomen is flat. Bowel sounds are normal. There is no distension.      Palpations: Abdomen is soft.      Tenderness: There is no abdominal tenderness.   Musculoskeletal:      Right lower leg: No edema.      Left lower leg: No edema.   Skin:     General: Skin is warm and moist.      Findings: No rash.   Neurological:      Mental Status: She is alert.      Comments: She focuses her attention on me but doesn't say anything   Psychiatric:         Mood and Affect: Mood and affect normal.         Significant Labs: All pertinent labs within the past 24  hours have been reviewed.  CBC: No results for input(s): WBC, HGB, HCT, PLT in the last 48 hours.  CMP: No results for input(s): NA, K, CL, CO2, GLU, BUN, CREATININE, CALCIUM, PROT, ALBUMIN, BILITOT, ALKPHOS, AST, ALT, ANIONGAP, EGFRNONAA in the last 48 hours.    Invalid input(s): ESTGFAFRICA    Significant Imaging: I have reviewed all pertinent imaging results/findings within the past 24 hours.

## 2022-02-16 NOTE — PROGRESS NOTES
[facility] Hospital Medicine  Progress Note    Patient Name: Sandra Wilson  MRN: [id number]  Patient Class: IP- Inpatient   Admission Date: 1/18/2022  Length of Stay: 29 days  Attending Physician: Neda Porter MD  Primary Care Provider: Primary Doctor No        Subjective:     Principal Problem:Acute hypoxemic respiratory failure        HPI:  Sandra Wilson is an 81 year old female with a past medical history of CVAs, CNS aneurysm, PEG status, CAD, HLD, hypothryoidism, DM, aortic stenosis, and colon, breast, and ovarian cancer who presented from long term care with vomiting, diarrhea, and shortness of breath. Workup in the ED showed likely aspiration pneumonitis and C. Diff colitis. Her O2 requirement increased while in the ED requiring intubation with sedation. She also required Levophed as she likely developed septic shock. Antibiotics were broadened to cefepime, vancomycin, and Flagyl. Pulmonary was consulted. Family was notified. The patient was unable to provide history given acuity of illness.      Overview/Hospital Course:  Sandra Wilson is an 81 year old female with a past medical history of CVAs, CNS aneurysm, PEG status, CAD, HLD, hypothyroidism, DM, aortic stenosis, and colon, breast, and ovarian cancer who presented from long term care with vomiting, diarrhea, and shortness of breath secondary to acute hypoxic respiratory failure from aspiration pneumonia in the setting of C diff colitis leading to septic shock. She was intubated in the ED and started on Levophed infusion via RIJ CVC placed by Dr. Vignesh Gaspar. She was started on broad spectrum antibiotics with cefepime, Flagyl, vancomycin and admitted to the ICU. She was also started on enteral vancomycin for severe C diff colitis given elevated WBC count and STEFFANY. There was concern for developing ARDS given P/F ratio of 87 (severe); 190 1/22 (moderate). Pulmonary was consulted. She also presented with demand ischemia likely  secondary to septic shock. Troponin was trended and improved as shock resolved. Normal saline infusion was added for hyponatremia and STEFFANY which improved both. Levophed was able to be weaned. Family agreed to DNR status 1/18 and Palliative Care was consulted to discuss goal of care 1/19. Antibiotics were changed to doxycycline and Flagyl (GBS in sputum culture) 1/21; vancomycin was added 1/23 after culture also became positive for Staph aureus and Klebsiella. Her respiratory status has improved throughout her course and she was extubated (code changed to partial code for intubation) 1/21 only to be re-intubated for worsening stridor (history of tracheostomy). Upon re-intubation, copious amounts of secretions were suctioned. KUB suggested a mild ileus A bowel regimen was instituted and the patient was able to have bowel movements 1/22. Tube feeds were started 1/23. Her course was complicated by Afib as well noted on telemetry 1/21; metoprolol tartrate started 1/22.She failed a leak test 1/22; Surgery was consulted for tracheostomy which is pending conversation with family regarding goals of care.    Patient was set to go for trach on 01/26, but attempt was made for another trial extubation and the patient did well after extubation, she was transition to face mask and was able to maintain her saturations although had significant difficulty with secretions.  She was started on Robinul and scopolamine patch which did seem to improve for secretions.  Her respiratory status continued to be somewhat tenuous, so she was monitored in the ICU.  Family expressed desire to not have her get a tracheostomy.  Case management began working on LTACH referral.      Interval History:  No Acute events overnight currently waiting on  placement    Review of Systems   Unable to perform ROS: Patient nonverbal     Objective:     Vital Signs (Most Recent):  Temp: 96.7 °F (35.9 °C) (02/16/22 1059)  Pulse: 63 (02/16/22 1059)  Resp: 16 (02/16/22  1059)  BP: (!) 146/72 (02/16/22 1059)  SpO2: 99 % (02/16/22 1059) Vital Signs (24h Range):  Temp:  [96.1 °F (35.6 °C)-98.5 °F (36.9 °C)] 96.7 °F (35.9 °C)  Pulse:  [63-73] 63  Resp:  [16-18] 16  SpO2:  [97 %-99 %] 99 %  BP: (129-158)/(62-72) 146/72     Weight: 63.9 kg (140 lb 14 oz)  Body mass index is 24.18 kg/m².    Intake/Output Summary (Last 24 hours) at 2/16/2022 1157  Last data filed at 2/15/2022 1600  Gross per 24 hour   Intake --   Output 650 ml   Net -650 ml      Physical Exam  Constitutional:       General: She is not in acute distress.     Appearance: She is not ill-appearing.   HENT:      Head: Normocephalic.      Mouth/Throat:      Mouth: Mucous membranes are moist.   Eyes:      General:         Right eye: No discharge.         Left eye: No discharge.      Extraocular Movements: Extraocular movements intact.      Pupils: Pupils are equal, round, and reactive to light.   Neck:      Vascular: No JVD.   Cardiovascular:      Rate and Rhythm: Normal rate and regular rhythm.   Pulmonary:      Effort: Pulmonary effort is normal.      Breath sounds: Normal breath sounds.   Abdominal:      General: Abdomen is flat. Bowel sounds are normal. There is no distension.      Palpations: Abdomen is soft.      Tenderness: There is no abdominal tenderness.   Musculoskeletal:      Right lower leg: No edema.      Left lower leg: No edema.   Skin:     General: Skin is warm and moist.      Findings: No rash.   Neurological:      Mental Status: She is alert.      Comments: She focuses her attention on me but doesn't say anything   Psychiatric:         Mood and Affect: Mood and affect normal.         Significant Labs: All pertinent labs within the past 24 hours have been reviewed.  CBC: No results for input(s): WBC, HGB, HCT, PLT in the last 48 hours.  CMP: No results for input(s): NA, K, CL, CO2, GLU, BUN, CREATININE, CALCIUM, PROT, ALBUMIN, BILITOT, ALKPHOS, AST, ALT, ANIONGAP, EGFRNONAA in the last 48 hours.    Invalid  input(s): ADAL    Significant Imaging: I have reviewed all pertinent imaging results/findings within the past 24 hours.      Assessment/Plan:      * Acute hypoxemic respiratory failure  stable  Monitor sats.  Monitor work of breathing.  She's partial code, specifying that she'd want to be intubated if need be.    Anemia  Continue to monitor H&H every 3 days currently stable no active bleeding noted    Hemoglobin   Date Value Ref Range Status   02/11/2022 9.5 (L) 12.0 - 16.0 g/dL Final   02/07/2022 8.3 (L) 12.0 - 16.0 g/dL Final   02/06/2022 8.5 (L) 12.0 - 16.0 g/dL Final   02/05/2022 6.7 (L) 12.0 - 16.0 g/dL Final   ]      Paroxysmal atrial fibrillation  -Telemetry  - controlled on metoprolol 12.5 mg BID  Will hold anticoagulant due to high risk of bleeding      Gastrostomy tube dependent  Stable.  No issues with it    Type 2 diabetes mellitus, with long-term current use of insulin  Patient's FSGs are controlled on current medication regimen.  Most recent fingerstick glucose reviewed-   No results for input(s): POCTGLUCOSE in the last 24 hours.  Current correctional scale  Medium  Maintain anti-hyperglycemic dose as follows-   Antihyperglycemics (From admission, onward)            Start     Stop Route Frequency Ordered    01/18/22 1438  insulin aspart U-100 pen 1-10 Units         -- SubQ Before meals & nightly PRN 01/18/22 1438        Hold Oral hypoglycemics while patient is in the hospital.        YARI (generalized anxiety disorder)  Extubated on 01/26.  Using Precedex as needed to control anxiety.  Patient does not appear anxious on exam.      Hypothyroidism  Patient has chronic hypothyroidism. TFTs reviewed-   Lab Results   Component Value Date    TSH 1.130 07/13/2017   . Will continue chronic levothyroxine and adjust for and clinical changes.        Hyperlipidemia   Patient is chronically on statin.will continue for now. Monitor clinically. Last LDL was   Lab Results   Component Value Date    LDLCALC 94.4  07/13/2017          GERD (gastroesophageal reflux disease)  On famotidine.      VTE Risk Mitigation (From admission, onward)         Ordered     enoxaparin injection 40 mg  Daily         01/18/22 1438     IP VTE HIGH RISK PATIENT  Once         01/18/22 1438     Place sequential compression device  Until discontinued         01/18/22 1438                Discharge Planning   DEMETRIUS: 2/11/2022     Code Status: Partial Code   Is the patient medically ready for discharge?:     Reason for patient still in hospital (select all that apply): Patient trending condition and Treatment  Discharge Plan A: Return to nursing home                  Neda Porter MD  Department of Hospital Medicine   [facility]

## 2022-02-16 NOTE — PLAN OF CARE
Spoke with EPS , Mrs Albrecth; answered questions regarding the patient's current situation. Mrs Albrecht is going to come see the pt this evening and will continue to reach out to the pt's daughter, Ira then will update CM.    02/16/22 1442   Discharge Reassessment   Assessment Type Discharge Planning Reassessment

## 2022-02-16 NOTE — PLAN OF CARE
Patient lying in bed. No acute changes noted. Patient speech hard to understand at times. Redirection required frequently with care. Patient denies pain. No s/s of discomfort or distress noted. Mayte feeding and flush well via pegtube. Safety measure in place. Tele in place. Frequent rounds made d/t unable to push call light system. Turn q2h provided. No new skin concerns. No other concerns at present. Care will continue.

## 2022-02-16 NOTE — PLAN OF CARE
Received message from Ms Albrecht at [facility], returned her call and left message asking her to call me back.   Spoke with the pt's daughter, Ira; she states that she has been approved for a personal loan to pay for the first few months of her mother's care at a nursing home but will not have the money until next Wednesday. She does understand that at this time we do not have an accepting nursing home and that the patient is medically cleared for discharge, she is insistent that she is not taking the patient and she will need to stay here until a NH accepts and she has the funds available. Ira did say that she received a message from a Elderly Services worker, she is going to call her back and will inform her of the above also.    02/16/22 1110   Discharge Reassessment   Assessment Type Discharge Planning Reassessment

## 2022-02-17 PROCEDURE — 25000003 PHARM REV CODE 250: Performed by: INTERNAL MEDICINE

## 2022-02-17 PROCEDURE — 11000001 HC ACUTE MED/SURG PRIVATE ROOM

## 2022-02-17 PROCEDURE — 27000221 HC OXYGEN, UP TO 24 HOURS

## 2022-02-17 PROCEDURE — 63600175 PHARM REV CODE 636 W HCPCS: Performed by: INTERNAL MEDICINE

## 2022-02-17 PROCEDURE — 94761 N-INVAS EAR/PLS OXIMETRY MLT: CPT

## 2022-02-17 PROCEDURE — C1751 CATH, INF, PER/CENT/MIDLINE: HCPCS

## 2022-02-17 PROCEDURE — 27000207 HC ISOLATION

## 2022-02-17 RX ADMIN — GLYCOPYRROLATE 1 MG: 1 TABLET ORAL at 03:02

## 2022-02-17 RX ADMIN — MIDODRINE HYDROCHLORIDE 5 MG: 5 TABLET ORAL at 03:02

## 2022-02-17 RX ADMIN — MIDODRINE HYDROCHLORIDE 5 MG: 5 TABLET ORAL at 10:02

## 2022-02-17 RX ADMIN — ENOXAPARIN SODIUM 40 MG: 100 INJECTION SUBCUTANEOUS at 04:02

## 2022-02-17 RX ADMIN — CITALOPRAM HYDROBROMIDE 40 MG: 10 TABLET ORAL at 10:02

## 2022-02-17 RX ADMIN — GLYCOPYRROLATE 1 MG: 1 TABLET ORAL at 10:02

## 2022-02-17 RX ADMIN — FAMOTIDINE 20 MG: 10 INJECTION INTRAVENOUS at 08:02

## 2022-02-17 RX ADMIN — GLYCOPYRROLATE 1 MG: 1 TABLET ORAL at 08:02

## 2022-02-17 RX ADMIN — METOPROLOL TARTRATE 12.5 MG: 25 TABLET, FILM COATED ORAL at 08:02

## 2022-02-17 RX ADMIN — AZELASTINE 137 MCG: 1 SPRAY, METERED NASAL at 10:02

## 2022-02-17 RX ADMIN — METOPROLOL TARTRATE 12.5 MG: 25 TABLET, FILM COATED ORAL at 10:02

## 2022-02-17 RX ADMIN — ATORVASTATIN CALCIUM 40 MG: 40 TABLET, FILM COATED ORAL at 08:02

## 2022-02-17 RX ADMIN — FAMOTIDINE 20 MG: 10 INJECTION INTRAVENOUS at 11:02

## 2022-02-17 RX ADMIN — MIDODRINE HYDROCHLORIDE 5 MG: 5 TABLET ORAL at 08:02

## 2022-02-17 RX ADMIN — LEVOTHYROXINE SODIUM 25 MCG: 0.03 TABLET ORAL at 05:02

## 2022-02-17 RX ADMIN — AZELASTINE 137 MCG: 1 SPRAY, METERED NASAL at 08:02

## 2022-02-17 NOTE — PLAN OF CARE
Spoke with Mrs Albrecht, EPS worker; she came to see the patient and spoke with the pt's daughter, Ira, at this time EPS is stating the pt needs [facility]iary nursing home placement due to the daughter's inability to properly care for the patient at home.   CM will continue to pursue penitentiary NH placement.    02/17/22 1102   Discharge Reassessment   Assessment Type Discharge Planning Reassessment

## 2022-02-17 NOTE — PLAN OF CARE
Sent referrals via [facility] to following facilities-     [facility]- denied   [facility]   [facility] denied  [facility]- denied   [facility]  [facility]- denied   [facility]  [facility]   [facility]- denied  [facility]- denied  [facility]  [facility]- denied   [facility]- reviewing   [facility]- denied  [facility]  [facility] [place]   [facility]- denied  [facility]- denied  [facility]- not accepting nursing home at this time  [facility]   [facility]- denied    [facility]  [facility]     02/17/22 1053   Post-Acute Status   Post-Acute Authorization Placement   Post-Acute Placement Status Referrals Sent

## 2022-02-17 NOTE — PLAN OF CARE
Intervention: enteral nutrition therapy     Recommendations  1) Continue TF Isosource 1.5 @ 45 mL/hr as pt tolerates + Brody BID and Beneprotein BID + 115 ml flush q 4 hr  --provides 1620 kcal, 73 g protein ( + beneprotein), 820 ml free water  (100% EEN, 100% EPN )     2) weigh weekly     Goals: 1) Initation of enteral nutrition by RD follow up 2) Nutrition support meeting > 50% of needs at f/u  Nutrition Goal Status: met/meeting-continues  Communication of RD Recs: POC, sticky note

## 2022-02-17 NOTE — CARE UPDATE
02/16/22 2111   PRE-TX-O2   O2 Device (Oxygen Therapy) nasal cannula   $ Is the patient on Low Flow Oxygen? Yes   Flow (L/min) 1   SpO2 98 %   Pulse Oximetry Type Intermittent

## 2022-02-17 NOTE — PLAN OF CARE
Non vocal. Afebrile. VSS. Cardiac monitoring. Safety measures maintained. Bed locked in lowest position, call light in reach. Continuous tube feedings by pump via SYL midline. NAD noted. WCTM & update POC prn.     Problem: Adult Inpatient Plan of Care  Goal: Plan of Care Review  Outcome: Ongoing, Progressing  Goal: Patient-Specific Goal (Individualized)  Outcome: Ongoing, Progressing  Goal: Absence of Hospital-Acquired Illness or Injury  Outcome: Ongoing, Progressing  Goal: Optimal Comfort and Wellbeing  Outcome: Ongoing, Progressing     Problem: Infection  Goal: Absence of Infection Signs and Symptoms  Outcome: Ongoing, Progressing     Problem: Fluid Imbalance (Pneumonia)  Goal: Fluid Balance  Outcome: Ongoing, Progressing     Problem: Infection (Pneumonia)  Goal: Resolution of Infection Signs and Symptoms  Outcome: Ongoing, Progressing     Problem: Respiratory Compromise (Pneumonia)  Goal: Effective Oxygenation and Ventilation  Outcome: Ongoing, Progressing     Problem: Fall Injury Risk  Goal: Absence of Fall and Fall-Related Injury  Outcome: Ongoing, Progressing     Problem: Skin Injury Risk Increased  Goal: Skin Health and Integrity  Outcome: Ongoing, Progressing     Problem: Adjustment to Illness (Sepsis/Septic Shock)  Goal: Optimal Coping  Outcome: Ongoing, Progressing     Problem: Bleeding (Sepsis/Septic Shock)  Goal: Absence of Bleeding  Outcome: Ongoing, Progressing     Problem: Infection Progression (Sepsis/Septic Shock)  Goal: Absence of Infection Signs and Symptoms  Outcome: Ongoing, Progressing     Problem: Fluid and Electrolyte Imbalance (Acute Kidney Injury/Impairment)  Goal: Fluid and Electrolyte Balance  Outcome: Ongoing, Progressing     Problem: Oral Intake Inadequate (Acute Kidney Injury/Impairment)  Goal: Optimal Nutrition Intake  Outcome: Ongoing, Progressing     Problem: Renal Function Impairment (Acute Kidney Injury/Impairment)  Goal: Effective Renal Function  Outcome: Ongoing, Progressing      Problem: ARDS (Acute Respiratory Distress Syndrome)  Goal: Effective Oxygenation  Outcome: Ongoing, Progressing     Problem: Oral Intake Inadequate  Goal: Improved Oral Intake  Outcome: Ongoing, Progressing     Problem: Impaired Wound Healing  Goal: Optimal Wound Healing  Outcome: Ongoing, Progressing     Problem: Aspiration (Enteral Nutrition)  Goal: Absence of Aspiration Signs and Symptoms  Outcome: Ongoing, Progressing     Problem: Device-Related Complication Risk (Enteral Nutrition)  Goal: Safe, Effective Therapy Delivery  Outcome: Ongoing, Progressing     Problem: Feeding Intolerance (Enteral Nutrition)  Goal: Feeding Tolerance  Outcome: Ongoing, Progressing

## 2022-02-17 NOTE — SUBJECTIVE & OBJECTIVE
Interval History:  No new symptoms no acute events overnight awaiting placement    Review of Systems   Unable to perform ROS: Patient nonverbal     Objective:     Vital Signs (Most Recent):  Temp: 96.2 °F (35.7 °C) (02/17/22 1501)  Pulse: 64 (02/17/22 1501)  Resp: 18 (02/17/22 1501)  BP: 139/62 (02/17/22 1501)  SpO2: 99 % (02/17/22 1501) Vital Signs (24h Range):  Temp:  [96.2 °F (35.7 °C)-98.3 °F (36.8 °C)] 96.2 °F (35.7 °C)  Pulse:  [62-76] 64  Resp:  [14-18] 18  SpO2:  [95 %-99 %] 99 %  BP: ()/(52-70) 139/62     Weight: 63.9 kg (140 lb 14 oz)  Body mass index is 24.18 kg/m².    Intake/Output Summary (Last 24 hours) at 2/17/2022 1636  Last data filed at 2/17/2022 0900  Gross per 24 hour   Intake 1920 ml   Output 0 ml   Net 1920 ml      Physical Exam  Vitals and nursing note reviewed.   Constitutional:       General: She is not in acute distress.     Appearance: She is not ill-appearing.   HENT:      Head: Normocephalic.      Mouth/Throat:      Mouth: Mucous membranes are moist.   Eyes:      General:         Right eye: No discharge.         Left eye: No discharge.      Extraocular Movements: Extraocular movements intact.      Pupils: Pupils are equal, round, and reactive to light.   Neck:      Vascular: No JVD.   Cardiovascular:      Rate and Rhythm: Normal rate and regular rhythm.   Pulmonary:      Effort: Pulmonary effort is normal.      Breath sounds: Normal breath sounds.   Abdominal:      General: Abdomen is flat. Bowel sounds are normal. There is no distension.      Palpations: Abdomen is soft.      Tenderness: There is no abdominal tenderness.   Musculoskeletal:      Right lower leg: No edema.      Left lower leg: No edema.   Skin:     General: Skin is warm and moist.      Findings: No rash.   Neurological:      Mental Status: She is alert.      Comments: She focuses her attention on me but doesn't say anything   Psychiatric:         Mood and Affect: Mood and affect normal.         Significant Labs: All  pertinent labs within the past 24 hours have been reviewed.  CBC: No results for input(s): WBC, HGB, HCT, PLT in the last 48 hours.  CMP: No results for input(s): NA, K, CL, CO2, GLU, BUN, CREATININE, CALCIUM, PROT, ALBUMIN, BILITOT, ALKPHOS, AST, ALT, ANIONGAP, EGFRNONAA in the last 48 hours.    Invalid input(s): ESTGF[place]    Significant Imaging: I have reviewed all pertinent imaging results/findings within the past 24 hours.

## 2022-02-17 NOTE — PLAN OF CARE
Received call from pt's son, Jose C asking to meet with me to discuss patient's DC dispo.   Met with Jose C, who presented pt's POA (copy made and placed in pt's blue folder) listing him and pt's daughter, Ira; updated him on current discharge challenges. Jose C is going to contact the EPS worker and go speak with Zaira at [facility] to see if he can assist with getting the patient's financial situation straight.   He will keep CM updated.    02/17/22 3597   Discharge Reassessment   Assessment Type Discharge Planning Reassessment

## 2022-02-17 NOTE — PROGRESS NOTES
[facility] Hospital Medicine  Progress Note    Patient Name: Sandra Wilson  MRN: [id number]  Patient Class: IP- Inpatient   Admission Date: 1/18/2022  Length of Stay: 30 days  Attending Physician: Neda Porter MD  Primary Care Provider: Primary Doctor No        Subjective:     Principal Problem:Acute hypoxemic respiratory failure        HPI:  Sandra Wilson is an 81 year old female with a past medical history of CVAs, CNS aneurysm, PEG status, CAD, HLD, hypothryoidism, DM, aortic stenosis, and colon, breast, and ovarian cancer who presented from long term care with vomiting, diarrhea, and shortness of breath. Workup in the ED showed likely aspiration pneumonitis and C. Diff colitis. Her O2 requirement increased while in the ED requiring intubation with sedation. She also required Levophed as she likely developed septic shock. Antibiotics were broadened to cefepime, vancomycin, and Flagyl. Pulmonary was consulted. Family was notified. The patient was unable to provide history given acuity of illness.      Overview/Hospital Course:  Sandra Wilson is an 81 year old female with a past medical history of CVAs, CNS aneurysm, PEG status, CAD, HLD, hypothyroidism, DM, aortic stenosis, and colon, breast, and ovarian cancer who presented from long term care with vomiting, diarrhea, and shortness of breath secondary to acute hypoxic respiratory failure from aspiration pneumonia in the setting of C diff colitis leading to septic shock. She was intubated in the ED and started on Levophed infusion via RIJ CVC placed by Dr. Vignesh Gaspar. She was started on broad spectrum antibiotics with cefepime, Flagyl, vancomycin and admitted to the ICU. She was also started on enteral vancomycin for severe C diff colitis given elevated WBC count and STEFFANY. There was concern for developing ARDS given P/F ratio of 87 (severe); 190 1/22 (moderate). Pulmonary was consulted. She also presented with demand ischemia likely  secondary to septic shock. Troponin was trended and improved as shock resolved. Normal saline infusion was added for hyponatremia and STEFFANY which improved both. Levophed was able to be weaned. Family agreed to DNR status 1/18 and Palliative Care was consulted to discuss goal of care 1/19. Antibiotics were changed to doxycycline and Flagyl (GBS in sputum culture) 1/21; vancomycin was added 1/23 after culture also became positive for Staph aureus and Klebsiella. Her respiratory status has improved throughout her course and she was extubated (code changed to partial code for intubation) 1/21 only to be re-intubated for worsening stridor (history of tracheostomy). Upon re-intubation, copious amounts of secretions were suctioned. KUB suggested a mild ileus A bowel regimen was instituted and the patient was able to have bowel movements 1/22. Tube feeds were started 1/23. Her course was complicated by Afib as well noted on telemetry 1/21; metoprolol tartrate started 1/22.She failed a leak test 1/22; Surgery was consulted for tracheostomy which is pending conversation with family regarding goals of care.    Patient was set to go for trach on 01/26, but attempt was made for another trial extubation and the patient did well after extubation, she was transition to face mask and was able to maintain her saturations although had significant difficulty with secretions.  She was started on Robinul and scopolamine patch which did seem to improve for secretions.  Her respiratory status continued to be somewhat tenuous, so she was monitored in the ICU.  Family expressed desire to not have her get a tracheostomy.  Case management began working on LTACH referral.      Interval History:  No new symptoms no acute events overnight awaiting placement    Review of Systems   Unable to perform ROS: Patient nonverbal     Objective:     Vital Signs (Most Recent):  Temp: 96.2 °F (35.7 °C) (02/17/22 1501)  Pulse: 64 (02/17/22 1501)  Resp: 18  (02/17/22 1501)  BP: 139/62 (02/17/22 1501)  SpO2: 99 % (02/17/22 1501) Vital Signs (24h Range):  Temp:  [96.2 °F (35.7 °C)-98.3 °F (36.8 °C)] 96.2 °F (35.7 °C)  Pulse:  [62-76] 64  Resp:  [14-18] 18  SpO2:  [95 %-99 %] 99 %  BP: ()/(52-70) 139/62     Weight: 63.9 kg (140 lb 14 oz)  Body mass index is 24.18 kg/m².    Intake/Output Summary (Last 24 hours) at 2/17/2022 1636  Last data filed at 2/17/2022 0900  Gross per 24 hour   Intake 1920 ml   Output 0 ml   Net 1920 ml      Physical Exam  Vitals and nursing note reviewed.   Constitutional:       General: She is not in acute distress.     Appearance: She is not ill-appearing.   HENT:      Head: Normocephalic.      Mouth/Throat:      Mouth: Mucous membranes are moist.   Eyes:      General:         Right eye: No discharge.         Left eye: No discharge.      Extraocular Movements: Extraocular movements intact.      Pupils: Pupils are equal, round, and reactive to light.   Neck:      Vascular: No JVD.   Cardiovascular:      Rate and Rhythm: Normal rate and regular rhythm.   Pulmonary:      Effort: Pulmonary effort is normal.      Breath sounds: Normal breath sounds.   Abdominal:      General: Abdomen is flat. Bowel sounds are normal. There is no distension.      Palpations: Abdomen is soft.      Tenderness: There is no abdominal tenderness.   Musculoskeletal:      Right lower leg: No edema.      Left lower leg: No edema.   Skin:     General: Skin is warm and moist.      Findings: No rash.   Neurological:      Mental Status: She is alert.      Comments: She focuses her attention on me but doesn't say anything   Psychiatric:         Mood and Affect: Mood and affect normal.         Significant Labs: All pertinent labs within the past 24 hours have been reviewed.  CBC: No results for input(s): WBC, HGB, HCT, PLT in the last 48 hours.  CMP: No results for input(s): NA, K, CL, CO2, GLU, BUN, CREATININE, CALCIUM, PROT, ALBUMIN, BILITOT, ALKPHOS, AST, ALT, ANIONGAP,  EGFRNONAA in the last 48 hours.    Invalid input(s): JOSEAFANDREINA    Significant Imaging: I have reviewed all pertinent imaging results/findings within the past 24 hours.      Assessment/Plan:      * Acute hypoxemic respiratory failure  stable  Monitor sats.  Monitor work of breathing.  She's partial code, specifying that she'd want to be intubated if need be.    Anemia  Continue to monitor H&H every 3 days currently stable no active bleeding noted    Hemoglobin   Date Value Ref Range Status   02/11/2022 9.5 (L) 12.0 - 16.0 g/dL Final   02/07/2022 8.3 (L) 12.0 - 16.0 g/dL Final   02/06/2022 8.5 (L) 12.0 - 16.0 g/dL Final   02/05/2022 6.7 (L) 12.0 - 16.0 g/dL Final   ]      Paroxysmal atrial fibrillation  -Telemetry  - controlled on metoprolol 12.5 mg BID  Will hold anticoagulant due to high risk of bleeding      Gastrostomy tube dependent  Stable.  No issues with it    Type 2 diabetes mellitus, with long-term current use of insulin  Patient's FSGs are controlled on current medication regimen.  Most recent fingerstick glucose reviewed-   No results for input(s): POCTGLUCOSE in the last 24 hours.  Current correctional scale  Medium  Maintain anti-hyperglycemic dose as follows-   Antihyperglycemics (From admission, onward)            Start     Stop Route Frequency Ordered    01/18/22 1438  insulin aspart U-100 pen 1-10 Units         -- SubQ Before meals & nightly PRN 01/18/22 1438        Hold Oral hypoglycemics while patient is in the hospital.        YARI (generalized anxiety disorder)  Extubated on 01/26.  Using Precedex as needed to control anxiety.  Patient does not appear anxious on exam.      Hypothyroidism  Patient has chronic hypothyroidism. TFTs reviewed-   Lab Results   Component Value Date    TSH 1.130 07/13/2017   . Will continue chronic levothyroxine and adjust for and clinical changes.        Hyperlipidemia   Patient is chronically on statin.will continue for now. Monitor clinically. Last LDL was   Lab  Results   Component Value Date    LDLCALC 94.4 07/13/2017          GERD (gastroesophageal reflux disease)  On famotidine.      VTE Risk Mitigation (From admission, onward)         Ordered     enoxaparin injection 40 mg  Daily         01/18/22 1438     IP VTE HIGH RISK PATIENT  Once         01/18/22 1438     Place sequential compression device  Until discontinued         01/18/22 1438                Discharge Planning   DEMETRIUS: 2/11/2022     Code Status: Partial Code   Is the patient medically ready for discharge?:     Reason for patient still in hospital (select all that apply): Patient trending condition and Treatment  Discharge Plan A: Return to nursing home                  Neda Porter MD  Department of Hospital Medicine   [facility]

## 2022-02-17 NOTE — PROGRESS NOTES
[facility]  Adult Nutrition  Progress Note    SUMMARY   Intervention: enteral nutrition therapy     Recommendations  1) Continue TF Isosource 1.5 @ 45 mL/hr as pt tolerates + Brody BID and Beneprotein BID + 115 ml flush q 4 hr  --provides 1620 kcal, 73 g protein ( + beneprotein), 820 ml free water  (100% EEN, 100% EPN )     2) weigh weekly     Goals: 1) Initation of enteral nutrition by RD follow up 2) Nutrition support meeting > 50% of needs at f/u  Nutrition Goal Status: met/meeting-continues  Communication of RD Recs: POC, sticky note     Assessment and Plan  Nutrition Problem  Inadequate energy intake     Related to (etiology):   NPO status, no TF running, dysphagia     Signs and Symptoms (as evidenced by):   Pt receiving < 50% EEN/EPN x 6 days     Interventions(treatment strategy):  above     Nutrition Diagnosis Status:   improved     Malnutrition  Edema: 1+  Gabriel: 11 + PEG, new coccyx ulcer  NFPE 1/24/21 no significant wasting seen  PO intakes < 50% of needs x > 5-6 days  No significant wt loss per chart review     Reason for Assessment  Reason For Assessment: follow up  Diagnosis: other (see comments) (Acute hypoxemic respiratory failure)  Relevant Medical History: GERD, HLD, HTN, CVAs, CNS aneurysm, PEG, CAD, hypothyroidism, DM, colon, breast and ovarian cancer.  Interdisciplinary Rounds: did not attend     General Information Comments: Remote assessment for coverage. Pt intubated and sedated, requiring levophed, on propofol, MAP 75, plan to try for extubation today per note. With PEG, noted pt has hx of removing PEG tube. C-diff positive, also noted with STEFFANY - renal labs improving, K, phos low, repleting. Per chart, with 9.5% wt loss over the last 2 months, significant. NFPE needed to determine malnutrition status. RD to monitor and follow up.  Nutrition Discharge Planning: Pending clinical course  1/24/22 TF recs left 1/21, TF started @ 10 ml/hr yesterday, continues at same rate. No  "BM. + vent-possible plan for trach placement. NFPE done 1/24/21 no significant wasting seen.  1/26/22 + PEG. TF held for possible trach placemen however now pt extubated. Plan to continue with TFs.  1/28/21 TF held 1/26-today r/t tenuous respiratory status. TPN started yesterday per MD ( meeting 72% protein needs and 94% energy needs). Pt with O2 mask on today. Plan per MD to continue TPN and start trickle TF in the next 24-48 hr.  1/31/22 Pt was started on trickle TF's over the weekend. TPN reordered today as pt TF not advancing fast enough.  TF at 20 ml/hr, and tolerating today. Plan to advance toward goal per MD.  2/3/22 Pt appeared well nourished visually.  TF had been advanced to 45ml/hr and pt was tolerating.  Pt on oxymask at 4L/min.    2/10/22 Pt continues to tolerate TF @ goal via PEG + flush as ordered. Not responsive to questions at visit.  2/17/22 Pt continues to tolerate TF @ goal + chun and beneprotein. Awaiting nursing home placement.     Discharge Planning:  TF above or cardiac, DM 1500 kcal diet     Nutrition/ Diet History  unable to obtain  Spiritual/cultural/Mormonism factors affecting PO intakes  Factors affecting PO intakes: NPO, trouble swallowing     Nutrition Risk Screen  Nutrition Risk Screen: no indicators present     Anthropometrics  Height: 5' 4" (162.6 cm)  Height (inches): 64 in  Weight Method: Bed Scale  Weight: 63.9 kg 2/6, 69.1 kg 1/31, 71.5 kg 1/22, 70.3 kg 1/24, 69 kg (admission)  Weight (lb): 140 lb ( edema)  Ideal Body Weight (IBW), Female: 120 lb  BMI (Calculated): 26.1 admission  81.6 kg 1/2020     Lab/Procedures/Meds  Pertinent Labs Reviewed: reviewed  BMP  Lab Results   Component Value Date     (L) 02/11/2022    K 4.2 02/11/2022    CL 98 02/11/2022    CO2 30 (H) 02/11/2022    BUN 14 02/11/2022    CREATININE 0.6 02/11/2022    CALCIUM 8.4 (L) 02/11/2022    ANIONGAP 7 (L) 02/11/2022    ESTGFRAFRICA >60 02/11/2022    EGFRNONAA >60 02/11/2022     Glucose  mg/dl x 24 " hr    Pertinent Medications Reviewed: reviewed  Statin, NS 1-35 ml/hr, insulin     Estimated/Assessed Needs  Weight Used For Calorie Calculations: 68.9 kg (152 lb)  Energy Calorie Requirements (kcal): MSJ ( x 1.25-1.4) = 6311-7383 kcal  Energy Need Method: MS  Protein Requirements:  g/day based on 1.2-1.5 g protein /kg  Weight Used For Protein Calculations: 68.9 kg (152 lb)  Fluid Requirements (mL): 1 mL/kcal or per MD  Estimated Fluid Requirement Method: RDA Method  RDA Method (mL): 1450 ml  CHO Requirement: 160-195 g     Nutrition Prescription Ordered  Current Diet Order: TF above + NPO     Evaluation of Received Nutrient/Fluid Intake  Energy Calories Required:  meeting needs  Protein Required: meeting needs  Fluid Required: meeting needs  Tolerance: tolerating  % Intake of Estimated Energy Needs: 100%  % Meal Intake: NPO + TF     Nutrition Risk  Level of Risk/Frequency of Follow-up: moderate/low (1-2x weekly)      Monitor and Evaluation  Food and Nutrient Intake: enteral nutrition intake  Food and Nutrient Adminstration: enteral and parenteral nutrition administration  Knowledge/Beliefs/Attitudes: food and nutrition knowledge/skill  Physical Activity and Function: nutrition-related ADLs and IADLs  Anthropometric Measurements: weight change  Biochemical Data, Medical Tests and Procedures: electrolyte and renal panel,gastrointestinal profile,glucose/endocrine profile  Nutrition-Focused Physical Findings: overall appearance,extremities, muscles and bones,skin      Nutrition Follow-Up  RD Follow-up?: Yes

## 2022-02-18 PROCEDURE — 27000207 HC ISOLATION

## 2022-02-18 PROCEDURE — 94761 N-INVAS EAR/PLS OXIMETRY MLT: CPT

## 2022-02-18 PROCEDURE — 25000003 PHARM REV CODE 250: Performed by: INTERNAL MEDICINE

## 2022-02-18 PROCEDURE — 27000221 HC OXYGEN, UP TO 24 HOURS

## 2022-02-18 PROCEDURE — 11000001 HC ACUTE MED/SURG PRIVATE ROOM

## 2022-02-18 PROCEDURE — 63600175 PHARM REV CODE 636 W HCPCS: Performed by: INTERNAL MEDICINE

## 2022-02-18 RX ADMIN — GLYCOPYRROLATE 1 MG: 1 TABLET ORAL at 09:02

## 2022-02-18 RX ADMIN — GLYCOPYRROLATE 1 MG: 1 TABLET ORAL at 03:02

## 2022-02-18 RX ADMIN — CITALOPRAM HYDROBROMIDE 40 MG: 10 TABLET ORAL at 08:02

## 2022-02-18 RX ADMIN — ATORVASTATIN CALCIUM 40 MG: 40 TABLET, FILM COATED ORAL at 09:02

## 2022-02-18 RX ADMIN — LEVOTHYROXINE SODIUM 25 MCG: 0.03 TABLET ORAL at 05:02

## 2022-02-18 RX ADMIN — MIDODRINE HYDROCHLORIDE 5 MG: 5 TABLET ORAL at 08:02

## 2022-02-18 RX ADMIN — METOPROLOL TARTRATE 12.5 MG: 25 TABLET, FILM COATED ORAL at 09:02

## 2022-02-18 RX ADMIN — ENOXAPARIN SODIUM 40 MG: 100 INJECTION SUBCUTANEOUS at 04:02

## 2022-02-18 RX ADMIN — AZELASTINE 137 MCG: 1 SPRAY, METERED NASAL at 09:02

## 2022-02-18 RX ADMIN — MIDODRINE HYDROCHLORIDE 5 MG: 5 TABLET ORAL at 09:02

## 2022-02-18 RX ADMIN — METOPROLOL TARTRATE 12.5 MG: 25 TABLET, FILM COATED ORAL at 08:02

## 2022-02-18 RX ADMIN — MIDODRINE HYDROCHLORIDE 5 MG: 5 TABLET ORAL at 03:02

## 2022-02-18 RX ADMIN — FAMOTIDINE 20 MG: 10 INJECTION INTRAVENOUS at 10:02

## 2022-02-18 RX ADMIN — FAMOTIDINE 20 MG: 10 INJECTION INTRAVENOUS at 09:02

## 2022-02-18 RX ADMIN — GLYCOPYRROLATE 1 MG: 1 TABLET ORAL at 08:02

## 2022-02-18 RX ADMIN — AZELASTINE 137 MCG: 1 SPRAY, METERED NASAL at 08:02

## 2022-02-18 NOTE — ASSESSMENT & PLAN NOTE
-Telemetry  - controlled on metoprolol 12.5 mg BID  Will hold full dose anticoagulant due to high risk of bleeding

## 2022-02-18 NOTE — SUBJECTIVE & OBJECTIVE
Interval History:  No acute events overnight.  Pending discharge back to nursing    Review of Systems   Unable to perform ROS: Patient nonverbal     Objective:     Vital Signs (Most Recent):  Temp: 97.9 °F (36.6 °C) (02/18/22 1512)  Pulse: 67 (02/18/22 1512)  Resp: 18 (02/18/22 1512)  BP: (!) 142/64 (02/18/22 1512)  SpO2: 98 % (02/18/22 1512) Vital Signs (24h Range):  Temp:  [96 °F (35.6 °C)-98.5 °F (36.9 °C)] 97.9 °F (36.6 °C)  Pulse:  [59-68] 67  Resp:  [16-18] 18  SpO2:  [96 %-100 %] 98 %  BP: (100-142)/(60-68) 142/64     Weight: 63.9 kg (140 lb 14 oz)  Body mass index is 24.18 kg/m².    Intake/Output Summary (Last 24 hours) at 2/18/2022 1521  Last data filed at 2/18/2022 1425  Gross per 24 hour   Intake 0 ml   Output 2950 ml   Net -2950 ml      Physical Exam  Vitals and nursing note reviewed.   Constitutional:       General: She is not in acute distress.     Appearance: She is not ill-appearing.   HENT:      Head: Normocephalic.      Mouth/Throat:      Mouth: Mucous membranes are moist.   Eyes:      General:         Right eye: No discharge.         Left eye: No discharge.      Extraocular Movements: Extraocular movements intact.      Pupils: Pupils are equal, round, and reactive to light.   Neck:      Vascular: No JVD.   Cardiovascular:      Rate and Rhythm: Normal rate and regular rhythm.   Pulmonary:      Effort: Pulmonary effort is normal.      Breath sounds: Normal breath sounds.   Abdominal:      General: Abdomen is flat. Bowel sounds are normal. There is no distension.      Palpations: Abdomen is soft.      Tenderness: There is no abdominal tenderness.   Musculoskeletal:      Right lower leg: No edema.      Left lower leg: No edema.   Skin:     General: Skin is warm and moist.      Findings: No rash.   Neurological:      Mental Status: She is alert.      Comments: She focuses her attention on me but doesn't say anything   Psychiatric:         Mood and Affect: Mood and affect normal.         Significant  Labs: All pertinent labs within the past 24 hours have been reviewed.  CBC: No results for input(s): WBC, HGB, HCT, PLT in the last 48 hours.  CMP: No results for input(s): NA, K, CL, CO2, GLU, BUN, CREATININE, CALCIUM, PROT, ALBUMIN, BILITOT, ALKPHOS, AST, ALT, ANIONGAP, EGFRNONAA in the last 48 hours.    Invalid input(s): ESTGFAFRICA    Significant Imaging: I have reviewed all pertinent imaging results/findings within the past 24 hours.

## 2022-02-18 NOTE — PROGRESS NOTES
[facility] Hospital Medicine  Progress Note    Patient Name: Sandra Wilson  MRN: [id number]  Patient Class: IP- Inpatient   Admission Date: 1/18/2022  Length of Stay: 31 days  Attending Physician: Neda Porter MD  Primary Care Provider: Primary Doctor No        Subjective:     Principal Problem:Acute hypoxemic respiratory failure        HPI:  Sandra Wilson is an 81 year old female with a past medical history of CVAs, CNS aneurysm, PEG status, CAD, HLD, hypothryoidism, DM, aortic stenosis, and colon, breast, and ovarian cancer who presented from long term care with vomiting, diarrhea, and shortness of breath. Workup in the ED showed likely aspiration pneumonitis and C. Diff colitis. Her O2 requirement increased while in the ED requiring intubation with sedation. She also required Levophed as she likely developed septic shock. Antibiotics were broadened to cefepime, vancomycin, and Flagyl. Pulmonary was consulted. Family was notified. The patient was unable to provide history given acuity of illness.      Overview/Hospital Course:  Sandra Wilson is an 81 year old female with a past medical history of CVAs, CNS aneurysm, PEG status, CAD, HLD, hypothyroidism, DM, aortic stenosis, and colon, breast, and ovarian cancer who presented from long term care with vomiting, diarrhea, and shortness of breath secondary to acute hypoxic respiratory failure from aspiration pneumonia in the setting of C diff colitis leading to septic shock. She was intubated in the ED and started on Levophed infusion via RIJ CVC placed by Dr. Vignesh Gaspar. She was started on broad spectrum antibiotics with cefepime, Flagyl, vancomycin and admitted to the ICU. She was also started on enteral vancomycin for severe C diff colitis given elevated WBC count and STEFFANY. There was concern for developing ARDS given P/F ratio of 87 (severe); 190 1/22 (moderate). Pulmonary was consulted. She also presented with demand ischemia likely  secondary to septic shock. Troponin was trended and improved as shock resolved. Normal saline infusion was added for hyponatremia and STEFFANY which improved both. Levophed was able to be weaned. Family agreed to DNR status 1/18 and Palliative Care was consulted to discuss goal of care 1/19. Antibiotics were changed to doxycycline and Flagyl (GBS in sputum culture) 1/21; vancomycin was added 1/23 after culture also became positive for Staph aureus and Klebsiella. Her respiratory status has improved throughout her course and she was extubated (code changed to partial code for intubation) 1/21 only to be re-intubated for worsening stridor (history of tracheostomy). Upon re-intubation, copious amounts of secretions were suctioned. KUB suggested a mild ileus A bowel regimen was instituted and the patient was able to have bowel movements 1/22. Tube feeds were started 1/23. Her course was complicated by Afib as well noted on telemetry 1/21; metoprolol tartrate started 1/22.She failed a leak test 1/22; Surgery was consulted for tracheostomy which is pending conversation with family regarding goals of care.    Patient was set to go for trach on 01/26, but attempt was made for another trial extubation and the patient did well after extubation, she was transition to face mask and was able to maintain her saturations although had significant difficulty with secretions.  She was started on Robinul and scopolamine patch which did seem to improve for secretions.  Her respiratory status continued to be somewhat tenuous, so she was monitored in the ICU.  Family expressed desire to not have her get a tracheostomy.  Case management began working on LTACH referral.      Interval History:  No acute events overnight.  Pending discharge back to nursing    Review of Systems   Unable to perform ROS: Patient nonverbal     Objective:     Vital Signs (Most Recent):  Temp: 97.9 °F (36.6 °C) (02/18/22 1512)  Pulse: 67 (02/18/22 1512)  Resp: 18  (02/18/22 1512)  BP: (!) 142/64 (02/18/22 1512)  SpO2: 98 % (02/18/22 1512) Vital Signs (24h Range):  Temp:  [96 °F (35.6 °C)-98.5 °F (36.9 °C)] 97.9 °F (36.6 °C)  Pulse:  [59-68] 67  Resp:  [16-18] 18  SpO2:  [96 %-100 %] 98 %  BP: (100-142)/(60-68) 142/64     Weight: 63.9 kg (140 lb 14 oz)  Body mass index is 24.18 kg/m².    Intake/Output Summary (Last 24 hours) at 2/18/2022 1521  Last data filed at 2/18/2022 1425  Gross per 24 hour   Intake 0 ml   Output 2950 ml   Net -2950 ml      Physical Exam  Vitals and nursing note reviewed.   Constitutional:       General: She is not in acute distress.     Appearance: She is not ill-appearing.   HENT:      Head: Normocephalic.      Mouth/Throat:      Mouth: Mucous membranes are moist.   Eyes:      General:         Right eye: No discharge.         Left eye: No discharge.      Extraocular Movements: Extraocular movements intact.      Pupils: Pupils are equal, round, and reactive to light.   Neck:      Vascular: No JVD.   Cardiovascular:      Rate and Rhythm: Normal rate and regular rhythm.   Pulmonary:      Effort: Pulmonary effort is normal.      Breath sounds: Normal breath sounds.   Abdominal:      General: Abdomen is flat. Bowel sounds are normal. There is no distension.      Palpations: Abdomen is soft.      Tenderness: There is no abdominal tenderness.   Musculoskeletal:      Right lower leg: No edema.      Left lower leg: No edema.   Skin:     General: Skin is warm and moist.      Findings: No rash.   Neurological:      Mental Status: She is alert.      Comments: She focuses her attention on me but doesn't say anything   Psychiatric:         Mood and Affect: Mood and affect normal.         Significant Labs: All pertinent labs within the past 24 hours have been reviewed.  CBC: No results for input(s): WBC, HGB, HCT, PLT in the last 48 hours.  CMP: No results for input(s): NA, K, CL, CO2, GLU, BUN, CREATININE, CALCIUM, PROT, ALBUMIN, BILITOT, ALKPHOS, AST, ALT, ANIONGAP,  EGFRNONAA in the last 48 hours.    Invalid input(s): ADAL    Significant Imaging: I have reviewed all pertinent imaging results/findings within the past 24 hours.      Assessment/Plan:      * Acute hypoxemic respiratory failure  stable  Monitor sats.  Monitor work of breathing.  She's partial code, specifying that she'd want to be intubated if need be.    Anemia  Continue to monitor H&H every 3 days currently stable no active bleeding noted    Hemoglobin   Date Value Ref Range Status   02/11/2022 9.5 (L) 12.0 - 16.0 g/dL Final   02/07/2022 8.3 (L) 12.0 - 16.0 g/dL Final   02/06/2022 8.5 (L) 12.0 - 16.0 g/dL Final   02/05/2022 6.7 (L) 12.0 - 16.0 g/dL Final   ]      Paroxysmal atrial fibrillation  -Telemetry  - controlled on metoprolol 12.5 mg BID  Will hold full dose anticoagulant due to high risk of bleeding      Gastrostomy tube dependent  Stable.  No issues with it    Type 2 diabetes mellitus, with long-term current use of insulin  Patient's FSGs are controlled on current medication regimen.  Most recent fingerstick glucose reviewed-   No results for input(s): POCTGLUCOSE in the last 24 hours.  Current correctional scale  Medium  Maintain anti-hyperglycemic dose as follows-   Antihyperglycemics (From admission, onward)            Start     Stop Route Frequency Ordered    01/18/22 1438  insulin aspart U-100 pen 1-10 Units         -- SubQ Before meals & nightly PRN 01/18/22 1438        Hold Oral hypoglycemics while patient is in the hospital.        YARI (generalized anxiety disorder)  Extubated on 01/26.  Using Precedex as needed to control anxiety.  Patient does not appear anxious on exam.      Hypothyroidism  Patient has chronic hypothyroidism. TFTs reviewed-   Lab Results   Component Value Date    TSH 1.130 07/13/2017   . Will continue chronic levothyroxine and adjust for and clinical changes.        Hyperlipidemia   Patient is chronically on statin.will continue for now. Monitor clinically. Last LDL was    Lab Results   Component Value Date    LDLCALC 94.4 07/13/2017          GERD (gastroesophageal reflux disease)  On famotidine.      VTE Risk Mitigation (From admission, onward)         Ordered     enoxaparin injection 40 mg  Daily         01/18/22 1438     IP VTE HIGH RISK PATIENT  Once         01/18/22 1438     Place sequential compression device  Until discontinued         01/18/22 1438                Discharge Planning   DEMETRIUS: 2/11/2022     Code Status: Partial Code   Is the patient medically ready for discharge?:     Reason for patient still in hospital (select all that apply): Patient trending condition and Treatment  Discharge Plan A: Return to nursing home                  Neda Porter MD  Department of Hospital Medicine   [facility]

## 2022-02-19 PROCEDURE — 63600175 PHARM REV CODE 636 W HCPCS: Performed by: INTERNAL MEDICINE

## 2022-02-19 PROCEDURE — 25000003 PHARM REV CODE 250: Performed by: INTERNAL MEDICINE

## 2022-02-19 PROCEDURE — 11000001 HC ACUTE MED/SURG PRIVATE ROOM

## 2022-02-19 PROCEDURE — 27000221 HC OXYGEN, UP TO 24 HOURS

## 2022-02-19 PROCEDURE — 27000207 HC ISOLATION

## 2022-02-19 PROCEDURE — 94761 N-INVAS EAR/PLS OXIMETRY MLT: CPT

## 2022-02-19 RX ADMIN — FAMOTIDINE 20 MG: 10 INJECTION INTRAVENOUS at 09:02

## 2022-02-19 RX ADMIN — GLYCOPYRROLATE 1 MG: 1 TABLET ORAL at 08:02

## 2022-02-19 RX ADMIN — GLYCOPYRROLATE 1 MG: 1 TABLET ORAL at 03:02

## 2022-02-19 RX ADMIN — AZELASTINE 137 MCG: 1 SPRAY, METERED NASAL at 09:02

## 2022-02-19 RX ADMIN — CITALOPRAM HYDROBROMIDE 40 MG: 10 TABLET ORAL at 08:02

## 2022-02-19 RX ADMIN — ATORVASTATIN CALCIUM 40 MG: 40 TABLET, FILM COATED ORAL at 09:02

## 2022-02-19 RX ADMIN — LEVOTHYROXINE SODIUM 25 MCG: 0.03 TABLET ORAL at 05:02

## 2022-02-19 RX ADMIN — MIDODRINE HYDROCHLORIDE 5 MG: 5 TABLET ORAL at 03:02

## 2022-02-19 RX ADMIN — GLYCOPYRROLATE 1 MG: 1 TABLET ORAL at 09:02

## 2022-02-19 RX ADMIN — AZELASTINE 137 MCG: 1 SPRAY, METERED NASAL at 08:02

## 2022-02-19 RX ADMIN — METOPROLOL TARTRATE 12.5 MG: 25 TABLET, FILM COATED ORAL at 08:02

## 2022-02-19 RX ADMIN — MIDODRINE HYDROCHLORIDE 5 MG: 5 TABLET ORAL at 09:02

## 2022-02-19 RX ADMIN — ENOXAPARIN SODIUM 40 MG: 100 INJECTION SUBCUTANEOUS at 04:02

## 2022-02-19 RX ADMIN — METOPROLOL TARTRATE 12.5 MG: 25 TABLET, FILM COATED ORAL at 09:02

## 2022-02-19 RX ADMIN — MIDODRINE HYDROCHLORIDE 5 MG: 5 TABLET ORAL at 08:02

## 2022-02-19 NOTE — PROGRESS NOTES
[facility] Medicine  Progress Note    Patient Name: Sandra Wilson  MRN: [id number]  Patient Class: IP- Inpatient   Admission Date: 1/18/2022  Length of Stay: 32 days  Attending Physician: Neda Porter MD  Primary Care Provider: Primary Doctor No        Subjective:     Principal Problem:Acute hypoxemic respiratory failure        HPI:  Sandra Wilson is an 81 year old female with a past medical history of CVAs, CNS aneurysm, PEG status, CAD, HLD, hypothryoidism, DM, aortic stenosis, and colon, breast, and ovarian cancer who presented from long term care with vomiting, diarrhea, and shortness of breath. Workup in the ED showed likely aspiration pneumonitis and C. Diff colitis. Her O2 requirement increased while in the ED requiring intubation with sedation. She also required Levophed as she likely developed septic shock. Antibiotics were broadened to cefepime, vancomycin, and Flagyl. Pulmonary was consulted. Family was notified. The patient was unable to provide history given acuity of illness.      Overview/Hospital Course:  Sandra Wilson is an 81 year old female with a past medical history of CVAs, CNS aneurysm, PEG status, CAD, HLD, hypothyroidism, DM, aortic stenosis, and colon, breast, and ovarian cancer who presented from long term care with vomiting, diarrhea, and shortness of breath secondary to acute hypoxic respiratory failure from aspiration pneumonia in the setting of C diff colitis leading to septic shock. She was intubated in the ED and started on Levophed infusion via RIJ CVC placed by Dr. Vignesh Gaspar. She was started on broad spectrum antibiotics with cefepime, Flagyl, vancomycin and admitted to the ICU. She was also started on enteral vancomycin for severe C diff colitis given elevated WBC count and STEFFANY. There was concern for developing ARDS given P/F ratio of 87 (severe); 190 1/22 (moderate). Pulmonary was consulted. She also presented with demand ischemia likely  secondary to septic shock. Troponin was trended and improved as shock resolved. Normal saline infusion was added for hyponatremia and STEFFANY which improved both. Levophed was able to be weaned. Family agreed to DNR status 1/18 and Palliative Care was consulted to discuss goal of care 1/19. Antibiotics were changed to doxycycline and Flagyl (GBS in sputum culture) 1/21; vancomycin was added 1/23 after culture also became positive for Staph aureus and Klebsiella. Her respiratory status has improved throughout her course and she was extubated (code changed to partial code for intubation) 1/21 only to be re-intubated for worsening stridor (history of tracheostomy). Upon re-intubation, copious amounts of secretions were suctioned. KUB suggested a mild ileus A bowel regimen was instituted and the patient was able to have bowel movements 1/22. Tube feeds were started 1/23. Her course was complicated by Afib as well noted on telemetry 1/21; metoprolol tartrate started 1/22.She failed a leak test 1/22; Surgery was consulted for tracheostomy which is pending conversation with family regarding goals of care.    Patient was set to go for trach on 01/26, but attempt was made for another trial extubation and the patient did well after extubation, she was transition to face mask and was able to maintain her saturations although had significant difficulty with secretions.  She was started on Robinul and scopolamine patch which did seem to improve for secretions.  Her respiratory status continued to be somewhat tenuous, so she was monitored in the ICU.  Family expressed desire to not have her get a tracheostomy.  Case management began working on LTACH referral.      Interval History:  No acute events overnight currently pending disposition    Review of Systems   Unable to perform ROS: Patient nonverbal   Objective:     Vital Signs (Most Recent):  Temp: 96.7 °F (35.9 °C) (02/19/22 0723)  Pulse: 61 (02/19/22 0723)  Resp: 16 (02/19/22  0723)  BP: (!) 144/65 (02/19/22 0723)  SpO2: 99 % (02/19/22 0723)   Vital Signs (24h Range):  Temp:  [96.7 °F (35.9 °C)-98.7 °F (37.1 °C)] 96.7 °F (35.9 °C)  Pulse:  [59-67] 61  Resp:  [16-18] 16  SpO2:  [96 %-100 %] 99 %  BP: (100-144)/(57-65) 144/65     Weight: 63.9 kg (140 lb 14 oz)  Body mass index is 24.18 kg/m².    Intake/Output Summary (Last 24 hours) at 2/19/2022 1026  Last data filed at 2/18/2022 1753  Gross per 24 hour   Intake 0 ml   Output 700 ml   Net -700 ml      Physical Exam  Vitals and nursing note reviewed.   Constitutional:       General: She is not in acute distress.     Appearance: She is not ill-appearing.   HENT:      Head: Normocephalic.      Mouth/Throat:      Mouth: Mucous membranes are moist.   Eyes:      General:         Right eye: No discharge.         Left eye: No discharge.      Extraocular Movements: Extraocular movements intact.      Pupils: Pupils are equal, round, and reactive to light.   Neck:      Vascular: No JVD.   Cardiovascular:      Rate and Rhythm: Normal rate and regular rhythm.   Pulmonary:      Effort: Pulmonary effort is normal.      Breath sounds: Normal breath sounds.   Abdominal:      General: Abdomen is flat. Bowel sounds are normal. There is no distension.      Palpations: Abdomen is soft.      Tenderness: There is no abdominal tenderness.   Musculoskeletal:      Right lower leg: No edema.      Left lower leg: No edema.   Skin:     General: Skin is warm and moist.      Findings: No rash.   Neurological:      Mental Status: She is alert.      Comments: She focuses her attention on me but doesn't say anything   Psychiatric:         Mood and Affect: Mood and affect normal.       Significant Labs: All pertinent labs within the past 24 hours have been reviewed.  CBC: No results for input(s): WBC, HGB, HCT, PLT in the last 48 hours.  CMP: No results for input(s): NA, K, CL, CO2, GLU, BUN, CREATININE, CALCIUM, PROT, ALBUMIN, BILITOT, ALKPHOS, AST, ALT, ANIONGAP, EGFRNONAA  in the last 48 hours.    Invalid input(s): [place]    Significant Imaging: I have reviewed all pertinent imaging results/findings within the past 24 hours.      Assessment/Plan:      * Acute hypoxemic respiratory failure  stable  Monitor sats.  Monitor work of breathing.  She's partial code, specifying that she'd want to be intubated if need be.    Anemia  Continue to monitor H&H every 3 days currently stable no active bleeding noted    Hemoglobin   Date Value Ref Range Status   02/11/2022 9.5 (L) 12.0 - 16.0 g/dL Final   02/07/2022 8.3 (L) 12.0 - 16.0 g/dL Final   02/06/2022 8.5 (L) 12.0 - 16.0 g/dL Final   02/05/2022 6.7 (L) 12.0 - 16.0 g/dL Final   ]      Paroxysmal atrial fibrillation  -Telemetry  - controlled on metoprolol 12.5 mg BID  Will hold full dose anticoagulant due to high risk of bleeding      Gastrostomy tube dependent  Stable.  No issues with it    Type 2 diabetes mellitus, with long-term current use of insulin  Patient's FSGs are controlled on current medication regimen.  Most recent fingerstick glucose reviewed-   No results for input(s): POCTGLUCOSE in the last 24 hours.  Current correctional scale  Medium  Maintain anti-hyperglycemic dose as follows-   Antihyperglycemics (From admission, onward)            Start     Stop Route Frequency Ordered    01/18/22 1438  insulin aspart U-100 pen 1-10 Units         -- SubQ Before meals & nightly PRN 01/18/22 1438        Hold Oral hypoglycemics while patient is in the hospital.        YARI (generalized anxiety disorder)  Extubated on 01/26.  Using Precedex as needed to control anxiety.  Patient does not appear anxious on exam.      Hypothyroidism  Patient has chronic hypothyroidism. TFTs reviewed-   Lab Results   Component Value Date    TSH 1.130 07/13/2017   . Will continue chronic levothyroxine and adjust for and clinical changes.        Hyperlipidemia   Patient is chronically on statin.will continue for now. Monitor clinically. Last LDL was   Lab  Results   Component Value Date    LDLCALC 94.4 07/13/2017          GERD (gastroesophageal reflux disease)  On famotidine.      VTE Risk Mitigation (From admission, onward)         Ordered     enoxaparin injection 40 mg  Daily         01/18/22 1438     IP VTE HIGH RISK PATIENT  Once         01/18/22 1438     Place sequential compression device  Until discontinued         01/18/22 1438                Discharge Planning   DEMETRIUS: 2/11/2022     Code Status: Partial Code   Is the patient medically ready for discharge?:     Reason for patient still in hospital (select all that apply): Patient trending condition and Treatment  Discharge Plan A: Return to nursing home                  Neda Porter MD  Department of Hospital Medicine   [facility]

## 2022-02-19 NOTE — SUBJECTIVE & OBJECTIVE
Interval History:  No acute events overnight currently pending disposition    Review of Systems   Unable to perform ROS: Patient nonverbal   Objective:     Vital Signs (Most Recent):  Temp: 96.7 °F (35.9 °C) (02/19/22 0723)  Pulse: 61 (02/19/22 0723)  Resp: 16 (02/19/22 0723)  BP: (!) 144/65 (02/19/22 0723)  SpO2: 99 % (02/19/22 0723)   Vital Signs (24h Range):  Temp:  [96.7 °F (35.9 °C)-98.7 °F (37.1 °C)] 96.7 °F (35.9 °C)  Pulse:  [59-67] 61  Resp:  [16-18] 16  SpO2:  [96 %-100 %] 99 %  BP: (100-144)/(57-65) 144/65     Weight: 63.9 kg (140 lb 14 oz)  Body mass index is 24.18 kg/m².    Intake/Output Summary (Last 24 hours) at 2/19/2022 1026  Last data filed at 2/18/2022 1753  Gross per 24 hour   Intake 0 ml   Output 700 ml   Net -700 ml      Physical Exam  Vitals and nursing note reviewed.   Constitutional:       General: She is not in acute distress.     Appearance: She is not ill-appearing.   HENT:      Head: Normocephalic.      Mouth/Throat:      Mouth: Mucous membranes are moist.   Eyes:      General:         Right eye: No discharge.         Left eye: No discharge.      Extraocular Movements: Extraocular movements intact.      Pupils: Pupils are equal, round, and reactive to light.   Neck:      Vascular: No JVD.   Cardiovascular:      Rate and Rhythm: Normal rate and regular rhythm.   Pulmonary:      Effort: Pulmonary effort is normal.      Breath sounds: Normal breath sounds.   Abdominal:      General: Abdomen is flat. Bowel sounds are normal. There is no distension.      Palpations: Abdomen is soft.      Tenderness: There is no abdominal tenderness.   Musculoskeletal:      Right lower leg: No edema.      Left lower leg: No edema.   Skin:     General: Skin is warm and moist.      Findings: No rash.   Neurological:      Mental Status: She is alert.      Comments: She focuses her attention on me but doesn't say anything   Psychiatric:         Mood and Affect: Mood and affect normal.       Significant Labs: All  pertinent labs within the past 24 hours have been reviewed.  CBC: No results for input(s): WBC, HGB, HCT, PLT in the last 48 hours.  CMP: No results for input(s): NA, K, CL, CO2, GLU, BUN, CREATININE, CALCIUM, PROT, ALBUMIN, BILITOT, ALKPHOS, AST, ALT, ANIONGAP, EGFRNONAA in the last 48 hours.    Invalid input(s): [place]GF[place]    Significant Imaging: I have reviewed all pertinent imaging results/findings within the past 24 hours.

## 2022-02-19 NOTE — PLAN OF CARE
Patients son present at the bedside. Patient non vocal. Afebrile. Vitals WNL. Cardiac monitoring tele in place NSR. Safety measures maintained. Bed locked in lowest position, call light in reach. Continuous tube feedings by pump via peg tube and KVO via midline. Patient tolerated position changes per protocol. Will continue to monitor patient throughout shift.

## 2022-02-19 NOTE — PLAN OF CARE
Patient alert and oriented to person only. resting in bed. NAD. Denies pain or SOB. VSS. Brief for incontinence. PEG feeding continuous by pump. Normal SR. Turned every 2 hours. O2@2LNc. Bed alarm active. Plan of care reviewed with patient. Verbalizes understanding.Call light in reach. Pt free from fall or injury. Will monitor.

## 2022-02-20 PROCEDURE — 27000207 HC ISOLATION

## 2022-02-20 PROCEDURE — 25000003 PHARM REV CODE 250: Performed by: INTERNAL MEDICINE

## 2022-02-20 PROCEDURE — 27000221 HC OXYGEN, UP TO 24 HOURS

## 2022-02-20 PROCEDURE — 94761 N-INVAS EAR/PLS OXIMETRY MLT: CPT

## 2022-02-20 PROCEDURE — 63600175 PHARM REV CODE 636 W HCPCS: Performed by: INTERNAL MEDICINE

## 2022-02-20 PROCEDURE — 11000001 HC ACUTE MED/SURG PRIVATE ROOM

## 2022-02-20 RX ADMIN — METOPROLOL TARTRATE 12.5 MG: 25 TABLET, FILM COATED ORAL at 08:02

## 2022-02-20 RX ADMIN — MIDODRINE HYDROCHLORIDE 5 MG: 5 TABLET ORAL at 03:02

## 2022-02-20 RX ADMIN — MIDODRINE HYDROCHLORIDE 5 MG: 5 TABLET ORAL at 08:02

## 2022-02-20 RX ADMIN — GLYCOPYRROLATE 1 MG: 1 TABLET ORAL at 08:02

## 2022-02-20 RX ADMIN — CITALOPRAM HYDROBROMIDE 40 MG: 10 TABLET ORAL at 08:02

## 2022-02-20 RX ADMIN — AZELASTINE 137 MCG: 1 SPRAY, METERED NASAL at 08:02

## 2022-02-20 RX ADMIN — FAMOTIDINE 20 MG: 10 INJECTION INTRAVENOUS at 10:02

## 2022-02-20 RX ADMIN — GLYCOPYRROLATE 1 MG: 1 TABLET ORAL at 03:02

## 2022-02-20 RX ADMIN — LEVOTHYROXINE SODIUM 25 MCG: 0.03 TABLET ORAL at 06:02

## 2022-02-20 RX ADMIN — ENOXAPARIN SODIUM 40 MG: 100 INJECTION SUBCUTANEOUS at 05:02

## 2022-02-20 RX ADMIN — ATORVASTATIN CALCIUM 40 MG: 40 TABLET, FILM COATED ORAL at 08:02

## 2022-02-20 NOTE — PROGRESS NOTES
[facility] Hospital Medicine  Progress Note    Patient Name: Sandra Wilson  MRN: [id number]  Patient Class: IP- Inpatient   Admission Date: 1/18/2022  Length of Stay: 33 days  Attending Physician: Neda Porter MD  Primary Care Provider: Primary Doctor No        Subjective:     Principal Problem:Acute hypoxemic respiratory failure        HPI:  Sandra Wilson is an 81 year old female with a past medical history of CVAs, CNS aneurysm, PEG status, CAD, HLD, hypothryoidism, DM, aortic stenosis, and colon, breast, and ovarian cancer who presented from long term care with vomiting, diarrhea, and shortness of breath. Workup in the ED showed likely aspiration pneumonitis and C. Diff colitis. Her O2 requirement increased while in the ED requiring intubation with sedation. She also required Levophed as she likely developed septic shock. Antibiotics were broadened to cefepime, vancomycin, and Flagyl. Pulmonary was consulted. Family was notified. The patient was unable to provide history given acuity of illness.      Overview/Hospital Course:  Sandra Wilson is an 81 year old female with a past medical history of CVAs, CNS aneurysm, PEG status, CAD, HLD, hypothyroidism, DM, aortic stenosis, and colon, breast, and ovarian cancer who presented from long term care with vomiting, diarrhea, and shortness of breath secondary to acute hypoxic respiratory failure from aspiration pneumonia in the setting of C diff colitis leading to septic shock. She was intubated in the ED and started on Levophed infusion via RIJ CVC placed by Dr. Vignesh Gaspar. She was started on broad spectrum antibiotics with cefepime, Flagyl, vancomycin and admitted to the ICU. She was also started on enteral vancomycin for severe C diff colitis given elevated WBC count and STEFFANY. There was concern for developing ARDS given P/F ratio of 87 (severe); 190 1/22 (moderate). Pulmonary was consulted. She also presented with demand ischemia likely  secondary to septic shock. Troponin was trended and improved as shock resolved. Normal saline infusion was added for hyponatremia and STEFFANY which improved both. Levophed was able to be weaned. Family agreed to DNR status 1/18 and Palliative Care was consulted to discuss goal of care 1/19. Antibiotics were changed to doxycycline and Flagyl (GBS in sputum culture) 1/21; vancomycin was added 1/23 after culture also became positive for Staph aureus and Klebsiella. Her respiratory status has improved throughout her course and she was extubated (code changed to partial code for intubation) 1/21 only to be re-intubated for worsening stridor (history of tracheostomy). Upon re-intubation, copious amounts of secretions were suctioned. KUB suggested a mild ileus A bowel regimen was instituted and the patient was able to have bowel movements 1/22. Tube feeds were started 1/23. Her course was complicated by Afib as well noted on telemetry 1/21; metoprolol tartrate started 1/22.She failed a leak test 1/22; Surgery was consulted for tracheostomy which is pending conversation with family regarding goals of care.    Patient was set to go for trach on 01/26, but attempt was made for another trial extubation and the patient did well after extubation, she was transition to face mask and was able to maintain her saturations although had significant difficulty with secretions.  She was started on Robinul and scopolamine patch which did seem to improve for secretions.  Her respiratory status continued to be somewhat tenuous, so she was monitored in the ICU.  Family expressed desire to not have her get a tracheostomy.  Case management began working on LTACH referral.      Interval History: No acute events overnight. Pending dispo    Review of Systems   Unable to perform ROS: Patient nonverbal   Objective:     Vital Signs (Most Recent):  Temp: 98.3 °F (36.8 °C) (02/20/22 0710)  Pulse: 68 (02/20/22 0710)  Resp: 16 (02/20/22 0710)  BP: (!)  123/59 (02/20/22 0710)  SpO2: 99 % (02/20/22 0820)   Vital Signs (24h Range):  Temp:  [96.6 °F (35.9 °C)-98.3 °F (36.8 °C)] 98.3 °F (36.8 °C)  Pulse:  [60-75] 68  Resp:  [16-18] 16  SpO2:  [96 %-99 %] 99 %  BP: (123-201)/(59-82) 123/59     Weight: 63.9 kg (140 lb 14 oz)  Body mass index is 24.18 kg/m².    Intake/Output Summary (Last 24 hours) at 2/20/2022 1034  Last data filed at 2/19/2022 1800  Gross per 24 hour   Intake 0 ml   Output 800 ml   Net -800 ml      Physical Exam  Vitals and nursing note reviewed.   Constitutional:       General: She is not in acute distress.     Appearance: She is not ill-appearing.   HENT:      Head: Normocephalic.      Mouth/Throat:      Mouth: Mucous membranes are moist.   Eyes:      General:         Right eye: No discharge.         Left eye: No discharge.      Extraocular Movements: Extraocular movements intact.      Pupils: Pupils are equal, round, and reactive to light.   Neck:      Vascular: No JVD.   Cardiovascular:      Rate and Rhythm: Normal rate and regular rhythm.   Pulmonary:      Effort: Pulmonary effort is normal.      Breath sounds: Normal breath sounds.   Abdominal:      General: Abdomen is flat. Bowel sounds are normal. There is no distension.      Palpations: Abdomen is soft.      Tenderness: There is no abdominal tenderness.   Musculoskeletal:      Right lower leg: No edema.      Left lower leg: No edema.   Skin:     General: Skin is warm and moist.      Findings: No rash.   Neurological:      Mental Status: She is alert.      Comments: She focuses her attention on me but doesn't say anything   Psychiatric:         Mood and Affect: Mood and affect normal.       Significant Labs: All pertinent labs within the past 24 hours have been reviewed.  BMP: No results for input(s): GLU, NA, K, CL, CO2, BUN, CREATININE, CALCIUM, MG in the last 48 hours.  CBC: No results for input(s): WBC, HGB, HCT, PLT in the last 48 hours.    Significant Imaging: I have reviewed all pertinent  imaging results/findings within the past 24 hours.      Assessment/Plan:      * Acute hypoxemic respiratory failure  stable  Monitor sats.  Monitor work of breathing.  She's partial code, specifying that she'd want to be intubated if need be.    Anemia  Continue to monitor H&H every 3 days currently stable no active bleeding noted    Hemoglobin   Date Value Ref Range Status   02/11/2022 9.5 (L) 12.0 - 16.0 g/dL Final   02/07/2022 8.3 (L) 12.0 - 16.0 g/dL Final   02/06/2022 8.5 (L) 12.0 - 16.0 g/dL Final   02/05/2022 6.7 (L) 12.0 - 16.0 g/dL Final   ]      Paroxysmal atrial fibrillation  -Telemetry  - controlled on metoprolol 12.5 mg BID  Will hold full dose anticoagulant due to high risk of bleeding      Gastrostomy tube dependent  Stable.  No issues with it    Type 2 diabetes mellitus, with long-term current use of insulin  Patient's FSGs are controlled on current medication regimen.  Most recent fingerstick glucose reviewed-   No results for input(s): POCTGLUCOSE in the last 24 hours.  Current correctional scale  Medium  Maintain anti-hyperglycemic dose as follows-   Antihyperglycemics (From admission, onward)            Start     Stop Route Frequency Ordered    01/18/22 1438  insulin aspart U-100 pen 1-10 Units         -- SubQ Before meals & nightly PRN 01/18/22 1438        Hold Oral hypoglycemics while patient is in the hospital.        YARI (generalized anxiety disorder)  Extubated on 01/26.  Using Precedex as needed to control anxiety.  Patient does not appear anxious on exam.      Hypothyroidism  Patient has chronic hypothyroidism. TFTs reviewed-   Lab Results   Component Value Date    TSH 1.130 07/13/2017   . Will continue chronic levothyroxine and adjust for and clinical changes.        Hyperlipidemia   Patient is chronically on statin.will continue for now. Monitor clinically. Last LDL was   Lab Results   Component Value Date    LDLCALC 94.4 07/13/2017          GERD (gastroesophageal reflux disease)  On  famotidine.      VTE Risk Mitigation (From admission, onward)         Ordered     enoxaparin injection 40 mg  Daily         01/18/22 1438     IP VTE HIGH RISK PATIENT  Once         01/18/22 1438     Place sequential compression device  Until discontinued         01/18/22 1438                Discharge Planning   DEMETRIUS: 2/11/2022     Code Status: Partial Code   Is the patient medically ready for discharge?:     Reason for patient still in hospital (select all that apply): Pending disposition  Discharge Plan A: Return to nursing home                  Neda Porter MD  Department of Hospital Medicine   [facility]

## 2022-02-20 NOTE — SUBJECTIVE & OBJECTIVE
Interval History: No acute events overnight. Pending dispo    Review of Systems   Unable to perform ROS: Patient nonverbal   Objective:     Vital Signs (Most Recent):  Temp: 98.3 °F (36.8 °C) (02/20/22 0710)  Pulse: 68 (02/20/22 0710)  Resp: 16 (02/20/22 0710)  BP: (!) 123/59 (02/20/22 0710)  SpO2: 99 % (02/20/22 0820)   Vital Signs (24h Range):  Temp:  [96.6 °F (35.9 °C)-98.3 °F (36.8 °C)] 98.3 °F (36.8 °C)  Pulse:  [60-75] 68  Resp:  [16-18] 16  SpO2:  [96 %-99 %] 99 %  BP: (123-201)/(59-82) 123/59     Weight: 63.9 kg (140 lb 14 oz)  Body mass index is 24.18 kg/m².    Intake/Output Summary (Last 24 hours) at 2/20/2022 1034  Last data filed at 2/19/2022 1800  Gross per 24 hour   Intake 0 ml   Output 800 ml   Net -800 ml      Physical Exam  Vitals and nursing note reviewed.   Constitutional:       General: She is not in acute distress.     Appearance: She is not ill-appearing.   HENT:      Head: Normocephalic.      Mouth/Throat:      Mouth: Mucous membranes are moist.   Eyes:      General:         Right eye: No discharge.         Left eye: No discharge.      Extraocular Movements: Extraocular movements intact.      Pupils: Pupils are equal, round, and reactive to light.   Neck:      Vascular: No JVD.   Cardiovascular:      Rate and Rhythm: Normal rate and regular rhythm.   Pulmonary:      Effort: Pulmonary effort is normal.      Breath sounds: Normal breath sounds.   Abdominal:      General: Abdomen is flat. Bowel sounds are normal. There is no distension.      Palpations: Abdomen is soft.      Tenderness: There is no abdominal tenderness.   Musculoskeletal:      Right lower leg: No edema.      Left lower leg: No edema.   Skin:     General: Skin is warm and moist.      Findings: No rash.   Neurological:      Mental Status: She is alert.      Comments: She focuses her attention on me but doesn't say anything   Psychiatric:         Mood and Affect: Mood and affect normal.       Significant Labs: All pertinent labs  within the past 24 hours have been reviewed.  BMP: No results for input(s): GLU, NA, K, CL, CO2, BUN, CREATININE, CALCIUM, MG in the last 48 hours.  CBC: No results for input(s): WBC, HGB, HCT, PLT in the last 48 hours.    Significant Imaging: I have reviewed all pertinent imaging results/findings within the past 24 hours.

## 2022-02-20 NOTE — CARE UPDATE
02/20/22 0820   PRE-TX-O2   O2 Device (Oxygen Therapy) nasal cannula   $ Is the patient on Low Flow Oxygen? Yes   Flow (L/min) 1   SpO2 99 %   Pulse Oximetry Type Intermittent   $ Pulse Oximetry - Multiple Charge Pulse Oximetry - Multiple

## 2022-02-20 NOTE — PLAN OF CARE
Patient resting in bed, appears asleep, eyes closed, respirations even and unlabored. NAD. Denies pain or SOB. VSS. Afebrile. Cardiac monitoring maintained. Normal SR. Bedfast.  Medications administered per MD/NP order. Incontinence care performed. Bed alarm active. Side Rails up X2. Tube feeding Isosource 1.5 running at 45 ml/hr, which is goal, no residual, PEG flushes without issue. Plan of care reviewed with patient. Unable to verbalize understanding. Frequent checks performed Q2H. Call light in reach. Pt free from fall or injury. Will monitor.

## 2022-02-21 PROCEDURE — 27000207 HC ISOLATION

## 2022-02-21 PROCEDURE — 25000003 PHARM REV CODE 250: Performed by: INTERNAL MEDICINE

## 2022-02-21 PROCEDURE — 27000221 HC OXYGEN, UP TO 24 HOURS

## 2022-02-21 PROCEDURE — 11000001 HC ACUTE MED/SURG PRIVATE ROOM

## 2022-02-21 PROCEDURE — 63600175 PHARM REV CODE 636 W HCPCS: Performed by: INTERNAL MEDICINE

## 2022-02-21 PROCEDURE — 94761 N-INVAS EAR/PLS OXIMETRY MLT: CPT

## 2022-02-21 RX ADMIN — MIDODRINE HYDROCHLORIDE 5 MG: 5 TABLET ORAL at 09:02

## 2022-02-21 RX ADMIN — GLYCOPYRROLATE 1 MG: 1 TABLET ORAL at 09:02

## 2022-02-21 RX ADMIN — ATORVASTATIN CALCIUM 40 MG: 40 TABLET, FILM COATED ORAL at 09:02

## 2022-02-21 RX ADMIN — METOPROLOL TARTRATE 12.5 MG: 25 TABLET, FILM COATED ORAL at 09:02

## 2022-02-21 RX ADMIN — ENOXAPARIN SODIUM 40 MG: 100 INJECTION SUBCUTANEOUS at 04:02

## 2022-02-21 RX ADMIN — CITALOPRAM HYDROBROMIDE 40 MG: 10 TABLET ORAL at 09:02

## 2022-02-21 RX ADMIN — FAMOTIDINE 20 MG: 10 INJECTION INTRAVENOUS at 09:02

## 2022-02-21 RX ADMIN — LEVOTHYROXINE SODIUM 25 MCG: 0.03 TABLET ORAL at 05:02

## 2022-02-21 RX ADMIN — MIDODRINE HYDROCHLORIDE 5 MG: 5 TABLET ORAL at 03:02

## 2022-02-21 RX ADMIN — GLYCOPYRROLATE 1 MG: 1 TABLET ORAL at 03:02

## 2022-02-21 RX ADMIN — AZELASTINE 137 MCG: 1 SPRAY, METERED NASAL at 09:02

## 2022-02-21 NOTE — PROGRESS NOTES
[facility] Hospital Medicine  Progress Note    Patient Name: Sandra Wilson  MRN: [id number]  Patient Class: IP- Inpatient   Admission Date: 1/18/2022  Length of Stay: 34 days  Attending Physician: Kaylyn Ibarra MD  Primary Care Provider: Primary Doctor No        Subjective:     Principal Problem:Acute hypoxemic respiratory failure        HPI:  Sandra Wilson is an 81 year old female with a past medical history of CVAs, CNS aneurysm, PEG status, CAD, HLD, hypothryoidism, DM, aortic stenosis, and colon, breast, and ovarian cancer who presented from long term care with vomiting, diarrhea, and shortness of breath. Workup in the ED showed likely aspiration pneumonitis and C. Diff colitis. Her O2 requirement increased while in the ED requiring intubation with sedation. She also required Levophed as she likely developed septic shock. Antibiotics were broadened to cefepime, vancomycin, and Flagyl. Pulmonary was consulted. Family was notified. The patient was unable to provide history given acuity of illness.      Overview/Hospital Course:  Sandra Wilson is an 81 year old female with a past medical history of CVAs, CNS aneurysm, PEG status, CAD, HLD, hypothyroidism, DM, aortic stenosis, and colon, breast, and ovarian cancer who presented from long term care with vomiting, diarrhea, and shortness of breath secondary to acute hypoxic respiratory failure from aspiration pneumonia in the setting of C diff colitis leading to septic shock. She was intubated in the ED and started on Levophed infusion via RIJ CVC placed by Dr. Vignesh Gaspar. She was started on broad spectrum antibiotics with cefepime, Flagyl, vancomycin and admitted to the ICU. She was also started on enteral vancomycin for severe C diff colitis given elevated WBC count and STEFFANY. There was concern for developing ARDS given P/F ratio of 87 (severe); 190 1/22 (moderate). Pulmonary was consulted. She also presented with demand ischemia likely  secondary to septic shock. Troponin was trended and improved as shock resolved. Normal saline infusion was added for hyponatremia and STEFFANY which improved both. Levophed was able to be weaned. Family agreed to DNR status 1/18 and Palliative Care was consulted to discuss goal of care 1/19. Antibiotics were changed to doxycycline and Flagyl (GBS in sputum culture) 1/21; vancomycin was added 1/23 after culture also became positive for Staph aureus and Klebsiella. Her respiratory status has improved throughout her course and she was extubated (code changed to partial code for intubation) 1/21 only to be re-intubated for worsening stridor (history of tracheostomy). Upon re-intubation, copious amounts of secretions were suctioned. KUB suggested a mild ileus A bowel regimen was instituted and the patient was able to have bowel movements 1/22. Tube feeds were started 1/23. Her course was complicated by Afib as well noted on telemetry 1/21; metoprolol tartrate started 1/22.She failed a leak test 1/22; Surgery was consulted for tracheostomy which is pending conversation with family regarding goals of care.    Patient was set to go for trach on 01/26, but attempt was made for another trial extubation and the patient did well after extubation, she was transition to face mask and was able to maintain her saturations although had significant difficulty with secretions.  She was started on Robinul and scopolamine patch which did seem to improve for secretions.  Her respiratory status continued to be somewhat tenuous, so she was monitored in the ICU.  Family expressed desire to not have her get a tracheostomy.  Case management began working on LTACH referral.    Unable to obtain subjective 2/2 current cognitive status    NAD, alert  NC, AT  RRR  CTAB  SNTND+BS  PERRL  No gross joint abnormalities.     Vitals, labs and radiographs from past 24h reviewed and personally interpreted.       Assessment/Plan:      Acute bacterial pneumonia  2/2 unknown organism   -treatment complete    * Acute hypoxemic respiratory failure  stable  Monitor sats.  Monitor work of breathing.  She's partial code, specifying that she'd want to be intubated if need be.    Anemia  Continue to monitor H&H every 3 days currently stable no active bleeding noted    Hemoglobin   Date Value Ref Range Status   02/11/2022 9.5 (L) 12.0 - 16.0 g/dL Final   02/07/2022 8.3 (L) 12.0 - 16.0 g/dL Final   02/06/2022 8.5 (L) 12.0 - 16.0 g/dL Final   02/05/2022 6.7 (L) 12.0 - 16.0 g/dL Final   ]      Paroxysmal atrial fibrillation  -Telemetry  - controlled on metoprolol 12.5 mg BID  Will hold full dose anticoagulant due to high risk of bleeding      Gastrostomy tube dependent  Stable.  No issues with it    Type 2 diabetes mellitus, with long-term current use of insulin  Patient's FSGs are controlled on current medication regimen.  Most recent fingerstick glucose reviewed-   No results for input(s): POCTGLUCOSE in the last 24 hours.  Current correctional scale  Medium  Maintain anti-hyperglycemic dose as follows-   Antihyperglycemics (From admission, onward)            Start     Stop Route Frequency Ordered    01/18/22 1438  insulin aspart U-100 pen 1-10 Units         -- SubQ Before meals & nightly PRN 01/18/22 1438        Hold Oral hypoglycemics while patient is in the hospital.        YARI (generalized anxiety disorder)  Extubated on 01/26.  Using Precedex as needed to control anxiety.  Patient does not appear anxious on exam.      Hypothyroidism  Patient has chronic hypothyroidism. TFTs reviewed-   Lab Results   Component Value Date    TSH 1.130 07/13/2017   . Will continue chronic levothyroxine and adjust for and clinical changes.        Hyperlipidemia   Patient is chronically on statin.will continue for now. Monitor clinically. Last LDL was   Lab Results   Component Value Date    LDLCALC 94.4 07/13/2017          GERD (gastroesophageal reflux disease)  On famotidine.    VTE Risk Mitigation  (From admission, onward)         Ordered     enoxaparin injection 40 mg  Daily         01/18/22 1438     IP VTE HIGH RISK PATIENT  Once         01/18/22 1438     Place sequential compression device  Until discontinued         01/18/22 1438                Discharge Planning   DEMETRIUS: 2/11/2022     Code Status: Partial Code   Is the patient medically ready for discharge?:     Reason for patient still in hospital (select all that apply): Pending disposition  Discharge Plan A: Return to nursing home                  Kaylyn Ibarra MD  Department of Hospital Medicine   [facility]

## 2022-02-21 NOTE — PLAN OF CARE
Patient A&Ox2. Patient communication more today. Continues to tolerate ped tube feedings Isosource @45ml/hr Q4 flushes, KVO @20 NS. Tele in place NS. O2 @1L via nasal cannula. Patient tolerated position changes per protocol. Bed in lowest position and bed alarm set. Will continue to monitor patient for changes.

## 2022-02-21 NOTE — PT/OT/SLP PROGRESS
Physical Therapy      Patient Name:  Sandra Wilson   MRN:  [id number]    Patient not seen today secondary to patient unarousable and withdraws when PT removes blankets to encourage participation. Will follow-up 02/22/22.

## 2022-02-21 NOTE — PLAN OF CARE
Recevied message from the pt's daughter, Ira stating that she has spoken with [facility] and was working with them for admission.   I left both [facility] and Ira a message asking for a return call to discuss the acceptance status.    02/21/22 1214   Discharge Reassessment   Assessment Type Discharge Planning Reassessment

## 2022-02-21 NOTE — CARE UPDATE
02/21/22 0820   Patient Assessment/Suction   Level of Consciousness (AVPU) alert   PRE-TX-O2   O2 Device (Oxygen Therapy) nasal cannula   $ Is the patient on Low Flow Oxygen? Yes   Flow (L/min) 1   SpO2 99 %   Pulse Oximetry Type Intermittent   $ Pulse Oximetry - Multiple Charge Pulse Oximetry - Multiple

## 2022-02-22 LAB
ALBUMIN SERPL BCP-MCNC: 2.7 G/DL (ref 3.5–5.2)
ALP SERPL-CCNC: 60 U/L (ref 55–135)
ALT SERPL W/O P-5'-P-CCNC: 22 U/L (ref 10–44)
ANION GAP SERPL CALC-SCNC: 9 MMOL/L (ref 8–16)
AST SERPL-CCNC: 19 U/L (ref 10–40)
BASOPHILS # BLD AUTO: 0.08 K/UL (ref 0–0.2)
BASOPHILS NFR BLD: 1.1 % (ref 0–1.9)
BILIRUB SERPL-MCNC: 0.3 MG/DL (ref 0.1–1)
BUN SERPL-MCNC: 17 MG/DL (ref 8–23)
CALCIUM SERPL-MCNC: 8.8 MG/DL (ref 8.7–10.5)
CHLORIDE SERPL-SCNC: 98 MMOL/L (ref 95–110)
CO2 SERPL-SCNC: 28 MMOL/L (ref 23–29)
CREAT SERPL-MCNC: 0.6 MG/DL (ref 0.5–1.4)
DIFFERENTIAL METHOD: ABNORMAL
EOSINOPHIL # BLD AUTO: 0.4 K/UL (ref 0–0.5)
EOSINOPHIL NFR BLD: 5.3 % (ref 0–8)
ERYTHROCYTE [DISTWIDTH] IN BLOOD BY AUTOMATED COUNT: 14 % (ref 11.5–14.5)
EST. GFR  (AFRICAN AMERICAN): >60 ML/MIN/1.73 M^2
EST. GFR  (NON AFRICAN AMERICAN): >60 ML/MIN/1.73 M^2
GLUCOSE SERPL-MCNC: 92 MG/DL (ref 70–110)
HCT VFR BLD AUTO: 31.8 % (ref 37–48.5)
HGB BLD-MCNC: 10.3 G/DL (ref 12–16)
IMM GRANULOCYTES # BLD AUTO: 0.03 K/UL (ref 0–0.04)
IMM GRANULOCYTES NFR BLD AUTO: 0.4 % (ref 0–0.5)
LYMPHOCYTES # BLD AUTO: 2 K/UL (ref 1–4.8)
LYMPHOCYTES NFR BLD: 27 % (ref 18–48)
MAGNESIUM SERPL-MCNC: 1.8 MG/DL (ref 1.6–2.6)
MCH RBC QN AUTO: 32.5 PG (ref 27–31)
MCHC RBC AUTO-ENTMCNC: 32.4 G/DL (ref 32–36)
MCV RBC AUTO: 100 FL (ref 82–98)
MONOCYTES # BLD AUTO: 0.8 K/UL (ref 0.3–1)
MONOCYTES NFR BLD: 10.7 % (ref 4–15)
NEUTROPHILS # BLD AUTO: 4.1 K/UL (ref 1.8–7.7)
NEUTROPHILS NFR BLD: 55.5 % (ref 38–73)
NRBC BLD-RTO: 0 /100 WBC
PHOSPHATE SERPL-MCNC: 3.5 MG/DL (ref 2.7–4.5)
PLATELET # BLD AUTO: 223 K/UL (ref 150–450)
PMV BLD AUTO: 11.7 FL (ref 9.2–12.9)
POCT GLUCOSE: 119 MG/DL (ref 70–110)
POTASSIUM SERPL-SCNC: 4 MMOL/L (ref 3.5–5.1)
PROT SERPL-MCNC: 6.9 G/DL (ref 6–8.4)
RBC # BLD AUTO: 3.17 M/UL (ref 4–5.4)
SODIUM SERPL-SCNC: 135 MMOL/L (ref 136–145)
WBC # BLD AUTO: 7.32 K/UL (ref 3.9–12.7)

## 2022-02-22 PROCEDURE — 25000003 PHARM REV CODE 250: Performed by: INTERNAL MEDICINE

## 2022-02-22 PROCEDURE — 83735 ASSAY OF MAGNESIUM: CPT | Performed by: INTERNAL MEDICINE

## 2022-02-22 PROCEDURE — 84100 ASSAY OF PHOSPHORUS: CPT | Performed by: INTERNAL MEDICINE

## 2022-02-22 PROCEDURE — 63600175 PHARM REV CODE 636 W HCPCS: Performed by: INTERNAL MEDICINE

## 2022-02-22 PROCEDURE — 27000207 HC ISOLATION

## 2022-02-22 PROCEDURE — 36415 COLL VENOUS BLD VENIPUNCTURE: CPT | Performed by: INTERNAL MEDICINE

## 2022-02-22 PROCEDURE — 27000221 HC OXYGEN, UP TO 24 HOURS

## 2022-02-22 PROCEDURE — C1751 CATH, INF, PER/CENT/MIDLINE: HCPCS

## 2022-02-22 PROCEDURE — 94761 N-INVAS EAR/PLS OXIMETRY MLT: CPT

## 2022-02-22 PROCEDURE — 11000001 HC ACUTE MED/SURG PRIVATE ROOM

## 2022-02-22 PROCEDURE — 80053 COMPREHEN METABOLIC PANEL: CPT | Performed by: INTERNAL MEDICINE

## 2022-02-22 PROCEDURE — 85025 COMPLETE CBC W/AUTO DIFF WBC: CPT | Performed by: INTERNAL MEDICINE

## 2022-02-22 RX ADMIN — AZELASTINE 137 MCG: 1 SPRAY, METERED NASAL at 08:02

## 2022-02-22 RX ADMIN — MIDODRINE HYDROCHLORIDE 5 MG: 5 TABLET ORAL at 08:02

## 2022-02-22 RX ADMIN — METOPROLOL TARTRATE 12.5 MG: 25 TABLET, FILM COATED ORAL at 08:02

## 2022-02-22 RX ADMIN — FAMOTIDINE 20 MG: 10 INJECTION INTRAVENOUS at 08:02

## 2022-02-22 RX ADMIN — SODIUM CHLORIDE: 0.9 INJECTION, SOLUTION INTRAVENOUS at 08:02

## 2022-02-22 RX ADMIN — MIDODRINE HYDROCHLORIDE 5 MG: 5 TABLET ORAL at 03:02

## 2022-02-22 RX ADMIN — CITALOPRAM HYDROBROMIDE 40 MG: 10 TABLET ORAL at 08:02

## 2022-02-22 RX ADMIN — LEVOTHYROXINE SODIUM 25 MCG: 0.03 TABLET ORAL at 05:02

## 2022-02-22 RX ADMIN — GLYCOPYRROLATE 1 MG: 1 TABLET ORAL at 08:02

## 2022-02-22 RX ADMIN — GLYCOPYRROLATE 1 MG: 1 TABLET ORAL at 03:02

## 2022-02-22 RX ADMIN — ENOXAPARIN SODIUM 40 MG: 100 INJECTION SUBCUTANEOUS at 03:02

## 2022-02-22 RX ADMIN — ATORVASTATIN CALCIUM 40 MG: 40 TABLET, FILM COATED ORAL at 08:02

## 2022-02-22 NOTE — PROGRESS NOTES
[facility] Hospital Medicine  Progress Note    Patient Name: Sandra Wilson  MRN: [id number]  Patient Class: IP- Inpatient   Admission Date: 1/18/2022  Length of Stay: 35 days  Attending Physician: Kaylyn Ibarra MD  Primary Care Provider: Primary Doctor No        Subjective:     Principal Problem:Acute hypoxemic respiratory failure        HPI:  Sandra Wilson is an 81 year old female with a past medical history of CVAs, CNS aneurysm, PEG status, CAD, HLD, hypothryoidism, DM, aortic stenosis, and colon, breast, and ovarian cancer who presented from long term care with vomiting, diarrhea, and shortness of breath. Workup in the ED showed likely aspiration pneumonitis and C. Diff colitis. Her O2 requirement increased while in the ED requiring intubation with sedation. She also required Levophed as she likely developed septic shock. Antibiotics were broadened to cefepime, vancomycin, and Flagyl. Pulmonary was consulted. Family was notified. The patient was unable to provide history given acuity of illness.      Overview/Hospital Course:  Sandra Wilson is an 81 year old female with a past medical history of CVAs, CNS aneurysm, PEG status, CAD, HLD, hypothyroidism, DM, aortic stenosis, and colon, breast, and ovarian cancer who presented from long term care with vomiting, diarrhea, and shortness of breath secondary to acute hypoxic respiratory failure from aspiration pneumonia in the setting of C diff colitis leading to septic shock. She was intubated in the ED and started on Levophed infusion via RIJ CVC placed by Dr. Vignesh Gaspar. She was started on broad spectrum antibiotics with cefepime, Flagyl, vancomycin and admitted to the ICU. She was also started on enteral vancomycin for severe C diff colitis given elevated WBC count and STEFFANY. There was concern for developing ARDS given P/F ratio of 87 (severe); 190 1/22 (moderate). Pulmonary was consulted. She also presented with demand ischemia likely  secondary to septic shock. Troponin was trended and improved as shock resolved. Normal saline infusion was added for hyponatremia and STEFFANY which improved both. Levophed was able to be weaned. Family agreed to DNR status 1/18 and Palliative Care was consulted to discuss goal of care 1/19. Antibiotics were changed to doxycycline and Flagyl (GBS in sputum culture) 1/21; vancomycin was added 1/23 after culture also became positive for Staph aureus and Klebsiella. Her respiratory status has improved throughout her course and she was extubated (code changed to partial code for intubation) 1/21 only to be re-intubated for worsening stridor (history of tracheostomy). Upon re-intubation, copious amounts of secretions were suctioned. KUB suggested a mild ileus A bowel regimen was instituted and the patient was able to have bowel movements 1/22. Tube feeds were started 1/23. Her course was complicated by Afib as well noted on telemetry 1/21; metoprolol tartrate started 1/22.She failed a leak test 1/22; Surgery was consulted for tracheostomy which is pending conversation with family regarding goals of care.    Patient was set to go for trach on 01/26, but attempt was made for another trial extubation and the patient did well after extubation, she was transition to face mask and was able to maintain her saturations although had significant difficulty with secretions.  She was started on Robinul and scopolamine patch which did seem to improve for secretions.  Her respiratory status continued to be somewhat tenuous, so she was monitored in the ICU.  Family expressed desire to not have her get a tracheostomy.  Case management began working on LTACH referral.    Unable to obtain subjective 2/2 current cognitive status    NAD, alert  NC, AT  RRR  CTAB  SNTND+BS  PERRL  No gross joint abnormalities.     Vitals, labs and radiographs from past 24h reviewed and personally interpreted.       Assessment/Plan:      Acute bacterial pneumonia  2/2 unknown organism   -treatment complete    CDiff  -Treatment complete.     * Acute hypoxemic respiratory failure  stable  Monitor sats.  Monitor work of breathing.  She's partial code, specifying that she'd want to be intubated if need be.    Anemia  Continue to monitor H&H every 3 days currently stable no active bleeding noted    Hemoglobin   Date Value Ref Range Status   02/11/2022 9.5 (L) 12.0 - 16.0 g/dL Final   02/07/2022 8.3 (L) 12.0 - 16.0 g/dL Final   02/06/2022 8.5 (L) 12.0 - 16.0 g/dL Final   02/05/2022 6.7 (L) 12.0 - 16.0 g/dL Final   ]      Paroxysmal atrial fibrillation  -Telemetry  - controlled on metoprolol 12.5 mg BID  Will hold full dose anticoagulant due to high risk of bleeding      Gastrostomy tube dependent  Stable.  No issues with it    Type 2 diabetes mellitus, with long-term current use of insulin  Patient's FSGs are controlled on current medication regimen.  Most recent fingerstick glucose reviewed-   No results for input(s): POCTGLUCOSE in the last 24 hours.  Current correctional scale  Medium  Maintain anti-hyperglycemic dose as follows-   Antihyperglycemics (From admission, onward)            Start     Stop Route Frequency Ordered    01/18/22 1438  insulin aspart U-100 pen 1-10 Units         -- SubQ Before meals & nightly PRN 01/18/22 1438        Hold Oral hypoglycemics while patient is in the hospital.        YARI (generalized anxiety disorder)  Extubated on 01/26.  Using Precedex as needed to control anxiety.  Patient does not appear anxious on exam.      Hypothyroidism  Patient has chronic hypothyroidism. TFTs reviewed-   Lab Results   Component Value Date    TSH 1.130 07/13/2017   . Will continue chronic levothyroxine and adjust for and clinical changes.        Hyperlipidemia   Patient is chronically on statin.will continue for now. Monitor clinically. Last LDL was   Lab Results   Component Value Date    LDLCALC 94.4 07/13/2017          GERD (gastroesophageal reflux disease)  On  famotidine.    VTE Risk Mitigation (From admission, onward)         Ordered     enoxaparin injection 40 mg  Daily         01/18/22 1438     IP VTE HIGH RISK PATIENT  Once         01/18/22 1438     Place sequential compression device  Until discontinued         01/18/22 1438                Discharge Planning   DEMETRIUS: 2/11/2022     Code Status: Partial Code   Is the patient medically ready for discharge?:     Reason for patient still in hospital (select all that apply): Pending disposition  Discharge Plan A: Return to nursing home                  Kaylyn Ibarra MD  Department of Hospital Medicine   [facility]

## 2022-02-22 NOTE — CARE UPDATE
02/21/22 2244   Patient Assessment/Suction   Level of Consciousness (AVPU) responds to voice   Respiratory Effort Normal;Unlabored   Expansion/Accessory Muscles/Retractions no use of accessory muscles;no retractions;expansion symmetric   Rhythm/Pattern, Respiratory pattern regular;depth regular;unlabored   PRE-TX-O2   O2 Device (Oxygen Therapy) nasal cannula   $ Is the patient on Low Flow Oxygen? Yes   SpO2 98 %   Pulse Oximetry Type Intermittent   $ Pulse Oximetry - Multiple Charge Pulse Oximetry - Multiple   Pulse 66   Resp 18

## 2022-02-22 NOTE — PLAN OF CARE
Plan of Care:    Aspiration precautions: patient receiving TF; HOB elevated    Bleeding precautions: patient receiving Lovenox; monitor for bleeding, excessive bruising, and monitor labs     Cardiac Monitoring: monitor and document vital signs per unit protocol; notify physician of significant changes in vital signs    Fall precautions: Bed alarm activated    Safety precautions: Call light and personal belongings within reach, wheels locked and bed in lowest position    Moisture Management: incontinence pad changed as needed; skin to remain clean, dry, and intact    Sacral Protection: mepilex dressing to sacral noted to be clean, dry, and intact     Shift Summary:   2200: Perineal care performed and Purewick changed; patient tolerated well. 0000: TF bag and tubing changed. Patient repositioned during shift and heels elevated off bed. No blood return noted from Midline.

## 2022-02-22 NOTE — PLAN OF CARE
Problem: Adult Inpatient Plan of Care  Goal: Plan of Care Review  Outcome: Ongoing, Progressing  Flowsheets (Taken 2/22/2022 1610)  Plan of Care Reviewed With: patient   Tube feeding/water flushes per order, patient tolerating goal. Incontinence care. Contact precautions maintained. Turn q 2. Safety maintained.

## 2022-02-22 NOTE — CARE UPDATE
02/22/22 0731   Patient Assessment/Suction   Respiratory Effort Unlabored   Rhythm/Pattern, Respiratory unlabored   PRE-TX-O2   O2 Device (Oxygen Therapy) nasal cannula   $ Is the patient on Low Flow Oxygen? Yes   Flow (L/min) 1   SpO2 97 %   Pulse Oximetry Type Intermittent   Pulse 61   Resp 18

## 2022-02-23 PROCEDURE — 11000001 HC ACUTE MED/SURG PRIVATE ROOM

## 2022-02-23 PROCEDURE — 27000207 HC ISOLATION

## 2022-02-23 PROCEDURE — 63600175 PHARM REV CODE 636 W HCPCS: Performed by: INTERNAL MEDICINE

## 2022-02-23 PROCEDURE — 94761 N-INVAS EAR/PLS OXIMETRY MLT: CPT

## 2022-02-23 PROCEDURE — 25000003 PHARM REV CODE 250: Performed by: INTERNAL MEDICINE

## 2022-02-23 PROCEDURE — 27000221 HC OXYGEN, UP TO 24 HOURS

## 2022-02-23 PROCEDURE — 97110 THERAPEUTIC EXERCISES: CPT | Mod: CQ

## 2022-02-23 RX ADMIN — GLYCOPYRROLATE 1 MG: 1 TABLET ORAL at 09:02

## 2022-02-23 RX ADMIN — FAMOTIDINE 20 MG: 10 INJECTION INTRAVENOUS at 09:02

## 2022-02-23 RX ADMIN — LEVOTHYROXINE SODIUM 25 MCG: 0.03 TABLET ORAL at 05:02

## 2022-02-23 RX ADMIN — AZELASTINE 137 MCG: 1 SPRAY, METERED NASAL at 09:02

## 2022-02-23 RX ADMIN — ENOXAPARIN SODIUM 40 MG: 100 INJECTION SUBCUTANEOUS at 05:02

## 2022-02-23 RX ADMIN — MIDODRINE HYDROCHLORIDE 5 MG: 5 TABLET ORAL at 09:02

## 2022-02-23 RX ADMIN — METOPROLOL TARTRATE 12.5 MG: 25 TABLET, FILM COATED ORAL at 09:02

## 2022-02-23 RX ADMIN — MIDODRINE HYDROCHLORIDE 5 MG: 5 TABLET ORAL at 03:02

## 2022-02-23 RX ADMIN — ATORVASTATIN CALCIUM 40 MG: 40 TABLET, FILM COATED ORAL at 09:02

## 2022-02-23 RX ADMIN — CITALOPRAM HYDROBROMIDE 40 MG: 10 TABLET ORAL at 09:02

## 2022-02-23 RX ADMIN — GLYCOPYRROLATE 1 MG: 1 TABLET ORAL at 03:02

## 2022-02-23 NOTE — PROGRESS NOTES
[facility]  Adult Nutrition  Progress Note    SUMMARY   Intervention: enteral nutrition therapy     Recommendations  1) Continue TF Isosource 1.5 @ 45 mL/hr as pt tolerates + Brody BID and Beneprotein BID + 115 ml flush q 4 hr  --provides 1620 kcal, 73 g protein ( + beneprotein), 820 ml free water  (100% EEN, 100% EPN )     2) weigh weekly     Goals: 1) Initation of enteral nutrition by RD follow up 2) Nutrition support meeting > 50% of needs at f/u  Nutrition Goal Status: met/meeting-continues  Communication of RD Recs: POC, sticky note     Assessment and Plan  Nutrition Problem  Inadequate energy intake     Related to (etiology):   NPO status, no TF running, dysphagia     Signs and Symptoms (as evidenced by):   Pt receiving < 50% EEN/EPN x 6 days     Interventions(treatment strategy):  above     Nutrition Diagnosis Status:   improved     Malnutrition  Edema: 1+  Gabriel: 13 + PEG, coccyx ulcer  NFPE 1/24/21 no significant wasting seen  PO intakes < 50% of needs x > 5-6 days  No significant wt loss per chart review     Reason for Assessment  Reason For Assessment: follow up  Diagnosis: other (see comments) (Acute hypoxemic respiratory failure)  Relevant Medical History: GERD, HLD, HTN, CVAs, CNS aneurysm, PEG, CAD, hypothyroidism, DM, colon, breast and ovarian cancer.  Interdisciplinary Rounds: did not attend     General Information Comments: Remote assessment for coverage. Pt intubated and sedated, requiring levophed, on propofol, MAP 75, plan to try for extubation today per note. With PEG, noted pt has hx of removing PEG tube. C-diff positive, also noted with STEFFANY - renal labs improving, K, phos low, repleting. Per chart, with 9.5% wt loss over the last 2 months, significant. NFPE needed to determine malnutrition status. RD to monitor and follow up.  Nutrition Discharge Planning: Pending clinical course  1/24/22 TF recs left 1/21, TF started @ 10 ml/hr yesterday, continues at same rate. No BM. +  "vent-possible plan for trach placement. NFPE done 1/24/21 no significant wasting seen.  1/26/22 + PEG. TF held for possible trach placemen however now pt extubated. Plan to continue with TFs.  1/28/21 TF held 1/26-today r/t tenuous respiratory status. TPN started yesterday per MD ( meeting 72% protein needs and 94% energy needs). Pt with O2 mask on today. Plan per MD to continue TPN and start trickle TF in the next 24-48 hr.  1/31/22 Pt was started on trickle TF's over the weekend. TPN reordered today as pt TF not advancing fast enough.  TF at 20 ml/hr, and tolerating today. Plan to advance toward goal per MD.  2/3/22 Pt appeared well nourished visually.  TF had been advanced to 45ml/hr and pt was tolerating.  Pt on oxymask at 4L/min.    2/10/22 Pt continues to tolerate TF @ goal via PEG + flush as ordered. Not responsive to questions at visit.  2/17/22 Pt continues to tolerate TF @ goal + chun and beneprotein. Awaiting nursing home placement.  2/23/22 No changes, pt continues to tolerate TF @ goal + chun and beneprotein. Awaiting nursing home placement today.     Discharge Planning:  TF above ( If bolus needed- 4.5 cans Isosource daily +75 ml flush before and after each bolus)     Nutrition/ Diet History  unable to obtain  Spiritual/cultural/Scientology factors affecting PO intakes  Factors affecting PO intakes: NPO, trouble swallowing     Nutrition Risk Screen  Nutrition Risk Screen: no indicators present     Anthropometrics  Height: 5' 4" (162.6 cm)  Height (inches): 64 in  Weight Method: Bed Scale  Weight: 63.9 kg 2/6, 69.1 kg 1/31, 71.5 kg 1/22, 70.3 kg 1/24, 69 kg (admission)  Weight (lb): 140 lb ( edema)  Ideal Body Weight (IBW), Female: 120 lb  BMI (Calculated): 26.1 admission  81.6 kg 1/2020     Lab/Procedures/Meds  Pertinent Labs Reviewed: reviewed  BMP  Lab Results   Component Value Date     (L) 02/22/2022    K 4.0 02/22/2022    CL 98 02/22/2022    CO2 28 02/22/2022    BUN 17 02/22/2022    " CREATININE 0.6 02/22/2022    CALCIUM 8.8 02/22/2022    ANIONGAP 9 02/22/2022    ESTGFRAFRICA >60 02/22/2022    EGFRNONAA >60 02/22/2022        Pertinent Medications Reviewed: reviewed  Statin, NS 1-35 ml/hr, insulin     Estimated/Assessed Needs  Weight Used For Calorie Calculations: 68.9 kg (152 lb)  Energy Calorie Requirements (kcal): MSJ ( x 1.25-1.4) = 9756-4917 kcal  Energy Need Method: MSJ  Protein Requirements:  g/day based on 1.2-1.5 g protein /kg  Weight Used For Protein Calculations: 68.9 kg (152 lb)  Fluid Requirements (mL): 1 mL/kcal or per MD  Estimated Fluid Requirement Method: RDA Method  RDA Method (mL): 1450 ml  CHO Requirement: 160-195 g     Nutrition Prescription Ordered  Current Diet Order: TF above + NPO     Evaluation of Received Nutrient/Fluid Intake  Energy Calories Required: meeting needs  Protein Required: meeting needs  Fluid Required: meeting needs  Tolerance: tolerating  % Intake of Estimated Energy Needs: 100%  % Meal Intake: NPO + TF     Nutrition Risk  Level of Risk/Frequency of Follow-up: moderate/low (1-2x weekly)      Monitor and Evaluation  Food and Nutrient Intake: enteral nutrition intake  Food and Nutrient Adminstration: enteral and parenteral nutrition administration  Knowledge/Beliefs/Attitudes: food and nutrition knowledge/skill  Physical Activity and Function: nutrition-related ADLs and IADLs  Anthropometric Measurements: weight change  Biochemical Data, Medical Tests and Procedures: electrolyte and renal panel,gastrointestinal profile,glucose/endocrine profile  Nutrition-Focused Physical Findings: overall appearance skin     Nutrition Follow-Up  RD Follow-up?: Yes

## 2022-02-23 NOTE — PLAN OF CARE
Problem: Physical Therapy Goal  Goal: Physical Therapy Goal  Description: Goals to be met by: 2022     Patient will increase functional independence with mobility by performin. Supine to sit with Maximum Assistance  2. Sitting at edge of bed x20 minutes with Maximum Assistance  3. Lower extremity exercise program x20 reps   Outcome: Ongoing, Progressing   Therapeutic exercises as tolerated.

## 2022-02-23 NOTE — PLAN OF CARE
9:34am  awaiting return call from [facility] to complete locet.    10:00am locet called in to [facility].  nikki faxed to [phone].  Fax confirmation received.    2:09pm 142 obtained and attached in careport.         02/23/22 0955   Post-Acute Status   Post-Acute Authorization Placement   Post-Acute Placement Status Pending state direction/certification

## 2022-02-23 NOTE — CARE UPDATE
02/23/22 0757   Patient Assessment/Suction   Level of Consciousness (AVPU) alert   Rhythm/Pattern, Respiratory unlabored   PRE-TX-O2   O2 Device (Oxygen Therapy) nasal cannula   $ Is the patient on Low Flow Oxygen? Yes   Flow (L/min) 1   SpO2 99 %   Pulse Oximetry Type Intermittent   $ Pulse Oximetry - Multiple Charge Pulse Oximetry - Multiple   Pulse 61   Resp 17

## 2022-02-23 NOTE — PLAN OF CARE
Spoke with Hannah at [facility], she has been in contact with the pt's daughter, Ira and is waiting for her to send the bank statements and required financial documents.   I left a message for Mrs Albrecht with [facility] for any updates.   Spoke with the pt's son, Jose C; he spoke with his sister who is insistent on handling everything so he is going to let her. I did let him know that I would follow up with the [facility] worker regarding his responsibility for the patient since he also listed on the POA.   Left message for pt's daughter, Ira asking her to call me also.    02/23/22 0394   Post-Acute Status   Post-Acute Authorization Placement   Post-Acute Placement Status Referrals Sent

## 2022-02-23 NOTE — PT/OT/SLP PROGRESS
"Physical Therapy Treatment    Patient Name:  Sandra Wilson   MRN:  [id number]    Recommendations:     Discharge Recommendations:  nursing facility, basic, nursing facility, skilled   Discharge Equipment Recommendations: none   Barriers to discharge: None    Assessment:     Sandra Wilson is a 81 y.o. female admitted with a medical diagnosis of Acute hypoxemic respiratory failure.  She presents with the following impairments/functional limitations:  weakness, impaired endurance, impaired self care skills, impaired functional mobilty, decreased upper extremity function, decreased lower extremity function, pain, decreased ROM . Awake , alert in bed.  Agreed to mobilize and asking , ' what time is it ?" Therapeutic exercises all 4 extremities performed with patient counting along on occasion.     Rehab Prognosis: Fair; patient would benefit from acute skilled PT services to address these deficits and reach maximum level of function.    Recent Surgery: Procedure(s) (LRB):  CREATION, TRACHEOSTOMY (N/A) 28 Days Post-Op    Plan:     During this hospitalization, patient to be seen 3 x/week to address the identified rehab impairments via therapeutic exercises, therapeutic activities and progress toward the following goals:    · Plan of Care Expires:  02/28/22    Subjective     Chief Complaint: none stated  Patient/Family Comments/goals: none stated  Pain/Comfort:  · Pain Rating 1: other (see comments) (did not rate , " It hurts " with certain touvh and movement LE's.)  · Location - Side 1: Bilateral  · Location - Orientation 1: generalized  · Location 1: leg  · Pain Addressed 1: Cessation of Activity      Objective:     Communicated with nurse Lerma prior to session.  Patient found supine with bed alarm, telemetry, pressure relief boots, PureWick upon PT entry to room.     General Precautions: Standard, contact, fall   Orthopedic Precautions:N/A   Braces: N/A  Respiratory Status: Room air     Functional " Mobility:  ·       AM-PAC 6 CLICK MOBILITY          Therapeutic Activities and Exercises:   Therapeutic exercises all 4 extremities in supine in available planes of movement.     Patient left supine with all lines intact, call button in reach, bed alarm on and nurse Maria Guadalupe notified..    GOALS:   Multidisciplinary Problems     Physical Therapy Goals        Problem: Physical Therapy Goal    Goal Priority Disciplines Outcome Goal Variances Interventions   Physical Therapy Goal     PT, PT/OT Ongoing, Progressing     Description: Goals to be met by: 2022     Patient will increase functional independence with mobility by performin. Supine to sit with Maximum Assistance  2. Sitting at edge of bed x20 minutes with Maximum Assistance  3. Lower extremity exercise program x20 reps                    Time Tracking:     PT Received On: 22  PT Start Time: 1115     PT Stop Time: 1125  PT Total Time (min): 10 min     Billable Minutes: Therapeutic Exercise 10min    Treatment Type: Treatment  PT/PTA: PTA     PTA Visit Number: 1     2022

## 2022-02-23 NOTE — PLAN OF CARE
Problem: Adult Inpatient Plan of Care  Goal: Plan of Care Review  Outcome: Ongoing, Progressing  Goal: Patient-Specific Goal (Individualized)  Outcome: Ongoing, Progressing  Goal: Absence of Hospital-Acquired Illness or Injury  Outcome: Ongoing, Progressing  Goal: Optimal Comfort and Wellbeing  Outcome: Ongoing, Progressing  Goal: Readiness for Transition of Care  Outcome: Ongoing, Progressing     Problem: Infection  Goal: Absence of Infection Signs and Symptoms  Outcome: Ongoing, Progressing     Problem: Fluid Imbalance (Pneumonia)  Goal: Fluid Balance  Outcome: Ongoing, Progressing     Problem: Infection (Pneumonia)  Goal: Resolution of Infection Signs and Symptoms  Outcome: Ongoing, Progressing     Problem: Respiratory Compromise (Pneumonia)  Goal: Effective Oxygenation and Ventilation  Outcome: Ongoing, Progressing     Problem: Fall Injury Risk  Goal: Absence of Fall and Fall-Related Injury  Outcome: Ongoing, Progressing     Problem: Skin Injury Risk Increased  Goal: Skin Health and Integrity  Outcome: Ongoing, Progressing     Problem: Adjustment to Illness (Sepsis/Septic Shock)  Goal: Optimal Coping  Outcome: Ongoing, Progressing     Problem: Bleeding (Sepsis/Septic Shock)  Goal: Absence of Bleeding  Outcome: Ongoing, Progressing     Problem: Infection Progression (Sepsis/Septic Shock)  Goal: Absence of Infection Signs and Symptoms  Outcome: Ongoing, Progressing     Problem: Fluid and Electrolyte Imbalance (Acute Kidney Injury/Impairment)  Goal: Fluid and Electrolyte Balance  Outcome: Ongoing, Progressing     Problem: Oral Intake Inadequate (Acute Kidney Injury/Impairment)  Goal: Optimal Nutrition Intake  Outcome: Ongoing, Progressing     Problem: Renal Function Impairment (Acute Kidney Injury/Impairment)  Goal: Effective Renal Function  Outcome: Ongoing, Progressing     Problem: ARDS (Acute Respiratory Distress Syndrome)  Goal: Effective Oxygenation  Outcome: Ongoing, Progressing     Problem: Oral Intake  Inadequate  Goal: Improved Oral Intake  Outcome: Ongoing, Progressing     Problem: Impaired Wound Healing  Goal: Optimal Wound Healing  Outcome: Ongoing, Progressing     Problem: Aspiration (Enteral Nutrition)  Goal: Absence of Aspiration Signs and Symptoms  Outcome: Ongoing, Progressing     Problem: Device-Related Complication Risk (Enteral Nutrition)  Goal: Safe, Effective Therapy Delivery  Outcome: Ongoing, Progressing     Problem: Feeding Intolerance (Enteral Nutrition)  Goal: Feeding Tolerance  Outcome: Ongoing, Progressing

## 2022-02-23 NOTE — CARE UPDATE
02/22/22 2020   Patient Assessment/Suction   Level of Consciousness (AVPU) alert   Respiratory Effort Unlabored;Normal   Expansion/Accessory Muscles/Retractions no use of accessory muscles;no retractions;expansion symmetric   Rhythm/Pattern, Respiratory unlabored;pattern regular;depth regular   PRE-TX-O2   O2 Device (Oxygen Therapy) nasal cannula   $ Is the patient on Low Flow Oxygen? Yes   Flow (L/min) 1   SpO2 98 %   Pulse Oximetry Type Intermittent   $ Pulse Oximetry - Multiple Charge Pulse Oximetry - Multiple   Pulse 63   Resp 18

## 2022-02-24 LAB
POCT GLUCOSE: 103 MG/DL (ref 70–110)
POCT GLUCOSE: 84 MG/DL (ref 70–110)
POCT GLUCOSE: 86 MG/DL (ref 70–110)

## 2022-02-24 PROCEDURE — 30200315 PPD INTRADERMAL TEST REV CODE 302: Performed by: HOSPITALIST

## 2022-02-24 PROCEDURE — 63600175 PHARM REV CODE 636 W HCPCS: Performed by: INTERNAL MEDICINE

## 2022-02-24 PROCEDURE — 27000221 HC OXYGEN, UP TO 24 HOURS

## 2022-02-24 PROCEDURE — 11000001 HC ACUTE MED/SURG PRIVATE ROOM

## 2022-02-24 PROCEDURE — 86580 TB INTRADERMAL TEST: CPT | Performed by: HOSPITALIST

## 2022-02-24 PROCEDURE — 43246 EGD PLACE GASTROSTOMY TUBE: CPT

## 2022-02-24 PROCEDURE — 25000003 PHARM REV CODE 250: Performed by: INTERNAL MEDICINE

## 2022-02-24 PROCEDURE — 94761 N-INVAS EAR/PLS OXIMETRY MLT: CPT

## 2022-02-24 PROCEDURE — 27000207 HC ISOLATION

## 2022-02-24 PROCEDURE — C1751 CATH, INF, PER/CENT/MIDLINE: HCPCS

## 2022-02-24 RX ADMIN — AZELASTINE 137 MCG: 1 SPRAY, METERED NASAL at 09:02

## 2022-02-24 RX ADMIN — METOPROLOL TARTRATE 12.5 MG: 25 TABLET, FILM COATED ORAL at 09:02

## 2022-02-24 RX ADMIN — FAMOTIDINE 20 MG: 10 INJECTION INTRAVENOUS at 09:02

## 2022-02-24 RX ADMIN — METOPROLOL TARTRATE 12.5 MG: 25 TABLET, FILM COATED ORAL at 08:02

## 2022-02-24 RX ADMIN — ATORVASTATIN CALCIUM 40 MG: 40 TABLET, FILM COATED ORAL at 08:02

## 2022-02-24 RX ADMIN — TUBERCULIN PURIFIED PROTEIN DERIVATIVE 5 UNITS: 5 INJECTION, SOLUTION INTRADERMAL at 11:02

## 2022-02-24 RX ADMIN — MIDODRINE HYDROCHLORIDE 5 MG: 5 TABLET ORAL at 09:02

## 2022-02-24 RX ADMIN — LEVOTHYROXINE SODIUM 25 MCG: 0.03 TABLET ORAL at 06:02

## 2022-02-24 RX ADMIN — AZELASTINE 137 MCG: 1 SPRAY, METERED NASAL at 08:02

## 2022-02-24 RX ADMIN — MIDODRINE HYDROCHLORIDE 5 MG: 5 TABLET ORAL at 02:02

## 2022-02-24 RX ADMIN — GLYCOPYRROLATE 1 MG: 1 TABLET ORAL at 02:02

## 2022-02-24 RX ADMIN — MIDODRINE HYDROCHLORIDE 5 MG: 5 TABLET ORAL at 08:02

## 2022-02-24 RX ADMIN — CITALOPRAM HYDROBROMIDE 40 MG: 10 TABLET ORAL at 09:02

## 2022-02-24 RX ADMIN — GLYCOPYRROLATE 1 MG: 1 TABLET ORAL at 09:02

## 2022-02-24 RX ADMIN — FAMOTIDINE 20 MG: 10 INJECTION INTRAVENOUS at 08:02

## 2022-02-24 RX ADMIN — GLYCOPYRROLATE 1 MG: 1 TABLET ORAL at 08:02

## 2022-02-24 RX ADMIN — ENOXAPARIN SODIUM 40 MG: 100 INJECTION SUBCUTANEOUS at 05:02

## 2022-02-24 NOTE — PLAN OF CARE
Spoke with pt's daughter, Ira; she is having phone issues but wanted to let me know she has been talking with Shon at [facility] and took off work tomorrow to go sign paperwork at the facility.   I spoke with Alie at [facility] (who works with Shon) to ask if they were going to be able to admit the pt tomorrow once the daughter completes the paperwork, she thought the patient was coming skilled and is going to touch base with Shon and get back with me.    02/24/22 1003   Discharge Reassessment   Assessment Type Discharge Planning Reassessment

## 2022-02-24 NOTE — PROGRESS NOTES
[facility] Hospital Medicine  Progress Note    Patient Name: Sandra Wilson  MRN: [id number]  Patient Class: IP- Inpatient   Admission Date: 1/18/2022  Length of Stay: 36 days  Attending Physician: Kaylyn Ibarra MD  Primary Care Provider: Primary Doctor No        Subjective:     Principal Problem:Acute hypoxemic respiratory failure        HPI:  Sandra Wilson is an 81 year old female with a past medical history of CVAs, CNS aneurysm, PEG status, CAD, HLD, hypothryoidism, DM, aortic stenosis, and colon, breast, and ovarian cancer who presented from long term care with vomiting, diarrhea, and shortness of breath. Workup in the ED showed likely aspiration pneumonitis and C. Diff colitis. Her O2 requirement increased while in the ED requiring intubation with sedation. She also required Levophed as she likely developed septic shock. Antibiotics were broadened to cefepime, vancomycin, and Flagyl. Pulmonary was consulted. Family was notified. The patient was unable to provide history given acuity of illness.      Overview/Hospital Course:  Sandra Wilson is an 81 year old female with a past medical history of CVAs, CNS aneurysm, PEG status, CAD, HLD, hypothyroidism, DM, aortic stenosis, and colon, breast, and ovarian cancer who presented from long term care with vomiting, diarrhea, and shortness of breath secondary to acute hypoxic respiratory failure from aspiration pneumonia in the setting of C diff colitis leading to septic shock. She was intubated in the ED and started on Levophed infusion via RIJ CVC placed by Dr. Vignesh Gaspar. She was started on broad spectrum antibiotics with cefepime, Flagyl, vancomycin and admitted to the ICU. She was also started on enteral vancomycin for severe C diff colitis given elevated WBC count and STEFFANY. There was concern for developing ARDS given P/F ratio of 87 (severe); 190 1/22 (moderate). Pulmonary was consulted. She also presented with demand ischemia likely  secondary to septic shock. Troponin was trended and improved as shock resolved. Normal saline infusion was added for hyponatremia and STEFFANY which improved both. Levophed was able to be weaned. Family agreed to DNR status 1/18 and Palliative Care was consulted to discuss goal of care 1/19. Antibiotics were changed to doxycycline and Flagyl (GBS in sputum culture) 1/21; vancomycin was added 1/23 after culture also became positive for Staph aureus and Klebsiella. Her respiratory status has improved throughout her course and she was extubated (code changed to partial code for intubation) 1/21 only to be re-intubated for worsening stridor (history of tracheostomy). Upon re-intubation, copious amounts of secretions were suctioned. KUB suggested a mild ileus A bowel regimen was instituted and the patient was able to have bowel movements 1/22. Tube feeds were started 1/23. Her course was complicated by Afib as well noted on telemetry 1/21; metoprolol tartrate started 1/22.She failed a leak test 1/22; Surgery was consulted for tracheostomy which is pending conversation with family regarding goals of care.    Patient was set to go for trach on 01/26, but attempt was made for another trial extubation and the patient did well after extubation, she was transition to face mask and was able to maintain her saturations although had significant difficulty with secretions.  She was started on Robinul and scopolamine patch which did seem to improve for secretions.  Her respiratory status continued to be somewhat tenuous, so she was monitored in the ICU.  Family expressed desire to not have her get a tracheostomy.  Case management began working on LTACH referral.    Unable to obtain subjective 2/2 current cognitive status    NAD, alert  NC, AT  RRR  CTAB  SNTND+BS. PEG in place  PERRL  No gross joint abnormalities.     Vitals, labs and radiographs from past 24h reviewed and personally interpreted.       Assessment/Plan:      Acute  bacterial pneumonia 2/2 unknown organism   -treatment complete    CDiff  -Treatment complete.     * Acute hypoxemic respiratory failure  stable  Monitor sats.  Monitor work of breathing.  She's partial code, specifying that she'd want to be intubated if need be.    Anemia  Continue to monitor H&H every 3 days currently stable no active bleeding noted    Hemoglobin   Date Value Ref Range Status   02/11/2022 9.5 (L) 12.0 - 16.0 g/dL Final   02/07/2022 8.3 (L) 12.0 - 16.0 g/dL Final   02/06/2022 8.5 (L) 12.0 - 16.0 g/dL Final   02/05/2022 6.7 (L) 12.0 - 16.0 g/dL Final   ]      Paroxysmal atrial fibrillation  -Telemetry  - controlled on metoprolol 12.5 mg BID  Will hold full dose anticoagulant due to high risk of bleeding      Gastrostomy tube dependent  Stable.  No issues with it    Type 2 diabetes mellitus, with long-term current use of insulin  Patient's FSGs are controlled on current medication regimen.  Most recent fingerstick glucose reviewed-   No results for input(s): POCTGLUCOSE in the last 24 hours.  Current correctional scale  Medium  Maintain anti-hyperglycemic dose as follows-   Antihyperglycemics (From admission, onward)            Start     Stop Route Frequency Ordered    01/18/22 1438  insulin aspart U-100 pen 1-10 Units         -- SubQ Before meals & nightly PRN 01/18/22 1438        Hold Oral hypoglycemics while patient is in the hospital.        YARI (generalized anxiety disorder)  Extubated on 01/26.  Using Precedex as needed to control anxiety.  Patient does not appear anxious on exam.      Hypothyroidism  Patient has chronic hypothyroidism. TFTs reviewed-   Lab Results   Component Value Date    TSH 1.130 07/13/2017   . Will continue chronic levothyroxine and adjust for and clinical changes.        Hyperlipidemia   Patient is chronically on statin.will continue for now. Monitor clinically. Last LDL was   Lab Results   Component Value Date    LDLCALC 94.4 07/13/2017          GERD (gastroesophageal  reflux disease)  On famotidine.    VTE Risk Mitigation (From admission, onward)         Ordered     enoxaparin injection 40 mg  Daily         01/18/22 1438     IP VTE HIGH RISK PATIENT  Once         01/18/22 1438     Place sequential compression device  Until discontinued         01/18/22 1438                Discharge Planning   DEMETRIUS: 2/11/2022     Code Status: Partial Code   Is the patient medically ready for discharge?:     Reason for patient still in hospital (select all that apply): Pending disposition  Discharge Plan A: Return to nursing home                  Kaylyn Ibarra MD  Department of Hospital Medicine   [facility]

## 2022-02-24 NOTE — CARE UPDATE
02/24/22 0819   Patient Assessment/Suction   Respiratory Effort Unlabored   Rhythm/Pattern, Respiratory unlabored   PRE-TX-O2   O2 Device (Oxygen Therapy) nasal cannula   $ Is the patient on Low Flow Oxygen? Yes   Flow (L/min) 1   SpO2 99 %   Pulse Oximetry Type Intermittent   $ Pulse Oximetry - Multiple Charge Pulse Oximetry - Multiple   Pulse 61   Resp 18

## 2022-02-25 LAB
POCT GLUCOSE: 102 MG/DL (ref 70–110)
POCT GLUCOSE: 103 MG/DL (ref 70–110)
POCT GLUCOSE: 121 MG/DL (ref 70–110)

## 2022-02-25 PROCEDURE — 25000003 PHARM REV CODE 250: Performed by: INTERNAL MEDICINE

## 2022-02-25 PROCEDURE — C1751 CATH, INF, PER/CENT/MIDLINE: HCPCS

## 2022-02-25 PROCEDURE — 63600175 PHARM REV CODE 636 W HCPCS: Performed by: INTERNAL MEDICINE

## 2022-02-25 PROCEDURE — 94761 N-INVAS EAR/PLS OXIMETRY MLT: CPT

## 2022-02-25 PROCEDURE — 11000001 HC ACUTE MED/SURG PRIVATE ROOM

## 2022-02-25 PROCEDURE — 43246 EGD PLACE GASTROSTOMY TUBE: CPT

## 2022-02-25 PROCEDURE — 27000221 HC OXYGEN, UP TO 24 HOURS

## 2022-02-25 PROCEDURE — 27000207 HC ISOLATION

## 2022-02-25 PROCEDURE — 97110 THERAPEUTIC EXERCISES: CPT | Mod: CQ

## 2022-02-25 RX ADMIN — AZELASTINE 137 MCG: 1 SPRAY, METERED NASAL at 08:02

## 2022-02-25 RX ADMIN — GLYCOPYRROLATE 1 MG: 1 TABLET ORAL at 08:02

## 2022-02-25 RX ADMIN — GLYCOPYRROLATE 1 MG: 1 TABLET ORAL at 02:02

## 2022-02-25 RX ADMIN — METOPROLOL TARTRATE 12.5 MG: 25 TABLET, FILM COATED ORAL at 08:02

## 2022-02-25 RX ADMIN — ENOXAPARIN SODIUM 40 MG: 100 INJECTION SUBCUTANEOUS at 04:02

## 2022-02-25 RX ADMIN — ATORVASTATIN CALCIUM 40 MG: 40 TABLET, FILM COATED ORAL at 08:02

## 2022-02-25 RX ADMIN — MIDODRINE HYDROCHLORIDE 5 MG: 5 TABLET ORAL at 02:02

## 2022-02-25 RX ADMIN — LEVOTHYROXINE SODIUM 25 MCG: 0.03 TABLET ORAL at 05:02

## 2022-02-25 RX ADMIN — MIDODRINE HYDROCHLORIDE 5 MG: 5 TABLET ORAL at 08:02

## 2022-02-25 RX ADMIN — FAMOTIDINE 20 MG: 10 INJECTION INTRAVENOUS at 08:02

## 2022-02-25 RX ADMIN — CITALOPRAM HYDROBROMIDE 40 MG: 10 TABLET ORAL at 08:02

## 2022-02-25 NOTE — ASSESSMENT & PLAN NOTE
Continue to monitor H&H every 3 days currently stable no active bleeding noted    Hemoglobin   Date Value Ref Range Status   02/22/2022 10.3 (L) 12.0 - 16.0 g/dL Final   02/11/2022 9.5 (L) 12.0 - 16.0 g/dL Final   02/07/2022 8.3 (L) 12.0 - 16.0 g/dL Final   02/06/2022 8.5 (L) 12.0 - 16.0 g/dL Final   ]

## 2022-02-25 NOTE — PT/OT/SLP PROGRESS
Physical Therapy Treatment    Patient Name:  Sandra Wilson   MRN:  [id number]    Recommendations:     Discharge Recommendations:  nursing facility, basic, nursing facility, skilled   Discharge Equipment Recommendations: none   Barriers to discharge: None    Assessment:     Sandra Wilson is a 81 y.o. female admitted with a medical diagnosis of Acute hypoxemic respiratory failure.  She presents with the following impairments/functional limitations:  weakness, impaired endurance, impaired self care skills, impaired functional mobilty, decreased upper extremity function, decreased lower extremity function . Easily awakened. Agreed to receive therapeutic exercises. No report of pain or discomfort when asked. Tolerated treatment,interactive throughout. Positioned L side lying following.     Rehab Prognosis: Fair; patient would benefit from acute skilled PT services to address these deficits and reach maximum level of function.    Recent Surgery: Procedure(s) (LRB):  CREATION, TRACHEOSTOMY (N/A) 30 Days Post-Op    Plan:     During this hospitalization, patient to be seen 3 x/week to address the identified rehab impairments via therapeutic activities, therapeutic exercises and progress toward the following goals:    · Plan of Care Expires:  02/28/22    Subjective     Chief Complaint: none stated  Patient/Family Comments/goals: none stated  Pain/Comfort:  · Pain Rating 1: 0/10      Objective:     Communicated with nurse Hancock prior to session.  Patient found HOB elevated with bed alarm, PureWick, peripheral IV, telemetry, pressure relief boots upon PT entry to room.     General Precautions: Standard, contact, fall   Orthopedic Precautions:N/A   Braces:    Respiratory Status: Room air     Functional Mobility:  ·       AM-PAC 6 CLICK MOBILITY          Therapeutic Activities and Exercises:   Therapeutic exercises AAROM all 4 extremities in supine.    Able to assist nurse with cleaning her mouth with R hand.   Positioned L  side lying following.     Patient left HOB elevated with all lines intact, call button in reach, bed alarm on and nurse Karissa notified..    GOALS:   Multidisciplinary Problems     Physical Therapy Goals        Problem: Physical Therapy Goal    Goal Priority Disciplines Outcome Goal Variances Interventions   Physical Therapy Goal     PT, PT/OT Ongoing, Progressing     Description: Goals to be met by: 2022     Patient will increase functional independence with mobility by performin. Supine to sit with Maximum Assistance  2. Sitting at edge of bed x20 minutes with Maximum Assistance  3. Lower extremity exercise program x20 reps                    Time Tracking:     PT Received On: 22  PT Start Time: 1052     PT Stop Time: 1105  PT Total Time (min): 13 min     Billable Minutes: Therapeutic Exercise 13min    Treatment Type: Treatment  PT/PTA: PTA     PTA Visit Number: 2     2022

## 2022-02-25 NOTE — PLAN OF CARE
Pt is disoriented and unable to verbalize. Frequent rounding Q 2 hours. Pain monitored using pain scale,tube feeding maintained at 45 ml/hr. call light within reach. Will continue to monitor

## 2022-02-25 NOTE — CARE UPDATE
02/24/22 2007   Patient Assessment/Suction   Level of Consciousness (AVPU) alert   PRE-TX-O2   O2 Device (Oxygen Therapy) nasal cannula   Flow (L/min) 1   Oxygen Concentration (%) 24   SpO2 97 %   Pulse Oximetry Type Intermittent   Ready to Wean/Extubation Screen   FIO2<=50 (chart decimal) 0.24

## 2022-02-25 NOTE — ASSESSMENT & PLAN NOTE
Patient's FSGs are controlled on current medication regimen.  Most recent fingerstick glucose reviewed-   Recent Labs   Lab 02/24/22  0729 02/24/22  1056 02/24/22  1644   POCTGLUCOSE 86 103 84     Current correctional scale  Medium  Maintain anti-hyperglycemic dose as follows-   Antihyperglycemics (From admission, onward)            Start     Stop Route Frequency Ordered    01/18/22 1438  insulin aspart U-100 pen 1-10 Units         -- SubQ Before meals & nightly PRN 01/18/22 1438        Hold Oral hypoglycemics while patient is in the hospital.

## 2022-02-25 NOTE — PROGRESS NOTES
[facility] Hospital Medicine  Progress Note    Patient Name: Sandra Wilson  MRN: [id number]  Patient Class: IP- Inpatient   Admission Date: 1/18/2022  Length of Stay: 37 days  Attending Physician: Felipe Kruse MD  Primary Care Provider: Primary Doctor No        Subjective:     Principal Problem:Acute hypoxemic respiratory failure        HPI:  Sandra Wilson is an 81 year old female with a past medical history of CVAs, CNS aneurysm, PEG status, CAD, HLD, hypothryoidism, DM, aortic stenosis, and colon, breast, and ovarian cancer who presented from long term care with vomiting, diarrhea, and shortness of breath. Workup in the ED showed likely aspiration pneumonitis and C. Diff colitis. Her O2 requirement increased while in the ED requiring intubation with sedation. She also required Levophed as she likely developed septic shock. Antibiotics were broadened to cefepime, vancomycin, and Flagyl. Pulmonary was consulted. Family was notified. The patient was unable to provide history given acuity of illness.      Overview/Hospital Course:  Sandra Wilson is an 81 year old female with a past medical history of CVAs, CNS aneurysm, PEG status, CAD, HLD, hypothyroidism, DM, aortic stenosis, and colon, breast, and ovarian cancer who presented from long term care with vomiting, diarrhea, and shortness of breath secondary to acute hypoxic respiratory failure from aspiration pneumonia in the setting of C diff colitis leading to septic shock. She was intubated in the ED and started on Levophed infusion via RIJ CVC placed by Dr. Vignesh Gaspar. She was started on broad spectrum antibiotics with cefepime, Flagyl, vancomycin and admitted to the ICU. She was also started on enteral vancomycin for severe C diff colitis given elevated WBC count and STEFFANY. There was concern for developing ARDS given P/F ratio of 87 (severe); 190 1/22 (moderate). Pulmonary was consulted. She also presented with demand ischemia likely  secondary to septic shock. Troponin was trended and improved as shock resolved. Normal saline infusion was added for hyponatremia and STEFFANY which improved both. Levophed was able to be weaned. Family agreed to DNR status 1/18 and Palliative Care was consulted to discuss goal of care 1/19. Antibiotics were changed to doxycycline and Flagyl (GBS in sputum culture) 1/21; vancomycin was added 1/23 after culture also became positive for Staph aureus and Klebsiella. Her respiratory status has improved throughout her course and she was extubated (code changed to partial code for intubation) 1/21 only to be re-intubated for worsening stridor (history of tracheostomy). Upon re-intubation, copious amounts of secretions were suctioned. KUB suggested a mild ileus A bowel regimen was instituted and the patient was able to have bowel movements 1/22. Tube feeds were started 1/23. Her course was complicated by Afib as well noted on telemetry 1/21; metoprolol tartrate started 1/22.She failed a leak test 1/22; Surgery was consulted for tracheostomy which is pending conversation with family regarding goals of care.    Patient was set to go for trach on 01/26, but attempt was made for another trial extubation and the patient did well after extubation, she was transition to face mask and was able to maintain her saturations although had significant difficulty with secretions.  She was started on Robinul and scopolamine patch which did seem to improve for secretions.  Her respiratory status continued to be somewhat tenuous, so she was monitored in the ICU.  Family expressed desire to not have her get a tracheostomy.  Case management began working on LTACH referral.      Interval History:  no changes in condition.    Review of Systems   Unable to perform ROS: Patient nonverbal   Objective:     Vital Signs (Most Recent):  Temp: 96.1 °F (35.6 °C) (02/1940)  Pulse: 73 (02/1940)  Resp: 17 (02/1940)  BP: (!) 104/58 (02/24/22  1940)  SpO2: 98 % (02/1940)   Vital Signs (24h Range):  Temp:  [96.1 °F (35.6 °C)-98 °F (36.7 °C)] 96.1 °F (35.6 °C)  Pulse:  [61-73] 73  Resp:  [16-18] 17  SpO2:  [94 %-99 %] 98 %  BP: ()/(56-65) 104/58     Weight: 63.9 kg (140 lb 14 oz)  Body mass index is 24.18 kg/m².    Intake/Output Summary (Last 24 hours) at 2/24/2022 2021  Last data filed at 2/24/2022 1838  Gross per 24 hour   Intake 556 ml   Output 1650 ml   Net -1094 ml      Physical Exam  Vitals and nursing note reviewed.   Constitutional:       General: She is not in acute distress.     Appearance: She is not ill-appearing.   HENT:      Head: Normocephalic.      Mouth/Throat:      Mouth: Mucous membranes are moist.   Eyes:      General:         Right eye: No discharge.         Left eye: No discharge.      Extraocular Movements: Extraocular movements intact.      Pupils: Pupils are equal, round, and reactive to light.   Neck:      Vascular: No JVD.   Cardiovascular:      Rate and Rhythm: Normal rate and regular rhythm.   Pulmonary:      Effort: Pulmonary effort is normal.      Breath sounds: Normal breath sounds.   Abdominal:      General: Abdomen is flat. Bowel sounds are normal. There is no distension.      Palpations: Abdomen is soft.      Tenderness: There is no abdominal tenderness.   Musculoskeletal:      Right lower leg: No edema.      Left lower leg: No edema.   Skin:     General: Skin is warm and moist.      Findings: No rash.   Neurological:      Mental Status: She is alert.      Comments: She focuses her attention on me but doesn't say anything   Psychiatric:         Mood and Affect: Mood and affect normal.       Significant Labs: All pertinent labs within the past 24 hours have been reviewed.    Significant Imaging: I have reviewed all pertinent imaging results/findings within the past 24 hours.      Assessment/Plan:      * Acute hypoxemic respiratory failure  stable  Monitor sats.  Continue supplementing oxygen low flow.  Monitor  work of breathing.  She's partial code, specifying that she'd want to be intubated if need be.    Anemia  Continue to monitor H&H every 3 days currently stable no active bleeding noted    Hemoglobin   Date Value Ref Range Status   02/22/2022 10.3 (L) 12.0 - 16.0 g/dL Final   02/11/2022 9.5 (L) 12.0 - 16.0 g/dL Final   02/07/2022 8.3 (L) 12.0 - 16.0 g/dL Final   02/06/2022 8.5 (L) 12.0 - 16.0 g/dL Final   ]      Paroxysmal atrial fibrillation  -Telemetry  - controlled on metoprolol 12.5 mg BID  Will hold full dose anticoagulant due to high risk of bleeding      Gastrostomy tube dependent  Stable.  No issues with it    Type 2 diabetes mellitus, with long-term current use of insulin  Patient's FSGs are controlled on current medication regimen.  Most recent fingerstick glucose reviewed-   Recent Labs   Lab 02/24/22  0729 02/24/22  1056 02/24/22  1644   POCTGLUCOSE 86 103 84     Current correctional scale  Medium  Maintain anti-hyperglycemic dose as follows-   Antihyperglycemics (From admission, onward)            Start     Stop Route Frequency Ordered    01/18/22 1438  insulin aspart U-100 pen 1-10 Units         -- SubQ Before meals & nightly PRN 01/18/22 1438        Hold Oral hypoglycemics while patient is in the hospital.        YARI (generalized anxiety disorder)  Stable.  On citalopram.    Hypothyroidism  Patient has chronic hypothyroidism. TFTs reviewed-   Lab Results   Component Value Date    TSH 1.130 07/13/2017   . Will continue chronic levothyroxine and adjust for and clinical changes.        Hyperlipidemia   Patient is chronically on statin.will continue for now. Monitor clinically. Last LDL was   Lab Results   Component Value Date    LDLCALC 94.4 07/13/2017          GERD (gastroesophageal reflux disease)  On famotidine.      VTE Risk Mitigation (From admission, onward)         Ordered     enoxaparin injection 40 mg  Daily         01/18/22 1438     IP VTE HIGH RISK PATIENT  Once         01/18/22 1438     Place  sequential compression device  Until discontinued         01/18/22 1438                Discharge Planning   DEMETRIUS: 2/11/2022     Code Status: Partial Code   Is the patient medically ready for discharge?:     Reason for patient still in hospital (select all that apply): Pending disposition  Discharge Plan A: Return to nursing home                  Felipe Kruse MD  Department of Hospital Medicine   [facility]

## 2022-02-25 NOTE — PLAN OF CARE
Problem: Physical Therapy Goal  Goal: Physical Therapy Goal  Description: Goals to be met by: 2022     Patient will increase functional independence with mobility by performin. Supine to sit with Maximum Assistance  2. Sitting at edge of bed x20 minutes with Maximum Assistance  3. Lower extremity exercise program x20 reps   Outcome: Ongoing, Progressing   Therapeutic exercises all 4 extremities in available planes of movement.

## 2022-02-25 NOTE — PLAN OF CARE
Problem: Adult Inpatient Plan of Care  Goal: Plan of Care Review  Outcome: Ongoing, Progressing  Goal: Patient-Specific Goal (Individualized)  Outcome: Ongoing, Progressing  Goal: Absence of Hospital-Acquired Illness or Injury  Outcome: Ongoing, Progressing  Goal: Optimal Comfort and Wellbeing  Outcome: Ongoing, Progressing  Goal: Readiness for Transition of Care  Outcome: Ongoing, Progressing     Problem: Infection  Goal: Absence of Infection Signs and Symptoms  Outcome: Ongoing, Progressing     Problem: Fluid Imbalance (Pneumonia)  Goal: Fluid Balance  Outcome: Ongoing, Progressing     Problem: Infection (Pneumonia)  Goal: Resolution of Infection Signs and Symptoms  Outcome: Ongoing, Progressing     Problem: Respiratory Compromise (Pneumonia)  Goal: Effective Oxygenation and Ventilation  Outcome: Ongoing, Progressing     Problem: Fall Injury Risk  Goal: Absence of Fall and Fall-Related Injury  Outcome: Ongoing, Progressing     Problem: Skin Injury Risk Increased  Goal: Skin Health and Integrity  Outcome: Ongoing, Progressing     Problem: Adjustment to Illness (Sepsis/Septic Shock)  Goal: Optimal Coping  Outcome: Ongoing, Progressing     Problem: Bleeding (Sepsis/Septic Shock)  Goal: Absence of Bleeding  Outcome: Ongoing, Progressing     Problem: Infection Progression (Sepsis/Septic Shock)  Goal: Absence of Infection Signs and Symptoms  Outcome: Ongoing, Progressing     Problem: Fluid and Electrolyte Imbalance (Acute Kidney Injury/Impairment)  Goal: Fluid and Electrolyte Balance  Outcome: Ongoing, Progressing     Problem: Oral Intake Inadequate (Acute Kidney Injury/Impairment)  Goal: Optimal Nutrition Intake  Outcome: Ongoing, Progressing     Problem: Renal Function Impairment (Acute Kidney Injury/Impairment)  Goal: Effective Renal Function  Outcome: Ongoing, Progressing     Problem: ARDS (Acute Respiratory Distress Syndrome)  Goal: Effective Oxygenation  Outcome: Ongoing, Progressing     Problem: Oral Intake  Inadequate  Goal: Improved Oral Intake  Outcome: Ongoing, Progressing     Problem: Impaired Wound Healing  Goal: Optimal Wound Healing  Outcome: Ongoing, Progressing     Problem: Aspiration (Enteral Nutrition)  Goal: Absence of Aspiration Signs and Symptoms  Outcome: Ongoing, Progressing     Problem: Device-Related Complication Risk (Enteral Nutrition)  Goal: Safe, Effective Therapy Delivery  Outcome: Ongoing, Progressing     Patient alert to self, VSS, turn q2hr, purwick in place. TubeFeeding going. IV infusing KVO. Bed in lowest position, call light within reach, bed alarm set, room near nurses station

## 2022-02-25 NOTE — PLAN OF CARE
Received message from Hannah at [facility] stating that she has not received any documents from the patient's daughter.   I left a message for Alie and/or Shon at [facility] to check the status of accepting and admitting the patient. Will await a return call.    02/25/22 3168   Discharge Reassessment   Assessment Type Discharge Planning Reassessment

## 2022-02-25 NOTE — PLAN OF CARE
Problem: Adult Inpatient Plan of Care  Goal: Plan of Care Review  Outcome: Ongoing, Progressing  Goal: Patient-Specific Goal (Individualized)  Outcome: Ongoing, Progressing  Goal: Absence of Hospital-Acquired Illness or Injury  Outcome: Ongoing, Progressing  Goal: Optimal Comfort and Wellbeing  Outcome: Ongoing, Progressing  Goal: Readiness for Transition of Care  Outcome: Ongoing, Progressing     Problem: Infection  Goal: Absence of Infection Signs and Symptoms  Outcome: Ongoing, Progressing     Problem: Fluid Imbalance (Pneumonia)  Goal: Fluid Balance  Outcome: Ongoing, Progressing     Problem: Infection (Pneumonia)  Goal: Resolution of Infection Signs and Symptoms  Outcome: Ongoing, Progressing     Problem: Respiratory Compromise (Pneumonia)  Goal: Effective Oxygenation and Ventilation  Outcome: Ongoing, Progressing     Problem: Fall Injury Risk  Goal: Absence of Fall and Fall-Related Injury  Outcome: Ongoing, Progressing     Problem: Skin Injury Risk Increased  Goal: Skin Health and Integrity  Outcome: Ongoing, Progressing     Problem: Adjustment to Illness (Sepsis/Septic Shock)  Goal: Optimal Coping  Outcome: Ongoing, Progressing     Problem: Bleeding (Sepsis/Septic Shock)  Goal: Absence of Bleeding  Outcome: Ongoing, Progressing     Problem: Infection Progression (Sepsis/Septic Shock)  Goal: Absence of Infection Signs and Symptoms  Outcome: Ongoing, Progressing     Problem: Fluid and Electrolyte Imbalance (Acute Kidney Injury/Impairment)  Goal: Fluid and Electrolyte Balance  Outcome: Ongoing, Progressing     Problem: Oral Intake Inadequate (Acute Kidney Injury/Impairment)  Goal: Optimal Nutrition Intake  Outcome: Ongoing, Progressing     Problem: Renal Function Impairment (Acute Kidney Injury/Impairment)  Goal: Effective Renal Function  Outcome: Ongoing, Progressing     Problem: ARDS (Acute Respiratory Distress Syndrome)  Goal: Effective Oxygenation  Outcome: Ongoing, Progressing     Problem: Oral Intake  Inadequate  Goal: Improved Oral Intake  Outcome: Ongoing, Progressing     Problem: Impaired Wound Healing  Goal: Optimal Wound Healing  Outcome: Ongoing, Progressing     Problem: Aspiration (Enteral Nutrition)  Goal: Absence of Aspiration Signs and Symptoms  Outcome: Ongoing, Progressing     Problem: Device-Related Complication Risk (Enteral Nutrition)  Goal: Safe, Effective Therapy Delivery  Outcome: Ongoing, Progressing     Patient alert to self. VSS. Turn q2hr. Working with PT. Tube feeding infusing via PEG. IV infusing KVO. Midlline dressing change done today. BM 02/25, Voiding via purwick. Triad applied to sacrum. Bed in lowest position, call light within reach, bed alarm set

## 2022-02-25 NOTE — ASSESSMENT & PLAN NOTE
stable  Monitor sats.  Continue supplementing oxygen low flow.  Monitor work of breathing.  She's partial code, specifying that she'd want to be intubated if need be.

## 2022-02-25 NOTE — SUBJECTIVE & OBJECTIVE
Interval History:  no changes in condition.    Review of Systems   Unable to perform ROS: Patient nonverbal   Objective:     Vital Signs (Most Recent):  Temp: 96.1 °F (35.6 °C) (02/1940)  Pulse: 73 (02/1940)  Resp: 17 (02/1940)  BP: (!) 104/58 (02/1940)  SpO2: 98 % (02/1940)   Vital Signs (24h Range):  Temp:  [96.1 °F (35.6 °C)-98 °F (36.7 °C)] 96.1 °F (35.6 °C)  Pulse:  [61-73] 73  Resp:  [16-18] 17  SpO2:  [94 %-99 %] 98 %  BP: ()/(56-65) 104/58     Weight: 63.9 kg (140 lb 14 oz)  Body mass index is 24.18 kg/m².    Intake/Output Summary (Last 24 hours) at 2/24/2022 2021  Last data filed at 2/24/2022 1838  Gross per 24 hour   Intake 556 ml   Output 1650 ml   Net -1094 ml      Physical Exam  Vitals and nursing note reviewed.   Constitutional:       General: She is not in acute distress.     Appearance: She is not ill-appearing.   HENT:      Head: Normocephalic.      Mouth/Throat:      Mouth: Mucous membranes are moist.   Eyes:      General:         Right eye: No discharge.         Left eye: No discharge.      Extraocular Movements: Extraocular movements intact.      Pupils: Pupils are equal, round, and reactive to light.   Neck:      Vascular: No JVD.   Cardiovascular:      Rate and Rhythm: Normal rate and regular rhythm.   Pulmonary:      Effort: Pulmonary effort is normal.      Breath sounds: Normal breath sounds.   Abdominal:      General: Abdomen is flat. Bowel sounds are normal. There is no distension.      Palpations: Abdomen is soft.      Tenderness: There is no abdominal tenderness.   Musculoskeletal:      Right lower leg: No edema.      Left lower leg: No edema.   Skin:     General: Skin is warm and moist.      Findings: No rash.   Neurological:      Mental Status: She is alert.      Comments: She focuses her attention on me but doesn't say anything   Psychiatric:         Mood and Affect: Mood and affect normal.       Significant Labs: All pertinent labs within the past  24 hours have been reviewed.    Significant Imaging: I have reviewed all pertinent imaging results/findings within the past 24 hours.

## 2022-02-25 NOTE — CARE UPDATE
02/25/22 0823   Patient Assessment/Suction   Level of Consciousness (AVPU)   (pt asleep)   Respiratory Effort Unlabored   Rhythm/Pattern, Respiratory unlabored   PRE-TX-O2   O2 Device (Oxygen Therapy) nasal cannula   $ Is the patient on Low Flow Oxygen? Yes   Flow (L/min) 1   SpO2 98 %   Pulse Oximetry Type Intermittent   $ Pulse Oximetry - Multiple Charge Pulse Oximetry - Multiple   Pulse 66   Resp 17

## 2022-02-26 LAB — TB INDURATION 48 - 72 HR READ: 0 MM

## 2022-02-26 PROCEDURE — 94761 N-INVAS EAR/PLS OXIMETRY MLT: CPT

## 2022-02-26 PROCEDURE — 27000221 HC OXYGEN, UP TO 24 HOURS

## 2022-02-26 PROCEDURE — 25000003 PHARM REV CODE 250: Performed by: INTERNAL MEDICINE

## 2022-02-26 PROCEDURE — 11000001 HC ACUTE MED/SURG PRIVATE ROOM

## 2022-02-26 PROCEDURE — 27000207 HC ISOLATION

## 2022-02-26 PROCEDURE — 63600175 PHARM REV CODE 636 W HCPCS: Performed by: INTERNAL MEDICINE

## 2022-02-26 RX ORDER — PANTOPRAZOLE SODIUM 40 MG/1
40 TABLET, DELAYED RELEASE ORAL DAILY
Status: DISCONTINUED | OUTPATIENT
Start: 2022-02-27 | End: 2022-02-28

## 2022-02-26 RX ADMIN — GLYCOPYRROLATE 1 MG: 1 TABLET ORAL at 02:02

## 2022-02-26 RX ADMIN — MIDODRINE HYDROCHLORIDE 5 MG: 5 TABLET ORAL at 02:02

## 2022-02-26 RX ADMIN — ENOXAPARIN SODIUM 40 MG: 100 INJECTION SUBCUTANEOUS at 06:02

## 2022-02-26 RX ADMIN — ATORVASTATIN CALCIUM 40 MG: 40 TABLET, FILM COATED ORAL at 09:02

## 2022-02-26 RX ADMIN — METOPROLOL TARTRATE 12.5 MG: 25 TABLET, FILM COATED ORAL at 09:02

## 2022-02-26 RX ADMIN — GLYCOPYRROLATE 1 MG: 1 TABLET ORAL at 09:02

## 2022-02-26 RX ADMIN — AZELASTINE 137 MCG: 1 SPRAY, METERED NASAL at 09:02

## 2022-02-26 RX ADMIN — FAMOTIDINE 20 MG: 10 INJECTION INTRAVENOUS at 09:02

## 2022-02-26 RX ADMIN — MIDODRINE HYDROCHLORIDE 5 MG: 5 TABLET ORAL at 09:02

## 2022-02-26 RX ADMIN — LEVOTHYROXINE SODIUM 25 MCG: 0.03 TABLET ORAL at 05:02

## 2022-02-26 RX ADMIN — CITALOPRAM HYDROBROMIDE 40 MG: 10 TABLET ORAL at 09:02

## 2022-02-26 NOTE — ASSESSMENT & PLAN NOTE
Patient's FSGs are controlled on current medication regimen.  Most recent fingerstick glucose reviewed-   Recent Labs   Lab 02/25/22  0721 02/25/22  1106 02/25/22  1647   POCTGLUCOSE 103 102 121*     Current correctional scale  Medium  Maintain anti-hyperglycemic dose as follows-   Antihyperglycemics (From admission, onward)            Start     Stop Route Frequency Ordered    01/18/22 1438  insulin aspart U-100 pen 1-10 Units         -- SubQ Before meals & nightly PRN 01/18/22 1438        Hold Oral hypoglycemics while patient is in the hospital.

## 2022-02-26 NOTE — SUBJECTIVE & OBJECTIVE
Interval History:  no changes in condition.  Tolerating tube feed at 45 ml/hour.    Review of Systems   Unable to perform ROS: Patient nonverbal   Objective:     Vital Signs (Most Recent):  Temp: 96.2 °F (35.7 °C) (02/25/22 1914)  Pulse: 63 (02/25/22 1914)  Resp: 19 (02/25/22 1914)  BP: (!) 168/62 (02/25/22 1914)  SpO2: (!) 92 % (02/25/22 1914)   Vital Signs (24h Range):  Temp:  [96.2 °F (35.7 °C)-98.2 °F (36.8 °C)] 96.2 °F (35.7 °C)  Pulse:  [60-66] 63  Resp:  [16-19] 19  SpO2:  [92 %-99 %] 92 %  BP: (124-168)/(58-70) 168/62     Weight: 63.9 kg (140 lb 14 oz)  Body mass index is 24.18 kg/m².    Intake/Output Summary (Last 24 hours) at 2/25/2022 2115  Last data filed at 2/25/2022 1648  Gross per 24 hour   Intake 622 ml   Output 800 ml   Net -178 ml        Physical Exam  Vitals and nursing note reviewed.   Constitutional:       General: She is not in acute distress.     Appearance: She is not ill-appearing.   HENT:      Head: Normocephalic.      Mouth/Throat:      Mouth: Mucous membranes are moist.   Eyes:      General:         Right eye: No discharge.         Left eye: No discharge.      Extraocular Movements: Extraocular movements intact.      Pupils: Pupils are equal, round, and reactive to light.   Neck:      Vascular: No JVD.   Cardiovascular:      Rate and Rhythm: Normal rate and regular rhythm.   Pulmonary:      Effort: Pulmonary effort is normal.      Breath sounds: Normal breath sounds.   Abdominal:      General: Abdomen is flat. Bowel sounds are normal. There is no distension.      Palpations: Abdomen is soft.      Tenderness: There is no abdominal tenderness.   Musculoskeletal:      Right lower leg: No edema.      Left lower leg: No edema.   Skin:     General: Skin is warm and moist.      Findings: No rash.   Neurological:      Mental Status: She is alert.      Comments: She focuses her attention on me but doesn't say anything   Psychiatric:         Mood and Affect: Mood and affect normal.        Significant Labs: All pertinent labs within the past 24 hours have been reviewed.    Significant Imaging: I have reviewed all pertinent imaging results/findings within the past 24 hours.

## 2022-02-26 NOTE — PROGRESS NOTES
[facility] Hospital Medicine  Progress Note    Patient Name: Sandra Wilson  MRN: [id number]  Patient Class: IP- Inpatient   Admission Date: 1/18/2022  Length of Stay: 38 days  Attending Physician: Felipe Kruse MD  Primary Care Provider: Primary Doctor No        Subjective:     Principal Problem:Acute hypoxemic respiratory failure        HPI:  Sandra Wilson is an 81 year old female with a past medical history of CVAs, CNS aneurysm, PEG status, CAD, HLD, hypothryoidism, DM, aortic stenosis, and colon, breast, and ovarian cancer who presented from long term care with vomiting, diarrhea, and shortness of breath. Workup in the ED showed likely aspiration pneumonitis and C. Diff colitis. Her O2 requirement increased while in the ED requiring intubation with sedation. She also required Levophed as she likely developed septic shock. Antibiotics were broadened to cefepime, vancomycin, and Flagyl. Pulmonary was consulted. Family was notified. The patient was unable to provide history given acuity of illness.      Overview/Hospital Course:  Sandra Wilson is an 81 year old female with a past medical history of CVAs, CNS aneurysm, PEG status, CAD, HLD, hypothyroidism, DM, aortic stenosis, and colon, breast, and ovarian cancer who presented from long term care with vomiting, diarrhea, and shortness of breath secondary to acute hypoxic respiratory failure from aspiration pneumonia in the setting of C diff colitis leading to septic shock. She was intubated in the ED and started on Levophed infusion via RIJ CVC placed by Dr. Vignesh Gaspar. She was started on broad spectrum antibiotics with cefepime, Flagyl, vancomycin and admitted to the ICU. She was also started on enteral vancomycin for severe C diff colitis given elevated WBC count and STEFFANY. There was concern for developing ARDS given P/F ratio of 87 (severe); 190 1/22 (moderate). Pulmonary was consulted. She also presented with demand ischemia likely  secondary to septic shock. Troponin was trended and improved as shock resolved. Normal saline infusion was added for hyponatremia and STEFFANY which improved both. Levophed was able to be weaned. Family agreed to DNR status 1/18 and Palliative Care was consulted to discuss goal of care 1/19. Antibiotics were changed to doxycycline and Flagyl (GBS in sputum culture) 1/21; vancomycin was added 1/23 after culture also became positive for Staph aureus and Klebsiella. Her respiratory status has improved throughout her course and she was extubated (code changed to partial code for intubation) 1/21 only to be re-intubated for worsening stridor (history of tracheostomy). Upon re-intubation, copious amounts of secretions were suctioned. KUB suggested a mild ileus A bowel regimen was instituted and the patient was able to have bowel movements 1/22. Tube feeds were started 1/23. Her course was complicated by Afib as well noted on telemetry 1/21; metoprolol tartrate started 1/22.She failed a leak test 1/22; Surgery was consulted for tracheostomy which is pending conversation with family regarding goals of care.    Patient was set to go for trach on 01/26, but attempt was made for another trial extubation and the patient did well after extubation, she was transition to face mask and was able to maintain her saturations although had significant difficulty with secretions.  She was started on Robinul and scopolamine patch which did seem to improve for secretions.  Her respiratory status continued to be somewhat tenuous, so she was monitored in the ICU.  Family expressed desire to not have her get a tracheostomy.  Case management began working on LTACH referral.      Interval History:  no changes in condition.  Tolerating tube feed at 45 ml/hour.    Review of Systems   Unable to perform ROS: Patient nonverbal   Objective:     Vital Signs (Most Recent):  Temp: 96.2 °F (35.7 °C) (02/25/22 1914)  Pulse: 63 (02/25/22 1914)  Resp: 19  (02/25/22 1914)  BP: (!) 168/62 (02/25/22 1914)  SpO2: (!) 92 % (02/25/22 1914)   Vital Signs (24h Range):  Temp:  [96.2 °F (35.7 °C)-98.2 °F (36.8 °C)] 96.2 °F (35.7 °C)  Pulse:  [60-66] 63  Resp:  [16-19] 19  SpO2:  [92 %-99 %] 92 %  BP: (124-168)/(58-70) 168/62     Weight: 63.9 kg (140 lb 14 oz)  Body mass index is 24.18 kg/m².    Intake/Output Summary (Last 24 hours) at 2/25/2022 2115  Last data filed at 2/25/2022 1648  Gross per 24 hour   Intake 622 ml   Output 800 ml   Net -178 ml        Physical Exam  Vitals and nursing note reviewed.   Constitutional:       General: She is not in acute distress.     Appearance: She is not ill-appearing.   HENT:      Head: Normocephalic.      Mouth/Throat:      Mouth: Mucous membranes are moist.   Eyes:      General:         Right eye: No discharge.         Left eye: No discharge.      Extraocular Movements: Extraocular movements intact.      Pupils: Pupils are equal, round, and reactive to light.   Neck:      Vascular: No JVD.   Cardiovascular:      Rate and Rhythm: Normal rate and regular rhythm.   Pulmonary:      Effort: Pulmonary effort is normal.      Breath sounds: Normal breath sounds.   Abdominal:      General: Abdomen is flat. Bowel sounds are normal. There is no distension.      Palpations: Abdomen is soft.      Tenderness: There is no abdominal tenderness.   Musculoskeletal:      Right lower leg: No edema.      Left lower leg: No edema.   Skin:     General: Skin is warm and moist.      Findings: No rash.   Neurological:      Mental Status: She is alert.      Comments: She focuses her attention on me but doesn't say anything   Psychiatric:         Mood and Affect: Mood and affect normal.       Significant Labs: All pertinent labs within the past 24 hours have been reviewed.    Significant Imaging: I have reviewed all pertinent imaging results/findings within the past 24 hours.      Assessment/Plan:      * Acute hypoxemic respiratory failure  stable  Monitor sats.   Continue supplementing oxygen low flow.  Monitor work of breathing.  She's partial code, specifying that she'd want to be intubated if need be.    Anemia  Continue to monitor H&H every 3 days currently stable no active bleeding noted    Hemoglobin   Date Value Ref Range Status   02/22/2022 10.3 (L) 12.0 - 16.0 g/dL Final   02/11/2022 9.5 (L) 12.0 - 16.0 g/dL Final   02/07/2022 8.3 (L) 12.0 - 16.0 g/dL Final   02/06/2022 8.5 (L) 12.0 - 16.0 g/dL Final   ]      Paroxysmal atrial fibrillation  -Telemetry  - controlled on metoprolol 12.5 mg BID  Will hold full dose anticoagulant due to high risk of bleeding      Gastrostomy tube dependent  Stable.  No issues with it    Type 2 diabetes mellitus, with long-term current use of insulin  Patient's FSGs are controlled on current medication regimen.  Most recent fingerstick glucose reviewed-   Recent Labs   Lab 02/25/22  0721 02/25/22  1106 02/25/22  1647   POCTGLUCOSE 103 102 121*     Current correctional scale  Medium  Maintain anti-hyperglycemic dose as follows-   Antihyperglycemics (From admission, onward)            Start     Stop Route Frequency Ordered    01/18/22 1438  insulin aspart U-100 pen 1-10 Units         -- SubQ Before meals & nightly PRN 01/18/22 1438        Hold Oral hypoglycemics while patient is in the hospital.        YARI (generalized anxiety disorder)  Stable.  On citalopram.    Hypothyroidism  Patient has chronic hypothyroidism. TFTs reviewed-   Lab Results   Component Value Date    TSH 1.130 07/13/2017   . Will continue chronic levothyroxine and adjust for and clinical changes.        Hyperlipidemia   Patient is chronically on statin.will continue for now. Monitor clinically. Last LDL was   Lab Results   Component Value Date    LDLCALC 94.4 07/13/2017          GERD (gastroesophageal reflux disease)  On famotidine.      VTE Risk Mitigation (From admission, onward)         Ordered     enoxaparin injection 40 mg  Daily         01/18/22 1438     IP VTE HIGH  RISK PATIENT  Once         01/18/22 1438     Place sequential compression device  Until discontinued         01/18/22 1438                Discharge Planning   DEMETRIUS:      Code Status: Partial Code   Is the patient medically ready for discharge?:     Reason for patient still in hospital (select all that apply): Pending disposition  Discharge Plan A: Return to nursing home                  Felipe Kruse MD  Department of Hospital Medicine   [facility]

## 2022-02-26 NOTE — CARE UPDATE
02/25/22 2010   Patient Assessment/Suction   Level of Consciousness (AVPU) alert   PRE-TX-O2   O2 Device (Oxygen Therapy) nasal cannula   Flow (L/min) 1   Oxygen Concentration (%) 24   SpO2 (!) 94 %   Pulse Oximetry Type Intermittent   Ready to Wean/Extubation Screen   FIO2<=50 (chart decimal) 0.24

## 2022-02-27 LAB
ANION GAP SERPL CALC-SCNC: 9 MMOL/L (ref 8–16)
BASOPHILS # BLD AUTO: 0.08 K/UL (ref 0–0.2)
BASOPHILS NFR BLD: 0.9 % (ref 0–1.9)
BUN SERPL-MCNC: 19 MG/DL (ref 8–23)
CALCIUM SERPL-MCNC: 8.9 MG/DL (ref 8.7–10.5)
CHLORIDE SERPL-SCNC: 98 MMOL/L (ref 95–110)
CO2 SERPL-SCNC: 26 MMOL/L (ref 23–29)
CREAT SERPL-MCNC: 0.6 MG/DL (ref 0.5–1.4)
DIFFERENTIAL METHOD: ABNORMAL
EOSINOPHIL # BLD AUTO: 0.5 K/UL (ref 0–0.5)
EOSINOPHIL NFR BLD: 6 % (ref 0–8)
ERYTHROCYTE [DISTWIDTH] IN BLOOD BY AUTOMATED COUNT: 13.8 % (ref 11.5–14.5)
EST. GFR  (AFRICAN AMERICAN): >60 ML/MIN/1.73 M^2
EST. GFR  (NON AFRICAN AMERICAN): >60 ML/MIN/1.73 M^2
GLUCOSE SERPL-MCNC: 117 MG/DL (ref 70–110)
HCT VFR BLD AUTO: 31.1 % (ref 37–48.5)
HGB BLD-MCNC: 10.2 G/DL (ref 12–16)
IMM GRANULOCYTES # BLD AUTO: 0.02 K/UL (ref 0–0.04)
IMM GRANULOCYTES NFR BLD AUTO: 0.2 % (ref 0–0.5)
LYMPHOCYTES # BLD AUTO: 2 K/UL (ref 1–4.8)
LYMPHOCYTES NFR BLD: 22.6 % (ref 18–48)
MCH RBC QN AUTO: 32.5 PG (ref 27–31)
MCHC RBC AUTO-ENTMCNC: 32.8 G/DL (ref 32–36)
MCV RBC AUTO: 99 FL (ref 82–98)
MONOCYTES # BLD AUTO: 0.9 K/UL (ref 0.3–1)
MONOCYTES NFR BLD: 10.2 % (ref 4–15)
NEUTROPHILS # BLD AUTO: 5.4 K/UL (ref 1.8–7.7)
NEUTROPHILS NFR BLD: 60.1 % (ref 38–73)
NRBC BLD-RTO: 0 /100 WBC
PLATELET # BLD AUTO: 181 K/UL (ref 150–450)
PMV BLD AUTO: 11.6 FL (ref 9.2–12.9)
POTASSIUM SERPL-SCNC: 3.9 MMOL/L (ref 3.5–5.1)
RBC # BLD AUTO: 3.14 M/UL (ref 4–5.4)
SODIUM SERPL-SCNC: 133 MMOL/L (ref 136–145)
WBC # BLD AUTO: 9 K/UL (ref 3.9–12.7)

## 2022-02-27 PROCEDURE — 80048 BASIC METABOLIC PNL TOTAL CA: CPT | Performed by: HOSPITALIST

## 2022-02-27 PROCEDURE — 63600175 PHARM REV CODE 636 W HCPCS: Performed by: INTERNAL MEDICINE

## 2022-02-27 PROCEDURE — 85025 COMPLETE CBC W/AUTO DIFF WBC: CPT | Performed by: HOSPITALIST

## 2022-02-27 PROCEDURE — 11000001 HC ACUTE MED/SURG PRIVATE ROOM

## 2022-02-27 PROCEDURE — 25000003 PHARM REV CODE 250: Performed by: INTERNAL MEDICINE

## 2022-02-27 PROCEDURE — 25000003 PHARM REV CODE 250: Performed by: HOSPITALIST

## 2022-02-27 PROCEDURE — 94761 N-INVAS EAR/PLS OXIMETRY MLT: CPT

## 2022-02-27 PROCEDURE — 27000221 HC OXYGEN, UP TO 24 HOURS

## 2022-02-27 PROCEDURE — 36415 COLL VENOUS BLD VENIPUNCTURE: CPT | Performed by: HOSPITALIST

## 2022-02-27 PROCEDURE — 27000207 HC ISOLATION

## 2022-02-27 RX ADMIN — MIDODRINE HYDROCHLORIDE 5 MG: 5 TABLET ORAL at 03:02

## 2022-02-27 RX ADMIN — LEVOTHYROXINE SODIUM 25 MCG: 0.03 TABLET ORAL at 05:02

## 2022-02-27 RX ADMIN — METOPROLOL TARTRATE 12.5 MG: 25 TABLET, FILM COATED ORAL at 08:02

## 2022-02-27 RX ADMIN — PANTOPRAZOLE SODIUM 40 MG: 40 TABLET, DELAYED RELEASE ORAL at 08:02

## 2022-02-27 RX ADMIN — MIDODRINE HYDROCHLORIDE 5 MG: 5 TABLET ORAL at 08:02

## 2022-02-27 RX ADMIN — AZELASTINE 137 MCG: 1 SPRAY, METERED NASAL at 08:02

## 2022-02-27 RX ADMIN — CITALOPRAM HYDROBROMIDE 40 MG: 10 TABLET ORAL at 08:02

## 2022-02-27 RX ADMIN — ENOXAPARIN SODIUM 40 MG: 100 INJECTION SUBCUTANEOUS at 05:02

## 2022-02-27 RX ADMIN — ATORVASTATIN CALCIUM 40 MG: 40 TABLET, FILM COATED ORAL at 08:02

## 2022-02-27 NOTE — PROGRESS NOTES
[facility] Hospital Medicine  Progress Note    Patient Name: Sandra Wilson  MRN: [id number]  Patient Class: IP- Inpatient   Admission Date: 1/18/2022  Length of Stay: 39 days  Attending Physician: Felipe Kruse MD  Primary Care Provider: Primary Doctor No        Subjective:     Principal Problem:Acute hypoxemic respiratory failure        HPI:  Sandra Wilson is an 81 year old female with a past medical history of CVAs, CNS aneurysm, PEG status, CAD, HLD, hypothryoidism, DM, aortic stenosis, and colon, breast, and ovarian cancer who presented from long term care with vomiting, diarrhea, and shortness of breath. Workup in the ED showed likely aspiration pneumonitis and C. Diff colitis. Her O2 requirement increased while in the ED requiring intubation with sedation. She also required Levophed as she likely developed septic shock. Antibiotics were broadened to cefepime, vancomycin, and Flagyl. Pulmonary was consulted. Family was notified. The patient was unable to provide history given acuity of illness.      Overview/Hospital Course:  Sandra Wilson is an 81 year old female with a past medical history of CVAs, CNS aneurysm, PEG status, CAD, HLD, hypothyroidism, DM, aortic stenosis, and colon, breast, and ovarian cancer who presented from long term care with vomiting, diarrhea, and shortness of breath secondary to acute hypoxic respiratory failure from aspiration pneumonia in the setting of C diff colitis leading to septic shock. She was intubated in the ED and started on Levophed infusion via RIJ CVC placed by Dr. Vignesh Gaspar. She was started on broad spectrum antibiotics with cefepime, Flagyl, vancomycin and admitted to the ICU. She was also started on enteral vancomycin for severe C diff colitis given elevated WBC count and STEFFANY. There was concern for developing ARDS given P/F ratio of 87 (severe); 190 1/22 (moderate). Pulmonary was consulted. She also presented with demand ischemia likely  secondary to septic shock. Troponin was trended and improved as shock resolved. Normal saline infusion was added for hyponatremia and STEFFANY which improved both. Levophed was able to be weaned. Family agreed to DNR status 1/18 and Palliative Care was consulted to discuss goal of care 1/19. Antibiotics were changed to doxycycline and Flagyl (GBS in sputum culture) 1/21; vancomycin was added 1/23 after culture also became positive for Staph aureus and Klebsiella. Her respiratory status has improved throughout her course and she was extubated (code changed to partial code for intubation) 1/21 only to be re-intubated for worsening stridor (history of tracheostomy). Upon re-intubation, copious amounts of secretions were suctioned. KUB suggested a mild ileus A bowel regimen was instituted and the patient was able to have bowel movements 1/22. Tube feeds were started 1/23. Her course was complicated by Afib as well noted on telemetry 1/21; metoprolol tartrate started 1/22.She failed a leak test 1/22; Surgery was consulted for tracheostomy which is pending conversation with family regarding goals of care.    Patient was set to go for trach on 01/26, but attempt was made for another trial extubation and the patient did well after extubation, she was transition to face mask and was able to maintain her saturations although had significant difficulty with secretions.  She was started on Robinul and scopolamine patch which did seem to improve for secretions.  Her respiratory status continued to be somewhat tenuous, so she was monitored in the ICU.  Family expressed desire to not have her get a tracheostomy.  Case management began working on LTACH referral.      Interval History:  As I'm seeing her, she's agitated and yelling help.  She doesn't say what's wrong.  She's moving around in bed a lot.    Review of Systems   Unable to perform ROS: Patient nonverbal   Objective:     Vital Signs (Most Recent):  Temp: 98.9 °F (37.2 °C)  "(02/26/22 1508)  Pulse: 80 (02/26/22 1508)  Resp: 18 (02/26/22 1508)  BP: (!) 146/77 (02/26/22 1508)  SpO2: 99 % (02/26/22 1508)   Vital Signs (24h Range):  Temp:  [97.1 °F (36.2 °C)-98.9 °F (37.2 °C)] 98.9 °F (37.2 °C)  Pulse:  [58-80] 80  Resp:  [14-18] 18  SpO2:  [94 %-100 %] 99 %  BP: (111-146)/(58-77) 146/77     Weight: 63.9 kg (140 lb 14 oz)  Body mass index is 24.18 kg/m².    Intake/Output Summary (Last 24 hours) at 2/26/2022 1933  Last data filed at 2/26/2022 0945  Gross per 24 hour   Intake --   Output 1100 ml   Net -1100 ml        Physical Exam  Vitals and nursing note reviewed.   Constitutional:       General: She is not in acute distress.     Appearance: She is not ill-appearing.   HENT:      Head: Normocephalic.      Mouth/Throat:      Mouth: Mucous membranes are moist.   Eyes:      General:         Right eye: No discharge.         Left eye: No discharge.      Extraocular Movements: Extraocular movements intact.      Pupils: Pupils are equal, round, and reactive to light.   Neck:      Vascular: No JVD.   Cardiovascular:      Rate and Rhythm: Normal rate and regular rhythm.   Pulmonary:      Effort: Pulmonary effort is normal.      Breath sounds: Normal breath sounds.   Abdominal:      General: Abdomen is flat. Bowel sounds are normal. There is no distension.      Palpations: Abdomen is soft.      Tenderness: There is no abdominal tenderness.   Musculoskeletal:      Right lower leg: No edema.      Left lower leg: No edema.   Skin:     General: Skin is warm and moist.      Findings: No rash.   Neurological:      Mental Status: She is alert.      Comments: She focuses her attention on me, but she keeps saying "help" and nothing else.     Psychiatric:         Behavior: Behavior is agitated.       Significant Labs: All pertinent labs within the past 24 hours have been reviewed.    Significant Imaging: I have reviewed all pertinent imaging results/findings within the past 24 hours.      Assessment/Plan:    "   * Acute hypoxemic respiratory failure  stable  Monitor sats.  Continue supplementing oxygen low flow.  Monitor work of breathing.  She's partial code, specifying that she'd want to be intubated if need be.    Anemia  Continue to monitor H&H every 3 days currently stable no active bleeding noted    Hemoglobin   Date Value Ref Range Status   02/22/2022 10.3 (L) 12.0 - 16.0 g/dL Final   02/11/2022 9.5 (L) 12.0 - 16.0 g/dL Final   02/07/2022 8.3 (L) 12.0 - 16.0 g/dL Final   02/06/2022 8.5 (L) 12.0 - 16.0 g/dL Final   ]      Paroxysmal atrial fibrillation  -Telemetry  - controlled on metoprolol 12.5 mg BID  Will hold full dose anticoagulant due to high risk of bleeding      Gastrostomy tube dependent  Stable.  No issues with it    Type 2 diabetes mellitus, with long-term current use of insulin  Patient's FSGs are controlled on current medication regimen.  Most recent fingerstick glucose reviewed-   No results for input(s): POCTGLUCOSE in the last 24 hours.  Current correctional scale  Medium  Maintain anti-hyperglycemic dose as follows-   Antihyperglycemics (From admission, onward)            Start     Stop Route Frequency Ordered    01/18/22 1438  insulin aspart U-100 pen 1-10 Units         -- SubQ Before meals & nightly PRN 01/18/22 1438        Hold Oral hypoglycemics while patient is in the hospital.        YARI (generalized anxiety disorder)  Stable.  On citalopram.    Hypothyroidism  Patient has chronic hypothyroidism. TFTs reviewed-   Lab Results   Component Value Date    TSH 1.130 07/13/2017   . Will continue chronic levothyroxine and adjust for and clinical changes.        Hyperlipidemia   Patient is chronically on statin.will continue for now. Monitor clinically. Last LDL was   Lab Results   Component Value Date    LDLCALC 94.4 07/13/2017          GERD (gastroesophageal reflux disease)  On famotidine.      VTE Risk Mitigation (From admission, onward)         Ordered     enoxaparin injection 40 mg  Daily          01/18/22 1438     IP VTE HIGH RISK PATIENT  Once         01/18/22 1438     Place sequential compression device  Until discontinued         01/18/22 1438                Discharge Planning   DEMETRIUS:      Code Status: Partial Code   Is the patient medically ready for discharge?:     Reason for patient still in hospital (select all that apply): Pending disposition  Discharge Plan A: Return to nursing home                  Felipe Kruse MD  Department of Hospital Medicine   [facility]

## 2022-02-27 NOTE — SUBJECTIVE & OBJECTIVE
"Interval History:  As I'm seeing her, she's agitated and yelling help.  She doesn't say what's wrong.  She's moving around in bed a lot.    Review of Systems   Unable to perform ROS: Patient nonverbal   Objective:     Vital Signs (Most Recent):  Temp: 98.9 °F (37.2 °C) (02/26/22 1508)  Pulse: 80 (02/26/22 1508)  Resp: 18 (02/26/22 1508)  BP: (!) 146/77 (02/26/22 1508)  SpO2: 99 % (02/26/22 1508)   Vital Signs (24h Range):  Temp:  [97.1 °F (36.2 °C)-98.9 °F (37.2 °C)] 98.9 °F (37.2 °C)  Pulse:  [58-80] 80  Resp:  [14-18] 18  SpO2:  [94 %-100 %] 99 %  BP: (111-146)/(58-77) 146/77     Weight: 63.9 kg (140 lb 14 oz)  Body mass index is 24.18 kg/m².    Intake/Output Summary (Last 24 hours) at 2/26/2022 1933  Last data filed at 2/26/2022 0945  Gross per 24 hour   Intake --   Output 1100 ml   Net -1100 ml        Physical Exam  Vitals and nursing note reviewed.   Constitutional:       General: She is not in acute distress.     Appearance: She is not ill-appearing.   HENT:      Head: Normocephalic.      Mouth/Throat:      Mouth: Mucous membranes are moist.   Eyes:      General:         Right eye: No discharge.         Left eye: No discharge.      Extraocular Movements: Extraocular movements intact.      Pupils: Pupils are equal, round, and reactive to light.   Neck:      Vascular: No JVD.   Cardiovascular:      Rate and Rhythm: Normal rate and regular rhythm.   Pulmonary:      Effort: Pulmonary effort is normal.      Breath sounds: Normal breath sounds.   Abdominal:      General: Abdomen is flat. Bowel sounds are normal. There is no distension.      Palpations: Abdomen is soft.      Tenderness: There is no abdominal tenderness.   Musculoskeletal:      Right lower leg: No edema.      Left lower leg: No edema.   Skin:     General: Skin is warm and moist.      Findings: No rash.   Neurological:      Mental Status: She is alert.      Comments: She focuses her attention on me, but she keeps saying "help" and nothing else.   "   Psychiatric:         Behavior: Behavior is agitated.       Significant Labs: All pertinent labs within the past 24 hours have been reviewed.    Significant Imaging: I have reviewed all pertinent imaging results/findings within the past 24 hours.

## 2022-02-27 NOTE — CARE UPDATE
02/26/22 2050   Patient Assessment/Suction   Level of Consciousness (AVPU) alert   PRE-TX-O2   O2 Device (Oxygen Therapy) nasal cannula   Flow (L/min) 1   Oxygen Concentration (%) 24   SpO2 99 %   Pulse Oximetry Type Intermittent   Ready to Wean/Extubation Screen   FIO2<=50 (chart decimal) 0.24

## 2022-02-28 PROCEDURE — 27000207 HC ISOLATION

## 2022-02-28 PROCEDURE — 97110 THERAPEUTIC EXERCISES: CPT | Mod: CQ

## 2022-02-28 PROCEDURE — 27000221 HC OXYGEN, UP TO 24 HOURS

## 2022-02-28 PROCEDURE — 11000001 HC ACUTE MED/SURG PRIVATE ROOM

## 2022-02-28 PROCEDURE — 94761 N-INVAS EAR/PLS OXIMETRY MLT: CPT

## 2022-02-28 PROCEDURE — 25000003 PHARM REV CODE 250: Performed by: INTERNAL MEDICINE

## 2022-02-28 PROCEDURE — 63600175 PHARM REV CODE 636 W HCPCS: Performed by: INTERNAL MEDICINE

## 2022-02-28 PROCEDURE — 25000003 PHARM REV CODE 250: Performed by: HOSPITALIST

## 2022-02-28 RX ORDER — PANTOPRAZOLE SODIUM 40 MG/1
40 FOR SUSPENSION ORAL DAILY
Status: DISCONTINUED | OUTPATIENT
Start: 2022-02-28 | End: 2022-03-14 | Stop reason: HOSPADM

## 2022-02-28 RX ADMIN — PANTOPRAZOLE SODIUM 40 MG: 40 GRANULE, DELAYED RELEASE ORAL at 10:02

## 2022-02-28 RX ADMIN — ATORVASTATIN CALCIUM 40 MG: 40 TABLET, FILM COATED ORAL at 08:02

## 2022-02-28 RX ADMIN — MIDODRINE HYDROCHLORIDE 5 MG: 5 TABLET ORAL at 08:02

## 2022-02-28 RX ADMIN — LEVOTHYROXINE SODIUM 25 MCG: 0.03 TABLET ORAL at 06:02

## 2022-02-28 RX ADMIN — CITALOPRAM HYDROBROMIDE 40 MG: 10 TABLET ORAL at 09:02

## 2022-02-28 RX ADMIN — AZELASTINE 137 MCG: 1 SPRAY, METERED NASAL at 09:02

## 2022-02-28 RX ADMIN — METOPROLOL TARTRATE 12.5 MG: 25 TABLET, FILM COATED ORAL at 09:02

## 2022-02-28 RX ADMIN — MIDODRINE HYDROCHLORIDE 5 MG: 5 TABLET ORAL at 09:02

## 2022-02-28 RX ADMIN — MIDODRINE HYDROCHLORIDE 5 MG: 5 TABLET ORAL at 03:02

## 2022-02-28 RX ADMIN — AZELASTINE 137 MCG: 1 SPRAY, METERED NASAL at 08:02

## 2022-02-28 RX ADMIN — METOPROLOL TARTRATE 12.5 MG: 25 TABLET, FILM COATED ORAL at 08:02

## 2022-02-28 RX ADMIN — ENOXAPARIN SODIUM 40 MG: 100 INJECTION SUBCUTANEOUS at 05:02

## 2022-02-28 NOTE — SUBJECTIVE & OBJECTIVE
"Interval History:  No changes in her condition.    Review of Systems   Unable to perform ROS: Patient nonverbal   Objective:     Vital Signs (Most Recent):  Temp: 97.5 °F (36.4 °C) (02/27/22 2006)  Pulse: 64 (02/27/22 2006)  Resp: 18 (02/27/22 2006)  BP: (!) 167/73 (02/27/22 2006)  SpO2: 99 % (02/27/22 2006)   Vital Signs (24h Range):  Temp:  [96.5 °F (35.8 °C)-97.5 °F (36.4 °C)] 97.5 °F (36.4 °C)  Pulse:  [61-66] 64  Resp:  [16-18] 18  SpO2:  [96 %-100 %] 99 %  BP: (111-167)/(63-82) 167/73     Weight: 63.9 kg (140 lb 14 oz)  Body mass index is 24.18 kg/m².    Intake/Output Summary (Last 24 hours) at 2/27/2022 2044  Last data filed at 2/27/2022 1601  Gross per 24 hour   Intake --   Output 950 ml   Net -950 ml        Physical Exam  Vitals and nursing note reviewed.   Constitutional:       General: She is not in acute distress.     Appearance: She is not ill-appearing.   HENT:      Head: Normocephalic.      Mouth/Throat:      Mouth: Mucous membranes are moist.   Eyes:      General:         Right eye: No discharge.         Left eye: No discharge.      Extraocular Movements: Extraocular movements intact.      Pupils: Pupils are equal, round, and reactive to light.   Neck:      Vascular: No JVD.   Cardiovascular:      Rate and Rhythm: Normal rate and regular rhythm.   Pulmonary:      Effort: Pulmonary effort is normal.      Breath sounds: Normal breath sounds.   Abdominal:      General: Abdomen is flat. Bowel sounds are normal. There is no distension.      Palpations: Abdomen is soft.      Tenderness: There is no abdominal tenderness.   Musculoskeletal:      Right lower leg: No edema.      Left lower leg: No edema.   Skin:     General: Skin is warm and moist.      Findings: No rash.   Neurological:      Mental Status: She is alert.      Comments: She focuses her attention on me, but she keeps saying "help" and nothing else.     Psychiatric:         Behavior: Behavior is agitated.       Significant Labs: All pertinent " labs within the past 24 hours have been reviewed.    Significant Imaging: I have reviewed all pertinent imaging results/findings within the past 24 hours.

## 2022-02-28 NOTE — ASSESSMENT & PLAN NOTE
Continue to monitor H&H every 3 days currently stable no active bleeding noted    Hemoglobin   Date Value Ref Range Status   02/27/2022 10.2 (L) 12.0 - 16.0 g/dL Final   02/22/2022 10.3 (L) 12.0 - 16.0 g/dL Final   02/11/2022 9.5 (L) 12.0 - 16.0 g/dL Final   02/07/2022 8.3 (L) 12.0 - 16.0 g/dL Final   ]

## 2022-02-28 NOTE — PLAN OF CARE
Problem: Physical Therapy Goal  Goal: Physical Therapy Goal  Description: Goals to be met by: 2022     Patient will increase functional independence with mobility by performin. Supine to sit with Maximum Assistance  2. Sitting at edge of bed x20 minutes with Maximum Assistance  3. Lower extremity exercise program x20 reps   Outcome: Ongoing, Progressing   Therapeutic exercises all 4 extremities and positioning  for pressure relief.

## 2022-02-28 NOTE — ASSESSMENT & PLAN NOTE
She has diet controlled DM.  No need for frequent fingersticks or PRN insulin.    Glucose   Date Value Ref Range Status   02/27/2022 117 (H) 70 - 110 mg/dL Final   02/22/2022 92 70 - 110 mg/dL Final   02/11/2022 110 70 - 110 mg/dL Final   ]

## 2022-02-28 NOTE — PT/OT/SLP PROGRESS
Physical Therapy Treatment    Patient Name:  Sandra Wilson   MRN:  [id number]    Recommendations:     Discharge Recommendations:  nursing facility, basic, nursing facility, skilled   Discharge Equipment Recommendations: none   Barriers to discharge: None    Assessment:     Sandra Wilson is a 81 y.o. female admitted with a medical diagnosis of Acute hypoxemic respiratory failure.  She presents with the following impairments/functional limitations:  weakness, impaired endurance, impaired self care skills, impaired functional mobilty, decreased upper extremity function, decreased lower extremity function, decreased ROM . Awake, alert, interacting. Received therapeutic exercises all 4 extremities at 2 sets 10 reps each . Rolled R<>L and positioned R side lying following.    Rehab Prognosis: Fair; patient would benefit from acute skilled PT services to address these deficits and reach maximum level of function.    Recent Surgery: Procedure(s) (LRB):  CREATION, TRACHEOSTOMY (N/A) 33 Days Post-Op    Plan:     During this hospitalization, patient to be seen 3 x/week to address the identified rehab impairments via therapeutic activities, therapeutic exercises and progress toward the following goals:    · Plan of Care Expires:  02/28/22    Subjective     Chief Complaint: none stated  Patient/Family Comments/goals: none stated  Pain/Comfort:  · Pain Rating 1: 0/10      Objective:     Communicated with nurse Ortiz prior to session.  Patient found HOB elevated with bed alarm, PEG Tube, pressure relief boots, telemetry, PureWick upon PT entry to room.     General Precautions: Standard, contact, fall   Orthopedic Precautions:N/A   Braces: N/A  Respiratory Status: Room air     Functional Mobility:  · Bed Mobility:     · Rolling Left:  maximal assistance  · Rolling Right: maximal assistance  · Scooting: total assistance      AM-PAC 6 CLICK MOBILITY          Therapeutic Activities and Exercises:   AAROM all 4 extremities in  supine.    Rolled R<>L for linens adjusted.   Positioned R side lying.     Patient left right sidelying with all lines intact, call button in reach, bed alarm on and nurse Diana notified..    GOALS:   Multidisciplinary Problems     Physical Therapy Goals        Problem: Physical Therapy Goal    Goal Priority Disciplines Outcome Goal Variances Interventions   Physical Therapy Goal     PT, PT/OT Ongoing, Progressing     Description: Goals to be met by: 2022     Patient will increase functional independence with mobility by performin. Supine to sit with Maximum Assistance  2. Sitting at edge of bed x20 minutes with Maximum Assistance  3. Lower extremity exercise program x20 reps                    Time Tracking:     PT Received On: 22  PT Start Time: 1030     PT Stop Time: 1046  PT Total Time (min): 16 min     Billable Minutes: Therapeutic Exercise 16min    Treatment Type: Treatment  PT/PTA: PTA     PTA Visit Number: 3     2022

## 2022-02-28 NOTE — PLAN OF CARE
Left message for pt's daughter, Ira and admission at [facility] regarding the status of [facility] accepting/admitting the pt.   EPS worker, Mrs Albrecht will be back in the office on Wednesday, 3/2/2022; CM will follow up with her at that time.    02/28/22 0936   Discharge Reassessment   Assessment Type Discharge Planning Reassessment

## 2022-02-28 NOTE — PROGRESS NOTES
[facility] Hospital Medicine  Progress Note    Patient Name: Sandra Wilson  MRN: [id number]  Patient Class: IP- Inpatient   Admission Date: 1/18/2022  Length of Stay: 40 days  Attending Physician: Felipe Kruse MD  Primary Care Provider: Primary Doctor No        Subjective:     Principal Problem:Acute hypoxemic respiratory failure        HPI:  Sandra Wilson is an 81 year old female with a past medical history of CVAs, CNS aneurysm, PEG status, CAD, HLD, hypothryoidism, DM, aortic stenosis, and colon, breast, and ovarian cancer who presented from long term care with vomiting, diarrhea, and shortness of breath. Workup in the ED showed likely aspiration pneumonitis and C. Diff colitis. Her O2 requirement increased while in the ED requiring intubation with sedation. She also required Levophed as she likely developed septic shock. Antibiotics were broadened to cefepime, vancomycin, and Flagyl. Pulmonary was consulted. Family was notified. The patient was unable to provide history given acuity of illness.      Overview/Hospital Course:  Sandra Wilson is an 81 year old female with a past medical history of CVAs, CNS aneurysm, PEG status, CAD, HLD, hypothyroidism, DM, aortic stenosis, and colon, breast, and ovarian cancer who presented from long term care with vomiting, diarrhea, and shortness of breath secondary to acute hypoxic respiratory failure from aspiration pneumonia in the setting of C diff colitis leading to septic shock. She was intubated in the ED and started on Levophed infusion via RIJ CVC placed by Dr. Vignesh Gaspar. She was started on broad spectrum antibiotics with cefepime, Flagyl, vancomycin and admitted to the ICU. She was also started on enteral vancomycin for severe C diff colitis given elevated WBC count and STEFFANY. There was concern for developing ARDS given P/F ratio of 87 (severe); 190 1/22 (moderate). Pulmonary was consulted. She also presented with demand ischemia likely  secondary to septic shock. Troponin was trended and improved as shock resolved. Normal saline infusion was added for hyponatremia and STEFFANY which improved both. Levophed was able to be weaned. Family agreed to DNR status 1/18 and Palliative Care was consulted to discuss goal of care 1/19. Antibiotics were changed to doxycycline and Flagyl (GBS in sputum culture) 1/21; vancomycin was added 1/23 after culture also became positive for Staph aureus and Klebsiella. Her respiratory status has improved throughout her course and she was extubated (code changed to partial code for intubation) 1/21 only to be re-intubated for worsening stridor (history of tracheostomy). Upon re-intubation, copious amounts of secretions were suctioned. KUB suggested a mild ileus A bowel regimen was instituted and the patient was able to have bowel movements 1/22. Tube feeds were started 1/23. Her course was complicated by Afib as well noted on telemetry 1/21; metoprolol tartrate started 1/22.She failed a leak test 1/22; Surgery was consulted for tracheostomy which is pending conversation with family regarding goals of care.    Patient was set to go for trach on 01/26, but attempt was made for another trial extubation and the patient did well after extubation, she was transition to face mask and was able to maintain her saturations although had significant difficulty with secretions.  She was started on Robinul and scopolamine patch which did seem to improve for secretions.  Her respiratory status continued to be somewhat tenuous, so she was monitored in the ICU.  Family expressed desire to not have her get a tracheostomy.  Case management began working on LTACH referral.      Interval History:  No changes in her condition.    Review of Systems   Unable to perform ROS: Patient nonverbal   Objective:     Vital Signs (Most Recent):  Temp: 97.5 °F (36.4 °C) (02/27/22 2006)  Pulse: 64 (02/27/22 2006)  Resp: 18 (02/27/22 2006)  BP: (!) 167/73  "(02/27/22 2006)  SpO2: 99 % (02/27/22 2006)   Vital Signs (24h Range):  Temp:  [96.5 °F (35.8 °C)-97.5 °F (36.4 °C)] 97.5 °F (36.4 °C)  Pulse:  [61-66] 64  Resp:  [16-18] 18  SpO2:  [96 %-100 %] 99 %  BP: (111-167)/(63-82) 167/73     Weight: 63.9 kg (140 lb 14 oz)  Body mass index is 24.18 kg/m².    Intake/Output Summary (Last 24 hours) at 2/27/2022 2044  Last data filed at 2/27/2022 1601  Gross per 24 hour   Intake --   Output 950 ml   Net -950 ml        Physical Exam  Vitals and nursing note reviewed.   Constitutional:       General: She is not in acute distress.     Appearance: She is not ill-appearing.   HENT:      Head: Normocephalic.      Mouth/Throat:      Mouth: Mucous membranes are moist.   Eyes:      General:         Right eye: No discharge.         Left eye: No discharge.      Extraocular Movements: Extraocular movements intact.      Pupils: Pupils are equal, round, and reactive to light.   Neck:      Vascular: No JVD.   Cardiovascular:      Rate and Rhythm: Normal rate and regular rhythm.   Pulmonary:      Effort: Pulmonary effort is normal.      Breath sounds: Normal breath sounds.   Abdominal:      General: Abdomen is flat. Bowel sounds are normal. There is no distension.      Palpations: Abdomen is soft.      Tenderness: There is no abdominal tenderness.   Musculoskeletal:      Right lower leg: No edema.      Left lower leg: No edema.   Skin:     General: Skin is warm and moist.      Findings: No rash.   Neurological:      Mental Status: She is alert.      Comments: She focuses her attention on me, but she keeps saying "help" and nothing else.     Psychiatric:         Behavior: Behavior is agitated.       Significant Labs: All pertinent labs within the past 24 hours have been reviewed.    Significant Imaging: I have reviewed all pertinent imaging results/findings within the past 24 hours.      Assessment/Plan:      * Acute hypoxemic respiratory failure  stable  Monitor sats.  Continue supplementing " oxygen low flow.  Monitor work of breathing.  She's partial code, specifying that she'd want to be intubated if need be.    Anemia  Continue to monitor H&H every 3 days currently stable no active bleeding noted    Hemoglobin   Date Value Ref Range Status   02/27/2022 10.2 (L) 12.0 - 16.0 g/dL Final   02/22/2022 10.3 (L) 12.0 - 16.0 g/dL Final   02/11/2022 9.5 (L) 12.0 - 16.0 g/dL Final   02/07/2022 8.3 (L) 12.0 - 16.0 g/dL Final   ]      Paroxysmal atrial fibrillation  -Telemetry  - controlled on metoprolol 12.5 mg BID  Will hold full dose anticoagulant due to high risk of bleeding      Gastrostomy tube dependent  Stable.  No issues with it    Type 2 diabetes mellitus, with long-term current use of insulin  She has diet controlled DM.  No need for frequent fingersticks or PRN insulin.    Glucose   Date Value Ref Range Status   02/27/2022 117 (H) 70 - 110 mg/dL Final   02/22/2022 92 70 - 110 mg/dL Final   02/11/2022 110 70 - 110 mg/dL Final   ]    YARI (generalized anxiety disorder)  Stable.  On citalopram.    Hypothyroidism  Patient has chronic hypothyroidism. TFTs reviewed-   Lab Results   Component Value Date    TSH 1.130 07/13/2017   . Will continue chronic levothyroxine and adjust for and clinical changes.        Hyperlipidemia   Patient is chronically on statin.will continue for now. Monitor clinically. Last LDL was   Lab Results   Component Value Date    LDLCALC 94.4 07/13/2017          GERD (gastroesophageal reflux disease)  On famotidine.      VTE Risk Mitigation (From admission, onward)         Ordered     enoxaparin injection 40 mg  Daily         01/18/22 1438     IP VTE HIGH RISK PATIENT  Once         01/18/22 1438     Place sequential compression device  Until discontinued         01/18/22 1438                Discharge Planning   DEMETRIUS:      Code Status: Partial Code   Is the patient medically ready for discharge?:     Reason for patient still in hospital (select all that apply): Pending  disposition  Discharge Plan A: Return to nursing home                  Felipe Kruse MD  Department of Hospital Medicine   [facility]

## 2022-02-28 NOTE — CARE UPDATE
02/27/22 1933   Patient Assessment/Suction   Level of Consciousness (AVPU) alert   Respiratory Effort Unlabored   Expansion/Accessory Muscles/Retractions expansion symmetric;no retractions;no use of accessory muscles   PRE-TX-O2   O2 Device (Oxygen Therapy) nasal cannula   Flow (L/min) 1   Oxygen Concentration (%) 24   SpO2 99 %   Pulse Oximetry Type Intermittent   Ready to Wean/Extubation Screen   FIO2<=50 (chart decimal) 0.24

## 2022-03-01 PROCEDURE — 25000003 PHARM REV CODE 250: Performed by: HOSPITALIST

## 2022-03-01 PROCEDURE — 11000001 HC ACUTE MED/SURG PRIVATE ROOM

## 2022-03-01 PROCEDURE — 99900035 HC TECH TIME PER 15 MIN (STAT)

## 2022-03-01 PROCEDURE — 27000221 HC OXYGEN, UP TO 24 HOURS

## 2022-03-01 PROCEDURE — 27000207 HC ISOLATION

## 2022-03-01 PROCEDURE — 25000003 PHARM REV CODE 250: Performed by: INTERNAL MEDICINE

## 2022-03-01 PROCEDURE — 94761 N-INVAS EAR/PLS OXIMETRY MLT: CPT

## 2022-03-01 PROCEDURE — 63600175 PHARM REV CODE 636 W HCPCS: Performed by: INTERNAL MEDICINE

## 2022-03-01 RX ADMIN — METOPROLOL TARTRATE 12.5 MG: 25 TABLET, FILM COATED ORAL at 09:03

## 2022-03-01 RX ADMIN — CITALOPRAM HYDROBROMIDE 40 MG: 10 TABLET ORAL at 09:03

## 2022-03-01 RX ADMIN — AZELASTINE 137 MCG: 1 SPRAY, METERED NASAL at 09:03

## 2022-03-01 RX ADMIN — PANTOPRAZOLE SODIUM 40 MG: 40 GRANULE, DELAYED RELEASE ORAL at 09:03

## 2022-03-01 RX ADMIN — MIDODRINE HYDROCHLORIDE 5 MG: 5 TABLET ORAL at 09:03

## 2022-03-01 RX ADMIN — ACETAMINOPHEN 650 MG: 325 TABLET ORAL at 09:03

## 2022-03-01 RX ADMIN — ATORVASTATIN CALCIUM 40 MG: 40 TABLET, FILM COATED ORAL at 10:03

## 2022-03-01 RX ADMIN — LEVOTHYROXINE SODIUM 25 MCG: 0.03 TABLET ORAL at 05:03

## 2022-03-01 RX ADMIN — ENOXAPARIN SODIUM 40 MG: 100 INJECTION SUBCUTANEOUS at 05:03

## 2022-03-01 RX ADMIN — METOPROLOL TARTRATE 12.5 MG: 25 TABLET, FILM COATED ORAL at 10:03

## 2022-03-01 RX ADMIN — MIDODRINE HYDROCHLORIDE 5 MG: 5 TABLET ORAL at 10:03

## 2022-03-01 RX ADMIN — AZELASTINE 137 MCG: 1 SPRAY, METERED NASAL at 10:03

## 2022-03-01 RX ADMIN — MIDODRINE HYDROCHLORIDE 5 MG: 5 TABLET ORAL at 03:03

## 2022-03-01 NOTE — PROGRESS NOTES
[facility] Medicine  Progress Note    Patient Name: Sandra Wilson  MRN: [id number]  Patient Class: IP- Inpatient   Admission Date: 1/18/2022  Length of Stay: 41 days  Attending Physician: Felipe Kruse MD  Primary Care Provider: Primary Doctor No        Subjective:     Principal Problem:Acute hypoxemic respiratory failure        HPI:  Sandra Wilson is an 81 year old female with a past medical history of CVAs, CNS aneurysm, PEG status, CAD, HLD, hypothryoidism, DM, aortic stenosis, and colon, breast, and ovarian cancer who presented from long term care with vomiting, diarrhea, and shortness of breath. Workup in the ED showed likely aspiration pneumonitis and C. Diff colitis. Her O2 requirement increased while in the ED requiring intubation with sedation. She also required Levophed as she likely developed septic shock. Antibiotics were broadened to cefepime, vancomycin, and Flagyl. Pulmonary was consulted. Family was notified. The patient was unable to provide history given acuity of illness.      Overview/Hospital Course:  Sandra Wilson is an 81 year old female with a past medical history of CVAs, CNS aneurysm, PEG status, CAD, HLD, hypothyroidism, DM, aortic stenosis, and colon, breast, and ovarian cancer who presented from long term care with vomiting, diarrhea, and shortness of breath secondary to acute hypoxic respiratory failure from aspiration pneumonia in the setting of C diff colitis leading to septic shock. She was intubated in the ED and started on Levophed infusion via RIJ CVC placed by Dr. Vignesh Gaspar. She was started on broad spectrum antibiotics with cefepime, Flagyl, vancomycin and admitted to the ICU. She was also started on enteral vancomycin for severe C diff colitis given elevated WBC count and STEFFANY. There was concern for developing ARDS given P/F ratio of 87 (severe); 190 1/22 (moderate). Pulmonary was consulted. She also presented with demand ischemia likely  secondary to septic shock. Troponin was trended and improved as shock resolved. Normal saline infusion was added for hyponatremia and STEFFANY which improved both. Levophed was able to be weaned. Family agreed to DNR status 1/18 and Palliative Care was consulted to discuss goal of care 1/19. Antibiotics were changed to doxycycline and Flagyl (GBS in sputum culture) 1/21; vancomycin was added 1/23 after culture also became positive for Staph aureus and Klebsiella. Her respiratory status has improved throughout her course and she was extubated (code changed to partial code for intubation) 1/21 only to be re-intubated for worsening stridor (history of tracheostomy). Upon re-intubation, copious amounts of secretions were suctioned. KUB suggested a mild ileus A bowel regimen was instituted and the patient was able to have bowel movements 1/22. Tube feeds were started 1/23. Her course was complicated by Afib as well noted on telemetry 1/21; metoprolol tartrate started 1/22.She failed a leak test 1/22; Surgery was consulted for tracheostomy which is pending conversation with family regarding goals of care.    Patient was set to go for trach on 01/26, but attempt was made for another trial extubation and the patient did well after extubation, she was transition to face mask and was able to maintain her saturations although had significant difficulty with secretions.  She was started on Robinul and scopolamine patch which did seem to improve for secretions.  Her respiratory status continued to be somewhat tenuous, so she was monitored in the ICU.  Family expressed desire to not have her get a tracheostomy.  Case management began working on LTACH referral.      Interval History:  No changes in her condition.  Nothing remarkable mentioned by nurses.    Review of Systems   Unable to perform ROS: Patient nonverbal   Objective:     Vital Signs (Most Recent):  Temp: 98.2 °F (36.8 °C) (02/1940)  Pulse: 67 (02/28/22 2015)  Resp: 18  "(02/1940)  BP: (!) 141/63 (02/28/22 1542)  SpO2: 98 % (02/28/22 2015)   Vital Signs (24h Range):  Temp:  [96.1 °F (35.6 °C)-98.2 °F (36.8 °C)] 98.2 °F (36.8 °C)  Pulse:  [54-72] 67  Resp:  [16-18] 18  SpO2:  [98 %-99 %] 98 %  BP: (134-193)/(63-79) 141/63     Weight: 63.9 kg (140 lb 14 oz)  Body mass index is 24.18 kg/m².    Intake/Output Summary (Last 24 hours) at 2/28/2022 2106  Last data filed at 2/28/2022 1800  Gross per 24 hour   Intake 20 ml   Output 1450 ml   Net -1430 ml        Physical Exam  Vitals and nursing note reviewed.   Constitutional:       General: She is not in acute distress.     Appearance: She is not ill-appearing.   HENT:      Head: Normocephalic.      Mouth/Throat:      Mouth: Mucous membranes are moist.   Eyes:      General:         Right eye: No discharge.         Left eye: No discharge.      Extraocular Movements: Extraocular movements intact.      Pupils: Pupils are equal, round, and reactive to light.   Neck:      Vascular: No JVD.   Cardiovascular:      Rate and Rhythm: Normal rate and regular rhythm.   Pulmonary:      Effort: Pulmonary effort is normal.      Breath sounds: Normal breath sounds.   Abdominal:      General: Abdomen is flat. Bowel sounds are normal. There is no distension.      Palpations: Abdomen is soft.      Tenderness: There is no abdominal tenderness.   Musculoskeletal:      Right lower leg: No edema.      Left lower leg: No edema.   Skin:     General: Skin is warm and moist.      Findings: No rash.   Neurological:      Mental Status: She is alert.      Comments: She focuses her attention on me, but she keeps saying "help" and nothing else.     Psychiatric:         Behavior: Behavior is agitated.       Significant Labs: All pertinent labs within the past 24 hours have been reviewed.    Significant Imaging: I have reviewed all pertinent imaging results/findings within the past 24 hours.      Assessment/Plan:      * Acute hypoxemic respiratory " failure  stable  Monitor sats.  Continue supplementing oxygen low flow.  Monitor work of breathing.  She's partial code, specifying that she'd want to be intubated if need be.    Anemia  Continue to monitor H&H every 3 days currently stable no active bleeding noted    Hemoglobin   Date Value Ref Range Status   02/27/2022 10.2 (L) 12.0 - 16.0 g/dL Final   02/22/2022 10.3 (L) 12.0 - 16.0 g/dL Final   02/11/2022 9.5 (L) 12.0 - 16.0 g/dL Final   02/07/2022 8.3 (L) 12.0 - 16.0 g/dL Final   ]      Paroxysmal atrial fibrillation  -Telemetry  - controlled on metoprolol 12.5 mg BID  Will hold full dose anticoagulant due to high risk of bleeding      Gastrostomy tube dependent  Stable.  No issues with it    Type 2 diabetes mellitus, with long-term current use of insulin  She has diet controlled DM.  No need for frequent fingersticks or PRN insulin.    Glucose   Date Value Ref Range Status   02/27/2022 117 (H) 70 - 110 mg/dL Final   02/22/2022 92 70 - 110 mg/dL Final   02/11/2022 110 70 - 110 mg/dL Final   ]    YARI (generalized anxiety disorder)  Stable.  On citalopram.    Hypothyroidism  Patient has chronic hypothyroidism. TFTs reviewed-   Lab Results   Component Value Date    TSH 1.130 07/13/2017   . Will continue chronic levothyroxine and adjust for and clinical changes.        Hyperlipidemia   Patient is chronically on statin.will continue for now. Monitor clinically. Last LDL was   Lab Results   Component Value Date    LDLCALC 94.4 07/13/2017          GERD (gastroesophageal reflux disease)  On famotidine.      VTE Risk Mitigation (From admission, onward)         Ordered     enoxaparin injection 40 mg  Daily         01/18/22 1438     IP VTE HIGH RISK PATIENT  Once         01/18/22 1438     Place sequential compression device  Until discontinued         01/18/22 1438                Discharge Planning   DEMETRIUS:      Code Status: Partial Code   Is the patient medically ready for discharge?:     Reason for patient still in  hospital (select all that apply): Pending disposition  Discharge Plan A: Return to nursing home                  Felipe Kruse MD  Department of Hospital Medicine   [facility]

## 2022-03-01 NOTE — SUBJECTIVE & OBJECTIVE
"Interval History:  No changes in her condition.  Nothing remarkable mentioned by nurses.    Review of Systems   Unable to perform ROS: Patient nonverbal   Objective:     Vital Signs (Most Recent):  Temp: 98.2 °F (36.8 °C) (02/1940)  Pulse: 67 (02/28/22 2015)  Resp: 18 (02/1940)  BP: (!) 141/63 (02/28/22 1542)  SpO2: 98 % (02/28/22 2015)   Vital Signs (24h Range):  Temp:  [96.1 °F (35.6 °C)-98.2 °F (36.8 °C)] 98.2 °F (36.8 °C)  Pulse:  [54-72] 67  Resp:  [16-18] 18  SpO2:  [98 %-99 %] 98 %  BP: (134-193)/(63-79) 141/63     Weight: 63.9 kg (140 lb 14 oz)  Body mass index is 24.18 kg/m².    Intake/Output Summary (Last 24 hours) at 2/28/2022 2106  Last data filed at 2/28/2022 1800  Gross per 24 hour   Intake 20 ml   Output 1450 ml   Net -1430 ml        Physical Exam  Vitals and nursing note reviewed.   Constitutional:       General: She is not in acute distress.     Appearance: She is not ill-appearing.   HENT:      Head: Normocephalic.      Mouth/Throat:      Mouth: Mucous membranes are moist.   Eyes:      General:         Right eye: No discharge.         Left eye: No discharge.      Extraocular Movements: Extraocular movements intact.      Pupils: Pupils are equal, round, and reactive to light.   Neck:      Vascular: No JVD.   Cardiovascular:      Rate and Rhythm: Normal rate and regular rhythm.   Pulmonary:      Effort: Pulmonary effort is normal.      Breath sounds: Normal breath sounds.   Abdominal:      General: Abdomen is flat. Bowel sounds are normal. There is no distension.      Palpations: Abdomen is soft.      Tenderness: There is no abdominal tenderness.   Musculoskeletal:      Right lower leg: No edema.      Left lower leg: No edema.   Skin:     General: Skin is warm and moist.      Findings: No rash.   Neurological:      Mental Status: She is alert.      Comments: She focuses her attention on me, but she keeps saying "help" and nothing else.     Psychiatric:         Behavior: Behavior is " agitated.       Significant Labs: All pertinent labs within the past 24 hours have been reviewed.    Significant Imaging: I have reviewed all pertinent imaging results/findings within the past 24 hours.

## 2022-03-02 PROCEDURE — 27000221 HC OXYGEN, UP TO 24 HOURS

## 2022-03-02 PROCEDURE — 25000003 PHARM REV CODE 250: Performed by: INTERNAL MEDICINE

## 2022-03-02 PROCEDURE — 63600175 PHARM REV CODE 636 W HCPCS: Performed by: INTERNAL MEDICINE

## 2022-03-02 PROCEDURE — 25000003 PHARM REV CODE 250: Performed by: HOSPITALIST

## 2022-03-02 PROCEDURE — 27000207 HC ISOLATION

## 2022-03-02 PROCEDURE — 11000001 HC ACUTE MED/SURG PRIVATE ROOM

## 2022-03-02 PROCEDURE — 94761 N-INVAS EAR/PLS OXIMETRY MLT: CPT

## 2022-03-02 RX ADMIN — LEVOTHYROXINE SODIUM 25 MCG: 0.03 TABLET ORAL at 05:03

## 2022-03-02 RX ADMIN — MIDODRINE HYDROCHLORIDE 5 MG: 5 TABLET ORAL at 09:03

## 2022-03-02 RX ADMIN — MIDODRINE HYDROCHLORIDE 5 MG: 5 TABLET ORAL at 06:03

## 2022-03-02 RX ADMIN — AZELASTINE 137 MCG: 1 SPRAY, METERED NASAL at 09:03

## 2022-03-02 RX ADMIN — CITALOPRAM HYDROBROMIDE 40 MG: 10 TABLET ORAL at 09:03

## 2022-03-02 RX ADMIN — METOPROLOL TARTRATE 12.5 MG: 25 TABLET, FILM COATED ORAL at 09:03

## 2022-03-02 RX ADMIN — ENOXAPARIN SODIUM 40 MG: 100 INJECTION SUBCUTANEOUS at 06:03

## 2022-03-02 RX ADMIN — ATORVASTATIN CALCIUM 40 MG: 40 TABLET, FILM COATED ORAL at 09:03

## 2022-03-02 RX ADMIN — ACETAMINOPHEN 650 MG: 325 TABLET ORAL at 09:03

## 2022-03-02 RX ADMIN — PANTOPRAZOLE SODIUM 40 MG: 40 GRANULE, DELAYED RELEASE ORAL at 09:03

## 2022-03-02 NOTE — PROGRESS NOTES
[facility]  Adult Nutrition  Progress Note    SUMMARY   Intervention: enteral nutrition therapy     Recommendations  1) Continue TF Isosource 1.5 @ 45 mL/hr as pt tolerates + Brody BID and Beneprotein BID + 115 ml flush q 4 hr  --provides 1620 kcal, 73 g protein ( + beneprotein), 820 ml free water  (100% EEN, 100% EPN )     2) weigh weekly     Goals: 1) Initation of enteral nutrition by RD follow up 2) Nutrition support meeting > 50% of needs at f/u  Nutrition Goal Status: met/meeting-continues  Communication of RD Recs: POC, sticky note     Assessment and Plan  Nutrition Problem  Inadequate energy intake     Related to (etiology):   NPO status, no TF running, dysphagia     Signs and Symptoms (as evidenced by):   Pt receiving < 50% EEN/EPN x 6 days     Interventions(treatment strategy):  above     Nutrition Diagnosis Status:   improved     Malnutrition  Edema: 1+  Gabriel: 14 + PEG, coccyx ulcer  NFPE 1/24/21 no significant wasting seen  PO intakes < 50% of needs x > 5-6 days  No significant wt loss per chart review     Reason for Assessment  Reason For Assessment: follow up  Diagnosis: other (see comments) (Acute hypoxemic respiratory failure)  Relevant Medical History: GERD, HLD, HTN, CVAs, CNS aneurysm, PEG, CAD, hypothyroidism, DM, colon, breast and ovarian cancer.  Interdisciplinary Rounds: did not attend     General Information Comments: Remote assessment for coverage. Pt intubated and sedated, requiring levophed, on propofol, MAP 75, plan to try for extubation today per note. With PEG, noted pt has hx of removing PEG tube. C-diff positive, also noted with STEFFANY - renal labs improving, K, phos low, repleting. Per chart, with 9.5% wt loss over the last 2 months, significant. NFPE needed to determine malnutrition status. RD to monitor and follow up.  Nutrition Discharge Planning: Pending clinical course  1/24/22 TF recs left 1/21, TF started @ 10 ml/hr yesterday, continues at same rate. No BM. +  "vent-possible plan for trach placement. NFPE done 1/24/21 no significant wasting seen.  1/26/22 + PEG. TF held for possible trach placemen however now pt extubated. Plan to continue with TFs.  1/28/21 TF held 1/26-today r/t tenuous respiratory status. TPN started yesterday per MD ( meeting 72% protein needs and 94% energy needs). Pt with O2 mask on today. Plan per MD to continue TPN and start trickle TF in the next 24-48 hr.  1/31/22 Pt was started on trickle TF's over the weekend. TPN reordered today as pt TF not advancing fast enough.  TF at 20 ml/hr, and tolerating today. Plan to advance toward goal per MD.  2/3/22 Pt appeared well nourished visually.  TF had been advanced to 45ml/hr and pt was tolerating.  Pt on oxymask at 4L/min.    2/10/22 Pt continues to tolerate TF @ goal via PEG + flush as ordered. Not responsive to questions at visit.  2/17/22 Pt continues to tolerate TF @ goal + chun and beneprotein. Awaiting nursing home placement.  2/23/22 No changes, pt continues to tolerate TF @ goal + chun and beneprotein. Awaiting nursing home placement today.  3/2/22  No changes, pt continues to tolerate TF @ goal + chun and beneprotein. Awaiting placement.     Discharge Planning:  TF above ( If bolus needed- 4.5 cans Isosource daily +75 ml flush before and after each bolus)     Nutrition/ Diet History  unable to obtain  Spiritual/cultural/Temple factors affecting PO intakes  Factors affecting PO intakes: NPO, trouble swallowing     Nutrition Risk Screen  Nutrition Risk Screen: no indicators present     Anthropometrics  Height: 5' 4" (162.6 cm)  Height (inches): 64 in  Weight Method: Bed Scale  Weight: 63.9 kg 2/6, 69.1 kg 1/31, 71.5 kg 1/22, 70.3 kg 1/24, 69 kg (admission)  Weight (lb): 140 lb ( edema)  Ideal Body Weight (IBW), Female: 120 lb  BMI (Calculated): 26.1 admission  81.6 kg 1/2020     Lab/Procedures/Meds  Pertinent Labs Reviewed: reviewed  BMP  Lab Results   Component Value Date     (L) " 02/27/2022    K 3.9 02/27/2022    CL 98 02/27/2022    CO2 26 02/27/2022    BUN 19 02/27/2022    CREATININE 0.6 02/27/2022    CALCIUM 8.9 02/27/2022    ANIONGAP 9 02/27/2022    ESTGFRAFRICA >60 02/27/2022    EGFRNONAA >60 02/27/2022    glucose  mg/dl x 24 hr    Pertinent Medications Reviewed: reviewed  Statin, NS 1-35 ml/hr     Estimated/Assessed Needs  Weight Used For Calorie Calculations: 68.9 kg (152 lb)  Energy Calorie Requirements (kcal): MSJ ( x 1.25-1.4) = 7105-9232 kcal  Energy Need Method: MSJ  Protein Requirements:  g/day based on 1.2-1.5 g protein /kg  Weight Used For Protein Calculations: 68.9 kg (152 lb)  Fluid Requirements (mL): 1 mL/kcal or per MD  Estimated Fluid Requirement Method: RDA Method  RDA Method (mL): 1450 ml  CHO Requirement: 160-195 g     Nutrition Prescription Ordered  Current Diet Order: TF above + NPO     Evaluation of Received Nutrient/Fluid Intake  Energy Calories Required: meeting needs  Protein Required: meeting needs  Fluid Required: meeting needs  Tolerance: tolerating  % Intake of Estimated Energy Needs: 100%  % Meal Intake: NPO + TF     Nutrition Risk  Level of Risk/Frequency of Follow-up: moderate/low (1-2x weekly)      Monitor and Evaluation  Food and Nutrient Intake: enteral nutrition intake  Food and Nutrient Adminstration: enteral and parenteral nutrition administration  Knowledge/Beliefs/Attitudes: food and nutrition knowledge/skill  Physical Activity and Function: nutrition-related ADLs and IADLs  Anthropometric Measurements: weight change  Biochemical Data, Medical Tests and Procedures: electrolyte and renal panel,gastrointestinal profile,glucose/endocrine profile  Nutrition-Focused Physical Findings: overall appearance skin     Nutrition Follow-Up  RD Follow-up?: Yes

## 2022-03-02 NOTE — SUBJECTIVE & OBJECTIVE
"Interval History:  No changes in her condition.  Nothing remarkable mentioned by nurses.  Still requiring maximal assistance with bed mobility.    Review of Systems   Unable to perform ROS: Patient nonverbal   Objective:     Vital Signs (Most Recent):  Temp: 96.5 °F (35.8 °C) (03/01/22 1933)  Pulse: 63 (03/01/22 1933)  Resp: 18 (03/01/22 1933)  BP: (!) 122/58 (03/01/22 1933)  SpO2: 96 % (03/01/22 1933)   Vital Signs (24h Range):  Temp:  [96.5 °F (35.8 °C)-98.4 °F (36.9 °C)] 96.5 °F (35.8 °C)  Pulse:  [57-67] 63  Resp:  [14-20] 18  SpO2:  [96 %-99 %] 96 %  BP: (122-152)/(57-74) 122/58     Weight: 63.9 kg (140 lb 14 oz)  Body mass index is 24.18 kg/m².    Intake/Output Summary (Last 24 hours) at 3/1/2022 2056  Last data filed at 3/1/2022 1635  Gross per 24 hour   Intake 2 ml   Output --   Net 2 ml        Physical Exam  Vitals and nursing note reviewed.   Constitutional:       General: She is not in acute distress.     Appearance: She is not ill-appearing.   HENT:      Head: Normocephalic.      Mouth/Throat:      Mouth: Mucous membranes are moist.   Eyes:      General:         Right eye: No discharge.         Left eye: No discharge.      Extraocular Movements: Extraocular movements intact.      Pupils: Pupils are equal, round, and reactive to light.   Neck:      Vascular: No JVD.   Cardiovascular:      Rate and Rhythm: Normal rate and regular rhythm.   Pulmonary:      Effort: Pulmonary effort is normal.      Breath sounds: Normal breath sounds.   Abdominal:      General: Abdomen is flat. Bowel sounds are normal. There is no distension.      Palpations: Abdomen is soft.      Tenderness: There is no abdominal tenderness.   Musculoskeletal:      Right lower leg: No edema.      Left lower leg: No edema.   Skin:     General: Skin is warm and moist.      Findings: No rash.   Neurological:      Mental Status: She is alert.      Comments: She focuses her attention on me, but she keeps saying "help" and nothing else.   "   Psychiatric:         Behavior: Behavior is agitated.       Significant Labs: All pertinent labs within the past 24 hours have been reviewed.    Significant Imaging: I have reviewed all pertinent imaging results/findings within the past 24 hours.

## 2022-03-02 NOTE — PROGRESS NOTES
[facility] Hospital Medicine  Progress Note    Patient Name: Sandra Wilson  MRN: [id number]  Patient Class: IP- Inpatient   Admission Date: 1/18/2022  Length of Stay: 42 days  Attending Physician: Felipe Kruse MD  Primary Care Provider: Primary Doctor No        Subjective:     Principal Problem:Acute hypoxemic respiratory failure        HPI:  Sandra Wilson is an 81 year old female with a past medical history of CVAs, CNS aneurysm, PEG status, CAD, HLD, hypothryoidism, DM, aortic stenosis, and colon, breast, and ovarian cancer who presented from long term care with vomiting, diarrhea, and shortness of breath. Workup in the ED showed likely aspiration pneumonitis and C. Diff colitis. Her O2 requirement increased while in the ED requiring intubation with sedation. She also required Levophed as she likely developed septic shock. Antibiotics were broadened to cefepime, vancomycin, and Flagyl. Pulmonary was consulted. Family was notified. The patient was unable to provide history given acuity of illness.      Overview/Hospital Course:  Sandra Wilson is an 81 year old female with a past medical history of CVAs, CNS aneurysm, PEG status, CAD, HLD, hypothyroidism, DM, aortic stenosis, and colon, breast, and ovarian cancer who presented from long term care with vomiting, diarrhea, and shortness of breath secondary to acute hypoxic respiratory failure from aspiration pneumonia in the setting of C diff colitis leading to septic shock. She was intubated in the ED and started on Levophed infusion via RIJ CVC placed by Dr. Vignesh Gapsar. She was started on broad spectrum antibiotics with cefepime, Flagyl, vancomycin and admitted to the ICU. She was also started on enteral vancomycin for severe C diff colitis given elevated WBC count and STEFFANY. There was concern for developing ARDS given P/F ratio of 87 (severe); 190 1/22 (moderate). Pulmonary was consulted. She also presented with demand ischemia likely  secondary to septic shock. Troponin was trended and improved as shock resolved. Normal saline infusion was added for hyponatremia and STEFFANY which improved both. Levophed was able to be weaned. Family agreed to DNR status 1/18 and Palliative Care was consulted to discuss goal of care 1/19. Antibiotics were changed to doxycycline and Flagyl (GBS in sputum culture) 1/21; vancomycin was added 1/23 after culture also became positive for Staph aureus and Klebsiella. Her respiratory status has improved throughout her course and she was extubated (code changed to partial code for intubation) 1/21 only to be re-intubated for worsening stridor (history of tracheostomy). Upon re-intubation, copious amounts of secretions were suctioned. KUB suggested a mild ileus A bowel regimen was instituted and the patient was able to have bowel movements 1/22. Tube feeds were started 1/23. Her course was complicated by Afib as well noted on telemetry 1/21; metoprolol tartrate started 1/22.She failed a leak test 1/22; Surgery was consulted for tracheostomy which is pending conversation with family regarding goals of care.    Patient was set to go for trach on 01/26, but attempt was made for another trial extubation and the patient did well after extubation, she was transition to face mask and was able to maintain her saturations although had significant difficulty with secretions.  She was started on Robinul and scopolamine patch which did seem to improve for secretions.  Her respiratory status continued to be somewhat tenuous, so she was monitored in the ICU.  Family expressed desire to not have her get a tracheostomy.  Case management began working on LTACH referral.      Interval History:  No changes in her condition.  Nothing remarkable mentioned by nurses.  Still requiring maximal assistance with bed mobility.    Review of Systems   Unable to perform ROS: Patient nonverbal   Objective:     Vital Signs (Most Recent):  Temp: 96.5 °F (35.8  "°C) (03/01/22 1933)  Pulse: 63 (03/01/22 1933)  Resp: 18 (03/01/22 1933)  BP: (!) 122/58 (03/01/22 1933)  SpO2: 96 % (03/01/22 1933)   Vital Signs (24h Range):  Temp:  [96.5 °F (35.8 °C)-98.4 °F (36.9 °C)] 96.5 °F (35.8 °C)  Pulse:  [57-67] 63  Resp:  [14-20] 18  SpO2:  [96 %-99 %] 96 %  BP: (122-152)/(57-74) 122/58     Weight: 63.9 kg (140 lb 14 oz)  Body mass index is 24.18 kg/m².    Intake/Output Summary (Last 24 hours) at 3/1/2022 2056  Last data filed at 3/1/2022 1635  Gross per 24 hour   Intake 2 ml   Output --   Net 2 ml        Physical Exam  Vitals and nursing note reviewed.   Constitutional:       General: She is not in acute distress.     Appearance: She is not ill-appearing.   HENT:      Head: Normocephalic.      Mouth/Throat:      Mouth: Mucous membranes are moist.   Eyes:      General:         Right eye: No discharge.         Left eye: No discharge.      Extraocular Movements: Extraocular movements intact.      Pupils: Pupils are equal, round, and reactive to light.   Neck:      Vascular: No JVD.   Cardiovascular:      Rate and Rhythm: Normal rate and regular rhythm.   Pulmonary:      Effort: Pulmonary effort is normal.      Breath sounds: Normal breath sounds.   Abdominal:      General: Abdomen is flat. Bowel sounds are normal. There is no distension.      Palpations: Abdomen is soft.      Tenderness: There is no abdominal tenderness.   Musculoskeletal:      Right lower leg: No edema.      Left lower leg: No edema.   Skin:     General: Skin is warm and moist.      Findings: No rash.   Neurological:      Mental Status: She is alert.      Comments: She focuses her attention on me, but she keeps saying "help" and nothing else.     Psychiatric:         Behavior: Behavior is agitated.       Significant Labs: All pertinent labs within the past 24 hours have been reviewed.    Significant Imaging: I have reviewed all pertinent imaging results/findings within the past 24 hours.      Assessment/Plan:      * " Acute hypoxemic respiratory failure  stable  Monitor sats.  Continue supplementing oxygen low flow.  Monitor work of breathing.  She's partial code, specifying that she'd want to be intubated if need be.    Anemia  Continue to monitor H&H every 3 days currently stable no active bleeding noted    Hemoglobin   Date Value Ref Range Status   02/27/2022 10.2 (L) 12.0 - 16.0 g/dL Final   02/22/2022 10.3 (L) 12.0 - 16.0 g/dL Final   02/11/2022 9.5 (L) 12.0 - 16.0 g/dL Final   02/07/2022 8.3 (L) 12.0 - 16.0 g/dL Final   ]      Paroxysmal atrial fibrillation  -Telemetry  - controlled on metoprolol 12.5 mg BID  Will hold full dose anticoagulant due to high risk of bleeding      Gastrostomy tube dependent  Stable.  No issues with it    Type 2 diabetes mellitus, with long-term current use of insulin  She has diet controlled DM.  No need for frequent fingersticks or PRN insulin.    Glucose   Date Value Ref Range Status   02/27/2022 117 (H) 70 - 110 mg/dL Final   02/22/2022 92 70 - 110 mg/dL Final   02/11/2022 110 70 - 110 mg/dL Final   ]    YARI (generalized anxiety disorder)  Stable.  On citalopram.    Hypothyroidism  Patient has chronic hypothyroidism. TFTs reviewed-   Lab Results   Component Value Date    TSH 1.130 07/13/2017   . Will continue chronic levothyroxine and adjust for and clinical changes.        Hyperlipidemia   Patient is chronically on statin.will continue for now. Monitor clinically. Last LDL was   Lab Results   Component Value Date    LDLCALC 94.4 07/13/2017          GERD (gastroesophageal reflux disease)  On famotidine.      VTE Risk Mitigation (From admission, onward)         Ordered     enoxaparin injection 40 mg  Daily         01/18/22 1438     IP VTE HIGH RISK PATIENT  Once         01/18/22 1438     Place sequential compression device  Until discontinued         01/18/22 1438                Discharge Planning   DEMETRIUS:      Code Status: Partial Code   Is the patient medically ready for discharge?:     Reason  for patient still in hospital (select all that apply): Pending disposition  Discharge Plan A: Return to nursing home                  eFlipe Kruse MD  Department of Hospital Medicine   [facility]

## 2022-03-02 NOTE — PLAN OF CARE
Assessment complete via flowsheet. Skin warm and dry to touch with respirations even and unlabored. No s/s of distress discomfort or adverse reactions noted. Unable to verbalize demands as needed. Observed sitting in chair at bedside. AAOxs 1 to self. Peg tube site patent. Isosource running at 45cc/hour. Tolerated well. Will continue to monitor for change of conditioning not noted at this time

## 2022-03-02 NOTE — PLAN OF CARE
Spoke with the pt's daughter, Ira; she states that she completed the paperwork at [facility] on Friday, 2/25/2022 and just needs to get them the bank statements from [employer], which she actively trying to obtain.   I spoke with Mrs Montaño, EPS worker who is also trying to obtain the patient's bank statements; [employer] has requested an extension until 39/2022 then she hopes to have the bank statements needed to assist in nursing home placement for the pt.   I left a message for Alie at [facility] asking for a return call to discuss placement of the patient.    03/02/22 1200   Discharge Reassessment   Assessment Type Discharge Planning Reassessment

## 2022-03-02 NOTE — CARE UPDATE
03/02/22 1219   Patient Assessment/Suction   Level of Consciousness (AVPU) alert   PRE-TX-O2   O2 Device (Oxygen Therapy) room air   SpO2 100 %   Pulse Oximetry Type Intermittent

## 2022-03-02 NOTE — CARE UPDATE
03/01/22 2051   Patient Assessment/Suction   Level of Consciousness (AVPU) alert   Respiratory Effort Unlabored;Normal   Expansion/Accessory Muscles/Retractions no use of accessory muscles;no retractions;expansion symmetric   All Lung Fields Breath Sounds Anterior:;Lateral:;diminished   Rhythm/Pattern, Respiratory unlabored;pattern regular;depth regular   PRE-TX-O2   O2 Device (Oxygen Therapy) nasal cannula   $ Is the patient on Low Flow Oxygen? Yes   Flow (L/min) 1   SpO2 98 %   Pulse Oximetry Type Intermittent   $ Pulse Oximetry - Multiple Charge Pulse Oximetry - Multiple   Pulse 68   Resp 18   Aerosol Therapy   $ Aerosol Therapy Charges PRN treatment not required   Respiratory Treatment Status (SVN) PRN treatment not required

## 2022-03-02 NOTE — CONSULTS
Thank you for your consult to [facility]. We have reviewed the patient chart. This patient does not meet criteria for [facility] service at this time due to Patient has a condition likely to benefit from in-depth physical exam, or palpation / auscultation, or serial abdominal exams, or patient non-verbal and will unable to facilitate interaction with telemedicine encounter. . Will hand back to In-house service..

## 2022-03-02 NOTE — CARE UPDATE
03/02/22 0752   Patient Assessment/Suction   Level of Consciousness (AVPU) responds to voice   PRE-TX-O2   O2 Device (Oxygen Therapy) nasal cannula   $ Is the patient on Low Flow Oxygen? Yes   Flow (L/min) 1   SpO2 99 %   Pulse Oximetry Type Intermittent   $ Pulse Oximetry - Multiple Charge Pulse Oximetry - Multiple

## 2022-03-03 PROCEDURE — 25000003 PHARM REV CODE 250: Performed by: INTERNAL MEDICINE

## 2022-03-03 PROCEDURE — 27000207 HC ISOLATION

## 2022-03-03 PROCEDURE — C1751 CATH, INF, PER/CENT/MIDLINE: HCPCS

## 2022-03-03 PROCEDURE — 25000003 PHARM REV CODE 250: Performed by: HOSPITALIST

## 2022-03-03 PROCEDURE — 94761 N-INVAS EAR/PLS OXIMETRY MLT: CPT

## 2022-03-03 PROCEDURE — 11000001 HC ACUTE MED/SURG PRIVATE ROOM

## 2022-03-03 PROCEDURE — 97110 THERAPEUTIC EXERCISES: CPT | Mod: CQ

## 2022-03-03 PROCEDURE — 63600175 PHARM REV CODE 636 W HCPCS: Performed by: INTERNAL MEDICINE

## 2022-03-03 RX ADMIN — AZELASTINE 137 MCG: 1 SPRAY, METERED NASAL at 09:03

## 2022-03-03 RX ADMIN — SODIUM CHLORIDE: 0.9 INJECTION, SOLUTION INTRAVENOUS at 04:03

## 2022-03-03 RX ADMIN — METOPROLOL TARTRATE 12.5 MG: 25 TABLET, FILM COATED ORAL at 08:03

## 2022-03-03 RX ADMIN — LEVOTHYROXINE SODIUM 25 MCG: 0.03 TABLET ORAL at 06:03

## 2022-03-03 RX ADMIN — ATORVASTATIN CALCIUM 40 MG: 40 TABLET, FILM COATED ORAL at 08:03

## 2022-03-03 RX ADMIN — CITALOPRAM HYDROBROMIDE 40 MG: 10 TABLET ORAL at 09:03

## 2022-03-03 RX ADMIN — METOPROLOL TARTRATE 12.5 MG: 25 TABLET, FILM COATED ORAL at 09:03

## 2022-03-03 RX ADMIN — ENOXAPARIN SODIUM 40 MG: 100 INJECTION SUBCUTANEOUS at 04:03

## 2022-03-03 RX ADMIN — MIDODRINE HYDROCHLORIDE 5 MG: 5 TABLET ORAL at 04:03

## 2022-03-03 RX ADMIN — ACETAMINOPHEN 650 MG: 325 TABLET ORAL at 08:03

## 2022-03-03 RX ADMIN — AZELASTINE 137 MCG: 1 SPRAY, METERED NASAL at 08:03

## 2022-03-03 RX ADMIN — MIDODRINE HYDROCHLORIDE 5 MG: 5 TABLET ORAL at 08:03

## 2022-03-03 RX ADMIN — MIDODRINE HYDROCHLORIDE 5 MG: 5 TABLET ORAL at 09:03

## 2022-03-03 RX ADMIN — PANTOPRAZOLE SODIUM 40 MG: 40 GRANULE, DELAYED RELEASE ORAL at 09:03

## 2022-03-03 NOTE — PLAN OF CARE
Assessment completed per flow sheet. Skin warm and dry to touch with respiration even and unlabored. Pt observed in room resting quietly with eyes closed. No s/s of distress discomfort or adverse reactions noted at this time. Able to verbalize demands as needed. Pt remains on telemetry with box 8711. Normal sinus rhythm at this time. Pt non ambulatory total dependence in all ADL's. Tolerated well. Meds administered  via peg       with no adverse reactions noted. IV 20g noted to right forearm.   . Continues on Isosource at 45ml/hr via kpeg tube and 10ml/hr KVO continuous. Edema noted to bilateral lower extremities. Bowel sounds active in all quads. Pt expressed no s/s pain or SOB at this time. Educated pt to use call light for any transfers or ambulation assist. . Will continue to monitor for change of condition not noted at this time.

## 2022-03-03 NOTE — PROGRESS NOTES
[facility] Hospital Medicine  Progress Note    Patient Name: Sandra Wilson  MRN: [id number]  Patient Class: IP- Inpatient   Admission Date: 1/18/2022  Length of Stay: 44 days  Attending Physician: Kaylyn Ibarra MD  Primary Care Provider: Primary Doctor No        Subjective:     Principal Problem:Acute hypoxemic respiratory failure        HPI:  Sandra Wilson is an 81 year old female with a past medical history of CVAs, CNS aneurysm, PEG status, CAD, HLD, hypothryoidism, DM, aortic stenosis, and colon, breast, and ovarian cancer who presented from long term care with vomiting, diarrhea, and shortness of breath. Workup in the ED showed likely aspiration pneumonitis and C. Diff colitis. Her O2 requirement increased while in the ED requiring intubation with sedation. She also required Levophed as she likely developed septic shock. Antibiotics were broadened to cefepime, vancomycin, and Flagyl. Pulmonary was consulted. Family was notified. The patient was unable to provide history given acuity of illness.      Overview/Hospital Course:  Sandra Wilson is an 81 year old female with a past medical history of CVAs, CNS aneurysm, PEG status, CAD, HLD, hypothyroidism, DM, aortic stenosis, and colon, breast, and ovarian cancer who presented from long term care with vomiting, diarrhea, and shortness of breath secondary to acute hypoxic respiratory failure from aspiration pneumonia in the setting of C diff colitis leading to septic shock. She was intubated in the ED and started on Levophed infusion via RIJ CVC placed by Dr. Vignesh Gaspar. She was started on broad spectrum antibiotics with cefepime, Flagyl, vancomycin and admitted to the ICU. She was also started on enteral vancomycin for severe C diff colitis given elevated WBC count and STEFFANY. There was concern for developing ARDS given P/F ratio of 87 (severe); 190 1/22 (moderate). Pulmonary was consulted. She also presented with demand ischemia likely  secondary to septic shock. Troponin was trended and improved as shock resolved. Normal saline infusion was added for hyponatremia and STEFFANY which improved both. Levophed was able to be weaned. Family agreed to DNR status 1/18 and Palliative Care was consulted to discuss goal of care 1/19. Antibiotics were changed to doxycycline and Flagyl (GBS in sputum culture) 1/21; vancomycin was added 1/23 after culture also became positive for Staph aureus and Klebsiella. Her respiratory status has improved throughout her course and she was extubated (code changed to partial code for intubation) 1/21 only to be re-intubated for worsening stridor (history of tracheostomy). Upon re-intubation, copious amounts of secretions were suctioned. KUB suggested a mild ileus A bowel regimen was instituted and the patient was able to have bowel movements 1/22. Tube feeds were started 1/23. Her course was complicated by Afib as well noted on telemetry 1/21; metoprolol tartrate started 1/22.She failed a leak test 1/22; Surgery was consulted for tracheostomy which is pending conversation with family regarding goals of care.    Patient was set to go for trach on 01/26, but attempt was made for another trial extubation and the patient did well after extubation, she was transition to face mask and was able to maintain her saturations although had significant difficulty with secretions.  She was started on Robinul and scopolamine patch which did seem to improve for secretions.  Her respiratory status continued to be somewhat tenuous, so she was monitored in the ICU.  Family expressed desire to not have her get a tracheostomy.  Case management began working on LTACH referral.    Unable to obtain subjective 2/2 current cognitive status    NAD, alert  NC, AT  RRR  CTAB  SNTND+BS. PEG in place  PERRL  No gross joint abnormalities.     Vitals, labs and radiographs from past 24h reviewed and personally interpreted.       Assessment/Plan:      Acute  bacterial pneumonia 2/2 unknown organism   -treatment complete    CDiff  -Treatment complete.     * Acute hypoxemic respiratory failure  stable  Monitor sats.  Continue supplementing oxygen low flow.  Monitor work of breathing.  She's partial code, specifying that she'd want to be intubated if need be.    Anemia  Continue to monitor H&H every 3 days currently stable no active bleeding noted    Hemoglobin   Date Value Ref Range Status   02/27/2022 10.2 (L) 12.0 - 16.0 g/dL Final   02/22/2022 10.3 (L) 12.0 - 16.0 g/dL Final   02/11/2022 9.5 (L) 12.0 - 16.0 g/dL Final   02/07/2022 8.3 (L) 12.0 - 16.0 g/dL Final   ]      Paroxysmal atrial fibrillation  -Telemetry  - controlled on metoprolol 12.5 mg BID  Will hold full dose anticoagulant due to high risk of bleeding      Gastrostomy tube dependent  Stable.  No issues with it    Type 2 diabetes mellitus, with long-term current use of insulin  She has diet controlled DM.  No need for frequent fingersticks or PRN insulin.    Glucose   Date Value Ref Range Status   02/27/2022 117 (H) 70 - 110 mg/dL Final   02/22/2022 92 70 - 110 mg/dL Final   02/11/2022 110 70 - 110 mg/dL Final   ]    YARI (generalized anxiety disorder)  Stable.  On citalopram.    Hypothyroidism  Patient has chronic hypothyroidism. TFTs reviewed-   Lab Results   Component Value Date    TSH 1.130 07/13/2017   . Will continue chronic levothyroxine and adjust for and clinical changes.        Hyperlipidemia   Patient is chronically on statin.will continue for now. Monitor clinically. Last LDL was   Lab Results   Component Value Date    LDLCALC 94.4 07/13/2017          GERD (gastroesophageal reflux disease)  On famotidine.    VTE Risk Mitigation (From admission, onward)         Ordered     enoxaparin injection 40 mg  Daily         01/18/22 1438     IP VTE HIGH RISK PATIENT  Once         01/18/22 1438     Place sequential compression device  Until discontinued         01/18/22 1438                Discharge Planning    DEMETRIUS:      Code Status: Partial Code   Is the patient medically ready for discharge?:     Reason for patient still in hospital (select all that apply): Pending disposition  Discharge Plan A: Return to nursing home                  Kaylyn Ibarra MD  Department of Hospital Medicine   [facility]

## 2022-03-03 NOTE — PLAN OF CARE
Problem: Adult Inpatient Plan of Care  Goal: Optimal Comfort and Wellbeing  Outcome: Ongoing, Progressing     Problem: Fluid Imbalance (Pneumonia)  Goal: Fluid Balance  Outcome: Ongoing, Progressing     Problem: Infection (Pneumonia)  Goal: Resolution of Infection Signs and Symptoms  Outcome: Ongoing, Progressing     Problem: Infection  Goal: Absence of Infection Signs and Symptoms  Outcome: Ongoing, Progressing   Pt is alert. Turned q2, PEG Running at 45 ml/hr. Contact precautions maintained. Incontinent care provided. Bed alarm set, call light in reach.

## 2022-03-03 NOTE — PLAN OF CARE
Problem: Physical Therapy Goal  Goal: Physical Therapy Goal  Description: Goals to be met by: 2022     Patient will increase functional independence with mobility by performin. Supine to sit with Maximum Assistance  2. Sitting at edge of bed x20 minutes with Maximum Assistance  3. Lower extremity exercise program x20 reps   Outcome: Ongoing, Progressing   Therapeutic exercises all 4 extremities in supine.

## 2022-03-03 NOTE — PROGRESS NOTES
[facility] Medicine  Progress Note    Patient Name: Sandra Wilson  MRN: [id number]  Patient Class: IP- Inpatient   Admission Date: 1/18/2022  Length of Stay: 43 days  Attending Physician: Felipe Kruse MD  Primary Care Provider: Primary Doctor No        Subjective:     Principal Problem:Acute hypoxemic respiratory failure        HPI:  Sandra Wilson is an 81 year old female with a past medical history of CVAs, CNS aneurysm, PEG status, CAD, HLD, hypothryoidism, DM, aortic stenosis, and colon, breast, and ovarian cancer who presented from long term care with vomiting, diarrhea, and shortness of breath. Workup in the ED showed likely aspiration pneumonitis and C. Diff colitis. Her O2 requirement increased while in the ED requiring intubation with sedation. She also required Levophed as she likely developed septic shock. Antibiotics were broadened to cefepime, vancomycin, and Flagyl. Pulmonary was consulted. Family was notified. The patient was unable to provide history given acuity of illness.      Overview/Hospital Course:  Sandra Wilson is an 81 year old female with a past medical history of CVAs, CNS aneurysm, PEG status, CAD, HLD, hypothyroidism, DM, aortic stenosis, and colon, breast, and ovarian cancer who presented from long term care with vomiting, diarrhea, and shortness of breath secondary to acute hypoxic respiratory failure from aspiration pneumonia in the setting of C diff colitis leading to septic shock. She was intubated in the ED and started on Levophed infusion via RIJ CVC placed by Dr. Vignesh Gaspar. She was started on broad spectrum antibiotics with cefepime, Flagyl, vancomycin and admitted to the ICU. She was also started on enteral vancomycin for severe C diff colitis given elevated WBC count and STEFFANY. There was concern for developing ARDS given P/F ratio of 87 (severe); 190 1/22 (moderate). Pulmonary was consulted. She also presented with demand ischemia likely  secondary to septic shock. Troponin was trended and improved as shock resolved. Normal saline infusion was added for hyponatremia and STEFFANY which improved both. Levophed was able to be weaned. Family agreed to DNR status 1/18 and Palliative Care was consulted to discuss goal of care 1/19. Antibiotics were changed to doxycycline and Flagyl (GBS in sputum culture) 1/21; vancomycin was added 1/23 after culture also became positive for Staph aureus and Klebsiella. Her respiratory status has improved throughout her course and she was extubated (code changed to partial code for intubation) 1/21 only to be re-intubated for worsening stridor (history of tracheostomy). Upon re-intubation, copious amounts of secretions were suctioned. KUB suggested a mild ileus A bowel regimen was instituted and the patient was able to have bowel movements 1/22. Tube feeds were started 1/23. Her course was complicated by Afib as well noted on telemetry 1/21; metoprolol tartrate started 1/22.She failed a leak test 1/22; Surgery was consulted for tracheostomy which is pending conversation with family regarding goals of care.    Patient was set to go for trach on 01/26, but attempt was made for another trial extubation and the patient did well after extubation, she was transition to face mask and was able to maintain her saturations although had significant difficulty with secretions.  She was started on Robinul and scopolamine patch which did seem to improve for secretions.  Her respiratory status continued to be somewhat tenuous, so she was monitored in the ICU.  Family expressed desire to not have her get a tracheostomy.  Case management began working on LTACH referral.      Interval History:  No major changes in status.  No new issues brought up by nurses.  Pt's daughter in the process of signing documents and providing financial statements to the nursing home in [place].    Review of Systems   Unable to perform ROS: Patient nonverbal  "  Objective:     Vital Signs (Most Recent):  Temp: 96.5 °F (35.8 °C) (03/02/22 1946)  Pulse: 73 (03/02/22 1946)  Resp: 18 (03/02/22 1946)  BP: 138/62 (03/02/22 1946)  SpO2: 96 % (03/02/22 1947)   Vital Signs (24h Range):  Temp:  [96.5 °F (35.8 °C)-98.3 °F (36.8 °C)] 96.5 °F (35.8 °C)  Pulse:  [58-73] 73  Resp:  [16-18] 18  SpO2:  [93 %-100 %] 96 %  BP: (119-158)/(58-67) 138/62     Weight: 63.9 kg (140 lb 14 oz)  Body mass index is 24.18 kg/m².    Intake/Output Summary (Last 24 hours) at 3/2/2022 2124  Last data filed at 3/2/2022 1733  Gross per 24 hour   Intake 182 ml   Output --   Net 182 ml        Physical Exam  Vitals and nursing note reviewed.   Constitutional:       General: She is not in acute distress.     Appearance: She is not ill-appearing.   HENT:      Head: Normocephalic.      Mouth/Throat:      Mouth: Mucous membranes are moist.   Eyes:      General:         Right eye: No discharge.         Left eye: No discharge.      Extraocular Movements: Extraocular movements intact.      Pupils: Pupils are equal, round, and reactive to light.   Neck:      Vascular: No JVD.   Cardiovascular:      Rate and Rhythm: Normal rate and regular rhythm.   Pulmonary:      Effort: Pulmonary effort is normal.      Breath sounds: Normal breath sounds.   Abdominal:      General: Abdomen is flat. Bowel sounds are normal. There is no distension.      Palpations: Abdomen is soft.      Tenderness: There is no abdominal tenderness.   Musculoskeletal:      Right lower leg: No edema.      Left lower leg: No edema.   Skin:     General: Skin is warm and moist.      Findings: No rash.   Neurological:      Mental Status: She is alert.      Comments: She focuses her attention on me, but she keeps saying "help" and nothing else.     Psychiatric:         Behavior: Behavior is agitated.       Significant Labs: All pertinent labs within the past 24 hours have been reviewed.    Significant Imaging: I have reviewed all pertinent imaging " results/findings within the past 24 hours.      Assessment/Plan:      * Acute hypoxemic respiratory failure  stable  Monitor sats.  Continue supplementing oxygen low flow.  Monitor work of breathing.  She's partial code, specifying that she'd want to be intubated if need be.    Anemia  Continue to monitor H&H every 3 days currently stable no active bleeding noted    Hemoglobin   Date Value Ref Range Status   02/27/2022 10.2 (L) 12.0 - 16.0 g/dL Final   02/22/2022 10.3 (L) 12.0 - 16.0 g/dL Final   02/11/2022 9.5 (L) 12.0 - 16.0 g/dL Final   02/07/2022 8.3 (L) 12.0 - 16.0 g/dL Final   ]      Paroxysmal atrial fibrillation  -Telemetry  - controlled on metoprolol 12.5 mg BID  Will hold full dose anticoagulant due to high risk of bleeding      Gastrostomy tube dependent  Stable.  No issues with it    Type 2 diabetes mellitus, with long-term current use of insulin  She has diet controlled DM.  No need for frequent fingersticks or PRN insulin.    Glucose   Date Value Ref Range Status   02/27/2022 117 (H) 70 - 110 mg/dL Final   02/22/2022 92 70 - 110 mg/dL Final   02/11/2022 110 70 - 110 mg/dL Final   ]    YARI (generalized anxiety disorder)  Stable.  On citalopram.    Hypothyroidism  Patient has chronic hypothyroidism. TFTs reviewed-   Lab Results   Component Value Date    TSH 1.130 07/13/2017   . Will continue chronic levothyroxine and adjust for and clinical changes.        Hyperlipidemia   Patient is chronically on statin.will continue for now. Monitor clinically. Last LDL was   Lab Results   Component Value Date    LDLCALC 94.4 07/13/2017          GERD (gastroesophageal reflux disease)  On famotidine.      VTE Risk Mitigation (From admission, onward)         Ordered     enoxaparin injection 40 mg  Daily         01/18/22 1438     IP VTE HIGH RISK PATIENT  Once         01/18/22 1438     Place sequential compression device  Until discontinued         01/18/22 1438                Discharge Planning   DEMETRIUS:      Code Status:  Partial Code   Is the patient medically ready for discharge?:     Reason for patient still in hospital (select all that apply): Pending disposition  Discharge Plan A: Return to nursing home                  Felipe Kruse MD  Department of Hospital Medicine   [facility]

## 2022-03-03 NOTE — PT/OT/SLP PROGRESS
Physical Therapy Treatment    Patient Name:  Sandra Wilson   MRN:  [id number]    Recommendations:     Discharge Recommendations:  nursing facility, basic, nursing facility, skilled   Discharge Equipment Recommendations: none   Barriers to discharge: None    Assessment:     Sandra Wilson is a 81 y.o. female admitted with a medical diagnosis of Acute hypoxemic respiratory failure.  She presents with the following impairments/functional limitations:  weakness, impaired endurance, impaired self care skills, impaired functional mobilty, decreased upper extremity function, decreased lower extremity function, decreased ROM . Awake, alert . Just finished bath with nursing student.  Received therapeutic exercises all 4 extremities supine in bed .     Rehab Prognosis: Fair; patient would benefit from acute skilled PT services to address these deficits and reach maximum level of function.    Recent Surgery: Procedure(s) (LRB):  CREATION, TRACHEOSTOMY (N/A) 36 Days Post-Op    Plan:     During this hospitalization, patient to be seen 3 x/week to address the identified rehab impairments via therapeutic activities, therapeutic exercises and progress toward the following goals:    · Plan of Care Expires:  02/28/22    Subjective     Chief Complaint: none stated  Patient/Family Comments/goals: none stated  Pain/Comfort:  · Pain Rating 1: 0/10      Objective:     Communicated with nurse Mcneill prior to session.  Patient found HOB elevated with bed alarm, PEG Tube, telemetry, pressure relief boots upon PT entry to room.     General Precautions: Standard, contact, fall   Orthopedic Precautions:N/A   Braces: N/A  Respiratory Status: Room air     Functional Mobility:  ·       AM-PAC 6 CLICK MOBILITY          Therapeutic Activities and Exercises:   Therapeutic exercises AAROM all 4 extremities supine in bed in available planes of movement.     Patient left HOB elevated with all lines intact, call button in reach, bed alarm on and nurse  Charito notified..    GOALS:   Multidisciplinary Problems     Physical Therapy Goals        Problem: Physical Therapy Goal    Goal Priority Disciplines Outcome Goal Variances Interventions   Physical Therapy Goal     PT, PT/OT Ongoing, Progressing     Description: Goals to be met by: 2022     Patient will increase functional independence with mobility by performin. Supine to sit with Maximum Assistance  2. Sitting at edge of bed x20 minutes with Maximum Assistance  3. Lower extremity exercise program x20 reps                    Time Tracking:     PT Received On: 22  PT Start Time: 1037     PT Stop Time: 1055  PT Total Time (min): 18 min     Billable Minutes: Therapeutic Exercise 18min    Treatment Type: Treatment  PT/PTA: PTA     PTA Visit Number: 4     2022

## 2022-03-03 NOTE — PLAN OF CARE
NACHO Myrick or Shon at [facility] regarding status of them accepting this pt and sent a message in [facility].   Pt's daughter, Ira left me a VM stating that she was coming to the hospital tomorrow afternoon with a notary to get an additional POA that [employer] is requesting in order to to get the pt's bank statements.    03/03/22 1134   Discharge Reassessment   Assessment Type Discharge Planning Reassessment

## 2022-03-03 NOTE — SUBJECTIVE & OBJECTIVE
Interval History:  No major changes in status.  No new issues brought up by nurses.  Pt's daughter in the process of signing documents and providing financial statements to the nursing home in [place].    Review of Systems   Unable to perform ROS: Patient nonverbal   Objective:     Vital Signs (Most Recent):  Temp: 96.5 °F (35.8 °C) (03/02/22 1946)  Pulse: 73 (03/02/22 1946)  Resp: 18 (03/02/22 1946)  BP: 138/62 (03/02/22 1946)  SpO2: 96 % (03/02/22 1947)   Vital Signs (24h Range):  Temp:  [96.5 °F (35.8 °C)-98.3 °F (36.8 °C)] 96.5 °F (35.8 °C)  Pulse:  [58-73] 73  Resp:  [16-18] 18  SpO2:  [93 %-100 %] 96 %  BP: (119-158)/(58-67) 138/62     Weight: 63.9 kg (140 lb 14 oz)  Body mass index is 24.18 kg/m².    Intake/Output Summary (Last 24 hours) at 3/2/2022 2124  Last data filed at 3/2/2022 1733  Gross per 24 hour   Intake 182 ml   Output --   Net 182 ml        Physical Exam  Vitals and nursing note reviewed.   Constitutional:       General: She is not in acute distress.     Appearance: She is not ill-appearing.   HENT:      Head: Normocephalic.      Mouth/Throat:      Mouth: Mucous membranes are moist.   Eyes:      General:         Right eye: No discharge.         Left eye: No discharge.      Extraocular Movements: Extraocular movements intact.      Pupils: Pupils are equal, round, and reactive to light.   Neck:      Vascular: No JVD.   Cardiovascular:      Rate and Rhythm: Normal rate and regular rhythm.   Pulmonary:      Effort: Pulmonary effort is normal.      Breath sounds: Normal breath sounds.   Abdominal:      General: Abdomen is flat. Bowel sounds are normal. There is no distension.      Palpations: Abdomen is soft.      Tenderness: There is no abdominal tenderness.   Musculoskeletal:      Right lower leg: No edema.      Left lower leg: No edema.   Skin:     General: Skin is warm and moist.      Findings: No rash.   Neurological:      Mental Status: She is alert.      Comments: She focuses her attention  "on me, but she keeps saying "help" and nothing else.     Psychiatric:         Behavior: Behavior is agitated.       Significant Labs: All pertinent labs within the past 24 hours have been reviewed.    Significant Imaging: I have reviewed all pertinent imaging results/findings within the past 24 hours.  "

## 2022-03-04 PROCEDURE — 25000003 PHARM REV CODE 250: Performed by: INTERNAL MEDICINE

## 2022-03-04 PROCEDURE — 94761 N-INVAS EAR/PLS OXIMETRY MLT: CPT

## 2022-03-04 PROCEDURE — 43246 EGD PLACE GASTROSTOMY TUBE: CPT

## 2022-03-04 PROCEDURE — 27000207 HC ISOLATION

## 2022-03-04 PROCEDURE — 63600175 PHARM REV CODE 636 W HCPCS: Performed by: INTERNAL MEDICINE

## 2022-03-04 PROCEDURE — 11000001 HC ACUTE MED/SURG PRIVATE ROOM

## 2022-03-04 PROCEDURE — 25000003 PHARM REV CODE 250: Performed by: HOSPITALIST

## 2022-03-04 PROCEDURE — 97110 THERAPEUTIC EXERCISES: CPT | Mod: CQ

## 2022-03-04 RX ADMIN — CITALOPRAM HYDROBROMIDE 40 MG: 10 TABLET ORAL at 10:03

## 2022-03-04 RX ADMIN — ACETAMINOPHEN 650 MG: 325 TABLET ORAL at 08:03

## 2022-03-04 RX ADMIN — MIDODRINE HYDROCHLORIDE 5 MG: 5 TABLET ORAL at 08:03

## 2022-03-04 RX ADMIN — METOPROLOL TARTRATE 12.5 MG: 25 TABLET, FILM COATED ORAL at 08:03

## 2022-03-04 RX ADMIN — AZELASTINE 137 MCG: 1 SPRAY, METERED NASAL at 08:03

## 2022-03-04 RX ADMIN — LEVOTHYROXINE SODIUM 25 MCG: 0.03 TABLET ORAL at 06:03

## 2022-03-04 RX ADMIN — MIDODRINE HYDROCHLORIDE 5 MG: 5 TABLET ORAL at 03:03

## 2022-03-04 RX ADMIN — METOPROLOL TARTRATE 12.5 MG: 25 TABLET, FILM COATED ORAL at 10:03

## 2022-03-04 RX ADMIN — AZELASTINE 137 MCG: 1 SPRAY, METERED NASAL at 10:03

## 2022-03-04 RX ADMIN — ENOXAPARIN SODIUM 40 MG: 100 INJECTION SUBCUTANEOUS at 05:03

## 2022-03-04 RX ADMIN — PANTOPRAZOLE SODIUM 40 MG: 40 GRANULE, DELAYED RELEASE ORAL at 10:03

## 2022-03-04 RX ADMIN — MIDODRINE HYDROCHLORIDE 5 MG: 5 TABLET ORAL at 10:03

## 2022-03-04 RX ADMIN — ATORVASTATIN CALCIUM 40 MG: 40 TABLET, FILM COATED ORAL at 08:03

## 2022-03-04 NOTE — PT/OT/SLP PROGRESS
Physical Therapy Treatment    Patient Name:  Sandra Wilson   MRN:  [id number]    Recommendations:     Discharge Recommendations:  nursing facility, basic, nursing facility, skilled   Discharge Equipment Recommendations: none   Barriers to discharge: None    Assessment:     Sandra Wilson is a 81 y.o. female admitted with a medical diagnosis of Acute hypoxemic respiratory failure.  She presents with the following impairments/functional limitations:  weakness, impaired endurance, impaired self care skills, impaired functional mobilty, decreased upper extremity function, decreased lower extremity function . Agreed to therapy. Received therapeutic exercises all 4 extremities in supine in available planes of movement.    Rehab Prognosis: Fair; patient would benefit from acute skilled PT services to address these deficits and reach maximum level of function.    Recent Surgery: Procedure(s) (LRB):  CREATION, TRACHEOSTOMY (N/A) 37 Days Post-Op    Plan:     During this hospitalization, patient to be seen 3 x/week to address the identified rehab impairments via therapeutic activities, therapeutic exercises and progress toward the following goals:    · Plan of Care Expires:  02/28/22    Subjective     Chief Complaint: none stated  Patient/Family Comments/goals: none stated  Pain/Comfort:  · Pain Rating 1: 0/10      Objective:     Communicated with nurse Mcneill prior to session.  Patient found supine with bed alarm, PEG Tube, pressure relief boots, telemetry upon PT entry to room.     General Precautions: Standard, contact, fall   Orthopedic Precautions:N/A   Braces: N/A  Respiratory Status: Room air     Functional Mobility:  ·       AM-PAC 6 CLICK MOBILITY          Therapeutic Activities and Exercises:   Therapeutic exercises, AAROM , all 4 extremities , supine in bed.     Patient left supine with all lines intact, call button in reach, bed alarm on and nurse Charito notified..    GOALS:   Multidisciplinary Problems      Physical Therapy Goals        Problem: Physical Therapy Goal    Goal Priority Disciplines Outcome Goal Variances Interventions   Physical Therapy Goal     PT, PT/OT Ongoing, Progressing     Description: Goals to be met by: 2022     Patient will increase functional independence with mobility by performin. Supine to sit with Maximum Assistance  2. Sitting at edge of bed x20 minutes with Maximum Assistance  3. Lower extremity exercise program x20 reps                    Time Tracking:     PT Received On: 22  PT Start Time: 1027     PT Stop Time: 1037  PT Total Time (min): 10 min     Billable Minutes: Therapeutic Exercise 10min    Treatment Type: Treatment  PT/PTA: PTA     PTA Visit Number: 5     2022

## 2022-03-04 NOTE — PROGRESS NOTES
[facility] Hospital Medicine  Progress Note    Patient Name: Sandra Wilson  MRN: [id number]  Patient Class: IP- Inpatient   Admission Date: 1/18/2022  Length of Stay: 45 days  Attending Physician: Kaylyn Ibarra MD  Primary Care Provider: Primary Doctor No        Subjective:     Principal Problem:Acute hypoxemic respiratory failure        HPI:  Sandra Wilson is an 81 year old female with a past medical history of CVAs, CNS aneurysm, PEG status, CAD, HLD, hypothryoidism, DM, aortic stenosis, and colon, breast, and ovarian cancer who presented from long term care with vomiting, diarrhea, and shortness of breath. Workup in the ED showed likely aspiration pneumonitis and C. Diff colitis. Her O2 requirement increased while in the ED requiring intubation with sedation. She also required Levophed as she likely developed septic shock. Antibiotics were broadened to cefepime, vancomycin, and Flagyl. Pulmonary was consulted. Family was notified. The patient was unable to provide history given acuity of illness.      Overview/Hospital Course:  Sandra Wilson is an 81 year old female with a past medical history of CVAs, CNS aneurysm, PEG status, CAD, HLD, hypothyroidism, DM, aortic stenosis, and colon, breast, and ovarian cancer who presented from long term care with vomiting, diarrhea, and shortness of breath secondary to acute hypoxic respiratory failure from aspiration pneumonia in the setting of C diff colitis leading to septic shock. She was intubated in the ED and started on Levophed infusion via RIJ CVC placed by Dr. Vignesh Gaspar. She was started on broad spectrum antibiotics with cefepime, Flagyl, vancomycin and admitted to the ICU. She was also started on enteral vancomycin for severe C diff colitis given elevated WBC count and STEFFANY. There was concern for developing ARDS given P/F ratio of 87 (severe); 190 1/22 (moderate). Pulmonary was consulted. She also presented with demand ischemia likely  secondary to septic shock. Troponin was trended and improved as shock resolved. Normal saline infusion was added for hyponatremia and STEFFANY which improved both. Levophed was able to be weaned. Family agreed to DNR status 1/18 and Palliative Care was consulted to discuss goal of care 1/19. Antibiotics were changed to doxycycline and Flagyl (GBS in sputum culture) 1/21; vancomycin was added 1/23 after culture also became positive for Staph aureus and Klebsiella. Her respiratory status has improved throughout her course and she was extubated (code changed to partial code for intubation) 1/21 only to be re-intubated for worsening stridor (history of tracheostomy). Upon re-intubation, copious amounts of secretions were suctioned. KUB suggested a mild ileus A bowel regimen was instituted and the patient was able to have bowel movements 1/22. Tube feeds were started 1/23. Her course was complicated by Afib as well noted on telemetry 1/21; metoprolol tartrate started 1/22.She failed a leak test 1/22; Surgery was consulted for tracheostomy which is pending conversation with family regarding goals of care.    Patient was set to go for trach on 01/26, but attempt was made for another trial extubation and the patient did well after extubation, she was transition to face mask and was able to maintain her saturations although had significant difficulty with secretions.  She was started on Robinul and scopolamine patch which did seem to improve for secretions.  Her respiratory status continued to be somewhat tenuous, so she was monitored in the ICU.  Family expressed desire to not have her get a tracheostomy.  Case management began working on LTACH referral.    Unable to obtain subjective 2/2 current cognitive status    NAD, alert  NC, AT  No rashes on exposed skin  No respiratory distress  . PEG in place  PERRL  No gross joint abnormalities.     Vitals, labs and radiographs from past 24h reviewed and personally interpreted.        Assessment/Plan:      Acute bacterial pneumonia 2/2 unknown organism   -treatment complete    CDiff  -Treatment complete.     * Acute hypoxemic respiratory failure  stable  Monitor sats.  Continue supplementing oxygen low flow.  Monitor work of breathing.  She's partial code, specifying that she'd want to be intubated if need be.    Anemia  Continue to monitor H&H every 3 days currently stable no active bleeding noted    Hemoglobin   Date Value Ref Range Status   02/27/2022 10.2 (L) 12.0 - 16.0 g/dL Final   02/22/2022 10.3 (L) 12.0 - 16.0 g/dL Final   02/11/2022 9.5 (L) 12.0 - 16.0 g/dL Final   02/07/2022 8.3 (L) 12.0 - 16.0 g/dL Final   ]      Paroxysmal atrial fibrillation  -Telemetry  - controlled on metoprolol 12.5 mg BID  Will hold full dose anticoagulant due to high risk of bleeding      Gastrostomy tube dependent  Stable.  No issues with it    Type 2 diabetes mellitus, with long-term current use of insulin  She has diet controlled DM.  No need for frequent fingersticks or PRN insulin.    Glucose   Date Value Ref Range Status   02/27/2022 117 (H) 70 - 110 mg/dL Final   02/22/2022 92 70 - 110 mg/dL Final   02/11/2022 110 70 - 110 mg/dL Final   ]    YARI (generalized anxiety disorder)  Stable.  On citalopram.    Hypothyroidism  Patient has chronic hypothyroidism. TFTs reviewed-   Lab Results   Component Value Date    TSH 1.130 07/13/2017   . Will continue chronic levothyroxine and adjust for and clinical changes.        Hyperlipidemia   Patient is chronically on statin.will continue for now. Monitor clinically. Last LDL was   Lab Results   Component Value Date    LDLCALC 94.4 07/13/2017          GERD (gastroesophageal reflux disease)  On famotidine.    VTE Risk Mitigation (From admission, onward)         Ordered     enoxaparin injection 40 mg  Daily         01/18/22 1438     IP VTE HIGH RISK PATIENT  Once         01/18/22 1438     Place sequential compression device  Until discontinued         01/18/22 1438                 Discharge Planning   DEMETRIUS:      Code Status: Partial Code   Is the patient medically ready for discharge?:     Reason for patient still in hospital (select all that apply): Pending disposition  Discharge Plan A: New Nursing Home placement - snf care facility   Discharge Delays: (!) Other (financials)              Kaylyn Ibarra MD  Department of Hospital Medicine   [facility]

## 2022-03-04 NOTE — PLAN OF CARE
Assessment completed per flow sheet. Skin warm and dry to touch with respiration even and unlabored. Pt observed in room resting quietly with eyes closed. No s/s of distress discomfort or adverse reactions noted at this time. Able to verbalize demands as needed. Pt remains on telemetry with box 8719. Normal sinus rhythm at this time. Tolerated well. Meds administered  via peg with no adverse reactions noted. LUE midline patent infusign KVO at 10ml/hr. Continues on  isosource 45 ml/ hr with 130cc flush. No edema noted to bilateral lower extremities. Bowel sounds active in all quads. Pt verbalized no pain or SOB at this time. Educated pt to use call light for any transfers or ambulation assist. Verbalized understanding. Will continue to monitor for change of condition not noted at this time.

## 2022-03-04 NOTE — PLAN OF CARE
KARLO spoke to patient's daughter Ira via telephone regarding discharge planning for patient.  Per Ira, she will complete POA today here at hospital and contact Shon/Elen with [facility].  Per Ira, she will pay [facility] 1 month payment if she has to for patient to discharge to their facility.  Per Ira, she will return call to this KARLO and/or Purnima Rausch.  KARLO left voice message for return call from [facility] [phone].           03/04/22 1054   Post-Acute Status   Post-Acute Authorization Placement   Post-Acute Placement Status Pending state direction/certification   Discharge Delays (!) Other  (financials)   Discharge Plan   Discharge Plan A New Nursing Home placement - long term care facility   Discharge Plan B New Nursing Home placement - long term care facility

## 2022-03-05 PROCEDURE — 11000001 HC ACUTE MED/SURG PRIVATE ROOM

## 2022-03-05 PROCEDURE — 99900035 HC TECH TIME PER 15 MIN (STAT)

## 2022-03-05 PROCEDURE — 25000003 PHARM REV CODE 250: Performed by: INTERNAL MEDICINE

## 2022-03-05 PROCEDURE — 94761 N-INVAS EAR/PLS OXIMETRY MLT: CPT

## 2022-03-05 PROCEDURE — 25000003 PHARM REV CODE 250: Performed by: HOSPITALIST

## 2022-03-05 PROCEDURE — 43246 EGD PLACE GASTROSTOMY TUBE: CPT

## 2022-03-05 PROCEDURE — 27000207 HC ISOLATION

## 2022-03-05 PROCEDURE — 63600175 PHARM REV CODE 636 W HCPCS: Performed by: INTERNAL MEDICINE

## 2022-03-05 RX ADMIN — METOPROLOL TARTRATE 12.5 MG: 25 TABLET, FILM COATED ORAL at 08:03

## 2022-03-05 RX ADMIN — CITALOPRAM HYDROBROMIDE 40 MG: 10 TABLET ORAL at 09:03

## 2022-03-05 RX ADMIN — MIDODRINE HYDROCHLORIDE 5 MG: 5 TABLET ORAL at 03:03

## 2022-03-05 RX ADMIN — AZELASTINE 137 MCG: 1 SPRAY, METERED NASAL at 09:03

## 2022-03-05 RX ADMIN — MIDODRINE HYDROCHLORIDE 5 MG: 5 TABLET ORAL at 09:03

## 2022-03-05 RX ADMIN — AZELASTINE 137 MCG: 1 SPRAY, METERED NASAL at 08:03

## 2022-03-05 RX ADMIN — ATORVASTATIN CALCIUM 40 MG: 40 TABLET, FILM COATED ORAL at 08:03

## 2022-03-05 RX ADMIN — ENOXAPARIN SODIUM 40 MG: 100 INJECTION SUBCUTANEOUS at 04:03

## 2022-03-05 RX ADMIN — METOPROLOL TARTRATE 12.5 MG: 25 TABLET, FILM COATED ORAL at 09:03

## 2022-03-05 RX ADMIN — PANTOPRAZOLE SODIUM 40 MG: 40 GRANULE, DELAYED RELEASE ORAL at 09:03

## 2022-03-05 RX ADMIN — MIDODRINE HYDROCHLORIDE 5 MG: 5 TABLET ORAL at 08:03

## 2022-03-05 RX ADMIN — LEVOTHYROXINE SODIUM 25 MCG: 0.03 TABLET ORAL at 05:03

## 2022-03-05 NOTE — CARE UPDATE
03/04/22 1945   Patient Assessment/Suction   Level of Consciousness (AVPU) alert   Respiratory Effort Normal;Unlabored   PRE-TX-O2   O2 Device (Oxygen Therapy) room air   SpO2 95 %   Pulse Oximetry Type Intermittent   $ Pulse Oximetry - Multiple Charge Pulse Oximetry - Multiple   Pulse 72   Resp 18

## 2022-03-05 NOTE — PROGRESS NOTES
[facility] Hospital Medicine  Progress Note    Patient Name: Sandra Wilson  MRN: [id number]  Patient Class: IP- Inpatient   Admission Date: 1/18/2022  Length of Stay: 46 days  Attending Physician: Kaylyn Ibarra MD  Primary Care Provider: Primary Doctor No        Subjective:     Principal Problem:Acute hypoxemic respiratory failure        HPI:  Sandra Wilson is an 81 year old female with a past medical history of CVAs, CNS aneurysm, PEG status, CAD, HLD, hypothryoidism, DM, aortic stenosis, and colon, breast, and ovarian cancer who presented from long term care with vomiting, diarrhea, and shortness of breath. Workup in the ED showed likely aspiration pneumonitis and C. Diff colitis. Her O2 requirement increased while in the ED requiring intubation with sedation. She also required Levophed as she likely developed septic shock. Antibiotics were broadened to cefepime, vancomycin, and Flagyl. Pulmonary was consulted. Family was notified. The patient was unable to provide history given acuity of illness.      Overview/Hospital Course:  Sandra Wilson is an 81 year old female with a past medical history of CVAs, CNS aneurysm, PEG status, CAD, HLD, hypothyroidism, DM, aortic stenosis, and colon, breast, and ovarian cancer who presented from long term care with vomiting, diarrhea, and shortness of breath secondary to acute hypoxic respiratory failure from aspiration pneumonia in the setting of C diff colitis leading to septic shock. She was intubated in the ED and started on Levophed infusion via RIJ CVC placed by Dr. Vignesh Gaspar. She was started on broad spectrum antibiotics with cefepime, Flagyl, vancomycin and admitted to the ICU. She was also started on enteral vancomycin for severe C diff colitis given elevated WBC count and STEFFANY. There was concern for developing ARDS given P/F ratio of 87 (severe); 190 1/22 (moderate). Pulmonary was consulted. She also presented with demand ischemia likely  secondary to septic shock. Troponin was trended and improved as shock resolved. Normal saline infusion was added for hyponatremia and STEFFANY which improved both. Levophed was able to be weaned. Family agreed to DNR status 1/18 and Palliative Care was consulted to discuss goal of care 1/19. Antibiotics were changed to doxycycline and Flagyl (GBS in sputum culture) 1/21; vancomycin was added 1/23 after culture also became positive for Staph aureus and Klebsiella. Her respiratory status has improved throughout her course and she was extubated (code changed to partial code for intubation) 1/21 only to be re-intubated for worsening stridor (history of tracheostomy). Upon re-intubation, copious amounts of secretions were suctioned. KUB suggested a mild ileus A bowel regimen was instituted and the patient was able to have bowel movements 1/22. Tube feeds were started 1/23. Her course was complicated by Afib as well noted on telemetry 1/21; metoprolol tartrate started 1/22.She failed a leak test 1/22; Surgery was consulted for tracheostomy which is pending conversation with family regarding goals of care.    Patient was set to go for trach on 01/26, but attempt was made for another trial extubation and the patient did well after extubation, she was transition to face mask and was able to maintain her saturations although had significant difficulty with secretions.  She was started on Robinul and scopolamine patch which did seem to improve for secretions.  Her respiratory status continued to be somewhat tenuous, so she was monitored in the ICU.  Family expressed desire to not have her get a tracheostomy.  Case management began working on LTACH referral.    Unable to obtain subjective 2/2 current cognitive status    NAD, alert  NC, AT  No rashes on exposed skin  No respiratory distress  . PEG in place  PERRL  No gross joint abnormalities.     Vitals, labs and radiographs from past 24h reviewed and personally interpreted.        Assessment/Plan:      Acute bacterial pneumonia 2/2 unknown organism   -treatment complete    CDiff  -Treatment complete.     * Acute hypoxemic respiratory failure  stable  Monitor sats.  Continue supplementing oxygen low flow.  Monitor work of breathing.  She's partial code, specifying that she'd want to be intubated if need be.    Anemia  Continue to monitor H&H every 3 days currently stable no active bleeding noted    Hemoglobin   Date Value Ref Range Status   02/27/2022 10.2 (L) 12.0 - 16.0 g/dL Final   02/22/2022 10.3 (L) 12.0 - 16.0 g/dL Final   02/11/2022 9.5 (L) 12.0 - 16.0 g/dL Final   02/07/2022 8.3 (L) 12.0 - 16.0 g/dL Final   ]      Paroxysmal atrial fibrillation  -Telemetry  - controlled on metoprolol 12.5 mg BID  Will hold full dose anticoagulant due to high risk of bleeding      Gastrostomy tube dependent  Stable.  No issues with it    Type 2 diabetes mellitus, with long-term current use of insulin  She has diet controlled DM.  No need for frequent fingersticks or PRN insulin.    Glucose   Date Value Ref Range Status   02/27/2022 117 (H) 70 - 110 mg/dL Final   02/22/2022 92 70 - 110 mg/dL Final   02/11/2022 110 70 - 110 mg/dL Final   ]    YARI (generalized anxiety disorder)  Stable.  On citalopram.    Hypothyroidism  Patient has chronic hypothyroidism. TFTs reviewed-   Lab Results   Component Value Date    TSH 1.130 07/13/2017   . Will continue chronic levothyroxine and adjust for and clinical changes.        Hyperlipidemia   Patient is chronically on statin.will continue for now. Monitor clinically. Last LDL was   Lab Results   Component Value Date    LDLCALC 94.4 07/13/2017          GERD (gastroesophageal reflux disease)  On famotidine.    VTE Risk Mitigation (From admission, onward)         Ordered     enoxaparin injection 40 mg  Daily         01/18/22 1438     IP VTE HIGH RISK PATIENT  Once         01/18/22 1438     Place sequential compression device  Until discontinued         01/18/22 1438                 Discharge Planning   DEMETRIUS:      Code Status: Partial Code   Is the patient medically ready for discharge?:     Reason for patient still in hospital (select all that apply): Pending disposition  Discharge Plan A: New Nursing Home placement - halfway care facility   Discharge Delays: (!) Other (financials)              Kaylyn Ibarra MD  Department of Hospital Medicine   [facility]

## 2022-03-06 PROCEDURE — 25000003 PHARM REV CODE 250: Performed by: INTERNAL MEDICINE

## 2022-03-06 PROCEDURE — 25000003 PHARM REV CODE 250: Performed by: HOSPITALIST

## 2022-03-06 PROCEDURE — 27000207 HC ISOLATION

## 2022-03-06 PROCEDURE — 63600175 PHARM REV CODE 636 W HCPCS: Performed by: INTERNAL MEDICINE

## 2022-03-06 PROCEDURE — 11000001 HC ACUTE MED/SURG PRIVATE ROOM

## 2022-03-06 PROCEDURE — 94761 N-INVAS EAR/PLS OXIMETRY MLT: CPT

## 2022-03-06 RX ADMIN — ENOXAPARIN SODIUM 40 MG: 100 INJECTION SUBCUTANEOUS at 05:03

## 2022-03-06 RX ADMIN — MIDODRINE HYDROCHLORIDE 5 MG: 5 TABLET ORAL at 02:03

## 2022-03-06 RX ADMIN — AZELASTINE 137 MCG: 1 SPRAY, METERED NASAL at 09:03

## 2022-03-06 RX ADMIN — METOPROLOL TARTRATE 12.5 MG: 25 TABLET, FILM COATED ORAL at 09:03

## 2022-03-06 RX ADMIN — MIDODRINE HYDROCHLORIDE 5 MG: 5 TABLET ORAL at 09:03

## 2022-03-06 RX ADMIN — ACETAMINOPHEN 650 MG: 325 TABLET ORAL at 01:03

## 2022-03-06 RX ADMIN — LEVOTHYROXINE SODIUM 25 MCG: 0.03 TABLET ORAL at 05:03

## 2022-03-06 RX ADMIN — ATORVASTATIN CALCIUM 40 MG: 40 TABLET, FILM COATED ORAL at 09:03

## 2022-03-06 RX ADMIN — CITALOPRAM HYDROBROMIDE 40 MG: 10 TABLET ORAL at 09:03

## 2022-03-06 RX ADMIN — PANTOPRAZOLE SODIUM 40 MG: 40 GRANULE, DELAYED RELEASE ORAL at 09:03

## 2022-03-06 NOTE — PROGRESS NOTES
[facility] Hospital Medicine  Progress Note    Patient Name: Sandra Wilson  MRN: [id number]  Patient Class: IP- Inpatient   Admission Date: 1/18/2022  Length of Stay: 47 days  Attending Physician: Vahid Poon MD  Primary Care Provider: Primary Doctor No        Subjective:     Principal Problem:Acute hypoxemic respiratory failure    HPI:  Sandra Wilson is an 81 year old female with a past medical history of CVAs, CNS aneurysm, PEG status, CAD, HLD, hypothryoidism, DM, aortic stenosis, and colon, breast, and ovarian cancer who presented from long term care with vomiting, diarrhea, and shortness of breath. Workup in the ED showed likely aspiration pneumonitis and C. Diff colitis. Her O2 requirement increased while in the ED requiring intubation with sedation. She also required Levophed as she likely developed septic shock. Antibiotics were broadened to cefepime, vancomycin, and Flagyl. Pulmonary was consulted. Family was notified. The patient was unable to provide history given acuity of illness.      Overview/Hospital Course:  Sandra Wilson is an 81 year old female with a past medical history of CVAs, CNS aneurysm, PEG status, CAD, HLD, hypothyroidism, DM, aortic stenosis, and colon, breast, and ovarian cancer who presented from long term care with vomiting, diarrhea, and shortness of breath secondary to acute hypoxic respiratory failure from aspiration pneumonia in the setting of C diff colitis leading to septic shock. She was intubated in the ED and started on Levophed infusion via RIJ CVC placed by Dr. Vignesh Gaspar. She was started on broad spectrum antibiotics with cefepime, Flagyl, vancomycin and admitted to the ICU. She was also started on enteral vancomycin for severe C diff colitis given elevated WBC count and STEFFANY. There was concern for developing ARDS given P/F ratio of 87 (severe); 190 1/22 (moderate). Pulmonary was consulted. She also presented with demand ischemia likely secondary  to septic shock. Troponin was trended and improved as shock resolved. Normal saline infusion was added for hyponatremia and STEFFANY which improved both. Levophed was able to be weaned. Family agreed to DNR status 1/18 and Palliative Care was consulted to discuss goal of care 1/19. Antibiotics were changed to doxycycline and Flagyl (GBS in sputum culture) 1/21; vancomycin was added 1/23 after culture also became positive for Staph aureus and Klebsiella. Her respiratory status has improved throughout her course and she was extubated (code changed to partial code for intubation) 1/21 only to be re-intubated for worsening stridor (history of tracheostomy). Upon re-intubation, copious amounts of secretions were suctioned. KUB suggested a mild ileus A bowel regimen was instituted and the patient was able to have bowel movements 1/22. Tube feeds were started 1/23. Her course was complicated by Afib as well noted on telemetry 1/21; metoprolol tartrate started 1/22.She failed a leak test 1/22; Surgery was consulted for tracheostomy which is pending conversation with family regarding goals of care.    Patient was set to go for trach on 01/26, but attempt was made for another trial extubation and the patient did well after extubation, she was transition to face mask and was able to maintain her saturations although had significant difficulty with secretions.  She was started on Robinul and scopolamine patch which did seem to improve for secretions.  Her respiratory status continued to be somewhat tenuous, so she was monitored in the ICU.  Family expressed desire to not have her get a tracheostomy.  Case management began working on LTACH referral.    Unable to obtain subjective 2/2 current cognitive status    Wt Readings from Last 3 Encounters:   02/06/22 63.9 kg (140 lb 14 oz)   12/17/21 66.8 kg (147 lb 4.3 oz)   12/09/21 76.2 kg (168 lb)     Temp Readings from Last 3 Encounters:   03/06/22 97.8 °F (36.6 °C) (Tympanic)   12/18/21  98.2 °F (36.8 °C)   12/09/21 98.1 °F (36.7 °C) (Oral)     BP Readings from Last 3 Encounters:   03/06/22 (!) 146/64   12/18/21 137/62   12/09/21 106/73     Pulse Readings from Last 3 Encounters:   03/06/22 63   12/18/21 63   12/09/21 90     Vitals and nursing note reviewed.   Constitutional:       General: She is not in acute distress.     Appearance: She is not ill-appearing.   HENT:      Head: Normocephalic.      Mouth/Throat:      Mouth: Mucous membranes are moist.   Eyes:      General:         Right eye: No discharge.         Left eye: No discharge.      Extraocular Movements: Extraocular movements intact.      Pupils: Pupils are equal, round, and reactive to light.   Neck:      Vascular: No JVD.   Cardiovascular:      Rate and Rhythm: Normal rate and regular rhythm.   Pulmonary:      Effort: Pulmonary effort is normal.      Breath sounds: Normal breath sounds.   Abdominal:      General: Abdomen is flat. Bowel sounds are normal. There is no distension.      Palpations: Abdomen is soft.      Tenderness: There is no abdominal tenderness.   Musculoskeletal:      Right lower leg: No edema.      Left lower leg: No edema.   Skin:     General: Skin is warm and moist.      Findings: No rash.   Neurological:      Mental Status: She is alert.   Psychiatric:         Behavior: Behavior is agitated.     Lab Results   Component Value Date    CREATININE 0.6 02/27/2022    BUN 19 02/27/2022     (L) 02/27/2022    K 3.9 02/27/2022    CL 98 02/27/2022    CO2 26 02/27/2022     Lab Results   Component Value Date    WBC 9.00 02/27/2022    HGB 10.2 (L) 02/27/2022    HCT 31.1 (L) 02/27/2022    MCV 99 (H) 02/27/2022     02/27/2022       Assessment/Plan:       * Acute hypoxemic respiratory failure  stable  Monitor sats.  Continue supplementing oxygen low flow.  Monitor work of breathing.  She's partial code, specifying that she'd want to be intubated if need be.    Anemia  Continue to monitor H&H every 3 days currently stable  no active bleeding noted    Hemoglobin   Date Value Ref Range Status   02/27/2022 10.2 (L) 12.0 - 16.0 g/dL Final   02/22/2022 10.3 (L) 12.0 - 16.0 g/dL Final   02/11/2022 9.5 (L) 12.0 - 16.0 g/dL Final   02/07/2022 8.3 (L) 12.0 - 16.0 g/dL Final   ]      Paroxysmal atrial fibrillation  -Telemetry  - controlled on metoprolol 12.5 mg BID  Will hold full dose anticoagulant due to high risk of bleeding      Gastrostomy tube dependent  Stable.  No issues with it    Type 2 diabetes mellitus, with long-term current use of insulin  She has diet controlled DM.  No need for frequent fingersticks or PRN insulin.    Glucose   Date Value Ref Range Status   02/27/2022 117 (H) 70 - 110 mg/dL Final   02/22/2022 92 70 - 110 mg/dL Final   02/11/2022 110 70 - 110 mg/dL Final   ]    YARI (generalized anxiety disorder)  Stable.  On citalopram.    Hypothyroidism  Patient has chronic hypothyroidism. TFTs reviewed-   Lab Results   Component Value Date    TSH 1.130 07/13/2017   . Will continue chronic levothyroxine and adjust for and clinical changes.        Hyperlipidemia   Patient is chronically on statin.will continue for now. Monitor clinically. Last LDL was   Lab Results   Component Value Date    LDLCALC 94.4 07/13/2017          GERD (gastroesophageal reflux disease)  On famotidine.    VTE Risk Mitigation (From admission, onward)         Ordered     enoxaparin injection 40 mg  Daily         01/18/22 1438     IP VTE HIGH RISK PATIENT  Once         01/18/22 1438     Place sequential compression device  Until discontinued         01/18/22 1438                Discharge Planning   DEMETRIUS:      Code Status: Partial Code   Is the patient medically ready for discharge?:     Reason for patient still in hospital (select all that apply): Pending disposition  Discharge Plan A: New Nursing Home placement - long term care facility   Discharge Delays: (!) Other (financials)      Vahid Poon MD  Department of Hospital Medicine   [facility]

## 2022-03-06 NOTE — CONSULTS
[facility] Hospital Medicine  Telemedicine Consult Note      Thank you for your consult to [facility]. We have reviewed the patient chart. This patient does not meet criteria for [facility] service at this time due to Patient is unlikely to participate well with the telemedicine platform due to cognitive status and multiple notes stating not being able to verbalize her needs.  .. Will hand back to In-house service..      Emerson Blackmon MD  Department of Hospital Medicine   [facility]

## 2022-03-06 NOTE — CARE UPDATE
03/05/22 1954   Patient Assessment/Suction   Level of Consciousness (AVPU) alert   Respiratory Effort Normal;Unlabored   PRE-TX-O2   O2 Device (Oxygen Therapy) room air   SpO2 96 %   Pulse Oximetry Type Intermittent   $ Pulse Oximetry - Multiple Charge Pulse Oximetry - Multiple   Pulse 73   Resp 18   Aerosol Therapy   $ Aerosol Therapy Charges PRN treatment not required   Respiratory Treatment Status (SVN) PRN treatment not required

## 2022-03-07 PROCEDURE — 25000003 PHARM REV CODE 250: Performed by: INTERNAL MEDICINE

## 2022-03-07 PROCEDURE — 63600175 PHARM REV CODE 636 W HCPCS: Performed by: INTERNAL MEDICINE

## 2022-03-07 PROCEDURE — 94761 N-INVAS EAR/PLS OXIMETRY MLT: CPT

## 2022-03-07 PROCEDURE — 25000003 PHARM REV CODE 250: Performed by: HOSPITALIST

## 2022-03-07 PROCEDURE — 97530 THERAPEUTIC ACTIVITIES: CPT | Mod: CQ

## 2022-03-07 PROCEDURE — 11000001 HC ACUTE MED/SURG PRIVATE ROOM

## 2022-03-07 PROCEDURE — 27000207 HC ISOLATION

## 2022-03-07 PROCEDURE — 97110 THERAPEUTIC EXERCISES: CPT | Mod: CQ

## 2022-03-07 RX ADMIN — MIDODRINE HYDROCHLORIDE 5 MG: 5 TABLET ORAL at 09:03

## 2022-03-07 RX ADMIN — METOPROLOL TARTRATE 12.5 MG: 25 TABLET, FILM COATED ORAL at 09:03

## 2022-03-07 RX ADMIN — ENOXAPARIN SODIUM 40 MG: 100 INJECTION SUBCUTANEOUS at 04:03

## 2022-03-07 RX ADMIN — AZELASTINE 137 MCG: 1 SPRAY, METERED NASAL at 10:03

## 2022-03-07 RX ADMIN — PANTOPRAZOLE SODIUM 40 MG: 40 GRANULE, DELAYED RELEASE ORAL at 09:03

## 2022-03-07 RX ADMIN — MIDODRINE HYDROCHLORIDE 5 MG: 5 TABLET ORAL at 10:03

## 2022-03-07 RX ADMIN — AZELASTINE 137 MCG: 1 SPRAY, METERED NASAL at 09:03

## 2022-03-07 RX ADMIN — ATORVASTATIN CALCIUM 40 MG: 40 TABLET, FILM COATED ORAL at 10:03

## 2022-03-07 RX ADMIN — CITALOPRAM HYDROBROMIDE 40 MG: 10 TABLET ORAL at 09:03

## 2022-03-07 RX ADMIN — METOPROLOL TARTRATE 12.5 MG: 25 TABLET, FILM COATED ORAL at 10:03

## 2022-03-07 RX ADMIN — MIDODRINE HYDROCHLORIDE 5 MG: 5 TABLET ORAL at 04:03

## 2022-03-07 RX ADMIN — LEVOTHYROXINE SODIUM 25 MCG: 0.03 TABLET ORAL at 05:03

## 2022-03-07 NOTE — PROGRESS NOTES
[facility] Hospital Medicine  Progress Note    Patient Name: Sandra Wilson  MRN: [id number]  Patient Class: IP- Inpatient   Admission Date: 1/18/2022  Length of Stay: 48 days  Attending Physician: Dinah Motta MD  Primary Care Provider: Primary Doctor No        Subjective:     Principal Problem:Acute hypoxemic respiratory failure        HPI:  Sandra Wilson is an 81 year old female with a past medical history of CVAs, CNS aneurysm, PEG status, CAD, HLD, hypothryoidism, DM, aortic stenosis, and colon, breast, and ovarian cancer who presented from long term care with vomiting, diarrhea, and shortness of breath. Workup in the ED showed likely aspiration pneumonitis and C. Diff colitis. Her O2 requirement increased while in the ED requiring intubation with sedation. She also required Levophed as she likely developed septic shock. Antibiotics were broadened to cefepime, vancomycin, and Flagyl. Pulmonary was consulted. Family was notified. The patient was unable to provide history given acuity of illness.      Overview/Hospital Course:  Sandra Wilson is an 81 year old female with a past medical history of CVAs, CNS aneurysm, PEG status, CAD, HLD, hypothyroidism, DM, aortic stenosis, and colon, breast, and ovarian cancer who presented from long term care with vomiting, diarrhea, and shortness of breath secondary to acute hypoxic respiratory failure from aspiration pneumonia in the setting of C diff colitis leading to septic shock. She was intubated in the ED and started on Levophed infusion via RIJ CVC placed by Dr. Vignesh Gaspar. She was started on broad spectrum antibiotics with cefepime, Flagyl, vancomycin and admitted to the ICU. She was also started on enteral vancomycin for severe C diff colitis given elevated WBC count and STEFFANY. There was concern for developing ARDS given P/F ratio of 87 (severe); 190 1/22 (moderate). Pulmonary was consulted. She also presented with demand ischemia likely  secondary to septic shock. Troponin was trended and improved as shock resolved. Normal saline infusion was added for hyponatremia and STEFFANY which improved both. Levophed was able to be weaned. Family agreed to DNR status 1/18 and Palliative Care was consulted to discuss goal of care 1/19. Antibiotics were changed to doxycycline and Flagyl (GBS in sputum culture) 1/21; vancomycin was added 1/23 after culture also became positive for Staph aureus and Klebsiella. Her respiratory status has improved throughout her course and she was extubated (code changed to partial code for intubation) 1/21 only to be re-intubated for worsening stridor (history of tracheostomy). Upon re-intubation, copious amounts of secretions were suctioned. KUB suggested a mild ileus A bowel regimen was instituted and the patient was able to have bowel movements 1/22. Tube feeds were started 1/23. Her course was complicated by Afib as well noted on telemetry 1/21; metoprolol tartrate started 1/22.She failed a leak test 1/22; Surgery was consulted for tracheostomy which is pending conversation with family regarding goals of care.    Patient was set to go for trach on 01/26, but attempt was made for another trial extubation and the patient did well after extubation, she was transition to face mask and was able to maintain her saturations although had significant difficulty with secretions.  She was started on Robinul and scopolamine patch which did seem to improve for secretions.  Her respiratory status continued to be somewhat tenuous, so she was monitored in the ICU.  Family expressed desire to not have her get a tracheostomy.  Case management began working on LTACH referral.      Interval History:  No major changes in status.  Still awaiting nursing home placement,    Review of Systems   Unable to perform ROS: Patient nonverbal   Objective:     Vital Signs (Most Recent):  Temp: 98.2 °F (36.8 °C) (03/07/22 0822)  Pulse: 76 (03/07/22 0822)  Resp: 16  (03/07/22 0822)  BP: (!) 122/58 (03/07/22 0822)  SpO2: 97 % (03/07/22 0833)   Vital Signs (24h Range):  Temp:  [97.8 °F (36.6 °C)-98.2 °F (36.8 °C)] 98.2 °F (36.8 °C)  Pulse:  [63-76] 76  Resp:  [16-18] 16  SpO2:  [95 %-98 %] 97 %  BP: (122-146)/(58-67) 122/58     Weight: 63.9 kg (140 lb 14 oz)  Body mass index is 24.18 kg/m².  No intake or output data in the 24 hours ending 03/07/22 1051     Physical Exam  Vitals and nursing note reviewed.   Constitutional:       General: She is not in acute distress.     Appearance: She is not ill-appearing.   HENT:      Head: Normocephalic.      Mouth/Throat:      Mouth: Mucous membranes are moist.   Eyes:      General:         Right eye: No discharge.         Left eye: No discharge.      Extraocular Movements: Extraocular movements intact.      Pupils: Pupils are equal, round, and reactive to light.   Neck:      Vascular: No JVD.   Cardiovascular:      Rate and Rhythm: Normal rate and regular rhythm.   Pulmonary:      Effort: Pulmonary effort is normal.      Breath sounds: Normal breath sounds.   Abdominal:      General: Abdomen is flat. Bowel sounds are normal. There is no distension.      Palpations: Abdomen is soft.      Tenderness: There is no abdominal tenderness.   Musculoskeletal:      Right lower leg: No edema.      Left lower leg: No edema.   Skin:     General: Skin is warm and moist.      Findings: No rash.   Neurological:      Mental Status: She is alert.      Comments: Makes eye contact   Psychiatric:         Behavior: Behavior is not agitated.       Significant Labs: All pertinent labs within the past 24 hours have been reviewed.    Significant Imaging: I have reviewed all pertinent imaging results/findings within the past 24 hours.      Assessment/Plan:      * Acute hypoxemic respiratory failure  stable  Monitor sats.  Continue supplementing oxygen low flow.  Monitor work of breathing.  She's partial code, specifying that she'd want to be intubated if need  be.    Anemia        Paroxysmal atrial fibrillation  -Telemetry  - controlled on metoprolol 12.5 mg BID  Will hold full dose anticoagulant due to high risk of bleeding      Gastrostomy tube dependent  Stable.  No issues with it    Type 2 diabetes mellitus, with long-term current use of insulin  She has diet controlled DM.  No need for frequent fingersticks or PRN insulin.    Glucose   Date Value Ref Range Status   02/27/2022 117 (H) 70 - 110 mg/dL Final   02/22/2022 92 70 - 110 mg/dL Final   02/11/2022 110 70 - 110 mg/dL Final   ]    YARI (generalized anxiety disorder)  Stable.  On citalopram.    Hypothyroidism  Patient has chronic hypothyroidism. TFTs reviewed-   Lab Results   Component Value Date    TSH 1.130 07/13/2017   . Will continue chronic levothyroxine and adjust for and clinical changes.        Hyperlipidemia   Patient is chronically on statin.will continue for now. Monitor clinically. Last LDL was   Lab Results   Component Value Date    LDLCALC 94.4 07/13/2017          GERD (gastroesophageal reflux disease)  On famotidine.      VTE Risk Mitigation (From admission, onward)         Ordered     enoxaparin injection 40 mg  Daily         01/18/22 1438     IP VTE HIGH RISK PATIENT  Once         01/18/22 1438     Place sequential compression device  Until discontinued         01/18/22 1438                Discharge Planning   DEMETRIUS:      Code Status: Partial Code   Is the patient medically ready for discharge?:     Reason for patient still in hospital (select all that apply): Pending disposition  Discharge Plan A: New Nursing Home placement - [facility] care facility   Discharge Delays: (!) Other (financials)              Dinah Motta MD  Department of Hospital Medicine   [facility]

## 2022-03-07 NOTE — PT/OT/SLP PROGRESS
Physical Therapy Treatment    Patient Name:  Sandra Wilson   MRN:  [id number]    Recommendations:     Discharge Recommendations:  nursing facility, basic, nursing facility, skilled   Discharge Equipment Recommendations: none   Barriers to discharge: None    Assessment:     Sandra Wilson is a 81 y.o. female admitted with a medical diagnosis of Acute hypoxemic respiratory failure.  She presents with the following impairments/functional limitations:  weakness, impaired endurance, impaired self care skills, impaired functional mobilty, impaired cognition, decreased safety awareness, decreased upper extremity function, decreased lower extremity function, decreased ROM, impaired muscle length.  Pt found with HOB elevated and making verbal expressions that were not appropriate to questions. Initial refusal to exercise, then agreeable. Performed BUE reaches x 10 reps with good effort and upon initiation of LE exercises, it was noted that PEG feeding was leaking into bed and patient required change of gown and bedding. Nursing assist requested, and pt performed rolling R/L with maxA for cleanup and change.   Positioned into partial R sidelying for pressure relief, with HOB elevated to 30 deg and PEG feeding resumed.     Rehab Prognosis: Good; patient would benefit from acute skilled PT services to address these deficits and reach maximum level of function.    Recent Surgery: Procedure(s) (LRB):  CREATION, TRACHEOSTOMY (N/A) 40 Days Post-Op    Plan:     During this hospitalization, patient to be seen 3 x/week to address the identified rehab impairments via therapeutic activities, therapeutic exercises and progress toward the following goals:    · Plan of Care Expires:  02/28/22    Subjective     Chief Complaint: I'm cold  Patient/Family Comments/goals: none expressed  Pain/Comfort:  · Pain Rating 1: 0/10      Objective:     Communicated with nurse Matt prior to session.  Patient found HOB elevated with bed alarm, PEG  Tube, pressure relief boots, telemetry, peripheral IV upon PT entry to room.     General Precautions: Standard, contact, fall   Orthopedic Precautions:N/A   Braces: N/A  Respiratory Status: Room air     Functional Mobility:  · Bed Mobility:     · Rolling Left:  maximal assistance  · Rolling Right: maximal assistance      AM-PAC 6 CLICK MOBILITY          Therapeutic Activities and Exercises:  BUE alternating reaches x10 with significant deficits in ROM but good effort   BLE hip ab/adduction AAROM x 5-10 reps ea  Rolling R/L for change of diaper, bedding, gown    Patient left right sidelying with all lines intact, call button in reach, bed alarm on and nursing staff present..    GOALS:   Multidisciplinary Problems     Physical Therapy Goals        Problem: Physical Therapy Goal    Goal Priority Disciplines Outcome Goal Variances Interventions   Physical Therapy Goal     PT, PT/OT Ongoing, Progressing     Description: Goals to be met by: 2022     Patient will increase functional independence with mobility by performin. Supine to sit with Maximum Assistance  2. Sitting at edge of bed x20 minutes with Maximum Assistance  3. Lower extremity exercise program x20 reps                    Time Tracking:     PT Received On: 22  PT Start Time: 1423     PT Stop Time: 1447  PT Total Time (min): 24 min     Billable Minutes: Therapeutic Activity 14 and Therapeutic Exercise 10    Treatment Type: Treatment  PT/PTA: PTA     PTA Visit Number: 5     2022

## 2022-03-07 NOTE — PLAN OF CARE
Problem: Physical Therapy Goal  Goal: Physical Therapy Goal  Description: Goals to be met by: 2022     Patient will increase functional independence with mobility by performin. Supine to sit with Maximum Assistance  2. Sitting at edge of bed x20 minutes with Maximum Assistance  3. Lower extremity exercise program x20 reps   Outcome: Ongoing, Progressing

## 2022-03-07 NOTE — SUBJECTIVE & OBJECTIVE
Interval History:  No major changes in status.  Still awaiting nursing home placement,    Review of Systems   Unable to perform ROS: Patient nonverbal   Objective:     Vital Signs (Most Recent):  Temp: 98.2 °F (36.8 °C) (03/07/22 0822)  Pulse: 76 (03/07/22 0822)  Resp: 16 (03/07/22 0822)  BP: (!) 122/58 (03/07/22 0822)  SpO2: 97 % (03/07/22 0833)   Vital Signs (24h Range):  Temp:  [97.8 °F (36.6 °C)-98.2 °F (36.8 °C)] 98.2 °F (36.8 °C)  Pulse:  [63-76] 76  Resp:  [16-18] 16  SpO2:  [95 %-98 %] 97 %  BP: (122-146)/(58-67) 122/58     Weight: 63.9 kg (140 lb 14 oz)  Body mass index is 24.18 kg/m².  No intake or output data in the 24 hours ending 03/07/22 1051     Physical Exam  Vitals and nursing note reviewed.   Constitutional:       General: She is not in acute distress.     Appearance: She is not ill-appearing.   HENT:      Head: Normocephalic.      Mouth/Throat:      Mouth: Mucous membranes are moist.   Eyes:      General:         Right eye: No discharge.         Left eye: No discharge.      Extraocular Movements: Extraocular movements intact.      Pupils: Pupils are equal, round, and reactive to light.   Neck:      Vascular: No JVD.   Cardiovascular:      Rate and Rhythm: Normal rate and regular rhythm.   Pulmonary:      Effort: Pulmonary effort is normal.      Breath sounds: Normal breath sounds.   Abdominal:      General: Abdomen is flat. Bowel sounds are normal. There is no distension.      Palpations: Abdomen is soft.      Tenderness: There is no abdominal tenderness.   Musculoskeletal:      Right lower leg: No edema.      Left lower leg: No edema.   Skin:     General: Skin is warm and moist.      Findings: No rash.   Neurological:      Mental Status: She is alert.      Comments: Makes eye contact   Psychiatric:         Behavior: Behavior is not agitated.       Significant Labs: All pertinent labs within the past 24 hours have been reviewed.    Significant Imaging: I have reviewed all pertinent imaging  results/findings within the past 24 hours.

## 2022-03-07 NOTE — PLAN OF CARE
Spoke with Shon at [facility], she did confirm that they met with the pt's daughter, Ira, they completed the admit paperwork and started the financials. They are waiting on the [place] bank statement but at this time the pt looks to be over resourced for Medicaid. She did let Ira know she would need to either pay a few months to the NH at the private pay rate to spend down the pt's assets or be able to provide up to date receipts of how the money was spent on direct care for the patient.   I will continue to follow up with the pt's daughter, Ira and EPS regarding the required financial documentation.    03/07/22 0957   Discharge Reassessment   Assessment Type Discharge Planning Reassessment

## 2022-03-07 NOTE — CARE UPDATE
03/06/22 1958   Patient Assessment/Suction   Level of Consciousness (AVPU) alert   Respiratory Effort Normal;Unlabored   PRE-TX-O2   O2 Device (Oxygen Therapy) room air   SpO2 95 %   Pulse Oximetry Type Intermittent   $ Pulse Oximetry - Multiple Charge Pulse Oximetry - Multiple   Pulse 70   Resp 18

## 2022-03-07 NOTE — PLAN OF CARE
Problem: Adult Inpatient Plan of Care  Goal: Plan of Care Review  Outcome: Ongoing, Progressing  Goal: Patient-Specific Goal (Individualized)  Outcome: Ongoing, Progressing  Goal: Absence of Hospital-Acquired Illness or Injury  Outcome: Ongoing, Progressing  Goal: Optimal Comfort and Wellbeing  Outcome: Ongoing, Progressing  Goal: Readiness for Transition of Care  Outcome: Ongoing, Progressing     Problem: Infection  Goal: Absence of Infection Signs and Symptoms  Outcome: Ongoing, Progressing     Problem: Fluid Imbalance (Pneumonia)  Goal: Fluid Balance  Outcome: Ongoing, Progressing     Problem: Infection (Pneumonia)  Goal: Resolution of Infection Signs and Symptoms  Outcome: Ongoing, Progressing     Problem: Respiratory Compromise (Pneumonia)  Goal: Effective Oxygenation and Ventilation  Outcome: Ongoing, Progressing     Problem: Fall Injury Risk  Goal: Absence of Fall and Fall-Related Injury  Outcome: Ongoing, Progressing     Problem: Skin Injury Risk Increased  Goal: Skin Health and Integrity  Outcome: Ongoing, Progressing     Problem: Adjustment to Illness (Sepsis/Septic Shock)  Goal: Optimal Coping  Outcome: Ongoing, Progressing     Problem: Bleeding (Sepsis/Septic Shock)  Goal: Absence of Bleeding  Outcome: Ongoing, Progressing     Problem: Infection Progression (Sepsis/Septic Shock)  Goal: Absence of Infection Signs and Symptoms  Outcome: Ongoing, Progressing     Problem: Fluid and Electrolyte Imbalance (Acute Kidney Injury/Impairment)  Goal: Fluid and Electrolyte Balance  Outcome: Ongoing, Progressing     Problem: Oral Intake Inadequate (Acute Kidney Injury/Impairment)  Goal: Optimal Nutrition Intake  Outcome: Ongoing, Progressing     Problem: Renal Function Impairment (Acute Kidney Injury/Impairment)  Goal: Effective Renal Function  Outcome: Ongoing, Progressing     Problem: ARDS (Acute Respiratory Distress Syndrome)  Goal: Effective Oxygenation  Outcome: Ongoing, Progressing     Problem: Oral Intake  Inadequate  Goal: Improved Oral Intake  Outcome: Ongoing, Progressing     Problem: Impaired Wound Healing  Goal: Optimal Wound Healing  Outcome: Ongoing, Progressing     Problem: Aspiration (Enteral Nutrition)  Goal: Absence of Aspiration Signs and Symptoms  Outcome: Ongoing, Progressing     Problem: Device-Related Complication Risk (Enteral Nutrition)  Goal: Safe, Effective Therapy Delivery  Outcome: Ongoing, Progressing    Care assumed at 1600. Patient bed in the lowest position, call light in reach, and bed alarm active. Patient PEG at goal of 45mL/hr and receiving FWF of 115mL Q4. Patient alert but confused.

## 2022-03-07 NOTE — CARE UPDATE
03/07/22 0833   Patient Assessment/Suction   Level of Consciousness (AVPU) alert   PRE-TX-O2   O2 Device (Oxygen Therapy) room air   SpO2 97 %   Pulse Oximetry Type Intermittent   $ Pulse Oximetry - Multiple Charge Pulse Oximetry - Multiple

## 2022-03-07 NOTE — PLAN OF CARE
Problem: Adult Inpatient Plan of Care  Goal: Plan of Care Review  Outcome: Ongoing, Progressing     Problem: Adult Inpatient Plan of Care  Goal: Optimal Comfort and Wellbeing  Outcome: Ongoing, Progressing     Problem: Infection  Goal: Absence of Infection Signs and Symptoms  Outcome: Ongoing, Progressing     Problem: Oral Intake Inadequate (Acute Kidney Injury/Impairment)  Goal: Optimal Nutrition Intake  Outcome: Ongoing, Progressing     Problem: Fall Injury Risk  Goal: Absence of Fall and Fall-Related Injury  Outcome: Ongoing, Progressing

## 2022-03-08 PROCEDURE — 25000003 PHARM REV CODE 250: Performed by: INTERNAL MEDICINE

## 2022-03-08 PROCEDURE — 63600175 PHARM REV CODE 636 W HCPCS: Performed by: INTERNAL MEDICINE

## 2022-03-08 PROCEDURE — 25000003 PHARM REV CODE 250: Performed by: HOSPITALIST

## 2022-03-08 PROCEDURE — 94761 N-INVAS EAR/PLS OXIMETRY MLT: CPT

## 2022-03-08 PROCEDURE — C1751 CATH, INF, PER/CENT/MIDLINE: HCPCS

## 2022-03-08 PROCEDURE — 27000207 HC ISOLATION

## 2022-03-08 PROCEDURE — 11000001 HC ACUTE MED/SURG PRIVATE ROOM

## 2022-03-08 RX ADMIN — PANTOPRAZOLE SODIUM 40 MG: 40 GRANULE, DELAYED RELEASE ORAL at 08:03

## 2022-03-08 RX ADMIN — LEVOTHYROXINE SODIUM 25 MCG: 0.03 TABLET ORAL at 05:03

## 2022-03-08 RX ADMIN — ENOXAPARIN SODIUM 40 MG: 100 INJECTION SUBCUTANEOUS at 04:03

## 2022-03-08 RX ADMIN — AZELASTINE 137 MCG: 1 SPRAY, METERED NASAL at 08:03

## 2022-03-08 RX ADMIN — MIDODRINE HYDROCHLORIDE 5 MG: 5 TABLET ORAL at 08:03

## 2022-03-08 RX ADMIN — ATORVASTATIN CALCIUM 40 MG: 40 TABLET, FILM COATED ORAL at 08:03

## 2022-03-08 RX ADMIN — MIDODRINE HYDROCHLORIDE 5 MG: 5 TABLET ORAL at 03:03

## 2022-03-08 RX ADMIN — METOPROLOL TARTRATE 12.5 MG: 25 TABLET, FILM COATED ORAL at 08:03

## 2022-03-08 RX ADMIN — SODIUM CHLORIDE: 0.9 INJECTION, SOLUTION INTRAVENOUS at 01:03

## 2022-03-08 RX ADMIN — CITALOPRAM HYDROBROMIDE 40 MG: 10 TABLET ORAL at 08:03

## 2022-03-08 NOTE — PLAN OF CARE
Recommendations  1) Continue TF Isosource 1.5 @ 45 mL/hr as pt tolerates + Brody BID and Beneprotein BID + 115 ml flush q 4 hr     -provides 1620 kcal, 73 g protein ( + beneprotein), 820 ml free water  (100% EEN, 100% EPN )       2) weigh weekly    Goals: 1) Initation of enteral nutrition by RD follow up 2) Nutrition support meeting > 50% of needs at f/u  Nutrition Goal Status: new, progressing towards goal  Communication of RD Recs: POC

## 2022-03-08 NOTE — PROGRESS NOTES
"[facility]  Adult Nutrition  Progress Note    SUMMARY       Recommendations  1) Continue TF Isosource 1.5 @ 45 mL/hr as pt tolerates + Brody BID and Beneprotein BID + 115 ml flush q 4 hr     -provides 1620 kcal, 73 g protein ( + beneprotein), 820 ml free water  (100% EEN, 100% EPN )       2) weigh weekly    Goals: 1) Initation of enteral nutrition by RD follow up 2) Nutrition support meeting > 50% of needs at f/u  Nutrition Goal Status: new, progressing towards goal  Communication of RD Recs: POC    Assessment and Plan  Nutrition Problem  Inadequate energy intake     Related to (etiology):   NPO status, no TF running, dysphagia     Signs and Symptoms (as evidenced by):   Pt receiving < 50% EEN/EPN x 6 days     Interventions(treatment strategy):  above     Nutrition Diagnosis Status:   improved    Malnutrition Assessment    Edema: 1+ Trace   Gabriel Score 13  NFPE 1/24/21 no significant wasting seen  PO intakes < 50% of needs x > 5-6 days  No significant wt loss per chart review     Reason for Assessment    Reason For Assessment: RD follow-up  Diagnosis: other (see comments) (Acute hypoxemic respiratory failure)  Relevant Medical History: GERD, HLD, HTN, CVAs, CNS aneurysm, PEG, CAD, hypothyroidism, DM, colon, breast and ovarian cancer.  Interdisciplinary Rounds: did not attend  General Information Comments: 3/8- RD coverage- working remotely.  Isosource @ 45 mL/hr- beneprotein/ Brody BID. Pending intermediate placement. Gabriel Score 13. LBM 3/8. No changes.    Nutrition Discharge Planning: TF above ( If bolus needed- 4.5 cans Isosource daily +75 ml flush before and after each bolus)    Nutrition Risk Screen    Nutrition Risk Screen: tube feeding or parenteral nutrition    Nutrition/Diet History    Factors Affecting Nutritional Intake: NPO    Anthropometrics    Temp: 98.3 °F (36.8 °C)  Height: 5' 4" (162.6 cm)  Height (inches): 64 in  Weight Method: Bed Scale  Weight: 63.9 kg (140 lb 14 oz)  Weight " (lb): 140.88 lb  Ideal Body Weight (IBW), Female: 120 lb  % Ideal Body Weight, Female (lb): 126.77 %  BMI (Calculated): 24.2    Lab/Procedures/Meds    Pertinent Labs Reviewed: reviewed  Pertinent Labs Comments: none  Pertinent Medications Reviewed: reviewed  Pertinent Medications Comments: Atorvastatin, azelastine, citalopram, enoxaparin, levothyroxine, metoprolol tartrate, midodrine, pantoprazole    Estimated/Assessed Needs    Weight Used For Calorie Calculations: 63.9 kg (140 lb 14 oz)  Energy Calorie Requirements (kcal): 3480-7969 kcal day (MSJ X AF 1.25-1.4)  Energy Need Method: [employer]-St Jeor  Protein Requirements:  (1.2-1.5 g/kg)  Weight Used For Protein Calculations: 68.9 kg (152 lb)  Fluid Requirements (mL): 1 mL/kcal or per MD  Estimated Fluid Requirement Method: RDA Method  RDA Method (mL): 1425  CHO Requirement: 160-195 g day    Nutrition Prescription Ordered    Current Diet Order: NPO  Current Nutrition Support Formula Ordered: Isosource 1.5  Current Nutrition Support Rate Ordered: 45 (ml)    Evaluation of Received Nutrient/Fluid Intake    Enteral (Free Water) Fluid (mL): 1620  Free Water Flush Fluid (mL): 73  Parenteral Calories (kcal): 825  Oral Calories (kcal): 50  Oral Protein (gm): 12  % Kcal Needs: 100  % Protein Needs: 100  Energy Calories Required: meeting needs  Protein Required: meeting needs  Fluid Required: meeting needs  Comments: LBM 1/21  Tolerance: tolerating  % Intake of Estimated Energy Needs: 75 - 100 %  % Meal Intake: NPO    Nutrition Risk    Level of Risk/Frequency of Follow-up: high (1-2x weekly)     Monitor and Evaluation    Food and Nutrient Intake: enteral nutrition intake, energy intake  Food and Nutrient Adminstration: enteral and parenteral nutrition administration  Knowledge/Beliefs/Attitudes: food and nutrition knowledge/skill  Physical Activity and Function: nutrition-related ADLs and IADLs  Anthropometric Measurements: weight, weight change, body mass  index  Biochemical Data, Medical Tests and Procedures: glucose/endocrine profile, gastrointestinal profile, electrolyte and renal panel, inflammatory profile, lipid profile  Nutrition-Focused Physical Findings: overall appearance, extremities, muscles and bones, skin     Nutrition Follow-Up    RD Follow-up?: Yes

## 2022-03-08 NOTE — PLAN OF CARE
Midline to the SYL dressing changed. Patient tolerated well.The patient's safety is maintained with the call light within reach, bed locked and in low position and the side rails up. .Free of injuries. The care plans are monitored.

## 2022-03-08 NOTE — PLAN OF CARE
Problem: Adult Inpatient Plan of Care  Goal: Plan of Care Review  Outcome: Ongoing, Progressing  Flowsheets (Taken 3/8/2022 1704)  Plan of Care Reviewed With: patient   Pt denies pain this shift. Incontinence care. Turn q 2. Tube feeds per order- pt tolerating well and feeds at goal rate. Safety maintained. Will monitor.

## 2022-03-08 NOTE — CARE UPDATE
03/08/22 0806   Patient Assessment/Suction   Level of Consciousness (AVPU) alert   Respiratory Effort Normal;Unlabored   Expansion/Accessory Muscles/Retractions no use of accessory muscles;no retractions;expansion symmetric   Rhythm/Pattern, Respiratory unlabored;pattern regular;depth regular   Cough Frequency no cough   PRE-TX-O2   O2 Device (Oxygen Therapy) room air   SpO2 95 %   Pulse Oximetry Type Intermittent   $ Pulse Oximetry - Multiple Charge Pulse Oximetry - Multiple   Pulse 71   Resp 18

## 2022-03-08 NOTE — PLAN OF CARE
Received call from pt's daughter, Ira asking that I send the pt's referral to Hannah at [facility], she spoke with her thinks the private pay rate might be more affordable at [facility]. Sent via [employer] as requested.   Ira is calling [facility] to get their private pay rate also.    03/08/22 1039   Discharge Reassessment   Assessment Type Discharge Planning Reassessment

## 2022-03-08 NOTE — PLAN OF CARE
Problem: Adult Inpatient Plan of Care  Goal: Plan of Care Review  Outcome: Ongoing, Progressing  Goal: Patient-Specific Goal (Individualized)  Outcome: Ongoing, Progressing  Goal: Absence of Hospital-Acquired Illness or Injury  Outcome: Ongoing, Progressing  Goal: Optimal Comfort and Wellbeing  Outcome: Ongoing, Progressing  Goal: Readiness for Transition of Care  Outcome: Ongoing, Progressing     Problem: Infection  Goal: Absence of Infection Signs and Symptoms  Outcome: Ongoing, Progressing     Problem: Fluid Imbalance (Pneumonia)  Goal: Fluid Balance  Outcome: Ongoing, Progressing     Problem: Infection (Pneumonia)  Goal: Resolution of Infection Signs and Symptoms  Outcome: Ongoing, Progressing     Problem: Respiratory Compromise (Pneumonia)  Goal: Effective Oxygenation and Ventilation  Outcome: Ongoing, Progressing     Problem: Fall Injury Risk  Goal: Absence of Fall and Fall-Related Injury  Outcome: Ongoing, Progressing     Problem: Skin Injury Risk Increased  Goal: Skin Health and Integrity  Outcome: Ongoing, Progressing     Problem: Adjustment to Illness (Sepsis/Septic Shock)  Goal: Optimal Coping  Outcome: Ongoing, Progressing     Problem: Bleeding (Sepsis/Septic Shock)  Goal: Absence of Bleeding  Outcome: Ongoing, Progressing     Problem: Infection Progression (Sepsis/Septic Shock)  Goal: Absence of Infection Signs and Symptoms  Outcome: Ongoing, Progressing     Problem: Fluid and Electrolyte Imbalance (Acute Kidney Injury/Impairment)  Goal: Fluid and Electrolyte Balance  Outcome: Ongoing, Progressing     Problem: Oral Intake Inadequate (Acute Kidney Injury/Impairment)  Goal: Optimal Nutrition Intake  Outcome: Ongoing, Progressing     Problem: Renal Function Impairment (Acute Kidney Injury/Impairment)  Goal: Effective Renal Function  Outcome: Ongoing, Progressing     Problem: ARDS (Acute Respiratory Distress Syndrome)  Goal: Effective Oxygenation  Outcome: Ongoing, Progressing     Problem: Oral Intake  Inadequate  Goal: Improved Oral Intake  Outcome: Ongoing, Progressing     Problem: Impaired Wound Healing  Goal: Optimal Wound Healing  Outcome: Ongoing, Progressing     Problem: Aspiration (Enteral Nutrition)  Goal: Absence of Aspiration Signs and Symptoms  Outcome: Ongoing, Progressing     Problem: Device-Related Complication Risk (Enteral Nutrition)  Goal: Safe, Effective Therapy Delivery  Outcome: Ongoing, Progressing     Patient safety measures maintained throughout shift. Patient medications given as ordered. Patient residual checked; very minimal. Patient pending placement.

## 2022-03-09 LAB
ALBUMIN SERPL BCP-MCNC: 2.9 G/DL (ref 3.5–5.2)
ALP SERPL-CCNC: 65 U/L (ref 55–135)
ALT SERPL W/O P-5'-P-CCNC: 30 U/L (ref 10–44)
ANION GAP SERPL CALC-SCNC: 7 MMOL/L (ref 8–16)
AST SERPL-CCNC: 21 U/L (ref 10–40)
BASOPHILS # BLD AUTO: 0.08 K/UL (ref 0–0.2)
BASOPHILS NFR BLD: 0.9 % (ref 0–1.9)
BILIRUB SERPL-MCNC: 0.4 MG/DL (ref 0.1–1)
BUN SERPL-MCNC: 19 MG/DL (ref 8–23)
CALCIUM SERPL-MCNC: 9.2 MG/DL (ref 8.7–10.5)
CHLORIDE SERPL-SCNC: 98 MMOL/L (ref 95–110)
CO2 SERPL-SCNC: 28 MMOL/L (ref 23–29)
CREAT SERPL-MCNC: 0.6 MG/DL (ref 0.5–1.4)
DIFFERENTIAL METHOD: ABNORMAL
EOSINOPHIL # BLD AUTO: 0.7 K/UL (ref 0–0.5)
EOSINOPHIL NFR BLD: 7.5 % (ref 0–8)
ERYTHROCYTE [DISTWIDTH] IN BLOOD BY AUTOMATED COUNT: 13.7 % (ref 11.5–14.5)
EST. GFR  (AFRICAN AMERICAN): >60 ML/MIN/1.73 M^2
EST. GFR  (NON AFRICAN AMERICAN): >60 ML/MIN/1.73 M^2
GLUCOSE SERPL-MCNC: 115 MG/DL (ref 70–110)
HCT VFR BLD AUTO: 32.8 % (ref 37–48.5)
HGB BLD-MCNC: 10.9 G/DL (ref 12–16)
IMM GRANULOCYTES # BLD AUTO: 0.02 K/UL (ref 0–0.04)
IMM GRANULOCYTES NFR BLD AUTO: 0.2 % (ref 0–0.5)
LYMPHOCYTES # BLD AUTO: 1.7 K/UL (ref 1–4.8)
LYMPHOCYTES NFR BLD: 18.1 % (ref 18–48)
MAGNESIUM SERPL-MCNC: 1.8 MG/DL (ref 1.6–2.6)
MCH RBC QN AUTO: 33 PG (ref 27–31)
MCHC RBC AUTO-ENTMCNC: 33.2 G/DL (ref 32–36)
MCV RBC AUTO: 99 FL (ref 82–98)
MONOCYTES # BLD AUTO: 0.8 K/UL (ref 0.3–1)
MONOCYTES NFR BLD: 8.7 % (ref 4–15)
NEUTROPHILS # BLD AUTO: 6 K/UL (ref 1.8–7.7)
NEUTROPHILS NFR BLD: 64.6 % (ref 38–73)
NRBC BLD-RTO: 0 /100 WBC
PHOSPHATE SERPL-MCNC: 4.1 MG/DL (ref 2.7–4.5)
PLATELET # BLD AUTO: 200 K/UL (ref 150–450)
PMV BLD AUTO: 11.3 FL (ref 9.2–12.9)
POTASSIUM SERPL-SCNC: 4 MMOL/L (ref 3.5–5.1)
PROT SERPL-MCNC: 6.9 G/DL (ref 6–8.4)
RBC # BLD AUTO: 3.3 M/UL (ref 4–5.4)
SODIUM SERPL-SCNC: 133 MMOL/L (ref 136–145)
WBC # BLD AUTO: 9.33 K/UL (ref 3.9–12.7)

## 2022-03-09 PROCEDURE — 25000003 PHARM REV CODE 250: Performed by: INTERNAL MEDICINE

## 2022-03-09 PROCEDURE — 84100 ASSAY OF PHOSPHORUS: CPT | Performed by: INTERNAL MEDICINE

## 2022-03-09 PROCEDURE — 85025 COMPLETE CBC W/AUTO DIFF WBC: CPT | Performed by: INTERNAL MEDICINE

## 2022-03-09 PROCEDURE — 25000003 PHARM REV CODE 250: Performed by: HOSPITALIST

## 2022-03-09 PROCEDURE — 11000001 HC ACUTE MED/SURG PRIVATE ROOM

## 2022-03-09 PROCEDURE — 80053 COMPREHEN METABOLIC PANEL: CPT | Performed by: INTERNAL MEDICINE

## 2022-03-09 PROCEDURE — 36415 COLL VENOUS BLD VENIPUNCTURE: CPT | Performed by: INTERNAL MEDICINE

## 2022-03-09 PROCEDURE — 97530 THERAPEUTIC ACTIVITIES: CPT

## 2022-03-09 PROCEDURE — 83735 ASSAY OF MAGNESIUM: CPT | Performed by: INTERNAL MEDICINE

## 2022-03-09 PROCEDURE — 63600175 PHARM REV CODE 636 W HCPCS: Performed by: INTERNAL MEDICINE

## 2022-03-09 PROCEDURE — 27000207 HC ISOLATION

## 2022-03-09 PROCEDURE — 97110 THERAPEUTIC EXERCISES: CPT

## 2022-03-09 PROCEDURE — 94761 N-INVAS EAR/PLS OXIMETRY MLT: CPT

## 2022-03-09 RX ADMIN — METOPROLOL TARTRATE 12.5 MG: 25 TABLET, FILM COATED ORAL at 08:03

## 2022-03-09 RX ADMIN — MIDODRINE HYDROCHLORIDE 5 MG: 5 TABLET ORAL at 08:03

## 2022-03-09 RX ADMIN — AZELASTINE 137 MCG: 1 SPRAY, METERED NASAL at 09:03

## 2022-03-09 RX ADMIN — ENOXAPARIN SODIUM 40 MG: 100 INJECTION SUBCUTANEOUS at 04:03

## 2022-03-09 RX ADMIN — CITALOPRAM HYDROBROMIDE 40 MG: 10 TABLET ORAL at 08:03

## 2022-03-09 RX ADMIN — METOPROLOL TARTRATE 12.5 MG: 25 TABLET, FILM COATED ORAL at 09:03

## 2022-03-09 RX ADMIN — MIDODRINE HYDROCHLORIDE 5 MG: 5 TABLET ORAL at 09:03

## 2022-03-09 RX ADMIN — AZELASTINE 137 MCG: 1 SPRAY, METERED NASAL at 08:03

## 2022-03-09 RX ADMIN — LEVOTHYROXINE SODIUM 25 MCG: 0.03 TABLET ORAL at 05:03

## 2022-03-09 RX ADMIN — ATORVASTATIN CALCIUM 40 MG: 40 TABLET, FILM COATED ORAL at 09:03

## 2022-03-09 RX ADMIN — PANTOPRAZOLE SODIUM 40 MG: 40 GRANULE, DELAYED RELEASE ORAL at 08:03

## 2022-03-09 RX ADMIN — MIDODRINE HYDROCHLORIDE 5 MG: 5 TABLET ORAL at 04:03

## 2022-03-09 NOTE — PLAN OF CARE
Problem: Physical Therapy Goal  Goal: Physical Therapy Goal  Description: Goals to be met by: 2022     Patient will increase functional independence with mobility by performin. Supine to sit with Maximum Assistance  2. Sitting at edge of bed x20 minutes with Maximum Assistance  3. Lower extremity exercise program x20 reps   Outcome: Ongoing, Progressing   Pt seen for thera ex and gentle stretches to LE's. EOB sitting mod assist- pt assisting by attempting to swing legs off bed. Pt maintaining static sitting with CGA to close supervision. Pt attempting to verbalize

## 2022-03-09 NOTE — CARE UPDATE
03/08/22 2059   Patient Assessment/Suction   Level of Consciousness (AVPU) alert   Respiratory Effort Unlabored   Expansion/Accessory Muscles/Retractions no use of accessory muscles   All Lung Fields Breath Sounds Anterior:;diminished   Rhythm/Pattern, Respiratory pattern regular   Cough Frequency no cough   PRE-TX-O2   O2 Device (Oxygen Therapy) room air   SpO2 97 %   Pulse Oximetry Type Intermittent   $ Pulse Oximetry - Multiple Charge Pulse Oximetry - Multiple   Pulse 69   Resp 18   Positioning HOB elevated 30 degrees

## 2022-03-09 NOTE — PROGRESS NOTES
[facility] Hospital Medicine  Progress Note    Patient Name: Sandra Wilson  MRN: [id number]  Patient Class: IP- Inpatient   Admission Date: 1/18/2022  Length of Stay: 49 days  Attending Physician: Kaylyn Ibarra MD  Primary Care Provider: Primary Doctor No        Subjective:     Principal Problem:Acute hypoxemic respiratory failure        HPI:  Sandra Wilson is an 81 year old female with a past medical history of CVAs, CNS aneurysm, PEG status, CAD, HLD, hypothryoidism, DM, aortic stenosis, and colon, breast, and ovarian cancer who presented from long term care with vomiting, diarrhea, and shortness of breath. Workup in the ED showed likely aspiration pneumonitis and C. Diff colitis. Her O2 requirement increased while in the ED requiring intubation with sedation. She also required Levophed as she likely developed septic shock. Antibiotics were broadened to cefepime, vancomycin, and Flagyl. Pulmonary was consulted. Family was notified. The patient was unable to provide history given acuity of illness.      Overview/Hospital Course:  Sandra Wilson is an 81 year old female with a past medical history of CVAs, CNS aneurysm, PEG status, CAD, HLD, hypothyroidism, DM, aortic stenosis, and colon, breast, and ovarian cancer who presented from long term care with vomiting, diarrhea, and shortness of breath secondary to acute hypoxic respiratory failure from aspiration pneumonia in the setting of C diff colitis leading to septic shock. She was intubated in the ED and started on Levophed infusion via RIJ CVC placed by Dr. Vignesh Gaspar. She was started on broad spectrum antibiotics with cefepime, Flagyl, vancomycin and admitted to the ICU. She was also started on enteral vancomycin for severe C diff colitis given elevated WBC count and STEFFANY. There was concern for developing ARDS given P/F ratio of 87 (severe); 190 1/22 (moderate). Pulmonary was consulted. She also presented with demand ischemia likely  secondary to septic shock. Troponin was trended and improved as shock resolved. Normal saline infusion was added for hyponatremia and STEFFANY which improved both. Levophed was able to be weaned. Family agreed to DNR status 1/18 and Palliative Care was consulted to discuss goal of care 1/19. Antibiotics were changed to doxycycline and Flagyl (GBS in sputum culture) 1/21; vancomycin was added 1/23 after culture also became positive for Staph aureus and Klebsiella. Her respiratory status has improved throughout her course and she was extubated (code changed to partial code for intubation) 1/21 only to be re-intubated for worsening stridor (history of tracheostomy). Upon re-intubation, copious amounts of secretions were suctioned. KUB suggested a mild ileus A bowel regimen was instituted and the patient was able to have bowel movements 1/22. Tube feeds were started 1/23. Her course was complicated by Afib as well noted on telemetry 1/21; metoprolol tartrate started 1/22.She failed a leak test 1/22; Surgery was consulted for tracheostomy which is pending conversation with family regarding goals of care.    Patient was set to go for trach on 01/26, but attempt was made for another trial extubation and the patient did well after extubation, she was transition to face mask and was able to maintain her saturations although had significant difficulty with secretions.  She was started on Robinul and scopolamine patch which did seem to improve for secretions.  Her respiratory status continued to be somewhat tenuous, so she was monitored in the ICU.  Family expressed desire to not have her get a tracheostomy.  Case management began working on LTACH referral.    Unable to obtain subjective 2/2 current cognitive status    NAD, alert  NC, AT  No rashes on exposed skin  No respiratory distress  . PEG in place  PERRL  No gross joint abnormalities.     Vitals, labs and radiographs from past 24h reviewed and personally interpreted.        Assessment/Plan:      Acute bacterial pneumonia 2/2 unknown organism   -treatment complete    CDiff  -Treatment complete.     * Acute hypoxemic respiratory failure  stable  Monitor sats.  Continue supplementing oxygen low flow.  Monitor work of breathing.  She's partial code, specifying that she'd want to be intubated if need be.    Anemia        Paroxysmal atrial fibrillation  -Telemetry  - controlled on metoprolol 12.5 mg BID  Will hold full dose anticoagulant due to high risk of bleeding      Gastrostomy tube dependent  Stable.  No issues with it    Type 2 diabetes mellitus, with long-term current use of insulin  She has diet controlled DM.  No need for frequent fingersticks or PRN insulin.    Glucose   Date Value Ref Range Status   02/27/2022 117 (H) 70 - 110 mg/dL Final   02/22/2022 92 70 - 110 mg/dL Final   02/11/2022 110 70 - 110 mg/dL Final   ]    YARI (generalized anxiety disorder)  Stable.  On citalopram.    Hypothyroidism  Patient has chronic hypothyroidism. TFTs reviewed-   Lab Results   Component Value Date    TSH 1.130 07/13/2017   . Will continue chronic levothyroxine and adjust for and clinical changes.        Hyperlipidemia   Patient is chronically on statin.will continue for now. Monitor clinically. Last LDL was   Lab Results   Component Value Date    LDLCALC 94.4 07/13/2017          GERD (gastroesophageal reflux disease)  On famotidine.    VTE Risk Mitigation (From admission, onward)         Ordered     enoxaparin injection 40 mg  Daily         01/18/22 1438     IP VTE HIGH RISK PATIENT  Once         01/18/22 1438     Place sequential compression device  Until discontinued         01/18/22 1438                Discharge Planning   DEMETRIUS:      Code Status: Partial Code   Is the patient medically ready for discharge?:     Reason for patient still in hospital (select all that apply): Pending disposition  Discharge Plan A: New Nursing Home placement - [facility] care facility   Discharge Delays: (!)  Other (financials)               Kaylyn Ibarra MD  Department of Hospital Medicine   [facility]

## 2022-03-09 NOTE — PROGRESS NOTES
[facility] Hospital Medicine  Progress Note    Patient Name: Sandra Wilson  MRN: [id number]  Patient Class: IP- Inpatient   Admission Date: 1/18/2022  Length of Stay: 50 days  Attending Physician: Kaylyn Ibarra MD  Primary Care Provider: Primary Doctor No        Subjective:     Principal Problem:Acute hypoxemic respiratory failure        HPI:  Sandra Wilson is an 81 year old female with a past medical history of CVAs, CNS aneurysm, PEG status, CAD, HLD, hypothryoidism, DM, aortic stenosis, and colon, breast, and ovarian cancer who presented from long term care with vomiting, diarrhea, and shortness of breath. Workup in the ED showed likely aspiration pneumonitis and C. Diff colitis. Her O2 requirement increased while in the ED requiring intubation with sedation. She also required Levophed as she likely developed septic shock. Antibiotics were broadened to cefepime, vancomycin, and Flagyl. Pulmonary was consulted. Family was notified. The patient was unable to provide history given acuity of illness.      Overview/Hospital Course:  Sandra Wilson is an 81 year old female with a past medical history of CVAs, CNS aneurysm, PEG status, CAD, HLD, hypothyroidism, DM, aortic stenosis, and colon, breast, and ovarian cancer who presented from long term care with vomiting, diarrhea, and shortness of breath secondary to acute hypoxic respiratory failure from aspiration pneumonia in the setting of C diff colitis leading to septic shock. She was intubated in the ED and started on Levophed infusion via RIJ CVC placed by Dr. Vignesh Gaspar. She was started on broad spectrum antibiotics with cefepime, Flagyl, vancomycin and admitted to the ICU. She was also started on enteral vancomycin for severe C diff colitis given elevated WBC count and STEFFANY. There was concern for developing ARDS given P/F ratio of 87 (severe); 190 1/22 (moderate). Pulmonary was consulted. She also presented with demand ischemia likely  secondary to septic shock. Troponin was trended and improved as shock resolved. Normal saline infusion was added for hyponatremia and STEFFANY which improved both. Levophed was able to be weaned. Family agreed to DNR status 1/18 and Palliative Care was consulted to discuss goal of care 1/19. Antibiotics were changed to doxycycline and Flagyl (GBS in sputum culture) 1/21; vancomycin was added 1/23 after culture also became positive for Staph aureus and Klebsiella. Her respiratory status has improved throughout her course and she was extubated (code changed to partial code for intubation) 1/21 only to be re-intubated for worsening stridor (history of tracheostomy). Upon re-intubation, copious amounts of secretions were suctioned. KUB suggested a mild ileus A bowel regimen was instituted and the patient was able to have bowel movements 1/22. Tube feeds were started 1/23. Her course was complicated by Afib as well noted on telemetry 1/21; metoprolol tartrate started 1/22.She failed a leak test 1/22; Surgery was consulted for tracheostomy which is pending conversation with family regarding goals of care.    Patient was set to go for trach on 01/26, but attempt was made for another trial extubation and the patient did well after extubation, she was transition to face mask and was able to maintain her saturations although had significant difficulty with secretions.  She was started on Robinul and scopolamine patch which did seem to improve for secretions.  Her respiratory status continued to be somewhat tenuous, so she was monitored in the ICU.  Family expressed desire to not have her get a tracheostomy.  Case management began working on LTACH referral.    Unable to obtain subjective 2/2 current cognitive status    NAD, alert  NC, AT  No rashes on exposed skin  No respiratory distress  . PEG in place  PERRL  No gross joint abnormalities.     Vitals, labs and radiographs from past 24h reviewed and personally interpreted.        Assessment/Plan:        3/9: hypotension transient and asymptomatic. Resolved without intervention. Labs unremarkable.       Acute bacterial pneumonia 2/2 unknown organism   -treatment complete    CDiff  -Treatment complete.     * Acute hypoxemic respiratory failure  stable  Monitor sats.  Continue supplementing oxygen low flow.  Monitor work of breathing.  She's partial code, specifying that she'd want to be intubated if need be.    Anemia        Paroxysmal atrial fibrillation  -Telemetry  - controlled on metoprolol 12.5 mg BID  Will hold full dose anticoagulant due to high risk of bleeding      Gastrostomy tube dependent  Stable.  No issues with it    Type 2 diabetes mellitus, with long-term current use of insulin  She has diet controlled DM.  No need for frequent fingersticks or PRN insulin.    Glucose   Date Value Ref Range Status   02/27/2022 117 (H) 70 - 110 mg/dL Final   02/22/2022 92 70 - 110 mg/dL Final   02/11/2022 110 70 - 110 mg/dL Final   ]    YARI (generalized anxiety disorder)  Stable.  On citalopram.    Hypothyroidism  Patient has chronic hypothyroidism. TFTs reviewed-   Lab Results   Component Value Date    TSH 1.130 07/13/2017   . Will continue chronic levothyroxine and adjust for and clinical changes.        Hyperlipidemia   Patient is chronically on statin.will continue for now. Monitor clinically. Last LDL was   Lab Results   Component Value Date    LDLCALC 94.4 07/13/2017          GERD (gastroesophageal reflux disease)  On famotidine.    VTE Risk Mitigation (From admission, onward)         Ordered     enoxaparin injection 40 mg  Daily         01/18/22 1438     IP VTE HIGH RISK PATIENT  Once         01/18/22 1438     Place sequential compression device  Until discontinued         01/18/22 1438                Discharge Planning   DEMETRIUS:      Code Status: Partial Code   Is the patient medically ready for discharge?:     Reason for patient still in hospital (select all that apply): Pending  disposition  Discharge Plan A: New Nursing Home placement - longterm care facility   Discharge Delays: (!) Other (financials)               Kaylyn Ibarra MD  Department of Hospital Medicine   [facility]

## 2022-03-09 NOTE — PT/OT/SLP PROGRESS
Physical Therapy Treatment    Patient Name:  Sandra Wilson   MRN:  [id number]    Recommendations:     Discharge Recommendations:  nursing facility, skilled, nursing facility, basic   Discharge Equipment Recommendations: none   Barriers to discharge: Decreased caregiver support    Assessment:     Sandra Wilson is a 81 y.o. female admitted with a medical diagnosis of Acute hypoxemic respiratory failure.  She presents with the following impairments/functional limitations:  weakness, impaired endurance, impaired self care skills, impaired functional mobilty, impaired balance, impaired cognition, decreased lower extremity function, decreased upper extremity function, decreased safety awareness, decreased ROM .    Pt seen supine in bed, asleep but easily arousable. Pt attempting to verbalize, pt following directions and participating with thera ex.. pt seen for stretching exercises to LE's. Pt with rigidity of all extremeties . Pt agreeable to sit EOB- pt attempting to follow directions by swinging legs off bed.  Pt sat EOB mod assist and CGA to close supervision for balance. Pt tolerating increased sitting time and able to acknowledged staff as they say hello to her while at EOB.  Pt to benefit from continued therapies      Rehab Prognosis: Fair; patient would benefit from acute skilled PT services to address these deficits and reach maximum level of function.    Recent Surgery: Procedure(s) (LRB):  CREATION, TRACHEOSTOMY (N/A) 42 Days Post-Op    Plan:     During this hospitalization, patient to be seen 3 x/week to address the identified rehab impairments via therapeutic activities, therapeutic exercises, neuromuscular re-education and progress toward the following goals:    · Plan of Care Expires:  03/28/22    Subjective   Pt asleep but arousable- attempting to follow directions- pt making eye contact  Chief Complaint: none  Patient/Family Comments/goals: none stated  Pain/Comfort:  · Pain Rating 1:  0/10      Objective:     Communicated with nurse Ralf prior to session.  Patient found HOB elevated with telemetry, peripheral IV, PEG Tube, pressure relief boots upon PT entry to room.     General Precautions: Standard, contact, fall   Orthopedic Precautions:N/A   Braces:    Respiratory Status: Room air     Functional Mobility:  · Bed Mobility:     · Rolling Left:  moderate assistance  · Scooting: moderate assistance  · Supine to Sit: moderate assistance  · Sit to Supine: moderate assistance      AM-PAC 6 CLICK MOBILITY  Turning over in bed (including adjusting bedclothes, sheets and blankets)?: 2  Sitting down on and standing up from a chair with arms (e.g., wheelchair, bedside commode, etc.): 1  Moving from lying on back to sitting on the side of the bed?: 2  Moving to and from a bed to a chair (including a wheelchair)?: 1  Need to walk in hospital room?: 1  Climbing 3-5 steps with a railing?: 1  Basic Mobility Total Score: 8       Therapeutic Activities and Exercises:   Patient was educated on the importance of OOB activity and functional mobility to negate negative effects of prolonged bed rest during hospitalization, safe transfers and ambulation, and D/C planning   thera ex and stretching to LE's  EOB sitting mod assist  Repositioning back to bed    Patient left HOB elevated with all lines intact, call button in reach and bed alarm on..    GOALS:   Multidisciplinary Problems     Physical Therapy Goals        Problem: Physical Therapy Goal    Goal Priority Disciplines Outcome Goal Variances Interventions   Physical Therapy Goal     PT, PT/OT Ongoing, Progressing     Description: Goals to be met by: 2022     Patient will increase functional independence with mobility by performin. Supine to sit with Maximum Assistance  2. Sitting at edge of bed x20 minutes with Maximum Assistance  3. Lower extremity exercise program x20 reps                    Time Tracking:     PT Received On: 22  PT Start  Time: 0859     PT Stop Time: 0924  PT Total Time (min): 25 min     Billable Minutes: Therapeutic Activity 15 and Therapeutic Exercise 10    Treatment Type: Treatment, 6th Visit  PT/PTA: PT     PTA Visit Number: 0     03/09/2022

## 2022-03-09 NOTE — PLAN OF CARE
Afebrile. VSS. Cardiac monitoring. Safety measures maintained. Bed locked in lowest position, call light in reach. Isosource 1.5 continuous tube feedings by pump via PEG.  KVO & 10mL/hr via SYL midline. NAD noted. WCTM & update POC prn.     Problem: Adult Inpatient Plan of Care  Goal: Plan of Care Review  Outcome: Ongoing, Progressing  Goal: Patient-Specific Goal (Individualized)  Outcome: Ongoing, Progressing  Goal: Absence of Hospital-Acquired Illness or Injury  Outcome: Ongoing, Progressing  Goal: Optimal Comfort and Wellbeing  Outcome: Ongoing, Progressing     Problem: Infection  Goal: Absence of Infection Signs and Symptoms  Outcome: Ongoing, Progressing     Problem: Fluid Imbalance (Pneumonia)  Goal: Fluid Balance  Outcome: Ongoing, Progressing     Problem: Infection (Pneumonia)  Goal: Resolution of Infection Signs and Symptoms  Outcome: Ongoing, Progressing     Problem: Respiratory Compromise (Pneumonia)  Goal: Effective Oxygenation and Ventilation  Outcome: Ongoing, Progressing     Problem: Fall Injury Risk  Goal: Absence of Fall and Fall-Related Injury  Outcome: Ongoing, Progressing     Problem: Skin Injury Risk Increased  Goal: Skin Health and Integrity  Outcome: Ongoing, Progressing     Problem: Adjustment to Illness (Sepsis/Septic Shock)  Goal: Optimal Coping  Outcome: Ongoing, Progressing     Problem: Bleeding (Sepsis/Septic Shock)  Goal: Absence of Bleeding  Outcome: Ongoing, Progressing     Problem: Infection Progression (Sepsis/Septic Shock)  Goal: Absence of Infection Signs and Symptoms  Outcome: Ongoing, Progressing     Problem: Fluid and Electrolyte Imbalance (Acute Kidney Injury/Impairment)  Goal: Fluid and Electrolyte Balance  Outcome: Ongoing, Progressing     Problem: Oral Intake Inadequate (Acute Kidney Injury/Impairment)  Goal: Optimal Nutrition Intake  Outcome: Ongoing, Progressing     Problem: Renal Function Impairment (Acute Kidney Injury/Impairment)  Goal: Effective Renal  Function  Outcome: Ongoing, Progressing     Problem: ARDS (Acute Respiratory Distress Syndrome)  Goal: Effective Oxygenation  Outcome: Ongoing, Progressing     Problem: Oral Intake Inadequate  Goal: Improved Oral Intake  Outcome: Ongoing, Progressing     Problem: Impaired Wound Healing  Goal: Optimal Wound Healing  Outcome: Ongoing, Progressing     Problem: Aspiration (Enteral Nutrition)  Goal: Absence of Aspiration Signs and Symptoms  Outcome: Ongoing, Progressing     Problem: Device-Related Complication Risk (Enteral Nutrition)  Goal: Safe, Effective Therapy Delivery  Outcome: Ongoing, Progressing     Problem: Feeding Intolerance (Enteral Nutrition)  Goal: Feeding Tolerance  Outcome: Ongoing, Progressing

## 2022-03-10 PROCEDURE — 63600175 PHARM REV CODE 636 W HCPCS: Performed by: INTERNAL MEDICINE

## 2022-03-10 PROCEDURE — 94761 N-INVAS EAR/PLS OXIMETRY MLT: CPT

## 2022-03-10 PROCEDURE — 27000207 HC ISOLATION

## 2022-03-10 PROCEDURE — 11000001 HC ACUTE MED/SURG PRIVATE ROOM

## 2022-03-10 PROCEDURE — 25000003 PHARM REV CODE 250: Performed by: INTERNAL MEDICINE

## 2022-03-10 PROCEDURE — 25000003 PHARM REV CODE 250: Performed by: HOSPITALIST

## 2022-03-10 RX ADMIN — CITALOPRAM HYDROBROMIDE 40 MG: 10 TABLET ORAL at 09:03

## 2022-03-10 RX ADMIN — MIDODRINE HYDROCHLORIDE 5 MG: 5 TABLET ORAL at 04:03

## 2022-03-10 RX ADMIN — METOPROLOL TARTRATE 12.5 MG: 25 TABLET, FILM COATED ORAL at 09:03

## 2022-03-10 RX ADMIN — MIDODRINE HYDROCHLORIDE 5 MG: 5 TABLET ORAL at 09:03

## 2022-03-10 RX ADMIN — METOPROLOL TARTRATE 12.5 MG: 25 TABLET, FILM COATED ORAL at 08:03

## 2022-03-10 RX ADMIN — PANTOPRAZOLE SODIUM 40 MG: 40 GRANULE, DELAYED RELEASE ORAL at 09:03

## 2022-03-10 RX ADMIN — ATORVASTATIN CALCIUM 40 MG: 40 TABLET, FILM COATED ORAL at 08:03

## 2022-03-10 RX ADMIN — AZELASTINE 137 MCG: 1 SPRAY, METERED NASAL at 08:03

## 2022-03-10 RX ADMIN — AZELASTINE 137 MCG: 1 SPRAY, METERED NASAL at 09:03

## 2022-03-10 RX ADMIN — ENOXAPARIN SODIUM 40 MG: 100 INJECTION SUBCUTANEOUS at 04:03

## 2022-03-10 RX ADMIN — MIDODRINE HYDROCHLORIDE 5 MG: 5 TABLET ORAL at 08:03

## 2022-03-10 RX ADMIN — LEVOTHYROXINE SODIUM 25 MCG: 0.03 TABLET ORAL at 06:03

## 2022-03-10 NOTE — PLAN OF CARE
KARLO left voice message for patient's daughter to return call regarding [facility] [place] declining patient.

## 2022-03-10 NOTE — PLAN OF CARE
Awake, RA. Peg tube infusing continuous feeds, no residual, isosource1.5 @ goal rate 45/ml/hr. Expressive aphasia, but follows some commands. Midline flushed, blood returned. Bed locked and low. Contact precautions maintained. Will continue to monitor.

## 2022-03-10 NOTE — PLAN OF CARE
[facility]  Discharge Reassessment    Primary Care Provider: Primary Doctor No    Expected Discharge Date:     SW spoke to patient's daughter Ira [phone] regarding discharge planning for patient.  Per Ira she spoke to Suni with [facility] who will review patient for detention placement and return call.  KARLO spoke to Suni with [facility] who stated she spoke with patient's daughter yesterday. Per Suni, she will review patient information and return call if they can accept patient.  Per Suni, she is aware of private pay.  CM following for discharge planning needs.    Reassessment (most recent)     Discharge Reassessment - 03/10/22 1034        Discharge Reassessment    Assessment Type Discharge Planning Reassessment     Did the patient's condition or plan change since previous assessment? Yes     Discharge Plan discussed with: Adult children     Communicated DEMETRIUS with patient/caregiver Date not available/Unable to determine     Discharge Plan A New Nursing Home placement - detention care facility     Discharge Plan B Home Health     DME Needed Upon Discharge  none     Discharge Barriers Identified None     Why the patient remains in the hospital Placement issues        Post-Acute Status    Post-Acute Authorization Placement     Post-Acute Placement Status Pending post-acute provider review/more information requested

## 2022-03-10 NOTE — PROGRESS NOTES
[facility] Medicine  Progress Note    Patient Name: Sandra Wilson  MRN: [id number]  Patient Class: IP- Inpatient   Admission Date: 1/18/2022  Length of Stay: 51 days  Attending Physician: Kaylyn Ibarra MD  Primary Care Provider: Primary Doctor No        Subjective:     Principal Problem:Acute hypoxemic respiratory failure        HPI:  Sandra Wilson is an 81 year old female with a past medical history of CVAs, CNS aneurysm, PEG status, CAD, HLD, hypothryoidism, DM, aortic stenosis, and colon, breast, and ovarian cancer who presented from long term care with vomiting, diarrhea, and shortness of breath. Workup in the ED showed likely aspiration pneumonitis and C. Diff colitis. Her O2 requirement increased while in the ED requiring intubation with sedation. She also required Levophed as she likely developed septic shock. Antibiotics were broadened to cefepime, vancomycin, and Flagyl. Pulmonary was consulted. Family was notified. The patient was unable to provide history given acuity of illness.      Overview/Hospital Course:  Sandra Wilson is an 81 year old female with a past medical history of CVAs, CNS aneurysm, PEG status, CAD, HLD, hypothyroidism, DM, aortic stenosis, and colon, breast, and ovarian cancer who presented from long term care with vomiting, diarrhea, and shortness of breath secondary to acute hypoxic respiratory failure from aspiration pneumonia in the setting of C diff colitis leading to septic shock. She was intubated in the ED and started on Levophed infusion via RIJ CVC placed by Dr. Vignesh Gaspar. She was started on broad spectrum antibiotics with cefepime, Flagyl, vancomycin and admitted to the ICU. She was also started on enteral vancomycin for severe C diff colitis given elevated WBC count and STEFFANY. There was concern for developing ARDS given P/F ratio of 87 (severe); 190 1/22 (moderate). Pulmonary was consulted. She also presented with demand ischemia likely  secondary to septic shock. Troponin was trended and improved as shock resolved. Normal saline infusion was added for hyponatremia and STEFFANY which improved both. Levophed was able to be weaned. Family agreed to DNR status 1/18 and Palliative Care was consulted to discuss goal of care 1/19. Antibiotics were changed to doxycycline and Flagyl (GBS in sputum culture) 1/21; vancomycin was added 1/23 after culture also became positive for Staph aureus and Klebsiella. Her respiratory status has improved throughout her course and she was extubated (code changed to partial code for intubation) 1/21 only to be re-intubated for worsening stridor (history of tracheostomy). Upon re-intubation, copious amounts of secretions were suctioned. KUB suggested a mild ileus A bowel regimen was instituted and the patient was able to have bowel movements 1/22. Tube feeds were started 1/23. Her course was complicated by Afib as well noted on telemetry 1/21; metoprolol tartrate started 1/22.She failed a leak test 1/22; Surgery was consulted for tracheostomy which is pending conversation with family regarding goals of care.    Patient was set to go for trach on 01/26, but attempt was made for another trial extubation and the patient did well after extubation, she was transition to face mask and was able to maintain her saturations although had significant difficulty with secretions.  She was started on Robinul and scopolamine patch which did seem to improve for secretions.  Her respiratory status continued to be somewhat tenuous, so she was monitored in the ICU.  Family expressed desire to not have her get a tracheostomy.  Case management began working on LTACH referral.    Unable to obtain subjective 2/2 current cognitive status    NAD, alert  NC, AT  No rashes on exposed skin  No respiratory distress  . PEG in place  PERRL  No gross joint abnormalities.     Vitals, labs and radiographs from past 24h reviewed and personally interpreted.        Assessment/Plan:        3/9: hypotension transient and asymptomatic. Resolved without intervention. Labs unremarkable.     3/10: again asymptomatic hypotension in AM (but when BP checked in another extremity, not the case)  -if continues and is objectively present, may need to hold metoprolol.      Acute bacterial pneumonia 2/2 unknown organism   -treatment complete    CDiff  -Treatment complete.     * Acute hypoxemic respiratory failure  stable  Monitor sats.  Continue supplementing oxygen low flow.  Monitor work of breathing.  She's partial code, specifying that she'd want to be intubated if need be.    Anemia        Paroxysmal atrial fibrillation  -Telemetry  - controlled on metoprolol 12.5 mg BID  Will hold full dose anticoagulant due to high risk of bleeding      Gastrostomy tube dependent  Stable.  No issues with it    Type 2 diabetes mellitus, with long-term current use of insulin  She has diet controlled DM.  No need for frequent fingersticks or PRN insulin.    Glucose   Date Value Ref Range Status   02/27/2022 117 (H) 70 - 110 mg/dL Final   02/22/2022 92 70 - 110 mg/dL Final   02/11/2022 110 70 - 110 mg/dL Final   ]    YARI (generalized anxiety disorder)  Stable.  On citalopram.    Hypothyroidism  Patient has chronic hypothyroidism. TFTs reviewed-   Lab Results   Component Value Date    TSH 1.130 07/13/2017   . Will continue chronic levothyroxine and adjust for and clinical changes.        Hyperlipidemia   Patient is chronically on statin.will continue for now. Monitor clinically. Last LDL was   Lab Results   Component Value Date    LDLCALC 94.4 07/13/2017          GERD (gastroesophageal reflux disease)  On famotidine.    VTE Risk Mitigation (From admission, onward)         Ordered     enoxaparin injection 40 mg  Daily         01/18/22 1438     IP VTE HIGH RISK PATIENT  Once         01/18/22 1438     Place sequential compression device  Until discontinued         01/18/22 1438                Discharge  Planning   DEMETRIUS:      Code Status: Partial Code   Is the patient medically ready for discharge?:     Reason for patient still in hospital (select all that apply): Pending disposition  Discharge Plan A: New Nursing Home placement - skilled nursing care facility   Discharge Delays: (!) Other (financials)               Kaylyn Ibarra MD  Department of Hospital Medicine   [facility]

## 2022-03-11 PROCEDURE — 27000207 HC ISOLATION

## 2022-03-11 PROCEDURE — 63600175 PHARM REV CODE 636 W HCPCS: Performed by: INTERNAL MEDICINE

## 2022-03-11 PROCEDURE — 97530 THERAPEUTIC ACTIVITIES: CPT

## 2022-03-11 PROCEDURE — 11000001 HC ACUTE MED/SURG PRIVATE ROOM

## 2022-03-11 PROCEDURE — 25000003 PHARM REV CODE 250: Performed by: INTERNAL MEDICINE

## 2022-03-11 PROCEDURE — 97110 THERAPEUTIC EXERCISES: CPT

## 2022-03-11 PROCEDURE — 25000003 PHARM REV CODE 250: Performed by: HOSPITALIST

## 2022-03-11 RX ADMIN — METOPROLOL TARTRATE 12.5 MG: 25 TABLET, FILM COATED ORAL at 09:03

## 2022-03-11 RX ADMIN — ENOXAPARIN SODIUM 40 MG: 100 INJECTION SUBCUTANEOUS at 05:03

## 2022-03-11 RX ADMIN — MIDODRINE HYDROCHLORIDE 5 MG: 5 TABLET ORAL at 08:03

## 2022-03-11 RX ADMIN — LEVOTHYROXINE SODIUM 25 MCG: 0.03 TABLET ORAL at 07:03

## 2022-03-11 RX ADMIN — PANTOPRAZOLE SODIUM 40 MG: 40 GRANULE, DELAYED RELEASE ORAL at 08:03

## 2022-03-11 RX ADMIN — CITALOPRAM HYDROBROMIDE 40 MG: 10 TABLET ORAL at 08:03

## 2022-03-11 RX ADMIN — MIDODRINE HYDROCHLORIDE 5 MG: 5 TABLET ORAL at 09:03

## 2022-03-11 RX ADMIN — AZELASTINE 137 MCG: 1 SPRAY, METERED NASAL at 09:03

## 2022-03-11 RX ADMIN — ATORVASTATIN CALCIUM 40 MG: 40 TABLET, FILM COATED ORAL at 09:03

## 2022-03-11 RX ADMIN — MIDODRINE HYDROCHLORIDE 5 MG: 5 TABLET ORAL at 05:03

## 2022-03-11 RX ADMIN — AZELASTINE 137 MCG: 1 SPRAY, METERED NASAL at 08:03

## 2022-03-11 RX ADMIN — METOPROLOL TARTRATE 12.5 MG: 25 TABLET, FILM COATED ORAL at 08:03

## 2022-03-11 NOTE — PLAN OF CARE
Received call from pt's daughter, Ira; she states she spoke with Alie at [facility] and will be bringing them a check over the weekend so they can admit on Monday.   Spoke with Alie at [facility] who confirmed the above; they need the pt's PPD, 142, PASR and CXR. Will send as requested.   CM will follow up with [facility] on Monday.    03/11/22 1413   Discharge Reassessment   Assessment Type Discharge Planning Reassessment

## 2022-03-11 NOTE — PT/OT/SLP PROGRESS
Physical Therapy Treatment    Patient Name:  Sandra Wilson   MRN:  [id number]    Recommendations:     Discharge Recommendations:  nursing facility, skilled, nursing facility, basic   Discharge Equipment Recommendations: none   Barriers to discharge: None    Assessment:     Sandra Wilson is a 81 y.o. female admitted with a medical diagnosis of Acute hypoxemic respiratory failure.  She presents with the following impairments/functional limitations:  weakness, impaired endurance, impaired functional mobilty, gait instability, impaired balance, decreased lower extremity function, pain, decreased ROM, edema .  Patient agreeable to PT treatment this morning.  Patient presented supine in bed and required max assist to transfer to sitting EOB.  Patient then able to maintain sitting EOB x 12 minutes with intermittent min assist before returning to supine with max assist.    Rehab Prognosis: Fair; patient would benefit from acute skilled PT services to address these deficits and reach maximum level of function.    Recent Surgery: Procedure(s) (LRB):  CREATION, TRACHEOSTOMY (N/A) 44 Days Post-Op    Plan:     During this hospitalization, patient to be seen 3 x/week to address the identified rehab impairments via therapeutic exercises, therapeutic activities, gait training and progress toward the following goals:    · Plan of Care Expires:  03/28/22    Subjective     Chief Complaint: fatigue  Patient/Family Comments/goals: none given  Pain/Comfort:  ·        Objective:     Communicated with nurse prior to session.  Patient found supine with bed alarm, peripheral IV upon PT entry to room.     General Precautions: Standard, fall, contact (ESBL, MRSA, MDRO)   Orthopedic Precautions:N/A   Braces:    Respiratory Status: Room air     Functional Mobility:  · Bed Mobility:     · Supine to Sit: maximal assistance  · Sit to Supine: maximal assistance  · Balance: sitting EOB x 12 minutes with intermittent min assist      AM-PAC 6 CLICK  MOBILITY          Therapeutic Activities and Exercises:   exercise to include ankle pumps, hip abd, quad sets and heel slides.  All done x 10 reps bilateral LE as AAROM.  Transfer training supine to/from sit with max assist.  Sitting balance/tolerane x 12 minutes with intermittent min assist    Patient left supine with call button in reach, bed alarm on and nurse notified..    GOALS:   Multidisciplinary Problems     Physical Therapy Goals        Problem: Physical Therapy Goal    Goal Priority Disciplines Outcome Goal Variances Interventions   Physical Therapy Goal     PT, PT/OT Ongoing, Progressing     Description: Goals to be met by: 2022     Patient will increase functional independence with mobility by performin. Supine to sit with Maximum Assistance  2. Sitting at edge of bed x20 minutes with Maximum Assistance  3. Lower extremity exercise program x20 reps                    Time Tracking:     PT Received On: 22  PT Start Time: 1001     PT Stop Time: 1025  PT Total Time (min): 24 min     Billable Minutes: Therapeutic Activity 15 and Therapeutic Exercise 9    Treatment Type: Treatment  PT/PTA: PT     PTA Visit Number: 0     2022

## 2022-03-12 PROCEDURE — 25000003 PHARM REV CODE 250: Performed by: INTERNAL MEDICINE

## 2022-03-12 PROCEDURE — 63600175 PHARM REV CODE 636 W HCPCS: Performed by: INTERNAL MEDICINE

## 2022-03-12 PROCEDURE — 27000207 HC ISOLATION

## 2022-03-12 PROCEDURE — 43246 EGD PLACE GASTROSTOMY TUBE: CPT

## 2022-03-12 PROCEDURE — 94761 N-INVAS EAR/PLS OXIMETRY MLT: CPT

## 2022-03-12 PROCEDURE — 25000003 PHARM REV CODE 250: Performed by: HOSPITALIST

## 2022-03-12 PROCEDURE — C1751 CATH, INF, PER/CENT/MIDLINE: HCPCS

## 2022-03-12 PROCEDURE — 11000001 HC ACUTE MED/SURG PRIVATE ROOM

## 2022-03-12 RX ADMIN — MIDODRINE HYDROCHLORIDE 5 MG: 5 TABLET ORAL at 09:03

## 2022-03-12 RX ADMIN — METOPROLOL TARTRATE 12.5 MG: 25 TABLET, FILM COATED ORAL at 09:03

## 2022-03-12 RX ADMIN — CITALOPRAM HYDROBROMIDE 40 MG: 10 TABLET ORAL at 09:03

## 2022-03-12 RX ADMIN — PANTOPRAZOLE SODIUM 40 MG: 40 GRANULE, DELAYED RELEASE ORAL at 09:03

## 2022-03-12 RX ADMIN — ATORVASTATIN CALCIUM 40 MG: 40 TABLET, FILM COATED ORAL at 09:03

## 2022-03-12 RX ADMIN — MIDODRINE HYDROCHLORIDE 5 MG: 5 TABLET ORAL at 06:03

## 2022-03-12 RX ADMIN — ENOXAPARIN SODIUM 40 MG: 100 INJECTION SUBCUTANEOUS at 06:03

## 2022-03-12 RX ADMIN — AZELASTINE 137 MCG: 1 SPRAY, METERED NASAL at 09:03

## 2022-03-12 RX ADMIN — LEVOTHYROXINE SODIUM 25 MCG: 0.03 TABLET ORAL at 06:03

## 2022-03-12 NOTE — PLAN OF CARE
Patient resting in bed. No concerns noted at present. Resp even and unlabored. No distress noted. Mayte pegtube feedings well. Reposition to prevent skin impairments. No new skin concerns noted. Tele in place. Bed low and locked. Call light within reach.Frequent rounds in place to assist with care d/t patient unable to push call light or get OOB. Safety measure in place. No discomfort noted. Will cont to provide care as needed

## 2022-03-12 NOTE — PROGRESS NOTES
[facility] Medicine  Progress Note    Patient Name: Sandra Wilson  MRN: [id number]  Patient Class: IP- Inpatient   Admission Date: 1/18/2022  Length of Stay: 53 days  Attending Physician: Dinah Motta MD  Primary Care Provider: Primary Doctor No        Subjective:     Principal Problem:Acute hypoxemic respiratory failure        HPI:  Sandra Wilson is an 81 year old female with a past medical history of CVAs, CNS aneurysm, PEG status, CAD, HLD, hypothryoidism, DM, aortic stenosis, and colon, breast, and ovarian cancer who presented from long term care with vomiting, diarrhea, and shortness of breath. Workup in the ED showed likely aspiration pneumonitis and C. Diff colitis. Her O2 requirement increased while in the ED requiring intubation with sedation. She also required Levophed as she likely developed septic shock. Antibiotics were broadened to cefepime, vancomycin, and Flagyl. Pulmonary was consulted. Family was notified. The patient was unable to provide history given acuity of illness.      Overview/Hospital Course:  Sandra Wilson is an 81 year old female with a past medical history of CVAs, CNS aneurysm, PEG status, CAD, HLD, hypothyroidism, DM, aortic stenosis, and colon, breast, and ovarian cancer who presented from long term care with vomiting, diarrhea, and shortness of breath secondary to acute hypoxic respiratory failure from aspiration pneumonia in the setting of C diff colitis leading to septic shock. She was intubated in the ED and started on Levophed infusion via RIJ CVC placed by Dr. Vignesh Gaspar. She was started on broad spectrum antibiotics with cefepime, Flagyl, vancomycin and admitted to the ICU. She was also started on enteral vancomycin for severe C diff colitis given elevated WBC count and STEFFANY. There was concern for developing ARDS given P/F ratio of 87 (severe); 190 1/22 (moderate). Pulmonary was consulted. She also presented with demand ischemia likely  secondary to septic shock. Troponin was trended and improved as shock resolved. Normal saline infusion was added for hyponatremia and STEFFANY which improved both. Levophed was able to be weaned. Family agreed to DNR status 1/18 and Palliative Care was consulted to discuss goal of care 1/19. Antibiotics were changed to doxycycline and Flagyl (GBS in sputum culture) 1/21; vancomycin was added 1/23 after culture also became positive for Staph aureus and Klebsiella. Her respiratory status has improved throughout her course and she was extubated (code changed to partial code for intubation) 1/21 only to be re-intubated for worsening stridor (history of tracheostomy). Upon re-intubation, copious amounts of secretions were suctioned. KUB suggested a mild ileus A bowel regimen was instituted and the patient was able to have bowel movements 1/22. Tube feeds were started 1/23. Her course was complicated by Afib as well noted on telemetry 1/21; metoprolol tartrate started 1/22.She failed a leak test 1/22; Surgery was consulted for tracheostomy which is pending conversation with family regarding goals of care.    Patient was set to go for trach on 01/26, but attempt was made for another trial extubation and the patient did well after extubation, she was transition to face mask and was able to maintain her saturations although had significant difficulty with secretions.  She was started on Robinul and scopolamine patch which did seem to improve for secretions.  Her respiratory status continued to be somewhat tenuous, so she was monitored in the ICU.  Family expressed desire to not have her get a tracheostomy.  Case management began working on LTACH referral.      Interval History:  No major changes in status.  Still awaiting nursing home    Review of Systems   Unable to perform ROS: Patient nonverbal   Objective:     Vital Signs (Most Recent):  Temp: 97.9 °F (36.6 °C) (03/12/22 0748)  Pulse: 65 (03/12/22 0748)  Resp: 19 (03/12/22  0748)  BP: 113/61 (03/12/22 0748)  SpO2: 95 % (03/12/22 0748)   Vital Signs (24h Range):  Temp:  [97.3 °F (36.3 °C)-98.1 °F (36.7 °C)] 97.9 °F (36.6 °C)  Pulse:  [61-71] 65  Resp:  [16-19] 19  SpO2:  [94 %-97 %] 95 %  BP: ()/(55-76) 113/61     Weight: 63.9 kg (140 lb 14 oz)  Body mass index is 24.18 kg/m².    Intake/Output Summary (Last 24 hours) at 3/12/2022 0813  Last data filed at 3/12/2022 0653  Gross per 24 hour   Intake 3115.63 ml   Output 1 ml   Net 3114.63 ml        Physical Exam  Vitals and nursing note reviewed.   Constitutional:       General: She is not in acute distress.     Appearance: She is not ill-appearing.   HENT:      Head: Normocephalic.      Mouth/Throat:      Mouth: Mucous membranes are moist.   Eyes:      General:         Right eye: No discharge.         Left eye: No discharge.      Extraocular Movements: Extraocular movements intact.      Pupils: Pupils are equal, round, and reactive to light.   Neck:      Vascular: No JVD.   Cardiovascular:      Rate and Rhythm: Normal rate and regular rhythm.   Pulmonary:      Effort: Pulmonary effort is normal.      Breath sounds: Normal breath sounds.   Abdominal:      General: Abdomen is flat. Bowel sounds are normal. There is no distension.      Palpations: Abdomen is soft.      Tenderness: There is no abdominal tenderness.   Musculoskeletal:      Right lower leg: No edema.      Left lower leg: No edema.   Skin:     General: Skin is warm and moist.      Findings: No rash.   Neurological:      Mental Status: She is alert.      Comments: Makes eye contact   Psychiatric:         Behavior: Behavior is not agitated.       Significant Labs: All pertinent labs within the past 24 hours have been reviewed.    Significant Imaging: I have reviewed all pertinent imaging results/findings within the past 24 hours.      Assessment/Plan:      * Acute hypoxemic respiratory failure  stable  Monitor sats.  Continue supplementing oxygen low flow.  Monitor work of  breathing.  She's partial code, specifying that she'd want to be intubated if need be.    Anemia        Paroxysmal atrial fibrillation  -Telemetry  - controlled on metoprolol 12.5 mg BID  Will hold full dose anticoagulant due to high risk of bleeding      Gastrostomy tube dependent  Stable.  No issues with it    Type 2 diabetes mellitus, with long-term current use of insulin  She has diet controlled DM.  No need for frequent fingersticks or PRN insulin.    Glucose   Date Value Ref Range Status   03/09/2022 115 (H) 70 - 110 mg/dL Final   02/27/2022 117 (H) 70 - 110 mg/dL Final   02/22/2022 92 70 - 110 mg/dL Final   ]    YARI (generalized anxiety disorder)  Stable.  On citalopram.    Hypothyroidism  Patient has chronic hypothyroidism. TFTs reviewed-   Lab Results   Component Value Date    TSH 1.130 07/13/2017   . Will continue chronic levothyroxine and adjust for and clinical changes.        Hyperlipidemia   Patient is chronically on statin.will continue for now. Monitor clinically. Last LDL was   Lab Results   Component Value Date    LDLCALC 94.4 07/13/2017          GERD (gastroesophageal reflux disease)  On famotidine.      VTE Risk Mitigation (From admission, onward)         Ordered     enoxaparin injection 40 mg  Daily         01/18/22 1438     IP VTE HIGH RISK PATIENT  Once         01/18/22 1438     Place sequential compression device  Until discontinued         01/18/22 1438                Discharge Planning   DEMETRIUS:      Code Status: Partial Code   Is the patient medically ready for discharge?:     Reason for patient still in hospital (select all that apply): Patient trending condition and Treatment  Discharge Plan A: [facility] Nursing Home placement [facility]   Discharge Delays: (!) Other (financials)              Dinah Motta MD  Department of Hospital Medicine   [facility]

## 2022-03-12 NOTE — CARE UPDATE
03/11/22 1945   Patient Assessment/Suction   Level of Consciousness (AVPU) alert   Respiratory Effort Normal;Unlabored   Expansion/Accessory Muscles/Retractions expansion symmetric;no retractions;no use of accessory muscles   PRE-TX-O2   O2 Device (Oxygen Therapy) room air   SpO2 97 %   Pulse Oximetry Type Intermittent

## 2022-03-12 NOTE — PROGRESS NOTES
[facility] Hospital Medicine  Progress Note    Patient Name: Sandra Wilson  MRN: [id number]  Patient Class: IP- Inpatient   Admission Date: 1/18/2022  Length of Stay: 52 days  Attending Physician: Kaylyn Ibarra MD  Primary Care Provider: Primary Doctor No        Subjective:     Principal Problem:Acute hypoxemic respiratory failure        HPI:  Sandra Wilson is an 81 year old female with a past medical history of CVAs, CNS aneurysm, PEG status, CAD, HLD, hypothryoidism, DM, aortic stenosis, and colon, breast, and ovarian cancer who presented from long term care with vomiting, diarrhea, and shortness of breath. Workup in the ED showed likely aspiration pneumonitis and C. Diff colitis. Her O2 requirement increased while in the ED requiring intubation with sedation. She also required Levophed as she likely developed septic shock. Antibiotics were broadened to cefepime, vancomycin, and Flagyl. Pulmonary was consulted. Family was notified. The patient was unable to provide history given acuity of illness.      Overview/Hospital Course:  Sandra Wilson is an 81 year old female with a past medical history of CVAs, CNS aneurysm, PEG status, CAD, HLD, hypothyroidism, DM, aortic stenosis, and colon, breast, and ovarian cancer who presented from long term care with vomiting, diarrhea, and shortness of breath secondary to acute hypoxic respiratory failure from aspiration pneumonia in the setting of C diff colitis leading to septic shock. She was intubated in the ED and started on Levophed infusion via RIJ CVC placed by Dr. Vignesh Gaspar. She was started on broad spectrum antibiotics with cefepime, Flagyl, vancomycin and admitted to the ICU. She was also started on enteral vancomycin for severe C diff colitis given elevated WBC count and STEFFANY. There was concern for developing ARDS given P/F ratio of 87 (severe); 190 1/22 (moderate). Pulmonary was consulted. She also presented with demand ischemia likely  secondary to septic shock. Troponin was trended and improved as shock resolved. Normal saline infusion was added for hyponatremia and STEFFANY which improved both. Levophed was able to be weaned. Family agreed to DNR status 1/18 and Palliative Care was consulted to discuss goal of care 1/19. Antibiotics were changed to doxycycline and Flagyl (GBS in sputum culture) 1/21; vancomycin was added 1/23 after culture also became positive for Staph aureus and Klebsiella. Her respiratory status has improved throughout her course and she was extubated (code changed to partial code for intubation) 1/21 only to be re-intubated for worsening stridor (history of tracheostomy). Upon re-intubation, copious amounts of secretions were suctioned. KUB suggested a mild ileus A bowel regimen was instituted and the patient was able to have bowel movements 1/22. Tube feeds were started 1/23. Her course was complicated by Afib as well noted on telemetry 1/21; metoprolol tartrate started 1/22.She failed a leak test 1/22; Surgery was consulted for tracheostomy which is pending conversation with family regarding goals of care.    Patient was set to go for trach on 01/26, but attempt was made for another trial extubation and the patient did well after extubation, she was transition to face mask and was able to maintain her saturations although had significant difficulty with secretions.  She was started on Robinul and scopolamine patch which did seem to improve for secretions.  Her respiratory status continued to be somewhat tenuous, so she was monitored in the ICU.  Family expressed desire to not have her get a tracheostomy.  Case management began working on LTACH referral.    Unable to obtain subjective 2/2 current cognitive status    NAD, alert  NC, AT  No rashes on exposed skin  No respiratory distress  . PEG in place  PERRL  No gross joint abnormalities.     Vitals, labs and radiographs from past 24h reviewed and personally interpreted.        Assessment/Plan:        3/9: hypotension transient and asymptomatic. Resolved without intervention. Labs unremarkable.     3/10-11: again asymptomatic hypotension in AM (but when BP checked in another extremity, not the case)  -if continues and is objectively present, may need to reduce metoprolol to daily dosing.      Acute bacterial pneumonia 2/2 unknown organism   -treatment complete    CDiff  -Treatment complete.     * Acute hypoxemic respiratory failure  stable  Monitor sats.  Continue supplementing oxygen low flow.  Monitor work of breathing.  She's partial code, specifying that she'd want to be intubated if need be.    Anemia        Paroxysmal atrial fibrillation  -Telemetry  - controlled on metoprolol 12.5 mg BID  Will hold full dose anticoagulant due to high risk of bleeding      Gastrostomy tube dependent  Stable.  No issues with it    Type 2 diabetes mellitus, with long-term current use of insulin  She has diet controlled DM.  No need for frequent fingersticks or PRN insulin.    Glucose   Date Value Ref Range Status   02/27/2022 117 (H) 70 - 110 mg/dL Final   02/22/2022 92 70 - 110 mg/dL Final   02/11/2022 110 70 - 110 mg/dL Final   ]    YARI (generalized anxiety disorder)  Stable.  On citalopram.    Hypothyroidism  Patient has chronic hypothyroidism. TFTs reviewed-   Lab Results   Component Value Date    TSH 1.130 07/13/2017   . Will continue chronic levothyroxine and adjust for and clinical changes.        Hyperlipidemia   Patient is chronically on statin.will continue for now. Monitor clinically. Last LDL was   Lab Results   Component Value Date    LDLCALC 94.4 07/13/2017          GERD (gastroesophageal reflux disease)  On famotidine.    VTE Risk Mitigation (From admission, onward)         Ordered     enoxaparin injection 40 mg  Daily         01/18/22 1438     IP VTE HIGH RISK PATIENT  Once         01/18/22 1438     Place sequential compression device  Until discontinued         01/18/22 1438                 Discharge Planning   DEMETRIUS:      Code Status: Partial Code   Is the patient medically ready for discharge?:     Reason for patient still in hospital (select all that apply): Pending disposition  Discharge Plan A: New Nursing Home placement - long-term care facility   Discharge Delays: (!) Other (financials)               Kaylyn Ibarra MD  Department of Hospital Medicine   [facility]

## 2022-03-12 NOTE — ASSESSMENT & PLAN NOTE
She has diet controlled DM.  No need for frequent fingersticks or PRN insulin.    Glucose   Date Value Ref Range Status   03/09/2022 115 (H) 70 - 110 mg/dL Final   02/27/2022 117 (H) 70 - 110 mg/dL Final   02/22/2022 92 70 - 110 mg/dL Final   ]

## 2022-03-12 NOTE — SUBJECTIVE & OBJECTIVE
Interval History:  No major changes in status.  Still awaiting nursing home    Review of Systems   Unable to perform ROS: Patient nonverbal   Objective:     Vital Signs (Most Recent):  Temp: 97.9 °F (36.6 °C) (03/12/22 0748)  Pulse: 65 (03/12/22 0748)  Resp: 19 (03/12/22 0748)  BP: 113/61 (03/12/22 0748)  SpO2: 95 % (03/12/22 0748)   Vital Signs (24h Range):  Temp:  [97.3 °F (36.3 °C)-98.1 °F (36.7 °C)] 97.9 °F (36.6 °C)  Pulse:  [61-71] 65  Resp:  [16-19] 19  SpO2:  [94 %-97 %] 95 %  BP: ()/(55-76) 113/61     Weight: 63.9 kg (140 lb 14 oz)  Body mass index is 24.18 kg/m².    Intake/Output Summary (Last 24 hours) at 3/12/2022 0813  Last data filed at 3/12/2022 0653  Gross per 24 hour   Intake 3115.63 ml   Output 1 ml   Net 3114.63 ml        Physical Exam  Vitals and nursing note reviewed.   Constitutional:       General: She is not in acute distress.     Appearance: She is not ill-appearing.   HENT:      Head: Normocephalic.      Mouth/Throat:      Mouth: Mucous membranes are moist.   Eyes:      General:         Right eye: No discharge.         Left eye: No discharge.      Extraocular Movements: Extraocular movements intact.      Pupils: Pupils are equal, round, and reactive to light.   Neck:      Vascular: No JVD.   Cardiovascular:      Rate and Rhythm: Normal rate and regular rhythm.   Pulmonary:      Effort: Pulmonary effort is normal.      Breath sounds: Normal breath sounds.   Abdominal:      General: Abdomen is flat. Bowel sounds are normal. There is no distension.      Palpations: Abdomen is soft.      Tenderness: There is no abdominal tenderness.   Musculoskeletal:      Right lower leg: No edema.      Left lower leg: No edema.   Skin:     General: Skin is warm and moist.      Findings: No rash.   Neurological:      Mental Status: She is alert.      Comments: Makes eye contact   Psychiatric:         Behavior: Behavior is not agitated.       Significant Labs: All pertinent labs within the past 24 hours  have been reviewed.    Significant Imaging: I have reviewed all pertinent imaging results/findings within the past 24 hours.

## 2022-03-13 PROCEDURE — 25000003 PHARM REV CODE 250: Performed by: HOSPITALIST

## 2022-03-13 PROCEDURE — 25000003 PHARM REV CODE 250: Performed by: INTERNAL MEDICINE

## 2022-03-13 PROCEDURE — 63600175 PHARM REV CODE 636 W HCPCS: Performed by: INTERNAL MEDICINE

## 2022-03-13 PROCEDURE — 27000207 HC ISOLATION

## 2022-03-13 PROCEDURE — 11000001 HC ACUTE MED/SURG PRIVATE ROOM

## 2022-03-13 PROCEDURE — 94761 N-INVAS EAR/PLS OXIMETRY MLT: CPT

## 2022-03-13 RX ADMIN — METOPROLOL TARTRATE 12.5 MG: 25 TABLET, FILM COATED ORAL at 09:03

## 2022-03-13 RX ADMIN — ENOXAPARIN SODIUM 40 MG: 100 INJECTION SUBCUTANEOUS at 05:03

## 2022-03-13 RX ADMIN — PANTOPRAZOLE SODIUM 40 MG: 40 GRANULE, DELAYED RELEASE ORAL at 08:03

## 2022-03-13 RX ADMIN — LEVOTHYROXINE SODIUM 25 MCG: 0.03 TABLET ORAL at 05:03

## 2022-03-13 RX ADMIN — MIDODRINE HYDROCHLORIDE 5 MG: 5 TABLET ORAL at 05:03

## 2022-03-13 RX ADMIN — MIDODRINE HYDROCHLORIDE 5 MG: 5 TABLET ORAL at 08:03

## 2022-03-13 RX ADMIN — AZELASTINE 137 MCG: 1 SPRAY, METERED NASAL at 09:03

## 2022-03-13 RX ADMIN — ATORVASTATIN CALCIUM 40 MG: 40 TABLET, FILM COATED ORAL at 09:03

## 2022-03-13 RX ADMIN — METOPROLOL TARTRATE 12.5 MG: 25 TABLET, FILM COATED ORAL at 08:03

## 2022-03-13 RX ADMIN — AZELASTINE 137 MCG: 1 SPRAY, METERED NASAL at 08:03

## 2022-03-13 RX ADMIN — MIDODRINE HYDROCHLORIDE 5 MG: 5 TABLET ORAL at 09:03

## 2022-03-13 RX ADMIN — CITALOPRAM HYDROBROMIDE 40 MG: 10 TABLET ORAL at 08:03

## 2022-03-13 NOTE — PROGRESS NOTES
[facility] Medicine  Progress Note    Patient Name: Sandra Wilson  MRN: [id number]  Patient Class: IP- Inpatient   Admission Date: 1/18/2022  Length of Stay: 54 days  Attending Physician: Dinah Motta MD  Primary Care Provider: Primary Doctor No        Subjective:     Principal Problem:Acute hypoxemic respiratory failure        HPI:  Sandra Wilson is an 81 year old female with a past medical history of CVAs, CNS aneurysm, PEG status, CAD, HLD, hypothryoidism, DM, aortic stenosis, and colon, breast, and ovarian cancer who presented from long term care with vomiting, diarrhea, and shortness of breath. Workup in the ED showed likely aspiration pneumonitis and C. Diff colitis. Her O2 requirement increased while in the ED requiring intubation with sedation. She also required Levophed as she likely developed septic shock. Antibiotics were broadened to cefepime, vancomycin, and Flagyl. Pulmonary was consulted. Family was notified. The patient was unable to provide history given acuity of illness.      Overview/Hospital Course:  Sandra Wilson is an 81 year old female with a past medical history of CVAs, CNS aneurysm, PEG status, CAD, HLD, hypothyroidism, DM, aortic stenosis, and colon, breast, and ovarian cancer who presented from long term care with vomiting, diarrhea, and shortness of breath secondary to acute hypoxic respiratory failure from aspiration pneumonia in the setting of C diff colitis leading to septic shock. She was intubated in the ED and started on Levophed infusion via RIJ CVC placed by Dr. Vignesh Gaspar. She was started on broad spectrum antibiotics with cefepime, Flagyl, vancomycin and admitted to the ICU. She was also started on enteral vancomycin for severe C diff colitis given elevated WBC count and STEFFANY. There was concern for developing ARDS given P/F ratio of 87 (severe); 190 1/22 (moderate). Pulmonary was consulted. She also presented with demand ischemia likely  secondary to septic shock. Troponin was trended and improved as shock resolved. Normal saline infusion was added for hyponatremia and STEFFANY which improved both. Levophed was able to be weaned. Family agreed to DNR status 1/18 and Palliative Care was consulted to discuss goal of care 1/19. Antibiotics were changed to doxycycline and Flagyl (GBS in sputum culture) 1/21; vancomycin was added 1/23 after culture also became positive for Staph aureus and Klebsiella. Her respiratory status has improved throughout her course and she was extubated (code changed to partial code for intubation) 1/21 only to be re-intubated for worsening stridor (history of tracheostomy). Upon re-intubation, copious amounts of secretions were suctioned. KUB suggested a mild ileus A bowel regimen was instituted and the patient was able to have bowel movements 1/22. Tube feeds were started 1/23. Her course was complicated by Afib as well noted on telemetry 1/21; metoprolol tartrate started 1/22.She failed a leak test 1/22; Surgery was consulted for tracheostomy which is pending conversation with family regarding goals of care.    Patient was set to go for trach on 01/26, but attempt was made for another trial extubation and the patient did well after extubation, she was transition to face mask and was able to maintain her saturations although had significant difficulty with secretions.  She was started on Robinul and scopolamine patch which did seem to improve for secretions.  Her respiratory status continued to be somewhat tenuous, so she was monitored in the ICU.  Family expressed desire to not have her get a tracheostomy.  Case management began working on LTACH referral.      Interval History:  No major changes in status.  Still awaiting nursing home    Review of Systems   Unable to perform ROS: Patient nonverbal   Objective:     Vital Signs (Most Recent):  Temp: 97.6 °F (36.4 °C) (03/13/22 0705)  Pulse: 60 (03/13/22 0705)  Resp: 18 (03/13/22  0705)  BP: (!) 143/63 (03/13/22 0705)  SpO2: (!) 94 % (03/13/22 0705)   Vital Signs (24h Range):  Temp:  [96.3 °F (35.7 °C)-98.2 °F (36.8 °C)] 97.6 °F (36.4 °C)  Pulse:  [59-70] 60  Resp:  [18-20] 18  SpO2:  [90 %-98 %] 94 %  BP: ()/(53-66) 143/63     Weight: 63.9 kg (140 lb 14 oz)  Body mass index is 24.18 kg/m².    Intake/Output Summary (Last 24 hours) at 3/13/2022 1122  Last data filed at 3/13/2022 0853  Gross per 24 hour   Intake 2411.9 ml   Output 0 ml   Net 2411.9 ml        Physical Exam  Vitals and nursing note reviewed.   Constitutional:       General: She is not in acute distress.     Appearance: She is not ill-appearing.   HENT:      Head: Normocephalic.      Mouth/Throat:      Mouth: Mucous membranes are moist.   Eyes:      General:         Right eye: No discharge.         Left eye: No discharge.      Extraocular Movements: Extraocular movements intact.      Pupils: Pupils are equal, round, and reactive to light.   Neck:      Vascular: No JVD.   Cardiovascular:      Rate and Rhythm: Normal rate and regular rhythm.   Pulmonary:      Effort: Pulmonary effort is normal.      Breath sounds: Normal breath sounds.   Abdominal:      General: Abdomen is flat. Bowel sounds are normal. There is no distension.      Palpations: Abdomen is soft.      Tenderness: There is no abdominal tenderness.   Musculoskeletal:      Right lower leg: No edema.      Left lower leg: No edema.   Skin:     General: Skin is warm and moist.      Findings: No rash.   Neurological:      Mental Status: She is alert.      Comments: Makes eye contact   Psychiatric:         Behavior: Behavior is not agitated.       Significant Labs: All pertinent labs within the past 24 hours have been reviewed.    Significant Imaging: I have reviewed all pertinent imaging results/findings within the past 24 hours.      Assessment/Plan:      * Acute hypoxemic respiratory failure  stable  Monitor sats.  Continue supplementing oxygen low flow.  Monitor work  of breathing.  She's partial code, specifying that she'd want to be intubated if need be.    Anemia        Paroxysmal atrial fibrillation  -Telemetry  - controlled on metoprolol 12.5 mg BID  Will hold full dose anticoagulant due to high risk of bleeding      Gastrostomy tube dependent  Stable.  No issues with it    Type 2 diabetes mellitus, with long-term current use of insulin  She has diet controlled DM.  No need for frequent fingersticks or PRN insulin.    Glucose   Date Value Ref Range Status   03/09/2022 115 (H) 70 - 110 mg/dL Final   02/27/2022 117 (H) 70 - 110 mg/dL Final   02/22/2022 92 70 - 110 mg/dL Final   ]    YARI (generalized anxiety disorder)  Stable.  On citalopram.    Hypothyroidism  Patient has chronic hypothyroidism. TFTs reviewed-   Lab Results   Component Value Date    TSH 1.130 07/13/2017   . Will continue chronic levothyroxine and adjust for and clinical changes.        Hyperlipidemia   Patient is chronically on statin.will continue for now. Monitor clinically. Last LDL was   Lab Results   Component Value Date    LDLCALC 94.4 07/13/2017          GERD (gastroesophageal reflux disease)  On famotidine.      VTE Risk Mitigation (From admission, onward)         Ordered     enoxaparin injection 40 mg  Daily         01/18/22 1438     IP VTE HIGH RISK PATIENT  Once         01/18/22 1438     Place sequential compression device  Until discontinued         01/18/22 1438                Discharge Planning   DEMETRIUS:      Code Status: Partial Code   Is the patient medically ready for discharge?:     Reason for patient still in hospital (select all that apply): Patient trending condition and Treatment  Discharge Plan A: [facility] Nursing Home placement [facility]   Discharge Delays: (!) Other (financials)              Dinah Motta MD  Department of Hospital Medicine   [facility]

## 2022-03-13 NOTE — PLAN OF CARE
Patient resting well in bed. No acute changes noted. Resp even unlabored. Encourage to call for assist when needed. Frequent rounds provided to make sure no issues occur. Patient required turning and repositioning. Unable to ambulate. No new skin concerns. No distress  noted. Mayte pegtube feeding well. Denies abd pain. Safety measure in place. No other issues noted at present.

## 2022-03-13 NOTE — PLAN OF CARE
TF continued as ordered. Every 2 hour turns completed this shift. Pt reports nor shows any signs of discomfort. Bed alarm activated. No safety issues to report this shift.

## 2022-03-13 NOTE — PLAN OF CARE
Every 2 hour turns completed. Food/Fluid intake administered via PEG as ordered. Bed alarm set. Pt reports nor shows any s/s of discomfort or pain.

## 2022-03-13 NOTE — SUBJECTIVE & OBJECTIVE
Interval History:  No major changes in status.  Still awaiting nursing home    Review of Systems   Unable to perform ROS: Patient nonverbal   Objective:     Vital Signs (Most Recent):  Temp: 97.6 °F (36.4 °C) (03/13/22 0705)  Pulse: 60 (03/13/22 0705)  Resp: 18 (03/13/22 0705)  BP: (!) 143/63 (03/13/22 0705)  SpO2: (!) 94 % (03/13/22 0705)   Vital Signs (24h Range):  Temp:  [96.3 °F (35.7 °C)-98.2 °F (36.8 °C)] 97.6 °F (36.4 °C)  Pulse:  [59-70] 60  Resp:  [18-20] 18  SpO2:  [90 %-98 %] 94 %  BP: ()/(53-66) 143/63     Weight: 63.9 kg (140 lb 14 oz)  Body mass index is 24.18 kg/m².    Intake/Output Summary (Last 24 hours) at 3/13/2022 1122  Last data filed at 3/13/2022 0853  Gross per 24 hour   Intake 2411.9 ml   Output 0 ml   Net 2411.9 ml        Physical Exam  Vitals and nursing note reviewed.   Constitutional:       General: She is not in acute distress.     Appearance: She is not ill-appearing.   HENT:      Head: Normocephalic.      Mouth/Throat:      Mouth: Mucous membranes are moist.   Eyes:      General:         Right eye: No discharge.         Left eye: No discharge.      Extraocular Movements: Extraocular movements intact.      Pupils: Pupils are equal, round, and reactive to light.   Neck:      Vascular: No JVD.   Cardiovascular:      Rate and Rhythm: Normal rate and regular rhythm.   Pulmonary:      Effort: Pulmonary effort is normal.      Breath sounds: Normal breath sounds.   Abdominal:      General: Abdomen is flat. Bowel sounds are normal. There is no distension.      Palpations: Abdomen is soft.      Tenderness: There is no abdominal tenderness.   Musculoskeletal:      Right lower leg: No edema.      Left lower leg: No edema.   Skin:     General: Skin is warm and moist.      Findings: No rash.   Neurological:      Mental Status: She is alert.      Comments: Makes eye contact   Psychiatric:         Behavior: Behavior is not agitated.       Significant Labs: All pertinent labs within the past 24  hours have been reviewed.    Significant Imaging: I have reviewed all pertinent imaging results/findings within the past 24 hours.

## 2022-03-14 VITALS
OXYGEN SATURATION: 98 % | RESPIRATION RATE: 18 BRPM | WEIGHT: 140.88 LBS | TEMPERATURE: 97 F | DIASTOLIC BLOOD PRESSURE: 74 MMHG | HEIGHT: 64 IN | HEART RATE: 68 BPM | BODY MASS INDEX: 24.05 KG/M2 | SYSTOLIC BLOOD PRESSURE: 160 MMHG

## 2022-03-14 LAB
ANION GAP SERPL CALC-SCNC: 11 MMOL/L (ref 8–16)
BASOPHILS # BLD AUTO: 0.08 K/UL (ref 0–0.2)
BASOPHILS NFR BLD: 1 % (ref 0–1.9)
BUN SERPL-MCNC: 25 MG/DL (ref 8–23)
CALCIUM SERPL-MCNC: 9.2 MG/DL (ref 8.7–10.5)
CHLORIDE SERPL-SCNC: 97 MMOL/L (ref 95–110)
CO2 SERPL-SCNC: 26 MMOL/L (ref 23–29)
CREAT SERPL-MCNC: 0.6 MG/DL (ref 0.5–1.4)
DIFFERENTIAL METHOD: ABNORMAL
EOSINOPHIL # BLD AUTO: 0.6 K/UL (ref 0–0.5)
EOSINOPHIL NFR BLD: 8 % (ref 0–8)
ERYTHROCYTE [DISTWIDTH] IN BLOOD BY AUTOMATED COUNT: 13.7 % (ref 11.5–14.5)
EST. GFR  (AFRICAN AMERICAN): >60 ML/MIN/1.73 M^2
EST. GFR  (NON AFRICAN AMERICAN): >60 ML/MIN/1.73 M^2
GLUCOSE SERPL-MCNC: 102 MG/DL (ref 70–110)
HCT VFR BLD AUTO: 35 % (ref 37–48.5)
HGB BLD-MCNC: 11.4 G/DL (ref 12–16)
IMM GRANULOCYTES # BLD AUTO: 0.02 K/UL (ref 0–0.04)
IMM GRANULOCYTES NFR BLD AUTO: 0.3 % (ref 0–0.5)
LYMPHOCYTES # BLD AUTO: 1.9 K/UL (ref 1–4.8)
LYMPHOCYTES NFR BLD: 24.3 % (ref 18–48)
MCH RBC QN AUTO: 32.5 PG (ref 27–31)
MCHC RBC AUTO-ENTMCNC: 32.6 G/DL (ref 32–36)
MCV RBC AUTO: 100 FL (ref 82–98)
MONOCYTES # BLD AUTO: 0.8 K/UL (ref 0.3–1)
MONOCYTES NFR BLD: 9.8 % (ref 4–15)
NEUTROPHILS # BLD AUTO: 4.4 K/UL (ref 1.8–7.7)
NEUTROPHILS NFR BLD: 56.6 % (ref 38–73)
NRBC BLD-RTO: 0 /100 WBC
PLATELET # BLD AUTO: 235 K/UL (ref 150–450)
PMV BLD AUTO: 12 FL (ref 9.2–12.9)
POTASSIUM SERPL-SCNC: 4.1 MMOL/L (ref 3.5–5.1)
RBC # BLD AUTO: 3.51 M/UL (ref 4–5.4)
SODIUM SERPL-SCNC: 134 MMOL/L (ref 136–145)
WBC # BLD AUTO: 7.77 K/UL (ref 3.9–12.7)

## 2022-03-14 PROCEDURE — 80048 BASIC METABOLIC PNL TOTAL CA: CPT | Performed by: HOSPITALIST

## 2022-03-14 PROCEDURE — 25000003 PHARM REV CODE 250: Performed by: INTERNAL MEDICINE

## 2022-03-14 PROCEDURE — 25000003 PHARM REV CODE 250: Performed by: HOSPITALIST

## 2022-03-14 PROCEDURE — 36415 COLL VENOUS BLD VENIPUNCTURE: CPT | Performed by: HOSPITALIST

## 2022-03-14 PROCEDURE — 85025 COMPLETE CBC W/AUTO DIFF WBC: CPT | Performed by: HOSPITALIST

## 2022-03-14 RX ADMIN — MIDODRINE HYDROCHLORIDE 5 MG: 5 TABLET ORAL at 08:03

## 2022-03-14 RX ADMIN — CITALOPRAM HYDROBROMIDE 40 MG: 10 TABLET ORAL at 08:03

## 2022-03-14 RX ADMIN — AZELASTINE 137 MCG: 1 SPRAY, METERED NASAL at 08:03

## 2022-03-14 RX ADMIN — METOPROLOL TARTRATE 12.5 MG: 25 TABLET, FILM COATED ORAL at 08:03

## 2022-03-14 RX ADMIN — PANTOPRAZOLE SODIUM 40 MG: 40 GRANULE, DELAYED RELEASE ORAL at 08:03

## 2022-03-14 RX ADMIN — LEVOTHYROXINE SODIUM 25 MCG: 0.03 TABLET ORAL at 05:03

## 2022-03-14 NOTE — PROGRESS NOTES
[facility]  Adult Nutrition  Progress Note    SUMMARY   Intervention: enteral nutrition therapy     Recommendations  1) Continue TF Isosource 1.5 @ 45 mL/hr as pt tolerates + Brody BID and Beneprotein BID + 115 ml flush q 4 hr  --provides 1620 kcal, 73 g protein ( + beneprotein), 820 ml free water  (100% EEN, 100% EPN )     2) weigh weekly     Goals: 1) Initation of enteral nutrition by RD follow up 2) Nutrition support meeting > 50% of needs at f/u  Nutrition Goal Status: met/meeting-continues  Communication of RD Recs: POC, sticky note     Assessment and Plan  Nutrition Problem  Inadequate energy intake     Related to (etiology):   NPO status, no TF running, dysphagia     Signs and Symptoms (as evidenced by):   Pt receiving < 50% EEN/EPN x 6 days     Interventions(treatment strategy):  above     Nutrition Diagnosis Status:   improved     Malnutrition  Edema: 1+  Gabriel: 14 + PEG, coccyx ulcer  NFPE 1/24/21 no significant wasting seen  PO intakes < 50% of needs x > 5-6 days  No significant wt loss per chart review     Reason for Assessment  Reason For Assessment: follow up  Diagnosis: other (see comments) (Acute hypoxemic respiratory failure)  Relevant Medical History: GERD, HLD, HTN, CVAs, CNS aneurysm, PEG, CAD, hypothyroidism, DM, colon, breast and ovarian cancer.  Interdisciplinary Rounds: did not attend     General Information Comments: Remote assessment for coverage. Pt intubated and sedated, requiring levophed, on propofol, MAP 75, plan to try for extubation today per note. With PEG, noted pt has hx of removing PEG tube. C-diff positive, also noted with STEFFANY - renal labs improving, K, phos low, repleting. Per chart, with 9.5% wt loss over the last 2 months, significant. NFPE needed to determine malnutrition status. RD to monitor and follow up.  Nutrition Discharge Planning: Pending clinical course  1/24/22 TF recs left 1/21, TF started @ 10 ml/hr yesterday, continues at same rate. No BM. +  "vent-possible plan for trach placement. NFPE done 1/24/21 no significant wasting seen.  1/26/22 + PEG. TF held for possible trach placemen however now pt extubated. Plan to continue with TFs.  1/28/21 TF held 1/26-today r/t tenuous respiratory status. TPN started yesterday per MD ( meeting 72% protein needs and 94% energy needs). Pt with O2 mask on today. Plan per MD to continue TPN and start trickle TF in the next 24-48 hr.  1/31/22 Pt was started on trickle TF's over the weekend. TPN reordered today as pt TF not advancing fast enough.  TF at 20 ml/hr, and tolerating today. Plan to advance toward goal per MD.  2/3/22 Pt appeared well nourished visually.  TF had been advanced to 45ml/hr and pt was tolerating.  Pt on oxymask at 4L/min.    2/10/22 Pt continues to tolerate TF @ goal via PEG + flush as ordered. Not responsive to questions at visit.  2/17/22 Pt continues to tolerate TF @ goal + brody and beneprotein. Awaiting nursing home placement.  2/23/22 No changes, pt continues to tolerate TF @ goal + brody and beneprotein. Awaiting nursing home placement today.  3/2/22  No changes, pt continues to tolerate TF @ goal + brody and beneprotein. Awaiting placement.  3/8/22 RD coverage- working remotely.  Isosource @ 45 mL/hr- beneprotein/ Brody BID. Pending [facility] placement. Gabriel Score 13. LBM 3/8. No changes.  3/14/22 Pt continues to tolerate TF @ goal. Plan for discharge today.        Discharge Planning:  TF above ( If bolus needed- 4.5 cans Isosource daily +75 ml flush before and after each bolus)     Nutrition/ Diet History  unable to obtain  Spiritual/cultural/Moravian factors affecting PO intakes  Factors affecting PO intakes: NPO, trouble swallowing     Nutrition Risk Screen  Nutrition Risk Screen: no indicators present     Anthropometrics  Height: 5' 4" (162.6 cm)  Height (inches): 64 in  Weight Method: Bed Scale  Weight: 63.9 kg 2/6, 69.1 kg 1/31, 71.5 kg 1/22, 70.3 kg 1/24, 69 kg (admission)  Weight " (lb): 140 lb ( edema)  Ideal Body Weight (IBW), Female: 120 lb  BMI (Calculated): 26.1 admission  81.6 kg 1/2020     Lab/Procedures/Meds  Pertinent Labs Reviewed: reviewed  BMP  Lab Results   Component Value Date     (L) 03/14/2022    K 4.1 03/14/2022    CL 97 03/14/2022    CO2 26 03/14/2022    BUN 25 (H) 03/14/2022    CREATININE 0.6 03/14/2022    CALCIUM 9.2 03/14/2022    ANIONGAP 11 03/14/2022    ESTGFRAFRICA >60 03/14/2022    EGFRNONAA >60 03/14/2022     102 mg/dl 3/14/22 ( glucose)    Pertinent Medications Reviewed: reviewed  statin     Estimated/Assessed Needs  Weight Used For Calorie Calculations: 68.9 kg (152 lb)  Energy Calorie Requirements (kcal): MSJ ( x 1.25-1.4) = 3685-8555 kcal  Energy Need Method: MSJ  Protein Requirements:  g/day based on 1.2-1.5 g protein /kg  Weight Used For Protein Calculations: 68.9 kg (152 lb)  Fluid Requirements (mL): 1 mL/kcal or per MD  Estimated Fluid Requirement Method: RDA Method  RDA Method (mL): 1450 ml  CHO Requirement: 160-195 g     Nutrition Prescription Ordered  Current Diet Order: TF above + NPO     Evaluation of Received Nutrient/Fluid Intake  Energy Calories Required: meeting needs  Protein Required: meeting needs  Fluid Required: meeting needs  Tolerance: tolerating  % Intake of Estimated Energy Needs: 100%  % Meal Intake: NPO + TF     Nutrition Risk  Level of Risk/Frequency of Follow-up: moderate/low (1-2x weekly)      Monitor and Evaluation  Food and Nutrient Intake: enteral nutrition intake  Food and Nutrient Adminstration: enteral and parenteral nutrition administration  Knowledge/Beliefs/Attitudes: food and nutrition knowledge/skill  Physical Activity and Function: nutrition-related ADLs and IADLs  Anthropometric Measurements: weight change  Biochemical Data, Medical Tests and Procedures: electrolyte and renal panel,gastrointestinal profile,glucose/endocrine profile  Nutrition-Focused Physical Findings: overall appearance skin     Nutrition  Follow-Up  RD Follow-up?: Yes

## 2022-03-14 NOTE — PLAN OF CARE
Patient cleared for discharge from case management standpoint.       03/14/22 1353   Final Note   Assessment Type Final Discharge Note   Anticipated Discharge Disposition long-term [facility] Resources/Appts/Education Provided Post-Acute resouces added to AVS;Provided patient/caregiver with written discharge plan information;Provided education on problems/symptoms using teachback

## 2022-03-14 NOTE — PLAN OF CARE
Per Alie with [facility] [phone], patient's nurse can call report.   will provide transportation.         03/14/22 1352   Post-Acute Status   Post-Acute Authorization Placement   Post-Acute Placement Status Set-up Complete/Auth obtained

## 2022-03-14 NOTE — PLAN OF CARE
9:00am KARLO spoke to with Alie [facility] [place] who stated they are waiting on patient's daughter to bring in 2 documents.  KARLO faxed facesheet, orders, chest xray, ppd/results, orders to [phone].  Fax confirmation received.    10:15am KARLO left voice message for patient's daughter to return call regarding discharge today.     03/14/22 1029   Post-Acute Status   Post-Acute Authorization Placement   Post-Acute Placement Status Pending post-acute provider review/more information requested

## 2022-03-14 NOTE — DISCHARGE INSTRUCTIONS
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]  Not Applicable    YARI (generalized anxiety disorder) [F41.1]  Yes    Type 2 diabetes mellitus, with long-term current use of insulin [E11.9, Z79.4]  Not Applicable    Hypothyroidism [E03.9]  Yes    Hyperlipidemia [E78.5]  Yes     Chronic    GERD (gastroesophageal reflux disease) [K21.9]  Yes     Chronic      Resolved Hospital Problems    Diagnosis Date Resolved POA    Ileus [K56.7] 01/23/2022 No    Demand ischemia [I24.8] 01/24/2022 Yes    Sepsis [A41.9] 01/24/2022 Yes    STEFFANY (acute kidney injury) [N17.9] 01/21/2022 Yes    Hyponatremia [E87.1] 01/20/2022 Yes    ARDS (adult respiratory distress syndrome) [J80] 02/01/2022 Yes    Aspiration pneumonia [J69.0] 02/16/2022 Yes     Allergies:   Review of patient's allergies indicates:   Allergen Reactions    Cephalexin (bulk)      Flushing and itching    Lidocaine base      rash    Lisinopril Other (See Comments)     cough       Code Status: Partial - no chest compression or cardioversion/defibrillation    Vitals: Routine       Diet: NPO. Isosource 1.5 [place] at 45cc/hour with 115 FWF q 4 hours with Brody and Beneprotein BID    Activity: Activity as tolerated    Nursing Precautions: Aspiration , Fall, Seizure, and Pressure ulcer prevention    Bed/Surface: Low Air Loss      Oxygen: 5 liters/min via nasal cannula    Dialysis: Patient is not on dialysis.     Pending Diagnostic Studies:       None          Imaging: none    Miscellaneous Care:   PEG Care:  Clean site every 24 hours  Routine Skin for Bedridden Patients:  Apply moisture barrier cream to all    IV Access: Peripheral     Medications: Discontinue all previous medication orders, if any. See new list  below.  Current Discharge Medication List        START taking these medications    Details   metoprolol tartrate (LOPRESSOR) 25 MG tablet 0.5 tablets (12.5 mg total) by Per G Tube route 2 (two) times daily.  Qty: 30 tablet, Refills: 2    Comments: .           CONTINUE these medications which have NOT CHANGED    Details   atorvastatin (LIPITOR) 40 MG tablet 1 tablet (40 mg total) by Per G Tube route every evening. Per peg tube      citalopram (CELEXA) 40 MG tablet citalopram 40 mg tablet   TAKE 1 TABLET BY MOUTH EVERY DAY      FLUoxetine 10 MG capsule 1 capsule (10 mg total) by Per G Tube route once daily. Per peg tube      fluticasone propionate (FLONASE) 50 mcg/actuation nasal spray fluticasone propionate 50 mcg/actuation nasal spray,suspension      glycopyrrolate (ROBINUL) 1 mg Tab 1 mg 2 (two) times daily. Per peg tube      ipratropium (ATROVENT) 21 mcg (0.03 %) nasal spray ipratropium bromide 21 mcg (0.03 %) nasal spray      levothyroxine (SYNTHROID) 25 MCG tablet 25 mcg every morning. At 0600 per peg tube      midodrine (PROAMATINE) 5 MG Tab 1 tablet (5 mg total) by Per G Tube route 3 (three) times daily.      multivitamin (THERAGRAN) tablet 1 tablet once daily. Per peg tube      pantoprazole (PROTONIX) 40 mg suspension 1 packet (40 mg total) by Per G Tube route once daily. Per peg tube           STOP taking these medications       ALPRAZolam (XANAX) 0.25 MG tablet Comments:   Reason for Stopping:         celecoxib (CELEBREX) 100 MG capsule Comments:   Reason for Stopping:         hydrALAZINE (APRESOLINE) 25 MG tablet Comments:   Reason for Stopping:         HYDROcodone-acetaminophen (NORCO) 5-325 mg per tablet Comments:   Reason for Stopping:         insulin lispro 100 unit/mL injection Comments:   Reason for Stopping:         LIDOcaine (XYLOCAINE) 5 % Oint ointment Comments:   Reason for Stopping:         metoclopramide HCl (REGLAN) 5 mg/5 mL Soln Comments:   Reason for Stopping:             Follow up:     Follow-up Information       Primary Doctor No .    Why: Discuss timing or whether to initiate anti-coagulation for Afib in setting of prior subdural hemorrhage              [facility] Follow up.    Specialty: Skilled Nursing Facility  Contact information:  [address] [place] [id number]  [phone]                             Immunizations Administered as of 3/14/2022       No immunizations on file.                Dinah Motta MD

## 2022-03-14 NOTE — PLAN OF CARE
Feeding continued, turned q2, incontinence care provided. Contact precautions maintained. IV at KVO. Safety maintained.

## 2022-03-15 NOTE — DISCHARGE SUMMARY
[facility]  Discharge Summary      Patient Name: Sandra Wilson  MRN: [id number]  Patient Class: IP- Inpatient  Admission Date: 1/18/2022  Hospital Length of Stay: 55 days  Discharge Date and Time: 3/14/2022  4:39 PM  Attending Physician: Migdalia att. providers found   Discharging Provider: Dinah Motta MD  Primary Care Provider: Primary Doctor Migdalia      HPI:   Sandra Wilson is an 81 year old female with a past medical history of CVAs, CNS aneurysm, PEG status, CAD, HLD, hypothryoidism, DM, aortic stenosis, and colon, breast, and ovarian cancer who presented from long term care with vomiting, diarrhea, and shortness of breath. Workup in the ED showed likely aspiration pneumonitis and C. Diff colitis. Her O2 requirement increased while in the ED requiring intubation with sedation. She also required Levophed as she likely developed septic shock. Antibiotics were broadened to cefepime, vancomycin, and Flagyl. Pulmonary was consulted. Family was notified. The patient was unable to provide history given acuity of illness.      Procedure(s) (LRB):  CREATION, TRACHEOSTOMY (N/A)      Hospital Course:   Sandra Wilson is an 81 year old female with a past medical history of CVAs, CNS aneurysm, PEG status, CAD, HLD, hypothyroidism, DM, aortic stenosis, and colon, breast, and ovarian cancer who presented from long term care with vomiting, diarrhea, and shortness of breath secondary to acute hypoxic respiratory failure from aspiration pneumonia in the setting of C diff colitis leading to septic shock. She was intubated in the ED and started on Levophed infusion via RIJ CVC placed by Dr. Vignesh Gaspar. She was started on broad spectrum antibiotics with cefepime, Flagyl, vancomycin and admitted to the ICU. She was also started on enteral vancomycin for severe C diff colitis given elevated WBC count and STEFFANY. There was concern for developing ARDS given P/F ratio of 87 (severe); 190 1/22 (moderate).  Pulmonary was consulted. She also presented with demand ischemia likely secondary to septic shock. Troponin was trended and improved as shock resolved. Normal saline infusion was added for hyponatremia and STEFFANY which improved both. Levophed was able to be weaned. Family agreed to DNR status 1/18 and Palliative Care was consulted to discuss goal of care 1/19. Antibiotics were changed to doxycycline and Flagyl (GBS in sputum culture) 1/21; vancomycin was added 1/23 after culture also became positive for Staph aureus and Klebsiella. Her respiratory status has improved throughout her course and she was extubated (code changed to partial code for intubation) 1/21 only to be re-intubated for worsening stridor (history of tracheostomy). Upon re-intubation, copious amounts of secretions were suctioned. KUB suggested a mild ileus A bowel regimen was instituted and the patient was able to have bowel movements 1/22. Tube feeds were started 1/23. Her course was complicated by Afib as well noted on telemetry 1/21; metoprolol tartrate started 1/22.She failed a leak test 1/22; Surgery was consulted for tracheostomy which is pending conversation with family regarding goals of care.    Patient was set to go for trach on 01/26, but attempt was made for another trial extubation and the patient did well after extubation, she was transition to face mask and was able to maintain her saturations although had significant difficulty with secretions.  She was started on Robinul and scopolamine patch which did seem to improve for secretions.  Her respiratory status continued to be somewhat tenuous, so she was monitored in the ICU.  Family expressed desire to not have her get a tracheostomy.  Case management began working on LTACH referral. She was transferred out of the ICU    Patient was stable for discharge and tolerating tube feeds. Discharge was delayed for weeks because of family barriers. Eventually all paperwork and financial  information was given to [facility] and she was accepted there. Patient was discharged in stable condition.        Goals of Care Treatment Preferences:  Code Status: Partial Code      Consults:   Consults (From admission, onward)        Status Ordering Provider     Inpatient virtual consult to Hospital Medicine  Once        Provider:  (Not yet assigned)    Completed NAVEEN WONG     Inpatient virtual consult to Hospital Medicine  Once        Provider:  (Not yet assigned)    Completed ROBERTO HEIN     Inpatient consult to Social Work/Case Management  Once        Provider:  (Not yet assigned)    Completed MIKE GAGNON     Inpatient consult to Midline team  Once        Provider:  (Not yet assigned)    Completed MIKE GAGNON     Inpatient consult to Social Work/Case Management  Once        Provider:  (Not yet assigned)    Completed MIKE GAGNON     Inpatient consult to Registered Dietitian/Nutritionist  Once        Provider:  (Not yet assigned)    Completed MELISSA PINTO     Inpatient consult to General Surgery  Once        Provider:  Abdulkadir Marrufo MD    Completed JAYCE ARITA     Inpatient consult to Registered Dietitian/Nutritionist  Once        Provider:  (Not yet assigned)    Completed DONNA CONTEH     Inpatient consult to Palliative Care  Once        Provider:  Catracho Crabtree MD    Completed DONNA CONTEH     Inpatient consult to Pulmonology  Once        Provider:  Jayce Arita MD    Completed DONNA CONTEH          No new Assessment & Plan notes have been filed under this hospital service since the last note was generated.  Service: Hospital Medicine    Final Active Diagnoses:    Diagnosis Date Noted POA    PRINCIPAL PROBLEM:  Acute hypoxemic respiratory failure [J96.01] 01/18/2022 Yes    Anemia [D64.9] 02/01/2022 Yes    Paroxysmal atrial fibrillation [I48.0] 01/21/2022 No    Gastrostomy tube dependent [Z93.1] 01/19/2022 Not Applicable    YARI  (generalized anxiety disorder) [F41.1] 01/18/2022 Yes    Type 2 diabetes mellitus, with long-term current use of insulin [E11.9, Z79.4] 01/18/2022 Not Applicable    Hypothyroidism [E03.9] 12/15/2021 Yes    Hyperlipidemia [E78.5]  Yes     Chronic    GERD (gastroesophageal reflux disease) [K21.9]  Yes     Chronic      Problems Resolved During this Admission:    Diagnosis Date Noted Date Resolved POA    Ileus [K56.7] 01/21/2022 01/23/2022 No    Demand ischemia [I24.8] 01/19/2022 01/24/2022 Yes    Sepsis [A41.9] 01/19/2022 01/24/2022 Yes    STEFFANY (acute kidney injury) [N17.9] 01/19/2022 01/21/2022 Yes    Hyponatremia [E87.1] 01/19/2022 01/20/2022 Yes    ARDS (adult respiratory distress syndrome) [J80] 01/19/2022 02/01/2022 Yes    Aspiration pneumonia [J69.0] 01/18/2022 02/16/2022 Yes       Discharged Condition: good    Disposition: care home Nursing Home    Follow Up:   Follow-up Information     Primary Doctor No .    Why: Discuss timing or whether to initiate anti-coagulation for Afib in setting of prior subdural hemorrhage            [facility] Follow up.    Specialty: Skilled Nursing Facility  Contact information:  [address] [address]  [phone]                     Patient Instructions:      Diet NPO     Notify your health care provider if you experience any of the following:  temperature >100.4     Notify your health care provider if you experience any of the following:  persistent nausea and vomiting or diarrhea     Notify your health care provider if you experience any of the following:  severe uncontrolled pain     Notify your health care provider if you experience any of the following:  redness, tenderness, or signs of infection (pain, swelling, redness, odor or green/yellow discharge around incision site)     Notify your health care provider if you experience any of the following:  difficulty breathing or increased cough     Notify your health care  provider if you experience any of the following:  severe persistent headache     Notify your health care provider if you experience any of the following:  persistent dizziness, light-headedness, or visual disturbances     Notify your health care provider if you experience any of the following:  increased confusion or weakness     Activity as tolerated     Activity as tolerated       Significant Diagnostic Studies: Labs:   BMP:   Recent Labs   Lab 03/14/22  0504      *   K 4.1   CL 97   CO2 26   BUN 25*   CREATININE 0.6   CALCIUM 9.2    and CBC   Recent Labs   Lab 03/14/22  0505   WBC 7.77   HGB 11.4*   HCT 35.0*        X-Ray Chest AP Portable  Order: [id number]   Status: Final result     Visible to patient: No (inaccessible in Patient Portal)     Next appt: None     Dx: Hypoxia     0 Result Notes    Details    Reading Physician Reading Date Result Priority   Mathew Yanez Jr., MD  [phone] 1/18/2022 STAT     Narrative & Impression  EXAMINATION:  XR CHEST AP PORTABLE     CLINICAL HISTORY:  Hypoxemia     TECHNIQUE:  Single frontal view of the chest was performed.     COMPARISON:  None     FINDINGS:  A loop recorder is noted in the left chest.  A  shunt tube crosses the left chest.  The mediastinal and cardiac size and contours normal.  There is patchy infiltrate throughout the central right lung and at the left lung base.  No pneumothorax is seen.     Impression:     Continued patchy infiltrates throughout the central right lung and at the left lung base.  Loop recorder and  shunt tube in place.        Electronically signed by: Mathew Yanez MD  Date:                                            01/18/2022  Time:                                           08:54     X-Ray Chest AP Portable  Order: [id number]   Status: Final result     Visible to patient: No (inaccessible in Patient Portal)     Next appt: None     Dx: Encounter for intubation     0 Result Notes    Details    Reading  Physician Reading Date Result Priority   Mathew Yanez Jr., MD  [phone] 1/18/2022 STAT     Narrative & Impression  EXAMINATION:  XR CHEST AP PORTABLE     CLINICAL HISTORY:  Encounter for other preprocedural examination     TECHNIQUE:  Single frontal view of the chest was performed.     COMPARISON:  Chest x-ray of January 18, 2022 at 08:42.     FINDINGS:  An endotracheal tube has been placed ending in the trachea above the level the malorie.  A loop recorder remains in the left chest wall and a  shunt tube remains crossing the left chest.  The cardiac size and contour is within normal limits.  There is a significant improvement in the right central lung infiltrate and decreased infiltrate on the left as well.  Peripheral consolidation is not seen.  There is no pneumothorax or pleural effusion.     Impression:     Significantly decreased intrapulmonary infiltrates on both sides.  Endotracheal tube placed in good position.   shunt tube noted.        Electronically signed by: Mathew Yanez MD  Date:                                            01/18/2022  Time:                                           13:35             Exam Ended: 01/18/22 13:33 Last Resulted: 01           X-Ray Chest AP Portable  Order: [id number]   Status: Final result     Visible to patient: No (inaccessible in Patient Portal)     Next appt: None     Dx: Encounter for central line placement     0 Result Notes    Details    Reading Physician Reading Date Result Priority   Milton Dimas MD  [phone]  [phone] 1/18/2022 STAT     Narrative & Impression  EXAMINATION:  XR CHEST AP PORTABLE     CLINICAL HISTORY:  Encounter for adjustment and management of vascular access device     TECHNIQUE:  Single frontal view of the chest was performed.     COMPARISON:  01/18/2022     FINDINGS:  A new right IJ central line has been placed and has its tip in superior vena cava.  The endotracheal tube has its tip 4 cm above the malorie.  A new  nasogastric has been placed and has its tip in the body of the stomach.  A ventricular peritoneal shunt catheter courses over the left chest.  Cardiac loop recorder is present.  The cardiomediastinal silhouette is with normal limits.  There is moderate patchy airspace disease at the right lung base and mild patchy disease at the left lung base.  There is no significant pleural effusion.  There is no pneumothorax.     Impression:     Appropriate positioning of support devices.  Right greater than left bibasilar airspace disease without significant change.        Electronically signed by: Milton Dimas MD  Date:                                            01/18/2022  Time:                                           14:39             Exam Ended: 01/18/22 14:26 Last Resulted: 01/18/22 14:39         X-Ray Chest 1 View  Order: [id number]   Status: Final result     Visible to patient: No (inaccessible in Patient Portal)     Next appt: None     0 Result Notes    Details    Reading Physician Reading Date Result Priority   Mathew Yanez Jr., MD  [phone] 1/18/2022 STAT     Narrative & Impression  EXAMINATION:  XR CHEST 1 VIEW     CLINICAL HISTORY:  respiratory failure;     TECHNIQUE:  Single frontal view of the chest was performed.     COMPARISON:  Chest portable of January 18, 2022     FINDINGS:  An endotracheal tube ends in the trachea above the level the malorie.  An NG tube traverses the esophagus to the stomach.  A central line enters at the right neck and ends in the superior vena cava.  The cardiac size and contours within normal limits.  A loop recorder is noted in the left chest.  There is continued central right lung infiltrate and small infiltrate at the left lung base.  No pneumothorax is seen.     Impression:     Continued intrapulmonary infiltrates right greater than left.  Endotracheal tube, NG tube and right central lines in position.        Electronically signed by: Mathew Yanez MD  Date:                                             01/18/2022  Time:                                           15:50     X-Ray Chest 1 View  Order: [id number]   Status: Final result     Visible to patient: No (inaccessible in Patient Portal)     Next appt: None     0 Result Notes    Details    Reading Physician Reading Date Result Priority   Milton Dimas MD  [phone]  [phone] 1/19/2022 Routine     Narrative & Impression  EXAMINATION:  XR CHEST 1 VIEW     CLINICAL HISTORY:  resp failure;     TECHNIQUE:  Single frontal view of the chest was performed.     COMPARISON:  01/18/2022     FINDINGS:  Right IJ central line, endotracheal tube, and nasogastric tube are unchanged in positions.  A ventricular peritoneal shunt catheter courses over the left chest.  Cardiac loop recorder is present.  The cardiomediastinal silhouette is with normal limits.  There is moderate airspace disease predominating at the right lung base and right perihilar region and lesser extent left lung base.  These findings are unchanged.  There is no pleural effusion or pneumothorax.     Impression:     Unchanged right greater than left pulmonary airspace disease.     Stable positioning of support devices.        Electronically signed by: Milton Dimas MD  Date:                                            01/19/2022  Time:                                           08:24     US Lower Extremity Veins Bilateral  Order: [id number]   Status: Final result     Visible to patient: No (inaccessible in Patient Portal)     Next appt: None     0 Result Notes    Details    Reading Physician Reading Date Result Priority   Mathew Yanez Jr., MD  [phone] 1/19/2022 Routine     Narrative & Impression  EXAMINATION:  US LOWER EXTREMITY VEINS BILATERAL     CLINICAL HISTORY:  resp failure;     TECHNIQUE:  Duplex and color flow Doppler and dynamic compression was performed of the bilateral lower extremity veins was  performed.     COMPARISON:  None     FINDINGS:  Right thigh veins: The common femoral, femoral, popliteal, upper greater saphenous, and deep femoral veins are patent and free of thrombus. The veins are normally compressible and have normal phasic flow and augmentation response.     Right calf veins: The visualized calf veins are patent.     Left thigh veins: The common femoral, femoral, popliteal, upper greater saphenous, and deep femoral veins are patent and free of thrombus. The veins are normally compressible and have normal phasic flow and augmentation response.     Left calf veins: The visualized calf veins are patent.     Miscellaneous: None     Impression:     No evidence of deep venous thrombosis in either lower extremity.        X-Ray Abdomen AP 1 View  Order: [id number]   Status: Final result     Visible to patient: No (inaccessible in Patient Portal)     Next appt: None     0 Result Notes    Details    Reading Physician Reading Date Result Priority   Milton Dimas MD  [phone]  [phone] 1/19/2022 Routine     Narrative & Impression  EXAMINATION:  XR ABDOMEN AP 1 VIEW     CLINICAL HISTORY:  c diff colitis;     TECHNIQUE:  AP View(s) of the abdomen was performed.     COMPARISON:  None     FINDINGS:  There has been a prior gastrostomy.  A nasogastric has its tip in the body of the stomach.  A ventricular peritoneal shunt catheter courses through the left hemiabdomen with its tip in the low pelvis.  The bowel gas pattern is nonobstructed.     Impression:     No acute abdominal process.        Electronically signed by: Milton Dimas MD  Date:                                            01/19/2022  Time:                                           14:09     X-Ray Chest 1 View  Order: [id number]   Status: Final result     Visible to patient: No (inaccessible in Patient Portal)     Next appt: None     0 Result Notes    Details    Reading Physician Reading Date Result Priority   Mathew Yanez  MD Adela  [phone] 1/20/2022 Routine     Narrative & Impression  EXAMINATION:  XR CHEST 1 VIEW     CLINICAL HISTORY:  resp failure;     TECHNIQUE:  Single frontal view of the chest was performed.     COMPARISON:  Chest of January 19, 2022     FINDINGS:  A loop recorder is noted in the left chest.  An endotracheal tube ends in the trachea above the level the malorie.  An NG tube traverses the esophagus to the stomach.  A central line enters at the right neck and ends in the superior vena cava.  The cardiac size and contours within normal limits.  There is a significant decrease in the right lung infiltrate with some remaining centrally and at the base.  Faint infiltrate at the left base is still seen.  No pneumothorax is noted.     Impression:     Significant improvement in right lung infiltrate.  Faint infiltrates remain at the bases.  Endotracheal tube, NG tube and right central line in good position.           X-Ray Chest 1 View  Order: [id number]   Status: Final result     Visible to patient: No (inaccessible in Patient Portal)     Next appt: None     0 Result Notes    Details    Reading Physician Reading Date Result Priority   Milton Dimas MD  [phone]  [phone] 1/21/2022 Routine     Narrative & Impression  EXAMINATION:  XR CHEST 1 VIEW     CLINICAL HISTORY:  resp failure;     TECHNIQUE:  Single frontal view of the chest was performed.     COMPARISON:  01/20/2022     FINDINGS:  The endotracheal tube, right IJ central line, and nasogastric tube are unchanged in positions.  A shunt catheter courses over the left chest.  A cardiac loop recorder is present.  The cardiomediastinal silhouette is with normal limits.  Moderate multifocal airspace disease is present bilaterally predominating in the perihilar regions and lung bases and with mild interval worsening.  There is no pleural effusion or pneumothorax.     Impression:     Stable positioning of support devices.  Moderate pulmonary infiltrates with  mild worsening.        Narrative & Impression  EXAMINATION:  XR CHEST AP PORTABLE     CLINICAL HISTORY:  intunbation;     TECHNIQUE:  Single frontal view of the chest was performed.     COMPARISON:  01/21/2022     FINDINGS:  The nasogastric tube is been removed.  The endotracheal tube and right IJ central line remain in good position.  Shunt catheter courses over the left chest.  Cardiac loop recorder is in place.  The cardiomediastinal silhouette is with normal limits.  There is moderate right and mild left multifocal airspace disease, noting improvement in the left perihilar region and otherwise no significant change.     Impression:     Good positioning of support devices.  Interval removal of nasogastric tube.  Pulmonary infiltrates, mildly improved on the left.    EXAMINATION:  XR ABDOMEN AP 1 VIEW     CLINICAL HISTORY:  ileus;     TECHNIQUE:  AP View(s) of the abdomen was performed.     COMPARISON:  01/19/2022     FINDINGS:  A gastrostomy tube is present.  A ventricular peritoneal shunt catheter terminates in the low left hemipelvis.  There is a catheter in the midline of the low pelvis.  A cardiac loop recorder is partially imaged.  There is dense mitral annular calcification.  There is moderate air distention of the stomach.  A few mildly dilated loops of small bowel are present measuring up to 3 cm within the left hemiabdomen.     Impression:     Mild small-bowel dilatation favoring ileus.  Low-grade bowel obstruction cannot be excluded.  Attention on follow-up recommended.    X-Ray Chest AP Portable  Order: [id number]   Status: Final result     Visible to patient: No (inaccessible in Patient Portal)     Next appt: None     0 Result Notes    Details    Reading Physician Reading Date Result Priority   Milton Dimas MD  [phone]  [phone] 3/12/2022 Routine     Narrative & Impression  EXAMINATION:  XR CHEST AP PORTABLE     CLINICAL HISTORY:  Placement. pneumonia earlier in  hospitalization;     TECHNIQUE:  Single frontal view of the chest was performed.     COMPARISON:  02/01/2022     FINDINGS:  A right IJ central line is been removed.  A shunt catheter courses over the left chest.  Cardiac loop recorder is in place.  The cardiomediastinal silhouette is with normal limits.  There is some slight infiltrate in the right lung, with overall significantly improved aeration.  Left lung is clear.  There is no pleural effusion.     Impression:     Slight residual right lung infiltrate.        Electronically signed by: Milton Dimas MD  Date:                                            03/12/2022  Time:                                           06:30         Pending Diagnostic Studies:     None         Medications:  Reconciled Home Medications:      Medication List      START taking these medications    metoprolol tartrate 25 MG tablet  Commonly known as: LOPRESSOR  0.5 tablets (12.5 mg total) by Per G Tube route 2 (two) times daily.        CONTINUE taking these medications    atorvastatin 40 MG tablet  Commonly known as: LIPITOR  1 tablet (40 mg total) by Per G Tube route every evening. Per peg tube     citalopram 40 MG tablet  Commonly known as: CeleXA  citalopram 40 mg tablet   TAKE 1 TABLET BY MOUTH EVERY DAY     FLUoxetine 10 MG capsule  1 capsule (10 mg total) by Per G Tube route once daily. Per peg tube     fluticasone propionate 50 mcg/actuation nasal spray  Commonly known as: FLONASE  fluticasone propionate 50 mcg/actuation nasal spray,suspension     glycopyrrolate 1 mg Tab  Commonly known as: ROBINUL  1 mg 2 (two) times daily. Per peg tube     ipratropium 21 mcg (0.03 %) nasal spray  Commonly known as: ATROVENT  ipratropium bromide 21 mcg (0.03 %) nasal spray     levothyroxine 25 MCG tablet  Commonly known as: SYNTHROID  25 mcg every morning. At 0600 per peg tube     midodrine 5 MG Tab  Commonly known as: PROAMATINE  1 tablet (5 mg total) by Per G Tube route 3 (three) times  daily.     multivitamin tablet  Commonly known as: THERAGRAN  1 tablet once daily. Per peg tube     pantoprazole 40 mg suspension  Commonly known as: PROTONIX  1 packet (40 mg total) by Per G Tube route once daily. Per peg tube        STOP taking these medications    ALPRAZolam 0.25 MG tablet  Commonly known as: XANAX     celecoxib 100 MG capsule  Commonly known as: CeleBREX     hydrALAZINE 25 MG tablet  Commonly known as: APRESOLINE     HYDROcodone-acetaminophen 5-325 mg per tablet  Commonly known as: NORCO     insulin lispro 100 unit/mL injection     LIDOcaine 5 % Oint ointment  Commonly known as: XYLOCAINE     metoclopramide HCl 5 mg/5 mL Soln  Commonly known as: REGLAN            Indwelling Lines/Drains at time of discharge:   Lines/Drains/Airways     Drain  Duration                Gastrostomy/Enterostomy Percutaneous endoscopic gastrostomy (PEG) LUQ -- days                Time spent on the discharge of patient: 35 minutes         Dinah Motta MD  Department of Hospital Medicine  [facility]

## 2023-02-01 NOTE — PROGRESS NOTES
[facility] Hospital Medicine  Progress Note    Patient Name: Sandra Wilson  MRN: [id number]  Patient Class: IP- Inpatient   Admission Date: 1/18/2022  Length of Stay: 15 days  Attending Physician: Felipe Kruse MD  Primary Care Provider: Primary Doctor No        Subjective:     Principal Problem:Acute hypoxemic respiratory failure        HPI:  Sandra Wilson is an 81 year old female with a past medical history of CVAs, CNS aneurysm, PEG status, CAD, HLD, hypothryoidism, DM, aortic stenosis, and colon, breast, and ovarian cancer who presented from long term care with vomiting, diarrhea, and shortness of breath. Workup in the ED showed likely aspiration pneumonitis and C. Diff colitis. Her O2 requirement increased while in the ED requiring intubation with sedation. She also required Levophed as she likely developed septic shock. Antibiotics were broadened to cefepime, vancomycin, and Flagyl. Pulmonary was consulted. Family was notified. The patient was unable to provide history given acuity of illness.      Overview/Hospital Course:  Sandra Wilson is an 81 year old female with a past medical history of CVAs, CNS aneurysm, PEG status, CAD, HLD, hypothyroidism, DM, aortic stenosis, and colon, breast, and ovarian cancer who presented from long term care with vomiting, diarrhea, and shortness of breath secondary to acute hypoxic respiratory failure from aspiration pneumonia in the setting of C diff colitis leading to septic shock. She was intubated in the ED and started on Levophed infusion via RIJ CVC placed by Dr. Vignesh Gaspar. She was started on broad spectrum antibiotics with cefepime, Flagyl, vancomycin and admitted to the ICU. She was also started on enteral vancomycin for severe C diff colitis given elevated WBC count and STEFFANY. There was concern for developing ARDS given P/F ratio of 87 (severe); 190 1/22 (moderate). Pulmonary was consulted. She also presented with demand ischemia likely  TRANSFER - IN REPORT:    Verbal report received from [place] [id number] [facility] on Gail Garcia  being received from ICU for routine progression of patient care      Report consisted of patient's Situation, Background, Assessment and   Recommendations(SBAR). Information from the following report(s) Nurse Handoff Report was reviewed with the receiving nurse. Opportunity for questions and clarification was provided. Assessment completed upon patient's arrival to unit and care assumed. secondary to septic shock. Troponin was trended and improved as shock resolved. Normal saline infusion was added for hyponatremia and STEFFANY which improved both. Levophed was able to be weaned. Family agreed to DNR status 1/18 and Palliative Care was consulted to discuss goal of care 1/19. Antibiotics were changed to doxycycline and Flagyl (GBS in sputum culture) 1/21; vancomycin was added 1/23 after culture also became positive for Staph aureus and Klebsiella. Her respiratory status has improved throughout her course and she was extubated (code changed to partial code for intubation) 1/21 only to be re-intubated for worsening stridor (history of tracheostomy). Upon re-intubation, copious amounts of secretions were suctioned. KUB suggested a mild ileus A bowel regimen was instituted and the patient was able to have bowel movements 1/22. Tube feeds were started 1/23. Her course was complicated by Afib as well noted on telemetry 1/21; metoprolol tartrate started 1/22.She failed a leak test 1/22; Surgery was consulted for tracheostomy which is pending conversation with family regarding goals of care.    Patient was set to go for trach on 01/26, but attempt was made for another trial extubation and the patient did well after extubation, she was transition to face mask and was able to maintain her saturations although had significant difficulty with secretions.  She was started on Robinul and scopolamine patch which did seem to improve for secretions.  Her respiratory status continued to be somewhat tenuous, so she was monitored in the ICU.      Interval History:  No changes in status.  No fevers.  Significant airway secretions.  Needs suctioning several times a day.  LTACH referral ordered today.    Review of Systems   Very quiet and hoarse voice.  Hard to comprehend what she's saying.    Objective:     Vital Signs (Most Recent):  Temp: 98.1 °F (36.7 °C) (02/02/22 1930)  Pulse: 73 (02/02/22 2100)  Resp: 17 (02/02/22  2100)  BP: (!) 123/56 (02/02/22 2100)  SpO2: 100 % (02/02/22 2100) Vital Signs (24h Range):  Temp:  [97.9 °F (36.6 °C)-98.6 °F (37 °C)] 98.1 °F (36.7 °C)  Pulse:  [67-79] 73  Resp:  [16-22] 17  SpO2:  [91 %-100 %] 100 %  BP: (120-162)/(56-76) 123/56     Weight: 65.9 kg (145 lb 4.5 oz)  Body mass index is 24.94 kg/m².    Intake/Output Summary (Last 24 hours) at 2/2/2022 2122  Last data filed at 2/2/2022 1800  Gross per 24 hour   Intake 2684.68 ml   Output 1250 ml   Net 1434.68 ml      Physical Exam  Constitutional:       General: She is not in acute distress.     Appearance: She is not ill-appearing.   Eyes:      General:         Right eye: No discharge.         Left eye: No discharge.   Neck:      Vascular: No JVD.   Cardiovascular:      Rate and Rhythm: Normal rate and regular rhythm.   Pulmonary:      Effort: Pulmonary effort is normal.      Breath sounds: Normal breath sounds.   Abdominal:      General: Abdomen is flat. Bowel sounds are normal. There is no distension.      Palpations: Abdomen is soft.      Tenderness: There is no abdominal tenderness.   Musculoskeletal:      Right lower leg: No edema.      Left lower leg: No edema.   Skin:     General: Skin is warm and moist.      Findings: No rash.   Neurological:      Mental Status: She is alert.   Psychiatric:         Mood and Affect: Mood and affect normal.         Significant Labs: All pertinent labs within the past 24 hours have been reviewed.    Significant Imaging: I have reviewed all pertinent imaging results/findings within the past 24 hours.      Assessment/Plan:      * Acute hypoxemic respiratory failure  Improving.  Continue supplementing oxygen.  Monitor sats.  Monitor work of breathing.  She's partial code, specifying that she'd want to be intubated if need be.    Anemia  Worsening.  Monitor HGB.  Transfuse if less than 7.    Hemoglobin   Date Value Ref Range Status   02/02/2022 7.4 (L) 12.0 - 16.0 g/dL Final   02/01/2022 7.3 (L) 12.0 - 16.0 g/dL  "Final   01/31/2022 7.6 (L) 12.0 - 16.0 g/dL Final   01/30/2022 8.4 (L) 12.0 - 16.0 g/dL Final   ]      Paroxysmal atrial fibrillation  -Telemetry  - controlled on metoprolol 12.5 mg BID  - HGB is dropping, so will hold off on giving DOAC      Gastrostomy tube dependent  Stable.  No issues with it    Aspiration pneumonia  Continue antibiotics as they are but will stop them soon.  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  She's producing a lot of airway secretions and needs frequent suctioning.  She's high risk of aspiration pneumonia.  Will try to get her into LTACH for long-term care.    Antibiotics (From admission, onward)            Start     Stop Route Frequency Ordered    02/01/22 0530  vancomycin 1.25 g in dextrose 5% 250 mL IVPB (ready to mix)         -- IV Every 24 hours (non-standard times) 01/31/22 2154    01/23/22 1047  vancomycin - pharmacy to dose  (vancomycin IVPB)        "And" Linked Group Details    -- IV pharmacy to manage frequency 01/23/22 0947    01/18/22 1545  metronidazole IVPB 500 mg         -- IV Every 8 hours (non-standard times) 01/18/22 1441         Will monitor patient closely and continue current treatment plan unchanged.        Type 2 diabetes mellitus, with long-term current use of insulin  Patient's FSGs are controlled on current medication regimen.  Most recent fingerstick glucose reviewed-   Recent Labs   Lab 02/02/22  0023 02/02/22  0519 02/02/22  1756   POCTGLUCOSE 104 113* 127*     Current correctional scale  Medium  Maintain anti-hyperglycemic dose as follows-   Antihyperglycemics (From admission, onward)            Start     Stop Route Frequency Ordered    01/18/22 1438  insulin aspart U-100 pen 1-10 Units         -- SubQ Before meals & nightly PRN 01/18/22 1438        Hold Oral hypoglycemics while patient is in the hospital.        YARI (generalized anxiety disorder)  Extubated on 01/26.  Using Precedex as needed to control anxiety.  Patient does not appear anxious on " exam.      Hypothyroidism  Patient has chronic hypothyroidism. TFTs reviewed-   Lab Results   Component Value Date    TSH 1.130 07/13/2017   . Will continue chronic levothyroxine and adjust for and clinical changes.        Hyperlipidemia   Patient is chronically on statin.will continue for now. Monitor clinically. Last LDL was   Lab Results   Component Value Date    LDLCALC 94.4 07/13/2017          GERD (gastroesophageal reflux disease)  On famotidine.      VTE Risk Mitigation (From admission, onward)         Ordered     enoxaparin injection 40 mg  Daily         01/18/22 1438     IP VTE HIGH RISK PATIENT  Once         01/18/22 1438     Place sequential compression device  Until discontinued         01/18/22 1438                Discharge Planning   DEMETRIUS:      Code Status: Partial Code   Is the patient medically ready for discharge?:     Reason for patient still in hospital (select all that apply): Patient trending condition, Treatment and Pending disposition  Discharge Plan A: Return to nursing home            Felipe Kruse MD  Department of Hospital Medicine   [facility]

## 2024-09-04 NOTE — NURSING
Awake. Coarse breath sounds. Encouraged cough. Very weak, nonproductive.  NT suctioned large amt thick yellow secretions.   Remains on 3L oxymask.   
Consulted with pharmacist regarding vanc trough 21.1 and patient due to receive IV and oral vanc at this time. Pharmacist ordered to hold IV vanc and times would be adjusted for future dosing. Okay to given oral vanc at this time per pharmacist recommendation.    
NAD noted. [id number]. Pt safe and free of falls. Bed in lowest position. Call light in reach. Bed rails up x3. Bed alarm on and wheels locked. Will continue to monitor.  
NAD noted. Pt resting comfortably. [id number]. Pt safe and free of falls. Bed in lowest position. Bed rails up x3. Call light in reach. Bed alarm on and wheels locked. Will continue to monitor.  
Patient laying in bed awake, television on. Patient tolerated repositioning throughout the shift. Patient still has redness to buttocks mepilex remains intact ointment applied. O2 @1L N/C. Tele in place NSR. Bed in lowest position, call bell in reach, bed alarm set. Will continue to monitor patient.   
Patient's HR sustaining above 130 in atrial fib. PRN Metoprolol administered as ordered. HR remains in afib ranging from  bpm but not sustaining above 130. Will continue to monitor.   
Pt discharged to facility transport. IV and telemetry removed without complication. Pt alert and able to get into wheelchair. Vitals are stable.  
Pt in Afib with runs of SVT. HR then sustained 140-145bpm. PRN 5mg lopressor IVP given, relief noted. Pt denies pain or discomfort. PO lopressor scheduled to be given also. Will continue to monitor.   
Pt in respiratory distress after extubation. Dr. Gao at bedside to assess patient. Instructed to call anesthesia to intubate patient.     1150: Dr. Jaramillo at bedside to intubate patient. 60mg of Propofol and 40 mg of Rocuronium for intubation. Tube secured at 23 at the lip. Pt tolerated procedure well. Cxr ordered for placement verification.  
Pt seen today in rm 221 for wound care f/u- buttocks/sacrum. Pt lying in bed, turned to right. Pt sleeping but briefly woke during wound care. Pt's medial sacral area is closed, dried skin and cream in place.  Pt does have skin discoloration vs stage1 PI to sacral area.  A silicone border foam dressing was in place. Dressing was lifted for assessment and reapplied. Pt repositioned on right side with pillows to offload sacral/buttock area. Pt tolerated wound care and went back to sleep. Would suggest using Triad wound cream 2x a day to buttocks/sacrum as well as an offloading air mattress. Pt does have bilateral heel lift boots in place.           
Pt with heart rhythm of a-fib. HR decreased to 40bpm, sustained for about 45 seconds. BP maintained through episode of bradycardia. Pt in no acute distress at this time. Other VSS. Dr. Charles notified. EKG and troponin ordered.  
Pt's chart reviewed for wound care f/u. Spoke with staff nurse re: wound care. Assisted by nurse to turn pt for skin assessment of buttocks/sacrum. Pt has PurWick in place as well as heel lift boots. Pt was turned to left side with pillows offloading sacral area. Pt required total assist x1 to turn. Sacrum clean with redness- scar tissue vs PI. No open areas noted. Suggest to continue using a zinc based barrier cream for protection as well as pump for air mattress. Staff nurse to inquire about air mattress. Pt repositioned.   
Pt's daughter Ira called asking if it would be ok for herself, Notary, and 2 witnesses to come to the hospital tomorrow @ 0900 to have a paper notarized to get pt placed. House Sup and Main lobby notified.     1540- Called Ira to let her know this meeting is set up for tomorrow at 0900 and she stated, they needed to reschedule, everyone could not come up with an agreeable time. She said she would call either this Wednesday or Thursday to set up.   
Received report from [facility]. Patient transferred to room 221 in stable condition.  
Report called to [facility] [place] at the number provided by KARLO Cates, [phone]. Facility is to arrange transport. Pt being prepped for discharge.   
Report received from Kristyn/nurse.  
Secure chat and message left for MD swenson of [facility] 7.5 awaiting call return  
blood glucose testing/insulin therapy/routine screenings